# Patient Record
Sex: MALE | Race: WHITE | Employment: OTHER | URBAN - METROPOLITAN AREA
[De-identification: names, ages, dates, MRNs, and addresses within clinical notes are randomized per-mention and may not be internally consistent; named-entity substitution may affect disease eponyms.]

---

## 2017-01-04 RX ORDER — CLOPIDOGREL BISULFATE 75 MG/1
TABLET ORAL
Qty: 90 TAB | Refills: 3 | Status: SHIPPED | OUTPATIENT
Start: 2017-01-04 | End: 2018-02-16 | Stop reason: SDUPTHER

## 2017-01-04 RX ORDER — SIMVASTATIN 40 MG/1
TABLET, FILM COATED ORAL
Qty: 90 TAB | Refills: 3 | Status: SHIPPED | OUTPATIENT
Start: 2017-01-04 | End: 2018-02-18 | Stop reason: SDUPTHER

## 2017-01-04 RX ORDER — AMLODIPINE BESYLATE 5 MG/1
TABLET ORAL
Qty: 90 TAB | Refills: 3 | Status: SHIPPED | OUTPATIENT
Start: 2017-01-04 | End: 2018-02-18 | Stop reason: SDUPTHER

## 2017-03-03 ENCOUNTER — OFFICE VISIT (OUTPATIENT)
Dept: CARDIOLOGY CLINIC | Age: 82
End: 2017-03-03

## 2017-03-03 VITALS
SYSTOLIC BLOOD PRESSURE: 144 MMHG | DIASTOLIC BLOOD PRESSURE: 80 MMHG | OXYGEN SATURATION: 96 % | HEIGHT: 68 IN | BODY MASS INDEX: 25.16 KG/M2 | WEIGHT: 166 LBS | HEART RATE: 65 BPM

## 2017-03-03 DIAGNOSIS — I25.10 ATHEROSCLEROSIS OF NATIVE CORONARY ARTERY OF NATIVE HEART WITHOUT ANGINA PECTORIS: Primary | ICD-10-CM

## 2017-03-03 DIAGNOSIS — E78.5 DYSLIPIDEMIA: ICD-10-CM

## 2017-03-03 DIAGNOSIS — R09.89 BRUIT: ICD-10-CM

## 2017-03-03 DIAGNOSIS — I77.9 BILATERAL CAROTID ARTERY DISEASE (HCC): ICD-10-CM

## 2017-03-03 DIAGNOSIS — I44.7 LEFT BUNDLE BRANCH BLOCK: ICD-10-CM

## 2017-03-03 DIAGNOSIS — I11.9 BENIGN HYPERTENSIVE HEART DISEASE WITHOUT HEART FAILURE: ICD-10-CM

## 2017-03-03 NOTE — MR AVS SNAPSHOT
Visit Information Date & Time Provider Department Dept. Phone Encounter #  
 3/3/2017  8:00 AM Oma Stephens MD [facility] [phone] [id number] Follow-up Instructions Return in about 6 months (around 9/3/2017). Follow-up and Disposition History Your Appointments 6/22/2017  8:00 AM  
LAB with Yarely Varela MD  
[facility] [employer] (--) Appt Note: 6 mo f/u lab [address] [id number] [phone]  
  
   
 [address] [place] [address] [place] [address]  
  
    
 6/29/2017  8:00 AM  
ROUTINE CARE with Yarely Varela MD  
[facility] [employer] (--) Appt Note: 6 mo f/u &mwv   
 [address] [id number] [phone]  
  
   
 [address] [place] [address],[address] [place] [address] Upcoming Health Maintenance Date Due DTaP/Tdap/Td series (1 - Tdap) 2/5/1947 GLAUCOMA SCREENING Q2Y 4/1/2015 Pneumococcal 65+ High/Highest Risk (2 of 2 - PPSV23) 8/9/2016 MEDICARE YEARLY EXAM 6/15/2017 Allergies as of 3/3/2017  Review Complete On: 3/3/2017 By: Oma Stephens MD  
 No Known Allergies Current Immunizations  Reviewed on 11/10/2010 Name Date Influenza Vaccine Split 11/10/2010  9:13 AM  
  
 Not reviewed this visit You Were Diagnosed With   
  
 Codes Comments Atherosclerosis of native coronary artery of native heart without angina pectoris    -  Primary ICD-10-CM: I25.10 ICD-9-CM: 414.01 Bilateral carotid artery disease ([facility] [address]     ICD-10-CM: I77.9 ICD-9-CM: 447.9 Bruit     ICD-10-CM: R09.89 ICD-9-CM: 670. 9 Left bundle branch block     ICD-10-CM: I44.7 ICD-9-CM: 426.3 Benign hypertensive heart disease without heart failure     ICD-10-CM: I11.9 ICD-9-CM: 402.10 Dyslipidemia     ICD-10-CM: E78.5 ICD-9-CM: 272.4 Vitals BP  
  
  
  
  
  
 144/80 Vitals History BMI and BSA Data Body Mass Index Body Surface Area  
 25.24 kg/m 2 1.9 m 2 Preferred Pharmacy Pharmacy Name Phone Tiffani Cedillo [address] [place] [address] [phone] Your Updated Medication List  
  
   
This list is accurate as of: 3/3/17  8:59 AM.  Always use your most recent med list.  
  
  
  
  
 albuterol 90 mcg/actuation inhaler Commonly known as:  PROVENTIL HFA, VENTOLIN HFA, PROAIR HFA Take 2 Puffs by inhalation every four (4) hours as needed for Wheezing or Shortness of Breath. amLODIPine 5 mg tablet Commonly known as:  Suzon Salle TAKE 1 TABLET EVERY DAY  
  
 aspirin delayed-release 81 mg tablet Take 81 mg by mouth daily. clopidogrel 75 mg Tab Commonly known as:  PLAVIX TAKE 1 TABLET EVERY DAY  
  
 fluticasone-salmeterol 100-50 mcg/dose diskus inhaler Commonly known as:  ADVAIR DISKUS Take 1 Puff by inhalation daily. Indications: BRONCHIAL ASTHMA  
  
 simvastatin 40 mg tablet Commonly known as:  ZOCOR  
TAKE 1 TABLET EVERY NIGHT  
  
 tadalafil 20 mg tablet Commonly known as:  CIALIS Take 1 Tab by mouth as needed. tamsulosin 0.4 mg capsule Commonly known as:  FLOMAX Take 1 Cap by mouth daily. We Performed the Following AMB POC EKG ROUTINE W/ 12 LEADS, INTER & REP [25251 CPT(R)] Follow-up Instructions Return in about 6 months (around 9/3/2017). To-Do List   
 03/03/2017 Imaging:  DUPLEX CAROTID BILATERAL   
  
 03/08/2017 11:00 AM  
  Appointment with [facility] at [facility] ([phone]) No preparation is required for this study. Patient can have their meals and take their medications. Patient should not wear a turtleneck or high neck shirt for this study. Please report to the main location @ [address] [place] [address] approximately 30 minutes prior to your appointment time.   The entrance is located on the RIGHT side of the street, immediately adjacent to the Emergency Room. Introducing [facility] & HEALTH SERVICES! [facility] introduces LicenseStream patient portal. Now you can access parts of your medical record, email your doctor's office, and request medication refills online. 1. In your internet browser, go to [email or web address] 2. Click on the First Time User? Click Here link in the Sign In box. You will see the New Member Sign Up page. 3. Enter your LicenseStream Access Code exactly as it appears below. You will not need to use this code after youve completed the sign-up process. If you do not sign up before the expiration date, you must request a new code. · LicenseStream Access Code: [email or web address] Expires: 3/22/2017  8:10 AM 
 
4. Enter the last four digits of your Social Security Number (xxxx) and Date of Birth (mm/dd/yyyy) as indicated and click Submit. You will be taken to the next sign-up page. 5. Create a LicenseStream ID. This will be your LicenseStream login ID and cannot be changed, so think of one that is secure and easy to remember. 6. Create a LicenseStream password. You can change your password at any time. 7. Enter your Password Reset Question and Answer. This can be used at a later time if you forget your password. 8. Enter your e-mail address. You will receive e-mail notification when new information is available in [address] 9. Click Sign Up. You can now view and download portions of your medical record. 10. Click the Download Summary menu link to download a portable copy of your medical information. If you have questions, please visit the Frequently Asked Questions section of the LicenseStream website. Remember, LicenseStream is NOT to be used for urgent needs. For medical emergencies, dial 911. Now available from your iPhone and Android! Please provide this summary of care documentation to your next provider. Your primary care clinician is listed as DENISE HERRERA.  If you have any questions after today's visit, please call [phone].

## 2017-03-03 NOTE — PROGRESS NOTES
1. Have you been to the ER, urgent care clinic since your last visit? Hospitalized since your last visit? No    2. Have you seen or consulted any other health care providers outside of the [address] [place] [address] since your last visit? Include any pap smears or colon screening.  No

## 2017-03-13 ENCOUNTER — HOSPITAL ENCOUNTER (OUTPATIENT)
Dept: VASCULAR SURGERY | Age: 82
Discharge: HOME | End: 2017-03-13
Attending: INTERNAL MEDICINE
Payer: MEDICARE

## 2017-03-13 DIAGNOSIS — I25.10 ATHEROSCLEROSIS OF NATIVE CORONARY ARTERY OF NATIVE HEART WITHOUT ANGINA PECTORIS: ICD-10-CM

## 2017-03-13 DIAGNOSIS — R09.89 BRUIT: ICD-10-CM

## 2017-03-13 PROCEDURE — 93880 EXTRACRANIAL BILAT STUDY: CPT

## 2017-03-13 NOTE — PROGRESS NOTES
Bilateral carotid artery duplex exam complete; preliminary findings to follow. Armband removed and patient discharged.

## 2017-03-13 NOTE — PROCEDURES
[facility]  *** FINAL REPORT ***    Name: Brandyn Greenberg  MRN: [id number]    Outpatient  : 1926  HIS Order #: [id number]  [address] [facility] Visit #: [id number]  Date: 13 Mar 2017    TYPE OF TEST: Cerebrovascular Duplex    REASON FOR TEST  Carotid bruit    Right Carotid:-             Proximal               Mid                 Distal  cm/s  Systolic  Diastolic  Systolic  Diastolic  Systolic  Diastolic  CCA:     97.5       8.0       85.0      13.0       92.0      14.0  Bulb:  ECA:    187.0       0.0  ICA:    184.0      35.0      152.0      22.0       90.0      21.0  ICA/CCA:  2.0       2.5    ICA Stenosis: 50-69%    Right Vertebral:-  Finding: Antegrade  Sys:       81.0  Estefania:       19.0    Right Subclavian: <50% stenosis    Left Carotid:-            Proximal                Mid                 Distal  cm/s  Systolic  Diastolic  Systolic  Diastolic  Systolic  Diastolic  CCA:     93.0      14.0       94.0      14.0       86.0      14.0  Bulb:  ECA:    106.0       0.0  ICA:    258.0      42.0      275.0      57.0       84.0      17.0  ICA/CCA:  2.9       4.1    ICA Stenosis: 50-69%    Left Vertebral:-  Finding: Antegrade  Sys:       82.0  Estefania:       16.0    Left Subclavian: <50% stenosis    INTERPRETATION/FINDINGS  Duplex images were obtained using 2-D gray scale, color flow and  spectral doppler analysis. 1. Bilateral 50-69% stenosis of the internal carotid arteries. 2. No significant stenosis in the external carotid arteries  bilaterally. 3. Antegrade flow in both vertebral arteries. 4. Biphasic doppler signals in the bilateral subclavian arteries. 5. Brachial pressures are 140 mmhg on the right side and 150 mmhg on  the left side. Plaque Morphology:  1. Irregular varying density plaque in the bulb and right ICA. 2. Irregular varying density plaque in the bulb and left ICA.     ADDITIONAL COMMENTS  Retrospective Comparison: No significant changes of the bilateral  internal arteries when compared to previous exam performed 3/2/2016. I have personally reviewed the data relevant to the interpretation of  this  study. TECHNOLOGIST: Hipolito Hancock RVT  Signed: 03/13/2017 01:09 PM    PHYSICIAN: Angelo Farley MD  Signed: 03/14/2017 09:22 AM

## 2017-03-17 NOTE — PROGRESS NOTES
Per your last note \" Carotid artery disease. The patient has moderate bilateral ICA stenosis last evaluated by a carotid duplex in March 2016, which was unchanged compared to the year prior, but I would continue to reevaluate this on an annual basis. He remains on Plavix as well, which I would continue.

## 2017-04-20 ENCOUNTER — TELEPHONE (OUTPATIENT)
Dept: CARDIOLOGY CLINIC | Age: 82
End: 2017-04-20

## 2017-04-20 NOTE — TELEPHONE ENCOUNTER
----- Message from Darleen Farley MD sent at 3/22/2017  2:24 PM EDT -----  Patient's carotid artery disease remains unchanged on his recent follow-up duplex scan. This remains moderate bilaterally. We can continue to assess annually  ----- Message -----     From: Della Caro RN     Sent: 3/17/2017   4:12 PM       To: Darleen Farley MD    Per your last note \" Carotid artery disease. The patient has moderate bilateral ICA stenosis last evaluated by a carotid duplex in March 2016, which was unchanged compared to the year prior, but I would continue to reevaluate this on an annual basis. He remains on Plavix as well, which I would continue.

## 2017-06-22 ENCOUNTER — HOSPITAL ENCOUNTER (OUTPATIENT)
Dept: LAB | Age: 82
Discharge: HOME | End: 2017-06-22

## 2017-06-22 PROCEDURE — 99001 SPECIMEN HANDLING PT-LAB: CPT | Performed by: INTERNAL MEDICINE

## 2017-06-29 ENCOUNTER — OFFICE VISIT (OUTPATIENT)
Dept: INTERNAL MEDICINE CLINIC | Age: 82
End: 2017-06-29

## 2017-06-29 VITALS
TEMPERATURE: 97.7 F | RESPIRATION RATE: 16 BRPM | OXYGEN SATURATION: 96 % | WEIGHT: 163.9 LBS | HEART RATE: 61 BPM | BODY MASS INDEX: 24.84 KG/M2 | DIASTOLIC BLOOD PRESSURE: 62 MMHG | SYSTOLIC BLOOD PRESSURE: 139 MMHG | HEIGHT: 68 IN

## 2017-06-29 DIAGNOSIS — I25.10 ATHEROSCLEROSIS OF NATIVE CORONARY ARTERY OF NATIVE HEART WITHOUT ANGINA PECTORIS: ICD-10-CM

## 2017-06-29 DIAGNOSIS — R25.1 TREMOR OF RIGHT HAND: ICD-10-CM

## 2017-06-29 DIAGNOSIS — Z00.00 ROUTINE GENERAL MEDICAL EXAMINATION AT A HEALTH CARE FACILITY: Primary | ICD-10-CM

## 2017-06-29 DIAGNOSIS — E78.5 DYSLIPIDEMIA: ICD-10-CM

## 2017-06-29 DIAGNOSIS — I11.9 BENIGN HYPERTENSIVE HEART DISEASE WITHOUT HEART FAILURE: ICD-10-CM

## 2017-06-29 NOTE — PATIENT INSTRUCTIONS

## 2017-06-29 NOTE — MR AVS SNAPSHOT
Visit Information Date & Time Provider Department Dept. Phone Encounter #  
 6/29/2017  8:00 AM Hannah Davies MD [facility] [phone] Follow-up Instructions Return in about 6 months (around 12/29/2017) for labs 1 week before. Your Appointments 9/15/2017  8:20 AM  
Follow Up with Ky Chopra MD  
[facility] ([address]) Appt Note: 6 month follow up [place] [address] [address] [address] [phone] [facility] [address] Upcoming Health Maintenance Date Due DTaP/Tdap/Td series (1 - Tdap) 2/5/1947 GLAUCOMA SCREENING Q2Y 4/1/2015 Pneumococcal 65+ High/Highest Risk (2 of 2 - PPSV23) 8/9/2016 MEDICARE YEARLY EXAM 6/15/2017 Allergies as of 6/29/2017  Review Complete On: 6/29/2017 By: Hannah Davies MD  
 No Known Allergies Current Immunizations  Reviewed on 11/10/2010 Name Date Influenza Vaccine Split 11/10/2010  9:13 AM  
  
 Not reviewed this visit You Were Diagnosed With   
  
 Codes Comments Routine general medical examination at a health care facility    -  Primary ICD-10-CM: Z00.00 ICD-9-CM: V70.0 Vitals BP Pulse Temp Resp Height(growth percentile) Weight(growth percentile) 139/62 61 97.7 °F (36.5 °C) (Oral) 16 5' 8\" (1.727 m) 163 lb 14.4 oz (74.3 kg) SpO2 BMI Smoking Status 96% 24.92 kg/m2 Former Smoker Vitals History BMI and BSA Data Body Mass Index Body Surface Area 24.92 kg/m 2 1.89 m 2 Preferred Pharmacy Pharmacy Name Phone [address] [phone] Your Updated Medication List  
  
   
This list is accurate as of: 6/29/17  8:25 AM.  Always use your most recent med list.  
  
  
  
  
 albuterol 90 mcg/actuation inhaler Commonly known as:  PROVENTIL HFA, VENTOLIN HFA, PROAIR HFA Take 2 Puffs by inhalation every four (4) hours as needed for Wheezing or Shortness of Breath. amLODIPine 5 mg tablet Commonly known as:  Benjiman Floro TAKE 1 TABLET EVERY DAY  
  
 aspirin delayed-release 81 mg tablet Take 81 mg by mouth daily. clopidogrel 75 mg Tab Commonly known as:  PLAVIX TAKE 1 TABLET EVERY DAY  
  
 fluticasone-salmeterol 100-50 mcg/dose diskus inhaler Commonly known as:  ADVAIR DISKUS Take 1 Puff by inhalation daily. Indications: BRONCHIAL ASTHMA  
  
 simvastatin 40 mg tablet Commonly known as:  ZOCOR  
TAKE 1 TABLET EVERY NIGHT  
  
 tadalafil 20 mg tablet Commonly known as:  CIALIS Take 1 Tab by mouth as needed. tamsulosin 0.4 mg capsule Commonly known as:  FLOMAX Take 1 Cap by mouth daily. Follow-up Instructions Return in about 6 months (around 12/29/2017) for labs 1 week before. Patient Instructions A Healthy Lifestyle: Care Instructions Your Care Instructions A healthy lifestyle can help you feel good, stay at a healthy weight, and have plenty of energy for both work and play. A healthy lifestyle is something you can share with your whole family. A healthy lifestyle also can lower your risk for serious health problems, such as high blood pressure, heart disease, and diabetes. You can follow a few steps listed below to improve your health and the health of your family. Follow-up care is a key part of your treatment and safety. Be sure to make and go to all appointments, and call your doctor if you are having problems. Its also a good idea to know your test results and keep a list of the medicines you take. How can you care for yourself at home? · Do not eat too much sugar, fat, or fast foods. You can still have dessert and treats now and then. The goal is moderation. · Start small to improve your eating habits.  Pay attention to portion sizes, drink less juice and soda pop, and eat more fruits and vegetables. ¨ Eat a healthy amount of food. A 3-ounce serving of meat, for example, is about the size of a deck of cards. Fill the rest of your plate with vegetables and whole grains. ¨ Limit the amount of soda and sports drinks you have every day. Drink more water when you are thirsty. ¨ Eat at least 5 servings of fruits and vegetables every day. It may seem like a lot, but it is not hard to reach this goal. A serving or helping is 1 piece of fruit, 1 cup of vegetables, or 2 cups of leafy, raw vegetables. Have an apple or some carrot sticks as an afternoon snack instead of a candy bar. Try to have fruits and/or vegetables at every meal. 
· Make exercise part of your daily routine. You may want to start with simple activities, such as walking, bicycling, or slow swimming. Try to be active 30 to 60 minutes every day. You do not need to do all 30 to 60 minutes all at once. For example, you can exercise 3 times a day for 10 or 20 minutes. Moderate exercise is safe for most people, but it is always a good idea to talk to your doctor before starting an exercise program. 
· Keep moving. Javier Dies the lawn, work in the garden, or Zen Planner. Take the stairs instead of the elevator at work. · If you smoke, quit. People who smoke have an increased risk for heart attack, stroke, cancer, and other lung illnesses. Quitting is hard, but there are ways to boost your chance of quitting tobacco for good. ¨ Use nicotine gum, patches, or lozenges. ¨ Ask your doctor about stop-smoking programs and medicines. ¨ Keep trying. In addition to reducing your risk of diseases in the future, you will notice some benefits soon after you stop using tobacco. If you have shortness of breath or asthma symptoms, they will likely get better within a few weeks after you quit. · Limit how much alcohol you drink.  Moderate amounts of alcohol (up to 2 drinks a day for men, 1 drink a day for women) are okay. But drinking too much can lead to liver problems, high blood pressure, and other health problems. Family health If you have a family, there are many things you can do together to improve your health. · Eat meals together as a family as often as possible. · Eat healthy foods. This includes fruits, vegetables, lean meats and dairy, and whole grains. · Include your family in your fitness plan. Most people think of activities such as jogging or tennis as the way to fitness, but there are many ways you and your family can be more active. Anything that makes you breathe hard and gets your heart pumping is exercise. Here are some tips: 
¨ Walk to do errands or to take your child to school or the bus. ¨ Go for a family bike ride after dinner instead of watching TV. Where can you learn more? Go to http://monty-nirali.info/. Enter E677 in the search box to learn more about \"A Healthy Lifestyle: Care Instructions. \" Current as of: July 26, 2016 Content Version: 11.3 © 1491-5750 Bridg. Care instructions adapted under license by [employer] (which disclaims liability or warranty for this information). If you have questions about a medical condition or this instruction, always ask your healthcare professional. [address] any warranty or liability for your use of this information. Introducing [facility] & HEALTH SERVICES! Shyann Field introduces [email or web address] patient portal. Now you can access parts of your medical record, email your doctor's office, and request medication refills online. 1. In your internet browser, go to [email or web address] 2. Click on the First Time User? Click Here link in the Sign In box. You will see the New Member Sign Up page. 3. Enter your [email or web address] Access Code exactly as it appears below.  You will not need to use this code after youve completed the sign-up process. If you do not sign up before the expiration date, you must request a new code. · Xignite Access Code: [email or web address] Expires: 7/1/2017  2:26 PM 
 
4. Enter the last four digits of your Social Security Number (xxxx) and Date of Birth (mm/dd/yyyy) as indicated and click Submit. You will be taken to the next sign-up page. 5. Create a Xignite ID. This will be your Xignite login ID and cannot be changed, so think of one that is secure and easy to remember. 6. Create a Xignite password. You can change your password at any time. 7. Enter your Password Reset Question and Answer. This can be used at a later time if you forget your password. 8. Enter your e-mail address. You will receive e-mail notification when new information is available in [address]. 9. Click Sign Up. You can now view and download portions of your medical record. 10. Click the Download Summary menu link to download a portable copy of your medical information. If you have questions, please visit the Frequently Asked Questions section of the Xignite website. Remember, Xignite is NOT to be used for urgent needs. For medical emergencies, dial 911. Now available from your iPhone and Android! Please provide this summary of care documentation to your next provider. Your primary care clinician is listed as DENISE HERRERA. If you have any questions after today's visit, please call [phone].

## 2017-06-29 NOTE — PROGRESS NOTES
Subjective:       Chief Complaint  The patient presents for follow up of hypertension and high cholesterol. Asthma         HPI  Billy Segura is a 80 y.o. male seen for follow up of hyperlipidemia. Healponcho has hypertension. Hypertension well controlled, no significant medication side effects noted, on Norvasc , hyperlipidemia well controlled, no significant medication side effects noted, on Zocor. Diet and Lifestyle: generally follows a low fat low cholesterol diet, exercises regularly    Home BP Monitoring: is not measured at home. Other symptoms and concerns: Pt using Advair and albuterol intermittently for his asthma. Pt doing very well for his age. Bowling and playing Golf up to 3x/week. Pt c/o tremor left hand for the last year but it has not worsened and does not prevent him from doing his daily activities. Current Outpatient Prescriptions   Medication Sig    simvastatin (ZOCOR) 40 mg tablet TAKE 1 TABLET EVERY NIGHT    clopidogrel (PLAVIX) 75 mg tab TAKE 1 TABLET EVERY DAY    amLODIPine (NORVASC) 5 mg tablet TAKE 1 TABLET EVERY DAY    fluticasone-salmeterol (ADVAIR DISKUS) 100-50 mcg/dose diskus inhaler Take 1 Puff by inhalation daily. Indications: BRONCHIAL ASTHMA    tamsulosin (FLOMAX) 0.4 mg capsule Take 1 Cap by mouth daily.  aspirin delayed-release 81 mg tablet Take 81 mg by mouth daily.  tadalafil (CIALIS) 20 mg tablet Take 1 Tab by mouth as needed.  albuterol (PROVENTIL HFA, VENTOLIN HFA, PROAIR HFA) 90 mcg/actuation inhaler Take 2 Puffs by inhalation every four (4) hours as needed for Wheezing or Shortness of Breath. No current facility-administered medications for this visit.               Review of Systems  Respiratory: negative for dyspnea on exertion  Cardiovascular: negative for chest pain    Objective:     Visit Vitals    /62    Pulse 61    Temp 97.7 °F (36.5 °C) (Oral)    Resp 16    Ht 5' 8\" (1.727 m)    Wt 163 lb 14.4 oz (74.3 kg)    SpO2 96%    BMI 24.92 kg/m2        General appearance - alert, well appearing, and in no distress  Neck - supple, no significant adenopathy, carotids upstroke normal bilaterally, no bruits  Chest - clear to auscultation, no wheezes, rales or rhonchi, symmetric air entry  Heart - normal rate, regular rhythm, normal S1, S2, no murmurs, rubs, clicks or gallops  Extremities - peripheral pulses normal, no pedal edema, no clubbing or cyanosis  Skin - normal coloration and turgor, no rashes, no suspicious skin lesions noted      Labs:   Lab Results   Component Value Date/Time    Cholesterol, total 113 06/22/2017 07:51 AM    HDL Cholesterol 49 06/22/2017 07:51 AM    LDL, calculated 45 06/22/2017 07:51 AM    Triglyceride 97 06/22/2017 07:51 AM    CHOL/HDL Ratio 4.5 11/09/2010 07:51 AM     Lab Results   Component Value Date/Time    ALT (SGPT) 12 06/22/2017 07:51 AM    AST (SGOT) 13 06/22/2017 07:51 AM    Alk. phosphatase 45 06/22/2017 07:51 AM    Bilirubin, total 0.5 06/22/2017 07:51 AM     Lab Results   Component Value Date/Time    GFR est AA 90 06/22/2017 07:51 AM    GFR est non-AA 78 06/22/2017 07:51 AM    Creatinine 0.80 06/22/2017 07:51 AM    BUN 15 06/22/2017 07:51 AM    Sodium 145 06/22/2017 07:51 AM    Potassium 4.1 06/22/2017 07:51 AM    Chloride 103 06/22/2017 07:51 AM    CO2 25 06/22/2017 07:51 AM            Assessment / Plan     Hypertension well controlled, on Norvasc   Hyperlipidemia well controlled, on Zocor. ICD-10-CM ICD-9-CM    1. Routine general medical examination at a health care facility Z00.00 V70.0    2. Hypertension I11.9 402.10 Well controlled on current meds METABOLIC PANEL, COMPREHENSIVE   3. Dyslipidemia E78.5 272.4 Well controlled on statin LIPID PANEL   4. Atherosclerosis of native coronary artery of native heart without angina pectoris I25.10 414.01 Stable on current meds. Pt is followed by cardiology    5. Tremor of right hand R25.1 781.0 Pt will continue to monitor.  If worsening will refer to neurology                  Low cholesterol diet, weight control and daily exercise discussed. Follow-up Disposition:  Return in about 6 months (around 12/29/2017) for labs 1 week before. Reviewed plan of care. Patient has pro`vided input and agrees with goals.

## 2017-06-29 NOTE — PROGRESS NOTES
Pt is here for 6 month follow up. HTN, Chol. Labs done  Medicare Wellness Exam   Pt c/o tremors in his L hand, that are visible to this nurse. 1. Have you been to the ER, urgent care clinic since your last visit? Hospitalized since your last visit? No    2. Have you seen or consulted any other health care providers outside of the [address] [place] [address] since your last visit? Include any pap smears or colon screening. No        This is a Subsequent Medicare Annual Wellness Visit providing Personalized Prevention Plan Services (PPPS) (Performed 12 months after initial AWV and PPPS )    I have reviewed the patient's medical history in detail and updated the computerized patient record. History     Past Medical History:   Diagnosis Date    Asthma     Cardiac cath 04/28/2011    oLM 30%. pLAD 30%. oD1 50%. CX 90% (3 x 18 [place] DEMAR). dRCA 90%. RPLB subtotal.  RPDA 100%.  Cardiac echocardiogram 01/21/2014    EF 55-60%. Mod LVH. Gr 1 DDfx. Mild AS (mean grad 13). Mild AI. Unchanged from study of 11/30/11.  Cardiac nuclear imaging test, mod risk 01/22/2016    Intermediate risk. Medium-sized inferior, inferoseptal infarction. No ischemia. EF 54%. Nondiagnostic EKG on pharm stress test.    Carotid duplex 03/02/2016    Mod 50-69% bilateral ICA stenosis. Probable significant RECA stenosis. >50% stenosis of left subclavian. No significant change from study of 1/8/15.  Coronary artery disease     Status post PCI with drug-eluting stent, [place] 3 x 18 mm in the proximal circumflex.  Hx of carotid artery disease ([facility]     Hypercholesterolemia     Hypertension     Prostate cancer [facility])       Past Surgical History:   Procedure Laterality Date    HX CORONARY STENT PLACEMENT  4/28/11    PCI with drug-eluting stent, [place] 3 x 18 mm in the proximal circumflex (post dialated with 3.25 mm noncompliant balloon at high pressure).      Current Outpatient Prescriptions   Medication Sig Dispense Refill    simvastatin (ZOCOR) 40 mg tablet TAKE 1 TABLET EVERY NIGHT 90 Tab 3    clopidogrel (PLAVIX) 75 mg tab TAKE 1 TABLET EVERY DAY 90 Tab 3    amLODIPine (NORVASC) 5 mg tablet TAKE 1 TABLET EVERY DAY 90 Tab 3    fluticasone-salmeterol (ADVAIR DISKUS) 100-50 mcg/dose diskus inhaler Take 1 Puff by inhalation daily. Indications: BRONCHIAL ASTHMA 3 Inhaler 3    tamsulosin (FLOMAX) 0.4 mg capsule Take 1 Cap by mouth daily. 30 Cap 5    aspirin delayed-release 81 mg tablet Take 81 mg by mouth daily.  tadalafil (CIALIS) 20 mg tablet Take 1 Tab by mouth as needed. 3 Tab 0    albuterol (PROVENTIL HFA, VENTOLIN HFA, PROAIR HFA) 90 mcg/actuation inhaler Take 2 Puffs by inhalation every four (4) hours as needed for Wheezing or Shortness of Breath. 1 Inhaler 1     No Known Allergies  History reviewed. No pertinent family history. Social History   Substance Use Topics    Smoking status: Former Smoker     Packs/day: 1.00     Years: 15.00     Quit date: 5/28/1960    Smokeless tobacco: Never Used    Alcohol use No     Patient Active Problem List   Diagnosis Code    Asthma J45.909    Prostate cancer ([facility] C61    Hypercholesterolemia E78.00    [facility], [facility] I20.9    Left bundle branch block I44.7    Coronary artery disease I25.10    Hypertension I11.9    Dyslipidemia E78.5    Carotid artery disease (HCC) I77.9       Depression Risk Factor Screening:     PHQ over the last two weeks 6/29/2017   Little interest or pleasure in doing things Not at all   Feeling down, depressed or hopeless Not at all   Total Score PHQ 2 0     Alcohol Risk Factor Screening:   Pt drinks 1-2 glasses of red wine a week      Functional Ability and Level of Safety:     Hearing Loss   \"I don't hear like I used to\"    Activities of Daily Living   {No assistance with ADL's  Fall Risk     Fall Risk Assessment, last 12 mths 6/29/2017   Able to walk? Yes   Fall in past 12 months?  No     Abuse Screen   Pt is not abused. Review of Systems   A comprehensive review of systems was negative except for that written in the HPI. Physical Examination     Evaluation of Cognitive Function:  Mood/affect:  Very pleasant  Appearance does not look stated age  Family member/caregiver input: none    Visit Vitals    /62    Pulse 61    Temp 97.7 °F (36.5 °C) (Oral)    Resp 16    Ht 5' 8\" (1.727 m)    Wt 163 lb 14.4 oz (74.3 kg)    SpO2 96%    BMI 24.92 kg/m2       Patient Care Team:  Srinivas Flores MD as PCP - General  Jewel Torres MD as Physician (Cardiology)    Advice/Referrals/Counseling   Education and counseling provided:  Are appropriate based on today's review and evaluation  End-of-Life planning (with patient's consent)     Glaucoma Screening-  Has not been in many years. Due to age not interested in going since no problems currently   Pneumonia Vaccine- UTD  Shingles Vaccine- is due, patient declined today  Tdap Vaccine- is due, declined today  Colonoscopy- none on file, patient states last one performed was normal  PSA- last PSA less than 0.1 in 2013- d/t his age, not recommended to repeat  Advance Directive- none on file pt has one with daughter but confused on getting it to us. Assessment/Plan       ICD-10-CM ICD-9-CM    1. Routine general medical examination at a health care facility Z00.00 V70.0    2. Hypertension R82.6 840.29 METABOLIC PANEL, COMPREHENSIVE   3. Dyslipidemia E78.5 272.4 LIPID PANEL   4. Atherosclerosis of native coronary artery of native heart without angina pectoris I25.10 414.01    5. Tremor of right hand R25.1 781.0    . A comprehensive 5 year plan for medical care and screening exams was reviewed with pt and they received a copy of it.

## 2017-11-02 ENCOUNTER — OFFICE VISIT (OUTPATIENT)
Dept: CARDIOLOGY CLINIC | Age: 82
End: 2017-11-02

## 2017-11-02 VITALS
HEIGHT: 68 IN | BODY MASS INDEX: 24.71 KG/M2 | HEART RATE: 67 BPM | DIASTOLIC BLOOD PRESSURE: 70 MMHG | SYSTOLIC BLOOD PRESSURE: 140 MMHG | WEIGHT: 163 LBS | OXYGEN SATURATION: 97 %

## 2017-11-02 DIAGNOSIS — I11.9 BENIGN HYPERTENSIVE HEART DISEASE WITHOUT HEART FAILURE: ICD-10-CM

## 2017-11-02 DIAGNOSIS — I25.10 ATHEROSCLEROSIS OF NATIVE CORONARY ARTERY OF NATIVE HEART WITHOUT ANGINA PECTORIS: Primary | ICD-10-CM

## 2017-11-02 DIAGNOSIS — I44.7 LEFT BUNDLE BRANCH BLOCK: ICD-10-CM

## 2017-11-02 DIAGNOSIS — E78.00 HYPERCHOLESTEROLEMIA: ICD-10-CM

## 2017-11-02 DIAGNOSIS — I35.0 AORTIC VALVE STENOSIS, ETIOLOGY OF CARDIAC VALVE DISEASE UNSPECIFIED: ICD-10-CM

## 2017-11-02 DIAGNOSIS — I77.9 BILATERAL CAROTID ARTERY DISEASE (HCC): ICD-10-CM

## 2017-11-02 NOTE — MR AVS SNAPSHOT
Visit Information Date & Time Provider Department Dept. Phone Encounter #  
 11/2/2017 10:20 AM Lauren Rome MD [facility] [address] [id number] Your Appointments 12/28/2017  8:00 AM  
LAB with [facility] (BUNNY Akbar) Appt Note: 6 mo f/u lab [address] [facility] [address] [address]  
[phone]  
  
   
 [place] [facility] [address]  
  
    
 1/4/2018  8:00 AM  
Office Visit with Fernando Marroquin MD  
[address] [facility]) Appt Note: 6 mo f/u; .  
 [address], [address] [address]  
[phone]  
  
   
 [address] Upcoming Health Maintenance Date Due DTaP/Tdap/Td series (1 - Tdap) 2/5/1947 GLAUCOMA SCREENING Q2Y 4/1/2015 Pneumococcal 65+ High/Highest Risk (2 of 2 - PPSV23) 8/9/2016 MEDICARE YEARLY EXAM 6/30/2018 Allergies as of 11/2/2017  Review Complete On: 11/2/2017 By: Laila Waters No Known Allergies Current Immunizations  Reviewed on 11/10/2010 Name Date Influenza Vaccine Split 11/10/2010  9:13 AM  
  
 Not reviewed this visit You Were Diagnosed With   
  
 Codes Comments Atherosclerosis of native coronary artery of native heart without angina pectoris    -  Primary ICD-10-CM: I25.10 ICD-9-CM: 414.01 Aortic valve stenosis, etiology of cardiac valve disease unspecified     ICD-10-CM: I35.0 ICD-9-CM: 424.1 Vitals BP Pulse Height(growth percentile) Weight(growth percentile) SpO2 BMI  
 140/70 (BP 1 Location: Left arm, BP Patient Position: Sitting) 67 5' 8\" (1.727 m) 163 lb (73.9 kg) 97% 24.78 kg/m2 Smoking Status Former Smoker Vitals History BMI and BSA Data Body Mass Index Body Surface Area 24.78 kg/m 2 1.88 m 2 Preferred Pharmacy Pharmacy Name Phone [address] [phone] Your Updated Medication List  
  
   
This list is accurate as of: 11/2/17 10:43 AM.  Always use your most recent med list.  
  
  
  
  
 albuterol 90 mcg/actuation inhaler Commonly known as:  PROVENTIL HFA, VENTOLIN HFA, PROAIR HFA Take 2 Puffs by inhalation every four (4) hours as needed for Wheezing or Shortness of Breath. amLODIPine 5 mg tablet Commonly known as:  Dayanna Croon TAKE 1 TABLET EVERY DAY  
  
 aspirin delayed-release 81 mg tablet Take 81 mg by mouth daily. clopidogrel 75 mg Tab Commonly known as:  PLAVIX TAKE 1 TABLET EVERY DAY  
  
 fluticasone-salmeterol 100-50 mcg/dose diskus inhaler Commonly known as:  ADVAIR DISKUS Take 1 Puff by inhalation daily. Indications: BRONCHIAL ASTHMA  
  
 simvastatin 40 mg tablet Commonly known as:  ZOCOR  
TAKE 1 TABLET EVERY NIGHT  
  
 tadalafil 20 mg tablet Commonly known as:  CIALIS Take 1 Tab by mouth as needed. tamsulosin 0.4 mg capsule Commonly known as:  FLOMAX Take 1 Cap by mouth daily. We Performed the Following AMB POC EKG ROUTINE W/ 12 LEADS, INTER & REP [99661 CPT(R)] To-Do List   
 11/02/2017 ECHO:  2D ECHO COMPLETE ADULT (TTE) W OR WO CONTR   
  
 11/17/2017 12:30 PM  
  Appointment with HBV- [id number] MACHINE (WT ) at [facility] NON-INVASIVE CARD ([phone]) Age Limit for ALL Heart procedures @ all [facility] facilities: 18 yrs and older only. Under the age of 25, refer to [facility] ([phone]). Wt Limit: 350lbs. This study requires patient to bring a written physician's order or MD office may fax the order to Central Scheduling at [phone]. Patient needs to bring a current list of all medications. No preparation is required for this study. Patients should report 15 minutes prior to their appointment time to the [facility], [address] [place] [address]/[address]. Introducing [facility]! Ale Ruffin introduces [employer] patient portal. Now you can access parts of your medical record, email your doctor's office, and request medication refills online. 1. In your internet browser, go to [email or web address] 2. Click on the First Time User? Click Here link in the Sign In box. You will see the New Member Sign Up page. 3. Enter your [employer] Access Code exactly as it appears below. You will not need to use this code after youve completed the sign-up process. If you do not sign up before the expiration date, you must request a new code. · [employer] Access Code: [email or web address] Expires: 1/31/2018 10:30 AM 
 
4. Enter the last four digits of your Social Security Number (xxxx) and Date of Birth (mm/dd/yyyy) as indicated and click Submit. You will be taken to the next sign-up page. 5. Create a [employer]andera ID. This will be your [employer] login ID and cannot be changed, so think of one that is secure and easy to remember. 6. Create a [employer]era password. You can change your password at any time. 7. Enter your Password Reset Question and Answer. This can be used at a later time if you forget your password. 8. Enter your e-mail address. You will receive e-mail notification when new information is available in [address] 9. Click Sign Up. You can now view and download portions of your medical record. 10. Click the Download Summary menu link to download a portable copy of your medical information. If you have questions, please visit the Frequently Asked Questions section of the [employer] website. Remember, [employer]era is NOT to be used for urgent needs. For medical emergencies, dial 911. Now available from your iPhone and Android! Please provide this summary of care documentation to your next provider. Your primary care clinician is listed as DENISE HERRERA. If you have any questions after today's visit, please call [phone].

## 2017-11-02 NOTE — PROGRESS NOTES
HISTORY OF PRESENT ILLNESS  Erasto Willard is a 80 y.o. male. Heart Problem   Pertinent negatives include no chest pain, no abdominal pain, no headaches and no shortness of breath. Patient presents for a followup office visit. He was initially seen here for evaluation of angina. He subsequently underwent a cardiac catheterization April 2011, which revealed high-grade stenosis of his left circumflex, status post drug-eluting stent, [place] 3.0 x 18 mm. Patient also was found to have a heavily calcified vessels, including a 30% left main stenosis, 30% LAD stenosis, and RCA had diffuse distal 90% disease with collateralization from the left-sided. He last underwent a pharmacological nuclear stress test in January 2016, which showed a predominantly fixed inferior perfusion defect, likely from a previous MI, Low-normal left ventricular ejection fraction of 54%. This has not significantly changed compared to his prior study from 2013. He also has a history of moderate bilateral carotid artery disease, last evaluated by duplex in March 2017 which has remained unchanged over the past few years  . The patient was last seen in the office approximately 8 months ago. Since that time, he denies any change in his activity tolerance. He still tries to ball and golf one day a week respectively. He denies any exertional chest pain or shortness of breath. He continues to have a resting left hand tremor. He denies any leg swelling, no orthopnea, no PND. No palpitations, dizziness or divya syncope. Past Medical History:   Diagnosis Date    Asthma     Cardiac cath 04/28/2011    oLM 30%. pLAD 30%. oD1 50%. CX 90% (3 x 18 [place] DEMAR). dRCA 90%. RPLB subtotal.  RPDA 100%.  Cardiac echocardiogram 01/21/2014    EF 55-60%. Mod LVH. Gr 1 DDfx. Mild AS (mean grad 13). Mild AI. Unchanged from study of 11/30/11.  Cardiac nuclear imaging test, mod risk 01/22/2016    Intermediate risk.   Medium-sized inferior, inferoseptal infarction. No ischemia. EF 54%. Nondiagnostic EKG on pharm stress test.    Carotid duplex 03/02/2016    Mod 50-69% bilateral ICA stenosis. Probable significant RECA stenosis. >50% stenosis of left subclavian. No significant change from study of 1/8/15.  Coronary artery disease     Status post PCI with drug-eluting stent, [place] 3 x 18 mm in the proximal circumflex.  Hx of carotid artery disease (HCC)     Hypercholesterolemia     Hypertension     Prostate cancer (HCC)       Current Outpatient Prescriptions   Medication Sig Dispense Refill    simvastatin (ZOCOR) 40 mg tablet TAKE 1 TABLET EVERY NIGHT 90 Tab 3    clopidogrel (PLAVIX) 75 mg tab TAKE 1 TABLET EVERY DAY 90 Tab 3    amLODIPine (NORVASC) 5 mg tablet TAKE 1 TABLET EVERY DAY 90 Tab 3    tadalafil (CIALIS) 20 mg tablet Take 1 Tab by mouth as needed. 3 Tab 0    fluticasone-salmeterol (ADVAIR DISKUS) 100-50 mcg/dose diskus inhaler Take 1 Puff by inhalation daily. Indications: BRONCHIAL ASTHMA 3 Inhaler 3    albuterol (PROVENTIL HFA, VENTOLIN HFA, PROAIR HFA) 90 mcg/actuation inhaler Take 2 Puffs by inhalation every four (4) hours as needed for Wheezing or Shortness of Breath. 1 Inhaler 1    tamsulosin (FLOMAX) 0.4 mg capsule Take 1 Cap by mouth daily. 30 Cap 5    aspirin delayed-release 81 mg tablet Take 81 mg by mouth daily. No Known Allergies     Social History   Substance Use Topics    Smoking status: Former Smoker     Packs/day: 1.00     Years: 15.00     Quit date: 5/28/1960    Smokeless tobacco: Never Used    Alcohol use No         Review of Systems   Constitutional: Negative for chills, fever and weight loss. HENT: Negative for nosebleeds. Eyes: Negative for blurred vision and double vision. Respiratory: Negative for cough, shortness of breath and wheezing. Cardiovascular: Negative for chest pain, palpitations, orthopnea, claudication, leg swelling and PND.    Gastrointestinal: Negative for abdominal pain, heartburn, nausea and vomiting. Genitourinary: Negative for dysuria and hematuria. Musculoskeletal: Negative for falls and myalgias. Skin: Negative for rash. Neurological: Negative for dizziness, focal weakness and headaches. Endo/Heme/Allergies: Does not bruise/bleed easily. Psychiatric/Behavioral: Negative for substance abuse. Visit Vitals    /70 (BP 1 Location: Left arm, BP Patient Position: Sitting)    Pulse 67    Ht 5' 8\" (1.727 m)    Wt 73.9 kg (163 lb)    SpO2 97%    BMI 24.78 kg/m2      Physical Exam   Constitutional: He is oriented to person, place, and time. He appears well-developed and well-nourished. HENT:   Head: Normocephalic and atraumatic. Eyes: Conjunctivae are normal.   Neck: Neck supple. No JVD present. Carotid bruit is present (bilateral). Cardiovascular: Normal rate, regular rhythm, S1 normal, S2 normal and normal pulses. Exam reveals no gallop and no S3.    Murmur heard. Systolic murmur is present with a grade of 2/6  at the upper right sternal border  Pulmonary/Chest: Breath sounds normal. He has no wheezes. He has no rales. Abdominal: Soft. Bowel sounds are normal. There is no tenderness. Musculoskeletal: He exhibits no edema. Neurological: He is alert and oriented to person, place, and time. Skin: Skin is warm and dry. EKG: Normal sinus rhythm, leftward axis, left bundle branch block. Occasional PVCs. Compared to the previous EKG, PVCs are now present. ASSESSMENT and PLAN    Coronary disease. Status post drug-eluting stent to the left circumflex, with an [place] 3 x 18 mm stent in 2011. Patient has mild residual ostial left main disease and LAD disease, with heavy calcification, and significant distal RCA disease, which was well collateralized. He underwent a followup pharmacologic nuclear stress test in January 2016, which continued to demonstrate scarring of the inferior wall, but no significant ischemia.    No new symptoms concerning for angina. I would continue his aspirin, Plavix and simvastatin. He has never been on a beta blocker because of his low normal heart rate and left bundle branch block. Carotid artery disease. The patient has moderate bilateral ICA stenosis last evaluated by a carotid duplex in March 2017, which was unchanged compared to the year prior, but I would continue to reevaluate this on an annual basis. He remains on Plavix as well, which I would continue. Peripheral vascular disease. 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 Because of this his blood pressure should be checked in his right arm. Aortic valve stenosis. This was last checked on an echocardiogram in 2014. His murmur appears somewhat more pronounced on exam today, so I have recommended a repeat echocardiogram.    Hypertension. The patient's blood pressure  appears reasonably well-controlled on amlodipine as monotherapy. Dyslipidemia. On simvastatin 40 mg a day. This has been followed by his PCP. Historically this has been well-controlled. Left bundle branch block.  This is again present on today's EKG and has been intermittent in the past.    Followup in the office in 6 months time, sooner if needed

## 2017-11-02 NOTE — PROGRESS NOTES
1. Have you been to the ER, urgent care clinic since your last visit? Hospitalized since your last visit? no  2. Have you seen or consulted any other health care providers outside of the [address] [place] [address] since your last visit? Include any pap smears or colon screening.   no

## 2017-11-17 ENCOUNTER — HOSPITAL ENCOUNTER (OUTPATIENT)
Dept: NON INVASIVE DIAGNOSTICS | Age: 82
Discharge: HOME | End: 2017-11-17
Attending: INTERNAL MEDICINE
Payer: MEDICARE

## 2017-11-17 DIAGNOSIS — I35.0 AORTIC VALVE STENOSIS, ETIOLOGY OF CARDIAC VALVE DISEASE UNSPECIFIED: ICD-10-CM

## 2017-11-17 PROCEDURE — 93306 TTE W/DOPPLER COMPLETE: CPT

## 2017-11-20 NOTE — PROGRESS NOTES
Aortic valve stenosis. This was last checked on an echocardiogram in 2014.   His murmur appears somewhat more pronounced on exam today, so I have recommended a repeat echocardiogram.

## 2017-11-21 ENCOUNTER — TELEPHONE (OUTPATIENT)
Dept: CARDIOLOGY CLINIC | Age: 82
End: 2017-11-21

## 2017-11-21 NOTE — TELEPHONE ENCOUNTER
----- Message from Helene Bone MD sent at 11/21/2017 11:22 AM EST -----  Please let the patient know that his aortic valve stenosis was in the mild range and this is nothing to be concerned about at this point  ----- Message -----     From: Jermaine Naif     Sent: 11/20/2017  10:18 AM       To: Helene Bone MD    Aortic valve stenosis. This was last checked on an echocardiogram in 2014.   His murmur appears somewhat more pronounced on exam today, so I have recommended a repeat echocardiogram.

## 2017-11-21 NOTE — LETTER
11/28/2017 2:58 PM 
 
Mr. Eliza Katz [facility] [address] Dear Mr. Melottiam Gunderson, We have been unable to reach you by phone to notify you of your test results. Please call our office at [phone] and ask to speak with my nurse in order to explain these results to you and advise you of any recommendations. Sincerely, Karen Roach MD

## 2017-12-28 ENCOUNTER — HOSPITAL ENCOUNTER (OUTPATIENT)
Dept: LAB | Age: 82
Discharge: HOME | End: 2017-12-28

## 2017-12-28 ENCOUNTER — LAB ONLY (OUTPATIENT)
Dept: FAMILY MEDICINE CLINIC | Age: 82
End: 2017-12-28

## 2017-12-28 DIAGNOSIS — I11.9 BENIGN HYPERTENSIVE HEART DISEASE WITHOUT HEART FAILURE: Primary | ICD-10-CM

## 2017-12-28 DIAGNOSIS — E78.5 DYSLIPIDEMIA: ICD-10-CM

## 2017-12-28 PROCEDURE — 99001 SPECIMEN HANDLING PT-LAB: CPT | Performed by: INTERNAL MEDICINE

## 2017-12-29 LAB
ALBUMIN SERPL-MCNC: 4.4 G/DL (ref 3.2–4.6)
ALBUMIN/GLOB SERPL: 1.7 {RATIO} (ref 1.2–2.2)
ALP SERPL-CCNC: 45 IU/L (ref 39–117)
ALT SERPL-CCNC: 9 IU/L (ref 0–44)
AST SERPL-CCNC: 13 IU/L (ref 0–40)
BILIRUB SERPL-MCNC: 0.6 MG/DL (ref 0–1.2)
BUN SERPL-MCNC: 17 MG/DL (ref 10–36)
BUN/CREAT SERPL: 17 (ref 10–24)
CALCIUM SERPL-MCNC: 9.3 MG/DL (ref 8.6–10.2)
CHLORIDE SERPL-SCNC: 102 MMOL/L (ref 96–106)
CHOLEST SERPL-MCNC: 124 MG/DL (ref 100–199)
CO2 SERPL-SCNC: 29 MMOL/L (ref 18–29)
CREAT SERPL-MCNC: 1.03 MG/DL (ref 0.76–1.27)
GLOBULIN SER CALC-MCNC: 2.6 G/DL (ref 1.5–4.5)
GLUCOSE SERPL-MCNC: 114 MG/DL (ref 65–99)
HDLC SERPL-MCNC: 43 MG/DL
LDLC SERPL CALC-MCNC: 54 MG/DL (ref 0–99)
POTASSIUM SERPL-SCNC: 4 MMOL/L (ref 3.5–5.2)
PROT SERPL-MCNC: 7 G/DL (ref 6–8.5)
SODIUM SERPL-SCNC: 144 MMOL/L (ref 134–144)
TRIGL SERPL-MCNC: 134 MG/DL (ref 0–149)
VLDLC SERPL CALC-MCNC: 27 MG/DL (ref 5–40)

## 2018-01-01 ENCOUNTER — TELEPHONE (OUTPATIENT)
Dept: CARDIOLOGY CLINIC | Age: 83
End: 2018-01-01

## 2018-01-01 ENCOUNTER — HOSPITAL ENCOUNTER (OUTPATIENT)
Dept: LAB | Age: 83
Discharge: HOME | End: 2018-07-24

## 2018-01-01 ENCOUNTER — OFFICE VISIT (OUTPATIENT)
Dept: FAMILY MEDICINE CLINIC | Age: 83
End: 2018-01-01

## 2018-01-01 VITALS
HEART RATE: 66 BPM | OXYGEN SATURATION: 99 % | HEIGHT: 68 IN | TEMPERATURE: 98.2 F | WEIGHT: 164 LBS | DIASTOLIC BLOOD PRESSURE: 60 MMHG | SYSTOLIC BLOOD PRESSURE: 122 MMHG | BODY MASS INDEX: 24.86 KG/M2 | RESPIRATION RATE: 20 BRPM

## 2018-01-01 DIAGNOSIS — I25.10 ATHEROSCLEROSIS OF NATIVE CORONARY ARTERY OF NATIVE HEART WITHOUT ANGINA PECTORIS: ICD-10-CM

## 2018-01-01 DIAGNOSIS — I11.9 BENIGN HYPERTENSIVE HEART DISEASE WITHOUT HEART FAILURE: ICD-10-CM

## 2018-01-01 DIAGNOSIS — Z00.00 MEDICARE ANNUAL WELLNESS VISIT, SUBSEQUENT: Primary | ICD-10-CM

## 2018-01-01 DIAGNOSIS — E78.5 DYSLIPIDEMIA: ICD-10-CM

## 2018-01-01 DIAGNOSIS — I77.9 BILATERAL CAROTID ARTERY DISEASE (HCC): ICD-10-CM

## 2018-01-01 LAB
ALBUMIN SERPL-MCNC: 3.9 G/DL (ref 3.2–4.6)
ALBUMIN/GLOB SERPL: 1.4 {RATIO} (ref 1.2–2.2)
ALP SERPL-CCNC: 45 IU/L (ref 39–117)
ALT SERPL-CCNC: 7 IU/L (ref 0–44)
AST SERPL-CCNC: 10 IU/L (ref 0–40)
BILIRUB SERPL-MCNC: 0.3 MG/DL (ref 0–1.2)
BUN SERPL-MCNC: 14 MG/DL (ref 10–36)
BUN/CREAT SERPL: 12 (ref 10–24)
CALCIUM SERPL-MCNC: 9.4 MG/DL (ref 8.6–10.2)
CHLORIDE SERPL-SCNC: 103 MMOL/L (ref 96–106)
CHOLEST SERPL-MCNC: 117 MG/DL (ref 100–199)
CO2 SERPL-SCNC: 26 MMOL/L (ref 20–29)
CREAT SERPL-MCNC: 1.16 MG/DL (ref 0.76–1.27)
GLOBULIN SER CALC-MCNC: 2.8 G/DL (ref 1.5–4.5)
GLUCOSE SERPL-MCNC: 125 MG/DL (ref 65–99)
HDLC SERPL-MCNC: 36 MG/DL
LDLC SERPL CALC-MCNC: 39 MG/DL (ref 0–99)
POTASSIUM SERPL-SCNC: 4.1 MMOL/L (ref 3.5–5.2)
PROT SERPL-MCNC: 6.7 G/DL (ref 6–8.5)
SODIUM SERPL-SCNC: 143 MMOL/L (ref 134–144)
TRIGL SERPL-MCNC: 208 MG/DL (ref 0–149)
VLDLC SERPL CALC-MCNC: 42 MG/DL (ref 5–40)

## 2018-01-01 PROCEDURE — 99001 SPECIMEN HANDLING PT-LAB: CPT | Performed by: INTERNAL MEDICINE

## 2018-01-23 ENCOUNTER — TELEPHONE (OUTPATIENT)
Dept: FAMILY MEDICINE CLINIC | Age: 83
End: 2018-01-23

## 2018-01-23 NOTE — TELEPHONE ENCOUNTER
Pt daughter Sonia Holt called concerned about pt left arm shaking. Ms. Sonia Holt states that its gotten worse of time, it started from the hand on up to his arm. Ms. Sonia Holt is also requesting for a call after his appt tomorrow if possible from nurse and/or physician, being that she will not be able to come with the pt to his appt.

## 2018-01-24 ENCOUNTER — TELEPHONE (OUTPATIENT)
Dept: FAMILY MEDICINE CLINIC | Age: 83
End: 2018-01-24

## 2018-01-24 ENCOUNTER — OFFICE VISIT (OUTPATIENT)
Dept: FAMILY MEDICINE CLINIC | Age: 83
End: 2018-01-24

## 2018-01-24 VITALS
BODY MASS INDEX: 24.86 KG/M2 | OXYGEN SATURATION: 98 % | DIASTOLIC BLOOD PRESSURE: 60 MMHG | HEART RATE: 70 BPM | WEIGHT: 164 LBS | TEMPERATURE: 97.7 F | SYSTOLIC BLOOD PRESSURE: 140 MMHG | RESPIRATION RATE: 20 BRPM | HEIGHT: 68 IN

## 2018-01-24 DIAGNOSIS — I11.9 BENIGN HYPERTENSIVE HEART DISEASE WITHOUT HEART FAILURE: Primary | ICD-10-CM

## 2018-01-24 DIAGNOSIS — I77.9 BILATERAL CAROTID ARTERY DISEASE (HCC): ICD-10-CM

## 2018-01-24 DIAGNOSIS — I25.10 ATHEROSCLEROSIS OF NATIVE CORONARY ARTERY OF NATIVE HEART WITHOUT ANGINA PECTORIS: ICD-10-CM

## 2018-01-24 DIAGNOSIS — R25.1 TREMOR OF LEFT HAND: ICD-10-CM

## 2018-01-24 DIAGNOSIS — E78.5 DYSLIPIDEMIA: ICD-10-CM

## 2018-01-24 NOTE — MR AVS SNAPSHOT
[facility] 
 
 
 [address] 203 [address] Debbie Thomas [address] 
[phone] Patient: Shelly Enamorado MRN: [id number] Visit Information Date & Time Provider Department Dept. Phone Encounter #  
 1/24/2018  8:00 AM Subhash Wayne, [address] [place] [address] [phone] [id number] Follow-up Instructions Return in about 6 months (around 7/24/2018) for OV, and Medicare Wellness Visit, labs 1 week before. Your Appointments 5/21/2018 10:40 AM  
Follow Up with Shonda Fung MD  
[facility] [place] [address] ([facility]) Appt Note: 6 month follow up Kyle Gustafson [id number] [phone] [address] Upcoming Health Maintenance Date Due DTaP/Tdap/Td series (1 - Tdap) 2/5/1947 GLAUCOMA SCREENING Q2Y 4/1/2015 Pneumococcal 65+ High/Highest Risk (2 of 2 - PPSV23) 8/9/2016 MEDICARE YEARLY EXAM 6/30/2018 Allergies as of 1/24/2018  Review Complete On: 1/24/2018 By: Subhash Wayne MD  
 No Known Allergies Current Immunizations  Reviewed on 11/10/2010 Name Date Influenza Vaccine Split 11/10/2010  9:13 AM  
  
 Not reviewed this visit You Were Diagnosed With   
  
 Codes Comments Benign hypertensive heart disease without heart failure    -  Primary ICD-10-CM: I11.9 ICD-9-CM: 402.10 Dyslipidemia     ICD-10-CM: E78.5 ICD-9-CM: 272.4 Bilateral carotid artery disease ([address]     ICD-10-CM: I77.9 ICD-9-CM: 425. 9 Atherosclerosis of native coronary artery of native heart without angina pectoris     ICD-10-CM: I25.10 ICD-9-CM: 414.01 Vitals BP Pulse Temp Resp Height(growth percentile) Weight(growth percentile) 140/60 70 97.7 °F (36.5 °C) (Oral) 20 5' 8\" (1.727 m) 164 lb (74.4 kg) SpO2 BMI Smoking Status 98% 24.94 kg/m2 Former Smoker Vitals History BMI and BSA Data Body Mass Index Body Surface Area 24.94 kg/m 2 1.89 m 2 Preferred Pharmacy Pharmacy Name Phone Tiffani Cedillo [address] [place] [address] [address] [phone] Your Updated Medication List  
  
   
This list is accurate as of: 1/24/18  8:25 AM.  Always use your most recent med list.  
  
  
  
  
 albuterol 90 mcg/actuation inhaler Commonly known as:  PROVENTIL HFA, VENTOLIN HFA, PROAIR HFA Take 2 Puffs by inhalation every four (4) hours as needed for Wheezing or Shortness of Breath. amLODIPine 5 mg tablet Commonly known as:  Paulnia [place] TAKE 1 TABLET EVERY DAY  
  
 aspirin delayed-release 81 mg tablet Take 81 mg by mouth daily. clopidogrel 75 mg Tab Commonly known as:  PLAVIX TAKE 1 TABLET EVERY DAY  
  
 fluticasone-salmeterol 100-50 mcg/dose diskus inhaler Commonly known as:  ADVAIR DISKUS Take 1 Puff by inhalation daily. Indications: BRONCHIAL ASTHMA  
  
 simvastatin 40 mg tablet Commonly known as:  ZOCOR  
TAKE 1 TABLET EVERY NIGHT  
  
 tadalafil 20 mg tablet Commonly known as:  CIALIS Take 1 Tab by mouth as needed. tamsulosin 0.4 mg capsule Commonly known as:  FLOMAX Take 1 Cap by mouth daily. Follow-up Instructions Return in about 6 months (around 7/24/2018) for , and Medicare Wellness Visit, labs 1 week before. Patient Instructions High Blood Pressure: Care Instructions Your Care Instructions If your blood pressure is usually above 140/90, you have high blood pressure, or hypertension. That means the top number is 140 or higher or the bottom number is 90 or higher, or both. Despite what a lot of people think, high blood pressure usually doesn't cause headaches or make you feel dizzy or lightheaded. It usually has no symptoms. But it does increase your risk for heart attack, stroke, and kidney or eye damage.  The higher your blood pressure, the more your risk increases. Your doctor will give you a goal for your blood pressure. Your goal will be based on your health and your age. An example of a goal is to keep your blood pressure below 140/90. Lifestyle changes, such as eating healthy and being active, are always important to help lower blood pressure. You might also take medicine to reach your blood pressure goal. 
Follow-up care is a key part of your treatment and safety. Be sure to make and go to all appointments, and call your doctor if you are having problems. It's also a good idea to know your test results and keep a list of the medicines you take. How can you care for yourself at home? Medical treatment · If you stop taking your medicine, your blood pressure will go back up. You may take one or more types of medicine to lower your blood pressure. Be safe with medicines. Take your medicine exactly as prescribed. Call your doctor if you think you are having a problem with your medicine. · Talk to your doctor before you start taking aspirin every day. Aspirin can help certain people lower their risk of a heart attack or stroke. But taking aspirin isn't right for everyone, because it can cause serious bleeding. · See your doctor regularly. You may need to see the doctor more often at first or until your blood pressure comes down. · If you are taking blood pressure medicine, talk to your doctor before you take decongestants or anti-inflammatory medicine, such as ibuprofen. Some of these medicines can raise blood pressure. · Learn how to check your blood pressure at home. Lifestyle changes · Stay at a healthy weight. This is especially important if you put on weight around the waist. Losing even 10 pounds can help you lower your blood pressure. · If your doctor recommends it, get more exercise. Walking is a good choice. Bit by bit, increase the amount you walk every day. Try for at least 30 minutes on most days of the week.  You also may want to swim, bike, or do other activities. · Avoid or limit alcohol. Talk to your doctor about whether you can drink any alcohol. · Try to limit how much sodium you eat to less than 2,300 milligrams (mg) a day. Your doctor may ask you to try to eat less than 1,500 mg a day. · Eat plenty of fruits (such as bananas and oranges), vegetables, legumes, whole grains, and low-fat dairy products. · Lower the amount of saturated fat in your diet. Saturated fat is found in animal products such as milk, cheese, and meat. Limiting these foods may help you lose weight and also lower your risk for heart disease. · Do not smoke. Smoking increases your risk for heart attack and stroke. If you need help quitting, talk to your doctor about stop-smoking programs and medicines. These can increase your chances of quitting for good. When should you call for help? Call 911 anytime you think you may need emergency care. This may mean having symptoms that suggest that your blood pressure is causing a serious heart or blood vessel problem. Your blood pressure may be over 180/110. ? For example, call 911 if: 
? · You have symptoms of a heart attack. These may include: ¨ Chest pain or pressure, or a strange feeling in the chest. 
¨ Sweating. ¨ Shortness of breath. ¨ Nausea or vomiting. ¨ Pain, pressure, or a strange feeling in the back, neck, jaw, or upper belly or in one or both shoulders or arms. ¨ Lightheadedness or sudden weakness. ¨ A fast or irregular heartbeat. ? · You have symptoms of a stroke. These may include: 
¨ Sudden numbness, tingling, weakness, or loss of movement in your face, arm, or leg, especially on only one side of your body. ¨ Sudden vision changes. ¨ Sudden trouble speaking. ¨ Sudden confusion or trouble understanding simple statements. ¨ Sudden problems with walking or balance. ¨ A sudden, severe headache that is different from past headaches. ? · You have severe back or belly pain. ?Do not wait until your blood pressure comes down on its own. Get help right away. ?Call your doctor now or seek immediate care if: 
? · Your blood pressure is much higher than normal (such as 180/110 or higher), but you don't have symptoms. ? · You think high blood pressure is causing symptoms, such as: ¨ Severe headache. ¨ Blurry vision. ? Watch closely for changes in your health, and be sure to contact your doctor if: 
? · Your blood pressure measures 140/90 or higher at least 2 times. That means the top number is 140 or higher or the bottom number is 90 or higher, or both. ? · You think you may be having side effects from your blood pressure medicine. ? · Your blood pressure is usually normal, but it goes above normal at least 2 times. Where can you learn more? Go to http://monty-nirali.info/. Enter T710 in the search box to learn more about \"High Blood Pressure: Care Instructions. \" Current as of: September 21, 2016 Content Version: 11.4 © 3904-7754 [employer]. Care instructions adapted under license by [employer] (which disclaims liability or warranty for this information). If you have questions about a medical condition or this instruction, always ask your healthcare professional. [address] any warranty or liability for your use of this information. Introducing [facility]! Carmelina Mantilla introduces Visual [employer] patient portal. Now you can access parts of your medical record, email your doctor's office, and request medication refills online. 1. In your internet browser, go to [email or web address] 2. Click on the First Time User? Click Here link in the Sign In box. You will see the New Member Sign Up page. 3. Enter your Visual Revenue Access Code exactly as it appears below. You will not need to use this code after youve completed the sign-up process.  If you do not sign up before the expiration date, you must request a new code. · Remotemedical Access Code: [email or web address] Expires: 1/31/2018  9:30 AM 
 
4. Enter the last four digits of your Social Security Number (xxxx) and Date of Birth (mm/dd/yyyy) as indicated and click Submit. You will be taken to the next sign-up page. 5. Create a Remotemedical ID. This will be your Remotemedical login ID and cannot be changed, so think of one that is secure and easy to remember. 6. Create a Remotemedical password. You can change your password at any time. 7. Enter your Password Reset Question and Answer. This can be used at a later time if you forget your password. 8. Enter your e-mail address. You will receive e-mail notification when new information is available in [address] 9. Click Sign Up. You can now view and download portions of your medical record. 10. Click the Download Summary menu link to download a portable copy of your medical information. If you have questions, please visit the Frequently Asked Questions section of the Remotemedical website. Remember, Remotemedical is NOT to be used for urgent needs. For medical emergencies, dial 911. Now available from your iPhone and Android! Please provide this summary of care documentation to your next provider. Your primary care clinician is listed as DENISE HERRERA. If you have any questions after today's visit, please call [phone].

## 2018-01-24 NOTE — PROGRESS NOTES
Patient is in the office today for a 6 month follow up. 1. Have you been to the ER, urgent care clinic since your last visit? Hospitalized since your last visit? No    2. Have you seen or consulted any other health care providers outside of the [address] [place] [address] since your last visit? Include any pap smears or colon screening.  No

## 2018-01-24 NOTE — PROGRESS NOTES
Subjective:       Chief Complaint  The patient presents for follow up of hypertension and high cholesterol. Asthma, CAD         HPI  Guerda Roberto is a 80 y.o. male seen for follow up of hyperlipidemia. Healso has hypertension. Hypertension well controlled, no significant medication side effects noted, on Norvasc , hyperlipidemia well controlled, no significant medication side effects noted, on Zocor. Diet and Lifestyle: generally follows a low fat low cholesterol diet, exercises regularly    Home BP Monitoring: is not measured at home. Other symptoms and concerns: Pt using Advair and albuterol intermittently for his asthma. Pt doing very well for his age. Bowling and playing Golf up to 3x/week. Pt c/o tremor left hand for the last year but it has not worsened and does not prevent him from doing his daily activities. Due to his age would not pursue w/u unless pt's lifestyle is affected. Pt has h/o CAD and stent placement for which he is on Zocor. He is doing well and followed by cardiology. He has moderate Carotid artery disease that has been stable on imaging. He is on Plavix and Zocor and doing well. Current Outpatient Prescriptions   Medication Sig    simvastatin (ZOCOR) 40 mg tablet TAKE 1 TABLET EVERY NIGHT    clopidogrel (PLAVIX) 75 mg tab TAKE 1 TABLET EVERY DAY    amLODIPine (NORVASC) 5 mg tablet TAKE 1 TABLET EVERY DAY    fluticasone-salmeterol (ADVAIR DISKUS) 100-50 mcg/dose diskus inhaler Take 1 Puff by inhalation daily. Indications: BRONCHIAL ASTHMA    tamsulosin (FLOMAX) 0.4 mg capsule Take 1 Cap by mouth daily.  aspirin delayed-release 81 mg tablet Take 81 mg by mouth daily.  tadalafil (CIALIS) 20 mg tablet Take 1 Tab by mouth as needed.  albuterol (PROVENTIL HFA, VENTOLIN HFA, PROAIR HFA) 90 mcg/actuation inhaler Take 2 Puffs by inhalation every four (4) hours as needed for Wheezing or Shortness of Breath.      No current facility-administered medications for this visit. Review of Systems  Respiratory: negative for dyspnea on exertion  Cardiovascular: negative for chest pain    Objective:     Visit Vitals    /60    Pulse 70    Temp 97.7 °F (36.5 °C) (Oral)    Resp 20    Ht 5' 8\" (1.727 m)    Wt 164 lb (74.4 kg)    SpO2 98%    BMI 24.94 kg/m2        General appearance - alert, well appearing, and in no distress  Neck - supple, no significant adenopathy, carotids upstroke normal bilaterally, no bruits  Chest - clear to auscultation, no wheezes, rales or rhonchi, symmetric air entry  Heart - normal rate, regular rhythm, normal S1, S2, no murmurs, rubs, clicks or gallops  Extremities - peripheral pulses normal, no pedal edema, no clubbing or cyanosis  Skin - normal coloration and turgor, no rashes, no suspicious skin lesions noted  Neuro- tremor left hand that is constant     Labs:   Lab Results   Component Value Date/Time    Cholesterol, total 124 12/28/2017 08:00 AM    HDL Cholesterol 43 12/28/2017 08:00 AM    LDL, calculated 54 12/28/2017 08:00 AM    Triglyceride 134 12/28/2017 08:00 AM    CHOL/HDL Ratio 4.5 11/09/2010 07:51 AM     Lab Results   Component Value Date/Time    ALT (SGPT) 9 12/28/2017 08:00 AM    AST (SGOT) 13 12/28/2017 08:00 AM    Alk. phosphatase 45 12/28/2017 08:00 AM    Bilirubin, total 0.6 12/28/2017 08:00 AM     Lab Results   Component Value Date/Time    GFR est AA 73 12/28/2017 08:00 AM    GFR est non-AA 63 12/28/2017 08:00 AM    Creatinine 1.03 12/28/2017 08:00 AM    BUN 17 12/28/2017 08:00 AM    Sodium 144 12/28/2017 08:00 AM    Potassium 4.0 12/28/2017 08:00 AM    Chloride 102 12/28/2017 08:00 AM    CO2 29 12/28/2017 08:00 AM            Assessment / Plan     Hypertension well controlled, on Norvasc   Hyperlipidemia well controlled, on Zocor. ICD-10-CM ICD-9-CM    1. Hypertension M95.7 072.71 METABOLIC PANEL, COMPREHENSIVE   2.  Dyslipidemia E78.5 272.4 LIPID PANEL   3. Bilateral carotid artery disease (HCC) I77.9 447.9 Stable on Zocor, Plavix and ASA   4. Atherosclerosis of native coronary artery of native heart without angina pectoris I25.10 414.01 Pt is stable on current meds and followed by cardiology    5. Tremor of left hand R25.1 781.0 Stable. Due to age Would only pursue w/u if pt's life style becomes affected. Low cholesterol diet, weight control and daily exercise discussed. Follow-up Disposition:  Return in about 6 months (around 7/24/2018) for OV, and Medicare Wellness Visit, labs 1 week before. Reviewed plan of care. Patient has pro`vided input and agrees with goals.

## 2018-01-24 NOTE — PATIENT INSTRUCTIONS
High Blood Pressure: Care Instructions  Your Care Instructions    If your blood pressure is usually above 140/90, you have high blood pressure, or hypertension. That means the top number is 140 or higher or the bottom number is 90 or higher, or both. Despite what a lot of people think, high blood pressure usually doesn't cause headaches or make you feel dizzy or lightheaded. It usually has no symptoms. But it does increase your risk for heart attack, stroke, and kidney or eye damage. The higher your blood pressure, the more your risk increases. Your doctor will give you a goal for your blood pressure. Your goal will be based on your health and your age. An example of a goal is to keep your blood pressure below 140/90. Lifestyle changes, such as eating healthy and being active, are always important to help lower blood pressure. You might also take medicine to reach your blood pressure goal.  Follow-up care is a key part of your treatment and safety. Be sure to make and go to all appointments, and call your doctor if you are having problems. It's also a good idea to know your test results and keep a list of the medicines you take. How can you care for yourself at home? Medical treatment  · If you stop taking your medicine, your blood pressure will go back up. You may take one or more types of medicine to lower your blood pressure. Be safe with medicines. Take your medicine exactly as prescribed. Call your doctor if you think you are having a problem with your medicine. · Talk to your doctor before you start taking aspirin every day. Aspirin can help certain people lower their risk of a heart attack or stroke. But taking aspirin isn't right for everyone, because it can cause serious bleeding. · See your doctor regularly. You may need to see the doctor more often at first or until your blood pressure comes down.   · If you are taking blood pressure medicine, talk to your doctor before you take decongestants or anti-inflammatory medicine, such as ibuprofen. Some of these medicines can raise blood pressure. · Learn how to check your blood pressure at home. Lifestyle changes  · Stay at a healthy weight. This is especially important if you put on weight around the waist. Losing even 10 pounds can help you lower your blood pressure. · If your doctor recommends it, get more exercise. Walking is a good choice. Bit by bit, increase the amount you walk every day. Try for at least 30 minutes on most days of the week. You also may want to swim, bike, or do other activities. · Avoid or limit alcohol. Talk to your doctor about whether you can drink any alcohol. · Try to limit how much sodium you eat to less than 2,300 milligrams (mg) a day. Your doctor may ask you to try to eat less than 1,500 mg a day. · Eat plenty of fruits (such as bananas and oranges), vegetables, legumes, whole grains, and low-fat dairy products. · Lower the amount of saturated fat in your diet. Saturated fat is found in animal products such as milk, cheese, and meat. Limiting these foods may help you lose weight and also lower your risk for heart disease. · Do not smoke. Smoking increases your risk for heart attack and stroke. If you need help quitting, talk to your doctor about stop-smoking programs and medicines. These can increase your chances of quitting for good. When should you call for help? Call 911 anytime you think you may need emergency care. This may mean having symptoms that suggest that your blood pressure is causing a serious heart or blood vessel problem. Your blood pressure may be over 180/110. ? For example, call 911 if:  ? · You have symptoms of a heart attack. These may include:  ¨ Chest pain or pressure, or a strange feeling in the chest.  ¨ Sweating. ¨ Shortness of breath. ¨ Nausea or vomiting.   ¨ Pain, pressure, or a strange feeling in the back, neck, jaw, or upper belly or in one or both shoulders or arms.  ¨ Lightheadedness or sudden weakness. ¨ A fast or irregular heartbeat. ? · You have symptoms of a stroke. These may include:  ¨ Sudden numbness, tingling, weakness, or loss of movement in your face, arm, or leg, especially on only one side of your body. ¨ Sudden vision changes. ¨ Sudden trouble speaking. ¨ Sudden confusion or trouble understanding simple statements. ¨ Sudden problems with walking or balance. ¨ A sudden, severe headache that is different from past headaches. ? · You have severe back or belly pain. ?Do not wait until your blood pressure comes down on its own. Get help right away. ?Call your doctor now or seek immediate care if:  ? · Your blood pressure is much higher than normal (such as 180/110 or higher), but you don't have symptoms. ? · You think high blood pressure is causing symptoms, such as:  ¨ Severe headache. ¨ Blurry vision. ? Watch closely for changes in your health, and be sure to contact your doctor if:  ? · Your blood pressure measures 140/90 or higher at least 2 times. That means the top number is 140 or higher or the bottom number is 90 or higher, or both. ? · You think you may be having side effects from your blood pressure medicine. ? · Your blood pressure is usually normal, but it goes above normal at least 2 times. Where can you learn more? Go to http://monty-nirali.info/. Enter A422 in the search box to learn more about \"High Blood Pressure: Care Instructions. \"  Current as of: September 21, 2016  Content Version: 11.4  © 6465-3076 [employer]. Care instructions adapted under license by [employer] (which disclaims liability or warranty for this information). If you have questions about a medical condition or this instruction, always ask your healthcare professional. [place] [address] any warranty or liability for your use of this information.

## 2018-01-25 NOTE — TELEPHONE ENCOUNTER
Talked to daughter and she will monitor if his tremor worsens. If it does will do CT head and refer to neurology for further evaluation Pt has good quality of life currently.

## 2018-02-18 RX ORDER — SIMVASTATIN 40 MG/1
TABLET, FILM COATED ORAL
Qty: 90 TAB | Refills: 3 | Status: SHIPPED | OUTPATIENT
Start: 2018-02-18 | End: 2019-01-01 | Stop reason: SDUPTHER

## 2018-02-18 RX ORDER — CLOPIDOGREL BISULFATE 75 MG/1
TABLET ORAL
Qty: 90 TAB | Refills: 3 | Status: SHIPPED | OUTPATIENT
Start: 2018-02-18 | End: 2019-01-01 | Stop reason: SDUPTHER

## 2018-02-18 RX ORDER — AMLODIPINE BESYLATE 5 MG/1
TABLET ORAL
Qty: 90 TAB | Refills: 3 | Status: SHIPPED | OUTPATIENT
Start: 2018-02-18 | End: 2019-01-01 | Stop reason: SDUPTHER

## 2018-02-19 ENCOUNTER — TELEPHONE (OUTPATIENT)
Dept: FAMILY MEDICINE CLINIC | Age: 83
End: 2018-02-19

## 2018-02-19 NOTE — TELEPHONE ENCOUNTER
Pt walked in today and stated that [facility] will not fill his Plavix without speaking to office. Advised that the rx was sent over on Friday but he stated that they need authorization.

## 2018-02-19 NOTE — TELEPHONE ENCOUNTER
Pt states he needed to RX sent to the pharmacy. [facility]  Pt aware Dr Nisha Meade sent Plavix, simvastatin & Norvasc in to [facility] last night just after 7pm.

## 2018-06-21 ENCOUNTER — OFFICE VISIT (OUTPATIENT)
Dept: CARDIOLOGY CLINIC | Age: 83
End: 2018-06-21

## 2018-06-21 VITALS
DIASTOLIC BLOOD PRESSURE: 82 MMHG | WEIGHT: 164 LBS | BODY MASS INDEX: 24.86 KG/M2 | SYSTOLIC BLOOD PRESSURE: 138 MMHG | HEART RATE: 64 BPM | OXYGEN SATURATION: 96 % | HEIGHT: 68 IN

## 2018-06-21 DIAGNOSIS — E78.00 HYPERCHOLESTEROLEMIA: ICD-10-CM

## 2018-06-21 DIAGNOSIS — I77.9 BILATERAL CAROTID ARTERY DISEASE (HCC): ICD-10-CM

## 2018-06-21 DIAGNOSIS — I44.7 LEFT BUNDLE BRANCH BLOCK: ICD-10-CM

## 2018-06-21 DIAGNOSIS — I11.9 BENIGN HYPERTENSIVE HEART DISEASE WITHOUT HEART FAILURE: ICD-10-CM

## 2018-06-21 DIAGNOSIS — I35.0 AORTIC VALVE STENOSIS, ETIOLOGY OF CARDIAC VALVE DISEASE UNSPECIFIED: ICD-10-CM

## 2018-06-21 DIAGNOSIS — I25.10 ATHEROSCLEROSIS OF NATIVE CORONARY ARTERY OF NATIVE HEART WITHOUT ANGINA PECTORIS: Primary | ICD-10-CM

## 2018-06-21 NOTE — PROGRESS NOTES
1. Have you been to the ER, urgent care clinic since your last visit? Hospitalized since your last visit? No    2. Have you seen or consulted any other health care providers outside of the [address] [place] [address] since your last visit? Include any pap smears or colon screening.  No

## 2018-06-21 NOTE — MR AVS SNAPSHOT
[address] [address] [address] [address] [phone] Patient: Miguelina Davis MRN: [id number] NXY:4/5/1965 Visit Information Date & Time Provider Department Dept. Phone Encounter #  
 6/21/2018  9:20 AM Zahida Taylor MD [facility] [id number] Follow-up Instructions Return in about 6 months (around 12/21/2018). Your Appointments 7/17/2018  7:25 AM  
LAB with [facility] (BUNNY Akbar) Appt Note: Labs 1 week before [address] [facility] [address] [address]  
[phone]  
  
   
 [address], [facility]  
  
    
 7/24/2018  8:45 AM  
Office Visit with Reina Lopez MD  
[address] [place] [address]) Appt Note: OV / Medicare Wellness Visit [address] [facility] [address] [address]  
[phone]  
  
   
 [address], [facility] [address] [address]  
  
    
 12/28/2018  9:40 AM  
Follow Up with Zahida Taylor MD  
[facility] ([address]) Appt Note: 6 mo f/u  
 [address] [address] [address] [address]  
[phone] [address] Upcoming Health Maintenance Date Due DTaP/Tdap/Td series (1 - Tdap) 2/5/1947 GLAUCOMA SCREENING Q2Y 4/1/2015 Pneumococcal 65+ High/Highest Risk (2 of 2 - PPSV23) 8/9/2016 MEDICARE YEARLY EXAM 6/30/2018 Allergies as of 6/21/2018  Review Complete On: 1/24/2018 By: Reina Lopez MD  
 No Known Allergies Current Immunizations  Reviewed on 11/10/2010 Name Date Influenza Vaccine Split 11/10/2010  9:13 AM  
  
 Not reviewed this visit You Were Diagnosed With   
  
 Codes Comments Bilateral carotid bruits    -  Primary ICD-10-CM: R09.89 ICD-9-CM: 785.9 Aortic valve stenosis, etiology of cardiac valve disease unspecified     ICD-10-CM: I35.0 ICD-9-CM: 424.1 Left bundle branch block     ICD-10-CM: I44.7 ICD-9-CM: 426.3 Atherosclerosis of native coronary artery of native heart without angina pectoris     ICD-10-CM: I25.10 ICD-9-CM: 414.01 Benign hypertensive heart disease without heart failure     ICD-10-CM: I11.9 ICD-9-CM: 402.10 Bilateral carotid artery disease ([facility] [address]     ICD-10-CM: I77.9 ICD-9-CM: 386. 9 Vitals BP Pulse Height(growth percentile) Weight(growth percentile) SpO2 BMI  
 138/82 64 5' 8\" (1.727 m) 164 lb (74.4 kg) 96% 24.94 kg/m2 Smoking Status Former Smoker BMI and BSA Data Body Mass Index Body Surface Area 24.94 kg/m 2 1.89 m 2 Preferred Pharmacy Pharmacy Name Phone [address] [phone] Your Updated Medication List  
  
   
This list is accurate as of 6/21/18 10:08 AM.  Always use your most recent med list.  
  
  
  
  
 albuterol 90 mcg/actuation inhaler Commonly known as:  PROVENTIL HFA, VENTOLIN HFA, PROAIR HFA Take 2 Puffs by inhalation every four (4) hours as needed for Wheezing or Shortness of Breath. amLODIPine 5 mg tablet Commonly known as:  Skip Newcomer TAKE 1 TABLET EVERY DAY  
  
 aspirin delayed-release 81 mg tablet Take 81 mg by mouth daily. clopidogrel 75 mg Tab Commonly known as:  PLAVIX TAKE 1 TABLET EVERY DAY  
  
 fluticasone-salmeterol 100-50 mcg/dose diskus inhaler Commonly known as:  ADVAIR DISKUS Take 1 Puff by inhalation daily. Indications: BRONCHIAL ASTHMA  
  
 simvastatin 40 mg tablet Commonly known as:  ZOCOR  
TAKE 1 TABLET EVERY NIGHT  
  
 tadalafil 20 mg tablet Commonly known as:  CIALIS Take 1 Tab by mouth as needed. tamsulosin 0.4 mg capsule Commonly known as:  FLOMAX Take 1 Cap by mouth daily. We Performed the Following AMB POC EKG ROUTINE W/ 12 LEADS, INTER & REP [06929 CPT(R)] Follow-up Instructions Return in about 6 months (around 12/21/2018). To-Do List   
 06/21/2018 Vascular/US:  DUPLEX CAROTID BILATERAL Introducing [facility]! Deann Castorena introduces [employer] patient portal. Now you can access parts of your medical record, email your doctor's office, and request medication refills online. 1. In your internet browser, go to https://[employer]). [employer]) 2. Click on the First Time User? Click Here link in the Sign In box. You will see the New Member Sign Up page. 3. Enter your In[employer] Access Code exactly as it appears below. You will not need to use this code after youve completed the sign-up process. If you do not sign up before the expiration date, you must request a new code. · In[employer] Access Code: [email or web address] Expires: 9/19/2018 10:08 AM 
 
4. Enter the last four digits of your Social Security Number (xxxx) and Date of Birth (mm/dd/yyyy) as indicated and click Submit. You will be taken to the next sign-up page. 5. Create a In[employer] ID. This will be your [employer] login ID and cannot be changed, so think of one that is secure and easy to remember. 6. Create a [employer] password. You can change your password at any time. 7. Enter your Password Reset Question and Answer. This can be used at a later time if you forget your password. 8. Enter your e-mail address. You will receive e-mail notification when new information is available in [address] 9. Click Sign Up. You can now view and download portions of your medical record. 10. Click the Download Summary menu link to download a portable copy of your medical information. If you have questions, please visit the Frequently Asked Questions section of the [employer] website. Remember, [employer] is NOT to be used for urgent needs. For medical emergencies, dial 911. Now available from your iPhone and Android! Please provide this summary of care documentation to your next provider. Your primary care clinician is listed as DENISE HERRERA. If you have any questions after today's visit, please call [phone].

## 2018-06-21 NOTE — PROGRESS NOTES
HISTORY OF PRESENT ILLNESS  Mirian Pickens is a 80 y.o. male. Follow-up   Pertinent negatives include no chest pain, no abdominal pain, no headaches and no shortness of breath. Heart Problem   Pertinent negatives include no chest pain, no abdominal pain, no headaches and no shortness of breath. Patient presents for a followup office visit. He was initially seen here for evaluation of angina. He subsequently underwent a cardiac catheterization April 2011, which revealed high-grade stenosis of his left circumflex, status post drug-eluting stent, endeavor 3.0 x 18 mm. Patient also was found to have a heavily calcified vessels, including a 30% left main stenosis, 30% LAD stenosis, and RCA had diffuse distal 90% disease with collateralization from the left-sided. He last underwent a pharmacological nuclear stress test in January 2016, which showed a predominantly fixed inferior perfusion defect, likely from a previous MI, Low-normal left ventricular ejection fraction of 54%. This has not significantly changed compared to his prior study from 2013. He also has a history of moderate bilateral carotid artery disease, last evaluated by duplex in March 2017 which has remained unchanged over the past few years  . The patient underwent a follow-up echocardiogram in November 2017 was unchanged compared to his prior study. EF 55 6%, grade 1 diastolic dysfunction, mild aortic valve stenosis with mild aortic root enlargement. He was last in the office approximately 7 months ago. Since that time, he states he has been feeling well. No major change in his activity level. He still tries to bowl once a week and golf every other week. No new chest pain or shortness of breath, no leg swelling, orthopnea, PND. Past Medical History:   Diagnosis Date    Asthma     Cardiac cath 04/28/2011    oLM 30%. pLAD 30%. oD1 50%. CX 90% (3 x 18 [place] DEMAR). dRCA 90%. RPLB subtotal.  RPDA 100%.       Cardiac echocardiogram 01/21/2014    EF 55-60%. Mod LVH. Gr 1 DDfx. Mild AS (mean grad 13). Mild AI. Unchanged from study of 11/30/11.  Cardiac nuclear imaging test, mod risk 01/22/2016    Intermediate risk. Medium-sized inferior, inferoseptal infarction. No ischemia. EF 54%. Nondiagnostic EKG on pharm stress test.    Carotid duplex 03/02/2016    Mod 50-69% bilateral ICA stenosis. Probable significant RECA stenosis. >50% stenosis of left subclavian. No significant change from study of 1/8/15.  Coronary artery disease     Status post PCI with drug-eluting stent, Witt 3 x 18 mm in the proximal circumflex.  Hx of carotid artery disease (HCC)     Hypercholesterolemia     Hypertension     Prostate cancer (HCC)       Current Outpatient Prescriptions   Medication Sig Dispense Refill    amLODIPine (NORVASC) 5 mg tablet TAKE 1 TABLET EVERY DAY 90 Tab 3    simvastatin (ZOCOR) 40 mg tablet TAKE 1 TABLET EVERY NIGHT 90 Tab 3    clopidogrel (PLAVIX) 75 mg tab TAKE 1 TABLET EVERY DAY 90 Tab 3    tadalafil (CIALIS) 20 mg tablet Take 1 Tab by mouth as needed. 3 Tab 0    fluticasone-salmeterol (ADVAIR DISKUS) 100-50 mcg/dose diskus inhaler Take 1 Puff by inhalation daily. Indications: BRONCHIAL ASTHMA 3 Inhaler 3    albuterol (PROVENTIL HFA, VENTOLIN HFA, PROAIR HFA) 90 mcg/actuation inhaler Take 2 Puffs by inhalation every four (4) hours as needed for Wheezing or Shortness of Breath. 1 Inhaler 1    tamsulosin (FLOMAX) 0.4 mg capsule Take 1 Cap by mouth daily. 30 Cap 5    aspirin delayed-release 81 mg tablet Take 81 mg by mouth daily. No Known Allergies     Social History   Substance Use Topics    Smoking status: Former Smoker     Packs/day: 1.00     Years: 15.00     Quit date: 5/28/1960    Smokeless tobacco: Never Used    Alcohol use No         Review of Systems   Constitutional: Negative for chills, fever and weight loss. HENT: Negative for nosebleeds.     Eyes: Negative for blurred vision and double vision. Respiratory: Negative for cough, shortness of breath and wheezing. Cardiovascular: Negative for chest pain, palpitations, orthopnea, claudication, leg swelling and PND. Gastrointestinal: Negative for abdominal pain, heartburn, nausea and vomiting. Genitourinary: Negative for dysuria and hematuria. Musculoskeletal: Negative for falls and myalgias. Skin: Negative for rash. Neurological: Negative for dizziness, focal weakness and headaches. Endo/Heme/Allergies: Does not bruise/bleed easily. Psychiatric/Behavioral: Negative for substance abuse. Visit Vitals    /82    Pulse 64    Ht 5' 8\" (1.727 m)    Wt 74.4 kg (164 lb)    SpO2 96%    BMI 24.94 kg/m2      Physical Exam   Constitutional: He is oriented to person, place, and time. He appears well-developed and well-nourished. HENT:   Head: Normocephalic and atraumatic. Eyes: Conjunctivae are normal.   Neck: Neck supple. No JVD present. Carotid bruit is present (bilateral). Cardiovascular: Normal rate, regular rhythm, S1 normal, S2 normal and normal pulses. Exam reveals no gallop and no S3.    Murmur heard. Systolic murmur is present with a grade of 2/6  at the upper right sternal border  Pulmonary/Chest: Breath sounds normal. He has no wheezes. He has no rales. Abdominal: Soft. Bowel sounds are normal. There is no tenderness. Musculoskeletal: He exhibits no edema. Neurological: He is alert and oriented to person, place, and time. Skin: Skin is warm and dry. EKG: Normal sinus rhythm, leftward axis, left bundle branch block. Compared to the previous EKG, PVCs are no longer present. ASSESSMENT and PLAN    Coronary disease. Status post drug-eluting stent to the left circumflex, with an [place] 3 x 18 mm stent in 2011. Patient has mild residual ostial left main disease and LAD disease, with heavy calcification, and significant distal RCA disease, which was well collateralized.   He underwent a followup pharmacologic nuclear stress test in January 2016, which continued to demonstrate scarring of the inferior wall, but no significant ischemia. No new symptoms concerning for angina. I would continue his current medical regimen. Carotid artery disease. The patient has moderate bilateral ICA stenosis last evaluated by a carotid duplex in March 2017, which was unchanged compared to the year prior. I would like to repeat a carotid duplex scan later this year. Peripheral vascular disease. 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 Because of this his blood pressure should be checked in his right arm. Aortic valve stenosis. This remains in the mild range on a follow-up echocardiogram in November 2017. Hypertension. The patient's blood pressure  appears reasonably well-controlled on amlodipine as monotherapy. Dyslipidemia. On simvastatin 40 mg a day. This has been followed by his PCP. Historically this has been well-controlled. Left bundle branch block.  This is again present on today's EKG and has been intermittent in the past.    Followup in the office in 6 months time, sooner if needed

## 2018-06-27 ENCOUNTER — HOSPITAL ENCOUNTER (OUTPATIENT)
Dept: VASCULAR SURGERY | Age: 83
Discharge: HOME | End: 2018-06-27
Attending: INTERNAL MEDICINE
Payer: MEDICARE

## 2018-06-27 DIAGNOSIS — I35.0 AORTIC VALVE STENOSIS, ETIOLOGY OF CARDIAC VALVE DISEASE UNSPECIFIED: ICD-10-CM

## 2018-06-27 DIAGNOSIS — I77.9 BILATERAL CAROTID ARTERY DISEASE (HCC): ICD-10-CM

## 2018-06-27 DIAGNOSIS — I44.7 LEFT BUNDLE BRANCH BLOCK: ICD-10-CM

## 2018-06-27 DIAGNOSIS — I25.10 ATHEROSCLEROSIS OF NATIVE CORONARY ARTERY OF NATIVE HEART WITHOUT ANGINA PECTORIS: ICD-10-CM

## 2018-06-27 DIAGNOSIS — I11.9 BENIGN HYPERTENSIVE HEART DISEASE WITHOUT HEART FAILURE: ICD-10-CM

## 2018-06-27 LAB
LEFT CCA DIST DIAS: 70 CM/S
LEFT CCA DIST SYS: 77 CM/S
LEFT CCA PROX DIAS: 10 CM/S
LEFT CCA PROX SYS: 90 CM/S
LEFT ECA SYS: 117 CM/S
LEFT ICA DIST DIAS: 23 CM/S
LEFT ICA DIST SYS: 174 CM/S
LEFT ICA MID DIAS: 42 CM/S
LEFT ICA MID SYS: 283 CM/S
LEFT ICA PROX DIAS: 31 CM/S
LEFT ICA PROX SYS: 283 CM/S
LEFT SUBCLAVIAN SYS: 107 CM/S
LEFT VERTEBRAL SYS: 67 CM/S
RIGHT CCA DIST DIAS: 7 CM/S
RIGHT CCA DIST SYS: 69 CM/S
RIGHT CCA PROX DIAS: 8 CM/S
RIGHT CCA PROX SYS: 62 CM/S
RIGHT ECA SYS: 181 CM/S
RIGHT ICA DIST DIAS: 16 CM/S
RIGHT ICA DIST SYS: 106 CM/S
RIGHT ICA MID DIAS: 22 CM/S
RIGHT ICA MID SYS: 192 CM/S
RIGHT ICA PROX SYS: 178 CM/S
RIGHT SUBCLAVIAN SYS: 104 CM/S
RIGHT VERTEBRAL SYS: 61 CM/S

## 2018-06-27 PROCEDURE — 93880 EXTRACRANIAL BILAT STUDY: CPT

## 2018-06-28 NOTE — PROGRESS NOTES
Per your last note \" Carotid artery disease. The patient has moderate bilateral ICA stenosis last evaluated by a carotid duplex in March 2017, which was unchanged compared to the year prior. I would like to repeat a carotid duplex scan later this year.

## 2018-07-05 NOTE — TELEPHONE ENCOUNTER
----- Message from Lazaro Staples MD sent at 6/28/2018  8:09 AM EDT -----  Please let the patient know that his carotid artery disease was unchanged from last year remains moderate bilaterally  ----- Message -----     From: Jodi Carrillo RN     Sent: 6/28/2018   7:21 AM       To: Lazaro Staples MD    Per your last note \" Carotid artery disease. The patient has moderate bilateral ICA stenosis last evaluated by a carotid duplex in March 2017, which was unchanged compared to the year prior. I would like to repeat a carotid duplex scan later this year.

## 2018-07-24 NOTE — MR AVS SNAPSHOT
[address] [place] [address] 
 
 
 [address] [address] 
[phone] Patient: Keshawn Smith MRN: [id number] KIO:[birth year] Visit Information Date & Time Provider Department Dept. Phone Encounter #  
 7/24/2018  8:45 AM Jany Keys, [address] [place] [address] [address] [id number] Follow-up Instructions Return in about 6 months (around 1/24/2019). Your Appointments 12/28/2018  9:40 AM  
Follow Up with Kee Gallegos MD  
[facility] ([facility]) Appt Note: 6 mo f/u  
 [address] [facility] [id number] [address] [address] [phone] [address] [place] [address] [address] Upcoming Health Maintenance Date Due DTaP/Tdap/Td series (1 - Tdap) 2/5/1947 GLAUCOMA SCREENING Q2Y 4/1/2015 Pneumococcal 65+ High/Highest Risk (2 of 2 - PPSV23) 8/9/2016 MEDICARE YEARLY EXAM 6/30/2018 Allergies as of 7/24/2018  Review Complete On: 7/24/2018 By: Jany Keys MD  
 No Known Allergies Current Immunizations  Reviewed on 11/10/2010 Name Date Influenza Vaccine Split 11/10/2010  9:13 AM  
  
 Not reviewed this visit You Were Diagnosed With   
  
 Codes Comments Medicare annual wellness visit, subsequent    -  Primary ICD-10-CM: Z00.00 ICD-9-CM: V70.0 Benign hypertensive heart disease without heart failure     ICD-10-CM: I11.9 ICD-9-CM: 402.10 Dyslipidemia     ICD-10-CM: E78.5 ICD-9-CM: 272.4 Bilateral carotid artery disease ([address]     ICD-10-CM: I77.9 ICD-9-CM: 682. 9 Vitals BP Pulse Temp Resp Height(growth percentile) Weight(growth percentile) 122/60 (BP 1 Location: Left arm, BP Patient Position: Sitting) 66 98.2 °F (36.8 °C) (Oral) 20 5' 8\" (1.727 m) 164 lb (74.4 kg) SpO2 BMI Smoking Status 99% 24.94 kg/m2 Former Smoker Vitals History BMI and BSA Data Body Mass Index Body Surface Area 24.94 kg/m 2 1.89 m 2 Preferred Pharmacy Pharmacy Name Phone Tiffani Cedillo [address] [place] [phone] [facility] [address] [place] [address] [phone] Your Updated Medication List  
  
   
This list is accurate as of 7/24/18  9:14 AM.  Always use your most recent med list.  
  
  
  
  
 albuterol 90 mcg/actuation inhaler Commonly known as:  PROVENTIL HFA, VENTOLIN HFA, PROAIR HFA Take 2 Puffs by inhalation every four (4) hours as needed for Wheezing or Shortness of Breath. amLODIPine 5 mg tablet Commonly known as:  Davies Mullet TAKE 1 TABLET EVERY DAY  
  
 aspirin delayed-release 81 mg tablet Take 81 mg by mouth daily. clopidogrel 75 mg Tab Commonly known as:  PLAVIX TAKE 1 TABLET EVERY DAY  
  
 fluticasone-salmeterol 100-50 mcg/dose diskus inhaler Commonly known as:  ADVAIR DISKUS Take 1 Puff by inhalation daily. Indications: BRONCHIAL ASTHMA  
  
 simvastatin 40 mg tablet Commonly known as:  ZOCOR  
TAKE 1 TABLET EVERY NIGHT  
  
 tadalafil 20 mg tablet Commonly known as:  CIALIS Take 1 Tab by mouth as needed. tamsulosin 0.4 mg capsule Commonly known as:  FLOMAX Take 1 Cap by mouth daily. Follow-up Instructions Return in about 6 months (around 1/24/2019). Patient Instructions Medicare Wellness Visit, Male The best way to live healthy is to have a lifestyle where you eat a well-balanced diet, exercise regularly, limit alcohol use, and quit all forms of tobacco/nicotine, if applicable. Regular preventive services are another way to keep healthy. Preventive services (vaccines, screening tests, monitoring & exams) can help personalize your care plan, which helps you manage your own care. Screening tests can find health problems at the earliest stages, when they are easiest to treat.  
  
[facility] follows the current, evidence-based guidelines published by the [facility] (USPSTF) when recommending preventive services for our patients. Because we follow these guidelines, sometimes recommendations change over time as research supports it. (For example, a prostate screening blood test is no longer routinely recommended for men with no symptoms.) Of course, you and your provider may decide to screen more often for some diseases, based on your risk and co-morbidities (chronic disease you are already diagnosed with). Preventive services for you include: - Medicare offers their members a free annual wellness visit, which is time for you and your primary care provider to discuss and plan for your preventive service needs. Take advantage of this benefit every year! 
 
-All people over age 72 should receive the recommended pneumonia vaccines. Current USPSTF guidelines recommend a series of two vaccines for the best pneumonia protection.  
 
-All adults should have a yearly flu vaccine and a tetanus vaccine every 10 years. All adults age 61 years should receive a shingles vaccine once in their lifetime.   
 
-All adults age 38-68 years who are overweight should have a diabetes screening test once every three years.  
 
-Other screening tests & preventive services for persons with diabetes include: an eye exam to screen for diabetic retinopathy, a kidney function test, a foot exam, and stricter control over your cholesterol.  
 
-Cardiovascular screening for adults with routine risk involves an electrocardiogram (ECG) at intervals determined by the provider.  
 
-Colorectal cancer screenings should be done for adults age 54-65 years with normal risk. There are a number of acceptable methods of screening for this type of cancer. Each test has its own benefits and drawbacks. Discuss with your provider what is most appropriate for you during your annual wellness visit.  The different tests include: colonoscopy (considered the best screening method), a fecal occult blood test, a fecal DNA test, and sigmoidoscopy. 
 
-All adults born between [facility] should be screened once for Hepatitis C. 
 
-An Abdominal Aortic Aneurysm (AAA) Screening is recommended for men age 73-68 who has ever smoked in their lifetime. Here is a list of your current Health Maintenance items (your personalized list of preventive services) with a due date: 
Health Maintenance Due Topic Date Due  
 DTaP/Tdap/Td  (1 - Tdap) 02/05/1947  Glaucoma Screening   04/01/2015  Pneumococcal Vaccine (2 of 2 - PPSV23) 08/09/2016 [facility] Annual Well Visit  06/30/2018 Medicare Part B Preventive Services Limitations Recommendation Scheduled Bone Mass Measurement 
(age 72 & older, biennial) Requires diagnosis related to osteoporosis or estrogen deficiency. Biennial benefit unless patient has history of long-term glucocorticoid tx or baseline is needed because initial test was by other method  NA Cardiovascular Screening Blood Tests (every 5 years) Total cholesterol, HDL, Triglycerides Order as a panel if possible  12/2017 Colorectal Cancer Screening 
-Fecal occult blood test (annual) -Flexible sigmoidoscopy (5y) 
-Screening colonoscopy (10y) -Barium Enema   NA Counseling to Prevent Tobacco Use (up to 8 sessions per year) - Counseling greater than 3 and up to 10 minutes - Counseling greater than 10 minutes Patients must be asymptomatic of tobacco-related conditions to receive as preventive service  NA Diabetes Screening Tests (at least every 3 years, Medicare covers annually or at 6-month intervals for prediabetic patients) Fasting blood sugar (FBS) or glucose tolerance test (GTT) Patient must be diagnosed with one of the following: 
-Hypertension, Dyslipidemia, obesity, previous impaired FBS or GTT 
Or any two of the following: overweight, FH of diabetes, age ? 65, history of gestational diabetes, birth of baby weighing more than 9 pounds  12/2017 Diabetes Self-Management Training (DSMT) (no USPSTF recommendation) Requires referral by treating physician for patient with diabetes or renal disease. 10 hours of initial DSMT session of no less than 30 minutes each in a continuous 12-month period. 2 hours of follow-up DSMT in subsequent years. NA Glaucoma Screening (no USPSTF recommendation) Diabetes mellitus, family history, , age 48 or over,  American, age 72 or over  Ordered Human Immunodeficiency Virus (HIV) Screening (annually for increased risk patients) HIV-1 and HIV-2 by EIA, SOFIE, rapid antibody test, or oral mucosa transudate Patient must be at increased risk for HIV infection per USPSTF guidelines or pregnant. Tests covered annually for patients at increased risk. Pregnant patients may receive up to 3 test during pregnancy. NA Medical Nutrition Therapy (MNT) (for diabetes or renal disease not recommended schedule) Requires referral by treating physician for patient with diabetes or renal disease. Can be provided in same year as diabetes self-management training (DSMT), and CMS recommends medical nutrition therapy take place after DSMT. Up to 3 hours for initial year and 2 hours in subsequent years. NA Prostate Cancer Screening (annually up to age 76) - Digital rectal exam (MINERVA) - Prostate specific antigen (PSA) Annually (age 48 or over), MINERVA not paid separately when covered E/M service is provided on same date Men up to age 76 may need a screening blood test for prostate cancer at certain intervals, depending on their personal and family history. This decision is between the patient and his provider. NA Seasonal Influenza Vaccination (annually)   11/2010 Pneumococcal Vaccination (once after 65)   Declined Hepatitis B Vaccinations (if medium/high risk) Medium/high risk factors:  End-stage renal disease, 
 Hemophiliacs who received Factor VIII or IX concentrates, Clients of institutions for the mentally retarded, Persons who live in the same house as a HepB virus carrier, Homosexual men, Illicit injectable drug abusers. NA Shingles Vaccination A shingles vaccine is also recommended once in a lifetime after age 61  Declined Ultrasound Screening for Abdominal Aortic Aneurysm (AAA) (once) Patient must be referred through IPPE and not have had a screening for abdominal aortic aneurysm before under Medicare. Limited to patients who meet one of the following criteria: 
- Men who are 73-68 years old and have smoked more than 100 cigarettes in their lifetime. 
-Anyone with a FH of AAA 
-Anyone recommended for screening by USPSTF  NA Well Visit, Over 72: Care Instructions Your Care Instructions Physical exams can help you stay healthy. Your doctor has checked your overall health and may have suggested ways to take good care of yourself. He or she also may have recommended tests. At home, you can help prevent illness with healthy eating, regular exercise, and other steps. Follow-up care is a key part of your treatment and safety. Be sure to make and go to all appointments, and call your doctor if you are having problems. It's also a good idea to know your test results and keep a list of the medicines you take. How can you care for yourself at home? · Reach and stay at a healthy weight. This will lower your risk for many problems, such as obesity, diabetes, heart disease, and high blood pressure. · Get at least 30 minutes of exercise on most days of the week. Walking is a good choice. You also may want to do other activities, such as running, swimming, cycling, or playing tennis or team sports. · Do not smoke. Smoking can make health problems worse. If you need help quitting, talk to your doctor about stop-smoking programs and medicines. These can increase your chances of quitting for good. · Protect your skin from too much sun. When you're outdoors from 10 a.m. to 4 p.m., stay in the shade or cover up with clothing and a hat with a wide brim. Wear sunglasses that block UV rays. Even when it's cloudy, put broad-spectrum sunscreen (SPF 30 or higher) on any exposed skin. · See a dentist one or two times a year for checkups and to have your teeth cleaned. · Wear a seat belt in the car. · Limit alcohol to 2 drinks a day for men and 1 drink a day for women. Too much alcohol can cause health problems. Follow your doctor's advice about when to have certain tests. These tests can spot problems early. For men and women · Cholesterol. Your doctor will tell you how often to have this done based on your overall health and other things that can increase your risk for heart attack and stroke. · Blood pressure. Have your blood pressure checked during a routine doctor visit. Your doctor will tell you how often to check your blood pressure based on your age, your blood pressure results, and other factors. · Diabetes. Ask your doctor whether you should have tests for diabetes. · Vision. Experts recommend that you have yearly exams for glaucoma and other age-related eye problems. · Hearing. Tell your doctor if you notice any change in your hearing. You can have tests to find out how well you hear. · Colon cancer tests. Keep having colon cancer tests as your doctor recommends. You can have one of several types of tests. · Heart attack and stroke risk. At least every 4 to 6 years, you should have your risk for heart attack and stroke assessed. Your doctor uses factors such as your age, blood pressure, cholesterol, and whether you smoke or have diabetes to show what your risk for a heart attack or stroke is over the next 10 years. · Osteoporosis. Talk to your doctor about whether you should have a bone density test to find out whether you have thinning bones.  Also ask your doctor about whether you should take calcium and vitamin D supplements. For women · Pap test and pelvic exam. You may no longer need a Pap test. Talk with your doctor about whether to stop or continue to have Pap tests. · Breast exam and mammogram. Ask how often you should have a mammogram, which is an X-ray of your breasts. A mammogram can spot breast cancer before it can be felt and when it is easiest to treat. · Thyroid disease. Talk to your doctor about whether to have your thyroid checked as part of a regular physical exam. Women have an increased chance of a thyroid problem. For men · Prostate exam. Talk to your doctor about whether you should have a blood test (called a PSA test) for prostate cancer. Experts disagree on whether men should have this test. Some experts recommend that you discuss the benefits and risks of the test with your doctor. · Abdominal aortic aneurysm. Ask your doctor whether you should have a test to check for an aneurysm. You may need a test if you ever smoked or if your parent, brother, sister, or child has had an aneurysm. When should you call for help? Watch closely for changes in your health, and be sure to contact your doctor if you have any problems or symptoms that concern you. Where can you learn more? Go to http://monty-nirali.info/. Enter X343 in the search box to learn more about \"Well Visit, Over 65: Care Instructions. \" Current as of: May 16, 2017 Content Version: 11.7 © 9170-9675 "[employer]. Care instructions adapted under license by [employer] (which disclaims liability or warranty for this information). If you have questions about a medical condition or this instruction, always ask your healthcare professional. [place] [address] any warranty or liability for your use of this information. Introducing \[facility]\"" & HEALTH SERVICES!    
 [address] introduces [employer] patient portal. Now you can access parts of your medical record, email your doctor's office, and request medication refills online. 1. In your internet browser, go to [email or web address] 2. Click on the First Time User? Click Here link in the Sign In box. You will see the New Member Sign Up page. 3. Enter your citiservi Access Code exactly as it appears below. You will not need to use this code after youve completed the sign-up process. If you do not sign up before the expiration date, you must request a new code. · [employer]i Access Code: [email or web address] Expires: 9/19/2018 10:08 AM 
 
4. Enter the last four digits of your Social Security Number (xxxx) and Date of Birth (mm/dd/yyyy) as indicated and click Submit. You will be taken to the next sign-up page. 5. Create a citiservi ID. This will be your citiservi login ID and cannot be changed, so think of one that is secure and easy to remember. 6. Create a cit[employer]ervi password. You can change your password at any time. 7. Enter your Password Reset Question and Answer. This can be used at a later time if you forget your password. 8. Enter your e-mail address. You will receive e-mail notification when new information is available in [address] 9. Click Sign Up. You can now view and download portions of your medical record. 10. Click the Download Summary menu link to download a portable copy of your medical information. If you have questions, please visit the Frequently Asked Questions section of the [employer]i website. Remember, [employer]ervi is NOT to be used for urgent needs. For medical emergencies, dial 911. Now available from your iPhone and Android! Please provide this summary of care documentation to your next provider. Your primary care clinician is listed as DENISE HERRERA. If you have any questions after today's visit, please call [phone].

## 2018-07-24 NOTE — PATIENT INSTRUCTIONS
Medicare Wellness Visit, Male    The best way to live healthy is to have a lifestyle where you eat a well-balanced diet, exercise regularly, limit alcohol use, and quit all forms of tobacco/nicotine, if applicable. Regular preventive services are another way to keep healthy. Preventive services (vaccines, screening tests, monitoring & exams) can help personalize your care plan, which helps you manage your own care. Screening tests can find health problems at the earliest stages, when they are easiest to treat. [address] [facility] follows the current, evidence-based guidelines published by the [facility] ([facility]) when recommending preventive services for our patients. Because we follow these guidelines, sometimes recommendations change over time as research supports it. (For example, a prostate screening blood test is no longer routinely recommended for men with no symptoms.)    Of course, you and your provider may decide to screen more often for some diseases, based on your risk and co-morbidities (chronic disease you are already diagnosed with). Preventive services for you include:    - Medicare offers their members a free annual wellness visit, which is time for you and your primary care provider to discuss and plan for your preventive service needs. Take advantage of this benefit every year!    -All people over age 72 should receive the recommended pneumonia vaccines. Current USPSTF guidelines recommend a series of two vaccines for the best pneumonia protection.     -All adults should have a yearly flu vaccine and a tetanus vaccine every 10 years.  All adults age 61 years should receive a shingles vaccine once in their lifetime.      -All adults age 38-68 years who are overweight should have a diabetes screening test once every three years.     -Other screening tests & preventive services for persons with diabetes include: an eye exam to screen for diabetic retinopathy, a kidney function test, a foot exam, and stricter control over your cholesterol.     -Cardiovascular screening for adults with routine risk involves an electrocardiogram (ECG) at intervals determined by the provider.     -Colorectal cancer screenings should be done for adults age 54-65 years with normal risk. There are a number of acceptable methods of screening for this type of cancer. Each test has its own benefits and drawbacks. Discuss with your provider what is most appropriate for you during your annual wellness visit. The different tests include: colonoscopy (considered the best screening method), a fecal occult blood test, a fecal DNA test, and sigmoidoscopy.    -All adults born between [facility] should be screened once for Hepatitis C.    -An Abdominal Aortic Aneurysm (AAA) Screening is recommended for men age 73-68 who has ever smoked in their lifetime. Here is a list of your current Health Maintenance items (your personalized list of preventive services) with a due date:  Health Maintenance Due   Topic Date Due    DTaP/Tdap/Td  (1 - Tdap) 02/05/1947    Glaucoma Screening   04/01/2015    Pneumococcal Vaccine (2 of 2 - PPSV23) 08/09/2016    Annual Well Visit  06/30/2018     Medicare Part B Preventive Services Limitations Recommendation Scheduled   Bone Mass Measurement  (age 72 & older, biennial) Requires diagnosis related to osteoporosis or estrogen deficiency.  Biennial benefit unless patient has history of long-term glucocorticoid tx or baseline is needed because initial test was by other method  NA   Cardiovascular Screening Blood Tests (every 5 years)  Total cholesterol, HDL, Triglycerides Order as a panel if possible  12/2017   Colorectal Cancer Screening  -Fecal occult blood test (annual)  -Flexible sigmoidoscopy (5y)  -Screening colonoscopy (10y)  -Barium Enema   NA   Counseling to Prevent Tobacco Use (up to 8 sessions per year)  - Counseling greater than 3 and up to 10 minutes  - Counseling greater than 10 minutes Patients must be asymptomatic of tobacco-related conditions to receive as preventive service  NA   Diabetes Screening Tests (at least every 3 years, Medicare covers annually or at 6-month intervals for prediabetic patients)    Fasting blood sugar (FBS) or glucose tolerance test (GTT) Patient must be diagnosed with one of the following:  -Hypertension, Dyslipidemia, obesity, previous impaired FBS or GTT  Or any two of the following: overweight, FH of diabetes, age ? 72, history of gestational diabetes, birth of baby weighing more than 9 pounds  12/2017   Diabetes Self-Management Training (DSMT) (no USPSTF recommendation) Requires referral by treating physician for patient with diabetes or renal disease. 10 hours of initial DSMT session of no less than 30 minutes each in a continuous 12-month period. 2 hours of follow-up DSMT in subsequent years. NA   Glaucoma Screening (no USPSTF recommendation) Diabetes mellitus, family history, , age 48 or over,  American, age 72 or over  Ordered    Human Immunodeficiency Virus (HIV) Screening (annually for increased risk patients)  HIV-1 and HIV-2 by EIA, SOFIE, rapid antibody test, or oral mucosa transudate Patient must be at increased risk for HIV infection per USPSTF guidelines or pregnant. Tests covered annually for patients at increased risk. Pregnant patients may receive up to 3 test during pregnancy. NA   Medical Nutrition Therapy (MNT) (for diabetes or renal disease not recommended schedule) Requires referral by treating physician for patient with diabetes or renal disease. Can be provided in same year as diabetes self-management training (DSMT), and CMS recommends medical nutrition therapy take place after DSMT. Up to 3 hours for initial year and 2 hours in subsequent years.   NA   Prostate Cancer Screening (annually up to age 76)  - Digital rectal exam (MINERVA)  - Prostate specific antigen (PSA) Annually (age 48 or over), MINERVA not paid separately when covered E/M service is provided on same date  Men up to age 76 may need a screening blood test for prostate cancer at certain intervals, depending on their personal and family history. This decision is between the patient and his provider. NA   Seasonal Influenza Vaccination (annually)   11/2010     Pneumococcal Vaccination (once after 72)   Declined   Hepatitis B Vaccinations (if medium/high risk) Medium/high risk factors:  End-stage renal disease,  Hemophiliacs who received Factor VIII or IX concentrates, Clients of institutions for the mentally retarded, Persons who live in the same house as a HepB virus carrier, Homosexual men, Illicit injectable drug abusers. NA   Shingles Vaccination A shingles vaccine is also recommended once in a lifetime after age 61  Declined    Ultrasound Screening for Abdominal Aortic Aneurysm (AAA) (once) Patient must be referred through [facility] and not have had a screening for abdominal aortic aneurysm before under Medicare. Limited to patients who meet one of the following criteria:  - Men who are 73-68 years old and have smoked more than 100 cigarettes in their lifetime.  -Anyone with a FH of AAA  -Anyone recommended for screening by USPSTF  NA          Well Visit, Over 72: Care Instructions  Your Care Instructions    Physical exams can help you stay healthy. Your doctor has checked your overall health and may have suggested ways to take good care of yourself. He or she also may have recommended tests. At home, you can help prevent illness with healthy eating, regular exercise, and other steps. Follow-up care is a key part of your treatment and safety. Be sure to make and go to all appointments, and call your doctor if you are having problems. It's also a good idea to know your test results and keep a list of the medicines you take. How can you care for yourself at home? · Reach and stay at a healthy weight.  This will lower your risk for many problems, such as obesity, diabetes, heart disease, and high blood pressure. · Get at least 30 minutes of exercise on most days of the week. Walking is a good choice. You also may want to do other activities, such as running, swimming, cycling, or playing tennis or team sports. · Do not smoke. Smoking can make health problems worse. If you need help quitting, talk to your doctor about stop-smoking programs and medicines. These can increase your chances of quitting for good. · Protect your skin from too much sun. When you're outdoors from 10 a.m. to 4 p.m., stay in the shade or cover up with clothing and a hat with a wide brim. Wear sunglasses that block UV rays. Even when it's cloudy, put broad-spectrum sunscreen (SPF 30 or higher) on any exposed skin. · See a dentist one or two times a year for checkups and to have your teeth cleaned. · Wear a seat belt in the car. · Limit alcohol to 2 drinks a day for men and 1 drink a day for women. Too much alcohol can cause health problems. Follow your doctor's advice about when to have certain tests. These tests can spot problems early. For men and women  · Cholesterol. Your doctor will tell you how often to have this done based on your overall health and other things that can increase your risk for heart attack and stroke. · Blood pressure. Have your blood pressure checked during a routine doctor visit. Your doctor will tell you how often to check your blood pressure based on your age, your blood pressure results, and other factors. · Diabetes. Ask your doctor whether you should have tests for diabetes. · Vision. Experts recommend that you have yearly exams for glaucoma and other age-related eye problems. · Hearing. Tell your doctor if you notice any change in your hearing. You can have tests to find out how well you hear. · Colon cancer tests. Keep having colon cancer tests as your doctor recommends.  You can have one of several types of tests.  · Heart attack and stroke risk. At least every 4 to 6 years, you should have your risk for heart attack and stroke assessed. Your doctor uses factors such as your age, blood pressure, cholesterol, and whether you smoke or have diabetes to show what your risk for a heart attack or stroke is over the next 10 years. · Osteoporosis. Talk to your doctor about whether you should have a bone density test to find out whether you have thinning bones. Also ask your doctor about whether you should take calcium and vitamin D supplements. For women  · Pap test and pelvic exam. You may no longer need a Pap test. Talk with your doctor about whether to stop or continue to have Pap tests. · Breast exam and mammogram. Ask how often you should have a mammogram, which is an X-ray of your breasts. A mammogram can spot breast cancer before it can be felt and when it is easiest to treat. · Thyroid disease. Talk to your doctor about whether to have your thyroid checked as part of a regular physical exam. Women have an increased chance of a thyroid problem. For men  · Prostate exam. Talk to your doctor about whether you should have a blood test (called a PSA test) for prostate cancer. Experts disagree on whether men should have this test. Some experts recommend that you discuss the benefits and risks of the test with your doctor. · Abdominal aortic aneurysm. Ask your doctor whether you should have a test to check for an aneurysm. You may need a test if you ever smoked or if your parent, brother, sister, or child has had an aneurysm. When should you call for help? Watch closely for changes in your health, and be sure to contact your doctor if you have any problems or symptoms that concern you. Where can you learn more? Go to http://monty-nirali.info/. Enter Y186 in the search box to learn more about \"Well Visit, Over 65: Care Instructions. \"  Current as of:  May 16, 2017  Content Version: 11.7  © 3077-8850 Healthwise, Incorporated. Care instructions adapted under license by [employer] (which disclaims liability or warranty for this information). If you have questions about a medical condition or this instruction, always ask your healthcare professional. [place] [address] any warranty or liability for your use of this information.

## 2018-07-24 NOTE — PROGRESS NOTES
Patient is in the office today for a 6 month follow up, and Medicare Wellness Visit. 1. Have you been to the ER, urgent care clinic since your last visit? Hospitalized since your last visit? No    2. Have you seen or consulted any other health care providers outside of the [facility] since your last visit? Include any pap smears or colon screening. No          This is the Subsequent Medicare Annual Wellness Exam, performed 12 months or more after the Initial AWV or the last Subsequent AWV    I have reviewed the patient's medical history in detail and updated the computerized patient record. History     Past Medical History:   Diagnosis Date    Asthma     Cardiac cath 04/28/2011    oLM 30%. pLAD 30%. oD1 50%. CX 90% (3 x 18 [place] DEMAR). dRCA 90%. RPLB subtotal.  RPDA 100%.  Cardiac echocardiogram 01/21/2014    EF 55-60%. Mod LVH. Gr 1 DDfx. Mild AS (mean grad 13). Mild AI. Unchanged from study of 11/30/11.  Cardiac nuclear imaging test, mod risk 01/22/2016    Intermediate risk. Medium-sized inferior, inferoseptal infarction. No ischemia. EF 54%. Nondiagnostic EKG on pharm stress test.    Carotid duplex 03/02/2016    Mod 50-69% bilateral ICA stenosis. Probable significant RECA stenosis. >50% stenosis of left subclavian. No significant change from study of 1/8/15.  Coronary artery disease     Status post PCI with drug-eluting stent, [place] 3 x 18 mm in the proximal circumflex.  Hx of carotid artery disease ([facility] [address]     Hypercholesterolemia     Hypertension     Prostate cancer [facility])       Past Surgical History:   Procedure Laterality Date    HX CORONARY STENT PLACEMENT  4/28/11    PCI with drug-eluting stent, [place] 3 x 18 mm in the proximal circumflex (post dialated with 3.25 mm noncompliant balloon at high pressure).      Current Outpatient Prescriptions   Medication Sig Dispense Refill    amLODIPine (NORVASC) 5 mg tablet TAKE 1 TABLET EVERY DAY 90 Tab 3    simvastatin (ZOCOR) 40 mg tablet TAKE 1 TABLET EVERY NIGHT 90 Tab 3    clopidogrel (PLAVIX) 75 mg tab TAKE 1 TABLET EVERY DAY 90 Tab 3    tadalafil (CIALIS) 20 mg tablet Take 1 Tab by mouth as needed. 3 Tab 0    fluticasone-salmeterol (ADVAIR DISKUS) 100-50 mcg/dose diskus inhaler Take 1 Puff by inhalation daily. Indications: BRONCHIAL ASTHMA 3 Inhaler 3    tamsulosin (FLOMAX) 0.4 mg capsule Take 1 Cap by mouth daily. 30 Cap 5    aspirin delayed-release 81 mg tablet Take 81 mg by mouth daily.  albuterol (PROVENTIL HFA, VENTOLIN HFA, PROAIR HFA) 90 mcg/actuation inhaler Take 2 Puffs by inhalation every four (4) hours as needed for Wheezing or Shortness of Breath. 1 Inhaler 1     No Known Allergies  History reviewed. No pertinent family history. Social History   Substance Use Topics    Smoking status: Former Smoker     Packs/day: 1.00     Years: 15.00     Quit date: 5/28/1960    Smokeless tobacco: Never Used    Alcohol use No     Patient Active Problem List   Diagnosis Code    Asthma J45.909    Prostate cancer ([facility] [address] C61    Hypercholesterolemia E78.00    Angina, class I ([facility] [address] I20.9    Left bundle branch block I44.7    Coronary artery disease I25.10    Hypertension I11.9    Dyslipidemia E78.5    Carotid artery disease (HCC) I77.9    Aortic valve stenosis I35.0       Depression Risk Factor Screening:     PHQ over the last two weeks 7/24/2018   Little interest or pleasure in doing things Not at all   Feeling down, depressed, irritable, or hopeless Not at all   Total Score PHQ 2 0     Alcohol Risk Factor Screening:   Patient states he does not drink alcohol. Functional Ability and Level of Safety:   Hearing Loss  Hearing is good. Activities of Daily Living  The home contains: no safety equipment. Patient does total self care    Fall Risk  Fall Risk Assessment, last 12 mths 7/24/2018   Able to walk? Yes   Fall in past 12 months?  No       Abuse Screen  Patient is not abused    Cognitive Screening   Evaluation of Cognitive Function:  Has your family/caregiver stated any concerns about your memory: no  Normal    Patient Care Team   Patient Care Team:  Velia Hoffmann MD as PCP - General  Brooke Wray MD as Physician (Cardiology)     [facility]. Due to age not interested in going since no problems currently   Pneumonia Vaccine- declines due to age. Shingles Vaccine- is due, patient declined today  Tdap Vaccine- is due, declined today  Colonoscopy- none on file, patient states last one performed was normal  PSA- last PSA less than 0.1 in 2013- d/t his age, not recommended to repeat  Advance Directive- none on file pt has one with daughter but confused on getting it to us    Assessment/Plan   Education and counseling provided:  Are appropriate based on today's review and evaluation  End-of-Life planning (with patient's consent)    Diagnoses and all orders for this visit:    1. Medicare annual wellness visit, subsequent    2. Hypertension    3. Dyslipidemia    4. Atherosclerosis of native coronary artery of native heart without angina pectoris    5. Bilateral carotid artery disease [facility])        Health Maintenance Due   Topic Date Due    DTaP/Tdap/Td series (1 - Tdap) 02/05/1947    GLAUCOMA SCREENING Q2Y  04/01/2015    Pneumococcal 65+ High/Highest Risk (2 of 2 - PPSV23) 08/09/2016    MEDICARE YEARLY EXAM  06/30/2018     A comprehensive 5 year plan for medical care and screening exams was reviewed with pt and they received a copy of it.

## 2018-07-24 NOTE — PROGRESS NOTES
Subjective:       Chief Complaint  The patient presents for follow up of hypertension and high cholesterol. Asthma, CAD         HPI  Zi Lange is a 80 y.o. male seen for follow up of hyperlipidemia. Healso has hypertension. Hypertension well controlled, no significant medication side effects noted, on Norvasc , hyperlipidemia normally well controlled, no significant medication side effects noted, on Zocor. Will check lipid profile today    Diet and Lifestyle: generally follows a low fat low cholesterol diet, exercises regularly    Home BP Monitoring: is not measured at home. Other symptoms and concerns: Pt using Advair and albuterol intermittently for his asthma. Pt doing very well for his age. Bowling and playing Golf up to 3x/week. Pt c/o tremor left hand for the last year but it has not worsened and does not prevent him from doing his daily activities. Due to his age would not pursue w/u unless pt's lifestyle is affected. Pt lives by himself but his daughter lives in the area and is his POA. Pt has h/o CAD and stent placement for which he is on Zocor. He is doing well and followed by cardiology. He has moderate Carotid artery disease that has been stable on imaging. He is on Plavix and Zocor and doing well. Current Outpatient Prescriptions   Medication Sig    amLODIPine (NORVASC) 5 mg tablet TAKE 1 TABLET EVERY DAY    simvastatin (ZOCOR) 40 mg tablet TAKE 1 TABLET EVERY NIGHT    clopidogrel (PLAVIX) 75 mg tab TAKE 1 TABLET EVERY DAY    tadalafil (CIALIS) 20 mg tablet Take 1 Tab by mouth as needed.  fluticasone-salmeterol (ADVAIR DISKUS) 100-50 mcg/dose diskus inhaler Take 1 Puff by inhalation daily. Indications: BRONCHIAL ASTHMA    tamsulosin (FLOMAX) 0.4 mg capsule Take 1 Cap by mouth daily.  aspirin delayed-release 81 mg tablet Take 81 mg by mouth daily.     albuterol (PROVENTIL HFA, VENTOLIN HFA, PROAIR HFA) 90 mcg/actuation inhaler Take 2 Puffs by inhalation every four (4) hours as needed for Wheezing or Shortness of Breath. No current facility-administered medications for this visit. Review of Systems  Respiratory: negative for dyspnea on exertion  Cardiovascular: negative for chest pain    Objective:     Visit Vitals    /60 (BP 1 Location: Left arm, BP Patient Position: Sitting)    Pulse 66    Temp 98.2 °F (36.8 °C) (Oral)    Resp 20    Ht 5' 8\" (1.727 m)    Wt 164 lb (74.4 kg)    SpO2 99%    BMI 24.94 kg/m2        General appearance - alert, well appearing, and in no distress  Neck - supple, no significant adenopathy, carotids upstroke normal bilaterally, no bruits  Chest - clear to auscultation, no wheezes, rales or rhonchi, symmetric air entry  Heart - normal rate, regular rhythm, normal S1, S2, no murmurs, rubs, clicks or gallops  Extremities - peripheral pulses normal, no pedal edema, no clubbing or cyanosis  Skin - normal coloration and turgor, no rashes, no suspicious skin lesions noted  Neuro- tremor left hand that is constant     Labs:   Lab Results   Component Value Date/Time    Cholesterol, total 124 12/28/2017 08:00 AM    HDL Cholesterol 43 12/28/2017 08:00 AM    LDL, calculated 54 12/28/2017 08:00 AM    Triglyceride 134 12/28/2017 08:00 AM    CHOL/HDL Ratio 4.5 11/09/2010 07:51 AM     Lab Results   Component Value Date/Time    ALT (SGPT) 9 12/28/2017 08:00 AM    AST (SGOT) 13 12/28/2017 08:00 AM    Alk. phosphatase 45 12/28/2017 08:00 AM    Bilirubin, total 0.6 12/28/2017 08:00 AM     Lab Results   Component Value Date/Time    GFR est AA 73 12/28/2017 08:00 AM    GFR est non-AA 63 12/28/2017 08:00 AM    Creatinine 1.03 12/28/2017 08:00 AM    BUN 17 12/28/2017 08:00 AM    Sodium 144 12/28/2017 08:00 AM    Potassium 4.0 12/28/2017 08:00 AM    Chloride 102 12/28/2017 08:00 AM    CO2 29 12/28/2017 08:00 AM            Assessment / Plan     Hypertension well controlled, on Norvasc   Hyperlipidemia normally well controlled, on Zocor.  Will check lipid profile today. ICD-10-CM ICD-9-CM    1. Medicare annual wellness visit, subsequent Z00.00 V70.0    2. Hypertension I11.9 402.10    3. Dyslipidemia E78.5 272.4    4. Atherosclerosis of native coronary artery of native heart without angina pectoris I25.10 414.01 Controlled on Plavix and statin. Pt followed by Cardiology. 5. Bilateral carotid artery disease (HCC) I77.9 447.9 Stable on Plavix and statin. Low cholesterol diet, weight control and daily exercise discussed. Follow-up Disposition:  Return in about 6 months (around 1/24/2019). Reviewed plan of care. Patient has pro`vided input and agrees with goals.

## 2018-09-07 NOTE — PROGRESS NOTES
HISTORY OF PRESENT ILLNESS  Juice Vergara is a 80 y.o. male. Heart Problem   Pertinent negatives include no chest pain, no abdominal pain, no headaches and no shortness of breath. Patient presents for a followup office visit. He was initially seen here for evaluation of angina. He subsequently underwent a cardiac catheterization April 2011, which revealed high-grade stenosis of his left circumflex, status post drug-eluting stent, [place] 3.0 x 18 mm. Patient also was found to have a heavily calcified vessels, including a 30% left main stenosis, 30% LAD stenosis, and RCA had diffuse distal 90% disease with collateralization from the left-sided. He last underwent a pharmacological nuclear stress test in January 2016, which showed a predominantly fixed inferior perfusion defect, likely from a previous MI, Low-normal left ventricular ejection fraction of 54%. This has not significantly changed compared to his prior study from 2013. He also has a history of moderate bilateral carotid artery disease, last evaluated by duplex in March 2016 which was unchanged compared to the year prior. The patient was last seen in the office approximately 6 months ago. Since that time, he denies any new chest pain or shortness of breath. No palpitations, dizziness or syncope. No change in his activity tolerance. He still tries to bowl once a week and works in his yard when the weather permits. He does admit to a resting tremor of his left hand and arm which tends to improve when he is active. He denies any gait disturbance or difficulties ambulating. Past Medical History:   Diagnosis Date    Asthma     Cardiac cath 04/28/2011    oLM 30%. pLAD 30%. oD1 50%. CX 90% (3 x 18 [place] DEMAR). dRCA 90%. RPLB subtotal.  RPDA 100%.  Cardiac echocardiogram 01/21/2014    EF 55-60%. Mod LVH. Gr 1 DDfx. Mild AS (mean grad 13). Mild AI. Unchanged from study of 11/30/11.     Cardiac nuclear imaging test, mod risk 01/22/2016    Intermediate risk. Medium-sized inferior, inferoseptal infarction. No ischemia. EF 54%. Nondiagnostic EKG on pharm stress test.    Carotid duplex 03/02/2016    Mod 50-69% bilateral ICA stenosis. Probable significant RECA stenosis. >50% stenosis of left subclavian. No significant change from study of 1/8/15.  Coronary artery disease     Status post PCI with drug-eluting stent, [place] 3 x 18 mm in the proximal circumflex.  Hx of carotid artery disease (HCC)     Hypercholesterolemia     Hypertension     Prostate cancer (HCC)       Current Outpatient Prescriptions   Medication Sig Dispense Refill    simvastatin (ZOCOR) 40 mg tablet TAKE 1 TABLET EVERY NIGHT 90 Tab 3    clopidogrel (PLAVIX) 75 mg tab TAKE 1 TABLET EVERY DAY 90 Tab 3    amLODIPine (NORVASC) 5 mg tablet TAKE 1 TABLET EVERY DAY 90 Tab 3    tadalafil (CIALIS) 20 mg tablet Take 1 Tab by mouth as needed. 3 Tab 0    fluticasone-salmeterol (ADVAIR DISKUS) 100-50 mcg/dose diskus inhaler Take 1 Puff by inhalation daily. Indications: BRONCHIAL ASTHMA 3 Inhaler 3    albuterol (PROVENTIL HFA, VENTOLIN HFA, PROAIR HFA) 90 mcg/actuation inhaler Take 2 Puffs by inhalation every four (4) hours as needed for Wheezing or Shortness of Breath. 1 Inhaler 1    tamsulosin (FLOMAX) 0.4 mg capsule Take 1 Cap by mouth daily. 30 Cap 5    aspirin delayed-release 81 mg tablet Take 81 mg by mouth daily. No Known Allergies     Social History   Substance Use Topics    Smoking status: Former Smoker     Packs/day: 1.00     Years: 15.00     Quit date: 5/28/1960    Smokeless tobacco: Never Used    Alcohol use No         Review of Systems   Constitutional: Negative for chills, fever and weight loss. HENT: Negative for nosebleeds. Eyes: Negative for blurred vision and double vision. Respiratory: Negative for cough, shortness of breath and wheezing.     Cardiovascular: Negative for chest pain, palpitations, orthopnea, claudication, leg swelling and PND. Gastrointestinal: Negative for abdominal pain, heartburn, nausea and vomiting. Genitourinary: Negative for dysuria and hematuria. Musculoskeletal: Negative for falls and myalgias. Skin: Negative for rash. Neurological: Negative for dizziness, focal weakness and headaches. Endo/Heme/Allergies: Does not bruise/bleed easily. Psychiatric/Behavioral: Negative for substance abuse. Visit Vitals    /80    Pulse 65    Ht 5' 8\" (1.727 m)    Wt 75.3 kg (166 lb)    SpO2 96%    BMI 25.24 kg/m2      Physical Exam   Constitutional: He is oriented to person, place, and time. He appears well-developed and well-nourished. HENT:   Head: Normocephalic and atraumatic. Eyes: Conjunctivae are normal.   Neck: Neck supple. No JVD present. Carotid bruit is present (bilateral). Cardiovascular: Normal rate, regular rhythm, S1 normal, S2 normal and normal pulses. Exam reveals no gallop and no S3.    Murmur heard. Systolic murmur is present with a grade of 2/6  at the upper right sternal border  Pulmonary/Chest: Breath sounds normal. He has no wheezes. He has no rales. Abdominal: Soft. Bowel sounds are normal. There is no tenderness. Musculoskeletal: He exhibits no edema. Neurological: He is alert and oriented to person, place, and time. Skin: Skin is warm and dry. EKG: Normal sinus rhythm, leftward axis, left bundle branch block. No change compared to his previous EKG. ASSESSMENT and PLAN    Coronary disease. Status post drug-eluting stent to the left circumflex, with an [place] 3 x 18 mm stent April 2011. Patient has mild residual ostial left main disease and LAD disease, with heavy calcification, and he had significant distal RCA disease, which was well collateralized. He underwent a followup pharmacologic nuclear stress test in January 2016, which continued to demonstrate scarring of the inferior wall, but no significant ischemia.    No new symptoms concerning for angina. I would continue his Plavix and simvastatin. He has never been on a beta blocker because of his low normal heart rate and left bundle branch block. Carotid artery disease. The patient has moderate bilateral ICA stenosis last evaluated by a carotid duplex in March 2016, which was unchanged compared to the year prior, but I would continue to reevaluate this on an annual basis. He remains on Plavix as well, which I would continue. Peripheral vascular disease. 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 Because of this his blood pressure should be checked in his right arm. Mild aortic valve stenosis. Unchanged on echocardiogram in Jan. 2014. It is unlikely that this will progress to becoming significant. Hypertension. The patient's blood pressure is minimally elevated today in the office, however, is usually much better controlled, so for now I would not adjust any of his medications. Dyslipidemia. On simvastatin 40 mg a day. This has been followed by his PCP. Historically this has been well-controlled. Left bundle branch block.  This is again present on today's EKG and has been intermittent in the past.    Followup in the office in 6 months time, sooner if needed yes

## 2019-01-01 ENCOUNTER — APPOINTMENT (OUTPATIENT)
Dept: ULTRASOUND IMAGING | Age: 84
DRG: 193 | End: 2019-01-01
Attending: INTERNAL MEDICINE
Payer: MEDICARE

## 2019-01-01 ENCOUNTER — PATIENT OUTREACH (OUTPATIENT)
Dept: FAMILY MEDICINE CLINIC | Age: 84
End: 2019-01-01

## 2019-01-01 ENCOUNTER — TELEPHONE (OUTPATIENT)
Dept: FAMILY MEDICINE CLINIC | Age: 84
End: 2019-01-01

## 2019-01-01 ENCOUNTER — APPOINTMENT (OUTPATIENT)
Dept: GENERAL RADIOLOGY | Age: 84
DRG: 280 | End: 2019-01-01
Attending: PHYSICIAN ASSISTANT
Payer: MEDICARE

## 2019-01-01 ENCOUNTER — APPOINTMENT (OUTPATIENT)
Dept: GENERAL RADIOLOGY | Age: 84
DRG: 280 | End: 2019-01-01
Attending: INTERNAL MEDICINE
Payer: MEDICARE

## 2019-01-01 ENCOUNTER — HOME CARE VISIT (OUTPATIENT)
Dept: HOME HEALTH SERVICES | Facility: HOME HEALTH | Age: 84
End: 2019-01-01
Payer: MEDICARE

## 2019-01-01 ENCOUNTER — APPOINTMENT (OUTPATIENT)
Dept: ULTRASOUND IMAGING | Age: 84
DRG: 193 | End: 2019-01-01
Attending: RADIOLOGY
Payer: MEDICARE

## 2019-01-01 ENCOUNTER — HOME CARE VISIT (OUTPATIENT)
Dept: HOME HEALTH SERVICES | Facility: HOME HEALTH | Age: 84
End: 2019-01-01

## 2019-01-01 ENCOUNTER — APPOINTMENT (OUTPATIENT)
Dept: GENERAL RADIOLOGY | Age: 84
DRG: 193 | End: 2019-01-01
Attending: RADIOLOGY
Payer: MEDICARE

## 2019-01-01 ENCOUNTER — HOME HEALTH ADMISSION (OUTPATIENT)
Dept: HOME HEALTH SERVICES | Facility: HOME HEALTH | Age: 84
End: 2019-01-01

## 2019-01-01 ENCOUNTER — APPOINTMENT (OUTPATIENT)
Dept: GENERAL RADIOLOGY | Age: 84
DRG: 193 | End: 2019-01-01
Attending: INTERNAL MEDICINE
Payer: MEDICARE

## 2019-01-01 ENCOUNTER — APPOINTMENT (OUTPATIENT)
Dept: VASCULAR SURGERY | Age: 84
DRG: 193 | End: 2019-01-01
Attending: NURSE PRACTITIONER
Payer: MEDICARE

## 2019-01-01 ENCOUNTER — APPOINTMENT (OUTPATIENT)
Dept: GENERAL RADIOLOGY | Age: 84
DRG: 193 | End: 2019-01-01
Attending: NURSE PRACTITIONER
Payer: MEDICARE

## 2019-01-01 ENCOUNTER — APPOINTMENT (OUTPATIENT)
Dept: ULTRASOUND IMAGING | Age: 84
DRG: 280 | End: 2019-01-01
Attending: PHYSICIAN ASSISTANT
Payer: MEDICARE

## 2019-01-01 ENCOUNTER — HOSPITAL ENCOUNTER (OUTPATIENT)
Dept: LAB | Age: 84
Discharge: HOME | End: 2019-01-24

## 2019-01-01 ENCOUNTER — HOSPITAL ENCOUNTER (INPATIENT)
Age: 84
LOS: 26 days | Discharge: HOME HEALTH | DRG: 193 | End: 2019-05-06
Attending: EMERGENCY MEDICINE | Admitting: FAMILY MEDICINE
Payer: MEDICARE

## 2019-01-01 ENCOUNTER — APPOINTMENT (OUTPATIENT)
Dept: NON INVASIVE DIAGNOSTICS | Age: 84
DRG: 280 | End: 2019-01-01
Attending: PHYSICIAN ASSISTANT
Payer: MEDICARE

## 2019-01-01 ENCOUNTER — APPOINTMENT (OUTPATIENT)
Dept: GENERAL RADIOLOGY | Age: 84
DRG: 280 | End: 2019-01-01
Attending: EMERGENCY MEDICINE
Payer: MEDICARE

## 2019-01-01 ENCOUNTER — HOSPITAL ENCOUNTER (INPATIENT)
Dept: ULTRASOUND IMAGING | Age: 84
Discharge: HOME | DRG: 280 | End: 2019-03-26
Attending: PHYSICIAN ASSISTANT | Admitting: INTERNAL MEDICINE
Payer: MEDICARE

## 2019-01-01 ENCOUNTER — OFFICE VISIT (OUTPATIENT)
Dept: CARDIOLOGY CLINIC | Age: 84
End: 2019-01-01

## 2019-01-01 ENCOUNTER — HOME HEALTH ADMISSION (OUTPATIENT)
Dept: HOME HEALTH SERVICES | Facility: HOME HEALTH | Age: 84
End: 2019-01-01
Payer: MEDICARE

## 2019-01-01 ENCOUNTER — HOME CARE VISIT (OUTPATIENT)
Dept: SCHEDULING | Facility: HOME HEALTH | Age: 84
End: 2019-01-01
Payer: MEDICARE

## 2019-01-01 ENCOUNTER — TELEPHONE (OUTPATIENT)
Dept: CARDIOLOGY CLINIC | Age: 84
End: 2019-01-01

## 2019-01-01 ENCOUNTER — HOSPITAL ENCOUNTER (INPATIENT)
Dept: CARDIAC CATH/INVASIVE PROCEDURES | Age: 84
Discharge: HOME | DRG: 193 | End: 2019-04-23
Attending: HOSPITALIST
Payer: MEDICARE

## 2019-01-01 ENCOUNTER — HOSPITAL ENCOUNTER (OUTPATIENT)
Dept: LAB | Age: 84
Discharge: HOME | End: 2019-02-13
Payer: MEDICARE

## 2019-01-01 ENCOUNTER — APPOINTMENT (OUTPATIENT)
Dept: CT IMAGING | Age: 84
DRG: 280 | End: 2019-01-01
Attending: EMERGENCY MEDICINE
Payer: MEDICARE

## 2019-01-01 ENCOUNTER — HOSPITAL ENCOUNTER (INPATIENT)
Age: 84
LOS: 5 days | Discharge: HOME HEALTH | DRG: 246 | End: 2019-03-12
Attending: HOSPITALIST | Admitting: HOSPITALIST
Payer: MEDICARE

## 2019-01-01 ENCOUNTER — HOSPITAL ENCOUNTER (INPATIENT)
Age: 84
LOS: 20 days | Discharge: NURSING, REHAB OR LONG-TERM CARE | DRG: 280 | End: 2019-04-02
Attending: EMERGENCY MEDICINE | Admitting: INTERNAL MEDICINE
Payer: MEDICARE

## 2019-01-01 ENCOUNTER — PATIENT OUTREACH (OUTPATIENT)
Dept: CARDIOLOGY CLINIC | Age: 84
End: 2019-01-01

## 2019-01-01 ENCOUNTER — OFFICE VISIT (OUTPATIENT)
Dept: FAMILY MEDICINE CLINIC | Age: 84
End: 2019-01-01

## 2019-01-01 ENCOUNTER — APPOINTMENT (OUTPATIENT)
Dept: VASCULAR SURGERY | Age: 84
DRG: 193 | End: 2019-01-01
Attending: EMERGENCY MEDICINE
Payer: MEDICARE

## 2019-01-01 ENCOUNTER — APPOINTMENT (OUTPATIENT)
Dept: CT IMAGING | Age: 84
DRG: 193 | End: 2019-01-01
Attending: EMERGENCY MEDICINE
Payer: MEDICARE

## 2019-01-01 ENCOUNTER — APPOINTMENT (OUTPATIENT)
Dept: NON INVASIVE DIAGNOSTICS | Age: 84
DRG: 246 | End: 2019-01-01
Attending: HOSPITALIST
Payer: MEDICARE

## 2019-01-01 ENCOUNTER — APPOINTMENT (OUTPATIENT)
Dept: CT IMAGING | Age: 84
DRG: 280 | End: 2019-01-01
Attending: PHYSICIAN ASSISTANT
Payer: MEDICARE

## 2019-01-01 ENCOUNTER — APPOINTMENT (OUTPATIENT)
Dept: CARDIAC CATH/INVASIVE PROCEDURES | Age: 84
DRG: 193 | End: 2019-01-01
Attending: HOSPITALIST
Payer: MEDICARE

## 2019-01-01 ENCOUNTER — HOSPITAL ENCOUNTER (INPATIENT)
Age: 84
LOS: 8 days | Discharge: STILL ADMITTED OR ADMINISTRATIVE | End: 2019-04-10
Attending: INTERNAL MEDICINE | Admitting: INTERNAL MEDICINE

## 2019-01-01 ENCOUNTER — APPOINTMENT (OUTPATIENT)
Dept: GENERAL RADIOLOGY | Age: 84
DRG: 280 | End: 2019-01-01
Attending: NURSE PRACTITIONER
Payer: MEDICARE

## 2019-01-01 ENCOUNTER — HOSPITAL ENCOUNTER (OUTPATIENT)
Dept: GENERAL RADIOLOGY | Age: 84
Discharge: HOME | End: 2019-04-09
Attending: NURSE PRACTITIONER

## 2019-01-01 ENCOUNTER — APPOINTMENT (OUTPATIENT)
Dept: GENERAL RADIOLOGY | Age: 84
DRG: 193 | End: 2019-01-01
Attending: EMERGENCY MEDICINE
Payer: MEDICARE

## 2019-01-01 VITALS
RESPIRATION RATE: 17 BRPM | BODY MASS INDEX: 22.19 KG/M2 | OXYGEN SATURATION: 96 % | HEART RATE: 100 BPM | WEIGHT: 141.4 LBS | DIASTOLIC BLOOD PRESSURE: 64 MMHG | TEMPERATURE: 97 F | SYSTOLIC BLOOD PRESSURE: 100 MMHG | HEIGHT: 67 IN

## 2019-01-01 VITALS
SYSTOLIC BLOOD PRESSURE: 126 MMHG | WEIGHT: 161 LBS | HEIGHT: 67 IN | DIASTOLIC BLOOD PRESSURE: 54 MMHG | TEMPERATURE: 97.1 F | RESPIRATION RATE: 20 BRPM | BODY MASS INDEX: 25.27 KG/M2 | HEART RATE: 98 BPM | OXYGEN SATURATION: 95 %

## 2019-01-01 VITALS
HEART RATE: 111 BPM | HEIGHT: 68 IN | WEIGHT: 152 LBS | OXYGEN SATURATION: 94 % | SYSTOLIC BLOOD PRESSURE: 135 MMHG | BODY MASS INDEX: 23.04 KG/M2 | RESPIRATION RATE: 20 BRPM | TEMPERATURE: 97.7 F | DIASTOLIC BLOOD PRESSURE: 66 MMHG

## 2019-01-01 VITALS
OXYGEN SATURATION: 91 % | HEART RATE: 67 BPM | SYSTOLIC BLOOD PRESSURE: 106 MMHG | TEMPERATURE: 97.8 F | RESPIRATION RATE: 18 BRPM | DIASTOLIC BLOOD PRESSURE: 60 MMHG

## 2019-01-01 VITALS
RESPIRATION RATE: 14 BRPM | HEIGHT: 67 IN | HEART RATE: 82 BPM | OXYGEN SATURATION: 98 % | BODY MASS INDEX: 23.11 KG/M2 | DIASTOLIC BLOOD PRESSURE: 58 MMHG | WEIGHT: 147.27 LBS | SYSTOLIC BLOOD PRESSURE: 100 MMHG | TEMPERATURE: 97.9 F

## 2019-01-01 VITALS
HEART RATE: 72 BPM | WEIGHT: 149.03 LBS | OXYGEN SATURATION: 97 % | DIASTOLIC BLOOD PRESSURE: 57 MMHG | TEMPERATURE: 97.3 F | SYSTOLIC BLOOD PRESSURE: 108 MMHG | HEIGHT: 67 IN | BODY MASS INDEX: 23.39 KG/M2 | RESPIRATION RATE: 16 BRPM

## 2019-01-01 VITALS — SYSTOLIC BLOOD PRESSURE: 91 MMHG | DIASTOLIC BLOOD PRESSURE: 64 MMHG | OXYGEN SATURATION: 95 %

## 2019-01-01 VITALS
OXYGEN SATURATION: 98 % | WEIGHT: 136 LBS | HEART RATE: 80 BPM | HEIGHT: 65 IN | BODY MASS INDEX: 22.66 KG/M2 | TEMPERATURE: 98.2 F | SYSTOLIC BLOOD PRESSURE: 96 MMHG | RESPIRATION RATE: 19 BRPM | DIASTOLIC BLOOD PRESSURE: 56 MMHG

## 2019-01-01 VITALS
DIASTOLIC BLOOD PRESSURE: 82 MMHG | RESPIRATION RATE: 18 BRPM | SYSTOLIC BLOOD PRESSURE: 108 MMHG | HEART RATE: 85 BPM | OXYGEN SATURATION: 98 %

## 2019-01-01 VITALS
WEIGHT: 158 LBS | HEIGHT: 68 IN | SYSTOLIC BLOOD PRESSURE: 140 MMHG | HEART RATE: 69 BPM | OXYGEN SATURATION: 97 % | BODY MASS INDEX: 23.95 KG/M2 | DIASTOLIC BLOOD PRESSURE: 62 MMHG

## 2019-01-01 VITALS
RESPIRATION RATE: 20 BRPM | BODY MASS INDEX: 23.79 KG/M2 | TEMPERATURE: 97.7 F | HEART RATE: 66 BPM | DIASTOLIC BLOOD PRESSURE: 50 MMHG | OXYGEN SATURATION: 96 % | HEIGHT: 68 IN | SYSTOLIC BLOOD PRESSURE: 140 MMHG | WEIGHT: 157 LBS

## 2019-01-01 VITALS
OXYGEN SATURATION: 95 % | RESPIRATION RATE: 19 BRPM | SYSTOLIC BLOOD PRESSURE: 91 MMHG | HEART RATE: 103 BPM | TEMPERATURE: 98 F | DIASTOLIC BLOOD PRESSURE: 62 MMHG

## 2019-01-01 DIAGNOSIS — I25.10 ATHEROSCLEROSIS OF NATIVE CORONARY ARTERY OF NATIVE HEART WITHOUT ANGINA PECTORIS: ICD-10-CM

## 2019-01-01 DIAGNOSIS — I65.23 BILATERAL CAROTID ARTERY STENOSIS: ICD-10-CM

## 2019-01-01 DIAGNOSIS — I11.9 BENIGN HYPERTENSIVE HEART DISEASE WITHOUT HEART FAILURE: Primary | ICD-10-CM

## 2019-01-01 DIAGNOSIS — R77.8 ELEVATED TROPONIN: ICD-10-CM

## 2019-01-01 DIAGNOSIS — I11.9 BENIGN HYPERTENSIVE HEART DISEASE WITHOUT HEART FAILURE: ICD-10-CM

## 2019-01-01 DIAGNOSIS — K92.1 MELENA: Primary | ICD-10-CM

## 2019-01-01 DIAGNOSIS — J96.01 ACUTE RESPIRATORY FAILURE WITH HYPOXIA (HCC): ICD-10-CM

## 2019-01-01 DIAGNOSIS — J81.0 ACUTE PULMONARY EDEMA (HCC): Primary | ICD-10-CM

## 2019-01-01 DIAGNOSIS — R65.21 SEPTIC SHOCK (HCC): ICD-10-CM

## 2019-01-01 DIAGNOSIS — D50.0 IRON DEFICIENCY ANEMIA DUE TO CHRONIC BLOOD LOSS: Primary | ICD-10-CM

## 2019-01-01 DIAGNOSIS — E78.00 HYPERCHOLESTEROLEMIA: ICD-10-CM

## 2019-01-01 DIAGNOSIS — A41.9 SEPTIC SHOCK (HCC): ICD-10-CM

## 2019-01-01 DIAGNOSIS — D50.0 IRON DEFICIENCY ANEMIA DUE TO CHRONIC BLOOD LOSS: ICD-10-CM

## 2019-01-01 DIAGNOSIS — I21.4 NSTEMI (NON-ST ELEVATED MYOCARDIAL INFARCTION) (HCC): ICD-10-CM

## 2019-01-01 DIAGNOSIS — I44.7 LEFT BUNDLE BRANCH BLOCK: Primary | ICD-10-CM

## 2019-01-01 DIAGNOSIS — J45.21 MILD INTERMITTENT ASTHMA WITH ACUTE EXACERBATION: ICD-10-CM

## 2019-01-01 DIAGNOSIS — K31.811 AVM (ARTERIOVENOUS MALFORMATION) OF DUODENUM, ACQUIRED WITH HEMORRHAGE: Primary | ICD-10-CM

## 2019-01-01 DIAGNOSIS — Z12.11 COLON CANCER SCREENING: ICD-10-CM

## 2019-01-01 DIAGNOSIS — I50.23 SYSTOLIC CHF, ACUTE ON CHRONIC (HCC): ICD-10-CM

## 2019-01-01 DIAGNOSIS — J90 BILATERAL PLEURAL EFFUSION: Primary | ICD-10-CM

## 2019-01-01 DIAGNOSIS — I25.119 CORONARY ARTERY DISEASE INVOLVING NATIVE HEART WITH ANGINA PECTORIS, UNSPECIFIED VESSEL OR LESION TYPE (HCC): ICD-10-CM
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
## 2019-01-01 DIAGNOSIS — J45.40 MODERATE PERSISTENT ASTHMA, UNSPECIFIED WHETHER COMPLICATED: Primary | ICD-10-CM

## 2019-01-01 DIAGNOSIS — I50.9 ACUTE ON CHRONIC CONGESTIVE HEART FAILURE, UNSPECIFIED HEART FAILURE TYPE (HCC): ICD-10-CM

## 2019-01-01 DIAGNOSIS — Z71.89 ADVANCED CARE PLANNING/COUNSELING DISCUSSION: ICD-10-CM

## 2019-01-01 DIAGNOSIS — I35.0 AORTIC VALVE STENOSIS, ETIOLOGY OF CARDIAC VALVE DISEASE UNSPECIFIED: ICD-10-CM

## 2019-01-01 DIAGNOSIS — J90 PLEURAL EFFUSION, BILATERAL: ICD-10-CM

## 2019-01-01 DIAGNOSIS — E78.5 DYSLIPIDEMIA: ICD-10-CM

## 2019-01-01 DIAGNOSIS — J18.9 HAP (HOSPITAL-ACQUIRED PNEUMONIA): ICD-10-CM

## 2019-01-01 LAB
ABO + RH BLD: NORMAL
ABO + RH BLD: NORMAL
ALBUMIN SERPL-MCNC: 2.3 G/DL (ref 3.4–5)
ALBUMIN SERPL-MCNC: 2.5 G/DL (ref 3.4–5)
ALBUMIN SERPL-MCNC: 2.6 G/DL (ref 3.4–5)
ALBUMIN SERPL-MCNC: 2.7 G/DL (ref 3.4–5)
ALBUMIN SERPL-MCNC: 2.8 G/DL (ref 3.4–5)
ALBUMIN SERPL-MCNC: 3.1 G/DL (ref 3.4–5)
ALBUMIN SERPL-MCNC: 3.5 G/DL (ref 3.4–5)
ALBUMIN SERPL-MCNC: 3.5 G/DL (ref 3.4–5)
ALBUMIN SERPL-MCNC: 3.8 G/DL (ref 3.2–4.6)
ALBUMIN SERPL-MCNC: 4 G/DL (ref 3.2–4.6)
ALBUMIN SERPL-MCNC: 4.4 G/DL (ref 3.2–4.6)
ALBUMIN/GLOB SERPL: 0.6 {RATIO} (ref 0.8–1.7)
ALBUMIN/GLOB SERPL: 0.7 {RATIO} (ref 0.8–1.7)
ALBUMIN/GLOB SERPL: 0.8 {RATIO} (ref 0.8–1.7)
ALBUMIN/GLOB SERPL: 0.9 {RATIO} (ref 0.8–1.7)
ALBUMIN/GLOB SERPL: 1.2 {RATIO} (ref 0.8–1.7)
ALBUMIN/GLOB SERPL: 1.5 {RATIO} (ref 1.2–2.2)
ALBUMIN/GLOB SERPL: 1.5 {RATIO} (ref 1.2–2.2)
ALBUMIN/GLOB SERPL: 2 {RATIO} (ref 1.2–2.2)
ALP SERPL-CCNC: 42 IU/L (ref 39–117)
ALP SERPL-CCNC: 47 IU/L (ref 39–117)
ALP SERPL-CCNC: 50 IU/L (ref 39–117)
ALP SERPL-CCNC: 50 U/L (ref 45–117)
ALP SERPL-CCNC: 55 U/L (ref 45–117)
ALP SERPL-CCNC: 56 U/L (ref 45–117)
ALP SERPL-CCNC: 61 U/L (ref 45–117)
ALP SERPL-CCNC: 61 U/L (ref 45–117)
ALP SERPL-CCNC: 71 U/L (ref 45–117)
ALP SERPL-CCNC: 87 U/L (ref 45–117)
ALT SERPL-CCNC: 10 IU/L (ref 0–44)
ALT SERPL-CCNC: 11 IU/L (ref 0–44)
ALT SERPL-CCNC: 12 IU/L (ref 0–44)
ALT SERPL-CCNC: 13 U/L (ref 16–61)
ALT SERPL-CCNC: 14 U/L (ref 16–61)
ALT SERPL-CCNC: 15 U/L (ref 16–61)
ALT SERPL-CCNC: 15 U/L (ref 16–61)
ALT SERPL-CCNC: 20 U/L (ref 16–61)
ANION GAP SERPL CALC-SCNC: 10 MMOL/L (ref 3–18)
ANION GAP SERPL CALC-SCNC: 10 MMOL/L (ref 3–18)
ANION GAP SERPL CALC-SCNC: 11 MMOL/L (ref 3–18)
ANION GAP SERPL CALC-SCNC: 12 MMOL/L (ref 3–18)
ANION GAP SERPL CALC-SCNC: 12 MMOL/L (ref 3–18)
ANION GAP SERPL CALC-SCNC: 13 MMOL/L (ref 3–18)
ANION GAP SERPL CALC-SCNC: 15 MMOL/L (ref 3–18)
ANION GAP SERPL CALC-SCNC: 4 MMOL/L (ref 3–18)
ANION GAP SERPL CALC-SCNC: 5 MMOL/L (ref 3–18)
ANION GAP SERPL CALC-SCNC: 5 MMOL/L (ref 3–18)
ANION GAP SERPL CALC-SCNC: 6 MMOL/L (ref 3–18)
ANION GAP SERPL CALC-SCNC: 7 MMOL/L (ref 3–18)
ANION GAP SERPL CALC-SCNC: 8 MMOL/L (ref 3–18)
ANION GAP SERPL CALC-SCNC: 9 MMOL/L (ref 3–18)
APPEARANCE FLD: ABNORMAL
APPEARANCE UR: CLEAR
APPEARANCE UR: NORMAL
APTT PPP: 115 SEC (ref 23–36.4)
APTT PPP: 29.5 SEC (ref 23–36.4)
APTT PPP: 30.3 SEC (ref 23–36.4)
APTT PPP: 31.8 SEC (ref 23–36.4)
APTT PPP: 32.5 SEC (ref 23–36.4)
APTT PPP: 39.4 SEC (ref 23–36.4)
APTT PPP: 40.5 SEC (ref 23–36.4)
APTT PPP: 40.9 SEC (ref 23–36.4)
APTT PPP: 42.6 SEC (ref 23–36.4)
APTT PPP: 43.2 SEC (ref 23–36.4)
APTT PPP: 43.9 SEC (ref 23–36.4)
APTT PPP: 44 SEC (ref 23–36.4)
APTT PPP: 45 SEC (ref 23–36.4)
APTT PPP: 45 SEC (ref 23–36.4)
APTT PPP: 45.8 SEC (ref 23–36.4)
APTT PPP: 46.8 SEC (ref 23–36.4)
APTT PPP: 47.1 SEC (ref 23–36.4)
APTT PPP: 48 SEC (ref 23–36.4)
APTT PPP: 48.2 SEC (ref 23–36.4)
APTT PPP: 49 SEC (ref 23–36.4)
APTT PPP: 53 SEC (ref 23–36.4)
APTT PPP: 54.1 SEC (ref 23–36.4)
APTT PPP: 58.5 SEC (ref 23–36.4)
APTT PPP: 65.9 SEC (ref 23–36.4)
APTT PPP: 70.9 SEC (ref 23–36.4)
APTT PPP: 88.8 SEC (ref 23–36.4)
ARTERIAL PATENCY WRIST A: YES
AST SERPL-CCNC: 11 IU/L (ref 0–40)
AST SERPL-CCNC: 11 U/L (ref 15–37)
AST SERPL-CCNC: 14 U/L (ref 15–37)
AST SERPL-CCNC: 15 IU/L (ref 0–40)
AST SERPL-CCNC: 17 U/L (ref 15–37)
AST SERPL-CCNC: 18 IU/L (ref 0–40)
AST SERPL-CCNC: 37 U/L (ref 15–37)
AST SERPL-CCNC: 9 U/L (ref 15–37)
ATRIAL RATE: 117 BPM
ATRIAL RATE: 120 BPM
ATRIAL RATE: 129 BPM
ATRIAL RATE: 87 BPM
ATRIAL RATE: 94 BPM
BACTERIA SPEC CULT: NORMAL
BASE EXCESS BLDV CALC-SCNC: 5 MMOL/L
BASOPHILS # BLD AUTO: 0 X10E3/UL (ref 0–0.2)
BASOPHILS # BLD AUTO: 0 X10E3/UL (ref 0–0.2)
BASOPHILS # BLD: 0 K/UL (ref 0–0.06)
BASOPHILS # BLD: 0 K/UL (ref 0–0.1)
BASOPHILS NFR BLD AUTO: 0 %
BASOPHILS NFR BLD AUTO: 0 %
BASOPHILS NFR BLD: 0 % (ref 0–2)
BASOPHILS NFR BLD: 0 % (ref 0–3)
BDY SITE: ABNORMAL
BILIRUB DIRECT SERPL-MCNC: 0.3 MG/DL (ref 0–0.2)
BILIRUB SERPL-MCNC: 0.4 MG/DL (ref 0–1.2)
BILIRUB SERPL-MCNC: 0.5 MG/DL (ref 0.2–1)
BILIRUB SERPL-MCNC: 0.5 MG/DL (ref 0–1.2)
BILIRUB SERPL-MCNC: 0.6 MG/DL (ref 0.2–1)
BILIRUB SERPL-MCNC: 0.7 MG/DL (ref 0.2–1)
BILIRUB SERPL-MCNC: 0.7 MG/DL (ref 0.2–1)
BILIRUB SERPL-MCNC: 0.8 MG/DL (ref 0.2–1)
BILIRUB SERPL-MCNC: 1.1 MG/DL (ref 0.2–1)
BILIRUB SERPL-MCNC: 1.3 MG/DL (ref 0.2–1)
BILIRUB SERPL-MCNC: <0.2 MG/DL (ref 0–1.2)
BILIRUB UR QL: NEGATIVE
BILIRUB UR QL: NEGATIVE
BLD PROD TYP BPU: NORMAL
BLOOD GROUP ANTIBODIES SERPL: NORMAL
BLOOD GROUP ANTIBODIES SERPL: NORMAL
BNP SERPL-MCNC: ABNORMAL PG/ML (ref 0–1800)
BODY FLD TYPE: NORMAL
BPU ID: NORMAL
BUN SERPL-MCNC: 12 MG/DL (ref 10–36)
BUN SERPL-MCNC: 14 MG/DL (ref 10–36)
BUN SERPL-MCNC: 18 MG/DL (ref 10–36)
BUN SERPL-MCNC: 22 MG/DL (ref 7–18)
BUN SERPL-MCNC: 23 MG/DL (ref 7–18)
BUN SERPL-MCNC: 28 MG/DL (ref 7–18)
BUN SERPL-MCNC: 30 MG/DL (ref 7–18)
BUN SERPL-MCNC: 31 MG/DL (ref 7–18)
BUN SERPL-MCNC: 32 MG/DL (ref 7–18)
BUN SERPL-MCNC: 33 MG/DL (ref 7–18)
BUN SERPL-MCNC: 34 MG/DL (ref 7–18)
BUN SERPL-MCNC: 35 MG/DL (ref 7–18)
BUN SERPL-MCNC: 36 MG/DL (ref 7–18)
BUN SERPL-MCNC: 37 MG/DL (ref 7–18)
BUN SERPL-MCNC: 38 MG/DL (ref 7–18)
BUN SERPL-MCNC: 39 MG/DL (ref 7–18)
BUN SERPL-MCNC: 40 MG/DL (ref 7–18)
BUN SERPL-MCNC: 41 MG/DL (ref 7–18)
BUN SERPL-MCNC: 44 MG/DL (ref 7–18)
BUN SERPL-MCNC: 45 MG/DL (ref 7–18)
BUN SERPL-MCNC: 45 MG/DL (ref 7–18)
BUN SERPL-MCNC: 46 MG/DL (ref 7–18)
BUN SERPL-MCNC: 52 MG/DL (ref 7–18)
BUN SERPL-MCNC: 54 MG/DL (ref 7–18)
BUN SERPL-MCNC: 55 MG/DL (ref 7–18)
BUN SERPL-MCNC: 55 MG/DL (ref 7–18)
BUN SERPL-MCNC: 69 MG/DL (ref 7–18)
BUN SERPL-MCNC: 69 MG/DL (ref 7–18)
BUN SERPL-MCNC: 76 MG/DL (ref 7–18)
BUN SERPL-MCNC: 84 MG/DL (ref 7–18)
BUN SERPL-MCNC: 87 MG/DL (ref 7–18)
BUN SERPL-MCNC: 90 MG/DL (ref 7–18)
BUN SERPL-MCNC: 93 MG/DL (ref 7–18)
BUN SERPL-MCNC: 94 MG/DL (ref 7–18)
BUN SERPL-MCNC: 97 MG/DL (ref 7–18)
BUN SERPL-MCNC: 99 MG/DL (ref 7–18)
BUN/CREAT SERPL: 13 (ref 10–24)
BUN/CREAT SERPL: 14 (ref 10–24)
BUN/CREAT SERPL: 15 (ref 10–24)
BUN/CREAT SERPL: 19 (ref 12–20)
BUN/CREAT SERPL: 22 (ref 12–20)
BUN/CREAT SERPL: 23 (ref 12–20)
BUN/CREAT SERPL: 24 (ref 12–20)
BUN/CREAT SERPL: 25 (ref 12–20)
BUN/CREAT SERPL: 25 (ref 12–20)
BUN/CREAT SERPL: 26 (ref 12–20)
BUN/CREAT SERPL: 27 (ref 12–20)
BUN/CREAT SERPL: 28 (ref 12–20)
BUN/CREAT SERPL: 28 (ref 12–20)
BUN/CREAT SERPL: 29 (ref 12–20)
BUN/CREAT SERPL: 29 (ref 12–20)
BUN/CREAT SERPL: 30 (ref 12–20)
BUN/CREAT SERPL: 31 (ref 12–20)
BUN/CREAT SERPL: 31 (ref 12–20)
BUN/CREAT SERPL: 33 (ref 12–20)
BUN/CREAT SERPL: 33 (ref 12–20)
BUN/CREAT SERPL: 34 (ref 12–20)
BUN/CREAT SERPL: 35 (ref 12–20)
BUN/CREAT SERPL: 38 (ref 12–20)
BUN/CREAT SERPL: 38 (ref 12–20)
BUN/CREAT SERPL: 41 (ref 12–20)
BUN/CREAT SERPL: 41 (ref 12–20)
BUN/CREAT SERPL: 43 (ref 12–20)
BUN/CREAT SERPL: 44 (ref 12–20)
BUN/CREAT SERPL: 46 (ref 12–20)
BUN/CREAT SERPL: 46 (ref 12–20)
BUN/CREAT SERPL: 47 (ref 12–20)
BUN/CREAT SERPL: 52 (ref 12–20)
BUN/CREAT SERPL: 53 (ref 12–20)
BUN/CREAT SERPL: 54 (ref 12–20)
BUN/CREAT SERPL: 55 (ref 12–20)
CA-I SERPL-SCNC: 1.02 MMOL/L (ref 1.12–1.32)
CA-I SERPL-SCNC: 1.08 MMOL/L (ref 1.12–1.32)
CA-I SERPL-SCNC: 1.15 MMOL/L (ref 1.12–1.32)
CA-I SERPL-SCNC: 1.17 MMOL/L (ref 1.12–1.32)
CA-I SERPL-SCNC: 1.19 MMOL/L (ref 1.12–1.32)
CA-I SERPL-SCNC: 1.19 MMOL/L (ref 1.12–1.32)
CALCIUM SERPL-MCNC: 7.4 MG/DL (ref 8.5–10.1)
CALCIUM SERPL-MCNC: 8.3 MG/DL (ref 8.5–10.1)
CALCIUM SERPL-MCNC: 8.4 MG/DL (ref 8.5–10.1)
CALCIUM SERPL-MCNC: 8.5 MG/DL (ref 8.5–10.1)
CALCIUM SERPL-MCNC: 8.6 MG/DL (ref 8.5–10.1)
CALCIUM SERPL-MCNC: 8.6 MG/DL (ref 8.5–10.1)
CALCIUM SERPL-MCNC: 8.7 MG/DL (ref 8.5–10.1)
CALCIUM SERPL-MCNC: 8.8 MG/DL (ref 8.5–10.1)
CALCIUM SERPL-MCNC: 8.8 MG/DL (ref 8.5–10.1)
CALCIUM SERPL-MCNC: 8.9 MG/DL (ref 8.5–10.1)
CALCIUM SERPL-MCNC: 9 MG/DL (ref 8.5–10.1)
CALCIUM SERPL-MCNC: 9.1 MG/DL (ref 8.5–10.1)
CALCIUM SERPL-MCNC: 9.1 MG/DL (ref 8.6–10.2)
CALCIUM SERPL-MCNC: 9.2 MG/DL (ref 8.5–10.1)
CALCIUM SERPL-MCNC: 9.2 MG/DL (ref 8.6–10.2)
CALCIUM SERPL-MCNC: 9.3 MG/DL (ref 8.5–10.1)
CALCIUM SERPL-MCNC: 9.3 MG/DL (ref 8.5–10.1)
CALCIUM SERPL-MCNC: 9.3 MG/DL (ref 8.6–10.2)
CALCIUM SERPL-MCNC: 9.4 MG/DL (ref 8.5–10.1)
CALCIUM SERPL-MCNC: 9.6 MG/DL (ref 8.5–10.1)
CALCULATED P AXIS, ECG09: 0 DEGREES
CALCULATED P AXIS, ECG09: 50 DEGREES
CALCULATED P AXIS, ECG09: 57 DEGREES
CALCULATED R AXIS, ECG10: -12 DEGREES
CALCULATED R AXIS, ECG10: 10 DEGREES
CALCULATED R AXIS, ECG10: 44 DEGREES
CALCULATED R AXIS, ECG10: 5 DEGREES
CALCULATED R AXIS, ECG10: 8 DEGREES
CALCULATED T AXIS, ECG11: -147 DEGREES
CALCULATED T AXIS, ECG11: -178 DEGREES
CALCULATED T AXIS, ECG11: 150 DEGREES
CALCULATED T AXIS, ECG11: 163 DEGREES
CALCULATED T AXIS, ECG11: 179 DEGREES
CALLED TO:,BCALL1: NORMAL
CHLORIDE SERPL-SCNC: 100 MMOL/L (ref 100–108)
CHLORIDE SERPL-SCNC: 101 MMOL/L (ref 100–108)
CHLORIDE SERPL-SCNC: 102 MMOL/L (ref 100–108)
CHLORIDE SERPL-SCNC: 103 MMOL/L (ref 100–108)
CHLORIDE SERPL-SCNC: 104 MMOL/L (ref 100–108)
CHLORIDE SERPL-SCNC: 104 MMOL/L (ref 96–106)
CHLORIDE SERPL-SCNC: 105 MMOL/L (ref 100–108)
CHLORIDE SERPL-SCNC: 105 MMOL/L (ref 100–108)
CHLORIDE SERPL-SCNC: 105 MMOL/L (ref 96–106)
CHLORIDE SERPL-SCNC: 106 MMOL/L (ref 100–108)
CHLORIDE SERPL-SCNC: 106 MMOL/L (ref 100–108)
CHLORIDE SERPL-SCNC: 107 MMOL/L (ref 100–108)
CHLORIDE SERPL-SCNC: 107 MMOL/L (ref 96–106)
CHLORIDE SERPL-SCNC: 108 MMOL/L (ref 100–108)
CHLORIDE SERPL-SCNC: 108 MMOL/L (ref 100–108)
CHLORIDE SERPL-SCNC: 109 MMOL/L (ref 100–108)
CHLORIDE SERPL-SCNC: 110 MMOL/L (ref 100–108)
CHLORIDE SERPL-SCNC: 112 MMOL/L (ref 100–108)
CHLORIDE SERPL-SCNC: 113 MMOL/L (ref 100–108)
CHLORIDE SERPL-SCNC: 95 MMOL/L (ref 100–108)
CHLORIDE SERPL-SCNC: 97 MMOL/L (ref 100–108)
CHLORIDE SERPL-SCNC: 98 MMOL/L (ref 100–108)
CHLORIDE SERPL-SCNC: 99 MMOL/L (ref 100–108)
CHOLEST SERPL-MCNC: 108 MG/DL (ref 100–199)
CHOLEST SERPL-MCNC: 87 MG/DL
CK MB CFR SERPL CALC: 2.5 % (ref 0–4)
CK MB CFR SERPL CALC: 4.9 % (ref 0–4)
CK MB CFR SERPL CALC: 6.1 % (ref 0–4)
CK MB CFR SERPL CALC: 8 % (ref 0–4)
CK MB CFR SERPL CALC: 8 % (ref 0–4)
CK MB SERPL-MCNC: 1.4 NG/ML (ref 5–25)
CK MB SERPL-MCNC: 11 NG/ML (ref 5–25)
CK MB SERPL-MCNC: 15 NG/ML (ref 5–25)
CK MB SERPL-MCNC: 20.4 NG/ML (ref 5–25)
CK MB SERPL-MCNC: 7.6 NG/ML (ref 5–25)
CK SERPL-CCNC: 156 U/L (ref 39–308)
CK SERPL-CCNC: 179 U/L (ref 39–308)
CK SERPL-CCNC: 187 U/L (ref 39–308)
CK SERPL-CCNC: 256 U/L (ref 39–308)
CK SERPL-CCNC: 56 U/L (ref 39–308)
CO2 SERPL-SCNC: 21 MMOL/L (ref 20–29)
CO2 SERPL-SCNC: 23 MMOL/L (ref 20–29)
CO2 SERPL-SCNC: 26 MMOL/L (ref 20–29)
CO2 SERPL-SCNC: 26 MMOL/L (ref 21–32)
CO2 SERPL-SCNC: 27 MMOL/L (ref 21–32)
CO2 SERPL-SCNC: 28 MMOL/L (ref 21–32)
CO2 SERPL-SCNC: 29 MMOL/L (ref 21–32)
CO2 SERPL-SCNC: 30 MMOL/L (ref 21–32)
CO2 SERPL-SCNC: 31 MMOL/L (ref 21–32)
CO2 SERPL-SCNC: 32 MMOL/L (ref 21–32)
CO2 SERPL-SCNC: 33 MMOL/L (ref 21–32)
CO2 SERPL-SCNC: 34 MMOL/L (ref 21–32)
COLOR FLD: YELLOW
COLOR UR: NORMAL
COLOR UR: YELLOW
CREAT SERPL-MCNC: 0.76 MG/DL (ref 0.6–1.3)
CREAT SERPL-MCNC: 0.89 MG/DL (ref 0.6–1.3)
CREAT SERPL-MCNC: 0.89 MG/DL (ref 0.6–1.3)
CREAT SERPL-MCNC: 0.91 MG/DL (ref 0.6–1.3)
CREAT SERPL-MCNC: 0.91 MG/DL (ref 0.76–1.27)
CREAT SERPL-MCNC: 1 MG/DL (ref 0.6–1.3)
CREAT SERPL-MCNC: 1.01 MG/DL (ref 0.76–1.27)
CREAT SERPL-MCNC: 1.04 MG/DL (ref 0.6–1.3)
CREAT SERPL-MCNC: 1.07 MG/DL (ref 0.6–1.3)
CREAT SERPL-MCNC: 1.13 MG/DL (ref 0.6–1.3)
CREAT SERPL-MCNC: 1.15 MG/DL (ref 0.6–1.3)
CREAT SERPL-MCNC: 1.16 MG/DL (ref 0.6–1.3)
CREAT SERPL-MCNC: 1.17 MG/DL (ref 0.76–1.27)
CREAT SERPL-MCNC: 1.27 MG/DL (ref 0.6–1.3)
CREAT SERPL-MCNC: 1.27 MG/DL (ref 0.6–1.3)
CREAT SERPL-MCNC: 1.28 MG/DL (ref 0.6–1.3)
CREAT SERPL-MCNC: 1.28 MG/DL (ref 0.6–1.3)
CREAT SERPL-MCNC: 1.29 MG/DL (ref 0.6–1.3)
CREAT SERPL-MCNC: 1.3 MG/DL (ref 0.6–1.3)
CREAT SERPL-MCNC: 1.31 MG/DL (ref 0.6–1.3)
CREAT SERPL-MCNC: 1.32 MG/DL (ref 0.6–1.3)
CREAT SERPL-MCNC: 1.34 MG/DL (ref 0.6–1.3)
CREAT SERPL-MCNC: 1.37 MG/DL (ref 0.6–1.3)
CREAT SERPL-MCNC: 1.39 MG/DL (ref 0.6–1.3)
CREAT SERPL-MCNC: 1.39 MG/DL (ref 0.6–1.3)
CREAT SERPL-MCNC: 1.41 MG/DL (ref 0.6–1.3)
CREAT SERPL-MCNC: 1.44 MG/DL (ref 0.6–1.3)
CREAT SERPL-MCNC: 1.46 MG/DL (ref 0.6–1.3)
CREAT SERPL-MCNC: 1.47 MG/DL (ref 0.6–1.3)
CREAT SERPL-MCNC: 1.47 MG/DL (ref 0.6–1.3)
CREAT SERPL-MCNC: 1.49 MG/DL (ref 0.6–1.3)
CREAT SERPL-MCNC: 1.55 MG/DL (ref 0.6–1.3)
CREAT SERPL-MCNC: 1.57 MG/DL (ref 0.6–1.3)
CREAT SERPL-MCNC: 1.58 MG/DL (ref 0.6–1.3)
CREAT SERPL-MCNC: 1.61 MG/DL (ref 0.6–1.3)
CREAT SERPL-MCNC: 1.62 MG/DL (ref 0.6–1.3)
CREAT SERPL-MCNC: 1.63 MG/DL (ref 0.6–1.3)
CREAT SERPL-MCNC: 1.63 MG/DL (ref 0.6–1.3)
CREAT SERPL-MCNC: 1.67 MG/DL (ref 0.6–1.3)
CREAT SERPL-MCNC: 1.79 MG/DL (ref 0.6–1.3)
CREAT SERPL-MCNC: 1.96 MG/DL (ref 0.6–1.3)
CREAT SERPL-MCNC: 1.97 MG/DL (ref 0.6–1.3)
CREAT SERPL-MCNC: 2.09 MG/DL (ref 0.6–1.3)
CREAT SERPL-MCNC: 2.12 MG/DL (ref 0.6–1.3)
CREAT SERPL-MCNC: 2.23 MG/DL (ref 0.6–1.3)
CREAT SERPL-MCNC: 2.27 MG/DL (ref 0.6–1.3)
CREAT SERPL-MCNC: 2.34 MG/DL (ref 0.6–1.3)
CREAT UR-MCNC: 76.1 MG/DL (ref 30–125)
CREAT UR-MCNC: 76.1 MG/DL (ref 30–125)
CROSSMATCH RESULT,%XM: NORMAL
D DIMER PPP FEU-MCNC: 1.89 UG/ML(FEU)
D DIMER PPP FEU-MCNC: 3.08 UG/ML(FEU)
DATE LAST DOSE: ABNORMAL
DATE LAST DOSE: NORMAL
DIAGNOSIS, 93000: NORMAL
DIFFERENTIAL METHOD BLD: ABNORMAL
DIGOXIN SERPL-MCNC: 1.9 NG/ML (ref 0.9–2)
DIGOXIN SERPL-MCNC: 2 NG/ML (ref 0.9–2)
ECHO AO ROOT DIAM: 3.74 CM
ECHO AV AREA PEAK VELOCITY: 1.1 CM2
ECHO AV AREA VTI: 1 CM2
ECHO AV AREA/BSA PEAK VELOCITY: 0.6 CM2/M2
ECHO AV AREA/BSA VTI: 0.5 CM2/M2
ECHO AV MEAN GRADIENT: 12.9 MMHG
ECHO AV PEAK GRADIENT: 27.1 MMHG
ECHO AV PEAK VELOCITY: 260.38 CM/S
ECHO AV VTI: 49.91 CM
ECHO IVC SNIFF: 2.33 CM
ECHO LA AREA 4C: 23 CM2
ECHO LA VOL 2C: 117.16 ML (ref 18–58)
ECHO LA VOL 4C: 67.68 ML (ref 18–58)
ECHO LA VOL BP: 95.23 ML (ref 18–58)
ECHO LA VOL/BSA BIPLANE: 49.85 ML/M2 (ref 16–28)
ECHO LA VOLUME INDEX A2C: 61.32 ML/M2 (ref 16–28)
ECHO LA VOLUME INDEX A4C: 35.43 ML/M2 (ref 16–28)
ECHO LV INTERNAL DIMENSION DIASTOLIC: 4.99 CM (ref 4.2–5.9)
ECHO LV INTERNAL DIMENSION SYSTOLIC: 4.04 CM
ECHO LV IVSD: 1.36 CM (ref 0.6–1)
ECHO LV MASS 2D: 331.3 G (ref 88–224)
ECHO LV MASS INDEX 2D: 173.4 G/M2 (ref 49–115)
ECHO LV POSTERIOR WALL DIASTOLIC: 1.34 CM (ref 0.6–1)
ECHO LVOT DIAM: 2.23 CM
ECHO LVOT PEAK GRADIENT: 2 MMHG
ECHO LVOT PEAK VELOCITY: 71.27 CM/S
ECHO LVOT VTI: 12.47 CM
ECHO MV A VELOCITY: 64.62 CM/S
ECHO MV AREA PISA: 0.2 CM2
ECHO MV E DECELERATION TIME (DT): 156.4 MS
ECHO MV E VELOCITY: 98.97 CM/S
ECHO MV E/A RATIO: 1.53
ECHO MV EROA PISA: 0.2 CM2
ECHO MV REGURGITANT PEAK GRADIENT: 87.4 MMHG
ECHO MV REGURGITANT PEAK VELOCITY: 467.47 CM/S
ECHO MV REGURGITANT RADIUS PISA: 0.64 CM
ECHO MV REGURGITANT VOLUME: 24.53 CC
ECHO MV REGURGITANT VTIA: 142.33 CM
ECHO PULMONARY ARTERY SYSTOLIC PRESSURE (PASP): 59 MMHG
ECHO PULMONARY ARTERY SYSTOLIC PRESSURE (PASP): 60 MMHG
ECHO TV REGURGITANT MAX VELOCITY: 358.01 CM/S
ECHO TV REGURGITANT PEAK GRADIENT: 51.3 MMHG
EOSINOPHIL # BLD AUTO: 0 X10E3/UL (ref 0–0.4)
EOSINOPHIL # BLD AUTO: 0.1 X10E3/UL (ref 0–0.4)
EOSINOPHIL # BLD: 0 K/UL (ref 0–0.4)
EOSINOPHIL # BLD: 0.1 K/UL (ref 0–0.4)
EOSINOPHIL # BLD: 0.2 K/UL (ref 0–0.4)
EOSINOPHIL # BLD: 0.3 K/UL (ref 0–0.4)
EOSINOPHIL #/AREA URNS HPF: NORMAL /[HPF]
EOSINOPHIL NFR BLD AUTO: 1 %
EOSINOPHIL NFR BLD AUTO: 1 %
EOSINOPHIL NFR BLD: 0 % (ref 0–5)
EOSINOPHIL NFR BLD: 1 % (ref 0–5)
EOSINOPHIL NFR BLD: 2 % (ref 0–5)
EOSINOPHIL NFR BLD: 3 % (ref 0–5)
EOSINOPHIL NFR FLD MANUAL: 0 %
ERYTHROCYTE [DISTWIDTH] IN BLOOD BY AUTOMATED COUNT: 14 % (ref 12.3–15.4)
ERYTHROCYTE [DISTWIDTH] IN BLOOD BY AUTOMATED COUNT: 14.8 % (ref 11.6–14.5)
ERYTHROCYTE [DISTWIDTH] IN BLOOD BY AUTOMATED COUNT: 14.9 % (ref 12.3–15.4)
ERYTHROCYTE [DISTWIDTH] IN BLOOD BY AUTOMATED COUNT: 15.4 % (ref 11.6–14.5)
ERYTHROCYTE [DISTWIDTH] IN BLOOD BY AUTOMATED COUNT: 15.5 % (ref 11.6–14.5)
ERYTHROCYTE [DISTWIDTH] IN BLOOD BY AUTOMATED COUNT: 15.6 % (ref 11.6–14.5)
ERYTHROCYTE [DISTWIDTH] IN BLOOD BY AUTOMATED COUNT: 15.8 % (ref 11.6–14.5)
ERYTHROCYTE [DISTWIDTH] IN BLOOD BY AUTOMATED COUNT: 15.8 % (ref 11.6–14.5)
ERYTHROCYTE [DISTWIDTH] IN BLOOD BY AUTOMATED COUNT: 16.7 % (ref 11.6–14.5)
ERYTHROCYTE [DISTWIDTH] IN BLOOD BY AUTOMATED COUNT: 17.3 % (ref 11.6–14.5)
ERYTHROCYTE [DISTWIDTH] IN BLOOD BY AUTOMATED COUNT: 17.3 % (ref 11.6–14.5)
ERYTHROCYTE [DISTWIDTH] IN BLOOD BY AUTOMATED COUNT: 17.4 % (ref 11.6–14.5)
ERYTHROCYTE [DISTWIDTH] IN BLOOD BY AUTOMATED COUNT: 17.4 % (ref 11.6–14.5)
ERYTHROCYTE [DISTWIDTH] IN BLOOD BY AUTOMATED COUNT: 17.5 % (ref 11.6–14.5)
ERYTHROCYTE [DISTWIDTH] IN BLOOD BY AUTOMATED COUNT: 17.5 % (ref 11.6–14.5)
ERYTHROCYTE [DISTWIDTH] IN BLOOD BY AUTOMATED COUNT: 17.7 % (ref 11.6–14.5)
ERYTHROCYTE [DISTWIDTH] IN BLOOD BY AUTOMATED COUNT: 17.8 % (ref 11.6–14.5)
ERYTHROCYTE [DISTWIDTH] IN BLOOD BY AUTOMATED COUNT: 17.9 % (ref 11.6–14.5)
ERYTHROCYTE [DISTWIDTH] IN BLOOD BY AUTOMATED COUNT: 18.6 % (ref 11.6–14.5)
ERYTHROCYTE [DISTWIDTH] IN BLOOD BY AUTOMATED COUNT: 18.8 % (ref 11.6–14.5)
ERYTHROCYTE [DISTWIDTH] IN BLOOD BY AUTOMATED COUNT: 19 % (ref 11.6–14.5)
ERYTHROCYTE [DISTWIDTH] IN BLOOD BY AUTOMATED COUNT: 20.4 % (ref 11.6–14.5)
EST. AVERAGE GLUCOSE BLD GHB EST-MCNC: 131 MG/DL
FLUAV AG NPH QL IA: NEGATIVE
FLUAV RNA SPEC QL NAA+PROBE: NEGATIVE
FLUBV AG NOSE QL IA: NEGATIVE
FLUBV RNA SPEC QL NAA+PROBE: NEGATIVE
GAS FLOW.O2 O2 DELIVERY SYS: ABNORMAL L/MIN
GAS FLOW.O2 SETTING OXYMISER: 4 L/M
GLOBULIN SER CALC-MCNC: 2.2 G/DL (ref 1.5–4.5)
GLOBULIN SER CALC-MCNC: 2.6 G/DL (ref 1.5–4.5)
GLOBULIN SER CALC-MCNC: 2.7 G/DL (ref 1.5–4.5)
GLOBULIN SER CALC-MCNC: 3 G/DL (ref 2–4)
GLOBULIN SER CALC-MCNC: 3.4 G/DL (ref 2–4)
GLOBULIN SER CALC-MCNC: 3.4 G/DL (ref 2–4)
GLOBULIN SER CALC-MCNC: 3.5 G/DL (ref 2–4)
GLOBULIN SER CALC-MCNC: 3.8 G/DL (ref 2–4)
GLOBULIN SER CALC-MCNC: 4 G/DL (ref 2–4)
GLOBULIN SER CALC-MCNC: 4 G/DL (ref 2–4)
GLUCOSE BLD STRIP.AUTO-MCNC: 100 MG/DL (ref 70–110)
GLUCOSE BLD STRIP.AUTO-MCNC: 101 MG/DL (ref 70–110)
GLUCOSE BLD STRIP.AUTO-MCNC: 104 MG/DL (ref 70–110)
GLUCOSE BLD STRIP.AUTO-MCNC: 107 MG/DL (ref 70–110)
GLUCOSE BLD STRIP.AUTO-MCNC: 110 MG/DL (ref 70–110)
GLUCOSE BLD STRIP.AUTO-MCNC: 110 MG/DL (ref 70–110)
GLUCOSE BLD STRIP.AUTO-MCNC: 114 MG/DL (ref 70–110)
GLUCOSE BLD STRIP.AUTO-MCNC: 114 MG/DL (ref 70–110)
GLUCOSE BLD STRIP.AUTO-MCNC: 119 MG/DL (ref 70–110)
GLUCOSE BLD STRIP.AUTO-MCNC: 121 MG/DL (ref 70–110)
GLUCOSE BLD STRIP.AUTO-MCNC: 124 MG/DL (ref 70–110)
GLUCOSE BLD STRIP.AUTO-MCNC: 126 MG/DL (ref 70–110)
GLUCOSE BLD STRIP.AUTO-MCNC: 128 MG/DL (ref 70–110)
GLUCOSE BLD STRIP.AUTO-MCNC: 130 MG/DL (ref 70–110)
GLUCOSE BLD STRIP.AUTO-MCNC: 131 MG/DL (ref 70–110)
GLUCOSE BLD STRIP.AUTO-MCNC: 135 MG/DL (ref 70–110)
GLUCOSE BLD STRIP.AUTO-MCNC: 136 MG/DL (ref 70–110)
GLUCOSE BLD STRIP.AUTO-MCNC: 139 MG/DL (ref 70–110)
GLUCOSE BLD STRIP.AUTO-MCNC: 139 MG/DL (ref 70–110)
GLUCOSE BLD STRIP.AUTO-MCNC: 140 MG/DL (ref 70–110)
GLUCOSE BLD STRIP.AUTO-MCNC: 142 MG/DL (ref 70–110)
GLUCOSE BLD STRIP.AUTO-MCNC: 146 MG/DL (ref 70–110)
GLUCOSE BLD STRIP.AUTO-MCNC: 148 MG/DL (ref 70–110)
GLUCOSE BLD STRIP.AUTO-MCNC: 153 MG/DL (ref 70–110)
GLUCOSE BLD STRIP.AUTO-MCNC: 153 MG/DL (ref 70–110)
GLUCOSE BLD STRIP.AUTO-MCNC: 156 MG/DL (ref 70–110)
GLUCOSE BLD STRIP.AUTO-MCNC: 157 MG/DL (ref 70–110)
GLUCOSE BLD STRIP.AUTO-MCNC: 157 MG/DL (ref 70–110)
GLUCOSE BLD STRIP.AUTO-MCNC: 160 MG/DL (ref 70–110)
GLUCOSE BLD STRIP.AUTO-MCNC: 169 MG/DL (ref 70–110)
GLUCOSE BLD STRIP.AUTO-MCNC: 175 MG/DL (ref 70–110)
GLUCOSE BLD STRIP.AUTO-MCNC: 177 MG/DL (ref 70–110)
GLUCOSE BLD STRIP.AUTO-MCNC: 177 MG/DL (ref 70–110)
GLUCOSE BLD STRIP.AUTO-MCNC: 179 MG/DL (ref 70–110)
GLUCOSE BLD STRIP.AUTO-MCNC: 182 MG/DL (ref 70–110)
GLUCOSE BLD STRIP.AUTO-MCNC: 182 MG/DL (ref 70–110)
GLUCOSE BLD STRIP.AUTO-MCNC: 192 MG/DL (ref 70–110)
GLUCOSE BLD STRIP.AUTO-MCNC: 196 MG/DL (ref 70–110)
GLUCOSE BLD STRIP.AUTO-MCNC: 205 MG/DL (ref 70–110)
GLUCOSE BLD STRIP.AUTO-MCNC: 207 MG/DL (ref 70–110)
GLUCOSE BLD STRIP.AUTO-MCNC: 212 MG/DL (ref 70–110)
GLUCOSE BLD STRIP.AUTO-MCNC: 226 MG/DL (ref 70–110)
GLUCOSE BLD STRIP.AUTO-MCNC: 226 MG/DL (ref 70–110)
GLUCOSE BLD STRIP.AUTO-MCNC: 236 MG/DL (ref 70–110)
GLUCOSE BLD STRIP.AUTO-MCNC: 243 MG/DL (ref 70–110)
GLUCOSE BLD STRIP.AUTO-MCNC: 247 MG/DL (ref 70–110)
GLUCOSE BLD STRIP.AUTO-MCNC: 337 MG/DL (ref 70–110)
GLUCOSE BLD STRIP.AUTO-MCNC: 82 MG/DL (ref 70–110)
GLUCOSE BLD STRIP.AUTO-MCNC: 84 MG/DL (ref 70–110)
GLUCOSE BLD STRIP.AUTO-MCNC: 87 MG/DL (ref 70–110)
GLUCOSE BLD STRIP.AUTO-MCNC: 96 MG/DL (ref 70–110)
GLUCOSE BLD STRIP.AUTO-MCNC: 98 MG/DL (ref 70–110)
GLUCOSE BLD STRIP.AUTO-MCNC: 98 MG/DL (ref 70–110)
GLUCOSE FLD-MCNC: 132 MG/DL
GLUCOSE FLD-MCNC: 139 MG/DL
GLUCOSE FLD-MCNC: 147 MG/DL
GLUCOSE FLD-MCNC: 162 MG/DL
GLUCOSE FLD-MCNC: 162 MG/DL
GLUCOSE SERPL-MCNC: 100 MG/DL (ref 65–99)
GLUCOSE SERPL-MCNC: 100 MG/DL (ref 74–99)
GLUCOSE SERPL-MCNC: 102 MG/DL (ref 74–99)
GLUCOSE SERPL-MCNC: 105 MG/DL (ref 74–99)
GLUCOSE SERPL-MCNC: 106 MG/DL (ref 74–99)
GLUCOSE SERPL-MCNC: 109 MG/DL (ref 74–99)
GLUCOSE SERPL-MCNC: 110 MG/DL (ref 74–99)
GLUCOSE SERPL-MCNC: 112 MG/DL (ref 74–99)
GLUCOSE SERPL-MCNC: 112 MG/DL (ref 74–99)
GLUCOSE SERPL-MCNC: 114 MG/DL (ref 74–99)
GLUCOSE SERPL-MCNC: 120 MG/DL (ref 74–99)
GLUCOSE SERPL-MCNC: 120 MG/DL (ref 74–99)
GLUCOSE SERPL-MCNC: 122 MG/DL (ref 74–99)
GLUCOSE SERPL-MCNC: 123 MG/DL (ref 74–99)
GLUCOSE SERPL-MCNC: 126 MG/DL (ref 65–99)
GLUCOSE SERPL-MCNC: 126 MG/DL (ref 74–99)
GLUCOSE SERPL-MCNC: 128 MG/DL (ref 74–99)
GLUCOSE SERPL-MCNC: 131 MG/DL (ref 65–99)
GLUCOSE SERPL-MCNC: 132 MG/DL (ref 74–99)
GLUCOSE SERPL-MCNC: 132 MG/DL (ref 74–99)
GLUCOSE SERPL-MCNC: 134 MG/DL (ref 74–99)
GLUCOSE SERPL-MCNC: 135 MG/DL (ref 74–99)
GLUCOSE SERPL-MCNC: 138 MG/DL (ref 74–99)
GLUCOSE SERPL-MCNC: 140 MG/DL (ref 74–99)
GLUCOSE SERPL-MCNC: 147 MG/DL (ref 74–99)
GLUCOSE SERPL-MCNC: 152 MG/DL (ref 74–99)
GLUCOSE SERPL-MCNC: 154 MG/DL (ref 74–99)
GLUCOSE SERPL-MCNC: 155 MG/DL (ref 74–99)
GLUCOSE SERPL-MCNC: 158 MG/DL (ref 74–99)
GLUCOSE SERPL-MCNC: 158 MG/DL (ref 74–99)
GLUCOSE SERPL-MCNC: 165 MG/DL (ref 74–99)
GLUCOSE SERPL-MCNC: 168 MG/DL (ref 74–99)
GLUCOSE SERPL-MCNC: 168 MG/DL (ref 74–99)
GLUCOSE SERPL-MCNC: 182 MG/DL (ref 74–99)
GLUCOSE SERPL-MCNC: 183 MG/DL (ref 74–99)
GLUCOSE SERPL-MCNC: 185 MG/DL (ref 74–99)
GLUCOSE SERPL-MCNC: 231 MG/DL (ref 74–99)
GLUCOSE SERPL-MCNC: 67 MG/DL (ref 74–99)
GLUCOSE SERPL-MCNC: 70 MG/DL (ref 74–99)
GLUCOSE SERPL-MCNC: 79 MG/DL (ref 74–99)
GLUCOSE SERPL-MCNC: 81 MG/DL (ref 74–99)
GLUCOSE SERPL-MCNC: 91 MG/DL (ref 74–99)
GLUCOSE SERPL-MCNC: 96 MG/DL (ref 74–99)
GLUCOSE SERPL-MCNC: 98 MG/DL (ref 74–99)
GLUCOSE UR STRIP.AUTO-MCNC: NEGATIVE MG/DL
GLUCOSE UR STRIP.AUTO-MCNC: NEGATIVE MG/DL
GRAM STN SPEC: NORMAL
HBA1C MFR BLD: 6.2 % (ref 4.2–5.6)
HCO3 BLDV-SCNC: 28.2 MMOL/L (ref 23–28)
HCT VFR BLD AUTO: 25.7 % (ref 37.5–51)
HCT VFR BLD AUTO: 25.9 % (ref 36–48)
HCT VFR BLD AUTO: 26 % (ref 36–48)
HCT VFR BLD AUTO: 26.5 % (ref 36–48)
HCT VFR BLD AUTO: 26.6 % (ref 36–48)
HCT VFR BLD AUTO: 26.9 % (ref 36–48)
HCT VFR BLD AUTO: 27 % (ref 36–48)
HCT VFR BLD AUTO: 27.1 % (ref 36–48)
HCT VFR BLD AUTO: 27.2 % (ref 36–48)
HCT VFR BLD AUTO: 27.4 % (ref 36–48)
HCT VFR BLD AUTO: 27.5 % (ref 36–48)
HCT VFR BLD AUTO: 27.5 % (ref 36–48)
HCT VFR BLD AUTO: 28.1 % (ref 36–48)
HCT VFR BLD AUTO: 28.3 % (ref 36–48)
HCT VFR BLD AUTO: 28.4 % (ref 36–48)
HCT VFR BLD AUTO: 28.5 % (ref 36–48)
HCT VFR BLD AUTO: 28.6 % (ref 37.5–51)
HCT VFR BLD AUTO: 28.8 % (ref 36–48)
HCT VFR BLD AUTO: 29 % (ref 36–48)
HCT VFR BLD AUTO: 29.6 % (ref 36–48)
HCT VFR BLD AUTO: 29.7 % (ref 36–48)
HCT VFR BLD AUTO: 29.9 % (ref 36–48)
HCT VFR BLD AUTO: 30 % (ref 36–48)
HCT VFR BLD AUTO: 30 % (ref 36–48)
HCT VFR BLD AUTO: 30.1 % (ref 36–48)
HCT VFR BLD AUTO: 30.3 % (ref 36–48)
HCT VFR BLD AUTO: 30.3 % (ref 36–48)
HCT VFR BLD AUTO: 30.8 % (ref 36–48)
HCT VFR BLD AUTO: 31.1 % (ref 36–48)
HCT VFR BLD AUTO: 31.2 % (ref 36–48)
HCT VFR BLD AUTO: 31.5 % (ref 36–48)
HCT VFR BLD AUTO: 31.9 % (ref 36–48)
HCT VFR BLD AUTO: 33 % (ref 36–48)
HCT VFR BLD AUTO: 34.5 % (ref 36–48)
HDLC SERPL-MCNC: 39 MG/DL
HDLC SERPL-MCNC: 44 MG/DL (ref 40–60)
HDLC SERPL: 2 {RATIO} (ref 0–5)
HEMOCCULT STL QL IA: POSITIVE
HGB BLD-MCNC: 10.2 G/DL (ref 13–16)
HGB BLD-MCNC: 7.9 G/DL (ref 13–16)
HGB BLD-MCNC: 7.9 G/DL (ref 13–16)
HGB BLD-MCNC: 8.1 G/DL (ref 13–16)
HGB BLD-MCNC: 8.2 G/DL (ref 13–16)
HGB BLD-MCNC: 8.3 G/DL (ref 13–16)
HGB BLD-MCNC: 8.4 G/DL (ref 13–16)
HGB BLD-MCNC: 8.4 G/DL (ref 13–17.7)
HGB BLD-MCNC: 8.5 G/DL (ref 13–16)
HGB BLD-MCNC: 8.6 G/DL (ref 13–16)
HGB BLD-MCNC: 8.6 G/DL (ref 13–16)
HGB BLD-MCNC: 8.7 G/DL (ref 13–16)
HGB BLD-MCNC: 8.9 G/DL (ref 13–16)
HGB BLD-MCNC: 9 G/DL (ref 13–16)
HGB BLD-MCNC: 9 G/DL (ref 13–16)
HGB BLD-MCNC: 9.1 G/DL (ref 13–16)
HGB BLD-MCNC: 9.2 G/DL (ref 13–16)
HGB BLD-MCNC: 9.2 G/DL (ref 13–17.7)
HGB BLD-MCNC: 9.5 G/DL (ref 13–16)
HGB BLD-MCNC: 9.9 G/DL (ref 13–16)
HGB UR QL STRIP: NEGATIVE
HGB UR QL STRIP: NEGATIVE
IMM GRANULOCYTES # BLD AUTO: 0 X10E3/UL (ref 0–0.1)
IMM GRANULOCYTES # BLD AUTO: 0 X10E3/UL (ref 0–0.1)
IMM GRANULOCYTES NFR BLD AUTO: 0 %
IMM GRANULOCYTES NFR BLD AUTO: 0 %
INR PPP: 1 (ref 0.8–1.2)
INR PPP: 1.1 (ref 0.8–1.2)
INR PPP: 1.2 (ref 0.8–1.2)
INR PPP: 1.2 (ref 0.8–1.2)
INR PPP: 1.3 (ref 0.8–1.2)
INR PPP: 1.3 (ref 0.8–1.2)
KETONES UR QL STRIP.AUTO: NEGATIVE MG/DL
KETONES UR QL STRIP.AUTO: NEGATIVE MG/DL
L PNEUMO AG UR QL IA: NEGATIVE
L PNEUMO AG UR QL IA: NEGATIVE
LACTATE BLD-SCNC: 1.32 MMOL/L (ref 0.4–2)
LACTATE BLD-SCNC: 1.81 MMOL/L (ref 0.4–2)
LACTATE SERPL-SCNC: 1 MMOL/L (ref 0.4–2)
LDH FLD L TO P-CCNC: 100 U/L
LDH FLD L TO P-CCNC: 104 U/L
LDH FLD L TO P-CCNC: 104 U/L
LDH FLD L TO P-CCNC: 86 U/L
LDH FLD L TO P-CCNC: 99 U/L
LDH SERPL L TO P-CCNC: 235 U/L (ref 81–234)
LDLC SERPL CALC-MCNC: 30 MG/DL (ref 0–100)
LDLC SERPL CALC-MCNC: 40 MG/DL (ref 0–99)
LEUKOCYTE ESTERASE UR QL STRIP.AUTO: NEGATIVE
LEUKOCYTE ESTERASE UR QL STRIP.AUTO: NEGATIVE
LIPID PROFILE,FLP: NORMAL
LYMPHOCYTES # BLD AUTO: 1.9 X10E3/UL (ref 0.7–3.1)
LYMPHOCYTES # BLD AUTO: 1.9 X10E3/UL (ref 0.7–3.1)
LYMPHOCYTES # BLD: 0.7 K/UL (ref 0.8–3.5)
LYMPHOCYTES # BLD: 0.7 K/UL (ref 0.9–3.6)
LYMPHOCYTES # BLD: 0.8 K/UL (ref 0.9–3.6)
LYMPHOCYTES # BLD: 1 K/UL (ref 0.8–3.5)
LYMPHOCYTES # BLD: 1.1 K/UL (ref 0.9–3.6)
LYMPHOCYTES # BLD: 1.2 K/UL (ref 0.8–3.5)
LYMPHOCYTES # BLD: 1.2 K/UL (ref 0.8–3.5)
LYMPHOCYTES # BLD: 1.2 K/UL (ref 0.9–3.6)
LYMPHOCYTES # BLD: 1.3 K/UL (ref 0.9–3.6)
LYMPHOCYTES # BLD: 1.4 K/UL (ref 0.8–3.5)
LYMPHOCYTES # BLD: 1.4 K/UL (ref 0.9–3.6)
LYMPHOCYTES # BLD: 1.5 K/UL (ref 0.9–3.6)
LYMPHOCYTES # BLD: 1.7 K/UL (ref 0.9–3.6)
LYMPHOCYTES # BLD: 1.8 K/UL (ref 0.9–3.6)
LYMPHOCYTES # BLD: 1.9 K/UL (ref 0.8–3.5)
LYMPHOCYTES # BLD: 1.9 K/UL (ref 0.9–3.6)
LYMPHOCYTES # BLD: 2 K/UL (ref 0.9–3.6)
LYMPHOCYTES # BLD: 2.2 K/UL (ref 0.9–3.6)
LYMPHOCYTES # BLD: 2.4 K/UL (ref 0.9–3.6)
LYMPHOCYTES # BLD: 2.9 K/UL (ref 0.9–3.6)
LYMPHOCYTES NFR BLD AUTO: 29 %
LYMPHOCYTES NFR BLD AUTO: 30 %
LYMPHOCYTES NFR BLD: 10 % (ref 21–52)
LYMPHOCYTES NFR BLD: 10 % (ref 21–52)
LYMPHOCYTES NFR BLD: 11 % (ref 20–51)
LYMPHOCYTES NFR BLD: 12 % (ref 21–52)
LYMPHOCYTES NFR BLD: 14 % (ref 20–51)
LYMPHOCYTES NFR BLD: 15 % (ref 20–51)
LYMPHOCYTES NFR BLD: 17 % (ref 21–52)
LYMPHOCYTES NFR BLD: 18 % (ref 21–52)
LYMPHOCYTES NFR BLD: 20 % (ref 21–52)
LYMPHOCYTES NFR BLD: 20 % (ref 21–52)
LYMPHOCYTES NFR BLD: 21 % (ref 21–52)
LYMPHOCYTES NFR BLD: 22 % (ref 21–52)
LYMPHOCYTES NFR BLD: 25 % (ref 20–51)
LYMPHOCYTES NFR BLD: 29 % (ref 21–52)
LYMPHOCYTES NFR BLD: 5 % (ref 20–51)
LYMPHOCYTES NFR BLD: 9 % (ref 20–51)
LYMPHOCYTES NFR BLD: 9 % (ref 21–52)
LYMPHOCYTES NFR FLD: 25 %
LYMPHOCYTES NFR FLD: 34 %
LYMPHOCYTES NFR FLD: 51 %
LYMPHOCYTES NFR FLD: 53 %
LYMPHOCYTES NFR FLD: 82 %
MACROPHAGES NFR FLD: 32 %
MACROPHAGES NFR FLD: 37 %
MACROPHAGES NFR FLD: 7 %
MAGNESIUM SERPL-MCNC: 2 MG/DL (ref 1.6–2.6)
MAGNESIUM SERPL-MCNC: 2.1 MG/DL (ref 1.6–2.6)
MAGNESIUM SERPL-MCNC: 2.2 MG/DL (ref 1.6–2.6)
MAGNESIUM SERPL-MCNC: 2.3 MG/DL (ref 1.6–2.6)
MAGNESIUM SERPL-MCNC: 2.4 MG/DL (ref 1.6–2.6)
MAGNESIUM SERPL-MCNC: 2.5 MG/DL (ref 1.6–2.6)
MAGNESIUM SERPL-MCNC: 2.9 MG/DL (ref 1.6–2.6)
MAGNESIUM SERPL-MCNC: 3 MG/DL (ref 1.6–2.6)
MAGNESIUM SERPL-MCNC: 3.2 MG/DL (ref 1.6–2.6)
MAGNESIUM SERPL-MCNC: 3.5 MG/DL (ref 1.6–2.6)
MCH RBC QN AUTO: 25 PG (ref 24–34)
MCH RBC QN AUTO: 25.1 PG (ref 24–34)
MCH RBC QN AUTO: 25.1 PG (ref 24–34)
MCH RBC QN AUTO: 25.2 PG (ref 24–34)
MCH RBC QN AUTO: 25.3 PG (ref 24–34)
MCH RBC QN AUTO: 25.6 PG (ref 24–34)
MCH RBC QN AUTO: 25.7 PG (ref 24–34)
MCH RBC QN AUTO: 25.9 PG (ref 24–34)
MCH RBC QN AUTO: 26 PG (ref 24–34)
MCH RBC QN AUTO: 26.1 PG (ref 24–34)
MCH RBC QN AUTO: 26.1 PG (ref 24–34)
MCH RBC QN AUTO: 26.2 PG (ref 24–34)
MCH RBC QN AUTO: 26.2 PG (ref 24–34)
MCH RBC QN AUTO: 26.4 PG (ref 24–34)
MCH RBC QN AUTO: 26.5 PG (ref 24–34)
MCH RBC QN AUTO: 26.7 PG (ref 24–34)
MCH RBC QN AUTO: 27 PG (ref 24–34)
MCH RBC QN AUTO: 27.1 PG (ref 24–34)
MCH RBC QN AUTO: 27.2 PG (ref 24–34)
MCH RBC QN AUTO: 27.4 PG (ref 24–34)
MCH RBC QN AUTO: 27.6 PG (ref 24–34)
MCH RBC QN AUTO: 27.7 PG (ref 24–34)
MCH RBC QN AUTO: 27.8 PG (ref 24–34)
MCH RBC QN AUTO: 27.9 PG (ref 24–34)
MCH RBC QN AUTO: 29.4 PG (ref 26.6–33)
MCH RBC QN AUTO: 30.9 PG (ref 26.6–33)
MCHC RBC AUTO-ENTMCNC: 28.5 G/DL (ref 31–37)
MCHC RBC AUTO-ENTMCNC: 28.5 G/DL (ref 31–37)
MCHC RBC AUTO-ENTMCNC: 28.7 G/DL (ref 31–37)
MCHC RBC AUTO-ENTMCNC: 28.8 G/DL (ref 31–37)
MCHC RBC AUTO-ENTMCNC: 28.9 G/DL (ref 31–37)
MCHC RBC AUTO-ENTMCNC: 28.9 G/DL (ref 31–37)
MCHC RBC AUTO-ENTMCNC: 29 G/DL (ref 31–37)
MCHC RBC AUTO-ENTMCNC: 29 G/DL (ref 31–37)
MCHC RBC AUTO-ENTMCNC: 29.2 G/DL (ref 31–37)
MCHC RBC AUTO-ENTMCNC: 29.3 G/DL (ref 31–37)
MCHC RBC AUTO-ENTMCNC: 29.3 G/DL (ref 31–37)
MCHC RBC AUTO-ENTMCNC: 29.5 G/DL (ref 31–37)
MCHC RBC AUTO-ENTMCNC: 29.6 G/DL (ref 31–37)
MCHC RBC AUTO-ENTMCNC: 29.9 G/DL (ref 31–37)
MCHC RBC AUTO-ENTMCNC: 30.1 G/DL (ref 31–37)
MCHC RBC AUTO-ENTMCNC: 30.2 G/DL (ref 31–37)
MCHC RBC AUTO-ENTMCNC: 30.3 G/DL (ref 31–37)
MCHC RBC AUTO-ENTMCNC: 30.3 G/DL (ref 31–37)
MCHC RBC AUTO-ENTMCNC: 30.5 G/DL (ref 31–37)
MCHC RBC AUTO-ENTMCNC: 30.6 G/DL (ref 31–37)
MCHC RBC AUTO-ENTMCNC: 30.7 G/DL (ref 31–37)
MCHC RBC AUTO-ENTMCNC: 30.9 G/DL (ref 31–37)
MCHC RBC AUTO-ENTMCNC: 31 G/DL (ref 31–37)
MCHC RBC AUTO-ENTMCNC: 31.2 G/DL (ref 31–37)
MCHC RBC AUTO-ENTMCNC: 32.2 G/DL (ref 31.5–35.7)
MCHC RBC AUTO-ENTMCNC: 32.7 G/DL (ref 31.5–35.7)
MCV RBC AUTO: 86.8 FL (ref 74–97)
MCV RBC AUTO: 86.8 FL (ref 74–97)
MCV RBC AUTO: 86.9 FL (ref 74–97)
MCV RBC AUTO: 86.9 FL (ref 74–97)
MCV RBC AUTO: 87 FL (ref 74–97)
MCV RBC AUTO: 87.1 FL (ref 74–97)
MCV RBC AUTO: 87.1 FL (ref 74–97)
MCV RBC AUTO: 87.3 FL (ref 74–97)
MCV RBC AUTO: 87.3 FL (ref 74–97)
MCV RBC AUTO: 88 FL (ref 74–97)
MCV RBC AUTO: 88.1 FL (ref 74–97)
MCV RBC AUTO: 88.2 FL (ref 74–97)
MCV RBC AUTO: 88.2 FL (ref 74–97)
MCV RBC AUTO: 88.3 FL (ref 74–97)
MCV RBC AUTO: 88.8 FL (ref 74–97)
MCV RBC AUTO: 88.8 FL (ref 74–97)
MCV RBC AUTO: 89 FL (ref 74–97)
MCV RBC AUTO: 89.3 FL (ref 74–97)
MCV RBC AUTO: 89.3 FL (ref 74–97)
MCV RBC AUTO: 89.4 FL (ref 74–97)
MCV RBC AUTO: 89.4 FL (ref 74–97)
MCV RBC AUTO: 89.9 FL (ref 74–97)
MCV RBC AUTO: 90.4 FL (ref 74–97)
MCV RBC AUTO: 90.6 FL (ref 74–97)
MCV RBC AUTO: 90.6 FL (ref 74–97)
MCV RBC AUTO: 90.7 FL (ref 74–97)
MCV RBC AUTO: 90.9 FL (ref 74–97)
MCV RBC AUTO: 91 FL (ref 79–97)
MCV RBC AUTO: 91.1 FL (ref 74–97)
MCV RBC AUTO: 91.2 FL (ref 74–97)
MCV RBC AUTO: 91.4 FL (ref 74–97)
MCV RBC AUTO: 95 FL (ref 79–97)
MONOCYTES # BLD AUTO: 0.9 X10E3/UL (ref 0.1–0.9)
MONOCYTES # BLD AUTO: 1 X10E3/UL (ref 0.1–0.9)
MONOCYTES # BLD: 0.2 K/UL (ref 0.05–1.2)
MONOCYTES # BLD: 0.3 K/UL (ref 0.05–1.2)
MONOCYTES # BLD: 0.3 K/UL (ref 0.05–1.2)
MONOCYTES # BLD: 0.4 K/UL (ref 0–1)
MONOCYTES # BLD: 0.5 K/UL (ref 0.05–1.2)
MONOCYTES # BLD: 0.6 K/UL (ref 0.05–1.2)
MONOCYTES # BLD: 0.7 K/UL (ref 0.05–1.2)
MONOCYTES # BLD: 0.9 K/UL (ref 0–1)
MONOCYTES # BLD: 1 K/UL (ref 0–1)
MONOCYTES # BLD: 1.1 K/UL (ref 0.05–1.2)
MONOCYTES # BLD: 1.2 K/UL (ref 0.05–1.2)
MONOCYTES # BLD: 1.2 K/UL (ref 0.05–1.2)
MONOCYTES # BLD: 1.3 K/UL (ref 0.05–1.2)
MONOCYTES # BLD: 1.4 K/UL (ref 0.05–1.2)
MONOCYTES # BLD: 1.4 K/UL (ref 0–1)
MONOCYTES # BLD: 1.5 K/UL (ref 0.05–1.2)
MONOCYTES # BLD: 1.5 K/UL (ref 0.05–1.2)
MONOCYTES # BLD: 1.5 K/UL (ref 0–1)
MONOCYTES # BLD: 1.7 K/UL (ref 0–1)
MONOCYTES # BLD: 2 K/UL (ref 0.05–1.2)
MONOCYTES # BLD: 2.1 K/UL (ref 0.05–1.2)
MONOCYTES NFR BLD AUTO: 13 %
MONOCYTES NFR BLD AUTO: 15 %
MONOCYTES NFR BLD: 1 % (ref 3–10)
MONOCYTES NFR BLD: 12 % (ref 3–10)
MONOCYTES NFR BLD: 14 % (ref 2–9)
MONOCYTES NFR BLD: 14 % (ref 3–10)
MONOCYTES NFR BLD: 16 % (ref 3–10)
MONOCYTES NFR BLD: 17 % (ref 3–10)
MONOCYTES NFR BLD: 18 % (ref 2–9)
MONOCYTES NFR BLD: 18 % (ref 2–9)
MONOCYTES NFR BLD: 2 % (ref 3–10)
MONOCYTES NFR BLD: 21 % (ref 3–10)
MONOCYTES NFR BLD: 23 % (ref 3–10)
MONOCYTES NFR BLD: 3 % (ref 3–10)
MONOCYTES NFR BLD: 4 % (ref 3–10)
MONOCYTES NFR BLD: 5 % (ref 2–9)
MONOCYTES NFR BLD: 5 % (ref 3–10)
MONOCYTES NFR BLD: 5 % (ref 3–10)
MONOCYTES NFR BLD: 6 % (ref 3–10)
MONOCYTES NFR BLD: 6 % (ref 3–10)
MONOCYTES NFR BLD: 7 % (ref 2–9)
MONOCYTES NFR BLD: 7 % (ref 3–10)
MONOCYTES NFR BLD: 9 % (ref 2–9)
MONOCYTES NFR FLD: 0 %
MONOCYTES NFR FLD: 0 %
MONOCYTES NFR FLD: 18 %
MONOCYTES NFR FLD: 18 %
MONOCYTES NFR FLD: 2 %
NEUTROPHILS # BLD AUTO: 3.4 X10E3/UL (ref 1.4–7)
NEUTROPHILS # BLD AUTO: 3.7 X10E3/UL (ref 1.4–7)
NEUTROPHILS NFR BLD AUTO: 54 %
NEUTROPHILS NFR BLD AUTO: 57 %
NEUTROPHILS NFR FLD: 10 %
NEUTROPHILS NFR FLD: 16 %
NEUTROPHILS NFR FLD: 24 %
NEUTROPHILS NFR FLD: 43 %
NEUTROPHILS NFR FLD: 48 %
NEUTS BAND # FLD: 0 %
NEUTS SEG # BLD: 10 K/UL (ref 1.8–8)
NEUTS SEG # BLD: 10.3 K/UL (ref 1.8–8)
NEUTS SEG # BLD: 10.8 K/UL (ref 1.8–8)
NEUTS SEG # BLD: 11 K/UL (ref 1.8–8)
NEUTS SEG # BLD: 11.8 K/UL (ref 1.8–8)
NEUTS SEG # BLD: 3.5 K/UL (ref 1.8–8)
NEUTS SEG # BLD: 3.8 K/UL (ref 1.8–8)
NEUTS SEG # BLD: 4 K/UL (ref 1.8–8)
NEUTS SEG # BLD: 4.1 K/UL (ref 1.8–8)
NEUTS SEG # BLD: 5 K/UL (ref 1.8–8)
NEUTS SEG # BLD: 5.7 K/UL (ref 1.8–8)
NEUTS SEG # BLD: 6.1 K/UL (ref 1.8–8)
NEUTS SEG # BLD: 6.2 K/UL (ref 1.8–8)
NEUTS SEG # BLD: 6.6 K/UL (ref 1.8–8)
NEUTS SEG # BLD: 6.6 K/UL (ref 1.8–8)
NEUTS SEG # BLD: 6.7 K/UL (ref 1.8–8)
NEUTS SEG # BLD: 6.8 K/UL (ref 1.8–8)
NEUTS SEG # BLD: 6.9 K/UL (ref 1.8–8)
NEUTS SEG # BLD: 7.4 K/UL (ref 1.8–8)
NEUTS SEG # BLD: 8.2 K/UL (ref 1.8–8)
NEUTS SEG # BLD: 8.4 K/UL (ref 1.8–8)
NEUTS SEG # BLD: 8.9 K/UL (ref 1.8–8)
NEUTS SEG # BLD: 9.1 K/UL (ref 1.8–8)
NEUTS SEG # BLD: 9.3 K/UL (ref 1.8–8)
NEUTS SEG NFR BLD: 55 % (ref 40–73)
NEUTS SEG NFR BLD: 55 % (ref 42–75)
NEUTS SEG NFR BLD: 56 % (ref 40–73)
NEUTS SEG NFR BLD: 63 % (ref 40–73)
NEUTS SEG NFR BLD: 65 % (ref 40–73)
NEUTS SEG NFR BLD: 68 % (ref 40–73)
NEUTS SEG NFR BLD: 68 % (ref 40–73)
NEUTS SEG NFR BLD: 68 % (ref 42–75)
NEUTS SEG NFR BLD: 70 % (ref 40–73)
NEUTS SEG NFR BLD: 71 % (ref 40–73)
NEUTS SEG NFR BLD: 72 % (ref 40–73)
NEUTS SEG NFR BLD: 73 % (ref 40–73)
NEUTS SEG NFR BLD: 74 % (ref 40–73)
NEUTS SEG NFR BLD: 76 % (ref 40–73)
NEUTS SEG NFR BLD: 77 % (ref 40–73)
NEUTS SEG NFR BLD: 77 % (ref 42–75)
NEUTS SEG NFR BLD: 80 % (ref 42–75)
NEUTS SEG NFR BLD: 80 % (ref 42–75)
NEUTS SEG NFR BLD: 86 % (ref 40–73)
NEUTS SEG NFR BLD: 86 % (ref 40–73)
NEUTS SEG NFR BLD: 88 % (ref 42–75)
NEUTS SEG NFR BLD: 90 % (ref 40–73)
NITRITE UR QL STRIP.AUTO: NEGATIVE
NITRITE UR QL STRIP.AUTO: NEGATIVE
NUC CELL # FLD: 12 /CU MM
NUC CELL # FLD: 270 /CU MM
NUC CELL # FLD: 420 /CU MM
NUC CELL # FLD: 435 /CU MM
NUC CELL # FLD: 50 /CU MM
OSMOLALITY UR: 512 MOSM/KG H2O (ref 300–900)
P-R INTERVAL, ECG05: 124 MS
P-R INTERVAL, ECG05: 138 MS
P-R INTERVAL, ECG05: 240 MS
PCO2 BLDV: 38.7 MMHG (ref 41–51)
PH BLDV: 7.47 [PH] (ref 7.32–7.42)
PH FLD: 7.7 [PH]
PH FLD: 7.9 [PH]
PH UR STRIP: 5 [PH] (ref 5–8)
PH UR STRIP: 5 [PH] (ref 5–8)
PHOSPHATE SERPL-MCNC: 2.9 MG/DL (ref 2.5–4.9)
PHOSPHATE SERPL-MCNC: 3 MG/DL (ref 2.5–4.9)
PHOSPHATE SERPL-MCNC: 3 MG/DL (ref 2.5–4.9)
PHOSPHATE SERPL-MCNC: 3.1 MG/DL (ref 2.5–4.9)
PHOSPHATE SERPL-MCNC: 3.1 MG/DL (ref 2.5–4.9)
PHOSPHATE SERPL-MCNC: 3.2 MG/DL (ref 2.5–4.9)
PHOSPHATE SERPL-MCNC: 3.3 MG/DL (ref 2.5–4.9)
PHOSPHATE SERPL-MCNC: 3.5 MG/DL (ref 2.5–4.9)
PHOSPHATE SERPL-MCNC: 4 MG/DL (ref 2.5–4.9)
PHOSPHATE SERPL-MCNC: 4 MG/DL (ref 2.5–4.9)
PHOSPHATE SERPL-MCNC: 4.4 MG/DL (ref 2.5–4.9)
PHOSPHATE SERPL-MCNC: 4.5 MG/DL (ref 2.5–4.9)
PHOSPHATE SERPL-MCNC: 5.7 MG/DL (ref 2.5–4.9)
PHOSPHATE SERPL-MCNC: 5.8 MG/DL (ref 2.5–4.9)
PHOSPHATE SERPL-MCNC: 5.9 MG/DL (ref 2.5–4.9)
PLATELET # BLD AUTO: 173 K/UL (ref 135–420)
PLATELET # BLD AUTO: 206 K/UL (ref 135–420)
PLATELET # BLD AUTO: 215 K/UL (ref 135–420)
PLATELET # BLD AUTO: 223 K/UL (ref 135–420)
PLATELET # BLD AUTO: 232 K/UL (ref 135–420)
PLATELET # BLD AUTO: 235 K/UL (ref 135–420)
PLATELET # BLD AUTO: 251 K/UL (ref 135–420)
PLATELET # BLD AUTO: 254 K/UL (ref 135–420)
PLATELET # BLD AUTO: 263 K/UL (ref 135–420)
PLATELET # BLD AUTO: 263 K/UL (ref 135–420)
PLATELET # BLD AUTO: 264 K/UL (ref 135–420)
PLATELET # BLD AUTO: 265 K/UL (ref 135–420)
PLATELET # BLD AUTO: 272 K/UL (ref 135–420)
PLATELET # BLD AUTO: 272 X10E3/UL (ref 150–379)
PLATELET # BLD AUTO: 275 K/UL (ref 135–420)
PLATELET # BLD AUTO: 278 K/UL (ref 135–420)
PLATELET # BLD AUTO: 278 K/UL (ref 135–420)
PLATELET # BLD AUTO: 284 K/UL (ref 135–420)
PLATELET # BLD AUTO: 287 K/UL (ref 135–420)
PLATELET # BLD AUTO: 294 K/UL (ref 135–420)
PLATELET # BLD AUTO: 295 K/UL (ref 135–420)
PLATELET # BLD AUTO: 295 K/UL (ref 135–420)
PLATELET # BLD AUTO: 304 K/UL (ref 135–420)
PLATELET # BLD AUTO: 305 K/UL (ref 135–420)
PLATELET # BLD AUTO: 306 X10E3/UL (ref 150–379)
PLATELET # BLD AUTO: 311 K/UL (ref 135–420)
PLATELET # BLD AUTO: 316 K/UL (ref 135–420)
PLATELET # BLD AUTO: 319 K/UL (ref 135–420)
PLATELET # BLD AUTO: 322 K/UL (ref 135–420)
PLATELET # BLD AUTO: 357 K/UL (ref 135–420)
PLATELET COMMENTS,PCOM: ABNORMAL
PMV BLD AUTO: 10 FL (ref 9.2–11.8)
PMV BLD AUTO: 10 FL (ref 9.2–11.8)
PMV BLD AUTO: 10.1 FL (ref 9.2–11.8)
PMV BLD AUTO: 10.2 FL (ref 9.2–11.8)
PMV BLD AUTO: 10.3 FL (ref 9.2–11.8)
PMV BLD AUTO: 10.4 FL (ref 9.2–11.8)
PMV BLD AUTO: 10.4 FL (ref 9.2–11.8)
PMV BLD AUTO: 10.5 FL (ref 9.2–11.8)
PMV BLD AUTO: 10.5 FL (ref 9.2–11.8)
PMV BLD AUTO: 10.7 FL (ref 9.2–11.8)
PMV BLD AUTO: 10.8 FL (ref 9.2–11.8)
PMV BLD AUTO: 10.8 FL (ref 9.2–11.8)
PMV BLD AUTO: 11 FL (ref 9.2–11.8)
PMV BLD AUTO: 11.1 FL (ref 9.2–11.8)
PMV BLD AUTO: 11.1 FL (ref 9.2–11.8)
PMV BLD AUTO: 9.6 FL (ref 9.2–11.8)
PMV BLD AUTO: 9.7 FL (ref 9.2–11.8)
PMV BLD AUTO: 9.8 FL (ref 9.2–11.8)
PMV BLD AUTO: 9.9 FL (ref 9.2–11.8)
PMV BLD AUTO: 9.9 FL (ref 9.2–11.8)
PO2 BLDV: 32 MMHG (ref 25–40)
POTASSIUM SERPL-SCNC: 3.1 MMOL/L (ref 3.5–5.5)
POTASSIUM SERPL-SCNC: 3.1 MMOL/L (ref 3.5–5.5)
POTASSIUM SERPL-SCNC: 3.2 MMOL/L (ref 3.5–5.5)
POTASSIUM SERPL-SCNC: 3.3 MMOL/L (ref 3.5–5.5)
POTASSIUM SERPL-SCNC: 3.4 MMOL/L (ref 3.5–5.5)
POTASSIUM SERPL-SCNC: 3.5 MMOL/L (ref 3.5–5.5)
POTASSIUM SERPL-SCNC: 3.6 MMOL/L (ref 3.5–5.5)
POTASSIUM SERPL-SCNC: 3.7 MMOL/L (ref 3.5–5.5)
POTASSIUM SERPL-SCNC: 3.7 MMOL/L (ref 3.5–5.5)
POTASSIUM SERPL-SCNC: 3.8 MMOL/L (ref 3.5–5.5)
POTASSIUM SERPL-SCNC: 3.9 MMOL/L (ref 3.5–5.5)
POTASSIUM SERPL-SCNC: 4 MMOL/L (ref 3.5–5.5)
POTASSIUM SERPL-SCNC: 4.1 MMOL/L (ref 3.5–5.2)
POTASSIUM SERPL-SCNC: 4.1 MMOL/L (ref 3.5–5.2)
POTASSIUM SERPL-SCNC: 4.1 MMOL/L (ref 3.5–5.5)
POTASSIUM SERPL-SCNC: 4.2 MMOL/L (ref 3.5–5.5)
POTASSIUM SERPL-SCNC: 4.3 MMOL/L (ref 3.5–5.5)
POTASSIUM SERPL-SCNC: 4.4 MMOL/L (ref 3.5–5.5)
POTASSIUM SERPL-SCNC: 4.5 MMOL/L (ref 3.5–5.5)
POTASSIUM SERPL-SCNC: 4.5 MMOL/L (ref 3.5–5.5)
POTASSIUM SERPL-SCNC: 4.6 MMOL/L (ref 3.5–5.5)
POTASSIUM SERPL-SCNC: 4.7 MMOL/L (ref 3.5–5.2)
POTASSIUM UR-SCNC: 57 MMOL/L (ref 12–62)
PROT FLD-MCNC: 1.9 G/DL
PROT FLD-MCNC: 1.9 G/DL
PROT FLD-MCNC: 2.1 G/DL
PROT FLD-MCNC: <2 G/DL
PROT FLD-MCNC: <2 G/DL
PROT SERPL-MCNC: 5.7 G/DL (ref 6.4–8.2)
PROT SERPL-MCNC: 5.7 G/DL (ref 6.4–8.2)
PROT SERPL-MCNC: 5.8 G/DL (ref 6.4–8.2)
PROT SERPL-MCNC: 6.4 G/DL (ref 6–8.5)
PROT SERPL-MCNC: 6.5 G/DL (ref 6.4–8.2)
PROT SERPL-MCNC: 6.5 G/DL (ref 6.4–8.2)
PROT SERPL-MCNC: 6.6 G/DL (ref 6–8.5)
PROT SERPL-MCNC: 6.7 G/DL (ref 6.4–8.2)
PROT SERPL-MCNC: 6.7 G/DL (ref 6–8.5)
PROT SERPL-MCNC: 6.9 G/DL (ref 6.4–8.2)
PROT SERPL-MCNC: 7.5 G/DL (ref 6.4–8.2)
PROT UR STRIP-MCNC: NEGATIVE MG/DL
PROT UR STRIP-MCNC: NEGATIVE MG/DL
PROT UR-MCNC: 34 MG/DL
PROT/CREAT UR-RTO: 0.4
PROTHROMBIN TIME: 12.5 SEC (ref 11.5–15.2)
PROTHROMBIN TIME: 14.3 SEC (ref 11.5–15.2)
PROTHROMBIN TIME: 14.8 SEC (ref 11.5–15.2)
PROTHROMBIN TIME: 15 SEC (ref 11.5–15.2)
PROTHROMBIN TIME: 15.6 SEC (ref 11.5–15.2)
PROTHROMBIN TIME: 15.6 SEC (ref 11.5–15.2)
Q-T INTERVAL, ECG07: 298 MS
Q-T INTERVAL, ECG07: 340 MS
Q-T INTERVAL, ECG07: 366 MS
Q-T INTERVAL, ECG07: 370 MS
Q-T INTERVAL, ECG07: 402 MS
QRS DURATION, ECG06: 130 MS
QRS DURATION, ECG06: 144 MS
QRS DURATION, ECG06: 144 MS
QRS DURATION, ECG06: 148 MS
QRS DURATION, ECG06: 150 MS
QTC CALCULATION (BEZET), ECG08: 410 MS
QTC CALCULATION (BEZET), ECG08: 467 MS
QTC CALCULATION (BEZET), ECG08: 480 MS
QTC CALCULATION (BEZET), ECG08: 483 MS
QTC CALCULATION (BEZET), ECG08: 536 MS
RBC # BLD AUTO: 2.72 X10E6/UL (ref 4.14–5.8)
RBC # BLD AUTO: 2.98 M/UL (ref 4.7–5.5)
RBC # BLD AUTO: 3 M/UL (ref 4.7–5.5)
RBC # BLD AUTO: 3.01 M/UL (ref 4.7–5.5)
RBC # BLD AUTO: 3.03 M/UL (ref 4.7–5.5)
RBC # BLD AUTO: 3.03 M/UL (ref 4.7–5.5)
RBC # BLD AUTO: 3.08 M/UL (ref 4.7–5.5)
RBC # BLD AUTO: 3.1 M/UL (ref 4.7–5.5)
RBC # BLD AUTO: 3.11 M/UL (ref 4.7–5.5)
RBC # BLD AUTO: 3.12 M/UL (ref 4.7–5.5)
RBC # BLD AUTO: 3.13 X10E6/UL (ref 4.14–5.8)
RBC # BLD AUTO: 3.17 M/UL (ref 4.7–5.5)
RBC # BLD AUTO: 3.2 M/UL (ref 4.7–5.5)
RBC # BLD AUTO: 3.28 M/UL (ref 4.7–5.5)
RBC # BLD AUTO: 3.31 M/UL (ref 4.7–5.5)
RBC # BLD AUTO: 3.38 M/UL (ref 4.7–5.5)
RBC # BLD AUTO: 3.39 M/UL (ref 4.7–5.5)
RBC # BLD AUTO: 3.39 M/UL (ref 4.7–5.5)
RBC # BLD AUTO: 3.41 M/UL (ref 4.7–5.5)
RBC # BLD AUTO: 3.47 M/UL (ref 4.7–5.5)
RBC # BLD AUTO: 3.47 M/UL (ref 4.7–5.5)
RBC # BLD AUTO: 3.48 M/UL (ref 4.7–5.5)
RBC # BLD AUTO: 3.48 M/UL (ref 4.7–5.5)
RBC # BLD AUTO: 3.5 M/UL (ref 4.7–5.5)
RBC # BLD AUTO: 3.52 M/UL (ref 4.7–5.5)
RBC # BLD AUTO: 3.54 M/UL (ref 4.7–5.5)
RBC # BLD AUTO: 3.55 M/UL (ref 4.7–5.5)
RBC # BLD AUTO: 3.59 M/UL (ref 4.7–5.5)
RBC # BLD AUTO: 3.63 M/UL (ref 4.7–5.5)
RBC # BLD AUTO: 3.91 M/UL (ref 4.7–5.5)
RBC # FLD: 1300 /CU MM
RBC # FLD: 1425 /CU MM
RBC # FLD: 1805 /CU MM
RBC # FLD: 2160 /CU MM
RBC # FLD: 910 /CU MM
RBC MORPH BLD: ABNORMAL
REPORTED DOSE,DOSE: 750 UNITS
REPORTED DOSE,DOSE: 750 UNITS
REPORTED DOSE,DOSE: ABNORMAL UNITS
REPORTED DOSE,DOSE: NORMAL UNITS
REPORTED DOSE,DOSE: NORMAL UNITS
REPORTED DOSE/TIME,TMG: 1000
REPORTED DOSE/TIME,TMG: 1900
REPORTED DOSE/TIME,TMG: 1900
REPORTED DOSE/TIME,TMG: ABNORMAL
REPORTED DOSE/TIME,TMG: NORMAL
S PNEUM AG UR QL: NEGATIVE
S PNEUM AG UR QL: NEGATIVE
SAO2 % BLDV: 67 % (ref 65–88)
SERVICE CMNT-IMP: ABNORMAL
SERVICE CMNT-IMP: NORMAL
SODIUM SERPL-SCNC: 133 MMOL/L (ref 136–145)
SODIUM SERPL-SCNC: 136 MMOL/L (ref 136–145)
SODIUM SERPL-SCNC: 137 MMOL/L (ref 136–145)
SODIUM SERPL-SCNC: 138 MMOL/L (ref 136–145)
SODIUM SERPL-SCNC: 138 MMOL/L (ref 136–145)
SODIUM SERPL-SCNC: 139 MMOL/L (ref 136–145)
SODIUM SERPL-SCNC: 140 MMOL/L (ref 136–145)
SODIUM SERPL-SCNC: 141 MMOL/L (ref 136–145)
SODIUM SERPL-SCNC: 141 MMOL/L (ref 136–145)
SODIUM SERPL-SCNC: 142 MMOL/L (ref 136–145)
SODIUM SERPL-SCNC: 143 MMOL/L (ref 136–145)
SODIUM SERPL-SCNC: 144 MMOL/L (ref 134–144)
SODIUM SERPL-SCNC: 144 MMOL/L (ref 134–144)
SODIUM SERPL-SCNC: 144 MMOL/L (ref 136–145)
SODIUM SERPL-SCNC: 145 MMOL/L (ref 134–144)
SODIUM SERPL-SCNC: 145 MMOL/L (ref 136–145)
SODIUM SERPL-SCNC: 145 MMOL/L (ref 136–145)
SODIUM SERPL-SCNC: 146 MMOL/L (ref 136–145)
SODIUM SERPL-SCNC: 147 MMOL/L (ref 136–145)
SODIUM SERPL-SCNC: 148 MMOL/L (ref 136–145)
SODIUM SERPL-SCNC: 149 MMOL/L (ref 136–145)
SODIUM SERPL-SCNC: 151 MMOL/L (ref 136–145)
SODIUM SERPL-SCNC: 153 MMOL/L (ref 136–145)
SODIUM SERPL-SCNC: 153 MMOL/L (ref 136–145)
SODIUM UR-SCNC: 17 MMOL/L (ref 20–110)
SODIUM UR-SCNC: 8 MMOL/L (ref 20–110)
SP GR UR REFRACTOMETRY: 1.02 (ref 1–1.03)
SP GR UR REFRACTOMETRY: 1.02 (ref 1–1.03)
SPECIMEN EXP DATE BLD: NORMAL
SPECIMEN EXP DATE BLD: NORMAL
SPECIMEN SOURCE FLD: ABNORMAL
SPECIMEN SOURCE FLD: NORMAL
SPECIMEN TYPE: ABNORMAL
STATUS OF UNIT,%ST: NORMAL
TOTAL RESP. RATE, ITRR: 24
TRIGL SERPL-MCNC: 144 MG/DL (ref 0–149)
TRIGL SERPL-MCNC: 65 MG/DL (ref ?–150)
TROPONIN I SERPL-MCNC: 0.26 NG/ML (ref 0–0.04)
TROPONIN I SERPL-MCNC: 0.27 NG/ML (ref 0–0.04)
TROPONIN I SERPL-MCNC: 1.94 NG/ML (ref 0–0.04)
TROPONIN I SERPL-MCNC: 3.84 NG/ML (ref 0–0.04)
TROPONIN I SERPL-MCNC: 5.98 NG/ML (ref 0–0.04)
TROPONIN I SERPL-MCNC: 6.7 NG/ML (ref 0–0.04)
TROPONIN I SERPL-MCNC: 7.58 NG/ML (ref 0–0.04)
TSH SERPL DL<=0.05 MIU/L-ACNC: 1.22 UIU/ML (ref 0.36–3.74)
UNIT DIVISION, %UDIV: 0
UROBILINOGEN UR QL STRIP.AUTO: 0.2 EU/DL (ref 0.2–1)
UROBILINOGEN UR QL STRIP.AUTO: 0.2 EU/DL (ref 0.2–1)
VANCOMYCIN SERPL-MCNC: 10.7 UG/ML (ref 5–40)
VANCOMYCIN TROUGH SERPL-MCNC: 10.5 UG/ML (ref 10–20)
VANCOMYCIN TROUGH SERPL-MCNC: 14.6 UG/ML (ref 10–20)
VANCOMYCIN TROUGH SERPL-MCNC: 18.3 UG/ML (ref 10–20)
VANCOMYCIN TROUGH SERPL-MCNC: 19 UG/ML (ref 10–20)
VANCOMYCIN TROUGH SERPL-MCNC: 24.9 UG/ML (ref 10–20)
VENTRICULAR RATE, ECG03: 114 BPM
VENTRICULAR RATE, ECG03: 120 BPM
VENTRICULAR RATE, ECG03: 129 BPM
VENTRICULAR RATE, ECG03: 87 BPM
VENTRICULAR RATE, ECG03: 96 BPM
VLDLC SERPL CALC-MCNC: 13 MG/DL
VLDLC SERPL CALC-MCNC: 29 MG/DL (ref 5–40)
WBC # BLD AUTO: 10 K/UL (ref 4.6–13.2)
WBC # BLD AUTO: 10.2 K/UL (ref 4.6–13.2)
WBC # BLD AUTO: 10.6 K/UL (ref 4.6–13.2)
WBC # BLD AUTO: 10.7 K/UL (ref 4.6–13.2)
WBC # BLD AUTO: 10.8 K/UL (ref 4.6–13.2)
WBC # BLD AUTO: 11.7 K/UL (ref 4.6–13.2)
WBC # BLD AUTO: 12 K/UL (ref 4.6–13.2)
WBC # BLD AUTO: 12.1 K/UL (ref 4.6–13.2)
WBC # BLD AUTO: 12.6 K/UL (ref 4.6–13.2)
WBC # BLD AUTO: 12.8 K/UL (ref 4.6–13.2)
WBC # BLD AUTO: 13.4 K/UL (ref 4.6–13.2)
WBC # BLD AUTO: 13.4 K/UL (ref 4.6–13.2)
WBC # BLD AUTO: 13.6 K/UL (ref 4.6–13.2)
WBC # BLD AUTO: 6.1 K/UL (ref 4.6–13.2)
WBC # BLD AUTO: 6.4 K/UL (ref 4.6–13.2)
WBC # BLD AUTO: 6.4 X10E3/UL (ref 3.4–10.8)
WBC # BLD AUTO: 6.5 X10E3/UL (ref 3.4–10.8)
WBC # BLD AUTO: 6.6 K/UL (ref 4.6–13.2)
WBC # BLD AUTO: 6.7 K/UL (ref 4.6–13.2)
WBC # BLD AUTO: 7.6 K/UL (ref 4.6–13.2)
WBC # BLD AUTO: 7.7 K/UL (ref 4.6–13.2)
WBC # BLD AUTO: 7.8 K/UL (ref 4.6–13.2)
WBC # BLD AUTO: 7.8 K/UL (ref 4.6–13.2)
WBC # BLD AUTO: 8.2 K/UL (ref 4.6–13.2)
WBC # BLD AUTO: 8.5 K/UL (ref 4.6–13.2)
WBC # BLD AUTO: 9.2 K/UL (ref 4.6–13.2)
WBC # BLD AUTO: 9.5 K/UL (ref 4.6–13.2)
WBC # BLD AUTO: 9.5 K/UL (ref 4.6–13.2)
WBC # BLD AUTO: 9.7 K/UL (ref 4.6–13.2)
WBC # BLD AUTO: 9.8 K/UL (ref 4.6–13.2)
WBC MORPH BLD: ABNORMAL

## 2019-01-01 PROCEDURE — 74011250637 HC RX REV CODE- 250/637: Performed by: FAMILY MEDICINE

## 2019-01-01 PROCEDURE — 74011250637 HC RX REV CODE- 250/637: Performed by: PHYSICIAN ASSISTANT

## 2019-01-01 PROCEDURE — 74011250637 HC RX REV CODE- 250/637: Performed by: INTERNAL MEDICINE

## 2019-01-01 PROCEDURE — 80048 BASIC METABOLIC PNL TOTAL CA: CPT

## 2019-01-01 PROCEDURE — 74011250636 HC RX REV CODE- 250/636: Performed by: EMERGENCY MEDICINE

## 2019-01-01 PROCEDURE — 77010033678 HC OXYGEN DAILY

## 2019-01-01 PROCEDURE — 65660000000 HC RM CCU STEPDOWN

## 2019-01-01 PROCEDURE — 74011000250 HC RX REV CODE- 250: Performed by: INTERNAL MEDICINE

## 2019-01-01 PROCEDURE — 74011000250 HC RX REV CODE- 250: Performed by: FAMILY MEDICINE

## 2019-01-01 PROCEDURE — 85730 THROMBOPLASTIN TIME PARTIAL: CPT

## 2019-01-01 PROCEDURE — 97530 THERAPEUTIC ACTIVITIES: CPT

## 2019-01-01 PROCEDURE — 74011250636 HC RX REV CODE- 250/636: Performed by: INTERNAL MEDICINE

## 2019-01-01 PROCEDURE — 94640 AIRWAY INHALATION TREATMENT: CPT

## 2019-01-01 PROCEDURE — 36415 COLL VENOUS BLD VENIPUNCTURE: CPT

## 2019-01-01 PROCEDURE — C1894 INTRO/SHEATH, NON-LASER: HCPCS | Performed by: INTERNAL MEDICINE

## 2019-01-01 PROCEDURE — 82803 BLOOD GASES ANY COMBINATION: CPT

## 2019-01-01 PROCEDURE — 3331090002 HH PPS REVENUE DEBIT

## 2019-01-01 PROCEDURE — 85025 COMPLETE CBC W/AUTO DIFF WBC: CPT

## 2019-01-01 PROCEDURE — A7048 VACUUM DRAIN BOTTLE/TUBE KIT: HCPCS

## 2019-01-01 PROCEDURE — 97110 THERAPEUTIC EXERCISES: CPT

## 2019-01-01 PROCEDURE — 83735 ASSAY OF MAGNESIUM: CPT

## 2019-01-01 PROCEDURE — 82945 GLUCOSE OTHER FLUID: CPT

## 2019-01-01 PROCEDURE — 83615 LACTATE (LD) (LDH) ENZYME: CPT

## 2019-01-01 PROCEDURE — 65660000001 HC RM ICU INTERMED STEPDOWN

## 2019-01-01 PROCEDURE — 77030002916 HC SUT ETHLN J&J -A

## 2019-01-01 PROCEDURE — 94761 N-INVAS EAR/PLS OXIMETRY MLT: CPT

## 2019-01-01 PROCEDURE — 3331090001 HH PPS REVENUE CREDIT

## 2019-01-01 PROCEDURE — 80202 ASSAY OF VANCOMYCIN: CPT

## 2019-01-01 PROCEDURE — 88112 CYTOPATH CELL ENHANCE TECH: CPT

## 2019-01-01 PROCEDURE — 71250 CT THORAX DX C-: CPT

## 2019-01-01 PROCEDURE — 83880 ASSAY OF NATRIURETIC PEPTIDE: CPT

## 2019-01-01 PROCEDURE — 82550 ASSAY OF CK (CPK): CPT

## 2019-01-01 PROCEDURE — 77030039266 HC ADH SKN EXOFIN S2SG -A

## 2019-01-01 PROCEDURE — 74011250636 HC RX REV CODE- 250/636

## 2019-01-01 PROCEDURE — C1729 CATH, DRAINAGE: HCPCS

## 2019-01-01 PROCEDURE — 75810000165 HC THORACENTESIS

## 2019-01-01 PROCEDURE — 77030037877 HC DRSG MEPILEX >48IN BORD MOLN -A

## 2019-01-01 PROCEDURE — 87040 BLOOD CULTURE FOR BACTERIA: CPT

## 2019-01-01 PROCEDURE — 77030020186 HC BOOT HL PROTCT SAGE -B

## 2019-01-01 PROCEDURE — 94667 MNPJ CHEST WALL 1ST: CPT

## 2019-01-01 PROCEDURE — 94668 MNPJ CHEST WALL SBSQ: CPT

## 2019-01-01 PROCEDURE — 71046 X-RAY EXAM CHEST 2 VIEWS: CPT

## 2019-01-01 PROCEDURE — 80162 ASSAY OF DIGOXIN TOTAL: CPT

## 2019-01-01 PROCEDURE — 74011636637 HC RX REV CODE- 636/637: Performed by: INTERNAL MEDICINE

## 2019-01-01 PROCEDURE — 81003 URINALYSIS AUTO W/O SCOPE: CPT

## 2019-01-01 PROCEDURE — 77030018729 HC ELECTRD DEFIB PAD CARD -B: Performed by: INTERNAL MEDICINE

## 2019-01-01 PROCEDURE — 97116 GAIT TRAINING THERAPY: CPT

## 2019-01-01 PROCEDURE — 77030037878 HC DRSG MEPILEX >48IN BORD MOLN -B

## 2019-01-01 PROCEDURE — 84132 ASSAY OF SERUM POTASSIUM: CPT

## 2019-01-01 PROCEDURE — C1769 GUIDE WIRE: HCPCS | Performed by: INTERNAL MEDICINE

## 2019-01-01 PROCEDURE — 74011250636 HC RX REV CODE- 250/636: Performed by: PHYSICIAN ASSISTANT

## 2019-01-01 PROCEDURE — 0W993ZZ DRAINAGE OF RIGHT PLEURAL CAVITY, PERCUTANEOUS APPROACH: ICD-10-PCS | Performed by: RADIOLOGY

## 2019-01-01 PROCEDURE — 74011636637 HC RX REV CODE- 636/637: Performed by: HOSPITALIST

## 2019-01-01 PROCEDURE — 80053 COMPREHEN METABOLIC PANEL: CPT

## 2019-01-01 PROCEDURE — 96368 THER/DIAG CONCURRENT INF: CPT

## 2019-01-01 PROCEDURE — 92526 ORAL FUNCTION THERAPY: CPT

## 2019-01-01 PROCEDURE — 74011000250 HC RX REV CODE- 250: Performed by: PHYSICIAN ASSISTANT

## 2019-01-01 PROCEDURE — 94664 DEMO&/EVAL PT USE INHALER: CPT

## 2019-01-01 PROCEDURE — 96365 THER/PROPH/DIAG IV INF INIT: CPT

## 2019-01-01 PROCEDURE — 74011250636 HC RX REV CODE- 250/636: Performed by: NURSE PRACTITIONER

## 2019-01-01 PROCEDURE — 99152 MOD SED SAME PHYS/QHP 5/>YRS: CPT | Performed by: INTERNAL MEDICINE

## 2019-01-01 PROCEDURE — 85610 PROTHROMBIN TIME: CPT

## 2019-01-01 PROCEDURE — 97535 SELF CARE MNGMENT TRAINING: CPT

## 2019-01-01 PROCEDURE — 71045 X-RAY EXAM CHEST 1 VIEW: CPT

## 2019-01-01 PROCEDURE — 74011250637 HC RX REV CODE- 250/637: Performed by: NURSE PRACTITIONER

## 2019-01-01 PROCEDURE — 74011000258 HC RX REV CODE- 258: Performed by: FAMILY MEDICINE

## 2019-01-01 PROCEDURE — 76450000000

## 2019-01-01 PROCEDURE — 87449 NOS EACH ORGANISM AG IA: CPT

## 2019-01-01 PROCEDURE — 93005 ELECTROCARDIOGRAM TRACING: CPT

## 2019-01-01 PROCEDURE — 74011250637 HC RX REV CODE- 250/637: Performed by: HOSPITALIST

## 2019-01-01 PROCEDURE — 82962 GLUCOSE BLOOD TEST: CPT

## 2019-01-01 PROCEDURE — 83935 ASSAY OF URINE OSMOLALITY: CPT

## 2019-01-01 PROCEDURE — 82274 ASSAY TEST FOR BLOOD FECAL: CPT

## 2019-01-01 PROCEDURE — C1769 GUIDE WIRE: HCPCS

## 2019-01-01 PROCEDURE — 74011250636 HC RX REV CODE- 250/636: Performed by: HOSPITALIST

## 2019-01-01 PROCEDURE — 71275 CT ANGIOGRAPHY CHEST: CPT

## 2019-01-01 PROCEDURE — 77030028114 HC DRN KT PLEURX SYS BD -B

## 2019-01-01 PROCEDURE — 74011250636 HC RX REV CODE- 250/636: Performed by: FAMILY MEDICINE

## 2019-01-01 PROCEDURE — 4A023N7 MEASUREMENT OF CARDIAC SAMPLING AND PRESSURE, LEFT HEART, PERCUTANEOUS APPROACH: ICD-10-PCS | Performed by: INTERNAL MEDICINE

## 2019-01-01 PROCEDURE — 87070 CULTURE OTHR SPECIMN AEROBIC: CPT

## 2019-01-01 PROCEDURE — 99285 EMERGENCY DEPT VISIT HI MDM: CPT

## 2019-01-01 PROCEDURE — 77030037875 HC DRSG MEPILEX <16IN BORD MOLN -A

## 2019-01-01 PROCEDURE — 86900 BLOOD TYPING SEROLOGIC ABO: CPT

## 2019-01-01 PROCEDURE — 84100 ASSAY OF PHOSPHORUS: CPT

## 2019-01-01 PROCEDURE — C1725 CATH, TRANSLUMIN NON-LASER: HCPCS | Performed by: INTERNAL MEDICINE

## 2019-01-01 PROCEDURE — 76604 US EXAM CHEST: CPT

## 2019-01-01 PROCEDURE — 5A09457 ASSISTANCE WITH RESPIRATORY VENTILATION, 24-96 CONSECUTIVE HOURS, CONTINUOUS POSITIVE AIRWAY PRESSURE: ICD-10-PCS | Performed by: INTERNAL MEDICINE

## 2019-01-01 PROCEDURE — 94760 N-INVAS EAR/PLS OXIMETRY 1: CPT

## 2019-01-01 PROCEDURE — 77030010545

## 2019-01-01 PROCEDURE — B2111ZZ FLUOROSCOPY OF MULTIPLE CORONARY ARTERIES USING LOW OSMOLAR CONTRAST: ICD-10-PCS | Performed by: INTERNAL MEDICINE

## 2019-01-01 PROCEDURE — 84155 ASSAY OF PROTEIN SERUM: CPT

## 2019-01-01 PROCEDURE — 77030010538 HC BG FLEXSEAL FMS BMS -A

## 2019-01-01 PROCEDURE — 74011000255 HC RX REV CODE- 255: Performed by: INTERNAL MEDICINE

## 2019-01-01 PROCEDURE — 77030032490 HC SLV COMPR SCD KNE COVD -B

## 2019-01-01 PROCEDURE — 97168 OT RE-EVAL EST PLAN CARE: CPT

## 2019-01-01 PROCEDURE — 77030028127 HC DRN KT PLEURX SYS BD -D

## 2019-01-01 PROCEDURE — 94660 CPAP INITIATION&MGMT: CPT

## 2019-01-01 PROCEDURE — 77030035694 HC MSK BIPAP FLL FAC PERFMAX PHIL -B

## 2019-01-01 PROCEDURE — 80069 RENAL FUNCTION PANEL: CPT

## 2019-01-01 PROCEDURE — 87641 MR-STAPH DNA AMP PROBE: CPT

## 2019-01-01 PROCEDURE — 83605 ASSAY OF LACTIC ACID: CPT

## 2019-01-01 PROCEDURE — 77030002933 HC SUT MCRYL J&J -A

## 2019-01-01 PROCEDURE — 84484 ASSAY OF TROPONIN QUANT: CPT

## 2019-01-01 PROCEDURE — 85018 HEMOGLOBIN: CPT

## 2019-01-01 PROCEDURE — 0W9B3ZZ DRAINAGE OF LEFT PLEURAL CAVITY, PERCUTANEOUS APPROACH: ICD-10-PCS | Performed by: RADIOLOGY

## 2019-01-01 PROCEDURE — 94762 N-INVAS EAR/PLS OXIMTRY CONT: CPT

## 2019-01-01 PROCEDURE — P9045 ALBUMIN (HUMAN), 5%, 250 ML: HCPCS | Performed by: PHYSICIAN ASSISTANT

## 2019-01-01 PROCEDURE — 89051 BODY FLUID CELL COUNT: CPT

## 2019-01-01 PROCEDURE — 88305 TISSUE EXAM BY PATHOLOGIST: CPT

## 2019-01-01 PROCEDURE — 97164 PT RE-EVAL EST PLAN CARE: CPT

## 2019-01-01 PROCEDURE — 74011000250 HC RX REV CODE- 250: Performed by: EMERGENCY MEDICINE

## 2019-01-01 PROCEDURE — 97166 OT EVAL MOD COMPLEX 45 MIN: CPT

## 2019-01-01 PROCEDURE — 84157 ASSAY OF PROTEIN OTHER: CPT

## 2019-01-01 PROCEDURE — 85379 FIBRIN DEGRADATION QUANT: CPT

## 2019-01-01 PROCEDURE — 85027 COMPLETE CBC AUTOMATED: CPT

## 2019-01-01 PROCEDURE — 77030027138 HC INCENT SPIROMETER -A

## 2019-01-01 PROCEDURE — 30233N1 TRANSFUSION OF NONAUTOLOGOUS RED BLOOD CELLS INTO PERIPHERAL VEIN, PERCUTANEOUS APPROACH: ICD-10-PCS | Performed by: INTERNAL MEDICINE

## 2019-01-01 PROCEDURE — 80076 HEPATIC FUNCTION PANEL: CPT

## 2019-01-01 PROCEDURE — 0W9B30Z DRAINAGE OF LEFT PLEURAL CAVITY WITH DRAINAGE DEVICE, PERCUTANEOUS APPROACH: ICD-10-PCS | Performed by: INTERNAL MEDICINE

## 2019-01-01 PROCEDURE — 93970 EXTREMITY STUDY: CPT

## 2019-01-01 PROCEDURE — 82330 ASSAY OF CALCIUM: CPT

## 2019-01-01 PROCEDURE — B2151ZZ FLUOROSCOPY OF LEFT HEART USING LOW OSMOLAR CONTRAST: ICD-10-PCS | Performed by: INTERNAL MEDICINE

## 2019-01-01 PROCEDURE — 97162 PT EVAL MOD COMPLEX 30 MIN: CPT

## 2019-01-01 PROCEDURE — G0299 HHS/HOSPICE OF RN EA 15 MIN: HCPCS

## 2019-01-01 PROCEDURE — 83986 ASSAY PH BODY FLUID NOS: CPT

## 2019-01-01 PROCEDURE — 74011636320 HC RX REV CODE- 636/320: Performed by: INTERNAL MEDICINE

## 2019-01-01 PROCEDURE — 77030013519 HC DEV INFL BASIX MRTM -B: Performed by: INTERNAL MEDICINE

## 2019-01-01 PROCEDURE — 74011636320 HC RX REV CODE- 636/320: Performed by: EMERGENCY MEDICINE

## 2019-01-01 PROCEDURE — 80061 LIPID PANEL: CPT

## 2019-01-01 PROCEDURE — 84300 ASSAY OF URINE SODIUM: CPT

## 2019-01-01 PROCEDURE — 97165 OT EVAL LOW COMPLEX 30 MIN: CPT

## 2019-01-01 PROCEDURE — 92610 EVALUATE SWALLOWING FUNCTION: CPT

## 2019-01-01 PROCEDURE — 65660000004 HC RM CVT STEPDOWN

## 2019-01-01 PROCEDURE — 36430 TRANSFUSION BLD/BLD COMPNT: CPT

## 2019-01-01 PROCEDURE — 77030031139 HC SUT VCRL2 J&J -A

## 2019-01-01 PROCEDURE — 77030013033 HC MSK BPAP/CPAP MMKA -B

## 2019-01-01 PROCEDURE — 0W9B30Z DRAINAGE OF LEFT PLEURAL CAVITY WITH DRAINAGE DEVICE, PERCUTANEOUS APPROACH: ICD-10-PCS | Performed by: RADIOLOGY

## 2019-01-01 PROCEDURE — 86923 COMPATIBILITY TEST ELECTRIC: CPT

## 2019-01-01 PROCEDURE — P9045 ALBUMIN (HUMAN), 5%, 250 ML: HCPCS | Performed by: NURSE PRACTITIONER

## 2019-01-01 PROCEDURE — 027034Z DILATION OF CORONARY ARTERY, ONE ARTERY WITH DRUG-ELUTING INTRALUMINAL DEVICE, PERCUTANEOUS APPROACH: ICD-10-PCS | Performed by: INTERNAL MEDICINE

## 2019-01-01 PROCEDURE — 77030015766: Performed by: INTERNAL MEDICINE

## 2019-01-01 PROCEDURE — 3331090003 HH PPS REVENUE ADJ

## 2019-01-01 PROCEDURE — 82565 ASSAY OF CREATININE: CPT

## 2019-01-01 PROCEDURE — 5A09357 ASSISTANCE WITH RESPIRATORY VENTILATION, LESS THAN 24 CONSECUTIVE HOURS, CONTINUOUS POSITIVE AIRWAY PRESSURE: ICD-10-PCS | Performed by: INTERNAL MEDICINE

## 2019-01-01 PROCEDURE — 96375 TX/PRO/DX INJ NEW DRUG ADDON: CPT

## 2019-01-01 PROCEDURE — 87502 INFLUENZA DNA AMP PROBE: CPT

## 2019-01-01 PROCEDURE — 32555 ASPIRATE PLEURA W/ IMAGING: CPT

## 2019-01-01 PROCEDURE — 74011000258 HC RX REV CODE- 258: Performed by: INTERNAL MEDICINE

## 2019-01-01 PROCEDURE — P9016 RBC LEUKOCYTES REDUCED: HCPCS

## 2019-01-01 PROCEDURE — 77030027845 HC BND COM RDL D-STAT TELE -B: Performed by: INTERNAL MEDICINE

## 2019-01-01 PROCEDURE — C8929 TTE W OR WO FOL WCON,DOPPLER: HCPCS

## 2019-01-01 PROCEDURE — 77030031604 US THORACENTESIS LT NDL W IMAGE

## 2019-01-01 PROCEDURE — 84156 ASSAY OF PROTEIN URINE: CPT

## 2019-01-01 PROCEDURE — 99153 MOD SED SAME PHYS/QHP EA: CPT | Performed by: INTERNAL MEDICINE

## 2019-01-01 PROCEDURE — 87804 INFLUENZA ASSAY W/OPTIC: CPT

## 2019-01-01 PROCEDURE — 92611 MOTION FLUOROSCOPY/SWALLOW: CPT

## 2019-01-01 PROCEDURE — C1874 STENT, COATED/COV W/DEL SYS: HCPCS | Performed by: INTERNAL MEDICINE

## 2019-01-01 PROCEDURE — 74011250636 HC RX REV CODE- 250/636: Performed by: RADIOLOGY

## 2019-01-01 PROCEDURE — 74011000250 HC RX REV CODE- 250: Performed by: HOSPITALIST

## 2019-01-01 PROCEDURE — 0W993ZZ DRAINAGE OF RIGHT PLEURAL CAVITY, PERCUTANEOUS APPROACH: ICD-10-PCS | Performed by: NURSE PRACTITIONER

## 2019-01-01 PROCEDURE — 93454 CORONARY ARTERY ANGIO S&I: CPT | Performed by: INTERNAL MEDICINE

## 2019-01-01 PROCEDURE — 93308 TTE F-UP OR LMTD: CPT

## 2019-01-01 PROCEDURE — 92928 PRQ TCAT PLMT NTRAC ST 1 LES: CPT | Performed by: INTERNAL MEDICINE

## 2019-01-01 PROCEDURE — 82570 ASSAY OF URINE CREATININE: CPT

## 2019-01-01 PROCEDURE — 77030012468 HC VLV BLEEDBK CNTRL ABBT -B: Performed by: INTERNAL MEDICINE

## 2019-01-01 PROCEDURE — 74230 X-RAY XM SWLNG FUNCJ C+: CPT

## 2019-01-01 PROCEDURE — 0W9930Z DRAINAGE OF RIGHT PLEURAL CAVITY WITH DRAINAGE DEVICE, PERCUTANEOUS APPROACH: ICD-10-PCS | Performed by: INTERNAL MEDICINE

## 2019-01-01 PROCEDURE — 77030010547 HC BG URIN W/UMETER -A

## 2019-01-01 PROCEDURE — 74011000258 HC RX REV CODE- 258: Performed by: EMERGENCY MEDICINE

## 2019-01-01 PROCEDURE — 70491 CT SOFT TISSUE NECK W/DYE: CPT

## 2019-01-01 PROCEDURE — 400013 HH SOC

## 2019-01-01 PROCEDURE — 96367 TX/PROPH/DG ADDL SEQ IV INF: CPT

## 2019-01-01 PROCEDURE — 83036 HEMOGLOBIN GLYCOSYLATED A1C: CPT

## 2019-01-01 PROCEDURE — 84133 ASSAY OF URINE POTASSIUM: CPT

## 2019-01-01 PROCEDURE — 87450 LEGIONELLA PNEUMOPHILA AG, URINE: CPT

## 2019-01-01 PROCEDURE — G0151 HHCP-SERV OF PT,EA 15 MIN: HCPCS

## 2019-01-01 PROCEDURE — 77030011256 HC DRSG MEPILEX <16IN NO BORD MOLN -A

## 2019-01-01 PROCEDURE — 87205 SMEAR GRAM STAIN: CPT

## 2019-01-01 PROCEDURE — 36600 WITHDRAWAL OF ARTERIAL BLOOD: CPT

## 2019-01-01 PROCEDURE — 65610000006 HC RM INTENSIVE CARE

## 2019-01-01 PROCEDURE — C1887 CATHETER, GUIDING: HCPCS | Performed by: INTERNAL MEDICINE

## 2019-01-01 PROCEDURE — 77030021352 HC CBL LD SYS DISP COVD -B

## 2019-01-01 PROCEDURE — 74011000250 HC RX REV CODE- 250

## 2019-01-01 PROCEDURE — 93017 CV STRESS TEST TRACING ONLY: CPT

## 2019-01-01 PROCEDURE — 97161 PT EVAL LOW COMPLEX 20 MIN: CPT

## 2019-01-01 PROCEDURE — 32550 INSERT PLEURAL CATH: CPT

## 2019-01-01 PROCEDURE — 84443 ASSAY THYROID STIM HORMONE: CPT

## 2019-01-01 PROCEDURE — 77030029684 HC NEB SM VOL KT MONA -A

## 2019-01-01 PROCEDURE — 99001 SPECIMEN HANDLING PT-LAB: CPT

## 2019-01-01 DEVICE — XIENCE ALPINE EVEROLIMUS ELUTING CORONARY STENT SYSTEM 2.25 MM X 12 MM / RAPID-EXCHANGE
Type: IMPLANTABLE DEVICE | Status: FUNCTIONAL
Brand: XIENCE ALPINE

## 2019-01-01 RX ORDER — SODIUM CHLORIDE 9 MG/ML
250 INJECTION, SOLUTION INTRAVENOUS ONCE
Status: COMPLETED | OUTPATIENT
Start: 2019-01-01 | End: 2019-01-01

## 2019-01-01 RX ORDER — CEFAZOLIN SODIUM 1 G/3ML
INJECTION, POWDER, FOR SOLUTION INTRAMUSCULAR; INTRAVENOUS
Status: DISCONTINUED
Start: 2019-01-01 | End: 2019-01-01 | Stop reason: CLARIF

## 2019-01-01 RX ORDER — CLOPIDOGREL BISULFATE 75 MG/1
75 TABLET ORAL DAILY
Status: DISCONTINUED | OUTPATIENT
Start: 2019-01-01 | End: 2019-01-01 | Stop reason: HOSPADM

## 2019-01-01 RX ORDER — CARVEDILOL 3.12 MG/1
3.12 TABLET ORAL 2 TIMES DAILY WITH MEALS
Status: DISCONTINUED | OUTPATIENT
Start: 2019-01-01 | End: 2019-01-01 | Stop reason: HOSPADM

## 2019-01-01 RX ORDER — POTASSIUM CHLORIDE 750 MG/1
10 TABLET, FILM COATED, EXTENDED RELEASE ORAL 2 TIMES DAILY
Status: DISCONTINUED | OUTPATIENT
Start: 2019-01-01 | End: 2019-01-01 | Stop reason: HOSPADM

## 2019-01-01 RX ORDER — POTASSIUM CHLORIDE 750 MG/1
20 TABLET, FILM COATED, EXTENDED RELEASE ORAL DAILY
Status: CANCELLED | OUTPATIENT
Start: 2019-01-01

## 2019-01-01 RX ORDER — LEVOFLOXACIN 5 MG/ML
750 INJECTION, SOLUTION INTRAVENOUS
Status: DISCONTINUED | OUTPATIENT
Start: 2019-01-01 | End: 2019-01-01

## 2019-01-01 RX ORDER — SODIUM CHLORIDE 0.9 % (FLUSH) 0.9 %
5-40 SYRINGE (ML) INJECTION EVERY 8 HOURS
Status: DISCONTINUED | OUTPATIENT
Start: 2019-01-01 | End: 2019-01-01 | Stop reason: HOSPADM

## 2019-01-01 RX ORDER — FUROSEMIDE 10 MG/ML
40 INJECTION INTRAMUSCULAR; INTRAVENOUS EVERY 12 HOURS
Status: DISCONTINUED | OUTPATIENT
Start: 2019-01-01 | End: 2019-01-01

## 2019-01-01 RX ORDER — FUROSEMIDE 20 MG/1
10 TABLET ORAL
Status: DISCONTINUED | OUTPATIENT
Start: 2019-01-01 | End: 2019-01-01

## 2019-01-01 RX ORDER — VANCOMYCIN HYDROCHLORIDE
1250
Status: DISCONTINUED | OUTPATIENT
Start: 2019-01-01 | End: 2019-01-01 | Stop reason: DRUGHIGH

## 2019-01-01 RX ORDER — ENOXAPARIN SODIUM 100 MG/ML
40 INJECTION SUBCUTANEOUS EVERY 24 HOURS
Status: DISCONTINUED | OUTPATIENT
Start: 2019-01-01 | End: 2019-01-01

## 2019-01-01 RX ORDER — LIDOCAINE HYDROCHLORIDE 10 MG/ML
INJECTION, SOLUTION EPIDURAL; INFILTRATION; INTRACAUDAL; PERINEURAL AS NEEDED
Status: DISCONTINUED | OUTPATIENT
Start: 2019-01-01 | End: 2019-01-01 | Stop reason: HOSPADM

## 2019-01-01 RX ORDER — LIDOCAINE HYDROCHLORIDE 10 MG/ML
10 INJECTION INFILTRATION; PERINEURAL
Status: DISPENSED | OUTPATIENT
Start: 2019-01-01 | End: 2019-01-01

## 2019-01-01 RX ORDER — ALBUMIN HUMAN 50 G/1000ML
25 SOLUTION INTRAVENOUS ONCE
Status: DISCONTINUED | OUTPATIENT
Start: 2019-01-01 | End: 2019-01-01

## 2019-01-01 RX ORDER — FUROSEMIDE 40 MG/1
20 TABLET ORAL DAILY
Status: DISCONTINUED | OUTPATIENT
Start: 2019-01-01 | End: 2019-01-01 | Stop reason: HOSPADM

## 2019-01-01 RX ORDER — FUROSEMIDE 10 MG/ML
20 INJECTION INTRAMUSCULAR; INTRAVENOUS DAILY
Status: DISCONTINUED | OUTPATIENT
Start: 2019-01-01 | End: 2019-01-01

## 2019-01-01 RX ORDER — FAMOTIDINE 20 MG/1
20 TABLET, FILM COATED ORAL DAILY
Qty: 30 TAB | Refills: 1 | Status: SHIPPED
Start: 2019-01-01 | End: 2019-01-01 | Stop reason: SDUPTHER

## 2019-01-01 RX ORDER — FACIAL-BODY WIPES
10 EACH TOPICAL DAILY PRN
Status: DISCONTINUED | OUTPATIENT
Start: 2019-01-01 | End: 2019-01-01 | Stop reason: HOSPADM

## 2019-01-01 RX ORDER — MIDODRINE HYDROCHLORIDE 10 MG/1
10 TABLET ORAL
Status: DISCONTINUED | OUTPATIENT
Start: 2019-01-01 | End: 2019-01-01 | Stop reason: HOSPADM

## 2019-01-01 RX ORDER — POTASSIUM CHLORIDE 1.5 G/1.77G
20 POWDER, FOR SOLUTION ORAL ONCE
Status: COMPLETED | OUTPATIENT
Start: 2019-01-01 | End: 2019-01-01

## 2019-01-01 RX ORDER — POTASSIUM CHLORIDE 750 MG/1
10 TABLET, FILM COATED, EXTENDED RELEASE ORAL ONCE
Status: COMPLETED | OUTPATIENT
Start: 2019-01-01 | End: 2019-01-01

## 2019-01-01 RX ORDER — GUAIFENESIN 600 MG/1
600 TABLET, EXTENDED RELEASE ORAL EVERY 12 HOURS
Status: DISCONTINUED | OUTPATIENT
Start: 2019-01-01 | End: 2019-01-01 | Stop reason: SDUPTHER

## 2019-01-01 RX ORDER — MIDODRINE HYDROCHLORIDE 5 MG/1
5 TABLET ORAL
Status: DISCONTINUED | OUTPATIENT
Start: 2019-01-01 | End: 2019-01-01

## 2019-01-01 RX ORDER — SODIUM CHLORIDE 9 MG/ML
50 INJECTION, SOLUTION INTRAVENOUS CONTINUOUS
Status: DISCONTINUED | OUTPATIENT
Start: 2019-01-01 | End: 2019-01-01

## 2019-01-01 RX ORDER — FUROSEMIDE 20 MG/1
20 TABLET ORAL DAILY
Qty: 30 TAB | Refills: 0 | Status: SHIPPED | OUTPATIENT
Start: 2019-01-01 | End: 2019-01-01

## 2019-01-01 RX ORDER — BIVALIRUDIN 250 MG/5ML
INJECTION, POWDER, LYOPHILIZED, FOR SOLUTION INTRAVENOUS AS NEEDED
Status: DISCONTINUED | OUTPATIENT
Start: 2019-01-01 | End: 2019-01-01 | Stop reason: HOSPADM

## 2019-01-01 RX ORDER — BUDESONIDE 0.5 MG/2ML
500 INHALANT ORAL
Status: DISCONTINUED | OUTPATIENT
Start: 2019-01-01 | End: 2019-01-01

## 2019-01-01 RX ORDER — CLOPIDOGREL BISULFATE 75 MG/1
75 TABLET ORAL DAILY
Status: DISCONTINUED | OUTPATIENT
Start: 2019-01-01 | End: 2019-01-01

## 2019-01-01 RX ORDER — SODIUM CHLORIDE 0.9 % (FLUSH) 0.9 %
5-40 SYRINGE (ML) INJECTION AS NEEDED
Status: DISCONTINUED | OUTPATIENT
Start: 2019-01-01 | End: 2019-01-01 | Stop reason: HOSPADM

## 2019-01-01 RX ORDER — SODIUM CHLORIDE FOR INHALATION 3 %
3 VIAL, NEBULIZER (ML) INHALATION
Status: DISCONTINUED | OUTPATIENT
Start: 2019-01-01 | End: 2019-01-01

## 2019-01-01 RX ORDER — FAMOTIDINE 20 MG/1
20 TABLET, FILM COATED ORAL DAILY
Status: DISCONTINUED | OUTPATIENT
Start: 2019-01-01 | End: 2019-01-01 | Stop reason: HOSPADM

## 2019-01-01 RX ORDER — FAMOTIDINE 20 MG/1
20 TABLET, FILM COATED ORAL DAILY
Status: DISCONTINUED | OUTPATIENT
Start: 2019-01-01 | End: 2019-01-01

## 2019-01-01 RX ORDER — FAMOTIDINE 20 MG/1
20 TABLET, FILM COATED ORAL ONCE
Status: DISPENSED | OUTPATIENT
Start: 2019-01-01 | End: 2019-01-01

## 2019-01-01 RX ORDER — POTASSIUM CHLORIDE 7.45 MG/ML
10 INJECTION INTRAVENOUS
Status: DISPENSED | OUTPATIENT
Start: 2019-01-01 | End: 2019-01-01

## 2019-01-01 RX ORDER — INSULIN LISPRO 100 [IU]/ML
INJECTION, SOLUTION INTRAVENOUS; SUBCUTANEOUS EVERY 6 HOURS
Status: DISCONTINUED | OUTPATIENT
Start: 2019-01-01 | End: 2019-01-01

## 2019-01-01 RX ORDER — IPRATROPIUM BROMIDE AND ALBUTEROL SULFATE 2.5; .5 MG/3ML; MG/3ML
3 SOLUTION RESPIRATORY (INHALATION)
Status: DISCONTINUED | OUTPATIENT
Start: 2019-01-01 | End: 2019-01-01 | Stop reason: HOSPADM

## 2019-01-01 RX ORDER — IPRATROPIUM BROMIDE AND ALBUTEROL SULFATE 2.5; .5 MG/3ML; MG/3ML
3 SOLUTION RESPIRATORY (INHALATION)
Status: DISCONTINUED | OUTPATIENT
Start: 2019-01-01 | End: 2019-01-01

## 2019-01-01 RX ORDER — DEXTROSE MONOHYDRATE 50 MG/ML
75 INJECTION, SOLUTION INTRAVENOUS CONTINUOUS
Status: DISCONTINUED | OUTPATIENT
Start: 2019-01-01 | End: 2019-01-01

## 2019-01-01 RX ORDER — FENTANYL CITRATE 50 UG/ML
25 INJECTION, SOLUTION INTRAMUSCULAR; INTRAVENOUS ONCE
Status: DISCONTINUED | OUTPATIENT
Start: 2019-01-01 | End: 2019-01-01

## 2019-01-01 RX ORDER — POTASSIUM CHLORIDE 750 MG/1
20 TABLET, FILM COATED, EXTENDED RELEASE ORAL DAILY
Status: DISCONTINUED | OUTPATIENT
Start: 2019-01-01 | End: 2019-01-01 | Stop reason: HOSPADM

## 2019-01-01 RX ORDER — LIDOCAINE HYDROCHLORIDE 10 MG/ML
10 INJECTION, SOLUTION EPIDURAL; INFILTRATION; INTRACAUDAL; PERINEURAL ONCE
Status: COMPLETED | OUTPATIENT
Start: 2019-01-01 | End: 2019-01-01

## 2019-01-01 RX ORDER — FUROSEMIDE 20 MG/1
20 TABLET ORAL DAILY
Status: DISCONTINUED | OUTPATIENT
Start: 2019-01-01 | End: 2019-01-01

## 2019-01-01 RX ORDER — GUAIFENESIN 100 MG/5ML
81 LIQUID (ML) ORAL DAILY
Status: DISCONTINUED | OUTPATIENT
Start: 2019-01-01 | End: 2019-01-01

## 2019-01-01 RX ORDER — FUROSEMIDE 20 MG/1
20 TABLET ORAL 2 TIMES DAILY
Status: DISCONTINUED | OUTPATIENT
Start: 2019-01-01 | End: 2019-01-01

## 2019-01-01 RX ORDER — NITROGLYCERIN 0.4 MG/1
0.4 TABLET SUBLINGUAL
Status: DISCONTINUED | OUTPATIENT
Start: 2019-01-01 | End: 2019-01-01 | Stop reason: HOSPADM

## 2019-01-01 RX ORDER — CLOPIDOGREL 300 MG/1
TABLET, FILM COATED ORAL AS NEEDED
Status: DISCONTINUED | OUTPATIENT
Start: 2019-01-01 | End: 2019-01-01 | Stop reason: HOSPADM

## 2019-01-01 RX ORDER — BUDESONIDE 0.5 MG/2ML
500 INHALANT ORAL
Status: DISCONTINUED | OUTPATIENT
Start: 2019-01-01 | End: 2019-01-01 | Stop reason: HOSPADM

## 2019-01-01 RX ORDER — PANTOPRAZOLE SODIUM 40 MG/1
40 TABLET, DELAYED RELEASE ORAL
Status: DISCONTINUED | OUTPATIENT
Start: 2019-01-01 | End: 2019-01-01 | Stop reason: HOSPADM

## 2019-01-01 RX ORDER — IODIXANOL 320 MG/ML
INJECTION, SOLUTION INTRAVASCULAR AS NEEDED
Status: DISCONTINUED | OUTPATIENT
Start: 2019-01-01 | End: 2019-01-01 | Stop reason: HOSPADM

## 2019-01-01 RX ORDER — GUAIFENESIN 100 MG/5ML
81 LIQUID (ML) ORAL DAILY
Status: DISCONTINUED | OUTPATIENT
Start: 2019-01-01 | End: 2019-01-01 | Stop reason: HOSPADM

## 2019-01-01 RX ORDER — IPRATROPIUM BROMIDE AND ALBUTEROL SULFATE 2.5; .5 MG/3ML; MG/3ML
3 SOLUTION RESPIRATORY (INHALATION)
Status: COMPLETED | OUTPATIENT
Start: 2019-01-01 | End: 2019-01-01

## 2019-01-01 RX ORDER — HEPARIN SODIUM 5000 [USP'U]/ML
5000 INJECTION, SOLUTION INTRAVENOUS; SUBCUTANEOUS EVERY 8 HOURS
Status: DISCONTINUED | OUTPATIENT
Start: 2019-01-01 | End: 2019-01-01 | Stop reason: HOSPADM

## 2019-01-01 RX ORDER — SODIUM CHLORIDE 0.9 % (FLUSH) 0.9 %
5-10 SYRINGE (ML) INJECTION AS NEEDED
Status: DISCONTINUED | OUTPATIENT
Start: 2019-01-01 | End: 2019-01-01 | Stop reason: SDUPTHER

## 2019-01-01 RX ORDER — ALBUMIN HUMAN 50 G/1000ML
12.5 SOLUTION INTRAVENOUS ONCE
Status: COMPLETED | OUTPATIENT
Start: 2019-01-01 | End: 2019-01-01

## 2019-01-01 RX ORDER — FUROSEMIDE 10 MG/ML
20 INJECTION INTRAMUSCULAR; INTRAVENOUS ONCE
Status: COMPLETED | OUTPATIENT
Start: 2019-01-01 | End: 2019-01-01

## 2019-01-01 RX ORDER — LISINOPRIL 5 MG/1
5 TABLET ORAL DAILY
Status: DISCONTINUED | OUTPATIENT
Start: 2019-01-01 | End: 2019-01-01 | Stop reason: HOSPADM

## 2019-01-01 RX ORDER — AMLODIPINE BESYLATE 5 MG/1
TABLET ORAL
Qty: 90 TAB | Refills: 3 | Status: SHIPPED | OUTPATIENT
Start: 2019-01-01 | End: 2019-01-01

## 2019-01-01 RX ORDER — TAMSULOSIN HYDROCHLORIDE 0.4 MG/1
0.4 CAPSULE ORAL DAILY
Status: DISCONTINUED | OUTPATIENT
Start: 2019-01-01 | End: 2019-01-01

## 2019-01-01 RX ORDER — ATORVASTATIN CALCIUM 40 MG/1
40 TABLET, FILM COATED ORAL
Status: DISCONTINUED | OUTPATIENT
Start: 2019-01-01 | End: 2019-01-01 | Stop reason: HOSPADM

## 2019-01-01 RX ORDER — LIDOCAINE HYDROCHLORIDE 10 MG/ML
5 INJECTION, SOLUTION EPIDURAL; INFILTRATION; INTRACAUDAL; PERINEURAL AS NEEDED
Status: DISCONTINUED | OUTPATIENT
Start: 2019-01-01 | End: 2019-01-01

## 2019-01-01 RX ORDER — AMOXICILLIN AND CLAVULANATE POTASSIUM 875; 125 MG/1; MG/1
1 TABLET, FILM COATED ORAL EVERY 12 HOURS
Status: CANCELLED | OUTPATIENT
Start: 2019-01-01 | End: 2019-01-01

## 2019-01-01 RX ORDER — ARFORMOTEROL TARTRATE 15 UG/2ML
15 SOLUTION RESPIRATORY (INHALATION)
Status: CANCELLED | OUTPATIENT
Start: 2019-01-01

## 2019-01-01 RX ORDER — CEFAZOLIN SODIUM 1 G/3ML
INJECTION, POWDER, FOR SOLUTION INTRAMUSCULAR; INTRAVENOUS
Status: DISCONTINUED
Start: 2019-01-01 | End: 2019-01-01 | Stop reason: WASHOUT

## 2019-01-01 RX ORDER — NITROGLYCERIN 0.4 MG/1
0.4 TABLET SUBLINGUAL
Qty: 1 BOTTLE | Refills: 0 | Status: SHIPPED | OUTPATIENT
Start: 2019-01-01 | End: 2019-01-01 | Stop reason: SDUPTHER

## 2019-01-01 RX ORDER — FUROSEMIDE 20 MG/1
20 TABLET ORAL DAILY
Status: DISCONTINUED | OUTPATIENT
Start: 2019-01-01 | End: 2019-01-01 | Stop reason: HOSPADM

## 2019-01-01 RX ORDER — BUDESONIDE AND FORMOTEROL FUMARATE DIHYDRATE 160; 4.5 UG/1; UG/1
2 AEROSOL RESPIRATORY (INHALATION)
Status: DISCONTINUED | OUTPATIENT
Start: 2019-01-01 | End: 2019-01-01

## 2019-01-01 RX ORDER — KETOROLAC TROMETHAMINE 30 MG/ML
15 INJECTION, SOLUTION INTRAMUSCULAR; INTRAVENOUS
Status: DISPENSED | OUTPATIENT
Start: 2019-01-01 | End: 2019-01-01

## 2019-01-01 RX ORDER — HEPARIN SODIUM 1000 [USP'U]/ML
INJECTION, SOLUTION INTRAVENOUS; SUBCUTANEOUS
Status: COMPLETED
Start: 2019-01-01 | End: 2019-01-01

## 2019-01-01 RX ORDER — IPRATROPIUM BROMIDE 0.5 MG/2.5ML
0.5 SOLUTION RESPIRATORY (INHALATION)
Status: DISCONTINUED | OUTPATIENT
Start: 2019-01-01 | End: 2019-01-01

## 2019-01-01 RX ORDER — FUROSEMIDE 10 MG/ML
10 INJECTION INTRAMUSCULAR; INTRAVENOUS ONCE
Status: COMPLETED | OUTPATIENT
Start: 2019-01-01 | End: 2019-01-01

## 2019-01-01 RX ORDER — CLOPIDOGREL BISULFATE 75 MG/1
TABLET ORAL
Qty: 90 TAB | Refills: 3 | Status: SHIPPED | OUTPATIENT
Start: 2019-01-01 | End: 2019-01-01 | Stop reason: SDUPTHER

## 2019-01-01 RX ORDER — DILTIAZEM HYDROCHLORIDE 60 MG/1
60 TABLET, FILM COATED ORAL EVERY 6 HOURS
Status: DISCONTINUED | OUTPATIENT
Start: 2019-01-01 | End: 2019-01-01

## 2019-01-01 RX ORDER — VANCOMYCIN HYDROCHLORIDE
1250 ONCE
Status: COMPLETED | OUTPATIENT
Start: 2019-01-01 | End: 2019-01-01

## 2019-01-01 RX ORDER — FUROSEMIDE 40 MG/1
40 TABLET ORAL DAILY
Status: DISCONTINUED | OUTPATIENT
Start: 2019-01-01 | End: 2019-01-01

## 2019-01-01 RX ORDER — NOREPINEPHRINE BITARTRATE/D5W 8 MG/250ML
2-16 PLASTIC BAG, INJECTION (ML) INTRAVENOUS
Status: DISCONTINUED | OUTPATIENT
Start: 2019-01-01 | End: 2019-01-01

## 2019-01-01 RX ORDER — ARFORMOTEROL TARTRATE 15 UG/2ML
15 SOLUTION RESPIRATORY (INHALATION)
Status: DISCONTINUED | OUTPATIENT
Start: 2019-01-01 | End: 2019-01-01 | Stop reason: HOSPADM

## 2019-01-01 RX ORDER — METOPROLOL TARTRATE 25 MG/1
12.5 TABLET, FILM COATED ORAL 2 TIMES DAILY
Status: DISCONTINUED | OUTPATIENT
Start: 2019-01-01 | End: 2019-01-01 | Stop reason: HOSPADM

## 2019-01-01 RX ORDER — IPRATROPIUM BROMIDE 0.5 MG/2.5ML
0.5 SOLUTION RESPIRATORY (INHALATION)
Status: DISCONTINUED | OUTPATIENT
Start: 2019-01-01 | End: 2019-01-01 | Stop reason: HOSPADM

## 2019-01-01 RX ORDER — LEVOFLOXACIN 5 MG/ML
750 INJECTION, SOLUTION INTRAVENOUS
Status: DISCONTINUED | OUTPATIENT
Start: 2019-01-01 | End: 2019-01-01 | Stop reason: SDUPTHER

## 2019-01-01 RX ORDER — HEPARIN SODIUM 1000 [USP'U]/ML
3000 INJECTION, SOLUTION INTRAVENOUS; SUBCUTANEOUS ONCE
Status: COMPLETED | OUTPATIENT
Start: 2019-01-01 | End: 2019-01-01

## 2019-01-01 RX ORDER — FENTANYL CITRATE 50 UG/ML
25 INJECTION, SOLUTION INTRAMUSCULAR; INTRAVENOUS ONCE
Status: COMPLETED | OUTPATIENT
Start: 2019-01-01 | End: 2019-01-01

## 2019-01-01 RX ORDER — VANCOMYCIN/0.9 % SOD CHLORIDE 1.5G/250ML
1500 PLASTIC BAG, INJECTION (ML) INTRAVENOUS ONCE
Status: COMPLETED | OUTPATIENT
Start: 2019-01-01 | End: 2019-01-01

## 2019-01-01 RX ORDER — ARFORMOTEROL TARTRATE 15 UG/2ML
15 SOLUTION RESPIRATORY (INHALATION)
Status: DISCONTINUED | OUTPATIENT
Start: 2019-01-01 | End: 2019-01-01

## 2019-01-01 RX ORDER — VANCOMYCIN/0.9 % SOD CHLORIDE 1 G/100 ML
1000 PLASTIC BAG, INJECTION (ML) INTRAVENOUS ONCE
Status: DISCONTINUED | OUTPATIENT
Start: 2019-01-01 | End: 2019-01-01 | Stop reason: DRUGHIGH

## 2019-01-01 RX ORDER — INSULIN GLARGINE 100 [IU]/ML
4 INJECTION, SOLUTION SUBCUTANEOUS DAILY
Status: DISCONTINUED | OUTPATIENT
Start: 2019-01-01 | End: 2019-01-01 | Stop reason: HOSPADM

## 2019-01-01 RX ORDER — DIGOXIN 125 MCG
0.12 TABLET ORAL
Qty: 12 TAB | Refills: 0 | Status: SHIPPED | OUTPATIENT
Start: 2019-01-01 | End: 2019-01-01 | Stop reason: SDUPTHER

## 2019-01-01 RX ORDER — GUAIFENESIN 100 MG/5ML
400 SOLUTION ORAL
Status: DISCONTINUED | OUTPATIENT
Start: 2019-01-01 | End: 2019-01-01

## 2019-01-01 RX ORDER — POTASSIUM CHLORIDE 1.5 G/1.77G
40 POWDER, FOR SOLUTION ORAL
Status: COMPLETED | OUTPATIENT
Start: 2019-01-01 | End: 2019-01-01

## 2019-01-01 RX ORDER — LIDOCAINE HYDROCHLORIDE 10 MG/ML
10 INJECTION INFILTRATION; PERINEURAL
Status: CANCELLED | OUTPATIENT
Start: 2019-01-01 | End: 2019-01-01

## 2019-01-01 RX ORDER — FUROSEMIDE 10 MG/ML
10 INJECTION INTRAMUSCULAR; INTRAVENOUS
Status: DISPENSED | OUTPATIENT
Start: 2019-01-01 | End: 2019-01-01

## 2019-01-01 RX ORDER — ADHESIVE BANDAGE
30 BANDAGE TOPICAL DAILY PRN
Status: DISCONTINUED | OUTPATIENT
Start: 2019-01-01 | End: 2019-01-01 | Stop reason: HOSPADM

## 2019-01-01 RX ORDER — BUDESONIDE AND FORMOTEROL FUMARATE DIHYDRATE 160; 4.5 UG/1; UG/1
2 AEROSOL RESPIRATORY (INHALATION) 2 TIMES DAILY
Qty: 1 INHALER | Refills: 5 | Status: SHIPPED | OUTPATIENT
Start: 2019-01-01

## 2019-01-01 RX ORDER — FUROSEMIDE 40 MG/1
20 TABLET ORAL 2 TIMES DAILY
Status: DISCONTINUED | OUTPATIENT
Start: 2019-01-01 | End: 2019-01-01 | Stop reason: HOSPADM

## 2019-01-01 RX ORDER — GUAIFENESIN 100 MG/5ML
400 SOLUTION ORAL 3 TIMES DAILY
Status: DISCONTINUED | OUTPATIENT
Start: 2019-01-01 | End: 2019-01-01 | Stop reason: HOSPADM

## 2019-01-01 RX ORDER — DEXTROSE MONOHYDRATE 50 MG/ML
25 INJECTION, SOLUTION INTRAVENOUS CONTINUOUS
Status: DISCONTINUED | OUTPATIENT
Start: 2019-01-01 | End: 2019-01-01 | Stop reason: HOSPADM

## 2019-01-01 RX ORDER — SODIUM CHLORIDE 0.9 % (FLUSH) 0.9 %
5-40 SYRINGE (ML) INJECTION EVERY 8 HOURS
Status: DISCONTINUED | OUTPATIENT
Start: 2019-01-01 | End: 2019-01-01 | Stop reason: SDUPTHER

## 2019-01-01 RX ORDER — TAMSULOSIN HYDROCHLORIDE 0.4 MG/1
0.4 CAPSULE ORAL DAILY
Status: DISCONTINUED | OUTPATIENT
Start: 2019-01-01 | End: 2019-01-01 | Stop reason: HOSPADM

## 2019-01-01 RX ORDER — UREA 10 %
2 LOTION (ML) TOPICAL 2 TIMES DAILY
Status: CANCELLED | OUTPATIENT
Start: 2019-01-01

## 2019-01-01 RX ORDER — LIDOCAINE HYDROCHLORIDE 10 MG/ML
20 INJECTION, SOLUTION EPIDURAL; INFILTRATION; INTRACAUDAL; PERINEURAL ONCE
Status: COMPLETED | OUTPATIENT
Start: 2019-01-01 | End: 2019-01-01

## 2019-01-01 RX ORDER — HEPARIN SODIUM 1000 [USP'U]/ML
INJECTION, SOLUTION INTRAVENOUS; SUBCUTANEOUS AS NEEDED
Status: DISCONTINUED | OUTPATIENT
Start: 2019-01-01 | End: 2019-01-01 | Stop reason: HOSPADM

## 2019-01-01 RX ORDER — FENTANYL CITRATE 50 UG/ML
INJECTION, SOLUTION INTRAMUSCULAR; INTRAVENOUS AS NEEDED
Status: DISCONTINUED | OUTPATIENT
Start: 2019-01-01 | End: 2019-01-01 | Stop reason: HOSPADM

## 2019-01-01 RX ORDER — NITROGLYCERIN 0.4 MG/1
0.4 TABLET SUBLINGUAL AS NEEDED
Status: DISCONTINUED | OUTPATIENT
Start: 2019-01-01 | End: 2019-01-01

## 2019-01-01 RX ORDER — VERAPAMIL HYDROCHLORIDE 2.5 MG/ML
INJECTION, SOLUTION INTRAVENOUS AS NEEDED
Status: DISCONTINUED | OUTPATIENT
Start: 2019-01-01 | End: 2019-01-01 | Stop reason: HOSPADM

## 2019-01-01 RX ORDER — UREA 10 %
2 LOTION (ML) TOPICAL ONCE
Status: COMPLETED | OUTPATIENT
Start: 2019-01-01 | End: 2019-01-01

## 2019-01-01 RX ORDER — FUROSEMIDE 40 MG/1
20 TABLET ORAL 2 TIMES DAILY
Status: CANCELLED | OUTPATIENT
Start: 2019-01-01 | End: 2019-01-01

## 2019-01-01 RX ORDER — ATORVASTATIN CALCIUM 40 MG/1
40 TABLET, FILM COATED ORAL
Qty: 30 TAB | Refills: 0 | Status: SHIPPED | OUTPATIENT
Start: 2019-01-01 | End: 2019-01-01 | Stop reason: SDUPTHER

## 2019-01-01 RX ORDER — BUDESONIDE 0.5 MG/2ML
500 INHALANT ORAL
Status: CANCELLED | OUTPATIENT
Start: 2019-01-01

## 2019-01-01 RX ORDER — SODIUM CHLORIDE 0.9 % (FLUSH) 0.9 %
5-10 SYRINGE (ML) INJECTION AS NEEDED
Status: DISCONTINUED | OUTPATIENT
Start: 2019-01-01 | End: 2019-01-01 | Stop reason: HOSPADM

## 2019-01-01 RX ORDER — UREA 10 %
2 LOTION (ML) TOPICAL 2 TIMES DAILY
Status: DISCONTINUED | OUTPATIENT
Start: 2019-01-01 | End: 2019-01-01 | Stop reason: HOSPADM

## 2019-01-01 RX ORDER — THERA TABS 400 MCG
1 TAB ORAL DAILY
Status: DISCONTINUED | OUTPATIENT
Start: 2019-01-01 | End: 2019-01-01

## 2019-01-01 RX ORDER — GUAIFENESIN 100 MG/5ML
81 LIQUID (ML) ORAL DAILY
Status: CANCELLED | OUTPATIENT
Start: 2019-01-01

## 2019-01-01 RX ORDER — THERA TABS 400 MCG
1 TAB ORAL DAILY
Status: DISCONTINUED | OUTPATIENT
Start: 2019-01-01 | End: 2019-01-01 | Stop reason: HOSPADM

## 2019-01-01 RX ORDER — FENTANYL CITRATE 50 UG/ML
25 INJECTION, SOLUTION INTRAMUSCULAR; INTRAVENOUS ONCE
Status: ACTIVE | OUTPATIENT
Start: 2019-01-01 | End: 2019-01-01

## 2019-01-01 RX ORDER — FUROSEMIDE 10 MG/ML
40 INJECTION INTRAMUSCULAR; INTRAVENOUS
Status: COMPLETED | OUTPATIENT
Start: 2019-01-01 | End: 2019-01-01

## 2019-01-01 RX ORDER — LIDOCAINE HYDROCHLORIDE 10 MG/ML
10 INJECTION INFILTRATION; PERINEURAL
Status: COMPLETED | OUTPATIENT
Start: 2019-01-01 | End: 2019-01-01

## 2019-01-01 RX ORDER — NITROGLYCERIN 400 UG/1
1 SPRAY ORAL
Status: ACTIVE | OUTPATIENT
Start: 2019-01-01 | End: 2019-01-01

## 2019-01-01 RX ORDER — BUDESONIDE AND FORMOTEROL FUMARATE DIHYDRATE 80; 4.5 UG/1; UG/1
2 AEROSOL RESPIRATORY (INHALATION) 2 TIMES DAILY
Status: DISCONTINUED | OUTPATIENT
Start: 2019-01-01 | End: 2019-01-01

## 2019-01-01 RX ORDER — NITROGLYCERIN 0.4 MG/1
0.4 TABLET SUBLINGUAL AS NEEDED
Status: CANCELLED | OUTPATIENT
Start: 2019-01-01

## 2019-01-01 RX ORDER — IPRATROPIUM BROMIDE AND ALBUTEROL SULFATE 2.5; .5 MG/3ML; MG/3ML
3 SOLUTION RESPIRATORY (INHALATION)
Status: CANCELLED | OUTPATIENT
Start: 2019-01-01

## 2019-01-01 RX ORDER — FACIAL-BODY WIPES
10 EACH TOPICAL DAILY PRN
Status: CANCELLED | OUTPATIENT
Start: 2019-01-01

## 2019-01-01 RX ORDER — SODIUM CHLORIDE 9 MG/ML
250 INJECTION, SOLUTION INTRAVENOUS ONCE
Status: DISPENSED | OUTPATIENT
Start: 2019-01-01 | End: 2019-01-01

## 2019-01-01 RX ORDER — HEPARIN SODIUM 10000 [USP'U]/100ML
12-25 INJECTION, SOLUTION INTRAVENOUS
Status: DISCONTINUED | OUTPATIENT
Start: 2019-01-01 | End: 2019-01-01

## 2019-01-01 RX ORDER — DIGOXIN 0.25 MG/ML
250 INJECTION INTRAMUSCULAR; INTRAVENOUS ONCE
Status: COMPLETED | OUTPATIENT
Start: 2019-01-01 | End: 2019-01-01

## 2019-01-01 RX ORDER — MIDODRINE HYDROCHLORIDE 2.5 MG/1
10 TABLET ORAL
Status: COMPLETED | OUTPATIENT
Start: 2019-01-01 | End: 2019-01-01

## 2019-01-01 RX ORDER — CLOPIDOGREL BISULFATE 75 MG/1
75 TABLET ORAL DAILY
Status: CANCELLED | OUTPATIENT
Start: 2019-01-01

## 2019-01-01 RX ORDER — FUROSEMIDE 20 MG/1
10 TABLET ORAL EVERY EVENING
Status: DISCONTINUED | OUTPATIENT
Start: 2019-01-01 | End: 2019-01-01 | Stop reason: HOSPADM

## 2019-01-01 RX ORDER — CEFAZOLIN SODIUM 2 G/50ML
2 SOLUTION INTRAVENOUS ONCE
Status: COMPLETED | OUTPATIENT
Start: 2019-01-01 | End: 2019-01-01

## 2019-01-01 RX ORDER — SODIUM CHLORIDE 9 MG/ML
INJECTION, SOLUTION INTRAVENOUS
Status: COMPLETED | OUTPATIENT
Start: 2019-01-01 | End: 2019-01-01

## 2019-01-01 RX ORDER — ALBUTEROL SULFATE 1.25 MG/3ML
1.25 SOLUTION RESPIRATORY (INHALATION)
Status: DISCONTINUED | OUTPATIENT
Start: 2019-01-01 | End: 2019-01-01 | Stop reason: HOSPADM

## 2019-01-01 RX ORDER — FAMOTIDINE 20 MG/1
20 TABLET, FILM COATED ORAL DAILY
Status: CANCELLED | OUTPATIENT
Start: 2019-01-01

## 2019-01-01 RX ORDER — DIGOXIN 0.25 MG/ML
250 INJECTION INTRAMUSCULAR; INTRAVENOUS
Status: COMPLETED | OUTPATIENT
Start: 2019-01-01 | End: 2019-01-01

## 2019-01-01 RX ORDER — ALBUTEROL SULFATE 90 UG/1
2 AEROSOL, METERED RESPIRATORY (INHALATION)
Status: CANCELLED | OUTPATIENT
Start: 2019-01-01

## 2019-01-01 RX ORDER — ACETAMINOPHEN 325 MG/1
650 TABLET ORAL
Status: DISCONTINUED | OUTPATIENT
Start: 2019-01-01 | End: 2019-01-01 | Stop reason: HOSPADM

## 2019-01-01 RX ORDER — PREDNISONE 20 MG/1
20 TABLET ORAL
Status: COMPLETED | OUTPATIENT
Start: 2019-01-01 | End: 2019-01-01

## 2019-01-01 RX ORDER — GUAIFENESIN 100 MG/5ML
81 LIQUID (ML) ORAL DAILY
Qty: 30 TAB | Refills: 1 | Status: SHIPPED
Start: 2019-01-01 | End: 2019-01-01

## 2019-01-01 RX ORDER — LEVOFLOXACIN 5 MG/ML
750 INJECTION, SOLUTION INTRAVENOUS EVERY 24 HOURS
Status: DISCONTINUED | OUTPATIENT
Start: 2019-01-01 | End: 2019-01-01 | Stop reason: DRUGHIGH

## 2019-01-01 RX ORDER — MIDODRINE HYDROCHLORIDE 10 MG/1
10 TABLET ORAL
Status: DISCONTINUED | OUTPATIENT
Start: 2019-01-01 | End: 2019-01-01

## 2019-01-01 RX ORDER — FACIAL-BODY WIPES
10 EACH TOPICAL DAILY PRN
Status: DISCONTINUED | OUTPATIENT
Start: 2019-01-01 | End: 2019-01-01

## 2019-01-01 RX ORDER — LISINOPRIL 5 MG/1
5 TABLET ORAL DAILY
Qty: 30 TAB | Refills: 0 | Status: SHIPPED | OUTPATIENT
Start: 2019-01-01 | End: 2019-01-01

## 2019-01-01 RX ORDER — MIDODRINE HYDROCHLORIDE 2.5 MG/1
5 TABLET ORAL
Status: DISCONTINUED | OUTPATIENT
Start: 2019-01-01 | End: 2019-01-01

## 2019-01-01 RX ORDER — LEVOFLOXACIN 750 MG/1
750 TABLET ORAL
Status: DISCONTINUED | OUTPATIENT
Start: 2019-01-01 | End: 2019-01-01

## 2019-01-01 RX ORDER — MIDODRINE HYDROCHLORIDE 10 MG/1
10 TABLET ORAL
Status: CANCELLED | OUTPATIENT
Start: 2019-01-01

## 2019-01-01 RX ORDER — METOPROLOL TARTRATE 25 MG/1
12.5 TABLET, FILM COATED ORAL 2 TIMES DAILY
Qty: 30 TAB | Refills: 0 | Status: SHIPPED | OUTPATIENT
Start: 2019-01-01 | End: 2019-01-01 | Stop reason: SDUPTHER

## 2019-01-01 RX ORDER — POTASSIUM CHLORIDE 20 MEQ/1
40 TABLET, EXTENDED RELEASE ORAL EVERY 4 HOURS
Status: COMPLETED | OUTPATIENT
Start: 2019-01-01 | End: 2019-01-01

## 2019-01-01 RX ORDER — SODIUM CHLORIDE 0.9 % (FLUSH) 0.9 %
5-40 SYRINGE (ML) INJECTION AS NEEDED
Status: DISCONTINUED | OUTPATIENT
Start: 2019-01-01 | End: 2019-01-01 | Stop reason: SDUPTHER

## 2019-01-01 RX ORDER — BUDESONIDE AND FORMOTEROL FUMARATE DIHYDRATE 80; 4.5 UG/1; UG/1
2 AEROSOL RESPIRATORY (INHALATION)
Status: DISCONTINUED | OUTPATIENT
Start: 2019-01-01 | End: 2019-01-01 | Stop reason: HOSPADM

## 2019-01-01 RX ORDER — DILTIAZEM HYDROCHLORIDE 5 MG/ML
10 INJECTION INTRAVENOUS
Status: COMPLETED | OUTPATIENT
Start: 2019-01-01 | End: 2019-01-01

## 2019-01-01 RX ORDER — POTASSIUM CHLORIDE 750 MG/1
20 TABLET, FILM COATED, EXTENDED RELEASE ORAL
Status: DISCONTINUED | OUTPATIENT
Start: 2019-01-01 | End: 2019-01-01

## 2019-01-01 RX ORDER — ASPIRIN 325 MG
325 TABLET, DELAYED RELEASE (ENTERIC COATED) ORAL DAILY
Status: DISCONTINUED | OUTPATIENT
Start: 2019-01-01 | End: 2019-01-01

## 2019-01-01 RX ORDER — POTASSIUM CHLORIDE 750 MG/1
20 TABLET, FILM COATED, EXTENDED RELEASE ORAL DAILY
Status: DISCONTINUED | OUTPATIENT
Start: 2019-01-01 | End: 2019-01-01 | Stop reason: SDUPTHER

## 2019-01-01 RX ORDER — INSULIN GLARGINE 100 [IU]/ML
5 INJECTION, SOLUTION SUBCUTANEOUS DAILY
Status: DISCONTINUED | OUTPATIENT
Start: 2019-01-01 | End: 2019-01-01

## 2019-01-01 RX ORDER — SPIRONOLACTONE 25 MG/1
12.5 TABLET ORAL DAILY
Status: DISCONTINUED | OUTPATIENT
Start: 2019-01-01 | End: 2019-01-01

## 2019-01-01 RX ORDER — DEXTROSE MONOHYDRATE 25 G/50ML
25-50 INJECTION, SOLUTION INTRAVENOUS AS NEEDED
Status: DISCONTINUED | OUTPATIENT
Start: 2019-01-01 | End: 2019-01-01 | Stop reason: HOSPADM

## 2019-01-01 RX ORDER — IPRATROPIUM BROMIDE AND ALBUTEROL SULFATE 2.5; .5 MG/3ML; MG/3ML
3 SOLUTION RESPIRATORY (INHALATION)
Qty: 30 NEBULE | Refills: 1 | Status: SHIPPED
Start: 2019-01-01 | End: 2019-01-01 | Stop reason: SDUPTHER

## 2019-01-01 RX ORDER — ATORVASTATIN CALCIUM 40 MG/1
40 TABLET, FILM COATED ORAL
Status: DISCONTINUED | OUTPATIENT
Start: 2019-01-01 | End: 2019-01-01

## 2019-01-01 RX ORDER — ASPIRIN 81 MG/1
81 TABLET ORAL DAILY
Status: DISCONTINUED | OUTPATIENT
Start: 2019-01-01 | End: 2019-01-01 | Stop reason: HOSPADM

## 2019-01-01 RX ORDER — FLUTICASONE PROPIONATE AND SALMETEROL 100; 50 UG/1; UG/1
1 POWDER RESPIRATORY (INHALATION) DAILY
Qty: 3 INHALER | Refills: 3 | Status: SHIPPED | OUTPATIENT
Start: 2019-01-01 | End: 2019-01-01 | Stop reason: ALTCHOICE

## 2019-01-01 RX ORDER — POTASSIUM CHLORIDE 750 MG/1
10 TABLET, FILM COATED, EXTENDED RELEASE ORAL DAILY
Status: DISCONTINUED | OUTPATIENT
Start: 2019-01-01 | End: 2019-01-01

## 2019-01-01 RX ORDER — POTASSIUM CHLORIDE 750 MG/1
10 TABLET, FILM COATED, EXTENDED RELEASE ORAL
Status: COMPLETED | OUTPATIENT
Start: 2019-01-01 | End: 2019-01-01

## 2019-01-01 RX ORDER — MIDAZOLAM HYDROCHLORIDE 1 MG/ML
INJECTION, SOLUTION INTRAMUSCULAR; INTRAVENOUS AS NEEDED
Status: DISCONTINUED | OUTPATIENT
Start: 2019-01-01 | End: 2019-01-01 | Stop reason: HOSPADM

## 2019-01-01 RX ORDER — AMOXICILLIN AND CLAVULANATE POTASSIUM 875; 125 MG/1; MG/1
1 TABLET, FILM COATED ORAL EVERY 12 HOURS
Status: DISCONTINUED | OUTPATIENT
Start: 2019-01-01 | End: 2019-01-01

## 2019-01-01 RX ORDER — VANCOMYCIN/0.9 % SOD CHLORIDE 1 G/100 ML
1000 PLASTIC BAG, INJECTION (ML) INTRAVENOUS EVERY 24 HOURS
Status: DISCONTINUED | OUTPATIENT
Start: 2019-01-01 | End: 2019-01-01

## 2019-01-01 RX ORDER — THERA TABS 400 MCG
1 TAB ORAL DAILY
Status: CANCELLED | OUTPATIENT
Start: 2019-01-01

## 2019-01-01 RX ORDER — AMOXICILLIN AND CLAVULANATE POTASSIUM 875; 125 MG/1; MG/1
1 TABLET, FILM COATED ORAL EVERY 12 HOURS
Status: DISCONTINUED | OUTPATIENT
Start: 2019-01-01 | End: 2019-01-01 | Stop reason: HOSPADM

## 2019-01-01 RX ORDER — IPRATROPIUM BROMIDE AND ALBUTEROL SULFATE 2.5; .5 MG/3ML; MG/3ML
SOLUTION RESPIRATORY (INHALATION)
Status: COMPLETED
Start: 2019-01-01 | End: 2019-01-01

## 2019-01-01 RX ORDER — SODIUM CHLORIDE 9 MG/ML
250 INJECTION, SOLUTION INTRAVENOUS AS NEEDED
Status: DISCONTINUED | OUTPATIENT
Start: 2019-01-01 | End: 2019-01-01 | Stop reason: ALTCHOICE

## 2019-01-01 RX ORDER — CEFPODOXIME PROXETIL 200 MG/1
200 TABLET, FILM COATED ORAL 2 TIMES DAILY
Qty: 4 TAB | Refills: 0 | Status: SHIPPED | OUTPATIENT
Start: 2019-01-01 | End: 2019-01-01 | Stop reason: SDUPTHER

## 2019-01-01 RX ORDER — ATORVASTATIN CALCIUM 40 MG/1
40 TABLET, FILM COATED ORAL
Status: CANCELLED | OUTPATIENT
Start: 2019-01-01

## 2019-01-01 RX ORDER — FUROSEMIDE 20 MG/1
20 TABLET ORAL DAILY
Qty: 30 TAB | Refills: 1 | Status: SHIPPED
Start: 2019-01-01 | End: 2019-01-01 | Stop reason: SDUPTHER

## 2019-01-01 RX ORDER — CEFPODOXIME PROXETIL 200 MG/1
200 TABLET, FILM COATED ORAL DAILY
Qty: 2 TAB | Refills: 0 | Status: SHIPPED
Start: 2019-01-01 | End: 2019-01-01

## 2019-01-01 RX ORDER — FUROSEMIDE 20 MG/1
20 TABLET ORAL
Status: DISCONTINUED | OUTPATIENT
Start: 2019-01-01 | End: 2019-01-01

## 2019-01-01 RX ORDER — POTASSIUM CHLORIDE 750 MG/1
10 TABLET, FILM COATED, EXTENDED RELEASE ORAL DAILY
Qty: 15 TAB | Refills: 0 | Status: SHIPPED | OUTPATIENT
Start: 2019-01-01 | End: 2019-01-01

## 2019-01-01 RX ORDER — IPRATROPIUM BROMIDE AND ALBUTEROL SULFATE 2.5; .5 MG/3ML; MG/3ML
3 SOLUTION RESPIRATORY (INHALATION)
Status: DISCONTINUED | OUTPATIENT
Start: 2019-01-01 | End: 2019-01-01 | Stop reason: SDUPTHER

## 2019-01-01 RX ORDER — FUROSEMIDE 20 MG/1
10 TABLET ORAL DAILY
Qty: 30 TAB | Refills: 1 | Status: SHIPPED
Start: 2019-01-01 | End: 2019-01-01 | Stop reason: SDUPTHER

## 2019-01-01 RX ORDER — DEXTROSE MONOHYDRATE 50 MG/ML
50 INJECTION, SOLUTION INTRAVENOUS CONTINUOUS
Status: DISCONTINUED | OUTPATIENT
Start: 2019-01-01 | End: 2019-01-01

## 2019-01-01 RX ORDER — THERA TABS 400 MCG
1 TAB ORAL DAILY
Qty: 30 TAB | Refills: 1 | Status: SHIPPED
Start: 2019-01-01 | End: 2019-01-01 | Stop reason: SDUPTHER

## 2019-01-01 RX ORDER — SIMVASTATIN 40 MG/1
TABLET, FILM COATED ORAL
Qty: 90 TAB | Refills: 3 | Status: SHIPPED | OUTPATIENT
Start: 2019-01-01 | End: 2019-01-01

## 2019-01-01 RX ORDER — ADHESIVE BANDAGE
30 BANDAGE TOPICAL DAILY PRN
Status: CANCELLED | OUTPATIENT
Start: 2019-01-01

## 2019-01-01 RX ORDER — BUDESONIDE AND FORMOTEROL FUMARATE DIHYDRATE 80; 4.5 UG/1; UG/1
2 AEROSOL RESPIRATORY (INHALATION) 2 TIMES DAILY
Qty: 1 INHALER | Refills: 1 | Status: SHIPPED
Start: 2019-01-01 | End: 2019-01-01 | Stop reason: SDUPTHER

## 2019-01-01 RX ORDER — ONDANSETRON 2 MG/ML
4 INJECTION INTRAMUSCULAR; INTRAVENOUS
Status: DISCONTINUED | OUTPATIENT
Start: 2019-01-01 | End: 2019-01-01 | Stop reason: HOSPADM

## 2019-01-01 RX ORDER — DIGOXIN 125 MCG
0.12 TABLET ORAL DAILY
Status: DISCONTINUED | OUTPATIENT
Start: 2019-01-01 | End: 2019-01-01

## 2019-01-01 RX ORDER — MAGNESIUM SULFATE 100 %
4 CRYSTALS MISCELLANEOUS AS NEEDED
Status: DISCONTINUED | OUTPATIENT
Start: 2019-01-01 | End: 2019-01-01 | Stop reason: HOSPADM

## 2019-01-01 RX ORDER — DIGOXIN 125 MCG
0.12 TABLET ORAL
Status: DISCONTINUED | OUTPATIENT
Start: 2019-01-01 | End: 2019-01-01 | Stop reason: HOSPADM

## 2019-01-01 RX ORDER — POTASSIUM CHLORIDE 1.5 G/1.77G
20 POWDER, FOR SOLUTION ORAL
Status: COMPLETED | OUTPATIENT
Start: 2019-01-01 | End: 2019-01-01

## 2019-01-01 RX ORDER — POTASSIUM CHLORIDE 20 MEQ/1
20 TABLET, EXTENDED RELEASE ORAL ONCE
Status: COMPLETED | OUTPATIENT
Start: 2019-01-01 | End: 2019-01-01

## 2019-01-01 RX ORDER — INSULIN LISPRO 100 [IU]/ML
INJECTION, SOLUTION INTRAVENOUS; SUBCUTANEOUS
Status: DISCONTINUED | OUTPATIENT
Start: 2019-01-01 | End: 2019-01-01 | Stop reason: HOSPADM

## 2019-01-01 RX ORDER — NITROGLYCERIN 0.4 MG/1
0.4 TABLET SUBLINGUAL AS NEEDED
Status: DISCONTINUED | OUTPATIENT
Start: 2019-01-01 | End: 2019-01-01 | Stop reason: HOSPADM

## 2019-01-01 RX ORDER — MIDODRINE HYDROCHLORIDE 5 MG/1
10 TABLET ORAL
Status: DISCONTINUED | OUTPATIENT
Start: 2019-01-01 | End: 2019-01-01 | Stop reason: HOSPADM

## 2019-01-01 RX ORDER — ADHESIVE BANDAGE
30 BANDAGE TOPICAL DAILY PRN
Status: DISCONTINUED | OUTPATIENT
Start: 2019-01-01 | End: 2019-01-01

## 2019-01-01 RX ORDER — ALPRAZOLAM 0.5 MG/1
0.5 TABLET ORAL
Status: DISCONTINUED | OUTPATIENT
Start: 2019-01-01 | End: 2019-01-01 | Stop reason: HOSPADM

## 2019-01-01 RX ORDER — DOCUSATE SODIUM 100 MG/1
100 CAPSULE, LIQUID FILLED ORAL 2 TIMES DAILY
Status: DISCONTINUED | OUTPATIENT
Start: 2019-01-01 | End: 2019-01-01 | Stop reason: HOSPADM

## 2019-01-01 RX ORDER — TAMSULOSIN HYDROCHLORIDE 0.4 MG/1
0.4 CAPSULE ORAL DAILY
Status: CANCELLED | OUTPATIENT
Start: 2019-01-01

## 2019-01-01 RX ORDER — SODIUM CHLORIDE 450 MG/100ML
150 INJECTION, SOLUTION INTRAVENOUS CONTINUOUS
Status: DISPENSED | OUTPATIENT
Start: 2019-01-01 | End: 2019-01-01

## 2019-01-01 RX ORDER — TAMSULOSIN HYDROCHLORIDE 0.4 MG/1
0.4 CAPSULE ORAL DAILY
Qty: 30 CAP | Refills: 1 | Status: SHIPPED
Start: 2019-01-01 | End: 2019-01-01

## 2019-01-01 RX ORDER — FUROSEMIDE 40 MG/1
20 TABLET ORAL DAILY
Status: CANCELLED | OUTPATIENT
Start: 2019-01-01

## 2019-01-01 RX ORDER — METOPROLOL TARTRATE 25 MG/1
12.5 TABLET, FILM COATED ORAL EVERY 12 HOURS
Status: DISCONTINUED | OUTPATIENT
Start: 2019-01-01 | End: 2019-01-01

## 2019-01-01 RX ORDER — BUDESONIDE AND FORMOTEROL FUMARATE DIHYDRATE 160; 4.5 UG/1; UG/1
2 AEROSOL RESPIRATORY (INHALATION) 2 TIMES DAILY
Status: DISCONTINUED | OUTPATIENT
Start: 2019-01-01 | End: 2019-01-01

## 2019-01-01 RX ORDER — CARVEDILOL 3.12 MG/1
3.12 TABLET ORAL 2 TIMES DAILY WITH MEALS
Qty: 60 TAB | Refills: 0 | Status: SHIPPED | OUTPATIENT
Start: 2019-01-01 | End: 2019-01-01

## 2019-01-01 RX ORDER — MIDODRINE HYDROCHLORIDE 10 MG/1
10 TABLET ORAL
Qty: 90 TAB | Refills: 0 | Status: SHIPPED
Start: 2019-01-01 | End: 2019-01-01

## 2019-01-01 RX ORDER — FUROSEMIDE 20 MG/1
10 TABLET ORAL DAILY
Status: DISCONTINUED | OUTPATIENT
Start: 2019-01-01 | End: 2019-01-01 | Stop reason: HOSPADM

## 2019-01-01 RX ORDER — METOPROLOL TARTRATE 5 MG/5ML
2.5 INJECTION INTRAVENOUS
Status: DISCONTINUED | OUTPATIENT
Start: 2019-01-01 | End: 2019-01-01 | Stop reason: HOSPADM

## 2019-01-01 RX ORDER — IPRATROPIUM BROMIDE 0.5 MG/2.5ML
0.5 SOLUTION RESPIRATORY (INHALATION)
Status: CANCELLED | OUTPATIENT
Start: 2019-01-01

## 2019-01-01 RX ORDER — FAMOTIDINE 20 MG/1
20 TABLET, FILM COATED ORAL 2 TIMES DAILY
Status: DISCONTINUED | OUTPATIENT
Start: 2019-01-01 | End: 2019-01-01 | Stop reason: DRUGHIGH

## 2019-01-01 RX ORDER — LIDOCAINE HYDROCHLORIDE 10 MG/ML
10 INJECTION, SOLUTION EPIDURAL; INFILTRATION; INTRACAUDAL; PERINEURAL ONCE
Status: DISPENSED | OUTPATIENT
Start: 2019-01-01 | End: 2019-01-01

## 2019-01-01 RX ORDER — DIGOXIN 0.25 MG/ML
125 INJECTION INTRAMUSCULAR; INTRAVENOUS ONCE
Status: DISCONTINUED | OUTPATIENT
Start: 2019-01-01 | End: 2019-01-01

## 2019-01-01 RX ORDER — ALPRAZOLAM 0.5 MG/1
0.5 TABLET, EXTENDED RELEASE ORAL
Status: DISCONTINUED | OUTPATIENT
Start: 2019-01-01 | End: 2019-01-01 | Stop reason: SDUPTHER

## 2019-01-01 RX ORDER — IPRATROPIUM BROMIDE AND ALBUTEROL SULFATE 2.5; .5 MG/3ML; MG/3ML
3 SOLUTION RESPIRATORY (INHALATION)
Qty: 30 NEBULE | Refills: 1 | Status: SHIPPED | OUTPATIENT
Start: 2019-01-01 | End: 2019-01-01

## 2019-01-01 RX ADMIN — Medication 750 MG: at 18:24

## 2019-01-01 RX ADMIN — Medication 1 TABLET: at 08:52

## 2019-01-01 RX ADMIN — IPRATROPIUM BROMIDE 0.5 MG: 0.5 SOLUTION RESPIRATORY (INHALATION) at 14:12

## 2019-01-01 RX ADMIN — FAMOTIDINE 20 MG: 20 TABLET ORAL at 10:31

## 2019-01-01 RX ADMIN — MIDODRINE HYDROCHLORIDE 10 MG: 10 TABLET ORAL at 17:05

## 2019-01-01 RX ADMIN — HEPARIN SODIUM 5000 UNITS: 5000 INJECTION INTRAVENOUS; SUBCUTANEOUS at 22:10

## 2019-01-01 RX ADMIN — ARFORMOTEROL TARTRATE 15 MCG: 15 SOLUTION RESPIRATORY (INHALATION) at 20:19

## 2019-01-01 RX ADMIN — IPRATROPIUM BROMIDE AND ALBUTEROL SULFATE 3 ML: .5; 3 SOLUTION RESPIRATORY (INHALATION) at 21:06

## 2019-01-01 RX ADMIN — METOPROLOL TARTRATE 2.5 MG: 5 INJECTION INTRAVENOUS at 09:34

## 2019-01-01 RX ADMIN — MIDODRINE HYDROCHLORIDE 10 MG: 10 TABLET ORAL at 12:11

## 2019-01-01 RX ADMIN — IPRATROPIUM BROMIDE 0.5 MG: 0.5 SOLUTION RESPIRATORY (INHALATION) at 02:19

## 2019-01-01 RX ADMIN — ATORVASTATIN CALCIUM 40 MG: 40 TABLET, FILM COATED ORAL at 21:26

## 2019-01-01 RX ADMIN — IPRATROPIUM BROMIDE 0.5 MG: 0.5 SOLUTION RESPIRATORY (INHALATION) at 02:00

## 2019-01-01 RX ADMIN — FUROSEMIDE 40 MG: 10 INJECTION, SOLUTION INTRAMUSCULAR; INTRAVENOUS at 08:39

## 2019-01-01 RX ADMIN — PIPERACILLIN SODIUM,TAZOBACTAM SODIUM 3.38 G: 3; .375 INJECTION, POWDER, FOR SOLUTION INTRAVENOUS at 20:40

## 2019-01-01 RX ADMIN — Medication 2 TABLET: at 09:24

## 2019-01-01 RX ADMIN — HEPARIN SODIUM 5000 UNITS: 5000 INJECTION INTRAVENOUS; SUBCUTANEOUS at 22:06

## 2019-01-01 RX ADMIN — DILTIAZEM HYDROCHLORIDE 60 MG: 60 TABLET, FILM COATED ORAL at 01:44

## 2019-01-01 RX ADMIN — MIDODRINE HYDROCHLORIDE 10 MG: 10 TABLET ORAL at 12:14

## 2019-01-01 RX ADMIN — METHYLPREDNISOLONE SODIUM SUCCINATE 40 MG: 40 INJECTION, POWDER, FOR SOLUTION INTRAMUSCULAR; INTRAVENOUS at 20:32

## 2019-01-01 RX ADMIN — FAMOTIDINE 20 MG: 20 TABLET ORAL at 08:58

## 2019-01-01 RX ADMIN — CLOPIDOGREL BISULFATE 75 MG: 75 TABLET, FILM COATED ORAL at 09:05

## 2019-01-01 RX ADMIN — MIDODRINE HYDROCHLORIDE 10 MG: 10 TABLET ORAL at 09:50

## 2019-01-01 RX ADMIN — Medication 10 ML: at 13:00

## 2019-01-01 RX ADMIN — SPIRONOLACTONE 12.5 MG: 25 TABLET ORAL at 10:30

## 2019-01-01 RX ADMIN — HEPARIN SODIUM 5000 UNITS: 5000 INJECTION INTRAVENOUS; SUBCUTANEOUS at 06:12

## 2019-01-01 RX ADMIN — MIDODRINE HYDROCHLORIDE 10 MG: 5 TABLET ORAL at 09:03

## 2019-01-01 RX ADMIN — MIDODRINE HYDROCHLORIDE 5 MG: 10 TABLET ORAL at 08:47

## 2019-01-01 RX ADMIN — BUDESONIDE 500 MCG: 0.5 INHALANT RESPIRATORY (INHALATION) at 14:11

## 2019-01-01 RX ADMIN — IPRATROPIUM BROMIDE AND ALBUTEROL SULFATE 3 ML: .5; 3 SOLUTION RESPIRATORY (INHALATION) at 20:09

## 2019-01-01 RX ADMIN — Medication 10 ML: at 17:52

## 2019-01-01 RX ADMIN — HEPARIN SODIUM 5000 UNITS: 5000 INJECTION INTRAVENOUS; SUBCUTANEOUS at 11:16

## 2019-01-01 RX ADMIN — TAMSULOSIN HYDROCHLORIDE 0.4 MG: 0.4 CAPSULE ORAL at 08:20

## 2019-01-01 RX ADMIN — CLOPIDOGREL BISULFATE 75 MG: 75 TABLET, FILM COATED ORAL at 09:42

## 2019-01-01 RX ADMIN — HEPARIN SODIUM 5000 UNITS: 5000 INJECTION INTRAVENOUS; SUBCUTANEOUS at 22:18

## 2019-01-01 RX ADMIN — HEPARIN SODIUM 5000 UNITS: 5000 INJECTION INTRAVENOUS; SUBCUTANEOUS at 13:32

## 2019-01-01 RX ADMIN — INSULIN GLARGINE 4 UNITS: 100 INJECTION, SOLUTION SUBCUTANEOUS at 08:32

## 2019-01-01 RX ADMIN — ARFORMOTEROL TARTRATE 15 MCG: 15 SOLUTION RESPIRATORY (INHALATION) at 07:56

## 2019-01-01 RX ADMIN — PIPERACILLIN SODIUM AND TAZOBACTAM SODIUM 3.38 G: 36; 4.5 INJECTION, POWDER, FOR SOLUTION INTRAVENOUS at 06:45

## 2019-01-01 RX ADMIN — PIPERACILLIN SODIUM,TAZOBACTAM SODIUM 4.5 G: 4; .5 INJECTION, POWDER, FOR SOLUTION INTRAVENOUS at 22:14

## 2019-01-01 RX ADMIN — FUROSEMIDE 40 MG: 40 TABLET ORAL at 08:19

## 2019-01-01 RX ADMIN — MIDODRINE HYDROCHLORIDE 10 MG: 10 TABLET ORAL at 17:53

## 2019-01-01 RX ADMIN — LISINOPRIL 5 MG: 5 TABLET ORAL at 08:02

## 2019-01-01 RX ADMIN — FUROSEMIDE 10 MG: 10 INJECTION, SOLUTION INTRAMUSCULAR; INTRAVENOUS at 12:43

## 2019-01-01 RX ADMIN — CLOPIDOGREL BISULFATE 75 MG: 75 TABLET, FILM COATED ORAL at 09:29

## 2019-01-01 RX ADMIN — THERA TABS 1 TABLET: TAB at 09:12

## 2019-01-01 RX ADMIN — ACETAMINOPHEN 650 MG: 325 TABLET, FILM COATED ORAL at 22:24

## 2019-01-01 RX ADMIN — Medication 2 TABLET: at 17:23

## 2019-01-01 RX ADMIN — IPRATROPIUM BROMIDE 0.5 MG: 0.5 SOLUTION RESPIRATORY (INHALATION) at 19:54

## 2019-01-01 RX ADMIN — IPRATROPIUM BROMIDE AND ALBUTEROL SULFATE 3 ML: .5; 3 SOLUTION RESPIRATORY (INHALATION) at 17:36

## 2019-01-01 RX ADMIN — PIPERACILLIN SODIUM,TAZOBACTAM SODIUM 4.5 G: 4; .5 INJECTION, POWDER, FOR SOLUTION INTRAVENOUS at 17:57

## 2019-01-01 RX ADMIN — POTASSIUM CHLORIDE 10 MEQ: 10 TABLET, EXTENDED RELEASE ORAL at 08:36

## 2019-01-01 RX ADMIN — BUDESONIDE AND FORMOTEROL FUMARATE DIHYDRATE 2 PUFF: 160; 4.5 AEROSOL RESPIRATORY (INHALATION) at 20:14

## 2019-01-01 RX ADMIN — HEPARIN SODIUM 5000 UNITS: 5000 INJECTION INTRAVENOUS; SUBCUTANEOUS at 13:04

## 2019-01-01 RX ADMIN — BUDESONIDE 500 MCG: 0.5 INHALANT RESPIRATORY (INHALATION) at 07:52

## 2019-01-01 RX ADMIN — WATER 2 G: 1 INJECTION INTRAMUSCULAR; INTRAVENOUS; SUBCUTANEOUS at 06:15

## 2019-01-01 RX ADMIN — Medication 10 ML: at 05:16

## 2019-01-01 RX ADMIN — ATORVASTATIN CALCIUM 40 MG: 40 TABLET, FILM COATED ORAL at 21:00

## 2019-01-01 RX ADMIN — IPRATROPIUM BROMIDE 0.5 MG: 0.5 SOLUTION RESPIRATORY (INHALATION) at 13:25

## 2019-01-01 RX ADMIN — IPRATROPIUM BROMIDE 0.5 MG: 0.5 SOLUTION RESPIRATORY (INHALATION) at 09:03

## 2019-01-01 RX ADMIN — HEPARIN SODIUM 5000 UNITS: 5000 INJECTION INTRAVENOUS; SUBCUTANEOUS at 21:38

## 2019-01-01 RX ADMIN — Medication 750 MG: at 21:00

## 2019-01-01 RX ADMIN — AMOXICILLIN AND CLAVULANATE POTASSIUM 1 TABLET: 875; 125 TABLET, FILM COATED ORAL at 20:46

## 2019-01-01 RX ADMIN — ASPIRIN 81 MG 81 MG: 81 TABLET ORAL at 09:16

## 2019-01-01 RX ADMIN — METHYLPREDNISOLONE SODIUM SUCCINATE 20 MG: 40 INJECTION, POWDER, FOR SOLUTION INTRAMUSCULAR; INTRAVENOUS at 08:59

## 2019-01-01 RX ADMIN — DIGOXIN 250 MCG: 0.25 INJECTION INTRAMUSCULAR; INTRAVENOUS at 07:50

## 2019-01-01 RX ADMIN — FAMOTIDINE 20 MG: 20 TABLET ORAL at 08:26

## 2019-01-01 RX ADMIN — THERA TABS 1 TABLET: TAB at 10:13

## 2019-01-01 RX ADMIN — Medication 2 TABLET: at 17:22

## 2019-01-01 RX ADMIN — ARFORMOTEROL TARTRATE 15 MCG: 15 SOLUTION RESPIRATORY (INHALATION) at 08:43

## 2019-01-01 RX ADMIN — HEPARIN SODIUM 5000 UNITS: 5000 INJECTION INTRAVENOUS; SUBCUTANEOUS at 14:21

## 2019-01-01 RX ADMIN — FUROSEMIDE 20 MG: 20 TABLET ORAL at 08:53

## 2019-01-01 RX ADMIN — ARFORMOTEROL TARTRATE 15 MCG: 15 SOLUTION RESPIRATORY (INHALATION) at 21:06

## 2019-01-01 RX ADMIN — DOCUSATE SODIUM 100 MG: 100 CAPSULE, LIQUID FILLED ORAL at 09:21

## 2019-01-01 RX ADMIN — FAMOTIDINE 20 MG: 20 TABLET ORAL at 09:08

## 2019-01-01 RX ADMIN — MIDODRINE HYDROCHLORIDE 10 MG: 10 TABLET ORAL at 13:00

## 2019-01-01 RX ADMIN — INSULIN GLARGINE 5 UNITS: 100 INJECTION, SOLUTION SUBCUTANEOUS at 14:00

## 2019-01-01 RX ADMIN — HEPARIN SODIUM 5000 UNITS: 5000 INJECTION INTRAVENOUS; SUBCUTANEOUS at 22:38

## 2019-01-01 RX ADMIN — Medication 10 ML: at 05:07

## 2019-01-01 RX ADMIN — HEPARIN SODIUM 5000 UNITS: 5000 INJECTION INTRAVENOUS; SUBCUTANEOUS at 14:34

## 2019-01-01 RX ADMIN — MIDODRINE HYDROCHLORIDE 10 MG: 10 TABLET ORAL at 11:45

## 2019-01-01 RX ADMIN — CLOPIDOGREL BISULFATE 75 MG: 75 TABLET, FILM COATED ORAL at 08:33

## 2019-01-01 RX ADMIN — BUDESONIDE 500 MCG: 0.5 INHALANT RESPIRATORY (INHALATION) at 19:50

## 2019-01-01 RX ADMIN — GUAIFENESIN 600 MG: 600 TABLET, EXTENDED RELEASE ORAL at 21:27

## 2019-01-01 RX ADMIN — FAMOTIDINE 20 MG: 20 TABLET ORAL at 07:13

## 2019-01-01 RX ADMIN — ATORVASTATIN CALCIUM 40 MG: 40 TABLET, FILM COATED ORAL at 22:53

## 2019-01-01 RX ADMIN — METOPROLOL TARTRATE 12.5 MG: 25 TABLET, FILM COATED ORAL at 13:42

## 2019-01-01 RX ADMIN — WATER 2 G: 1 INJECTION INTRAMUSCULAR; INTRAVENOUS; SUBCUTANEOUS at 19:04

## 2019-01-01 RX ADMIN — ASPIRIN 81 MG 81 MG: 81 TABLET ORAL at 08:35

## 2019-01-01 RX ADMIN — Medication 2 TABLET: at 09:20

## 2019-01-01 RX ADMIN — MIDODRINE HYDROCHLORIDE 10 MG: 10 TABLET ORAL at 18:22

## 2019-01-01 RX ADMIN — METOPROLOL TARTRATE 12.5 MG: 25 TABLET ORAL at 21:38

## 2019-01-01 RX ADMIN — ATORVASTATIN CALCIUM 40 MG: 40 TABLET, FILM COATED ORAL at 21:35

## 2019-01-01 RX ADMIN — Medication 1 TABLET: at 08:27

## 2019-01-01 RX ADMIN — ARFORMOTEROL TARTRATE 15 MCG: 15 SOLUTION RESPIRATORY (INHALATION) at 20:28

## 2019-01-01 RX ADMIN — Medication 10 ML: at 13:36

## 2019-01-01 RX ADMIN — HEPARIN SODIUM AND DEXTROSE 795.6 UNITS/HR: 10000; 5 INJECTION INTRAVENOUS at 02:07

## 2019-01-01 RX ADMIN — Medication 10 ML: at 05:28

## 2019-01-01 RX ADMIN — INSULIN LISPRO 3 UNITS: 100 INJECTION, SOLUTION INTRAVENOUS; SUBCUTANEOUS at 08:57

## 2019-01-01 RX ADMIN — FUROSEMIDE 10 MG: 20 TABLET ORAL at 17:39

## 2019-01-01 RX ADMIN — CLOPIDOGREL BISULFATE 75 MG: 75 TABLET, FILM COATED ORAL at 09:28

## 2019-01-01 RX ADMIN — MIDODRINE HYDROCHLORIDE 10 MG: 10 TABLET ORAL at 08:50

## 2019-01-01 RX ADMIN — HEPARIN SODIUM 5000 UNITS: 5000 INJECTION INTRAVENOUS; SUBCUTANEOUS at 21:46

## 2019-01-01 RX ADMIN — MIDODRINE HYDROCHLORIDE 10 MG: 5 TABLET ORAL at 09:12

## 2019-01-01 RX ADMIN — DILTIAZEM HYDROCHLORIDE 60 MG: 60 TABLET, FILM COATED ORAL at 21:26

## 2019-01-01 RX ADMIN — SODIUM CHLORIDE SOLN NEBU 3% 3 ML: 3 NEBU SOLN at 21:22

## 2019-01-01 RX ADMIN — ASPIRIN 81 MG 81 MG: 81 TABLET ORAL at 08:53

## 2019-01-01 RX ADMIN — CLOPIDOGREL BISULFATE 75 MG: 75 TABLET, FILM COATED ORAL at 13:43

## 2019-01-01 RX ADMIN — PIPERACILLIN SODIUM,TAZOBACTAM SODIUM 4.5 G: 4; .5 INJECTION, POWDER, FOR SOLUTION INTRAVENOUS at 22:36

## 2019-01-01 RX ADMIN — IPRATROPIUM BROMIDE AND ALBUTEROL SULFATE 3 ML: .5; 3 SOLUTION RESPIRATORY (INHALATION) at 13:20

## 2019-01-01 RX ADMIN — POTASSIUM CHLORIDE 10 MEQ: 10 TABLET, EXTENDED RELEASE ORAL at 17:12

## 2019-01-01 RX ADMIN — GUAIFENESIN 400 MG: 200 SOLUTION ORAL at 22:06

## 2019-01-01 RX ADMIN — IPRATROPIUM BROMIDE AND ALBUTEROL SULFATE 3 ML: .5; 3 SOLUTION RESPIRATORY (INHALATION) at 13:39

## 2019-01-01 RX ADMIN — DIGOXIN 0.12 MG: 125 TABLET ORAL at 08:27

## 2019-01-01 RX ADMIN — Medication 10 ML: at 07:12

## 2019-01-01 RX ADMIN — ARFORMOTEROL TARTRATE 15 MCG: 15 SOLUTION RESPIRATORY (INHALATION) at 21:05

## 2019-01-01 RX ADMIN — FUROSEMIDE 20 MG: 20 TABLET ORAL at 09:50

## 2019-01-01 RX ADMIN — THERA TABS 1 TABLET: TAB at 10:04

## 2019-01-01 RX ADMIN — METOPROLOL TARTRATE 12.5 MG: 25 TABLET, FILM COATED ORAL at 17:31

## 2019-01-01 RX ADMIN — TAMSULOSIN HYDROCHLORIDE 0.4 MG: 0.4 CAPSULE ORAL at 09:37

## 2019-01-01 RX ADMIN — HEPARIN SODIUM 5000 UNITS: 5000 INJECTION INTRAVENOUS; SUBCUTANEOUS at 10:24

## 2019-01-01 RX ADMIN — Medication 10 ML: at 10:04

## 2019-01-01 RX ADMIN — IPRATROPIUM BROMIDE AND ALBUTEROL SULFATE 3 ML: .5; 3 SOLUTION RESPIRATORY (INHALATION) at 08:02

## 2019-01-01 RX ADMIN — ARFORMOTEROL TARTRATE 15 MCG: 15 SOLUTION RESPIRATORY (INHALATION) at 19:54

## 2019-01-01 RX ADMIN — POTASSIUM CHLORIDE 10 MEQ: 10 TABLET, EXTENDED RELEASE ORAL at 17:30

## 2019-01-01 RX ADMIN — TAMSULOSIN HYDROCHLORIDE 0.4 MG: 0.4 CAPSULE ORAL at 08:52

## 2019-01-01 RX ADMIN — ARFORMOTEROL TARTRATE 15 MCG: 15 SOLUTION RESPIRATORY (INHALATION) at 20:24

## 2019-01-01 RX ADMIN — CLOPIDOGREL BISULFATE 75 MG: 75 TABLET, FILM COATED ORAL at 08:48

## 2019-01-01 RX ADMIN — HEPARIN SODIUM 5000 UNITS: 5000 INJECTION INTRAVENOUS; SUBCUTANEOUS at 03:31

## 2019-01-01 RX ADMIN — ASPIRIN 81 MG 81 MG: 81 TABLET ORAL at 08:27

## 2019-01-01 RX ADMIN — FAMOTIDINE 20 MG: 20 TABLET ORAL at 09:03

## 2019-01-01 RX ADMIN — ASPIRIN 81 MG: 81 TABLET, COATED ORAL at 08:02

## 2019-01-01 RX ADMIN — SODIUM CHLORIDE 50 ML/HR: 900 INJECTION, SOLUTION INTRAVENOUS at 20:04

## 2019-01-01 RX ADMIN — GUAIFENESIN 400 MG: 200 SOLUTION ORAL at 22:18

## 2019-01-01 RX ADMIN — ACETAMINOPHEN 650 MG: 325 TABLET, FILM COATED ORAL at 11:18

## 2019-01-01 RX ADMIN — TAMSULOSIN HYDROCHLORIDE 0.4 MG: 0.4 CAPSULE ORAL at 08:36

## 2019-01-01 RX ADMIN — ATORVASTATIN CALCIUM 40 MG: 40 TABLET, FILM COATED ORAL at 22:12

## 2019-01-01 RX ADMIN — IPRATROPIUM BROMIDE AND ALBUTEROL SULFATE 3 ML: .5; 3 SOLUTION RESPIRATORY (INHALATION) at 08:20

## 2019-01-01 RX ADMIN — POTASSIUM CHLORIDE 20 MEQ: 1.5 POWDER, FOR SOLUTION ORAL at 09:28

## 2019-01-01 RX ADMIN — HEPARIN SODIUM 5000 UNITS: 5000 INJECTION INTRAVENOUS; SUBCUTANEOUS at 16:00

## 2019-01-01 RX ADMIN — ATORVASTATIN CALCIUM 40 MG: 40 TABLET, FILM COATED ORAL at 21:52

## 2019-01-01 RX ADMIN — HEPARIN SODIUM 5000 UNITS: 5000 INJECTION INTRAVENOUS; SUBCUTANEOUS at 21:45

## 2019-01-01 RX ADMIN — HEPARIN SODIUM 5000 UNITS: 5000 INJECTION INTRAVENOUS; SUBCUTANEOUS at 12:52

## 2019-01-01 RX ADMIN — MIDODRINE HYDROCHLORIDE 10 MG: 10 TABLET ORAL at 13:24

## 2019-01-01 RX ADMIN — IOPAMIDOL 78 ML: 755 INJECTION, SOLUTION INTRAVENOUS at 15:05

## 2019-01-01 RX ADMIN — PERFLUTREN 2 ML: 6.52 INJECTION, SUSPENSION INTRAVENOUS at 14:35

## 2019-01-01 RX ADMIN — Medication 10 ML: at 15:21

## 2019-01-01 RX ADMIN — ARFORMOTEROL TARTRATE 15 MCG: 15 SOLUTION RESPIRATORY (INHALATION) at 19:56

## 2019-01-01 RX ADMIN — FUROSEMIDE 20 MG: 40 TABLET ORAL at 08:43

## 2019-01-01 RX ADMIN — Medication 2 TABLET: at 18:08

## 2019-01-01 RX ADMIN — METOPROLOL TARTRATE 12.5 MG: 25 TABLET ORAL at 13:30

## 2019-01-01 RX ADMIN — METHYLPREDNISOLONE SODIUM SUCCINATE 40 MG: 40 INJECTION, POWDER, FOR SOLUTION INTRAMUSCULAR; INTRAVENOUS at 11:18

## 2019-01-01 RX ADMIN — IPRATROPIUM BROMIDE AND ALBUTEROL SULFATE 3 ML: .5; 3 SOLUTION RESPIRATORY (INHALATION) at 20:52

## 2019-01-01 RX ADMIN — TAMSULOSIN HYDROCHLORIDE 0.4 MG: 0.4 CAPSULE ORAL at 08:57

## 2019-01-01 RX ADMIN — IPRATROPIUM BROMIDE 0.5 MG: 0.5 SOLUTION RESPIRATORY (INHALATION) at 00:38

## 2019-01-01 RX ADMIN — ASPIRIN 81 MG: 81 TABLET, COATED ORAL at 10:05

## 2019-01-01 RX ADMIN — HEPARIN SODIUM 5000 UNITS: 5000 INJECTION INTRAVENOUS; SUBCUTANEOUS at 17:41

## 2019-01-01 RX ADMIN — PANTOPRAZOLE SODIUM 40 MG: 40 TABLET, DELAYED RELEASE ORAL at 10:05

## 2019-01-01 RX ADMIN — ASPIRIN 81 MG 81 MG: 81 TABLET ORAL at 09:58

## 2019-01-01 RX ADMIN — IPRATROPIUM BROMIDE AND ALBUTEROL SULFATE 3 ML: .5; 3 SOLUTION RESPIRATORY (INHALATION) at 16:58

## 2019-01-01 RX ADMIN — BUDESONIDE AND FORMOTEROL FUMARATE DIHYDRATE 2 PUFF: 160; 4.5 AEROSOL RESPIRATORY (INHALATION) at 08:35

## 2019-01-01 RX ADMIN — Medication 750 MG: at 19:30

## 2019-01-01 RX ADMIN — ASPIRIN 81 MG 81 MG: 81 TABLET ORAL at 10:24

## 2019-01-01 RX ADMIN — POTASSIUM CHLORIDE 10 MEQ: 10 TABLET, EXTENDED RELEASE ORAL at 17:05

## 2019-01-01 RX ADMIN — BUDESONIDE 500 MCG: 0.5 INHALANT RESPIRATORY (INHALATION) at 19:44

## 2019-01-01 RX ADMIN — METOPROLOL TARTRATE 2.5 MG: 5 INJECTION INTRAVENOUS at 16:20

## 2019-01-01 RX ADMIN — PIPERACILLIN SODIUM,TAZOBACTAM SODIUM 4.5 G: 4; .5 INJECTION, POWDER, FOR SOLUTION INTRAVENOUS at 21:46

## 2019-01-01 RX ADMIN — IPRATROPIUM BROMIDE AND ALBUTEROL SULFATE 3 ML: .5; 3 SOLUTION RESPIRATORY (INHALATION) at 03:50

## 2019-01-01 RX ADMIN — ASPIRIN 81 MG 81 MG: 81 TABLET ORAL at 08:37

## 2019-01-01 RX ADMIN — IPRATROPIUM BROMIDE AND ALBUTEROL SULFATE 3 ML: .5; 3 SOLUTION RESPIRATORY (INHALATION) at 12:45

## 2019-01-01 RX ADMIN — BUDESONIDE 500 MCG: 0.5 INHALANT RESPIRATORY (INHALATION) at 08:07

## 2019-01-01 RX ADMIN — Medication 10 ML: at 06:53

## 2019-01-01 RX ADMIN — BUDESONIDE 500 MCG: 0.5 INHALANT RESPIRATORY (INHALATION) at 20:30

## 2019-01-01 RX ADMIN — IPRATROPIUM BROMIDE AND ALBUTEROL SULFATE 3 ML: .5; 3 SOLUTION RESPIRATORY (INHALATION) at 16:00

## 2019-01-01 RX ADMIN — CLOPIDOGREL BISULFATE 75 MG: 75 TABLET, FILM COATED ORAL at 08:26

## 2019-01-01 RX ADMIN — HEPARIN SODIUM 5000 UNITS: 5000 INJECTION INTRAVENOUS; SUBCUTANEOUS at 22:45

## 2019-01-01 RX ADMIN — ASPIRIN 81 MG 81 MG: 81 TABLET ORAL at 08:48

## 2019-01-01 RX ADMIN — PIPERACILLIN SODIUM AND TAZOBACTAM SODIUM 3.38 G: 36; 4.5 INJECTION, POWDER, FOR SOLUTION INTRAVENOUS at 22:13

## 2019-01-01 RX ADMIN — Medication 1 TABLET: at 08:59

## 2019-01-01 RX ADMIN — BUDESONIDE 500 MCG: 0.5 INHALANT RESPIRATORY (INHALATION) at 20:19

## 2019-01-01 RX ADMIN — IPRATROPIUM BROMIDE AND ALBUTEROL SULFATE 3 ML: .5; 3 SOLUTION RESPIRATORY (INHALATION) at 08:38

## 2019-01-01 RX ADMIN — TAMSULOSIN HYDROCHLORIDE 0.4 MG: 0.4 CAPSULE ORAL at 12:16

## 2019-01-01 RX ADMIN — HEPARIN SODIUM 5000 UNITS: 5000 INJECTION INTRAVENOUS; SUBCUTANEOUS at 22:53

## 2019-01-01 RX ADMIN — TAMSULOSIN HYDROCHLORIDE 0.4 MG: 0.4 CAPSULE ORAL at 10:29

## 2019-01-01 RX ADMIN — Medication 2 TABLET: at 18:23

## 2019-01-01 RX ADMIN — POTASSIUM CHLORIDE 10 MEQ: 10 TABLET, EXTENDED RELEASE ORAL at 09:03

## 2019-01-01 RX ADMIN — ASPIRIN 81 MG 81 MG: 81 TABLET ORAL at 09:34

## 2019-01-01 RX ADMIN — THERA TABS 1 TABLET: TAB at 09:03

## 2019-01-01 RX ADMIN — BUDESONIDE 500 MCG: 0.5 INHALANT RESPIRATORY (INHALATION) at 07:25

## 2019-01-01 RX ADMIN — ATORVASTATIN CALCIUM 40 MG: 40 TABLET, FILM COATED ORAL at 21:11

## 2019-01-01 RX ADMIN — THERA TABS 1 TABLET: TAB at 08:26

## 2019-01-01 RX ADMIN — FUROSEMIDE 20 MG: 10 INJECTION, SOLUTION INTRAMUSCULAR; INTRAVENOUS at 10:23

## 2019-01-01 RX ADMIN — TAMSULOSIN HYDROCHLORIDE 0.4 MG: 0.4 CAPSULE ORAL at 08:42

## 2019-01-01 RX ADMIN — FAMOTIDINE 20 MG: 20 TABLET ORAL at 11:03

## 2019-01-01 RX ADMIN — IPRATROPIUM BROMIDE AND ALBUTEROL SULFATE 3 ML: .5; 3 SOLUTION RESPIRATORY (INHALATION) at 08:44

## 2019-01-01 RX ADMIN — POTASSIUM CHLORIDE 10 MEQ: 750 TABLET, EXTENDED RELEASE ORAL at 12:07

## 2019-01-01 RX ADMIN — HEPARIN SODIUM 5000 UNITS: 5000 INJECTION INTRAVENOUS; SUBCUTANEOUS at 15:05

## 2019-01-01 RX ADMIN — WATER 2 G: 1 INJECTION INTRAMUSCULAR; INTRAVENOUS; SUBCUTANEOUS at 18:33

## 2019-01-01 RX ADMIN — PIPERACILLIN SODIUM AND TAZOBACTAM SODIUM 3.38 G: 36; 4.5 INJECTION, POWDER, FOR SOLUTION INTRAVENOUS at 15:55

## 2019-01-01 RX ADMIN — MIDODRINE HYDROCHLORIDE 10 MG: 10 TABLET ORAL at 08:26

## 2019-01-01 RX ADMIN — FUROSEMIDE 20 MG: 40 TABLET ORAL at 08:50

## 2019-01-01 RX ADMIN — IPRATROPIUM BROMIDE AND ALBUTEROL SULFATE 3 ML: .5; 3 SOLUTION RESPIRATORY (INHALATION) at 21:22

## 2019-01-01 RX ADMIN — Medication 10 ML: at 22:00

## 2019-01-01 RX ADMIN — Medication 10 ML: at 21:27

## 2019-01-01 RX ADMIN — TAMSULOSIN HYDROCHLORIDE 0.4 MG: 0.4 CAPSULE ORAL at 09:28

## 2019-01-01 RX ADMIN — IPRATROPIUM BROMIDE AND ALBUTEROL SULFATE 3 ML: .5; 3 SOLUTION RESPIRATORY (INHALATION) at 20:00

## 2019-01-01 RX ADMIN — Medication 10 ML: at 22:15

## 2019-01-01 RX ADMIN — POTASSIUM CHLORIDE 10 MEQ: 750 TABLET, EXTENDED RELEASE ORAL at 08:42

## 2019-01-01 RX ADMIN — HEPARIN SODIUM 5000 UNITS: 5000 INJECTION INTRAVENOUS; SUBCUTANEOUS at 05:41

## 2019-01-01 RX ADMIN — IPRATROPIUM BROMIDE 0.5 MG: 0.5 SOLUTION RESPIRATORY (INHALATION) at 13:26

## 2019-01-01 RX ADMIN — BUDESONIDE AND FORMOTEROL FUMARATE DIHYDRATE 2 PUFF: 160; 4.5 AEROSOL RESPIRATORY (INHALATION) at 19:35

## 2019-01-01 RX ADMIN — BUDESONIDE 500 MCG: 0.5 INHALANT RESPIRATORY (INHALATION) at 07:13

## 2019-01-01 RX ADMIN — ATORVASTATIN CALCIUM 40 MG: 40 TABLET, FILM COATED ORAL at 22:29

## 2019-01-01 RX ADMIN — BUDESONIDE AND FORMOTEROL FUMARATE DIHYDRATE 2 PUFF: 80; 4.5 AEROSOL RESPIRATORY (INHALATION) at 07:03

## 2019-01-01 RX ADMIN — IPRATROPIUM BROMIDE AND ALBUTEROL SULFATE 3 ML: .5; 3 SOLUTION RESPIRATORY (INHALATION) at 20:12

## 2019-01-01 RX ADMIN — FUROSEMIDE 10 MG: 20 TABLET ORAL at 09:37

## 2019-01-01 RX ADMIN — HEPARIN SODIUM 5000 UNITS: 5000 INJECTION INTRAVENOUS; SUBCUTANEOUS at 21:16

## 2019-01-01 RX ADMIN — HEPARIN SODIUM 5000 UNITS: 5000 INJECTION INTRAVENOUS; SUBCUTANEOUS at 00:16

## 2019-01-01 RX ADMIN — METHYLPREDNISOLONE SODIUM SUCCINATE 40 MG: 40 INJECTION, POWDER, FOR SOLUTION INTRAMUSCULAR; INTRAVENOUS at 03:13

## 2019-01-01 RX ADMIN — POTASSIUM CHLORIDE 10 MEQ: 10 TABLET, EXTENDED RELEASE ORAL at 09:23

## 2019-01-01 RX ADMIN — FAMOTIDINE 20 MG: 20 TABLET ORAL at 09:29

## 2019-01-01 RX ADMIN — BUDESONIDE 500 MCG: 0.5 INHALANT RESPIRATORY (INHALATION) at 08:12

## 2019-01-01 RX ADMIN — Medication 2 TABLET: at 18:05

## 2019-01-01 RX ADMIN — ASPIRIN 81 MG: 81 TABLET, COATED ORAL at 08:39

## 2019-01-01 RX ADMIN — HEPARIN SODIUM 5000 UNITS: 5000 INJECTION INTRAVENOUS; SUBCUTANEOUS at 04:57

## 2019-01-01 RX ADMIN — FAMOTIDINE 20 MG: 20 TABLET ORAL at 09:28

## 2019-01-01 RX ADMIN — ARFORMOTEROL TARTRATE 15 MCG: 15 SOLUTION RESPIRATORY (INHALATION) at 20:45

## 2019-01-01 RX ADMIN — PIPERACILLIN SODIUM,TAZOBACTAM SODIUM 4.5 G: 4; .5 INJECTION, POWDER, FOR SOLUTION INTRAVENOUS at 06:16

## 2019-01-01 RX ADMIN — FAMOTIDINE 20 MG: 20 TABLET ORAL at 09:36

## 2019-01-01 RX ADMIN — HEPARIN SODIUM 5000 UNITS: 5000 INJECTION INTRAVENOUS; SUBCUTANEOUS at 17:08

## 2019-01-01 RX ADMIN — POTASSIUM CHLORIDE 20 MEQ: 750 TABLET, EXTENDED RELEASE ORAL at 10:36

## 2019-01-01 RX ADMIN — CLOPIDOGREL BISULFATE 75 MG: 75 TABLET, FILM COATED ORAL at 09:36

## 2019-01-01 RX ADMIN — MIDODRINE HYDROCHLORIDE 10 MG: 10 TABLET ORAL at 09:34

## 2019-01-01 RX ADMIN — POTASSIUM CHLORIDE 10 MEQ: 10 TABLET, EXTENDED RELEASE ORAL at 17:22

## 2019-01-01 RX ADMIN — KETOROLAC TROMETHAMINE 15 MG: 30 INJECTION, SOLUTION INTRAMUSCULAR; INTRAVENOUS at 09:06

## 2019-01-01 RX ADMIN — TAMSULOSIN HYDROCHLORIDE 0.4 MG: 0.4 CAPSULE ORAL at 09:29

## 2019-01-01 RX ADMIN — METHYLPREDNISOLONE SODIUM SUCCINATE 20 MG: 40 INJECTION, POWDER, FOR SOLUTION INTRAMUSCULAR; INTRAVENOUS at 21:30

## 2019-01-01 RX ADMIN — ARFORMOTEROL TARTRATE 15 MCG: 15 SOLUTION RESPIRATORY (INHALATION) at 19:39

## 2019-01-01 RX ADMIN — IPRATROPIUM BROMIDE AND ALBUTEROL SULFATE 3 ML: .5; 3 SOLUTION RESPIRATORY (INHALATION) at 01:45

## 2019-01-01 RX ADMIN — MIDODRINE HYDROCHLORIDE 10 MG: 5 TABLET ORAL at 11:28

## 2019-01-01 RX ADMIN — METHYLPREDNISOLONE SODIUM SUCCINATE 40 MG: 40 INJECTION, POWDER, FOR SOLUTION INTRAMUSCULAR; INTRAVENOUS at 11:39

## 2019-01-01 RX ADMIN — Medication 2 TABLET: at 22:21

## 2019-01-01 RX ADMIN — POTASSIUM CHLORIDE 10 MEQ: 10 TABLET, EXTENDED RELEASE ORAL at 18:05

## 2019-01-01 RX ADMIN — VANCOMYCIN HYDROCHLORIDE 1250 MG: 10 INJECTION, POWDER, LYOPHILIZED, FOR SOLUTION INTRAVENOUS at 09:41

## 2019-01-01 RX ADMIN — ATORVASTATIN CALCIUM 40 MG: 40 TABLET, FILM COATED ORAL at 23:00

## 2019-01-01 RX ADMIN — THERA TABS 1 TABLET: TAB at 11:52

## 2019-01-01 RX ADMIN — POTASSIUM CHLORIDE 10 MEQ: 10 TABLET, EXTENDED RELEASE ORAL at 18:23

## 2019-01-01 RX ADMIN — DIGOXIN 0.12 MG: 125 TABLET ORAL at 21:35

## 2019-01-01 RX ADMIN — HEPARIN SODIUM 5000 UNITS: 5000 INJECTION INTRAVENOUS; SUBCUTANEOUS at 20:43

## 2019-01-01 RX ADMIN — PANTOPRAZOLE SODIUM 40 MG: 40 TABLET, DELAYED RELEASE ORAL at 08:39

## 2019-01-01 RX ADMIN — MIDODRINE HYDROCHLORIDE 10 MG: 10 TABLET ORAL at 10:03

## 2019-01-01 RX ADMIN — Medication 10 ML: at 05:42

## 2019-01-01 RX ADMIN — BUDESONIDE 500 MCG: 0.5 INHALANT RESPIRATORY (INHALATION) at 10:03

## 2019-01-01 RX ADMIN — HEPARIN SODIUM 5000 UNITS: 5000 INJECTION INTRAVENOUS; SUBCUTANEOUS at 04:41

## 2019-01-01 RX ADMIN — BUDESONIDE 500 MCG: 0.5 INHALANT RESPIRATORY (INHALATION) at 08:02

## 2019-01-01 RX ADMIN — ATORVASTATIN CALCIUM 40 MG: 40 TABLET, FILM COATED ORAL at 23:01

## 2019-01-01 RX ADMIN — PIPERACILLIN SODIUM,TAZOBACTAM SODIUM 4.5 G: 4; .5 INJECTION, POWDER, FOR SOLUTION INTRAVENOUS at 13:19

## 2019-01-01 RX ADMIN — PIPERACILLIN SODIUM,TAZOBACTAM SODIUM 3.38 G: 3; .375 INJECTION, POWDER, FOR SOLUTION INTRAVENOUS at 15:31

## 2019-01-01 RX ADMIN — PIPERACILLIN SODIUM,TAZOBACTAM SODIUM 4.5 G: 4; .5 INJECTION, POWDER, FOR SOLUTION INTRAVENOUS at 15:45

## 2019-01-01 RX ADMIN — MIDODRINE HYDROCHLORIDE 10 MG: 10 TABLET ORAL at 17:37

## 2019-01-01 RX ADMIN — ASPIRIN 81 MG 81 MG: 81 TABLET ORAL at 10:03

## 2019-01-01 RX ADMIN — POTASSIUM CHLORIDE 20 MEQ: 20 TABLET, EXTENDED RELEASE ORAL at 12:07

## 2019-01-01 RX ADMIN — MIDODRINE HYDROCHLORIDE 10 MG: 10 TABLET ORAL at 09:03

## 2019-01-01 RX ADMIN — IPRATROPIUM BROMIDE 0.5 MG: 0.5 SOLUTION RESPIRATORY (INHALATION) at 09:35

## 2019-01-01 RX ADMIN — GUAIFENESIN 400 MG: 200 SOLUTION ORAL at 09:04

## 2019-01-01 RX ADMIN — VANCOMYCIN HYDROCHLORIDE 1250 MG: 10 INJECTION, POWDER, LYOPHILIZED, FOR SOLUTION INTRAVENOUS at 16:31

## 2019-01-01 RX ADMIN — ARFORMOTEROL TARTRATE 15 MCG: 15 SOLUTION RESPIRATORY (INHALATION) at 08:07

## 2019-01-01 RX ADMIN — Medication 2 TABLET: at 18:31

## 2019-01-01 RX ADMIN — PIPERACILLIN SODIUM,TAZOBACTAM SODIUM 3.38 G: 3; .375 INJECTION, POWDER, FOR SOLUTION INTRAVENOUS at 02:15

## 2019-01-01 RX ADMIN — GUAIFENESIN 400 MG: 200 SOLUTION ORAL at 08:36

## 2019-01-01 RX ADMIN — TAMSULOSIN HYDROCHLORIDE 0.4 MG: 0.4 CAPSULE ORAL at 09:11

## 2019-01-01 RX ADMIN — IPRATROPIUM BROMIDE AND ALBUTEROL SULFATE 3 ML: .5; 3 SOLUTION RESPIRATORY (INHALATION) at 03:01

## 2019-01-01 RX ADMIN — HEPARIN SODIUM 5000 UNITS: 5000 INJECTION INTRAVENOUS; SUBCUTANEOUS at 13:25

## 2019-01-01 RX ADMIN — FUROSEMIDE 20 MG: 10 INJECTION, SOLUTION INTRAMUSCULAR; INTRAVENOUS at 04:41

## 2019-01-01 RX ADMIN — ATORVASTATIN CALCIUM 40 MG: 40 TABLET, FILM COATED ORAL at 22:20

## 2019-01-01 RX ADMIN — IPRATROPIUM BROMIDE AND ALBUTEROL SULFATE: .5; 3 SOLUTION RESPIRATORY (INHALATION) at 20:00

## 2019-01-01 RX ADMIN — ATORVASTATIN CALCIUM 40 MG: 40 TABLET, FILM COATED ORAL at 21:39

## 2019-01-01 RX ADMIN — ASPIRIN 81 MG 81 MG: 81 TABLET ORAL at 08:50

## 2019-01-01 RX ADMIN — MIDODRINE HYDROCHLORIDE 10 MG: 10 TABLET ORAL at 09:07

## 2019-01-01 RX ADMIN — HEPARIN SODIUM 5000 UNITS: 5000 INJECTION INTRAVENOUS; SUBCUTANEOUS at 21:06

## 2019-01-01 RX ADMIN — ASPIRIN 81 MG 81 MG: 81 TABLET ORAL at 10:13

## 2019-01-01 RX ADMIN — BUDESONIDE 500 MCG: 0.5 INHALANT RESPIRATORY (INHALATION) at 23:14

## 2019-01-01 RX ADMIN — POTASSIUM CHLORIDE 10 MEQ: 10 TABLET, EXTENDED RELEASE ORAL at 17:24

## 2019-01-01 RX ADMIN — Medication 750 MG: at 19:00

## 2019-01-01 RX ADMIN — FAMOTIDINE 20 MG: 20 TABLET ORAL at 08:21

## 2019-01-01 RX ADMIN — CARVEDILOL 3.12 MG: 3.12 TABLET, FILM COATED ORAL at 09:21

## 2019-01-01 RX ADMIN — BUDESONIDE AND FORMOTEROL FUMARATE DIHYDRATE 2 PUFF: 160; 4.5 AEROSOL RESPIRATORY (INHALATION) at 07:38

## 2019-01-01 RX ADMIN — METHYLPREDNISOLONE SODIUM SUCCINATE 20 MG: 40 INJECTION, POWDER, FOR SOLUTION INTRAMUSCULAR; INTRAVENOUS at 09:38

## 2019-01-01 RX ADMIN — CLOPIDOGREL BISULFATE 75 MG: 75 TABLET, FILM COATED ORAL at 09:38

## 2019-01-01 RX ADMIN — ASPIRIN 81 MG 81 MG: 81 TABLET ORAL at 08:58

## 2019-01-01 RX ADMIN — FUROSEMIDE 20 MG: 40 TABLET ORAL at 11:42

## 2019-01-01 RX ADMIN — BUDESONIDE 500 MCG: 0.5 INHALANT RESPIRATORY (INHALATION) at 20:28

## 2019-01-01 RX ADMIN — DILTIAZEM HYDROCHLORIDE 60 MG: 60 TABLET, FILM COATED ORAL at 06:22

## 2019-01-01 RX ADMIN — INSULIN GLARGINE 5 UNITS: 100 INJECTION, SOLUTION SUBCUTANEOUS at 08:57

## 2019-01-01 RX ADMIN — FUROSEMIDE 10 MG: 20 TABLET ORAL at 17:56

## 2019-01-01 RX ADMIN — MIDODRINE HYDROCHLORIDE 10 MG: 10 TABLET ORAL at 09:20

## 2019-01-01 RX ADMIN — FUROSEMIDE 20 MG: 10 INJECTION, SOLUTION INTRAVENOUS at 19:12

## 2019-01-01 RX ADMIN — ASPIRIN 81 MG: 81 TABLET, COATED ORAL at 09:21

## 2019-01-01 RX ADMIN — Medication 10 ML: at 21:59

## 2019-01-01 RX ADMIN — PANTOPRAZOLE SODIUM 40 MG: 40 TABLET, DELAYED RELEASE ORAL at 09:15

## 2019-01-01 RX ADMIN — MIDODRINE HYDROCHLORIDE 10 MG: 10 TABLET ORAL at 09:11

## 2019-01-01 RX ADMIN — IPRATROPIUM BROMIDE 0.5 MG: 0.5 SOLUTION RESPIRATORY (INHALATION) at 21:06

## 2019-01-01 RX ADMIN — ASPIRIN 81 MG 81 MG: 81 TABLET ORAL at 08:20

## 2019-01-01 RX ADMIN — ASPIRIN 81 MG 81 MG: 81 TABLET ORAL at 09:29

## 2019-01-01 RX ADMIN — MIDODRINE HYDROCHLORIDE 10 MG: 10 TABLET ORAL at 08:35

## 2019-01-01 RX ADMIN — IOPAMIDOL 70 ML: 612 INJECTION, SOLUTION INTRAVENOUS at 20:55

## 2019-01-01 RX ADMIN — INSULIN GLARGINE 5 UNITS: 100 INJECTION, SOLUTION SUBCUTANEOUS at 13:05

## 2019-01-01 RX ADMIN — GUAIFENESIN 400 MG: 200 SOLUTION ORAL at 15:46

## 2019-01-01 RX ADMIN — Medication 1 TABLET: at 13:42

## 2019-01-01 RX ADMIN — CEFAZOLIN SODIUM 2 G: 2 SOLUTION INTRAVENOUS at 14:12

## 2019-01-01 RX ADMIN — CLOPIDOGREL BISULFATE 75 MG: 75 TABLET, FILM COATED ORAL at 09:12

## 2019-01-01 RX ADMIN — IPRATROPIUM BROMIDE 0.5 MG: 0.5 SOLUTION RESPIRATORY (INHALATION) at 13:39

## 2019-01-01 RX ADMIN — IPRATROPIUM BROMIDE AND ALBUTEROL SULFATE 3 ML: .5; 3 SOLUTION RESPIRATORY (INHALATION) at 05:27

## 2019-01-01 RX ADMIN — BARIUM SULFATE 15 ML: 400 SUSPENSION ORAL at 09:46

## 2019-01-01 RX ADMIN — POTASSIUM CHLORIDE 10 MEQ: 750 TABLET, EXTENDED RELEASE ORAL at 08:49

## 2019-01-01 RX ADMIN — Medication 10 ML: at 14:47

## 2019-01-01 RX ADMIN — CLOPIDOGREL BISULFATE 75 MG: 75 TABLET, FILM COATED ORAL at 08:42

## 2019-01-01 RX ADMIN — BUDESONIDE 500 MCG: 0.5 INHALANT RESPIRATORY (INHALATION) at 08:00

## 2019-01-01 RX ADMIN — BUDESONIDE 500 MCG: 0.5 INHALANT RESPIRATORY (INHALATION) at 21:21

## 2019-01-01 RX ADMIN — FENTANYL CITRATE 25 MCG: 50 INJECTION, SOLUTION INTRAMUSCULAR; INTRAVENOUS at 14:44

## 2019-01-01 RX ADMIN — TAMSULOSIN HYDROCHLORIDE 0.4 MG: 0.4 CAPSULE ORAL at 10:41

## 2019-01-01 RX ADMIN — FAMOTIDINE 20 MG: 20 TABLET ORAL at 08:20

## 2019-01-01 RX ADMIN — Medication 2 TABLET: at 08:36

## 2019-01-01 RX ADMIN — FUROSEMIDE 10 MG: 20 TABLET ORAL at 19:43

## 2019-01-01 RX ADMIN — BUDESONIDE 500 MCG: 0.5 INHALANT RESPIRATORY (INHALATION) at 19:31

## 2019-01-01 RX ADMIN — DEXTROSE MONOHYDRATE 25 ML/HR: 5 INJECTION, SOLUTION INTRAVENOUS at 03:16

## 2019-01-01 RX ADMIN — BISACODYL 10 MG: 10 SUPPOSITORY RECTAL at 09:07

## 2019-01-01 RX ADMIN — IPRATROPIUM BROMIDE 0.5 MG: 0.5 SOLUTION RESPIRATORY (INHALATION) at 13:31

## 2019-01-01 RX ADMIN — SODIUM CHLORIDE 750 MG: 900 INJECTION, SOLUTION INTRAVENOUS at 19:41

## 2019-01-01 RX ADMIN — Medication 10 ML: at 14:00

## 2019-01-01 RX ADMIN — LEVOFLOXACIN 750 MG: 5 INJECTION, SOLUTION INTRAVENOUS at 16:20

## 2019-01-01 RX ADMIN — HEPARIN SODIUM 5000 UNITS: 5000 INJECTION INTRAVENOUS; SUBCUTANEOUS at 18:53

## 2019-01-01 RX ADMIN — ARFORMOTEROL TARTRATE 15 MCG: 15 SOLUTION RESPIRATORY (INHALATION) at 19:50

## 2019-01-01 RX ADMIN — TAMSULOSIN HYDROCHLORIDE 0.4 MG: 0.4 CAPSULE ORAL at 08:26

## 2019-01-01 RX ADMIN — ASPIRIN 81 MG 81 MG: 81 TABLET ORAL at 12:14

## 2019-01-01 RX ADMIN — BUDESONIDE 500 MCG: 0.5 INHALANT RESPIRATORY (INHALATION) at 20:24

## 2019-01-01 RX ADMIN — WATER 2 G: 1 INJECTION INTRAMUSCULAR; INTRAVENOUS; SUBCUTANEOUS at 05:14

## 2019-01-01 RX ADMIN — HEPARIN SODIUM 5000 UNITS: 5000 INJECTION INTRAVENOUS; SUBCUTANEOUS at 10:31

## 2019-01-01 RX ADMIN — PANTOPRAZOLE SODIUM 40 MG: 40 TABLET, DELAYED RELEASE ORAL at 08:02

## 2019-01-01 RX ADMIN — DILTIAZEM HYDROCHLORIDE 10 MG: 5 INJECTION INTRAVENOUS at 20:01

## 2019-01-01 RX ADMIN — IPRATROPIUM BROMIDE 0.5 MG: 0.5 SOLUTION RESPIRATORY (INHALATION) at 08:26

## 2019-01-01 RX ADMIN — TAMSULOSIN HYDROCHLORIDE 0.4 MG: 0.4 CAPSULE ORAL at 08:33

## 2019-01-01 RX ADMIN — MIDODRINE HYDROCHLORIDE 10 MG: 10 TABLET ORAL at 11:16

## 2019-01-01 RX ADMIN — CARVEDILOL 3.12 MG: 3.12 TABLET, FILM COATED ORAL at 17:00

## 2019-01-01 RX ADMIN — MIDODRINE HYDROCHLORIDE 5 MG: 10 TABLET ORAL at 17:23

## 2019-01-01 RX ADMIN — MIDODRINE HYDROCHLORIDE 10 MG: 10 TABLET ORAL at 17:30

## 2019-01-01 RX ADMIN — ARFORMOTEROL TARTRATE 15 MCG: 15 SOLUTION RESPIRATORY (INHALATION) at 08:08

## 2019-01-01 RX ADMIN — CARVEDILOL 3.12 MG: 3.12 TABLET, FILM COATED ORAL at 08:39

## 2019-01-01 RX ADMIN — INSULIN LISPRO 3 UNITS: 100 INJECTION, SOLUTION INTRAVENOUS; SUBCUTANEOUS at 11:44

## 2019-01-01 RX ADMIN — MIDODRINE HYDROCHLORIDE 10 MG: 10 TABLET ORAL at 09:24

## 2019-01-01 RX ADMIN — TIOTROPIUM BROMIDE 18 MCG: 18 CAPSULE ORAL; RESPIRATORY (INHALATION) at 08:11

## 2019-01-01 RX ADMIN — TAMSULOSIN HYDROCHLORIDE 0.4 MG: 0.4 CAPSULE ORAL at 09:50

## 2019-01-01 RX ADMIN — BUDESONIDE 500 MCG: 0.5 INHALANT RESPIRATORY (INHALATION) at 08:43

## 2019-01-01 RX ADMIN — INSULIN LISPRO 3 UNITS: 100 INJECTION, SOLUTION INTRAVENOUS; SUBCUTANEOUS at 18:08

## 2019-01-01 RX ADMIN — ASPIRIN 81 MG 81 MG: 81 TABLET ORAL at 09:04

## 2019-01-01 RX ADMIN — MIDODRINE HYDROCHLORIDE 10 MG: 10 TABLET ORAL at 13:39

## 2019-01-01 RX ADMIN — FAMOTIDINE 20 MG: 20 TABLET ORAL at 08:53

## 2019-01-01 RX ADMIN — ATORVASTATIN CALCIUM 40 MG: 40 TABLET, FILM COATED ORAL at 21:56

## 2019-01-01 RX ADMIN — Medication 10 ML: at 05:51

## 2019-01-01 RX ADMIN — PIPERACILLIN SODIUM,TAZOBACTAM SODIUM 4.5 G: 4; .5 INJECTION, POWDER, FOR SOLUTION INTRAVENOUS at 13:45

## 2019-01-01 RX ADMIN — HEPARIN SODIUM 5000 UNITS: 5000 INJECTION INTRAVENOUS; SUBCUTANEOUS at 06:22

## 2019-01-01 RX ADMIN — FUROSEMIDE 40 MG: 10 INJECTION, SOLUTION INTRAMUSCULAR; INTRAVENOUS at 19:23

## 2019-01-01 RX ADMIN — BUDESONIDE 500 MCG: 0.5 INHALANT RESPIRATORY (INHALATION) at 19:43

## 2019-01-01 RX ADMIN — LIDOCAINE HYDROCHLORIDE 14 ML: 10 INJECTION, SOLUTION EPIDURAL; INFILTRATION; INTRACAUDAL; PERINEURAL at 14:22

## 2019-01-01 RX ADMIN — METHYLPREDNISOLONE SODIUM SUCCINATE 40 MG: 40 INJECTION, POWDER, FOR SOLUTION INTRAMUSCULAR; INTRAVENOUS at 03:00

## 2019-01-01 RX ADMIN — ALBUMIN (HUMAN) 12.5 G: 12.5 SOLUTION INTRAVENOUS at 20:39

## 2019-01-01 RX ADMIN — CLOPIDOGREL BISULFATE 75 MG: 75 TABLET, FILM COATED ORAL at 08:58

## 2019-01-01 RX ADMIN — FUROSEMIDE 10 MG: 20 TABLET ORAL at 18:02

## 2019-01-01 RX ADMIN — INSULIN LISPRO 6 UNITS: 100 INJECTION, SOLUTION INTRAVENOUS; SUBCUTANEOUS at 21:30

## 2019-01-01 RX ADMIN — TAMSULOSIN HYDROCHLORIDE 0.4 MG: 0.4 CAPSULE ORAL at 08:50

## 2019-01-01 RX ADMIN — INSULIN LISPRO 4 UNITS: 100 INJECTION, SOLUTION INTRAVENOUS; SUBCUTANEOUS at 17:03

## 2019-01-01 RX ADMIN — BUDESONIDE 500 MCG: 0.5 INHALANT RESPIRATORY (INHALATION) at 08:55

## 2019-01-01 RX ADMIN — BUDESONIDE AND FORMOTEROL FUMARATE DIHYDRATE 2 PUFF: 160; 4.5 AEROSOL RESPIRATORY (INHALATION) at 10:23

## 2019-01-01 RX ADMIN — Medication 2 TABLET: at 08:47

## 2019-01-01 RX ADMIN — INSULIN GLARGINE 5 UNITS: 100 INJECTION, SOLUTION SUBCUTANEOUS at 08:36

## 2019-01-01 RX ADMIN — DEXTROSE MONOHYDRATE 50 ML/HR: 5 INJECTION, SOLUTION INTRAVENOUS at 23:00

## 2019-01-01 RX ADMIN — FAMOTIDINE 20 MG: 20 TABLET ORAL at 10:13

## 2019-01-01 RX ADMIN — FUROSEMIDE 20 MG: 20 TABLET ORAL at 10:31

## 2019-01-01 RX ADMIN — ASPIRIN 81 MG 81 MG: 81 TABLET ORAL at 09:28

## 2019-01-01 RX ADMIN — FUROSEMIDE 10 MG: 20 TABLET ORAL at 18:29

## 2019-01-01 RX ADMIN — TAMSULOSIN HYDROCHLORIDE 0.4 MG: 0.4 CAPSULE ORAL at 09:24

## 2019-01-01 RX ADMIN — BUDESONIDE AND FORMOTEROL FUMARATE DIHYDRATE 2 PUFF: 160; 4.5 AEROSOL RESPIRATORY (INHALATION) at 20:46

## 2019-01-01 RX ADMIN — POTASSIUM CHLORIDE 10 MEQ: 10 TABLET, EXTENDED RELEASE ORAL at 17:08

## 2019-01-01 RX ADMIN — FUROSEMIDE 10 MG: 10 INJECTION, SOLUTION INTRAVENOUS at 03:48

## 2019-01-01 RX ADMIN — GUAIFENESIN 400 MG: 200 SOLUTION ORAL at 23:01

## 2019-01-01 RX ADMIN — PIPERACILLIN SODIUM AND TAZOBACTAM SODIUM 3.38 G: 36; 4.5 INJECTION, POWDER, FOR SOLUTION INTRAVENOUS at 14:21

## 2019-01-01 RX ADMIN — AMOXICILLIN AND CLAVULANATE POTASSIUM 1 TABLET: 875; 125 TABLET, FILM COATED ORAL at 09:07

## 2019-01-01 RX ADMIN — PIPERACILLIN SODIUM,TAZOBACTAM SODIUM 4.5 G: 4; .5 INJECTION, POWDER, FOR SOLUTION INTRAVENOUS at 06:32

## 2019-01-01 RX ADMIN — HEPARIN SODIUM 5000 UNITS: 5000 INJECTION INTRAVENOUS; SUBCUTANEOUS at 06:24

## 2019-01-01 RX ADMIN — IPRATROPIUM BROMIDE AND ALBUTEROL SULFATE 3 ML: .5; 3 SOLUTION RESPIRATORY (INHALATION) at 00:25

## 2019-01-01 RX ADMIN — Medication 10 ML: at 06:10

## 2019-01-01 RX ADMIN — MIDODRINE HYDROCHLORIDE 10 MG: 5 TABLET ORAL at 15:28

## 2019-01-01 RX ADMIN — BUDESONIDE 500 MCG: 0.5 INHALANT RESPIRATORY (INHALATION) at 09:05

## 2019-01-01 RX ADMIN — LEVOFLOXACIN 750 MG: 750 INJECTION, SOLUTION INTRAVENOUS at 16:31

## 2019-01-01 RX ADMIN — BUDESONIDE AND FORMOTEROL FUMARATE DIHYDRATE 2 PUFF: 160; 4.5 AEROSOL RESPIRATORY (INHALATION) at 10:19

## 2019-01-01 RX ADMIN — FAMOTIDINE 20 MG: 20 TABLET ORAL at 09:35

## 2019-01-01 RX ADMIN — FAMOTIDINE 20 MG: 20 TABLET ORAL at 08:36

## 2019-01-01 RX ADMIN — ARFORMOTEROL TARTRATE 15 MCG: 15 SOLUTION RESPIRATORY (INHALATION) at 19:55

## 2019-01-01 RX ADMIN — DOCUSATE SODIUM 100 MG: 100 CAPSULE, LIQUID FILLED ORAL at 08:39

## 2019-01-01 RX ADMIN — TAMSULOSIN HYDROCHLORIDE 0.4 MG: 0.4 CAPSULE ORAL at 09:05

## 2019-01-01 RX ADMIN — FUROSEMIDE 20 MG: 20 TABLET ORAL at 10:25

## 2019-01-01 RX ADMIN — IPRATROPIUM BROMIDE AND ALBUTEROL SULFATE 3 ML: .5; 3 SOLUTION RESPIRATORY (INHALATION) at 20:46

## 2019-01-01 RX ADMIN — ATORVASTATIN CALCIUM 40 MG: 40 TABLET, FILM COATED ORAL at 21:33

## 2019-01-01 RX ADMIN — Medication 1 TABLET: at 08:35

## 2019-01-01 RX ADMIN — POTASSIUM CHLORIDE 10 MEQ: 750 TABLET, EXTENDED RELEASE ORAL at 09:06

## 2019-01-01 RX ADMIN — IPRATROPIUM BROMIDE AND ALBUTEROL SULFATE 3 ML: .5; 3 SOLUTION RESPIRATORY (INHALATION) at 20:44

## 2019-01-01 RX ADMIN — TAMSULOSIN HYDROCHLORIDE 0.4 MG: 0.4 CAPSULE ORAL at 10:03

## 2019-01-01 RX ADMIN — INSULIN LISPRO 4 UNITS: 100 INJECTION, SOLUTION INTRAVENOUS; SUBCUTANEOUS at 17:05

## 2019-01-01 RX ADMIN — FAMOTIDINE 20 MG: 20 TABLET ORAL at 09:16

## 2019-01-01 RX ADMIN — DIGOXIN 0.12 MG: 125 TABLET ORAL at 22:19

## 2019-01-01 RX ADMIN — FUROSEMIDE 20 MG: 20 TABLET ORAL at 09:13

## 2019-01-01 RX ADMIN — LEVOFLOXACIN 750 MG: 5 INJECTION, SOLUTION INTRAVENOUS at 16:46

## 2019-01-01 RX ADMIN — Medication 10 ML: at 21:42

## 2019-01-01 RX ADMIN — CARVEDILOL 3.12 MG: 3.12 TABLET, FILM COATED ORAL at 10:05

## 2019-01-01 RX ADMIN — POTASSIUM CHLORIDE 20 MEQ: 750 TABLET, EXTENDED RELEASE ORAL at 09:58

## 2019-01-01 RX ADMIN — PREDNISONE 20 MG: 20 TABLET ORAL at 08:48

## 2019-01-01 RX ADMIN — TAMSULOSIN HYDROCHLORIDE 0.4 MG: 0.4 CAPSULE ORAL at 10:31

## 2019-01-01 RX ADMIN — THERA TABS 1 TABLET: TAB at 09:11

## 2019-01-01 RX ADMIN — CLOPIDOGREL BISULFATE 75 MG: 75 TABLET, FILM COATED ORAL at 12:16

## 2019-01-01 RX ADMIN — PIPERACILLIN SODIUM AND TAZOBACTAM SODIUM 3.38 G: 36; 4.5 INJECTION, POWDER, FOR SOLUTION INTRAVENOUS at 13:31

## 2019-01-01 RX ADMIN — HEPARIN SODIUM 5000 UNITS: 5000 INJECTION INTRAVENOUS; SUBCUTANEOUS at 08:34

## 2019-01-01 RX ADMIN — BUDESONIDE 500 MCG: 0.5 INHALANT RESPIRATORY (INHALATION) at 21:06

## 2019-01-01 RX ADMIN — FUROSEMIDE 20 MG: 20 TABLET ORAL at 10:13

## 2019-01-01 RX ADMIN — PIPERACILLIN SODIUM AND TAZOBACTAM SODIUM 3.38 G: 36; 4.5 INJECTION, POWDER, FOR SOLUTION INTRAVENOUS at 15:02

## 2019-01-01 RX ADMIN — Medication 10 ML: at 16:39

## 2019-01-01 RX ADMIN — IPRATROPIUM BROMIDE 0.5 MG: 0.5 SOLUTION RESPIRATORY (INHALATION) at 07:56

## 2019-01-01 RX ADMIN — FUROSEMIDE 10 MG: 20 TABLET ORAL at 08:53

## 2019-01-01 RX ADMIN — IPRATROPIUM BROMIDE AND ALBUTEROL SULFATE 3 ML: .5; 3 SOLUTION RESPIRATORY (INHALATION) at 16:08

## 2019-01-01 RX ADMIN — IPRATROPIUM BROMIDE AND ALBUTEROL SULFATE 3 ML: .5; 3 SOLUTION RESPIRATORY (INHALATION) at 19:25

## 2019-01-01 RX ADMIN — Medication 2 TABLET: at 08:35

## 2019-01-01 RX ADMIN — POTASSIUM CHLORIDE 10 MEQ: 10 TABLET, EXTENDED RELEASE ORAL at 18:29

## 2019-01-01 RX ADMIN — ARFORMOTEROL TARTRATE 15 MCG: 15 SOLUTION RESPIRATORY (INHALATION) at 09:08

## 2019-01-01 RX ADMIN — ASPIRIN 81 MG 81 MG: 81 TABLET ORAL at 09:12

## 2019-01-01 RX ADMIN — BUDESONIDE AND FORMOTEROL FUMARATE DIHYDRATE 2 PUFF: 160; 4.5 AEROSOL RESPIRATORY (INHALATION) at 07:36

## 2019-01-01 RX ADMIN — DEXTROSE MONOHYDRATE 100 ML/HR: 5 INJECTION, SOLUTION INTRAVENOUS at 21:17

## 2019-01-01 RX ADMIN — POTASSIUM CHLORIDE 40 MEQ: 20 TABLET, EXTENDED RELEASE ORAL at 14:47

## 2019-01-01 RX ADMIN — BUDESONIDE AND FORMOTEROL FUMARATE DIHYDRATE 2 PUFF: 160; 4.5 AEROSOL RESPIRATORY (INHALATION) at 18:00

## 2019-01-01 RX ADMIN — Medication 2 TABLET: at 09:58

## 2019-01-01 RX ADMIN — AMOXICILLIN AND CLAVULANATE POTASSIUM 1 TABLET: 875; 125 TABLET, FILM COATED ORAL at 09:58

## 2019-01-01 RX ADMIN — DIGOXIN 0.12 MG: 125 TABLET ORAL at 09:58

## 2019-01-01 RX ADMIN — ARFORMOTEROL TARTRATE 15 MCG: 15 SOLUTION RESPIRATORY (INHALATION) at 08:09

## 2019-01-01 RX ADMIN — BARIUM SULFATE 15 ML: 400 SUSPENSION ORAL at 09:47

## 2019-01-01 RX ADMIN — MIDODRINE HYDROCHLORIDE 5 MG: 5 TABLET ORAL at 09:16

## 2019-01-01 RX ADMIN — FAMOTIDINE 20 MG: 20 TABLET ORAL at 09:04

## 2019-01-01 RX ADMIN — FUROSEMIDE 20 MG: 40 TABLET ORAL at 09:15

## 2019-01-01 RX ADMIN — SODIUM CHLORIDE 750 MG: 900 INJECTION, SOLUTION INTRAVENOUS at 19:29

## 2019-01-01 RX ADMIN — DEXTROSE MONOHYDRATE 50 ML/HR: 5 INJECTION, SOLUTION INTRAVENOUS at 00:52

## 2019-01-01 RX ADMIN — BARIUM SULFATE 15 ML: 400 PASTE ORAL at 09:48

## 2019-01-01 RX ADMIN — ASPIRIN 81 MG: 81 TABLET, COATED ORAL at 10:03

## 2019-01-01 RX ADMIN — IPRATROPIUM BROMIDE AND ALBUTEROL SULFATE 3 ML: .5; 3 SOLUTION RESPIRATORY (INHALATION) at 06:15

## 2019-01-01 RX ADMIN — CLOPIDOGREL BISULFATE 75 MG: 75 TABLET, FILM COATED ORAL at 08:20

## 2019-01-01 RX ADMIN — INSULIN GLARGINE 4 UNITS: 100 INJECTION, SOLUTION SUBCUTANEOUS at 10:04

## 2019-01-01 RX ADMIN — PIPERACILLIN SODIUM AND TAZOBACTAM SODIUM 3.38 G: 36; 4.5 INJECTION, POWDER, FOR SOLUTION INTRAVENOUS at 22:00

## 2019-01-01 RX ADMIN — PREDNISONE 20 MG: 20 TABLET ORAL at 08:58

## 2019-01-01 RX ADMIN — ACETAMINOPHEN 650 MG: 325 TABLET ORAL at 21:41

## 2019-01-01 RX ADMIN — BUDESONIDE 500 MCG: 0.5 INHALANT RESPIRATORY (INHALATION) at 19:54

## 2019-01-01 RX ADMIN — MIDODRINE HYDROCHLORIDE 10 MG: 10 TABLET ORAL at 17:12

## 2019-01-01 RX ADMIN — Medication 1 TABLET: at 08:42

## 2019-01-01 RX ADMIN — PIPERACILLIN SODIUM AND TAZOBACTAM SODIUM 3.38 G: 36; 4.5 INJECTION, POWDER, FOR SOLUTION INTRAVENOUS at 05:34

## 2019-01-01 RX ADMIN — TAMSULOSIN HYDROCHLORIDE 0.4 MG: 0.4 CAPSULE ORAL at 08:35

## 2019-01-01 RX ADMIN — INSULIN LISPRO 6 UNITS: 100 INJECTION, SOLUTION INTRAVENOUS; SUBCUTANEOUS at 21:38

## 2019-01-01 RX ADMIN — TAMSULOSIN HYDROCHLORIDE 0.4 MG: 0.4 CAPSULE ORAL at 08:47

## 2019-01-01 RX ADMIN — HEPARIN SODIUM 5000 UNITS: 5000 INJECTION INTRAVENOUS; SUBCUTANEOUS at 16:25

## 2019-01-01 RX ADMIN — Medication 10 ML: at 16:49

## 2019-01-01 RX ADMIN — BUDESONIDE AND FORMOTEROL FUMARATE DIHYDRATE 2 PUFF: 160; 4.5 AEROSOL RESPIRATORY (INHALATION) at 20:16

## 2019-01-01 RX ADMIN — CARVEDILOL 3.12 MG: 3.12 TABLET, FILM COATED ORAL at 09:15

## 2019-01-01 RX ADMIN — IPRATROPIUM BROMIDE AND ALBUTEROL SULFATE 3 ML: .5; 3 SOLUTION RESPIRATORY (INHALATION) at 16:09

## 2019-01-01 RX ADMIN — HEPARIN SODIUM 5000 UNITS: 5000 INJECTION INTRAVENOUS; SUBCUTANEOUS at 13:37

## 2019-01-01 RX ADMIN — IPRATROPIUM BROMIDE 0.5 MG: 0.5 SOLUTION RESPIRATORY (INHALATION) at 20:19

## 2019-01-01 RX ADMIN — BUDESONIDE AND FORMOTEROL FUMARATE DIHYDRATE 2 PUFF: 160; 4.5 AEROSOL RESPIRATORY (INHALATION) at 19:47

## 2019-01-01 RX ADMIN — TAMSULOSIN HYDROCHLORIDE 0.4 MG: 0.4 CAPSULE ORAL at 10:25

## 2019-01-01 RX ADMIN — LEVOFLOXACIN 750 MG: 750 TABLET, FILM COATED ORAL at 18:22

## 2019-01-01 RX ADMIN — MIDODRINE HYDROCHLORIDE 10 MG: 5 TABLET ORAL at 19:40

## 2019-01-01 RX ADMIN — BUDESONIDE 500 MCG: 0.5 INHALANT RESPIRATORY (INHALATION) at 20:38

## 2019-01-01 RX ADMIN — ARFORMOTEROL TARTRATE 15 MCG: 15 SOLUTION RESPIRATORY (INHALATION) at 19:30

## 2019-01-01 RX ADMIN — ARFORMOTEROL TARTRATE 15 MCG: 15 SOLUTION RESPIRATORY (INHALATION) at 08:55

## 2019-01-01 RX ADMIN — GUAIFENESIN 600 MG: 600 TABLET, EXTENDED RELEASE ORAL at 09:38

## 2019-01-01 RX ADMIN — IPRATROPIUM BROMIDE AND ALBUTEROL SULFATE 3 ML: .5; 3 SOLUTION RESPIRATORY (INHALATION) at 09:47

## 2019-01-01 RX ADMIN — CLOPIDOGREL BISULFATE 75 MG: 75 TABLET, FILM COATED ORAL at 08:53

## 2019-01-01 RX ADMIN — Medication 1 TABLET: at 09:27

## 2019-01-01 RX ADMIN — DILTIAZEM HYDROCHLORIDE 60 MG: 60 TABLET, FILM COATED ORAL at 17:05

## 2019-01-01 RX ADMIN — Medication 1 TABLET: at 09:03

## 2019-01-01 RX ADMIN — ATORVASTATIN CALCIUM 40 MG: 40 TABLET, FILM COATED ORAL at 22:00

## 2019-01-01 RX ADMIN — TAMSULOSIN HYDROCHLORIDE 0.4 MG: 0.4 CAPSULE ORAL at 11:03

## 2019-01-01 RX ADMIN — ARFORMOTEROL TARTRATE 15 MCG: 15 SOLUTION RESPIRATORY (INHALATION) at 20:38

## 2019-01-01 RX ADMIN — DEXTROSE MONOHYDRATE 100 ML/HR: 5 INJECTION, SOLUTION INTRAVENOUS at 10:07

## 2019-01-01 RX ADMIN — HEPARIN SODIUM 5000 UNITS: 5000 INJECTION INTRAVENOUS; SUBCUTANEOUS at 17:30

## 2019-01-01 RX ADMIN — ATORVASTATIN CALCIUM 40 MG: 40 TABLET, FILM COATED ORAL at 21:06

## 2019-01-01 RX ADMIN — IPRATROPIUM BROMIDE AND ALBUTEROL SULFATE 3 ML: .5; 3 SOLUTION RESPIRATORY (INHALATION) at 13:38

## 2019-01-01 RX ADMIN — Medication 10 ML: at 21:33

## 2019-01-01 RX ADMIN — HEPARIN SODIUM 5000 UNITS: 5000 INJECTION INTRAVENOUS; SUBCUTANEOUS at 06:43

## 2019-01-01 RX ADMIN — METOPROLOL TARTRATE 2.5 MG: 5 INJECTION INTRAVENOUS at 00:19

## 2019-01-01 RX ADMIN — MIDODRINE HYDROCHLORIDE 10 MG: 5 TABLET ORAL at 10:30

## 2019-01-01 RX ADMIN — DILTIAZEM HYDROCHLORIDE 60 MG: 60 TABLET, FILM COATED ORAL at 05:29

## 2019-01-01 RX ADMIN — TAMSULOSIN HYDROCHLORIDE 0.4 MG: 0.4 CAPSULE ORAL at 09:38

## 2019-01-01 RX ADMIN — ASPIRIN 81 MG 81 MG: 81 TABLET ORAL at 09:37

## 2019-01-01 RX ADMIN — IPRATROPIUM BROMIDE AND ALBUTEROL SULFATE 3 ML: .5; 3 SOLUTION RESPIRATORY (INHALATION) at 19:45

## 2019-01-01 RX ADMIN — ATORVASTATIN CALCIUM 40 MG: 40 TABLET, FILM COATED ORAL at 22:10

## 2019-01-01 RX ADMIN — FUROSEMIDE 10 MG: 20 TABLET ORAL at 17:55

## 2019-01-01 RX ADMIN — Medication 10 ML: at 07:17

## 2019-01-01 RX ADMIN — METOPROLOL TARTRATE 12.5 MG: 25 TABLET ORAL at 10:24

## 2019-01-01 RX ADMIN — FUROSEMIDE 20 MG: 20 TABLET ORAL at 09:34

## 2019-01-01 RX ADMIN — BUDESONIDE 500 MCG: 0.5 INHALANT RESPIRATORY (INHALATION) at 07:54

## 2019-01-01 RX ADMIN — IPRATROPIUM BROMIDE AND ALBUTEROL SULFATE 3 ML: .5; 3 SOLUTION RESPIRATORY (INHALATION) at 00:06

## 2019-01-01 RX ADMIN — IPRATROPIUM BROMIDE 0.5 MG: 0.5 SOLUTION RESPIRATORY (INHALATION) at 12:32

## 2019-01-01 RX ADMIN — INSULIN LISPRO 3 UNITS: 100 INJECTION, SOLUTION INTRAVENOUS; SUBCUTANEOUS at 10:16

## 2019-01-01 RX ADMIN — HEPARIN SODIUM AND DEXTROSE 15.02 UNITS/KG/HR: 10000; 5 INJECTION INTRAVENOUS at 12:01

## 2019-01-01 RX ADMIN — TAMSULOSIN HYDROCHLORIDE 0.4 MG: 0.4 CAPSULE ORAL at 09:19

## 2019-01-01 RX ADMIN — ALBUTEROL SULFATE 1.25 MG: 1.25 SOLUTION RESPIRATORY (INHALATION) at 03:51

## 2019-01-01 RX ADMIN — Medication 2 TABLET: at 08:52

## 2019-01-01 RX ADMIN — POTASSIUM CHLORIDE 10 MEQ: 750 TABLET, EXTENDED RELEASE ORAL at 11:16

## 2019-01-01 RX ADMIN — IPRATROPIUM BROMIDE 0.5 MG: 0.5 SOLUTION RESPIRATORY (INHALATION) at 20:24

## 2019-01-01 RX ADMIN — PIPERACILLIN SODIUM,TAZOBACTAM SODIUM 4.5 G: 4; .5 INJECTION, POWDER, FOR SOLUTION INTRAVENOUS at 06:23

## 2019-01-01 RX ADMIN — CLOPIDOGREL BISULFATE 75 MG: 75 TABLET, FILM COATED ORAL at 10:14

## 2019-01-01 RX ADMIN — HEPARIN SODIUM 5000 UNITS: 5000 INJECTION INTRAVENOUS; SUBCUTANEOUS at 14:00

## 2019-01-01 RX ADMIN — Medication 1 TABLET: at 08:58

## 2019-01-01 RX ADMIN — Medication 10 ML: at 06:08

## 2019-01-01 RX ADMIN — ATORVASTATIN CALCIUM 40 MG: 40 TABLET, FILM COATED ORAL at 22:21

## 2019-01-01 RX ADMIN — ATORVASTATIN CALCIUM 40 MG: 40 TABLET, FILM COATED ORAL at 22:19

## 2019-01-01 RX ADMIN — ATORVASTATIN CALCIUM 40 MG: 40 TABLET, FILM COATED ORAL at 22:11

## 2019-01-01 RX ADMIN — BUDESONIDE 500 MCG: 0.5 INHALANT RESPIRATORY (INHALATION) at 21:28

## 2019-01-01 RX ADMIN — MIDODRINE HYDROCHLORIDE 10 MG: 2.5 TABLET ORAL at 22:59

## 2019-01-01 RX ADMIN — IPRATROPIUM BROMIDE AND ALBUTEROL SULFATE 3 ML: .5; 3 SOLUTION RESPIRATORY (INHALATION) at 00:30

## 2019-01-01 RX ADMIN — CLOPIDOGREL BISULFATE 75 MG: 75 TABLET, FILM COATED ORAL at 09:35

## 2019-01-01 RX ADMIN — GUAIFENESIN 400 MG: 200 SOLUTION ORAL at 08:37

## 2019-01-01 RX ADMIN — BUDESONIDE 500 MCG: 0.5 INHALANT RESPIRATORY (INHALATION) at 20:51

## 2019-01-01 RX ADMIN — METOPROLOL TARTRATE 2.5 MG: 5 INJECTION INTRAVENOUS at 02:32

## 2019-01-01 RX ADMIN — TAMSULOSIN HYDROCHLORIDE 0.4 MG: 0.4 CAPSULE ORAL at 09:35

## 2019-01-01 RX ADMIN — METHYLPREDNISOLONE SODIUM SUCCINATE 20 MG: 40 INJECTION, POWDER, FOR SOLUTION INTRAMUSCULAR; INTRAVENOUS at 22:21

## 2019-01-01 RX ADMIN — ARFORMOTEROL TARTRATE 15 MCG: 15 SOLUTION RESPIRATORY (INHALATION) at 14:11

## 2019-01-01 RX ADMIN — LIDOCAINE HYDROCHLORIDE 10 ML: 10 INJECTION, SOLUTION INFILTRATION; PERINEURAL at 14:06

## 2019-01-01 RX ADMIN — HEPARIN SODIUM 3000 UNITS: 1000 INJECTION INTRAVENOUS; SUBCUTANEOUS at 09:40

## 2019-01-01 RX ADMIN — ARFORMOTEROL TARTRATE 15 MCG: 15 SOLUTION RESPIRATORY (INHALATION) at 09:35

## 2019-01-01 RX ADMIN — DEXTROSE MONOHYDRATE 25 ML/HR: 5 INJECTION, SOLUTION INTRAVENOUS at 09:57

## 2019-01-01 RX ADMIN — ARFORMOTEROL TARTRATE 15 MCG: 15 SOLUTION RESPIRATORY (INHALATION) at 08:30

## 2019-01-01 RX ADMIN — DEXTROSE MONOHYDRATE 50 ML/HR: 5 INJECTION, SOLUTION INTRAVENOUS at 09:35

## 2019-01-01 RX ADMIN — ARFORMOTEROL TARTRATE 15 MCG: 15 SOLUTION RESPIRATORY (INHALATION) at 20:50

## 2019-01-01 RX ADMIN — ACETAMINOPHEN 650 MG: 325 TABLET ORAL at 16:42

## 2019-01-01 RX ADMIN — WATER 2 G: 1 INJECTION INTRAMUSCULAR; INTRAVENOUS; SUBCUTANEOUS at 18:01

## 2019-01-01 RX ADMIN — ASPIRIN 81 MG 81 MG: 81 TABLET ORAL at 09:05

## 2019-01-01 RX ADMIN — WATER 2 G: 1 INJECTION INTRAMUSCULAR; INTRAVENOUS; SUBCUTANEOUS at 06:10

## 2019-01-01 RX ADMIN — Medication 2 TABLET: at 17:38

## 2019-01-01 RX ADMIN — BUDESONIDE 500 MCG: 0.5 INHALANT RESPIRATORY (INHALATION) at 09:10

## 2019-01-01 RX ADMIN — DILTIAZEM HYDROCHLORIDE 60 MG: 60 TABLET, FILM COATED ORAL at 00:04

## 2019-01-01 RX ADMIN — BUDESONIDE 500 MCG: 0.5 INHALANT RESPIRATORY (INHALATION) at 08:46

## 2019-01-01 RX ADMIN — Medication 10 ML: at 21:37

## 2019-01-01 RX ADMIN — FAMOTIDINE 20 MG: 20 TABLET ORAL at 08:52

## 2019-01-01 RX ADMIN — WATER 2 G: 1 INJECTION INTRAMUSCULAR; INTRAVENOUS; SUBCUTANEOUS at 17:49

## 2019-01-01 RX ADMIN — DOCUSATE SODIUM 100 MG: 100 CAPSULE, LIQUID FILLED ORAL at 08:02

## 2019-01-01 RX ADMIN — METOPROLOL TARTRATE 12.5 MG: 25 TABLET, FILM COATED ORAL at 18:00

## 2019-01-01 RX ADMIN — Medication 1 TABLET: at 09:15

## 2019-01-01 RX ADMIN — ASPIRIN 81 MG 81 MG: 81 TABLET ORAL at 08:22

## 2019-01-01 RX ADMIN — DILTIAZEM HYDROCHLORIDE 60 MG: 60 TABLET, FILM COATED ORAL at 17:22

## 2019-01-01 RX ADMIN — FAMOTIDINE 20 MG: 20 TABLET ORAL at 08:33

## 2019-01-01 RX ADMIN — IPRATROPIUM BROMIDE AND ALBUTEROL SULFATE 3 ML: .5; 3 SOLUTION RESPIRATORY (INHALATION) at 14:25

## 2019-01-01 RX ADMIN — POTASSIUM CHLORIDE 40 MEQ: 20 TABLET, EXTENDED RELEASE ORAL at 17:35

## 2019-01-01 RX ADMIN — METHYLPREDNISOLONE SODIUM SUCCINATE 40 MG: 40 INJECTION, POWDER, FOR SOLUTION INTRAMUSCULAR; INTRAVENOUS at 12:41

## 2019-01-01 RX ADMIN — Medication 10 ML: at 06:46

## 2019-01-01 RX ADMIN — IPRATROPIUM BROMIDE AND ALBUTEROL SULFATE 3 ML: .5; 3 SOLUTION RESPIRATORY (INHALATION) at 14:45

## 2019-01-01 RX ADMIN — HEPARIN SODIUM 5000 UNITS: 5000 INJECTION INTRAVENOUS; SUBCUTANEOUS at 13:45

## 2019-01-01 RX ADMIN — IPRATROPIUM BROMIDE AND ALBUTEROL SULFATE 3 ML: .5; 3 SOLUTION RESPIRATORY (INHALATION) at 20:20

## 2019-01-01 RX ADMIN — IPRATROPIUM BROMIDE 0.5 MG: 0.5 SOLUTION RESPIRATORY (INHALATION) at 13:17

## 2019-01-01 RX ADMIN — IPRATROPIUM BROMIDE AND ALBUTEROL SULFATE 3 ML: .5; 3 SOLUTION RESPIRATORY (INHALATION) at 08:00

## 2019-01-01 RX ADMIN — ATORVASTATIN CALCIUM 40 MG: 40 TABLET, FILM COATED ORAL at 21:45

## 2019-01-01 RX ADMIN — METOPROLOL TARTRATE 12.5 MG: 25 TABLET, FILM COATED ORAL at 18:29

## 2019-01-01 RX ADMIN — INSULIN LISPRO 3 UNITS: 100 INJECTION, SOLUTION INTRAVENOUS; SUBCUTANEOUS at 22:25

## 2019-01-01 RX ADMIN — MIDODRINE HYDROCHLORIDE 10 MG: 10 TABLET ORAL at 11:39

## 2019-01-01 RX ADMIN — BUDESONIDE 500 MCG: 0.5 INHALANT RESPIRATORY (INHALATION) at 07:57

## 2019-01-01 RX ADMIN — POTASSIUM CHLORIDE 10 MEQ: 10 TABLET, EXTENDED RELEASE ORAL at 09:20

## 2019-01-01 RX ADMIN — MIDODRINE HYDROCHLORIDE 10 MG: 5 TABLET ORAL at 17:55

## 2019-01-01 RX ADMIN — ARFORMOTEROL TARTRATE 15 MCG: 15 SOLUTION RESPIRATORY (INHALATION) at 10:03

## 2019-01-01 RX ADMIN — MIDODRINE HYDROCHLORIDE 5 MG: 10 TABLET ORAL at 10:28

## 2019-01-01 RX ADMIN — IPRATROPIUM BROMIDE AND ALBUTEROL SULFATE 3 ML: .5; 3 SOLUTION RESPIRATORY (INHALATION) at 16:16

## 2019-01-01 RX ADMIN — LIDOCAINE HYDROCHLORIDE 1 ML: 10 INJECTION, SOLUTION EPIDURAL; INFILTRATION; INTRACAUDAL; PERINEURAL at 10:00

## 2019-01-01 RX ADMIN — MIDODRINE HYDROCHLORIDE 10 MG: 10 TABLET ORAL at 08:20

## 2019-01-01 RX ADMIN — PIPERACILLIN SODIUM,TAZOBACTAM SODIUM 3.38 G: 3; .375 INJECTION, POWDER, FOR SOLUTION INTRAVENOUS at 09:29

## 2019-01-01 RX ADMIN — CARVEDILOL 3.12 MG: 3.12 TABLET, FILM COATED ORAL at 18:10

## 2019-01-01 RX ADMIN — CLOPIDOGREL BISULFATE 75 MG: 75 TABLET, FILM COATED ORAL at 12:14

## 2019-01-01 RX ADMIN — HEPARIN SODIUM 5000 UNITS: 5000 INJECTION INTRAVENOUS; SUBCUTANEOUS at 15:28

## 2019-01-01 RX ADMIN — BUDESONIDE 500 MCG: 0.5 INHALANT RESPIRATORY (INHALATION) at 19:39

## 2019-01-01 RX ADMIN — MIDODRINE HYDROCHLORIDE 10 MG: 10 TABLET ORAL at 18:30

## 2019-01-01 RX ADMIN — THERA TABS 1 TABLET: TAB at 09:34

## 2019-01-01 RX ADMIN — FAMOTIDINE 20 MG: 20 TABLET ORAL at 10:30

## 2019-01-01 RX ADMIN — Medication 1 TABLET: at 08:29

## 2019-01-01 RX ADMIN — POTASSIUM CHLORIDE 40 MEQ: 1.5 POWDER, FOR SOLUTION ORAL at 18:00

## 2019-01-01 RX ADMIN — GUAIFENESIN 400 MG: 200 SOLUTION ORAL at 17:12

## 2019-01-01 RX ADMIN — MIDODRINE HYDROCHLORIDE 10 MG: 10 TABLET ORAL at 15:47

## 2019-01-01 RX ADMIN — Medication 1 TABLET: at 09:24

## 2019-01-01 RX ADMIN — ATORVASTATIN CALCIUM 40 MG: 40 TABLET, FILM COATED ORAL at 21:25

## 2019-01-01 RX ADMIN — DILTIAZEM HYDROCHLORIDE 60 MG: 60 TABLET, FILM COATED ORAL at 11:45

## 2019-01-01 RX ADMIN — SODIUM CHLORIDE 750 MG: 900 INJECTION, SOLUTION INTRAVENOUS at 20:34

## 2019-01-01 RX ADMIN — DEXTROSE MONOHYDRATE 50 ML/HR: 5 INJECTION, SOLUTION INTRAVENOUS at 11:28

## 2019-01-01 RX ADMIN — HEPARIN SODIUM 5000 UNITS: 5000 INJECTION INTRAVENOUS; SUBCUTANEOUS at 01:01

## 2019-01-01 RX ADMIN — ATORVASTATIN CALCIUM 40 MG: 40 TABLET, FILM COATED ORAL at 21:20

## 2019-01-01 RX ADMIN — DIGOXIN 0.12 MG: 125 TABLET ORAL at 20:33

## 2019-01-01 RX ADMIN — BUDESONIDE 500 MCG: 0.5 INHALANT RESPIRATORY (INHALATION) at 09:02

## 2019-01-01 RX ADMIN — METHYLPREDNISOLONE SODIUM SUCCINATE 20 MG: 40 INJECTION, POWDER, FOR SOLUTION INTRAMUSCULAR; INTRAVENOUS at 21:39

## 2019-01-01 RX ADMIN — MIDODRINE HYDROCHLORIDE 10 MG: 5 TABLET ORAL at 11:52

## 2019-01-01 RX ADMIN — Medication 2 TABLET: at 17:05

## 2019-01-01 RX ADMIN — FUROSEMIDE 20 MG: 20 TABLET ORAL at 09:11

## 2019-01-01 RX ADMIN — MIDODRINE HYDROCHLORIDE 10 MG: 5 TABLET ORAL at 18:29

## 2019-01-01 RX ADMIN — IPRATROPIUM BROMIDE AND ALBUTEROL SULFATE 3 ML: .5; 3 SOLUTION RESPIRATORY (INHALATION) at 07:39

## 2019-01-01 RX ADMIN — ASPIRIN 81 MG 81 MG: 81 TABLET ORAL at 09:03

## 2019-01-01 RX ADMIN — BUDESONIDE 500 MCG: 0.5 INHALANT RESPIRATORY (INHALATION) at 20:29

## 2019-01-01 RX ADMIN — Medication 10 ML: at 07:06

## 2019-01-01 RX ADMIN — IPRATROPIUM BROMIDE AND ALBUTEROL SULFATE 3 ML: .5; 3 SOLUTION RESPIRATORY (INHALATION) at 08:29

## 2019-01-01 RX ADMIN — Medication 1 TABLET: at 09:58

## 2019-01-01 RX ADMIN — PIPERACILLIN SODIUM AND TAZOBACTAM SODIUM 3.38 G: 36; 4.5 INJECTION, POWDER, FOR SOLUTION INTRAVENOUS at 22:21

## 2019-01-01 RX ADMIN — ATORVASTATIN CALCIUM 40 MG: 40 TABLET, FILM COATED ORAL at 21:59

## 2019-01-01 RX ADMIN — CLOPIDOGREL BISULFATE 75 MG: 75 TABLET, FILM COATED ORAL at 09:24

## 2019-01-01 RX ADMIN — BUDESONIDE 500 MCG: 0.5 INHALANT RESPIRATORY (INHALATION) at 08:29

## 2019-01-01 RX ADMIN — FUROSEMIDE 20 MG: 20 TABLET ORAL at 08:33

## 2019-01-01 RX ADMIN — CLOPIDOGREL BISULFATE 75 MG: 75 TABLET, FILM COATED ORAL at 09:07

## 2019-01-01 RX ADMIN — LISINOPRIL 5 MG: 5 TABLET ORAL at 09:15

## 2019-01-01 RX ADMIN — HEPARIN SODIUM 5000 UNITS: 5000 INJECTION INTRAVENOUS; SUBCUTANEOUS at 06:07

## 2019-01-01 RX ADMIN — TAMSULOSIN HYDROCHLORIDE 0.4 MG: 0.4 CAPSULE ORAL at 09:03

## 2019-01-01 RX ADMIN — MIDODRINE HYDROCHLORIDE 5 MG: 10 TABLET ORAL at 17:12

## 2019-01-01 RX ADMIN — IPRATROPIUM BROMIDE AND ALBUTEROL SULFATE 3 ML: .5; 3 SOLUTION RESPIRATORY (INHALATION) at 12:01

## 2019-01-01 RX ADMIN — HEPARIN SODIUM 5000 UNITS: 5000 INJECTION INTRAVENOUS; SUBCUTANEOUS at 07:01

## 2019-01-01 RX ADMIN — Medication 10 ML: at 13:03

## 2019-01-01 RX ADMIN — ASPIRIN 81 MG 81 MG: 81 TABLET ORAL at 10:30

## 2019-01-01 RX ADMIN — IPRATROPIUM BROMIDE 0.5 MG: 0.5 SOLUTION RESPIRATORY (INHALATION) at 21:52

## 2019-01-01 RX ADMIN — IPRATROPIUM BROMIDE 0.5 MG: 0.5 SOLUTION RESPIRATORY (INHALATION) at 20:45

## 2019-01-01 RX ADMIN — BUDESONIDE AND FORMOTEROL FUMARATE DIHYDRATE 2 PUFF: 80; 4.5 AEROSOL RESPIRATORY (INHALATION) at 20:33

## 2019-01-01 RX ADMIN — CLOPIDOGREL BISULFATE 75 MG: 75 TABLET, FILM COATED ORAL at 08:50

## 2019-01-01 RX ADMIN — PIPERACILLIN SODIUM,TAZOBACTAM SODIUM 4.5 G: 4; .5 INJECTION, POWDER, FOR SOLUTION INTRAVENOUS at 22:00

## 2019-01-01 RX ADMIN — HEPARIN SODIUM 5000 UNITS: 5000 INJECTION INTRAVENOUS; SUBCUTANEOUS at 13:23

## 2019-01-01 RX ADMIN — FAMOTIDINE 20 MG: 20 TABLET ORAL at 08:35

## 2019-01-01 RX ADMIN — CLOPIDOGREL BISULFATE 75 MG: 75 TABLET, FILM COATED ORAL at 08:35

## 2019-01-01 RX ADMIN — CLOPIDOGREL BISULFATE 75 MG: 75 TABLET, FILM COATED ORAL at 09:04

## 2019-01-01 RX ADMIN — ARFORMOTEROL TARTRATE 15 MCG: 15 SOLUTION RESPIRATORY (INHALATION) at 07:57

## 2019-01-01 RX ADMIN — Medication 10 ML: at 05:54

## 2019-01-01 RX ADMIN — Medication 10 ML: at 21:32

## 2019-01-01 RX ADMIN — ATORVASTATIN CALCIUM 40 MG: 40 TABLET, FILM COATED ORAL at 21:12

## 2019-01-01 RX ADMIN — MIDODRINE HYDROCHLORIDE 10 MG: 10 TABLET ORAL at 09:27

## 2019-01-01 RX ADMIN — ARFORMOTEROL TARTRATE 15 MCG: 15 SOLUTION RESPIRATORY (INHALATION) at 22:12

## 2019-01-01 RX ADMIN — MIDODRINE HYDROCHLORIDE 10 MG: 5 TABLET ORAL at 10:24

## 2019-01-01 RX ADMIN — SODIUM CHLORIDE SOLN NEBU 3% 3 ML: 3 NEBU SOLN at 07:54

## 2019-01-01 RX ADMIN — IPRATROPIUM BROMIDE AND ALBUTEROL SULFATE 3 ML: .5; 3 SOLUTION RESPIRATORY (INHALATION) at 11:15

## 2019-01-01 RX ADMIN — MIDODRINE HYDROCHLORIDE 10 MG: 10 TABLET ORAL at 21:27

## 2019-01-01 RX ADMIN — IPRATROPIUM BROMIDE AND ALBUTEROL SULFATE 3 ML: .5; 3 SOLUTION RESPIRATORY (INHALATION) at 07:57

## 2019-01-01 RX ADMIN — ARFORMOTEROL TARTRATE 15 MCG: 15 SOLUTION RESPIRATORY (INHALATION) at 08:12

## 2019-01-01 RX ADMIN — HEPARIN SODIUM 5000 UNITS: 5000 INJECTION INTRAVENOUS; SUBCUTANEOUS at 05:40

## 2019-01-01 RX ADMIN — MIDODRINE HYDROCHLORIDE 10 MG: 5 TABLET ORAL at 13:04

## 2019-01-01 RX ADMIN — Medication 10 ML: at 22:23

## 2019-01-01 RX ADMIN — Medication 10 ML: at 21:46

## 2019-01-01 RX ADMIN — TAMSULOSIN HYDROCHLORIDE 0.4 MG: 0.4 CAPSULE ORAL at 08:48

## 2019-01-01 RX ADMIN — Medication 2 TABLET: at 12:15

## 2019-01-01 RX ADMIN — HEPARIN SODIUM 5000 UNITS: 5000 INJECTION INTRAVENOUS; SUBCUTANEOUS at 21:42

## 2019-01-01 RX ADMIN — DEXTROSE MONOHYDRATE 50 ML/HR: 5 INJECTION, SOLUTION INTRAVENOUS at 07:32

## 2019-01-01 RX ADMIN — Medication 10 ML: at 00:21

## 2019-01-01 RX ADMIN — ASPIRIN 81 MG 81 MG: 81 TABLET ORAL at 08:42

## 2019-01-01 RX ADMIN — Medication 2 TABLET: at 17:17

## 2019-01-01 RX ADMIN — GUAIFENESIN 400 MG: 200 SOLUTION ORAL at 17:03

## 2019-01-01 RX ADMIN — IPRATROPIUM BROMIDE AND ALBUTEROL SULFATE 3 ML: .5; 3 SOLUTION RESPIRATORY (INHALATION) at 01:15

## 2019-01-01 RX ADMIN — ASPIRIN 81 MG 81 MG: 81 TABLET ORAL at 09:27

## 2019-01-01 RX ADMIN — DOCUSATE SODIUM 100 MG: 100 CAPSULE, LIQUID FILLED ORAL at 18:10

## 2019-01-01 RX ADMIN — IPRATROPIUM BROMIDE 0.5 MG: 0.5 SOLUTION RESPIRATORY (INHALATION) at 19:43

## 2019-01-01 RX ADMIN — FUROSEMIDE 20 MG: 10 INJECTION, SOLUTION INTRAMUSCULAR; INTRAVENOUS at 12:55

## 2019-01-01 RX ADMIN — POTASSIUM CHLORIDE 10 MEQ: 7.46 INJECTION, SOLUTION INTRAVENOUS at 23:42

## 2019-01-01 RX ADMIN — POTASSIUM CHLORIDE 10 MEQ: 10 TABLET, EXTENDED RELEASE ORAL at 17:39

## 2019-01-01 RX ADMIN — IPRATROPIUM BROMIDE AND ALBUTEROL SULFATE 3 ML: .5; 3 SOLUTION RESPIRATORY (INHALATION) at 08:47

## 2019-01-01 RX ADMIN — POTASSIUM CHLORIDE 10 MEQ: 10 TABLET, EXTENDED RELEASE ORAL at 13:41

## 2019-01-01 RX ADMIN — HEPARIN SODIUM 5000 UNITS: 5000 INJECTION INTRAVENOUS; SUBCUTANEOUS at 05:29

## 2019-01-01 RX ADMIN — FUROSEMIDE 20 MG: 20 TABLET ORAL at 08:26

## 2019-01-01 RX ADMIN — POTASSIUM CHLORIDE 10 MEQ: 10 TABLET, EXTENDED RELEASE ORAL at 18:08

## 2019-01-01 RX ADMIN — VANCOMYCIN HYDROCHLORIDE 1500 MG: 10 INJECTION, POWDER, LYOPHILIZED, FOR SOLUTION INTRAVENOUS at 19:24

## 2019-01-01 RX ADMIN — Medication 2 TABLET: at 00:31

## 2019-01-01 RX ADMIN — HEPARIN SODIUM 5000 UNITS: 5000 INJECTION INTRAVENOUS; SUBCUTANEOUS at 22:34

## 2019-01-01 RX ADMIN — FAMOTIDINE 20 MG: 20 TABLET ORAL at 08:43

## 2019-01-01 RX ADMIN — POTASSIUM CHLORIDE 10 MEQ: 10 TABLET, EXTENDED RELEASE ORAL at 09:04

## 2019-01-01 RX ADMIN — MIDODRINE HYDROCHLORIDE 10 MG: 10 TABLET ORAL at 12:25

## 2019-01-01 RX ADMIN — DIGOXIN 250 MCG: 0.25 INJECTION INTRAMUSCULAR; INTRAVENOUS at 16:00

## 2019-01-01 RX ADMIN — Medication 1 TABLET: at 12:16

## 2019-01-01 RX ADMIN — GUAIFENESIN 600 MG: 600 TABLET, EXTENDED RELEASE ORAL at 11:45

## 2019-01-01 RX ADMIN — CLOPIDOGREL BISULFATE 75 MG: 75 TABLET, FILM COATED ORAL at 08:52

## 2019-01-01 RX ADMIN — Medication 10 ML: at 06:00

## 2019-01-01 RX ADMIN — IPRATROPIUM BROMIDE AND ALBUTEROL SULFATE 3 ML: .5; 3 SOLUTION RESPIRATORY (INHALATION) at 00:18

## 2019-01-01 RX ADMIN — IPRATROPIUM BROMIDE AND ALBUTEROL SULFATE 3 ML: .5; 3 SOLUTION RESPIRATORY (INHALATION) at 11:30

## 2019-01-01 RX ADMIN — ASPIRIN 81 MG 81 MG: 81 TABLET ORAL at 09:50

## 2019-01-01 RX ADMIN — IPRATROPIUM BROMIDE AND ALBUTEROL SULFATE 3 ML: .5; 3 SOLUTION RESPIRATORY (INHALATION) at 08:04

## 2019-01-01 RX ADMIN — Medication 1 TABLET: at 09:29

## 2019-01-01 RX ADMIN — IPRATROPIUM BROMIDE 0.5 MG: 0.5 SOLUTION RESPIRATORY (INHALATION) at 09:49

## 2019-01-01 RX ADMIN — VANCOMYCIN HYDROCHLORIDE 1250 MG: 10 INJECTION, POWDER, LYOPHILIZED, FOR SOLUTION INTRAVENOUS at 22:04

## 2019-01-01 RX ADMIN — ATORVASTATIN CALCIUM 40 MG: 40 TABLET, FILM COATED ORAL at 22:16

## 2019-01-01 RX ADMIN — Medication 10 ML: at 22:11

## 2019-01-01 RX ADMIN — MIDODRINE HYDROCHLORIDE 10 MG: 10 TABLET ORAL at 17:24

## 2019-01-01 RX ADMIN — HEPARIN SODIUM 5000 UNITS: 5000 INJECTION INTRAVENOUS; SUBCUTANEOUS at 21:17

## 2019-01-01 RX ADMIN — Medication 10 ML: at 21:36

## 2019-01-01 RX ADMIN — LIDOCAINE HYDROCHLORIDE 10 ML: 10 INJECTION, SOLUTION EPIDURAL; INFILTRATION; INTRACAUDAL; PERINEURAL at 11:00

## 2019-01-01 RX ADMIN — HEPARIN SODIUM 5000 UNITS: 5000 INJECTION INTRAVENOUS; SUBCUTANEOUS at 17:38

## 2019-01-01 RX ADMIN — IPRATROPIUM BROMIDE AND ALBUTEROL SULFATE 3 ML: .5; 3 SOLUTION RESPIRATORY (INHALATION) at 20:38

## 2019-01-01 RX ADMIN — PIPERACILLIN SODIUM,TAZOBACTAM SODIUM 4.5 G: 4; .5 INJECTION, POWDER, FOR SOLUTION INTRAVENOUS at 13:48

## 2019-01-01 RX ADMIN — MIDODRINE HYDROCHLORIDE 10 MG: 10 TABLET ORAL at 17:22

## 2019-01-01 RX ADMIN — Medication 1 TABLET: at 08:36

## 2019-01-01 RX ADMIN — DOCUSATE SODIUM 100 MG: 100 CAPSULE, LIQUID FILLED ORAL at 10:03

## 2019-01-01 RX ADMIN — Medication 2.67 MCG/MIN: at 18:41

## 2019-01-01 RX ADMIN — GUAIFENESIN 400 MG: 200 SOLUTION ORAL at 22:13

## 2019-01-01 RX ADMIN — IPRATROPIUM BROMIDE AND ALBUTEROL SULFATE 3 ML: .5; 3 SOLUTION RESPIRATORY (INHALATION) at 03:58

## 2019-01-01 RX ADMIN — MIDODRINE HYDROCHLORIDE 5 MG: 10 TABLET ORAL at 18:31

## 2019-01-01 RX ADMIN — IPRATROPIUM BROMIDE 0.5 MG: 0.5 SOLUTION RESPIRATORY (INHALATION) at 19:53

## 2019-01-01 RX ADMIN — HEPARIN SODIUM 5000 UNITS: 5000 INJECTION INTRAVENOUS; SUBCUTANEOUS at 05:08

## 2019-01-01 RX ADMIN — ARFORMOTEROL TARTRATE 15 MCG: 15 SOLUTION RESPIRATORY (INHALATION) at 22:59

## 2019-01-01 RX ADMIN — Medication 10 ML: at 08:40

## 2019-01-01 RX ADMIN — METOPROLOL TARTRATE 2.5 MG: 5 INJECTION INTRAVENOUS at 12:12

## 2019-01-01 RX ADMIN — HEPARIN SODIUM 3000 UNITS: 1000 INJECTION, SOLUTION INTRAVENOUS; SUBCUTANEOUS at 09:40

## 2019-01-01 RX ADMIN — BUDESONIDE AND FORMOTEROL FUMARATE DIHYDRATE 2 PUFF: 80; 4.5 AEROSOL RESPIRATORY (INHALATION) at 07:38

## 2019-01-01 RX ADMIN — IPRATROPIUM BROMIDE 0.5 MG: 0.5 SOLUTION RESPIRATORY (INHALATION) at 08:09

## 2019-01-01 RX ADMIN — DOCUSATE SODIUM 100 MG: 100 CAPSULE, LIQUID FILLED ORAL at 09:15

## 2019-01-01 RX ADMIN — ATORVASTATIN CALCIUM 40 MG: 40 TABLET, FILM COATED ORAL at 21:15

## 2019-01-01 RX ADMIN — FUROSEMIDE 20 MG: 10 INJECTION, SOLUTION INTRAVENOUS at 09:28

## 2019-01-01 RX ADMIN — ARFORMOTEROL TARTRATE 15 MCG: 15 SOLUTION RESPIRATORY (INHALATION) at 09:15

## 2019-01-01 RX ADMIN — MIDODRINE HYDROCHLORIDE 10 MG: 10 TABLET ORAL at 16:19

## 2019-01-01 RX ADMIN — Medication 10 ML: at 21:14

## 2019-01-01 RX ADMIN — BUDESONIDE AND FORMOTEROL FUMARATE DIHYDRATE 2 PUFF: 160; 4.5 AEROSOL RESPIRATORY (INHALATION) at 08:11

## 2019-01-01 RX ADMIN — ASPIRIN 81 MG 81 MG: 81 TABLET ORAL at 10:31

## 2019-01-01 RX ADMIN — IPRATROPIUM BROMIDE AND ALBUTEROL SULFATE 3 ML: .5; 3 SOLUTION RESPIRATORY (INHALATION) at 09:42

## 2019-01-01 RX ADMIN — ONDANSETRON 4 MG: 2 INJECTION INTRAMUSCULAR; INTRAVENOUS at 09:06

## 2019-01-01 RX ADMIN — BUDESONIDE 500 MCG: 0.5 INHALANT RESPIRATORY (INHALATION) at 19:45

## 2019-01-01 RX ADMIN — Medication 10 ML: at 21:19

## 2019-01-01 RX ADMIN — Medication 10 ML: at 15:32

## 2019-01-01 RX ADMIN — LISINOPRIL 5 MG: 5 TABLET ORAL at 10:06

## 2019-01-01 RX ADMIN — POTASSIUM CHLORIDE 10 MEQ: 10 TABLET, EXTENDED RELEASE ORAL at 08:35

## 2019-01-01 RX ADMIN — IPRATROPIUM BROMIDE 0.5 MG: 0.5 SOLUTION RESPIRATORY (INHALATION) at 14:29

## 2019-01-01 RX ADMIN — PIPERACILLIN SODIUM,TAZOBACTAM SODIUM 4.5 G: 4; .5 INJECTION, POWDER, FOR SOLUTION INTRAVENOUS at 14:45

## 2019-01-01 RX ADMIN — PANTOPRAZOLE SODIUM 40 MG: 40 TABLET, DELAYED RELEASE ORAL at 10:03

## 2019-01-01 RX ADMIN — FAMOTIDINE 20 MG: 20 TABLET ORAL at 08:48

## 2019-01-01 RX ADMIN — PIPERACILLIN SODIUM AND TAZOBACTAM SODIUM 3.38 G: 36; 4.5 INJECTION, POWDER, FOR SOLUTION INTRAVENOUS at 06:00

## 2019-01-01 RX ADMIN — HEPARIN SODIUM 5000 UNITS: 5000 INJECTION INTRAVENOUS; SUBCUTANEOUS at 08:21

## 2019-01-01 RX ADMIN — DIGOXIN 0.12 MG: 125 TABLET ORAL at 22:12

## 2019-01-01 RX ADMIN — SODIUM CHLORIDE SOLN NEBU 3% 3 ML: 3 NEBU SOLN at 08:11

## 2019-01-01 RX ADMIN — Medication 2 TABLET: at 09:03

## 2019-01-01 RX ADMIN — PIPERACILLIN SODIUM,TAZOBACTAM SODIUM 4.5 G: 4; .5 INJECTION, POWDER, FOR SOLUTION INTRAVENOUS at 05:40

## 2019-01-01 RX ADMIN — ARFORMOTEROL TARTRATE 15 MCG: 15 SOLUTION RESPIRATORY (INHALATION) at 21:29

## 2019-01-01 RX ADMIN — ATORVASTATIN CALCIUM 40 MG: 40 TABLET, FILM COATED ORAL at 21:42

## 2019-01-01 RX ADMIN — METOPROLOL TARTRATE 12.5 MG: 25 TABLET, FILM COATED ORAL at 17:12

## 2019-01-01 RX ADMIN — FAMOTIDINE 20 MG: 20 TABLET ORAL at 13:01

## 2019-01-01 RX ADMIN — FUROSEMIDE 20 MG: 20 TABLET ORAL at 12:51

## 2019-01-01 RX ADMIN — METHYLPREDNISOLONE SODIUM SUCCINATE 40 MG: 40 INJECTION, POWDER, FOR SOLUTION INTRAMUSCULAR; INTRAVENOUS at 18:23

## 2019-01-01 RX ADMIN — MIDODRINE HYDROCHLORIDE 10 MG: 5 TABLET ORAL at 09:28

## 2019-01-01 RX ADMIN — ARFORMOTEROL TARTRATE 15 MCG: 15 SOLUTION RESPIRATORY (INHALATION) at 09:10

## 2019-01-01 RX ADMIN — FAMOTIDINE 20 MG: 20 TABLET ORAL at 09:11

## 2019-01-01 RX ADMIN — Medication 1 TABLET: at 08:48

## 2019-01-01 RX ADMIN — Medication 10 ML: at 05:31

## 2019-01-01 RX ADMIN — BUDESONIDE 500 MCG: 0.5 INHALANT RESPIRATORY (INHALATION) at 21:20

## 2019-01-01 RX ADMIN — PIPERACILLIN SODIUM,TAZOBACTAM SODIUM 4.5 G: 4; .5 INJECTION, POWDER, FOR SOLUTION INTRAVENOUS at 05:29

## 2019-01-01 RX ADMIN — FUROSEMIDE 20 MG: 20 TABLET ORAL at 09:27

## 2019-01-01 RX ADMIN — IPRATROPIUM BROMIDE AND ALBUTEROL SULFATE 3 ML: .5; 3 SOLUTION RESPIRATORY (INHALATION) at 19:44

## 2019-01-01 RX ADMIN — CLOPIDOGREL BISULFATE 75 MG: 75 TABLET, FILM COATED ORAL at 09:23

## 2019-01-01 RX ADMIN — ARFORMOTEROL TARTRATE 15 MCG: 15 SOLUTION RESPIRATORY (INHALATION) at 07:24

## 2019-01-01 RX ADMIN — WATER 2 G: 1 INJECTION INTRAMUSCULAR; INTRAVENOUS; SUBCUTANEOUS at 05:15

## 2019-01-01 RX ADMIN — FUROSEMIDE 20 MG: 20 TABLET ORAL at 10:04

## 2019-01-01 RX ADMIN — GUAIFENESIN 400 MG: 200 SOLUTION ORAL at 18:08

## 2019-01-01 RX ADMIN — BUDESONIDE 500 MCG: 0.5 INHALANT RESPIRATORY (INHALATION) at 08:10

## 2019-01-01 RX ADMIN — MIDODRINE HYDROCHLORIDE 10 MG: 5 TABLET ORAL at 15:21

## 2019-01-01 RX ADMIN — MIDODRINE HYDROCHLORIDE 10 MG: 10 TABLET ORAL at 08:36

## 2019-01-01 RX ADMIN — ASPIRIN 81 MG 81 MG: 81 TABLET ORAL at 10:29

## 2019-01-01 RX ADMIN — FAMOTIDINE 20 MG: 20 TABLET ORAL at 09:20

## 2019-01-01 RX ADMIN — BUDESONIDE 500 MCG: 0.5 INHALANT RESPIRATORY (INHALATION) at 19:57

## 2019-01-01 RX ADMIN — MIDODRINE HYDROCHLORIDE 5 MG: 10 TABLET ORAL at 08:57

## 2019-01-01 RX ADMIN — BUDESONIDE 500 MCG: 0.5 INHALANT RESPIRATORY (INHALATION) at 07:56

## 2019-01-01 RX ADMIN — IPRATROPIUM BROMIDE AND ALBUTEROL SULFATE 3 ML: .5; 3 SOLUTION RESPIRATORY (INHALATION) at 20:51

## 2019-01-01 RX ADMIN — MIDODRINE HYDROCHLORIDE 10 MG: 10 TABLET ORAL at 17:35

## 2019-01-01 RX ADMIN — IPRATROPIUM BROMIDE 0.5 MG: 0.5 SOLUTION RESPIRATORY (INHALATION) at 03:00

## 2019-01-01 RX ADMIN — THERA TABS 1 TABLET: TAB at 09:28

## 2019-01-01 RX ADMIN — IPRATROPIUM BROMIDE AND ALBUTEROL SULFATE 3 ML: .5; 3 SOLUTION RESPIRATORY (INHALATION) at 03:25

## 2019-01-01 RX ADMIN — METOPROLOL TARTRATE 12.5 MG: 25 TABLET, FILM COATED ORAL at 17:24

## 2019-01-01 RX ADMIN — ATORVASTATIN CALCIUM 40 MG: 40 TABLET, FILM COATED ORAL at 03:52

## 2019-01-01 RX ADMIN — Medication 10 ML: at 16:52

## 2019-01-01 RX ADMIN — IPRATROPIUM BROMIDE 0.5 MG: 0.5 SOLUTION RESPIRATORY (INHALATION) at 14:04

## 2019-01-01 RX ADMIN — FAMOTIDINE 20 MG: 20 TABLET ORAL at 09:58

## 2019-01-01 RX ADMIN — HEPARIN SODIUM 5000 UNITS: 5000 INJECTION INTRAVENOUS; SUBCUTANEOUS at 04:08

## 2019-01-01 RX ADMIN — ATORVASTATIN CALCIUM 40 MG: 40 TABLET, FILM COATED ORAL at 22:39

## 2019-01-01 RX ADMIN — DIGOXIN 250 MCG: 250 INJECTION, SOLUTION INTRAMUSCULAR; INTRAVENOUS; PARENTERAL at 23:42

## 2019-01-01 RX ADMIN — BUDESONIDE AND FORMOTEROL FUMARATE DIHYDRATE 2 PUFF: 160; 4.5 AEROSOL RESPIRATORY (INHALATION) at 20:00

## 2019-01-01 RX ADMIN — Medication 1 TABLET: at 09:23

## 2019-01-01 RX ADMIN — ARFORMOTEROL TARTRATE 15 MCG: 15 SOLUTION RESPIRATORY (INHALATION) at 08:33

## 2019-01-01 RX ADMIN — DIGOXIN 0.12 MG: 125 TABLET ORAL at 10:29

## 2019-01-01 RX ADMIN — LISINOPRIL 5 MG: 5 TABLET ORAL at 12:16

## 2019-01-01 RX ADMIN — Medication 10 ML: at 16:27

## 2019-01-01 RX ADMIN — ATORVASTATIN CALCIUM 40 MG: 40 TABLET, FILM COATED ORAL at 22:24

## 2019-01-01 RX ADMIN — ARFORMOTEROL TARTRATE 15 MCG: 15 SOLUTION RESPIRATORY (INHALATION) at 07:52

## 2019-01-01 RX ADMIN — ATORVASTATIN CALCIUM 40 MG: 40 TABLET, FILM COATED ORAL at 21:16

## 2019-01-01 RX ADMIN — IPRATROPIUM BROMIDE AND ALBUTEROL SULFATE 3 ML: .5; 3 SOLUTION RESPIRATORY (INHALATION) at 05:11

## 2019-01-01 RX ADMIN — IPRATROPIUM BROMIDE 0.5 MG: 0.5 SOLUTION RESPIRATORY (INHALATION) at 08:47

## 2019-01-01 RX ADMIN — Medication 10 ML: at 21:10

## 2019-01-01 RX ADMIN — THERA TABS 1 TABLET: TAB at 10:25

## 2019-01-01 RX ADMIN — POTASSIUM CHLORIDE 10 MEQ: 750 TABLET, EXTENDED RELEASE ORAL at 13:00

## 2019-01-01 RX ADMIN — TAMSULOSIN HYDROCHLORIDE 0.4 MG: 0.4 CAPSULE ORAL at 09:15

## 2019-01-01 RX ADMIN — Medication 10 ML: at 22:16

## 2019-01-01 RX ADMIN — METOPROLOL TARTRATE 2.5 MG: 5 INJECTION INTRAVENOUS at 23:01

## 2019-01-01 RX ADMIN — MIDODRINE HYDROCHLORIDE 10 MG: 10 TABLET ORAL at 12:07

## 2019-01-01 RX ADMIN — ATORVASTATIN CALCIUM 40 MG: 40 TABLET, FILM COATED ORAL at 21:36

## 2019-01-01 RX ADMIN — HEPARIN SODIUM 5000 UNITS: 5000 INJECTION INTRAVENOUS; SUBCUTANEOUS at 13:59

## 2019-01-01 RX ADMIN — BUDESONIDE 500 MCG: 0.5 INHALANT RESPIRATORY (INHALATION) at 21:05

## 2019-01-01 RX ADMIN — MIDODRINE HYDROCHLORIDE 10 MG: 10 TABLET ORAL at 14:50

## 2019-01-01 RX ADMIN — INSULIN GLARGINE 4 UNITS: 100 INJECTION, SOLUTION SUBCUTANEOUS at 09:01

## 2019-01-01 RX ADMIN — BUDESONIDE 500 MCG: 0.5 INHALANT RESPIRATORY (INHALATION) at 20:44

## 2019-01-01 RX ADMIN — Medication 10 ML: at 21:12

## 2019-01-01 RX ADMIN — SODIUM CHLORIDE 250 ML: 900 INJECTION, SOLUTION INTRAVENOUS at 14:59

## 2019-01-01 RX ADMIN — HEPARIN SODIUM 5000 UNITS: 5000 INJECTION INTRAVENOUS; SUBCUTANEOUS at 22:21

## 2019-01-01 RX ADMIN — IPRATROPIUM BROMIDE AND ALBUTEROL SULFATE 3 ML: .5; 3 SOLUTION RESPIRATORY (INHALATION) at 20:37

## 2019-01-01 RX ADMIN — FUROSEMIDE 20 MG: 10 INJECTION, SOLUTION INTRAMUSCULAR; INTRAVENOUS at 08:47

## 2019-01-01 RX ADMIN — MIDODRINE HYDROCHLORIDE 10 MG: 10 TABLET ORAL at 11:32

## 2019-01-01 RX ADMIN — FUROSEMIDE 20 MG: 20 TABLET ORAL at 08:35

## 2019-01-01 RX ADMIN — SODIUM CHLORIDE 150 ML: 450 INJECTION, SOLUTION INTRAVENOUS at 13:54

## 2019-01-01 RX ADMIN — MIDODRINE HYDROCHLORIDE 10 MG: 10 TABLET ORAL at 11:41

## 2019-01-01 RX ADMIN — ARFORMOTEROL TARTRATE 15 MCG: 15 SOLUTION RESPIRATORY (INHALATION) at 19:57

## 2019-01-01 RX ADMIN — FUROSEMIDE 20 MG: 20 TABLET ORAL at 16:36

## 2019-01-01 RX ADMIN — MIDODRINE HYDROCHLORIDE 10 MG: 5 TABLET ORAL at 17:47

## 2019-01-01 RX ADMIN — MIDODRINE HYDROCHLORIDE 10 MG: 5 TABLET ORAL at 12:51

## 2019-01-01 RX ADMIN — FUROSEMIDE 20 MG: 40 TABLET ORAL at 09:29

## 2019-01-01 RX ADMIN — Medication 2 TABLET: at 09:05

## 2019-01-01 RX ADMIN — IPRATROPIUM BROMIDE 0.5 MG: 0.5 SOLUTION RESPIRATORY (INHALATION) at 10:03

## 2019-01-01 RX ADMIN — GUAIFENESIN 400 MG: 200 SOLUTION ORAL at 17:40

## 2019-01-01 RX ADMIN — MIDODRINE HYDROCHLORIDE 5 MG: 10 TABLET ORAL at 11:45

## 2019-01-01 RX ADMIN — ARFORMOTEROL TARTRATE 15 MCG: 15 SOLUTION RESPIRATORY (INHALATION) at 07:12

## 2019-01-01 RX ADMIN — FAMOTIDINE 20 MG: 20 TABLET ORAL at 08:50

## 2019-01-01 RX ADMIN — MIDODRINE HYDROCHLORIDE 10 MG: 10 TABLET ORAL at 18:08

## 2019-01-01 RX ADMIN — METOPROLOL TARTRATE 12.5 MG: 25 TABLET, FILM COATED ORAL at 12:17

## 2019-01-01 RX ADMIN — INSULIN LISPRO 3 UNITS: 100 INJECTION, SOLUTION INTRAVENOUS; SUBCUTANEOUS at 11:51

## 2019-01-01 RX ADMIN — ATORVASTATIN CALCIUM 40 MG: 40 TABLET, FILM COATED ORAL at 22:36

## 2019-01-01 RX ADMIN — IPRATROPIUM BROMIDE AND ALBUTEROL SULFATE 3 ML: .5; 3 SOLUTION RESPIRATORY (INHALATION) at 02:39

## 2019-01-01 RX ADMIN — BUDESONIDE AND FORMOTEROL FUMARATE DIHYDRATE 2 PUFF: 160; 4.5 AEROSOL RESPIRATORY (INHALATION) at 09:43

## 2019-01-01 RX ADMIN — ATORVASTATIN CALCIUM 40 MG: 40 TABLET, FILM COATED ORAL at 21:10

## 2019-01-01 RX ADMIN — HEPARIN SODIUM 5000 UNITS: 5000 INJECTION INTRAVENOUS; SUBCUTANEOUS at 06:00

## 2019-01-01 RX ADMIN — HEPARIN SODIUM 5000 UNITS: 5000 INJECTION INTRAVENOUS; SUBCUTANEOUS at 20:07

## 2019-01-01 RX ADMIN — TAMSULOSIN HYDROCHLORIDE 0.4 MG: 0.4 CAPSULE ORAL at 09:43

## 2019-01-01 RX ADMIN — POTASSIUM CHLORIDE 10 MEQ: 10 TABLET, EXTENDED RELEASE ORAL at 09:24

## 2019-01-01 RX ADMIN — ARFORMOTEROL TARTRATE 15 MCG: 15 SOLUTION RESPIRATORY (INHALATION) at 09:50

## 2019-01-01 RX ADMIN — BUDESONIDE 500 MCG: 0.5 INHALANT RESPIRATORY (INHALATION) at 08:26

## 2019-01-01 RX ADMIN — ATORVASTATIN CALCIUM 40 MG: 40 TABLET, FILM COATED ORAL at 21:14

## 2019-01-01 RX ADMIN — MIDODRINE HYDROCHLORIDE 10 MG: 10 TABLET ORAL at 08:27

## 2019-01-01 RX ADMIN — ARFORMOTEROL TARTRATE 15 MCG: 15 SOLUTION RESPIRATORY (INHALATION) at 20:52

## 2019-01-01 RX ADMIN — POTASSIUM CHLORIDE 10 MEQ: 10 TABLET, EXTENDED RELEASE ORAL at 18:31

## 2019-01-01 RX ADMIN — HEPARIN SODIUM 5000 UNITS: 5000 INJECTION INTRAVENOUS; SUBCUTANEOUS at 18:50

## 2019-01-01 RX ADMIN — BUDESONIDE 500 MCG: 0.5 INHALANT RESPIRATORY (INHALATION) at 21:04

## 2019-01-01 RX ADMIN — TAMSULOSIN HYDROCHLORIDE 0.4 MG: 0.4 CAPSULE ORAL at 08:53

## 2019-01-01 RX ADMIN — GUAIFENESIN 400 MG: 200 SOLUTION ORAL at 22:53

## 2019-01-01 RX ADMIN — ASPIRIN 81 MG 81 MG: 81 TABLET ORAL at 11:03

## 2019-01-01 RX ADMIN — IPRATROPIUM BROMIDE 0.5 MG: 0.5 SOLUTION RESPIRATORY (INHALATION) at 12:27

## 2019-01-01 RX ADMIN — WATER 2 G: 1 INJECTION INTRAMUSCULAR; INTRAVENOUS; SUBCUTANEOUS at 17:35

## 2019-01-01 RX ADMIN — CARVEDILOL 3.12 MG: 3.12 TABLET, FILM COATED ORAL at 17:36

## 2019-01-01 RX ADMIN — Medication 10 ML: at 23:53

## 2019-01-01 RX ADMIN — ARFORMOTEROL TARTRATE 15 MCG: 15 SOLUTION RESPIRATORY (INHALATION) at 20:30

## 2019-01-01 RX ADMIN — TAMSULOSIN HYDROCHLORIDE 0.4 MG: 0.4 CAPSULE ORAL at 09:27

## 2019-01-01 RX ADMIN — CLOPIDOGREL BISULFATE 75 MG: 75 TABLET, FILM COATED ORAL at 10:24

## 2019-01-01 RX ADMIN — Medication 10 ML: at 14:07

## 2019-01-01 RX ADMIN — IPRATROPIUM BROMIDE AND ALBUTEROL SULFATE 3 ML: .5; 3 SOLUTION RESPIRATORY (INHALATION) at 14:00

## 2019-01-01 RX ADMIN — Medication 2 TABLET: at 09:27

## 2019-01-01 RX ADMIN — Medication 2 TABLET: at 13:42

## 2019-01-01 RX ADMIN — FUROSEMIDE 20 MG: 10 INJECTION, SOLUTION INTRAMUSCULAR; INTRAVENOUS at 09:39

## 2019-01-01 RX ADMIN — GUAIFENESIN 400 MG: 200 SOLUTION ORAL at 12:14

## 2019-01-01 RX ADMIN — IPRATROPIUM BROMIDE AND ALBUTEROL SULFATE 3 ML: .5; 3 SOLUTION RESPIRATORY (INHALATION) at 18:02

## 2019-01-01 RX ADMIN — IPRATROPIUM BROMIDE 0.5 MG: 0.5 SOLUTION RESPIRATORY (INHALATION) at 13:04

## 2019-01-01 RX ADMIN — IPRATROPIUM BROMIDE AND ALBUTEROL SULFATE 3 ML: .5; 3 SOLUTION RESPIRATORY (INHALATION) at 04:19

## 2019-01-01 RX ADMIN — TAMSULOSIN HYDROCHLORIDE 0.4 MG: 0.4 CAPSULE ORAL at 09:36

## 2019-01-01 RX ADMIN — FAMOTIDINE 20 MG: 20 TABLET ORAL at 10:03

## 2019-01-01 RX ADMIN — ATORVASTATIN CALCIUM 40 MG: 40 TABLET, FILM COATED ORAL at 22:31

## 2019-01-01 RX ADMIN — Medication 2 TABLET: at 17:30

## 2019-01-01 RX ADMIN — DIGOXIN 0.12 MG: 125 TABLET ORAL at 20:49

## 2019-01-01 RX ADMIN — Medication 10 ML: at 15:05

## 2019-01-01 RX ADMIN — ARFORMOTEROL TARTRATE 15 MCG: 15 SOLUTION RESPIRATORY (INHALATION) at 08:26

## 2019-01-01 RX ADMIN — GUAIFENESIN 600 MG: 600 TABLET, EXTENDED RELEASE ORAL at 09:20

## 2019-01-01 RX ADMIN — BUDESONIDE 500 MCG: 0.5 INHALANT RESPIRATORY (INHALATION) at 09:47

## 2019-01-01 RX ADMIN — ALBUMIN (HUMAN) 12.5 G: 12.5 INJECTION, SOLUTION INTRAVENOUS at 03:00

## 2019-01-01 RX ADMIN — HEPARIN SODIUM 5000 UNITS: 5000 INJECTION INTRAVENOUS; SUBCUTANEOUS at 17:48

## 2019-01-01 RX ADMIN — INSULIN LISPRO 4 UNITS: 100 INJECTION, SOLUTION INTRAVENOUS; SUBCUTANEOUS at 08:36

## 2019-01-01 RX ADMIN — METOPROLOL TARTRATE 2.5 MG: 5 INJECTION INTRAVENOUS at 22:13

## 2019-01-01 RX ADMIN — PIPERACILLIN SODIUM,TAZOBACTAM SODIUM 4.5 G: 4; .5 INJECTION, POWDER, FOR SOLUTION INTRAVENOUS at 22:20

## 2019-01-01 RX ADMIN — HEPARIN SODIUM 5000 UNITS: 5000 INJECTION INTRAVENOUS; SUBCUTANEOUS at 14:22

## 2019-01-01 RX ADMIN — DEXTROSE MONOHYDRATE 75 ML/HR: 5 INJECTION, SOLUTION INTRAVENOUS at 12:19

## 2019-01-01 RX ADMIN — TAMSULOSIN HYDROCHLORIDE 0.4 MG: 0.4 CAPSULE ORAL at 10:13

## 2019-01-01 RX ADMIN — ASPIRIN 81 MG 81 MG: 81 TABLET ORAL at 09:24

## 2019-01-01 RX ADMIN — POTASSIUM CHLORIDE 10 MEQ: 10 TABLET, EXTENDED RELEASE ORAL at 08:34

## 2019-01-01 RX ADMIN — IPRATROPIUM BROMIDE AND ALBUTEROL SULFATE 3 ML: .5; 3 SOLUTION RESPIRATORY (INHALATION) at 15:40

## 2019-01-01 RX ADMIN — ARFORMOTEROL TARTRATE 15 MCG: 15 SOLUTION RESPIRATORY (INHALATION) at 21:52

## 2019-01-01 RX ADMIN — Medication 10 ML: at 05:57

## 2019-01-01 RX ADMIN — MIDODRINE HYDROCHLORIDE 10 MG: 10 TABLET ORAL at 18:05

## 2019-01-01 RX ADMIN — DILTIAZEM HYDROCHLORIDE 60 MG: 60 TABLET, FILM COATED ORAL at 07:01

## 2019-01-01 RX ADMIN — MIDODRINE HYDROCHLORIDE 5 MG: 10 TABLET ORAL at 12:00

## 2019-01-01 RX ADMIN — POTASSIUM CHLORIDE 10 MEQ: 10 TABLET, EXTENDED RELEASE ORAL at 12:17

## 2019-01-01 RX ADMIN — IPRATROPIUM BROMIDE 0.5 MG: 0.5 SOLUTION RESPIRATORY (INHALATION) at 08:53

## 2019-01-01 RX ADMIN — METOPROLOL TARTRATE 12.5 MG: 25 TABLET, FILM COATED ORAL at 08:48

## 2019-01-01 RX ADMIN — IPRATROPIUM BROMIDE AND ALBUTEROL SULFATE 3 ML: .5; 3 SOLUTION RESPIRATORY (INHALATION) at 19:54

## 2019-01-01 RX ADMIN — METOPROLOL TARTRATE 2.5 MG: 5 INJECTION INTRAVENOUS at 02:16

## 2019-01-01 RX ADMIN — INSULIN LISPRO 4 UNITS: 100 INJECTION, SOLUTION INTRAVENOUS; SUBCUTANEOUS at 11:51

## 2019-01-01 RX ADMIN — IPRATROPIUM BROMIDE AND ALBUTEROL SULFATE 3 ML: .5; 3 SOLUTION RESPIRATORY (INHALATION) at 09:05

## 2019-01-01 RX ADMIN — ARFORMOTEROL TARTRATE 15 MCG: 15 SOLUTION RESPIRATORY (INHALATION) at 21:53

## 2019-01-01 RX ADMIN — POTASSIUM CHLORIDE 10 MEQ: 750 TABLET, EXTENDED RELEASE ORAL at 12:00

## 2019-01-01 RX ADMIN — HEPARIN SODIUM 5000 UNITS: 5000 INJECTION INTRAVENOUS; SUBCUTANEOUS at 22:29

## 2019-01-01 RX ADMIN — LIDOCAINE HYDROCHLORIDE 1 ML: 10 INJECTION, SOLUTION EPIDURAL; INFILTRATION; INTRACAUDAL; PERINEURAL at 12:04

## 2019-01-01 RX ADMIN — DIGOXIN 0.12 MG: 125 TABLET ORAL at 08:20

## 2019-01-01 RX ADMIN — THERA TABS 1 TABLET: TAB at 08:53

## 2019-01-01 RX ADMIN — BUDESONIDE 500 MCG: 0.5 INHALANT RESPIRATORY (INHALATION) at 09:35

## 2019-01-01 RX ADMIN — CALCIUM GLUCONATE 2 G: 94 INJECTION, SOLUTION INTRAVENOUS at 10:46

## 2019-01-01 RX ADMIN — BUDESONIDE 500 MCG: 0.5 INHALANT RESPIRATORY (INHALATION) at 20:52

## 2019-01-01 RX ADMIN — INSULIN LISPRO 3 UNITS: 100 INJECTION, SOLUTION INTRAVENOUS; SUBCUTANEOUS at 23:11

## 2019-01-01 RX ADMIN — PIPERACILLIN SODIUM AND TAZOBACTAM SODIUM 3.38 G: 36; 4.5 INJECTION, POWDER, FOR SOLUTION INTRAVENOUS at 23:22

## 2019-01-01 RX ADMIN — IPRATROPIUM BROMIDE AND ALBUTEROL SULFATE 3 ML: .5; 3 SOLUTION RESPIRATORY (INHALATION) at 23:07

## 2019-01-01 RX ADMIN — FAMOTIDINE 20 MG: 20 TABLET ORAL at 09:27

## 2019-01-01 RX ADMIN — ARFORMOTEROL TARTRATE 15 MCG: 15 SOLUTION RESPIRATORY (INHALATION) at 08:47

## 2019-01-01 RX ADMIN — MIDODRINE HYDROCHLORIDE 10 MG: 10 TABLET ORAL at 18:23

## 2019-01-01 RX ADMIN — IPRATROPIUM BROMIDE AND ALBUTEROL SULFATE 3 ML: .5; 3 SOLUTION RESPIRATORY (INHALATION) at 07:00

## 2019-01-01 RX ADMIN — IPRATROPIUM BROMIDE 0.5 MG: 0.5 SOLUTION RESPIRATORY (INHALATION) at 20:39

## 2019-01-01 RX ADMIN — LEVOFLOXACIN 750 MG: 750 TABLET, FILM COATED ORAL at 18:43

## 2019-01-01 RX ADMIN — TAMSULOSIN HYDROCHLORIDE 0.4 MG: 0.4 CAPSULE ORAL at 09:07

## 2019-01-01 RX ADMIN — BUDESONIDE 500 MCG: 0.5 INHALANT RESPIRATORY (INHALATION) at 08:33

## 2019-01-01 RX ADMIN — ASPIRIN 81 MG 81 MG: 81 TABLET ORAL at 09:23

## 2019-01-01 RX ADMIN — SODIUM CHLORIDE SOLN NEBU 3%: 3 NEBU SOLN at 20:09

## 2019-01-01 RX ADMIN — INSULIN GLARGINE 5 UNITS: 100 INJECTION, SOLUTION SUBCUTANEOUS at 09:06

## 2019-01-01 RX ADMIN — IPRATROPIUM BROMIDE AND ALBUTEROL SULFATE 3 ML: .5; 3 SOLUTION RESPIRATORY (INHALATION) at 14:17

## 2019-01-01 RX ADMIN — INSULIN LISPRO 3 UNITS: 100 INJECTION, SOLUTION INTRAVENOUS; SUBCUTANEOUS at 22:52

## 2019-01-01 RX ADMIN — HEPARIN SODIUM 5000 UNITS: 5000 INJECTION INTRAVENOUS; SUBCUTANEOUS at 13:26

## 2019-01-01 RX ADMIN — MIDODRINE HYDROCHLORIDE 10 MG: 10 TABLET ORAL at 18:43

## 2019-01-01 RX ADMIN — IPRATROPIUM BROMIDE 0.5 MG: 0.5 SOLUTION RESPIRATORY (INHALATION) at 15:28

## 2019-01-01 RX ADMIN — GUAIFENESIN 600 MG: 600 TABLET, EXTENDED RELEASE ORAL at 22:19

## 2019-01-01 RX ADMIN — ATORVASTATIN CALCIUM 40 MG: 40 TABLET, FILM COATED ORAL at 21:48

## 2019-01-01 RX ADMIN — MIDODRINE HYDROCHLORIDE 10 MG: 10 TABLET ORAL at 08:42

## 2019-01-01 RX ADMIN — ASPIRIN 81 MG 81 MG: 81 TABLET ORAL at 09:42

## 2019-01-01 RX ADMIN — ATORVASTATIN CALCIUM 40 MG: 40 TABLET, FILM COATED ORAL at 22:59

## 2019-01-01 RX ADMIN — MIDODRINE HYDROCHLORIDE 10 MG: 10 TABLET ORAL at 09:38

## 2019-01-01 RX ADMIN — CLOPIDOGREL BISULFATE 75 MG: 75 TABLET, FILM COATED ORAL at 10:30

## 2019-01-01 RX ADMIN — IPRATROPIUM BROMIDE 0.5 MG: 0.5 SOLUTION RESPIRATORY (INHALATION) at 01:43

## 2019-01-01 RX ADMIN — CLOPIDOGREL BISULFATE 75 MG: 75 TABLET, FILM COATED ORAL at 09:03

## 2019-01-01 RX ADMIN — FAMOTIDINE 20 MG: 20 TABLET ORAL at 09:24

## 2019-01-01 RX ADMIN — INSULIN LISPRO 4 UNITS: 100 INJECTION, SOLUTION INTRAVENOUS; SUBCUTANEOUS at 12:24

## 2019-01-01 RX ADMIN — NITROGLYCERIN 0.5 INCH: 20 OINTMENT TOPICAL at 04:04

## 2019-01-01 RX ADMIN — TAMSULOSIN HYDROCHLORIDE 0.4 MG: 0.4 CAPSULE ORAL at 09:12

## 2019-01-01 RX ADMIN — LISINOPRIL 5 MG: 5 TABLET ORAL at 10:03

## 2019-01-01 RX ADMIN — METOPROLOL TARTRATE 2.5 MG: 5 INJECTION INTRAVENOUS at 05:29

## 2019-01-01 RX ADMIN — HEPARIN SODIUM 5000 UNITS: 5000 INJECTION INTRAVENOUS; SUBCUTANEOUS at 11:52

## 2019-01-01 RX ADMIN — ALBUTEROL SULFATE 1.25 MG: 1.25 SOLUTION RESPIRATORY (INHALATION) at 20:35

## 2019-01-01 RX ADMIN — ARFORMOTEROL TARTRATE 15 MCG: 15 SOLUTION RESPIRATORY (INHALATION) at 08:29

## 2019-01-01 RX ADMIN — SODIUM CHLORIDE 750 MG: 900 INJECTION, SOLUTION INTRAVENOUS at 19:45

## 2019-01-01 RX ADMIN — ARFORMOTEROL TARTRATE 15 MCG: 15 SOLUTION RESPIRATORY (INHALATION) at 19:45

## 2019-01-01 RX ADMIN — FAMOTIDINE 20 MG: 20 TABLET ORAL at 09:12

## 2019-01-01 RX ADMIN — HEPARIN SODIUM 5000 UNITS: 5000 INJECTION INTRAVENOUS; SUBCUTANEOUS at 21:41

## 2019-01-01 RX ADMIN — WATER 2 G: 1 INJECTION INTRAMUSCULAR; INTRAVENOUS; SUBCUTANEOUS at 18:53

## 2019-01-01 RX ADMIN — Medication 10 ML: at 18:53

## 2019-01-01 RX ADMIN — ASPIRIN 81 MG 81 MG: 81 TABLET ORAL at 09:38

## 2019-01-01 RX ADMIN — FUROSEMIDE 20 MG: 40 TABLET ORAL at 10:29

## 2019-01-01 RX ADMIN — ATORVASTATIN CALCIUM 40 MG: 40 TABLET, FILM COATED ORAL at 22:02

## 2019-01-01 RX ADMIN — TAMSULOSIN HYDROCHLORIDE 0.4 MG: 0.4 CAPSULE ORAL at 09:16

## 2019-01-01 RX ADMIN — HEPARIN SODIUM 5000 UNITS: 5000 INJECTION INTRAVENOUS; SUBCUTANEOUS at 11:03

## 2019-01-01 RX ADMIN — WATER 2 G: 1 INJECTION INTRAMUSCULAR; INTRAVENOUS; SUBCUTANEOUS at 18:10

## 2019-01-01 RX ADMIN — PIPERACILLIN SODIUM AND TAZOBACTAM SODIUM 3.38 G: 36; 4.5 INJECTION, POWDER, FOR SOLUTION INTRAVENOUS at 06:07

## 2019-01-01 RX ADMIN — IPRATROPIUM BROMIDE 0.5 MG: 0.5 SOLUTION RESPIRATORY (INHALATION) at 19:30

## 2019-01-01 RX ADMIN — METOPROLOL TARTRATE 12.5 MG: 25 TABLET, FILM COATED ORAL at 17:08

## 2019-01-01 RX ADMIN — BUDESONIDE 500 MCG: 0.5 INHALANT RESPIRATORY (INHALATION) at 09:08

## 2019-01-01 RX ADMIN — MIDODRINE HYDROCHLORIDE 10 MG: 10 TABLET ORAL at 18:25

## 2019-01-01 RX ADMIN — ATORVASTATIN CALCIUM 40 MG: 40 TABLET, FILM COATED ORAL at 21:58

## 2019-01-01 RX ADMIN — POTASSIUM CHLORIDE 20 MEQ: 1.5 POWDER, FOR SOLUTION ORAL at 21:26

## 2019-01-01 RX ADMIN — PIPERACILLIN SODIUM,TAZOBACTAM SODIUM 4.5 G: 4; .5 INJECTION, POWDER, FOR SOLUTION INTRAVENOUS at 06:08

## 2019-01-01 RX ADMIN — IPRATROPIUM BROMIDE 0.5 MG: 0.5 SOLUTION RESPIRATORY (INHALATION) at 08:55

## 2019-01-01 RX ADMIN — MIDODRINE HYDROCHLORIDE 10 MG: 10 TABLET ORAL at 09:29

## 2019-01-01 RX ADMIN — SODIUM CHLORIDE SOLN NEBU 3% 3 ML: 3 NEBU SOLN at 19:53

## 2019-01-01 RX ADMIN — IPRATROPIUM BROMIDE 0.5 MG: 0.5 SOLUTION RESPIRATORY (INHALATION) at 04:30

## 2019-01-01 RX ADMIN — BUDESONIDE 500 MCG: 0.5 INHALANT RESPIRATORY (INHALATION) at 09:15

## 2019-01-01 RX ADMIN — METHYLPREDNISOLONE SODIUM SUCCINATE 20 MG: 40 INJECTION, POWDER, FOR SOLUTION INTRAMUSCULAR; INTRAVENOUS at 20:49

## 2019-01-01 RX ADMIN — MIDODRINE HYDROCHLORIDE 10 MG: 10 TABLET ORAL at 16:13

## 2019-01-01 RX ADMIN — IRON SUCROSE 100 MG: 20 INJECTION, SOLUTION INTRAVENOUS at 17:25

## 2019-01-01 RX ADMIN — GUAIFENESIN 400 MG: 200 SOLUTION ORAL at 22:38

## 2019-01-01 RX ADMIN — HEPARIN SODIUM 5000 UNITS: 5000 INJECTION INTRAVENOUS; SUBCUTANEOUS at 21:39

## 2019-01-01 RX ADMIN — FUROSEMIDE 20 MG: 40 TABLET ORAL at 08:23

## 2019-01-01 RX ADMIN — FAMOTIDINE 20 MG: 20 TABLET ORAL at 10:29

## 2019-01-01 RX ADMIN — INSULIN LISPRO 3 UNITS: 100 INJECTION, SOLUTION INTRAVENOUS; SUBCUTANEOUS at 08:44

## 2019-01-01 RX ADMIN — DIGOXIN 0.12 MG: 125 TABLET ORAL at 23:06

## 2019-01-01 RX ADMIN — IPRATROPIUM BROMIDE AND ALBUTEROL SULFATE 3 ML: .5; 3 SOLUTION RESPIRATORY (INHALATION) at 14:46

## 2019-01-01 RX ADMIN — PIPERACILLIN SODIUM AND TAZOBACTAM SODIUM 3.38 G: 36; 4.5 INJECTION, POWDER, FOR SOLUTION INTRAVENOUS at 14:39

## 2019-01-01 RX ADMIN — Medication 1 TABLET: at 09:20

## 2019-01-01 RX ADMIN — LEVOFLOXACIN 750 MG: 750 TABLET, FILM COATED ORAL at 17:48

## 2019-01-01 RX ADMIN — ARFORMOTEROL TARTRATE 15 MCG: 15 SOLUTION RESPIRATORY (INHALATION) at 08:02

## 2019-01-01 RX ADMIN — MIDODRINE HYDROCHLORIDE 10 MG: 10 TABLET ORAL at 10:13

## 2019-01-01 RX ADMIN — METHYLPREDNISOLONE SODIUM SUCCINATE 40 MG: 40 INJECTION, POWDER, FOR SOLUTION INTRAMUSCULAR; INTRAVENOUS at 04:41

## 2019-01-01 RX ADMIN — CLOPIDOGREL BISULFATE 75 MG: 75 TABLET, FILM COATED ORAL at 11:03

## 2019-01-01 RX ADMIN — ASPIRIN 81 MG 81 MG: 81 TABLET ORAL at 12:18

## 2019-01-01 RX ADMIN — CARVEDILOL 3.12 MG: 3.12 TABLET, FILM COATED ORAL at 10:03

## 2019-01-01 RX ADMIN — IPRATROPIUM BROMIDE AND ALBUTEROL SULFATE 3 ML: .5; 3 SOLUTION RESPIRATORY (INHALATION) at 07:13

## 2019-01-01 RX ADMIN — IPRATROPIUM BROMIDE AND ALBUTEROL SULFATE 3 ML: .5; 3 SOLUTION RESPIRATORY (INHALATION) at 07:25

## 2019-01-01 RX ADMIN — HEPARIN SODIUM 5000 UNITS: 5000 INJECTION INTRAVENOUS; SUBCUTANEOUS at 22:24

## 2019-01-01 RX ADMIN — PIPERACILLIN SODIUM AND TAZOBACTAM SODIUM 3.38 G: 36; 4.5 INJECTION, POWDER, FOR SOLUTION INTRAVENOUS at 06:24

## 2019-01-01 RX ADMIN — FAMOTIDINE 20 MG: 20 TABLET ORAL at 08:27

## 2019-01-01 RX ADMIN — BUDESONIDE 500 MCG: 0.5 INHALANT RESPIRATORY (INHALATION) at 22:12

## 2019-01-01 RX ADMIN — ARFORMOTEROL TARTRATE 15 MCG: 15 SOLUTION RESPIRATORY (INHALATION) at 09:02

## 2019-01-01 RX ADMIN — MIDODRINE HYDROCHLORIDE 10 MG: 10 TABLET ORAL at 09:16

## 2019-01-01 RX ADMIN — HEPARIN SODIUM 5000 UNITS: 5000 INJECTION INTRAVENOUS; SUBCUTANEOUS at 22:31

## 2019-01-01 RX ADMIN — FUROSEMIDE 40 MG: 10 INJECTION, SOLUTION INTRAMUSCULAR; INTRAVENOUS at 09:21

## 2019-01-01 RX ADMIN — POTASSIUM CHLORIDE 10 MEQ: 10 TABLET, EXTENDED RELEASE ORAL at 08:53

## 2019-01-01 RX ADMIN — INSULIN LISPRO 12 UNITS: 100 INJECTION, SOLUTION INTRAVENOUS; SUBCUTANEOUS at 11:32

## 2019-01-01 RX ADMIN — ARFORMOTEROL TARTRATE 15 MCG: 15 SOLUTION RESPIRATORY (INHALATION) at 21:20

## 2019-01-01 RX ADMIN — FAMOTIDINE 20 MG: 20 TABLET ORAL at 09:50

## 2019-01-01 RX ADMIN — METHYLPREDNISOLONE SODIUM SUCCINATE 40 MG: 40 INJECTION, POWDER, FOR SOLUTION INTRAMUSCULAR; INTRAVENOUS at 19:50

## 2019-01-01 RX ADMIN — DOCUSATE SODIUM 100 MG: 100 CAPSULE, LIQUID FILLED ORAL at 17:35

## 2019-01-01 RX ADMIN — ATORVASTATIN CALCIUM 40 MG: 40 TABLET, FILM COATED ORAL at 21:05

## 2019-01-01 RX ADMIN — ARFORMOTEROL TARTRATE 15 MCG: 15 SOLUTION RESPIRATORY (INHALATION) at 19:31

## 2019-01-01 RX ADMIN — ARFORMOTEROL TARTRATE 15 MCG: 15 SOLUTION RESPIRATORY (INHALATION) at 08:53

## 2019-01-01 RX ADMIN — MIDODRINE HYDROCHLORIDE 10 MG: 10 TABLET ORAL at 18:00

## 2019-01-01 RX ADMIN — MIDODRINE HYDROCHLORIDE 10 MG: 10 TABLET ORAL at 16:45

## 2019-01-01 RX ADMIN — FUROSEMIDE 10 MG: 20 TABLET ORAL at 09:03

## 2019-01-01 RX ADMIN — Medication 10 ML: at 15:20

## 2019-01-01 RX ADMIN — ACETAMINOPHEN 650 MG: 325 TABLET ORAL at 23:00

## 2019-01-01 RX ADMIN — IPRATROPIUM BROMIDE 0.5 MG: 0.5 SOLUTION RESPIRATORY (INHALATION) at 20:29

## 2019-01-01 RX ADMIN — HEPARIN SODIUM 5000 UNITS: 5000 INJECTION INTRAVENOUS; SUBCUTANEOUS at 12:59

## 2019-01-01 RX ADMIN — TAMSULOSIN HYDROCHLORIDE 0.4 MG: 0.4 CAPSULE ORAL at 09:04

## 2019-01-01 RX ADMIN — HEPARIN SODIUM 5000 UNITS: 5000 INJECTION INTRAVENOUS; SUBCUTANEOUS at 06:39

## 2019-01-01 RX ADMIN — ASPIRIN 81 MG 81 MG: 81 TABLET ORAL at 08:26

## 2019-01-01 RX ADMIN — GUAIFENESIN 400 MG: 200 SOLUTION ORAL at 08:57

## 2019-01-01 RX ADMIN — PIPERACILLIN SODIUM AND TAZOBACTAM SODIUM 3.38 G: 36; 4.5 INJECTION, POWDER, FOR SOLUTION INTRAVENOUS at 22:36

## 2019-01-01 RX ADMIN — MIDODRINE HYDROCHLORIDE 10 MG: 5 TABLET ORAL at 16:00

## 2019-01-01 RX ADMIN — MIDODRINE HYDROCHLORIDE 10 MG: 10 TABLET ORAL at 12:40

## 2019-01-01 RX ADMIN — ASPIRIN 81 MG 81 MG: 81 TABLET ORAL at 08:33

## 2019-01-01 RX ADMIN — WATER 2 G: 1 INJECTION INTRAMUSCULAR; INTRAVENOUS; SUBCUTANEOUS at 05:41

## 2019-01-01 RX ADMIN — DILTIAZEM HYDROCHLORIDE 60 MG: 60 TABLET, FILM COATED ORAL at 01:06

## 2019-01-01 RX ADMIN — ARFORMOTEROL TARTRATE 15 MCG: 15 SOLUTION RESPIRATORY (INHALATION) at 20:29

## 2019-01-01 RX ADMIN — ACETAMINOPHEN 650 MG: 325 TABLET ORAL at 22:10

## 2019-01-01 RX ADMIN — FAMOTIDINE 20 MG: 20 TABLET ORAL at 09:15

## 2019-01-01 RX ADMIN — CARVEDILOL 3.12 MG: 3.12 TABLET, FILM COATED ORAL at 19:54

## 2019-01-01 RX ADMIN — HEPARIN SODIUM 5000 UNITS: 5000 INJECTION INTRAVENOUS; SUBCUTANEOUS at 00:18

## 2019-01-01 RX ADMIN — ACETAMINOPHEN 650 MG: 325 TABLET, FILM COATED ORAL at 11:51

## 2019-01-01 RX ADMIN — CLOPIDOGREL BISULFATE 75 MG: 75 TABLET ORAL at 15:21

## 2019-01-01 RX ADMIN — Medication 2 TABLET: at 17:12

## 2019-01-01 RX ADMIN — CLOPIDOGREL BISULFATE 75 MG: 75 TABLET, FILM COATED ORAL at 08:36

## 2019-01-01 RX ADMIN — FAMOTIDINE 20 MG: 20 TABLET ORAL at 08:15

## 2019-01-01 RX ADMIN — ALPRAZOLAM 0.5 MG: 0.5 TABLET ORAL at 22:13

## 2019-01-01 RX ADMIN — WATER 2 G: 1 INJECTION INTRAMUSCULAR; INTRAVENOUS; SUBCUTANEOUS at 18:00

## 2019-01-01 RX ADMIN — IPRATROPIUM BROMIDE AND ALBUTEROL SULFATE 3 ML: .5; 3 SOLUTION RESPIRATORY (INHALATION) at 07:54

## 2019-01-01 RX ADMIN — Medication 10 ML: at 21:38

## 2019-01-01 RX ADMIN — METOPROLOL TARTRATE 2.5 MG: 5 INJECTION INTRAVENOUS at 10:48

## 2019-01-01 RX ADMIN — IPRATROPIUM BROMIDE AND ALBUTEROL SULFATE 3 ML: .5; 3 SOLUTION RESPIRATORY (INHALATION) at 19:34

## 2019-01-01 RX ADMIN — IPRATROPIUM BROMIDE AND ALBUTEROL SULFATE 3 ML: .5; 3 SOLUTION RESPIRATORY (INHALATION) at 14:55

## 2019-01-01 RX ADMIN — DEXTROSE MONOHYDRATE 50 ML/HR: 5 INJECTION, SOLUTION INTRAVENOUS at 03:54

## 2019-01-01 RX ADMIN — DOCUSATE SODIUM 100 MG: 100 CAPSULE, LIQUID FILLED ORAL at 18:00

## 2019-01-01 RX ADMIN — BUDESONIDE 500 MCG: 0.5 INHALANT RESPIRATORY (INHALATION) at 19:49

## 2019-01-01 RX ADMIN — SODIUM CHLORIDE 750 MG: 900 INJECTION, SOLUTION INTRAVENOUS at 20:47

## 2019-01-01 RX ADMIN — DILTIAZEM HYDROCHLORIDE 60 MG: 60 TABLET, FILM COATED ORAL at 06:23

## 2019-01-01 RX ADMIN — IPRATROPIUM BROMIDE AND ALBUTEROL SULFATE 3 ML: .5; 3 SOLUTION RESPIRATORY (INHALATION) at 02:25

## 2019-01-01 RX ADMIN — GUAIFENESIN 400 MG: 200 SOLUTION ORAL at 08:53

## 2019-01-01 RX ADMIN — BUDESONIDE 500 MCG: 0.5 INHALANT RESPIRATORY (INHALATION) at 20:45

## 2019-01-01 RX ADMIN — IPRATROPIUM BROMIDE 0.5 MG: 0.5 SOLUTION RESPIRATORY (INHALATION) at 09:08

## 2019-01-01 RX ADMIN — HEPARIN SODIUM 5000 UNITS: 5000 INJECTION INTRAVENOUS; SUBCUTANEOUS at 06:06

## 2019-01-01 RX ADMIN — ATORVASTATIN CALCIUM 40 MG: 40 TABLET, FILM COATED ORAL at 21:50

## 2019-01-01 RX ADMIN — METHYLPREDNISOLONE SODIUM SUCCINATE 20 MG: 40 INJECTION, POWDER, FOR SOLUTION INTRAMUSCULAR; INTRAVENOUS at 08:37

## 2019-01-01 RX ADMIN — IPRATROPIUM BROMIDE 0.5 MG: 0.5 SOLUTION RESPIRATORY (INHALATION) at 23:13

## 2019-01-01 RX ADMIN — DOCUSATE SODIUM 100 MG: 100 CAPSULE, LIQUID FILLED ORAL at 10:05

## 2019-01-01 RX ADMIN — INSULIN GLARGINE 5 UNITS: 100 INJECTION, SOLUTION SUBCUTANEOUS at 08:48

## 2019-01-01 RX ADMIN — HEPARIN SODIUM 5000 UNITS: 5000 INJECTION INTRAVENOUS; SUBCUTANEOUS at 18:10

## 2019-01-01 RX ADMIN — POTASSIUM CHLORIDE 10 MEQ: 10 TABLET, EXTENDED RELEASE ORAL at 08:58

## 2019-01-01 RX ADMIN — GUAIFENESIN 400 MG: 200 SOLUTION ORAL at 21:16

## 2019-01-01 RX ADMIN — Medication 2 TABLET: at 09:23

## 2019-01-01 RX ADMIN — FUROSEMIDE 20 MG: 40 TABLET ORAL at 08:26

## 2019-01-01 RX ADMIN — ALPRAZOLAM 0.5 MG: 0.5 TABLET ORAL at 22:11

## 2019-01-01 RX ADMIN — BUDESONIDE 500 MCG: 0.5 INHALANT RESPIRATORY (INHALATION) at 19:56

## 2019-01-01 RX ADMIN — THERA TABS 1 TABLET: TAB at 10:30

## 2019-01-01 RX ADMIN — IPRATROPIUM BROMIDE AND ALBUTEROL SULFATE 3 ML: .5; 3 SOLUTION RESPIRATORY (INHALATION) at 19:51

## 2019-01-01 RX ADMIN — FUROSEMIDE 20 MG: 10 INJECTION, SOLUTION INTRAMUSCULAR; INTRAVENOUS at 08:15

## 2019-01-01 RX ADMIN — METHYLPREDNISOLONE SODIUM SUCCINATE 20 MG: 40 INJECTION, POWDER, FOR SOLUTION INTRAMUSCULAR; INTRAVENOUS at 20:45

## 2019-01-01 RX ADMIN — ASPIRIN 81 MG 81 MG: 81 TABLET ORAL at 09:40

## 2019-01-01 RX ADMIN — PIPERACILLIN SODIUM,TAZOBACTAM SODIUM 4.5 G: 4; .5 INJECTION, POWDER, FOR SOLUTION INTRAVENOUS at 22:57

## 2019-01-01 RX ADMIN — CLOPIDOGREL BISULFATE 75 MG: 75 TABLET, FILM COATED ORAL at 09:50

## 2019-01-01 RX ADMIN — POTASSIUM CHLORIDE 10 MEQ: 10 TABLET, EXTENDED RELEASE ORAL at 08:47

## 2019-01-01 RX ADMIN — MIDODRINE HYDROCHLORIDE 10 MG: 10 TABLET ORAL at 16:15

## 2019-01-01 RX ADMIN — HEPARIN SODIUM 5000 UNITS: 5000 INJECTION INTRAVENOUS; SUBCUTANEOUS at 03:37

## 2019-01-01 RX ADMIN — POTASSIUM CHLORIDE 10 MEQ: 10 TABLET, EXTENDED RELEASE ORAL at 09:33

## 2019-01-01 RX ADMIN — MIDODRINE HYDROCHLORIDE 10 MG: 10 TABLET ORAL at 12:43

## 2019-01-01 RX ADMIN — BUDESONIDE AND FORMOTEROL FUMARATE DIHYDRATE 2 PUFF: 160; 4.5 AEROSOL RESPIRATORY (INHALATION) at 21:42

## 2019-01-01 RX ADMIN — CLOPIDOGREL BISULFATE 75 MG: 75 TABLET, FILM COATED ORAL at 09:58

## 2019-01-01 RX ADMIN — IPRATROPIUM BROMIDE 0.5 MG: 0.5 SOLUTION RESPIRATORY (INHALATION) at 01:35

## 2019-01-01 RX ADMIN — MIDODRINE HYDROCHLORIDE 10 MG: 10 TABLET ORAL at 12:22

## 2019-01-01 RX ADMIN — FAMOTIDINE 20 MG: 20 TABLET ORAL at 10:24

## 2019-01-01 RX ADMIN — FAMOTIDINE 20 MG: 20 TABLET ORAL at 12:17

## 2019-01-01 RX ADMIN — BUDESONIDE AND FORMOTEROL FUMARATE DIHYDRATE 2 PUFF: 160; 4.5 AEROSOL RESPIRATORY (INHALATION) at 19:56

## 2019-01-01 RX ADMIN — METOPROLOL TARTRATE 12.5 MG: 25 TABLET, FILM COATED ORAL at 13:18

## 2019-01-01 RX ADMIN — DEXTROSE MONOHYDRATE 50 ML/HR: 5 INJECTION, SOLUTION INTRAVENOUS at 03:55

## 2019-01-01 RX ADMIN — TAMSULOSIN HYDROCHLORIDE 0.4 MG: 0.4 CAPSULE ORAL at 09:58

## 2019-01-01 RX ADMIN — GUAIFENESIN 400 MG: 200 SOLUTION ORAL at 17:24

## 2019-01-01 RX ADMIN — BUDESONIDE 500 MCG: 0.5 INHALANT RESPIRATORY (INHALATION) at 08:09

## 2019-01-01 RX ADMIN — IPRATROPIUM BROMIDE AND ALBUTEROL SULFATE 3 ML: .5; 3 SOLUTION RESPIRATORY (INHALATION) at 09:52

## 2019-01-01 RX ADMIN — ARFORMOTEROL TARTRATE 15 MCG: 15 SOLUTION RESPIRATORY (INHALATION) at 07:58

## 2019-01-01 RX ADMIN — METOPROLOL TARTRATE 2.5 MG: 5 INJECTION INTRAVENOUS at 13:10

## 2019-01-01 RX ADMIN — FUROSEMIDE 20 MG: 20 TABLET ORAL at 09:58

## 2019-01-01 RX ADMIN — LEVOFLOXACIN 750 MG: 5 INJECTION, SOLUTION INTRAVENOUS at 18:52

## 2019-01-01 RX ADMIN — IPRATROPIUM BROMIDE 0.5 MG: 0.5 SOLUTION RESPIRATORY (INHALATION) at 22:12

## 2019-01-01 RX ADMIN — Medication 1 TABLET: at 09:05

## 2019-01-01 RX ADMIN — BUDESONIDE 500 MCG: 0.5 INHALANT RESPIRATORY (INHALATION) at 21:53

## 2019-01-01 RX ADMIN — HEPARIN SODIUM 5000 UNITS: 5000 INJECTION INTRAVENOUS; SUBCUTANEOUS at 04:24

## 2019-01-01 RX ADMIN — METOPROLOL TARTRATE 12.5 MG: 25 TABLET, FILM COATED ORAL at 08:58

## 2019-01-01 RX ADMIN — THERA TABS 1 TABLET: TAB at 09:50

## 2019-01-01 RX ADMIN — GUAIFENESIN 600 MG: 600 TABLET, EXTENDED RELEASE ORAL at 21:35

## 2019-01-01 RX ADMIN — TAMSULOSIN HYDROCHLORIDE 0.4 MG: 0.4 CAPSULE ORAL at 08:15

## 2019-01-01 RX ADMIN — IPRATROPIUM BROMIDE AND ALBUTEROL SULFATE 3 ML: .5; 3 SOLUTION RESPIRATORY (INHALATION) at 21:31

## 2019-01-01 RX ADMIN — IPRATROPIUM BROMIDE AND ALBUTEROL SULFATE 3 ML: .5; 3 SOLUTION RESPIRATORY (INHALATION) at 00:14

## 2019-01-01 RX ADMIN — Medication 2 TABLET: at 17:49

## 2019-01-01 RX ADMIN — Medication 10 ML: at 21:00

## 2019-01-01 RX ADMIN — Medication 10 ML: at 16:51

## 2019-01-01 RX ADMIN — MIDODRINE HYDROCHLORIDE 10 MG: 10 TABLET ORAL at 09:58

## 2019-01-01 RX ADMIN — HEPARIN SODIUM 5000 UNITS: 5000 INJECTION INTRAVENOUS; SUBCUTANEOUS at 00:25

## 2019-01-01 RX ADMIN — FUROSEMIDE 10 MG: 20 TABLET ORAL at 17:47

## 2019-01-01 RX ADMIN — FUROSEMIDE 20 MG: 20 TABLET ORAL at 10:30

## 2019-01-01 RX ADMIN — FAMOTIDINE 20 MG: 20 TABLET ORAL at 09:23

## 2019-01-01 RX ADMIN — Medication 10 ML: at 15:27

## 2019-01-01 RX ADMIN — Medication 10 ML: at 05:23

## 2019-01-01 RX ADMIN — MIDODRINE HYDROCHLORIDE 5 MG: 10 TABLET ORAL at 14:04

## 2019-01-01 RX ADMIN — IPRATROPIUM BROMIDE 0.5 MG: 0.5 SOLUTION RESPIRATORY (INHALATION) at 04:18

## 2019-01-01 RX ADMIN — CLOPIDOGREL BISULFATE 75 MG: 75 TABLET, FILM COATED ORAL at 09:15

## 2019-01-01 RX ADMIN — BUDESONIDE 500 MCG: 0.5 INHALANT RESPIRATORY (INHALATION) at 07:00

## 2019-01-01 RX ADMIN — ASPIRIN 81 MG 81 MG: 81 TABLET ORAL at 09:07

## 2019-01-01 RX ADMIN — WATER 2 G: 1 INJECTION INTRAMUSCULAR; INTRAVENOUS; SUBCUTANEOUS at 06:39

## 2019-01-01 RX ADMIN — CEFEPIME 2 G: 2 INJECTION, POWDER, FOR SOLUTION INTRAVENOUS at 05:22

## 2019-01-01 RX ADMIN — BUDESONIDE AND FORMOTEROL FUMARATE DIHYDRATE 2 PUFF: 160; 4.5 AEROSOL RESPIRATORY (INHALATION) at 08:00

## 2019-01-01 RX ADMIN — WATER 2 G: 1 INJECTION INTRAMUSCULAR; INTRAVENOUS; SUBCUTANEOUS at 06:08

## 2019-01-01 RX ADMIN — PANTOPRAZOLE SODIUM 40 MG: 40 TABLET, DELAYED RELEASE ORAL at 09:21

## 2019-01-01 RX ADMIN — Medication 10 ML: at 06:44

## 2019-01-01 RX ADMIN — IPRATROPIUM BROMIDE AND ALBUTEROL SULFATE 3 ML: .5; 3 SOLUTION RESPIRATORY (INHALATION) at 20:16

## 2019-01-01 RX ADMIN — Medication 10 ML: at 07:13

## 2019-01-01 RX ADMIN — IPRATROPIUM BROMIDE 0.5 MG: 0.5 SOLUTION RESPIRATORY (INHALATION) at 15:34

## 2019-01-01 RX ADMIN — FAMOTIDINE 20 MG: 20 TABLET ORAL at 08:47

## 2019-01-01 RX ADMIN — DOCUSATE SODIUM 100 MG: 100 CAPSULE, LIQUID FILLED ORAL at 17:37

## 2019-01-01 RX ADMIN — ATORVASTATIN CALCIUM 40 MG: 40 TABLET, FILM COATED ORAL at 21:41

## 2019-01-01 RX ADMIN — ASPIRIN 81 MG 81 MG: 81 TABLET ORAL at 08:52

## 2019-01-01 RX ADMIN — Medication 2 TABLET: at 08:57

## 2019-01-01 RX ADMIN — BUDESONIDE AND FORMOTEROL FUMARATE DIHYDRATE 2 PUFF: 160; 4.5 AEROSOL RESPIRATORY (INHALATION) at 20:09

## 2019-01-01 RX ADMIN — BUDESONIDE AND FORMOTEROL FUMARATE DIHYDRATE 2 PUFF: 160; 4.5 AEROSOL RESPIRATORY (INHALATION) at 21:06

## 2019-01-01 RX ADMIN — FUROSEMIDE 20 MG: 20 TABLET ORAL at 11:03

## 2019-01-01 RX ADMIN — BUDESONIDE 500 MCG: 0.5 INHALANT RESPIRATORY (INHALATION) at 09:50

## 2019-01-01 RX ADMIN — IPRATROPIUM BROMIDE 0.5 MG: 0.5 SOLUTION RESPIRATORY (INHALATION) at 08:02

## 2019-01-01 RX ADMIN — BUDESONIDE 500 MCG: 0.5 INHALANT RESPIRATORY (INHALATION) at 20:50

## 2019-01-01 RX ADMIN — MIDODRINE HYDROCHLORIDE 5 MG: 5 TABLET ORAL at 18:02

## 2019-01-01 RX ADMIN — METHYLPREDNISOLONE SODIUM SUCCINATE 20 MG: 40 INJECTION, POWDER, FOR SOLUTION INTRAMUSCULAR; INTRAVENOUS at 09:20

## 2019-01-01 RX ADMIN — MIDODRINE HYDROCHLORIDE 10 MG: 5 TABLET ORAL at 08:53

## 2019-01-01 RX ADMIN — INSULIN LISPRO 3 UNITS: 100 INJECTION, SOLUTION INTRAVENOUS; SUBCUTANEOUS at 17:10

## 2019-01-01 RX ADMIN — BARIUM SULFATE 370 ML: 0.81 POWDER, FOR SUSPENSION ORAL at 09:45

## 2019-01-01 RX ADMIN — IPRATROPIUM BROMIDE AND ALBUTEROL SULFATE 3 ML: .5; 3 SOLUTION RESPIRATORY (INHALATION) at 10:24

## 2019-01-01 RX ADMIN — THERA TABS 1 TABLET: TAB at 09:07

## 2019-01-01 RX ADMIN — BUDESONIDE 500 MCG: 0.5 INHALANT RESPIRATORY (INHALATION) at 21:52

## 2019-01-01 RX ADMIN — IPRATROPIUM BROMIDE AND ALBUTEROL SULFATE 3 ML: .5; 3 SOLUTION RESPIRATORY (INHALATION) at 16:51

## 2019-01-01 RX ADMIN — MIDODRINE HYDROCHLORIDE 10 MG: 10 TABLET ORAL at 12:03

## 2019-01-01 RX ADMIN — Medication 2 TABLET: at 18:22

## 2019-01-01 RX ADMIN — MIDODRINE HYDROCHLORIDE 10 MG: 10 TABLET ORAL at 10:29

## 2019-01-01 RX ADMIN — MIDODRINE HYDROCHLORIDE 10 MG: 10 TABLET ORAL at 11:56

## 2019-01-01 RX ADMIN — Medication 1 TABLET: at 10:31

## 2019-01-01 RX ADMIN — BUDESONIDE 500 MCG: 0.5 INHALANT RESPIRATORY (INHALATION) at 07:39

## 2019-01-01 RX ADMIN — IPRATROPIUM BROMIDE 0.5 MG: 0.5 SOLUTION RESPIRATORY (INHALATION) at 02:38

## 2019-01-01 RX ADMIN — ATORVASTATIN CALCIUM 40 MG: 40 TABLET, FILM COATED ORAL at 21:51

## 2019-01-01 RX ADMIN — HEPARIN SODIUM 5000 UNITS: 5000 INJECTION INTRAVENOUS; SUBCUTANEOUS at 01:11

## 2019-01-01 RX ADMIN — TAMSULOSIN HYDROCHLORIDE 0.4 MG: 0.4 CAPSULE ORAL at 10:30

## 2019-01-01 RX ADMIN — CLOPIDOGREL BISULFATE 75 MG: 75 TABLET ORAL at 10:03

## 2019-01-01 RX ADMIN — BUDESONIDE 500 MCG: 0.5 INHALANT RESPIRATORY (INHALATION) at 19:30

## 2019-01-01 RX ADMIN — GUAIFENESIN 400 MG: 200 SOLUTION ORAL at 15:59

## 2019-01-01 RX ADMIN — POTASSIUM CHLORIDE 10 MEQ: 10 TABLET, EXTENDED RELEASE ORAL at 18:00

## 2019-01-01 RX ADMIN — DILTIAZEM HYDROCHLORIDE 60 MG: 60 TABLET, FILM COATED ORAL at 23:52

## 2019-01-01 RX ADMIN — Medication 10 ML: at 22:03

## 2019-01-01 RX ADMIN — IPRATROPIUM BROMIDE AND ALBUTEROL SULFATE 3 ML: .5; 3 SOLUTION RESPIRATORY (INHALATION) at 15:44

## 2019-01-01 RX ADMIN — HEPARIN SODIUM AND DEXTROSE 895.6 UNITS/HR: 10000; 5 INJECTION INTRAVENOUS at 07:41

## 2019-01-01 RX ADMIN — FAMOTIDINE 20 MG: 20 TABLET ORAL at 13:43

## 2019-01-01 RX ADMIN — BUDESONIDE 500 MCG: 0.5 INHALANT RESPIRATORY (INHALATION) at 08:30

## 2019-01-01 RX ADMIN — MIDODRINE HYDROCHLORIDE 10 MG: 10 TABLET ORAL at 11:35

## 2019-01-01 RX ADMIN — GUAIFENESIN 400 MG: 200 SOLUTION ORAL at 13:43

## 2019-01-01 RX ADMIN — IPRATROPIUM BROMIDE AND ALBUTEROL SULFATE 3 ML: .5; 3 SOLUTION RESPIRATORY (INHALATION) at 08:35

## 2019-01-01 RX ADMIN — Medication 10 ML: at 06:16

## 2019-01-01 RX ADMIN — ATORVASTATIN CALCIUM 40 MG: 40 TABLET, FILM COATED ORAL at 22:23

## 2019-01-01 RX ADMIN — POTASSIUM CHLORIDE 10 MEQ: 10 TABLET, EXTENDED RELEASE ORAL at 08:26

## 2019-01-01 RX ADMIN — PREDNISONE 20 MG: 20 TABLET ORAL at 12:17

## 2019-01-01 RX ADMIN — BUDESONIDE 500 MCG: 0.5 INHALANT RESPIRATORY (INHALATION) at 08:53

## 2019-01-01 RX ADMIN — INSULIN LISPRO 3 UNITS: 100 INJECTION, SOLUTION INTRAVENOUS; SUBCUTANEOUS at 17:12

## 2019-01-01 RX ADMIN — METOPROLOL TARTRATE 12.5 MG: 25 TABLET ORAL at 10:32

## 2019-01-01 RX ADMIN — HEPARIN SODIUM 5000 UNITS: 5000 INJECTION INTRAVENOUS; SUBCUTANEOUS at 06:46

## 2019-01-01 RX ADMIN — INSULIN LISPRO 3 UNITS: 100 INJECTION, SOLUTION INTRAVENOUS; SUBCUTANEOUS at 12:18

## 2019-01-01 RX ADMIN — PREDNISONE 20 MG: 20 TABLET ORAL at 09:05

## 2019-01-01 RX ADMIN — IPRATROPIUM BROMIDE AND ALBUTEROL SULFATE 3 ML: .5; 3 SOLUTION RESPIRATORY (INHALATION) at 12:17

## 2019-01-01 RX ADMIN — CLOPIDOGREL BISULFATE 75 MG: 75 TABLET, FILM COATED ORAL at 10:04

## 2019-01-01 RX ADMIN — BUDESONIDE 500 MCG: 0.5 INHALANT RESPIRATORY (INHALATION) at 08:08

## 2019-01-01 RX ADMIN — ASPIRIN 81 MG 81 MG: 81 TABLET ORAL at 13:43

## 2019-01-01 RX ADMIN — IPRATROPIUM BROMIDE AND ALBUTEROL SULFATE 3 ML: .5; 3 SOLUTION RESPIRATORY (INHALATION) at 12:07

## 2019-01-01 RX ADMIN — FAMOTIDINE 20 MG: 20 TABLET ORAL at 09:37

## 2019-01-01 RX ADMIN — DILTIAZEM HYDROCHLORIDE 60 MG: 60 TABLET, FILM COATED ORAL at 12:14

## 2019-01-01 RX ADMIN — CARVEDILOL 3.12 MG: 3.12 TABLET, FILM COATED ORAL at 08:02

## 2019-01-01 RX ADMIN — Medication 10 ML: at 07:16

## 2019-01-01 RX ADMIN — GUAIFENESIN 600 MG: 600 TABLET, EXTENDED RELEASE ORAL at 09:04

## 2019-01-01 RX ADMIN — FUROSEMIDE 20 MG: 20 TABLET ORAL at 09:38

## 2019-01-01 RX ADMIN — IPRATROPIUM BROMIDE AND ALBUTEROL SULFATE 3 ML: .5; 3 SOLUTION RESPIRATORY (INHALATION) at 09:00

## 2019-01-01 RX ADMIN — Medication 750 MG: at 21:14

## 2019-01-01 RX ADMIN — ASPIRIN 81 MG: 81 TABLET, COATED ORAL at 09:15

## 2019-01-01 RX ADMIN — Medication 2 TABLET: at 18:28

## 2019-01-01 RX ADMIN — FUROSEMIDE 40 MG: 10 INJECTION, SOLUTION INTRAMUSCULAR; INTRAVENOUS at 20:35

## 2019-01-01 RX ADMIN — MIDODRINE HYDROCHLORIDE 10 MG: 5 TABLET ORAL at 13:32

## 2019-01-01 RX ADMIN — FAMOTIDINE 20 MG: 20 TABLET ORAL at 10:25

## 2019-01-01 RX ADMIN — IPRATROPIUM BROMIDE AND ALBUTEROL SULFATE 3 ML: .5; 3 SOLUTION RESPIRATORY (INHALATION) at 12:10

## 2019-01-01 RX ADMIN — TAMSULOSIN HYDROCHLORIDE 0.4 MG: 0.4 CAPSULE ORAL at 13:42

## 2019-01-01 RX ADMIN — FUROSEMIDE 10 MG: 10 INJECTION, SOLUTION INTRAMUSCULAR; INTRAVENOUS at 12:46

## 2019-01-16 NOTE — PROGRESS NOTES
Jeremiah Arango presents today for Chief Complaint Patient presents with  Follow-up 6 months - no cardiac complaints Jeremiah Arango preferred language for health care discussion is english/other. Is someone accompanying this pt? No 
 
Is the patient using any DME equipment during OV? No 
 
Depression Screening: PHQ over the last two weeks 7/24/2018 Little interest or pleasure in doing things Not at all Feeling down, depressed, irritable, or hopeless Not at all Total Score PHQ 2 0 Learning Assessment: 
Learning Assessment 6/21/2018 PRIMARY LEARNER Patient HIGHEST LEVEL OF EDUCATION - PRIMARY LEARNER  -  
BARRIERS PRIMARY LEARNER -  
CO-LEARNER CAREGIVER -  
PRIMARY LANGUAGE ENGLISH  
LEARNER PREFERENCE PRIMARY DEMONSTRATION  
  -  
ANSWERED BY patient RELATIONSHIP SELF Abuse Screening: 
Abuse Screening Questionnaire 7/24/2018 Do you ever feel afraid of your partner? Jacy Clemens Are you in a relationship with someone who physically or mentally threatens you? Jacy Clemens Is it safe for you to go home? Davida Dance Fall Risk Fall Risk Assessment, last 12 mths 7/24/2018 Able to walk? Yes Fall in past 12 months? No  
 
 
Pt currently taking Antiplatelet therapy? Plavix and ASA Coordination of Care: 1. Have you been to the ER, urgent care clinic since your last visit? Hospitalized since your last visit? No 
 
2. Have you seen or consulted any other health care providers outside of the [address] [place] [address] since your last visit? Include any pap smears or colon screening.  No

## 2019-01-16 NOTE — PROGRESS NOTES
HISTORY OF PRESENT ILLNESS Anne Ardon is a 80 y.o. male. Follow-up Pertinent negatives include no chest pain, no abdominal pain, no headaches and no shortness of breath. Heart Problem Pertinent negatives include no chest pain, no abdominal pain, no headaches and no shortness of breath. Patient presents for a followup office visit. He was initially seen here for evaluation of angina. He subsequently underwent a cardiac catheterization April 2011, which revealed high-grade stenosis of his left circumflex, status post drug-eluting stent, [place] 3.0 x 18 mm. Patient also was found to have a heavily calcified vessels, including a 30% left main stenosis, 30% LAD stenosis, and RCA had diffuse distal 90% disease with collateralization from the left-sided. He last underwent a pharmacological nuclear stress test in January 2016, which showed a predominantly fixed inferior perfusion defect, likely from a previous MI, Low-normal left ventricular ejection fraction of 54%. This has not significantly changed compared to his prior study from 2013. He also has a history of moderate bilateral carotid artery disease, last evaluated by duplex in June 2018 which has remained unchanged over the past few years  . The patient underwent a follow-up echocardiogram in November 2017 was unchanged compared to his prior study. EF 24-73%, grade 1 diastolic dysfunction, mild aortic valve stenosis with mild aortic root enlargement. He was last in the office approximately 6-7 months ago. Since that time, he states he has been feeling well. No major change in his activity level. He still tries to bowl once a week and golf every other week. He denies any new chest pain or exertional dyspnea. No palpitations, dizziness or syncope. Past Medical History:  
Diagnosis Date  Asthma  Cardiac cath 04/28/2011 oLM 30%. pLAD 30%. oD1 50%. CX 90% (3 x 18 [place] DEMAR). dRCA 90%. RPLB subtotal.  RPDA 100%.  Cardiac echocardiogram 2014 EF 55-60%. Mod LVH. Gr 1 DDfx. Mild AS (mean grad 13). Mild AI. Unchanged from study of 11.  Cardiac nuclear imaging test, mod risk 2016 Intermediate risk. Medium-sized inferior, inferoseptal infarction. No ischemia. EF 54%. Nondiagnostic EKG on pharm stress test.  
 Carotid duplex 2016 Mod 50-69% bilateral ICA stenosis. Probable significant RECA stenosis. >50% stenosis of left subclavian. No significant change from study of 1/8/15.  Coronary artery disease Status post PCI with drug-eluting stent, [place] 3 x 18 mm in the proximal circumflex.  Hx of carotid artery disease ([facility] [address]  Hypercholesterolemia  Hypertension  Prostate cancer ([facility] [address] Current Outpatient Medications Medication Sig Dispense Refill  amLODIPine (NORVASC) 5 mg tablet TAKE 1 TABLET EVERY DAY 90 Tab 3  
 simvastatin (ZOCOR) 40 mg tablet TAKE 1 TABLET EVERY NIGHT 90 Tab 3  clopidogrel (PLAVIX) 75 mg tab TAKE 1 TABLET EVERY DAY 90 Tab 3  
 tadalafil (CIALIS) 20 mg tablet Take 1 Tab by mouth as needed. 3 Tab 0  
 fluticasone-salmeterol (ADVAIR DISKUS) 100-50 mcg/dose diskus inhaler Take 1 Puff by inhalation daily. Indications: BRONCHIAL ASTHMA 3 Inhaler 3  
 albuterol (PROVENTIL HFA, VENTOLIN HFA, PROAIR HFA) 90 mcg/actuation inhaler Take 2 Puffs by inhalation every four (4) hours as needed for Wheezing or Shortness of Breath. 1 Inhaler 1  
 tamsulosin (FLOMAX) 0.4 mg capsule Take 1 Cap by mouth daily. 30 Cap 5  
 aspirin delayed-release 81 mg tablet Take 81 mg by mouth daily. No Known Allergies Social History Tobacco Use  Smoking status: Former Smoker Packs/day: 1.00 Years: 15.00 Pack years: 15.00 Last attempt to quit: 1960 Years since quittin.6  Smokeless tobacco: Never Used Substance Use Topics  Alcohol use: No  
 Drug use: No  
   
 
Review of Systems Constitutional: Negative for chills, fever and weight loss. HENT: Negative for nosebleeds. Eyes: Negative for blurred vision and double vision. Respiratory: Negative for cough, shortness of breath and wheezing. Cardiovascular: Negative for chest pain, palpitations, orthopnea, claudication, leg swelling and PND. Gastrointestinal: Negative for abdominal pain, heartburn, nausea and vomiting. Genitourinary: Negative for dysuria and hematuria. Musculoskeletal: Negative for falls and myalgias. Skin: Negative for rash. Neurological: Negative for dizziness, focal weakness and headaches. Endo/Heme/Allergies: Does not bruise/bleed easily. Psychiatric/Behavioral: Negative for substance abuse. Visit Vitals /62 Pulse 69 Ht 5' 8\" (1.727 m) Wt 71.7 kg (158 lb) SpO2 97% BMI 24.02 kg/m² Physical Exam  
Constitutional: He is oriented to person, place, and time. He appears well-developed and well-nourished. HENT:  
Head: Normocephalic and atraumatic. Eyes: Conjunctivae are normal.  
Neck: Neck supple. No JVD present. Carotid bruit is present (bilateral). Cardiovascular: Normal rate, regular rhythm, S1 normal, S2 normal and normal pulses. Exam reveals no gallop and no S3.  
Murmur heard. Systolic murmur is present with a grade of 2/6 at the upper right sternal border. Pulmonary/Chest: Breath sounds normal. He has no wheezes. He has no rales. Abdominal: Soft. Bowel sounds are normal. There is no tenderness. Musculoskeletal: He exhibits no edema. Neurological: He is alert and oriented to person, place, and time. Skin: Skin is warm and dry. EKG: Normal sinus rhythm, leftward axis, left bundle branch block. Occasional PVC. Compared to the previous EKG, no significant interval change. Nate Larry ASSESSMENT and PLAN Coronary disease. Status post drug-eluting stent to the left circumflex, with an [place] 3 x 18 mm stent in 2011.   Patient has mild residual ostial left main disease and LAD disease, with heavy calcification, and significant distal RCA disease, which was well collateralized. He last underwent a followup pharmacologic nuclear stress test in January 2016, which continued to demonstrate scarring of the inferior wall, but no significant ischemia. No major change in his activity level or symptoms suggestive of angina. I will continue his current medical regimen. Carotid artery disease. The patient has moderate bilateral ICA stenosis last evaluated by a carotid duplex in June 2018 which was unchanged for the past several years. This can be reassessed every year. Peripheral vascular disease. 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 Because of this his blood pressure should be checked in his right arm. Aortic valve stenosis. This remains in the mild range on a follow-up echocardiogram in November 2017. This likely will not be an issue. Hypertension. The patient's blood pressure  appears reasonably well-controlled on amlodipine as monotherapy. Dyslipidemia. On simvastatin 40 mg a day. This has been followed by his PCP. Left bundle branch block. This is again present on today's EKG and has been intermittent in the past. 
 
Followup in the office in 6 months, sooner if needed

## 2019-01-24 NOTE — PROGRESS NOTES
Patient is in the office today for 6 month follow up. 1. Have you been to the ER, urgent care clinic since your last visit? Hospitalized since your last visit? No 
 
2. Have you seen or consulted any other health care providers outside of the [address] [place] [address] since your last visit? Include any pap smears or colon screening.  No

## 2019-01-24 NOTE — PROGRESS NOTES
Subjective: Chief Complaint The patient presents for follow up of hypertension and high cholesterol. Asthma, CAD HPI Jeremiah Arango is a 80 y.o. male seen for follow up of hyperlipidemia. Healso has hypertension. Hypertension well controlled, no significant medication side effects noted, on Norvasc, will check CMP today , hyperlipidemia normally well controlled except for triglcerides, no significant medication side effects noted, on Zocor. Will check lipid profile today Diet and Lifestyle: generally follows a low fat low cholesterol diet, exercises regularly pt has cut back sweets and lost weight. He continues to bowl weekly. Home BP Monitoring: is not measured at home. Other symptoms and concerns: Pt using Advair and albuterol intermittently for his asthma. Pt doing very well for his age. Bowling and playing Golf up to 3x/week. Pt c/o tremor left hand for the last year but it has not worsened and does not prevent him from doing his daily activities. Due to his age would not pursue w/u unless pt's lifestyle is affected. Pt lives by himself but his daughter lives in the area and is his POA. Pt has h/o CAD and stent placement for which he is on Zocor. He is doing well and followed by cardiology. He has moderate Carotid artery disease that has been stable on imaging. He is on Plavix and Zocor and doing well. Current Outpatient Medications Medication Sig  
 amLODIPine (NORVASC) 5 mg tablet TAKE 1 TABLET EVERY DAY  simvastatin (ZOCOR) 40 mg tablet TAKE 1 TABLET EVERY NIGHT  clopidogrel (PLAVIX) 75 mg tab TAKE 1 TABLET EVERY DAY  tadalafil (CIALIS) 20 mg tablet Take 1 Tab by mouth as needed.  fluticasone-salmeterol (ADVAIR DISKUS) 100-50 mcg/dose diskus inhaler Take 1 Puff by inhalation daily. Indications: BRONCHIAL ASTHMA  albuterol (PROVENTIL HFA, VENTOLIN HFA, PROAIR HFA) 90 mcg/actuation inhaler Take 2 Puffs by inhalation every four (4) hours as needed for Wheezing or Shortness of Breath.  tamsulosin (FLOMAX) 0.4 mg capsule Take 1 Cap by mouth daily.  aspirin delayed-release 81 mg tablet Take 81 mg by mouth daily. No current facility-administered medications for this visit. Review of Systems Respiratory: negative for dyspnea on exertion Cardiovascular: negative for chest pain Objective:  
 
Visit Vitals /50 Pulse 66 Temp 97.7 °F (36.5 °C) (Oral) Resp 20 Ht 5' 8\" (1.727 m) Wt 157 lb (71.2 kg) SpO2 96% BMI 23.87 kg/m² General appearance - alert, well appearing, and in no distress Neck - supple, no significant adenopathy, carotids upstroke normal bilaterally, no bruits Chest - clear to auscultation, no wheezes, rales or rhonchi, symmetric air entry Heart - normal rate, regular rhythm, normal S1, S2, no murmurs, rubs, clicks or gallops Extremities - peripheral pulses normal, no pedal edema, no clubbing or cyanosis Skin - normal coloration and turgor, no rashes, no suspicious skin lesions noted Neuro- tremor left hand that is constant Labs:  
Lab Results Component Value Date/Time Cholesterol, total 117 07/24/2018 12:00 AM  
 HDL Cholesterol 36 (L) 07/24/2018 12:00 AM  
 LDL, calculated 39 07/24/2018 12:00 AM  
 Triglyceride 208 (H) 07/24/2018 12:00 AM  
 CHOL/HDL Ratio 4.5 11/09/2010 07:51 AM  
 
Lab Results Component Value Date/Time ALT (SGPT) 7 07/24/2018 12:00 AM  
 AST (SGOT) 10 07/24/2018 12:00 AM  
 Alk. phosphatase 45 07/24/2018 12:00 AM  
 Bilirubin, total 0.3 07/24/2018 12:00 AM  
 
Lab Results Component Value Date/Time GFR est AA 63 07/24/2018 12:00 AM  
 GFR est non-AA 54 (L) 07/24/2018 12:00 AM  
 Creatinine 1.16 07/24/2018 12:00 AM  
 BUN 14 07/24/2018 12:00 AM  
 Sodium 143 07/24/2018 12:00 AM  
 Potassium 4.1 07/24/2018 12:00 AM  
 Chloride 103 07/24/2018 12:00 AM  
 CO2 26 07/24/2018 12:00 AM  
  
 
 
 
Assessment / Plan Hypertension well controlled, on Norvasc Hyperlipidemia normally well controlled, on Zocor. Will check lipid profile today. ICD-10-CM ICD-9-CM 1. Hypertension P92.5 996.99 METABOLIC PANEL, COMPREHENSIVE  
   METABOLIC PANEL, COMPREHENSIVE 2. Dyslipidemia E78.5 272.4 LIPID PANEL  
   LIPID PANEL 3. Atherosclerosis of native coronary artery of native heart without angina pectoris I25.10 414.01 Well controlled on Plavix and Zocor. Pt is followed by Cardiology 4. Bilateral carotid artery stenosis I65.23 433.10 Stable currently on Plavix and Zocor. 433.30 Low cholesterol diet, weight control and daily exercise discussed. Follow-up Disposition: 
Return in about 6 months (around 7/24/2019) for , and Medicare Wellness Visit. Reviewed plan of care. Patient has pro`vided input and agrees with goals.

## 2019-01-24 NOTE — PATIENT INSTRUCTIONS
High Blood Pressure: Care Instructions Overview It's normal for blood pressure to go up and down throughout the day. But if it stays up, you have high blood pressure. Another name for high blood pressure is hypertension. Despite what a lot of people think, high blood pressure usually doesn't cause headaches or make you feel dizzy or lightheaded. It usually has no symptoms. But it does increase your risk of stroke, heart attack, and other problems. You and your doctor will talk about your risks of these problems based on your blood pressure. Your doctor will give you a goal for your blood pressure. Your goal will be based on your health and your age. Lifestyle changes, such as eating healthy and being active, are always important to help lower blood pressure. You might also take medicine to reach your blood pressure goal. 
Follow-up care is a key part of your treatment and safety. Be sure to make and go to all appointments, and call your doctor if you are having problems. It's also a good idea to know your test results and keep a list of the medicines you take. How can you care for yourself at home? Medical treatment · If you stop taking your medicine, your blood pressure will go back up. You may take one or more types of medicine to lower your blood pressure. Be safe with medicines. Take your medicine exactly as prescribed. Call your doctor if you think you are having a problem with your medicine. · Talk to your doctor before you start taking aspirin every day. Aspirin can help certain people lower their risk of a heart attack or stroke. But taking aspirin isn't right for everyone, because it can cause serious bleeding. · See your doctor regularly. You may need to see the doctor more often at first or until your blood pressure comes down. · If you are taking blood pressure medicine, talk to your doctor before you take decongestants or anti-inflammatory medicine, such as ibuprofen. Some of these medicines can raise blood pressure. · Learn how to check your blood pressure at home. Lifestyle changes · Stay at a healthy weight. This is especially important if you put on weight around the waist. Losing even 10 pounds can help you lower your blood pressure. · If your doctor recommends it, get more exercise. Walking is a good choice. Bit by bit, increase the amount you walk every day. Try for at least 30 minutes on most days of the week. You also may want to swim, bike, or do other activities. · Avoid or limit alcohol. Talk to your doctor about whether you can drink any alcohol. · Try to limit how much sodium you eat to less than 2,300 milligrams (mg) a day. Your doctor may ask you to try to eat less than 1,500 mg a day. · Eat plenty of fruits (such as bananas and oranges), vegetables, legumes, whole grains, and low-fat dairy products. · Lower the amount of saturated fat in your diet. Saturated fat is found in animal products such as milk, cheese, and meat. Limiting these foods may help you lose weight and also lower your risk for heart disease. · Do not smoke. Smoking increases your risk for heart attack and stroke. If you need help quitting, talk to your doctor about stop-smoking programs and medicines. These can increase your chances of quitting for good. When should you call for help? Call 911 anytime you think you may need emergency care. This may mean having symptoms that suggest that your blood pressure is causing a serious heart or blood vessel problem. Your blood pressure may be over 180/120. 
 For example, call 911 if: 
  · You have symptoms of a heart attack. These may include: 
? Chest pain or pressure, or a strange feeling in the chest. 
? Sweating. ? Shortness of breath. ? Nausea or vomiting. ? Pain, pressure, or a strange feeling in the back, neck, jaw, or upper belly or in one or both shoulders or arms. ? Lightheadedness or sudden weakness. ? A fast or irregular heartbeat.  
  · You have symptoms of a stroke. These may include: 
? Sudden numbness, tingling, weakness, or loss of movement in your face, arm, or leg, especially on only one side of your body. ? Sudden vision changes. ? Sudden trouble speaking. ? Sudden confusion or trouble understanding simple statements. ? Sudden problems with walking or balance. ? A sudden, severe headache that is different from past headaches.  
  · You have severe back or belly pain.  
 Do not wait until your blood pressure comes down on its own. Get help right away. 
 Call your doctor now or seek immediate care if: 
  · Your blood pressure is much higher than normal (such as 180/120 or higher), but you don't have symptoms.  
  · You think high blood pressure is causing symptoms, such as: 
? Severe headache. 
? Blurry vision.  
 Watch closely for changes in your health, and be sure to contact your doctor if: 
  · Your blood pressure measures higher than your doctor recommends at least 2 times. That means the top number is higher or the bottom number is higher, or both.  
  · You think you may be having side effects from your blood pressure medicine. Where can you learn more? Go to http://monty-nirali.info/. Enter H773 in the search box to learn more about \"High Blood Pressure: Care Instructions. \" Current as of: July 22, 2018 Content Version: 11.9 © 8497-8080 [employer]. Care instructions adapted under license by [employer] (which disclaims liability or warranty for this information). If you have questions about a medical condition or this instruction, always ask your healthcare professional. [place] [address] any warranty or liability for your use of this information.

## 2019-02-07 NOTE — TELEPHONE ENCOUNTER
Spoke with Darrell Files she is aware to make sure patient drinks plenty of fluid and if things get worse patient will need to go to the ED. Tonya verbalizes understanding.

## 2019-02-07 NOTE — TELEPHONE ENCOUNTER
Make sure he drinks a lot of fluids today and if he is not feeling better can make appt tomorrow.  If things get worst go to ER

## 2019-02-07 NOTE — TELEPHONE ENCOUNTER
Spoke with patient's Daughter Guerrero Maldonado she states she is concerned wit her father. She states he vomited one time yesterday and it was black in color however patient had just finished drinking coffee. Daughter states he also had diarrhea yesterday for a few hours and finally took some antidiarrhea medication and it seemed to have stopped. Daughter states father felt weak and states this happened several times yesterday. Daughter states patient does not have any fever, cough or congestion. Daughter also states father is feeling a little bit better today. Daughter did not know if he should be seen sooner. Please advise.

## 2019-02-12 NOTE — PROGRESS NOTES
Patient is in the office today for dark tarry stool since 02/07/19 with abdominal pain. 1. Have you been to the ER, urgent care clinic since your last visit? Hospitalized since your last visit? No 
 
2. Have you seen or consulted any other health care providers outside of the [address] [place] [address] since your last visit? Include any pap smears or colon screening.  No

## 2019-02-12 NOTE — TELEPHONE ENCOUNTER
Spoke with daughter she was concerned with the visit. Daughter was aware patient mentioned he had dark tarry stools and he will be referred to GI. Daughter verbalizes understanding.

## 2019-02-14 NOTE — TELEPHONE ENCOUNTER
Patient is aware of the following appointment information: 
 
Dr. Valentina Awad [phone] Feb 27, 2019 at 11:00 am check in at 10:45 am 
[address] [place] [address]

## 2019-02-14 NOTE — PROGRESS NOTES
Saba Parrish is a 80 y.o.  male and presents with Stool Color Change and Melena SUBJECTIVE: 
 
Blood in Stool Patient presents for presents evaluation of blood in stool/ rectal bleeding. Patient has associated symptoms of change in stool color: has been black for about a week. The patient denies diarrhea and visible blood, none. The patient has a known history of: nothing. At age 80 he has not had a colonoscopy in awhile. . The patient has had 5 or 6 episodes of black stools  There is not a history of rectal injury. Patient does not have similar episodes of rectal bleeding in the past. 
 
 
Respiratory ROS: negative for - shortness of breath Cardiovascular ROS: negative for - chest pain Current Outpatient Medications Medication Sig  
 amLODIPine (NORVASC) 5 mg tablet TAKE 1 TABLET EVERY DAY  simvastatin (ZOCOR) 40 mg tablet TAKE 1 TABLET EVERY NIGHT  clopidogrel (PLAVIX) 75 mg tab TAKE 1 TABLET EVERY DAY  tadalafil (CIALIS) 20 mg tablet Take 1 Tab by mouth as needed.  fluticasone-salmeterol (ADVAIR DISKUS) 100-50 mcg/dose diskus inhaler Take 1 Puff by inhalation daily. Indications: BRONCHIAL ASTHMA  albuterol (PROVENTIL HFA, VENTOLIN HFA, PROAIR HFA) 90 mcg/actuation inhaler Take 2 Puffs by inhalation every four (4) hours as needed for Wheezing or Shortness of Breath.  tamsulosin (FLOMAX) 0.4 mg capsule Take 1 Cap by mouth daily.  aspirin delayed-release 81 mg tablet Take 81 mg by mouth daily. No current facility-administered medications for this visit. OBJECTIVE: 
alert, well appearing, and in no distress Visit Vitals /66 (BP 1 Location: Left arm, BP Patient Position: Sitting) Pulse (!) 111 Temp 97.7 °F (36.5 °C) (Oral) Resp 20 Ht 5' 8\" (1.727 m) Wt 152 lb (68.9 kg) SpO2 94% BMI 23.11 kg/m²  
  
well developed and well nourished Chest - clear to auscultation, no wheezes, rales or rhonchi, symmetric air entry Heart - normal rate, regular rhythm, normal S1, S2, no murmurs, rubs, clicks or gallops Abdomen - soft, nontender, nondistended, no masses or organomegaly Labs:  
Lab Results Component Value Date/Time WBC 6.5 02/12/2019 03:55 AM  
 HGB 8.4 (L) 02/12/2019 03:55 AM  
 HCT 25.7 (L) 02/12/2019 03:55 AM  
 PLATELET 184 81/68/2823 03:55 AM  
 MCV 95 02/12/2019 03:55 AM  
 
Lab Results Component Value Date/Time Sodium 144 02/12/2019 03:55 AM  
 Potassium 4.1 02/12/2019 03:55 AM  
 Chloride 105 02/12/2019 03:55 AM  
 CO2 21 02/12/2019 03:55 AM  
 Anion gap 6 04/29/2011 04:30 AM  
 Glucose 131 (H) 02/12/2019 03:55 AM  
 BUN 14 02/12/2019 03:55 AM  
 Creatinine 1.01 02/12/2019 03:55 AM  
 BUN/Creatinine ratio 14 02/12/2019 03:55 AM  
 GFR est AA 74 02/12/2019 03:55 AM  
 GFR est non-AA 64 02/12/2019 03:55 AM  
 Calcium 9.1 02/12/2019 03:55 AM  
 Bilirubin, total <0.2 02/12/2019 03:55 AM  
 ALT (SGPT) 12 02/12/2019 03:55 AM  
 AST (SGOT) 18 02/12/2019 03:55 AM  
 Alk. phosphatase 47 02/12/2019 03:55 AM  
 Protein, total 6.4 02/12/2019 03:55 AM  
 Albumin 3.8 02/12/2019 03:55 AM  
 Globulin 3.0 04/25/2011 11:37 AM  
 A-G Ratio 1.5 02/12/2019 03:55 AM  
  
 
Assessment/Plan ICD-10-CM ICD-9-CM 1. Melena K92.1 578.1 CBC WITH AUTOMATED DIFF  
   REFERRAL TO GASTROENTEROLOGY METABOLIC PANEL, COMPREHENSIVE 2. Colon cancer screening Z12.11 V76.51 OCCULT BLOOD IMMUNOASSAY,DIAGNOSTIC Follow-up Disposition: 
Return if symptoms worsen or fail to improve. Reviewed plan of care. Patient has provided input and agrees with goals.

## 2019-02-14 NOTE — TELEPHONE ENCOUNTER
----- Message from Kiki Durand MD sent at 2/14/2019  9:13 AM EST ----- Try to get him in with GI ASAP. He had some black stools and is anemic. I already placed an order.

## 2019-02-19 NOTE — TELEPHONE ENCOUNTER
Requested Prescriptions Pending Prescriptions Disp Refills  fluticasone-salmeterol (ADVAIR DISKUS) 100-50 mcg/dose diskus inhaler 3 Inhaler 3 Sig: Take 1 Puff by inhalation daily. Patient daughter is also requesting a call back in reference to previous lab work. She can be contacted back at [phone]

## 2019-02-20 NOTE — TELEPHONE ENCOUNTER
Pt daughter made aware that he is bleeding in his GI track so if he feels weak and has difficulty getting around she should take him to ER to be evaluated for possible transfusion and further diagnostic studies. He has GI appt for 2/27/19

## 2019-02-21 PROBLEM — D64.9 ANEMIA: Status: ACTIVE | Noted: 2019-01-01

## 2019-02-27 NOTE — Clinical Note
Patient reports feeling short of breath. Patient encouraged to call PCP as needed and to follow up at ED for any new or worsening symptoms.

## 2019-02-27 NOTE — PROGRESS NOTES
Hospital Discharge Follow-Up Date/Time:  2019 12:50 PM 
 
Patient was admitted to [facility] on 19  and discharged on 19 for: - Anemia *Per Discharge Summary of  
19, by Dr. Terry Castellon The physician discharge summary was available at the time of outreach. Patient was contacted within 3  business days of discharge. Top Challenges reviewed with the provider Per Discharge Summary of 19:  
PCP: Dr Javier Kam 1 week for f/u and lab CBC 
GI: Dr Sophia Thompson 4 weeks to plan on a repeat EGD in 8 weeks 
  
Patient reports he is feeling  short of breath. Patient reports he has had asthma for 50 years. It is a little worse this time Method of communication with provider : 
Chart Routed to Dr. Bogdan Ruth and Mrs. Dante Cordero LPN with importance. Inpatient RRAT score: not available Was this a readmission? no  
Patient stated reason for the readmission: n/a Nurse Navigator (NN) contacted the patient by telephone to perform post hospital discharge assessment. Verified name and  with patient as identifiers. Provided introduction to self, and explanation of the Nurse Navigator role. Reviewed discharge instructions and red flags with patient who verbalized understanding. Patient given an opportunity to ask questions and does not have any further questions or concerns at this time. The patient agrees to contact the PCP office for questions related to their healthcare. NN provided contact information for future reference. Disease Specific:   N/A Summary of patient's top problems: 1. Patient 89 years olf 2. Patient reports feeling a little short of breath and has a history of asthma Home Health orders at discharge: 
-No orders noted [address]: n/a Date of initial visit: n/a Durable Medical Equipment ordered/company:  
- No orders noted Durable Medical Equipment received: - No orders noted Barriers to care? 
- No Barriers to care noted at this time. Advance Care Planning:  
Does patient have an Advance Directive:  not on file Medication(s):  
New Medications at Discharge: - Prilosec Changed Medications at Discharge: - None noted Discontinued Medications at Discharge: - None noted Discharge medications were reviewed by nurse navigator. Medication reconciliation  with patient could not be completed with patient over the telephone as it was somewhat difficult to understand patient via the telephone call. Patient verbalizes understanding of administration of home medications and reports that he has started Omperazole. There were no barriers to obtaining medications identified at this time. Patient reports that he has all needed medications at this time. Patient reports some shortness of breath. Patient states that he has had Asthma for 50 years but it is a little worse this time. Patient states that he has medication to use and has not had to use this very much in the past. Patient states that he has a follow up appointment scheduled with Dr. Iesha Parham for Friday (3-1-19). Nurse Navigator encouraged patient to call physician office as needed or return to ED for any new or worsening of symptoms. Referral to Pharm D needed: no  
 
Current Outpatient Medications Medication Sig  
 amLODIPine (NORVASC) 5 mg tablet TAKE 1 TABLET EVERY DAY  clopidogrel (PLAVIX) 75 mg tab TAKE 1 TABLET EVERY DAY  simvastatin (ZOCOR) 40 mg tablet TAKE 1 TABLET EVERY NIGHT  omeprazole (PRILOSEC) 40 mg capsule Take 1 Cap by mouth daily.  fluticasone-salmeterol (ADVAIR DISKUS) 100-50 mcg/dose diskus inhaler Take 1 Puff by inhalation daily.  tadalafil (CIALIS) 20 mg tablet Take 1 Tab by mouth as needed.  albuterol (PROVENTIL HFA, VENTOLIN HFA, PROAIR HFA) 90 mcg/actuation inhaler Take 2 Puffs by inhalation every four (4) hours as needed for Wheezing or Shortness of Breath.  tamsulosin (FLOMAX) 0.4 mg capsule Take 1 Cap by mouth daily.  aspirin delayed-release 81 mg tablet Take 81 mg by mouth daily. No current facility-administered medications for this visit. There are no discontinued medications. [facility] follow up appointment(s):  
Future Appointments Date Time Provider Markus Campai 3/1/2019  2:15 PM Titi Daniel MD [facility]  
7/12/2019 11:00 AM Charis Suh  [facility] follow up 
 adppointment(s): 
 
Dr. Ariane Duggan within 4 weeks [facility] 1947[facility]:  out of service area Goals None Patient Reports: - Feeling  short of breath - A little weakness but that is normal per patient. Patient reports that he has been trying to walk. His leg/knee feels a little stiff. Patient Denies:  
- Fever/Chills - Chest Pain - Feeling light headed - Bleeding Patient encouraed to contact PCP office for concerns or follow up with ED as needed. Patient could carry on a conversation with nurse navigator via telephone call. Patient and/or family members were alerted to the availability of physician or other advanced practioners after hours, weekends, and holidays. Please call the PCP office phone number and speak with the answering service for assistance. Nurse Navigator contact information provided for follow up as needed. Patient stated he was not aware of follow up appointment with Dr. Ariane Duggan, \" I guess they will call me\". Nurse Navigator offered to follow up with Dr. Sisi Gamboa office and patient was agreeable with this. Nurse Navigator called Dr. Sisi Gamboa office, with patient's permission, and scheduled followed up GI appointment. Follow up with Dr. Ariane Duggan with [facility] is scheduled for March 6, 2019 @ 3:45 at the [facility] AT [place].

## 2019-02-28 NOTE — PROGRESS NOTES
Nurse Navigator spoke with patient via telephone call and provided the appointment date, time, and location for follow up with Dr. Cesar Chandler of [facility] Ind[facility] Herring [place] 1947. Patient alerted that follow up appointment has been scheduled for:  
 
3/6/19 @ 3:45 Loccation:  [employer] Patient asked that nurse navigator call his daughter to please inform her. Patient reports that he is feeling about the same as yesterday and that he is \"hanging in there\". Nurse Navigator attempted to contact [employer] via the two phone numbers listed on patient's medical record. Both of these numbers were incorrect. Nurse Navigator called patient for clarification of his daughter's phone number. Patient states his daughter's phone number is [phone]. Nurse Navigator called this phone number and requested a non-emergency return telephone call. Patient's daughter, Ms. Atiya Tidwell,  returned telephone call. Patient's daughter alerted to date, time, and location of GI follow up appointment. Mrs. Atiya Tidwell is agreeable with this date and time. Mrs. Atiya Tidwell reports the following in regards to patient:  
- Patient's  voice is raspy 
  - this may be related to the endoscopy? 
- Patient reports shortness of breath And gets winded with activity - Patient states his asthma is back - Patient complains of leg pain This may be due to patient's decreased mobility ? - Mrs. Atiya Tidwell reports that patient has all medications except Advair.  
- Mrs. Atiya Tidwell stated that another inhaler was called in and is not affordable. Mrs. Atiya Tidwell denies the following for patient[de-identified] 
- Chest pain  
- dizziness Mrs. Atiya Tidwell reports that patient has a follow up with PCP on 3-1-19. Mrs. Atiya Tidwell  was alerted to the availability of physician or other advanced practioners after hours, weekends, and holidays. Please call the PCP office phone number and speak with the answering service for assistance. Mrs. Iva Hayden asked to please follow up with emergency services for new or worsening symptoms to include  chest pain or severe shortness of breath. Nurse Navigator contact information provided for follow up as needed. Nurse Navigator called [facility] and attempted to follow up to determine which medication was not affordable for patient. Per discussion with [facility]ne [facility] representative the Advair Inhaler cost is  
$254.00 (for generic). Nurse Navigator will forward chart to Dr. Mauricio Rivero for review and copy to Ese Duenas LPN with [facility].

## 2019-02-28 NOTE — Clinical Note
Dr. Ramón Mcmanus and Ms. Steward please see follow up telephone call with patient and his daughter. Patient's daughter reports that one of the inhalers is not affordable. Thank you Valentino Peng Nurse Navigator [phone].

## 2019-03-01 NOTE — PROGRESS NOTES
Patient is in the office today for 1 month follow up. 1. Have you been to the ER, urgent care clinic since your last visit? Hospitalized since your last visit? yes [facility] 2/22/19 2. Have you seen or consulted any other health care providers outside of the [address] [place] [address] since your last visit? Include any pap smears or colon screening.  No

## 2019-03-01 NOTE — PATIENT INSTRUCTIONS
Learning About Colonoscopy What is a colonoscopy? A colonoscopy is a test (also called a procedure) that lets a doctor look inside your large intestine. The doctor uses a thin, lighted tube called a colonoscope. The doctor uses it to look for small growths called polyps, colon or rectal cancer (colorectal cancer), or other problems like bleeding. During the procedure, the doctor can take samples of tissue. The samples can then be checked for cancer or other conditions. The doctor can also take out polyps. How is colonoscopy done? This procedure is done in a doctor's office or a clinic or hospital. You will get medicine to help you relax and not feel pain. Some people find that they do not remember having the test because of the medicine. The doctor gently moves the colonoscope, or scope, through the colon. The scope is also a small video camera. It lets the doctor see the colon and take pictures. A colonoscopy usually takes 30 to 45 minutes. It may take longer if the doctor has to remove polyps. How do you prepare for the procedure? You need to clean out your colon before the procedure so the doctor can see all of your colon. You may start the cleaning process a day or two before the test. This depends on which \"colon prep\" your doctor recommends. To clean your colon, you stop eating solid foods and drink only clear liquids. You can have water, tea, coffee, clear juices, clear broths, flavored ice pops, and gelatin (such as Jell-O). Do not drink anything red or purple, such as grape juice or fruit punch. And do not eat red or purple foods, such as grape ice pops or cherry gelatin. The day or night before the procedure, you drink a large amount of a special liquid. This causes loose, frequent stools. You will go to the bathroom a lot. It is very important to drink all of the colon prep liquid. If you have problems drinking the liquid, call your doctor. For many people, the prep is worse than the test. It may be uncomfortable, and you may feel hungry on the clear liquid diet. Some people do not go to work or do their usual activities on the day of the prep. Arrange to have someone take you home after the test. 
What can you expect after a colonoscopy? The nurses will watch you for 1 to 2 hours until the medicines wear off. Then you can go home. You will need a ride. Your doctor will tell you when you can eat and do your usual activities. Your doctor will talk to you about when you will need your next colonoscopy. The results of your test and your risk for colorectal cancer will help your doctor decide how often you need to be checked. Follow-up care is a key part of your treatment and safety. Be sure to make and go to all appointments, and call your doctor if you are having problems. It's also a good idea to know your test results and keep a list of the medicines you take. Where can you learn more? Go to http://monty-nirali.info/. Enter R887 in the search box to learn more about \"Learning About Colonoscopy. \" Current as of: March 27, 2018 Content Version: 11.9 © 6424-8582 [employer]. Care instructions adapted under license by [employer] (which disclaims liability or warranty for this information). If you have questions about a medical condition or this instruction, always ask your healthcare professional. [place] [address] any warranty or liability for your use of this information.

## 2019-03-03 NOTE — PROGRESS NOTES
Alfonso Sood is a 80 y.o.  male and presents with Hospital Follow Up (endoscopy); Transitions Of Care; and Anemia SUBJECTIVE: 
Pt has anemia and EGD showed bleeding AVM that was cauterized. Pt's H/H is still low but not falling any further. He however continues to feel very short of breath with any activity. He denies any Chest pain. He is with his daughter today Bran Locus [phone]. Pt feels that his asthma has been exacerbated and is using an old serevent since Jessie Sandhu was to expensive Respiratory ROS: negative for - shortness of breath Cardiovascular ROS: negative for - chest pain Current Outpatient Medications Medication Sig  budesonide-formoterol (SYMBICORT) 160-4.5 mcg/actuation HFAA Take 2 Puffs by inhalation two (2) times a day.  glycopyrrolate-formoterol (BEVESPI AEROSPHERE) 9-4.8 mcg HFAA Take 2 Inhalation by inhalation two (2) times a day.  amLODIPine (NORVASC) 5 mg tablet TAKE 1 TABLET EVERY DAY  clopidogrel (PLAVIX) 75 mg tab TAKE 1 TABLET EVERY DAY  simvastatin (ZOCOR) 40 mg tablet TAKE 1 TABLET EVERY NIGHT  omeprazole (PRILOSEC) 40 mg capsule Take 1 Cap by mouth daily.  albuterol (PROVENTIL HFA, VENTOLIN HFA, PROAIR HFA) 90 mcg/actuation inhaler Take 2 Puffs by inhalation every four (4) hours as needed for Wheezing or Shortness of Breath.  tamsulosin (FLOMAX) 0.4 mg capsule Take 1 Cap by mouth daily.  aspirin delayed-release 81 mg tablet Take 81 mg by mouth daily.  tadalafil (CIALIS) 20 mg tablet Take 1 Tab by mouth as needed. No current facility-administered medications for this visit. OBJECTIVE: 
alert, well appearing, and in no distress Visit Vitals /54 (BP 1 Location: Left arm, BP Patient Position: Sitting) Pulse 98 Temp 97.1 °F (36.2 °C) (Oral) Resp 20 Ht 5' 7\" (1.702 m) Wt 161 lb (73 kg) SpO2 95% BMI 25.22 kg/m²  
  
well developed and well nourished Chest - clear to auscultation, no wheezes, rales or rhonchi, symmetric air entry Heart - normal rate, regular rhythm, normal S1, S2, no murmurs, rubs, clicks or gallops Extremities - peripheral pulses normal, no pedal edema, no clubbing or cyanosis Labs:  
Lab Results Component Value Date/Time WBC 6.4 03/01/2019 03:13 AM  
 HGB 9.2 (L) 03/01/2019 03:13 AM  
 HCT 28.6 (L) 03/01/2019 03:13 AM  
 PLATELET 910 72/78/4583 03:13 AM  
 MCV 91 03/01/2019 03:13 AM  
 
Labs:  
Lab Results Component Value Date/Time Sodium 145 (H) 03/01/2019 03:13 AM  
 Potassium 4.7 03/01/2019 03:13 AM  
 Chloride 107 (H) 03/01/2019 03:13 AM  
 CO2 23 03/01/2019 03:13 AM  
 Anion gap 7 02/22/2019 05:21 AM  
 Glucose 100 (H) 03/01/2019 03:13 AM  
 BUN 18 03/01/2019 03:13 AM  
 Creatinine 1.17 03/01/2019 03:13 AM  
 BUN/Creatinine ratio 15 03/01/2019 03:13 AM  
 GFR est AA 62 03/01/2019 03:13 AM  
 GFR est non-AA 53 (L) 03/01/2019 03:13 AM  
 Calcium 9.3 03/01/2019 03:13 AM  
 Bilirubin, total 0.4 03/01/2019 03:13 AM  
 ALT (SGPT) 11 03/01/2019 03:13 AM  
 AST (SGOT) 11 03/01/2019 03:13 AM  
 Alk. phosphatase 50 03/01/2019 03:13 AM  
 Protein, total 6.7 03/01/2019 03:13 AM  
 Albumin 4.0 03/01/2019 03:13 AM  
 Globulin 3.0 04/25/2011 11:37 AM  
 A-G Ratio 1.5 03/01/2019 03:13 AM  
  
 
 
 
Assessment/Plan ICD-10-CM ICD-9-CM 1. AVM (arteriovenous malformation) of duodenum, acquired with hemorrhage K31.811 537.83 Pt to f/u with GI for further evaluation next week 2. Iron deficiency anemia due to chronic blood loss D50.0 280.0 Will check CBC WITH AUTOMATED DIFF and consider transfusion or iron infusion METABOLIC PANEL, COMPREHENSIVE 3. Mild intermittent asthma with acute exacerbation J45.21 493.92 Will try budesonide-formoterol (SYMBICORT) 160-4.5 mcg/actuation HFAA if insurance covers it   
   glycopyrrolate-formoterol (BEVESPI AEROSPHERE) 9-4.8 mcg HFAA with free coupon if unable to get Symbicort Follow-up Disposition: 
Return if symptoms worsen or fail to improve. Reviewed plan of care. Patient has provided input and agrees with goals.

## 2019-03-04 NOTE — TELEPHONE ENCOUNTER
Luis Prabhakar (daughter) is awareo of the following appointment: 
 
Dr. Nael Rivas Hematology [phone] March 26, 2019 at 12:15 pm 
[address] [place] [address]

## 2019-03-04 NOTE — TELEPHONE ENCOUNTER
Patients daughter Jeoffrey Hutchinson calling asking for a call back with the [facility] lab results

## 2019-03-04 NOTE — TELEPHONE ENCOUNTER
Talked to daughter and told her H/H is rising and no justification for a blood transfusion but will refer to hematology for possible iron infusion. In the meantime he can take OTC iron with a stool softener. Hold for constipation Please refer to hematology give appt to daughter Eric Dears [phone]

## 2019-03-05 NOTE — TELEPHONE ENCOUNTER
Left detailed message for patient with the following appointment information:    Pulmonary Appointment  April 16, 2019 at 10:30 am for breathing test                         at  11:00 am for Xray                         at  11:15 am With Dr. Racheal Fairbanks

## 2019-03-05 NOTE — TELEPHONE ENCOUNTER
Spoke with Daughter Luann Fernando and she states patient did get the Symbicort inhaler and states it does not work well. Luann Fernando stated that Dr. Manuelito Rutledge said patients breathing is related to his Asthma and need to be referred to a pulmonary. Please advise.

## 2019-03-06 NOTE — PROGRESS NOTES
Telephone call received from patient's daughter. Patient's  daughter states that patient can barely breathe, can't walk but about 10 feet before almost collapsing, and can hardly speak. Patient's daughter stated that pulmonary follow up is scheduled for April 16th She has tried and a sooner appointment date is not available. An appointment with the blood doctor is scheduled for March 26th. Nurse Navigator related that patient could be taken to the ED for evaluation and nurse navigator will attempt to determine if a sooner pulmonary appointment might be available. Chart routed, with importance,  to Dr. Nichol Kim and Elicia Partida LPN for review.

## 2019-03-06 NOTE — PROGRESS NOTES
Nurse Navigator called [facility] Pulmonary Specialist to see if there might be an earlier appointment date/time for patient. Due to a cancellation Patient can be seen tomorrow morning, 3/7/19 @ 9:30 am. Please arrive 15 minutes early for the appointment. Patient is not to use inhaler the morning of the appointment, until after breathing test is completed. Nurse Navigator spoke with Dr. Gloria Ny via telephone call related the phone call with patient's daughter, Ms. Atiya Tidwell. Dr. Gloria Ny advised that patient be seen in  the ED for evaluation. Nurse Navigator spoke with patient's daughter Ms. Atiya Tidwell. Nurse Navigator related that per Dr. Madison Matt recommendation, please have  patient seen at ED for evaluation. Nurse Navigator alerted patient's daughter Ms. Atiya Tidwell that there was a cancellation at the Pulmonary office. Patient now has an appointment with Dr. Hayden Wills for 3-7-19 @ 7071. Please arrive by 9:15 am  Patient to not use inhalers that morning until after breathing test.     Dr. Gloria Ny updated that patient's pulmonary appointment is with Dr. Hayden Wills. Dr. Gloria Ny stated he is fine with   Dr. Hayden Wills seeing patient.

## 2019-03-07 PROBLEM — I21.4 NSTEMI (NON-ST ELEVATED MYOCARDIAL INFARCTION) (HCC): Status: ACTIVE | Noted: 2019-01-01

## 2019-03-07 NOTE — Clinical Note
Lesion: Located in the Mid LAD. Stent deployed. Multiple inflations used. First inflation pressure = 8 sarahi; inflation time: 14 sec. Second inflation pressure: 12 sarahi; inflation time: 11 sec.

## 2019-03-07 NOTE — PROGRESS NOTES
Pt admitted this am, seen for follow up  Pt [address], feels better   Less SOB, no bloody stool or melena   RS: R base rales noted, mild JVD  Cont IV lasix and monitor   D/w pt and daughter at bedside

## 2019-03-07 NOTE — Clinical Note
TRANSFER - IN REPORT:  
 
Verbal report received from: SAMIR Menchaca RN. Minor Ronde Report consisted of patient's Situation, Background, Assessment and  
Recommendations(SBAR). Opportunity for questions and clarification was provided. Assessment completed upon patient's arrival to unit and care assumed. Patient transported with a Registered Nurse, [address] [place] [address] / Patient Care Tech and Monitor.

## 2019-03-07 NOTE — Clinical Note
Contrast Dose Calculator:  
Patient's age: 80.  
Patient's sex: Male. Patient weight (kg) = 68.8. Creatinine level (mg/dL) = 1.13. Creatinine clearance (mL/min): 40. Contrast concentration (mg/mL) = 320. MACD = 285.4 mL. Max Contrast dose per Creatinine Cl calculator = 90 mL.

## 2019-03-07 NOTE — Clinical Note
Catheter redirected. Dx: articulation delay, oral myofunctional disorder, oral phase dysphagia         Authorized # of Visits:  5/12 approved, exp. 8/23/18         Next MD visit: none scheduled  Fall Risk: standard         Precautions: n/a           Medication Changes since la Progressing     Plan: Continue POC.      Charges: 1 S/L tx    Total Timed Treatment: 45 min  Total Treatment Time: 45 min    Brandy Guy M.S., CCC-SLP  7/16/2018  2:47 PM

## 2019-03-07 NOTE — PROGRESS NOTES
2247   Report called to me by RN Cj Bernal, pt's ETA 1 hr given SBAR, MAR, ED summary and updates. 0040  Patient arrived with medical transportation on the stretcher, he was on a cardiac telemetry  Monitor and oxygen. Pt has wet lung sounds that are diminished. He denies chest pain and denies all other pain. Pt denies feeling short of breath as long as he is on oxygen. Hospitalist paged and informed pt is here and registration to get him into our data system  0140 daughter is at bedside, we discussed how he has been sick for the previous week, ever since his GI bleed and now he is unable to walk, pt lives alone and currently can not care for himself and she is concerned. 5  At bedside sitting down talking with patient and his daughter  18 daughter is leaving for the night, she will return and would like to be present when the doctors make rounds in the morning.   [address]

## 2019-03-07 NOTE — Clinical Note
Aspirin Registry Question:  
Aspirin was given and discussed with physician prior to the procedure.  SEE MAR

## 2019-03-07 NOTE — Clinical Note
Lesion located in the Mid LAD. Balloon inflated using multiple inflations inflation technique. Pressure = 14 sarahi; Duration = 14 sec. Inflation 2: Pressure: 16 sarahi; Duration: 8 sec.

## 2019-03-07 NOTE — CONSULTS
Cardiovascular Specialists - Consult Note    Consultation request by Wilma Nesbitt MD for advice/opinion related to evaluating NSTEMI (non-ST elevated myocardial infarction) [facility]) [I21.4]    Date of  Admission: 3/7/2019 12:52 AM   Primary Care Physician:  Shalini Butler MD     Assessment:     Patient Active Problem List   Diagnosis Code    Asthma J45.909    Prostate cancer ([facility] C61    Hypercholesterolemia E78.00    Angina, class I ([facility] I20.9    Left bundle branch block I44.7    Coronary artery disease I25.10    Hypertension I11.9    Dyslipidemia E78.5    Carotid artery disease ([facility] I77.9    Aortic valve stenosis I35.0    Anemia D64.9    [facility]non-ST elevated [facility] I21.4       -Acute likely on chronic heart failure with historically normal LV function EF 55-60% with mild AS and grade 1 DD on echo 11/2017.  -Indeterminate troponin, not consistent with ACS, likely related to CHF, ECG with known intermittent LBBB. -CAD s/p PCI to left circumflex 2011 with residual disease medically managed with mild ostial left main disease and LAD disease, with heavy calcification, and significant distal RCA disease, which was well collateralized. He last underwent a followup pharmacologic nuclear stress test in January 2016, which continued to demonstrate scarring of the inferior wall, but no significant ischemia.  -Anemia with recent UGIB due to duodenal AVM   -Mild aortic valve stenosis. -Carotid artery disease. Moderate bilateral ICA stenosis medically managed.  -Peripheral vascular disease. 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 Because of this his blood pressure should be checked in his right arm.  -Hypertension.  -Dyslipidemia. -H/o intermittent LBBB. -Asthma, former smoker. Primary cardiologist Dr. Leydi Gaytan. Plan:     Would continue diuresis with IV lasix as renal function allows. Continue ASA, follow Hgb. Continue statin. Continue BB.   Will review echocardiogram once complete. Do not suspect ACS, anticoagulation not started due to recent GIB and anemia. Would recommend conservative management in setting of advanced age and co morbidities. History of Present Illness: This is a 80 y.o. male admitted for NSTEMI (non-ST elevated myocardial infarction) ([facility] [I21.4]. Patient complains of:  SOB. Patient is a 80year old male who had recent GIB due to AVMs at [facility]. Patient reports since that time he has had progressively increasing SOB, orthopnea, RYLEE. Patient denies history of CHF. Patient denies any CP, palp, syncope. Patient was diuresed and feeling better this AM.    Cardiac risk factors: diabetes mellitus, male gender, hypertension      Review of Symptoms:  Except as stated above include:  Constitutional:  negative  Respiratory:  negative  Cardiovascular:  negative  Gastrointestinal: negative  Genitourinary:  negative  Musculoskeletal:  Negative  Neurological:  Negative  Dermatological:  Negative  Endocrinological: Negative  Psychological:  Negative    Constitutional: negative for fevers and chills  Eyes: negative for visual disturbance  Ears, nose, mouth, throat, and face: negative for nasal congestion  Respiratory: positive for cough  Cardiovascular: positive for dyspnea, orthopnea, lower extremity edema  Gastrointestinal: negative for vomiting and diarrhea  Genitourinary:negative for dysuria  Hematologic/lymphatic: negative for bleeding  Musculoskeletal:negative for muscle weakness  Neurological: negative for dizziness     Past Medical History:     Past Medical History:   Diagnosis Date    Asthma     Cardiac cath 04/28/2011    oLM 30%. pLAD 30%. oD1 50%. CX 90% (3 x 18 [place] DEMAR). dRCA 90%. RPLB subtotal.  RPDA 100%.  Cardiac echocardiogram 01/21/2014    EF 55-60%. Mod LVH. Gr 1 DDfx. Mild AS (mean grad 13). Mild AI. Unchanged from study of 11/30/11.     Cardiac nuclear imaging test, mod risk 01/22/2016 Intermediate risk. Medium-sized inferior, inferoseptal infarction. No ischemia. EF 54%. Nondiagnostic EKG on pharm stress test.    Carotid duplex 2016    Mod 50-69% bilateral ICA stenosis. Probable significant RECA stenosis. >50% stenosis of left subclavian. No significant change from study of 1/8/15.  Coronary artery disease     Status post PCI with drug-eluting stent, [place] 3 x 18 mm in the proximal circumflex.  [facility]     Hypercholesterolemia     Hypertension     Prostate cancer ([facility]          Social History:     Social History     Socioeconomic History    Marital status: SINGLE     Spouse name: Not on file    Number of children: Not on file    Years of education: Not on file    Highest education level: Not on file   Tobacco Use    Smoking status: Former Smoker     Packs/day: 1.00     Years: 15.00     Pack years: 15.00     Last attempt to quit: 1960     Years since quittin.8    Smokeless tobacco: Never Used   Substance and Sexual Activity    Alcohol use: No    Drug use: No    Sexual activity: Not Currently        Family History:   No family history on file.      Medications:   No Known Allergies     Current Facility-Administered Medications   Medication Dose Route Frequency    aspirin delayed-release tablet 81 mg  81 mg Oral DAILY    pantoprazole (PROTONIX) tablet 40 mg  40 mg Oral ACB    sodium chloride (NS) flush 5-40 mL  5-40 mL IntraVENous Q8H    sodium chloride (NS) flush 5-40 mL  5-40 mL IntraVENous PRN    magnesium hydroxide (MILK OF MAGNESIA) 400 mg/5 mL oral suspension 30 mL  30 mL Oral DAILY PRN    docusate sodium (COLACE) capsule 100 mg  100 mg Oral BID    carvedilol (COREG) tablet 3.125 mg  3.125 mg Oral BID WITH MEALS    nitroglycerin (NITROSTAT) tablet 0.4 mg  0.4 mg SubLINGual Q5MIN PRN    atorvastatin (LIPITOR) tablet 40 mg  40 mg Oral QHS    furosemide (LASIX) injection 40 mg  40 mg IntraVENous Q12H    albuterol (ACCUNEB) nebulizer solution 1.25 mg  1.25 mg Nebulization Q6H PRN         Physical Exam:     Visit Vitals  /73   Pulse 82   Temp 98.1 °F (36.7 °C)   Resp 22   Wt 79.8 kg (175 lb 14.8 oz)   SpO2 95%   BMI 27.55 kg/m²     BP Readings from Last 3 Encounters:   03/07/19 121/73   03/01/19 126/54   02/23/19 114/89     Pulse Readings from Last 3 Encounters:   03/07/19 82   03/01/19 98   02/23/19 91     Wt Readings from Last 3 Encounters:   03/07/19 79.8 kg (175 lb 14.8 oz)   03/01/19 73 kg (161 lb)   02/23/19 74 kg (163 lb 2.3 oz)       General:  alert, cooperative, no distress, appears stated age  Neck:  no JVD  Lungs:  diminished breath sounds   Heart:  regular rate and rhythm 2/6 systolic murmur LSB  Abdomen:  abdomen is soft without significant tenderness, masses, organomegaly or guarding  Extremities:  extremities normal, atraumatic, no cyanosis 1+ edema  Skin: Warm and dry. no hyperpigmentation, vitiligo, or suspicious lesions  Neuro: alert, oriented x3, affect appropriate  Psych: non focal     Data Review:     Recent Labs     03/07/19  0529   WBC 6.1   HGB 8.3*   HCT 27.5*        Recent Labs     03/07/19  0529      K 3.6      CO2 26   *   BUN 22*   CREA 1.15   CA 8.7   MG 2.3   ALB 3.5   SGOT 9*   ALT 14*   INR 1.2       Results for orders placed or performed during the hospital encounter of 02/21/19   EKG, 12 LEAD, INITIAL   Result Value Ref Range    Ventricular Rate 81 BPM    Atrial Rate 81 BPM    P-R Interval 120 ms    QRS Duration 142 ms    Q-T Interval 396 ms    QTC Calculation (Bezet) 460 ms    Calculated P Axis 11 degrees    Calculated R Axis -20 degrees    Calculated T Axis 141 degrees    Diagnosis       Baseline Artifact  Normal sinus rhythm  Left bundle branch block  No previous ECGs available  Confirmed by Jose Pisano M.D., Catha Pouch.  ([id number]) on 2/22/2019 7:44:46 AM     Results for orders placed or performed in visit on 01/16/19   AMB POC EKG ROUTINE W/ 12 LEADS, INTER & REP Impression    See progress note.        All Cardiac Markers in the last 24 hours:    Lab Results   Component Value Date/Time    TROIQ 0.26 (H) 03/07/2019 05:29 AM    TROIQ 0.27 (H) 03/07/2019 05:29 AM       Last Lipid:    Lab Results   Component Value Date/Time    Cholesterol, total 108 01/24/2019 09:56 AM    HDL Cholesterol 39 (L) 01/24/2019 09:56 AM    LDL, calculated 40 01/24/2019 09:56 AM    Triglyceride 144 01/24/2019 09:56 AM    CHOL/HDL Ratio 4.5 11/09/2010 07:51 AM       Signed By: AMITA Schwarz     March 7, 2019

## 2019-03-07 NOTE — Clinical Note
TRANSFER - OUT REPORT:  
 
Verbal report given to: Hua Bailey RN. Report consisted of patient's Situation, Background, Assessment and  
Recommendations(SBAR). Opportunity for questions and clarification was provided. Patient transported with a Registered Nurse and [address] [place] [address] / Patient [facility] Tech. Patient transported to: [address].

## 2019-03-07 NOTE — PROGRESS NOTES
conducted an initial consultation and Spiritual Assessment for Jorden Lopez, who is a 80 y. o.,male. Patients Primary Language is: [place]. According to the patients EMR Pentecostal Affiliation is: No Uatsdin. The reason the Patient came to the hospital is:   Patient Active Problem List    Diagnosis Date Noted    NSTEMI (non-ST elevated myocardial infarction) ([facility] 03/07/2019    Anemia 02/21/2019    Aortic valve stenosis 11/02/2017    Carotid artery disease ([facility] 06/03/2013    Left bundle branch block 05/20/2011    Dyslipidemia 05/20/2011    Coronary artery disease     Hypertension     Angina, class I ([facility] 04/25/2011    Asthma     Prostate cancer ([facility]     Hypercholesterolemia         The  provided the following Interventions:  Patient and daughter were both welcoming and smiling as  greeted them. Initiated a relationship of care and support. Explored issues of sneha, belief, spirituality and Faith/ritual needs while hospitalized. Listened empathically as patient shared his hobbies like golfing, bowling,and gardening, cooking and eating. Provided information about Spiritual Care Services. Offered prayer of blessing and assurance of continued prayers on patient's behalf. Chart reviewed. The following outcomes where achieved:  Patient shared limited information about both his medical narrative and spiritual journey/beliefs. He mentioned that his first stent was made     confirmed Patient has NO Pentecostalism. Patient processed feeling about current hospitalization. Patient expressed gratitude for 's visit. Assessment:  Patient is not in any form of distress. Denies any spiritual needs. Patient does not have any Faith/cultural needs that will affect patients preferences in health care. There are no spiritual or Faith issues which require intervention at this time.      Plan:  Chaplains will continue to follow and will provide pastoral care on an as needed/requested basis.  recommends bedside caregivers page  on duty if patient shows signs of acute spiritual or emotional distress.     Chaplain Resident [address]   [phone]

## 2019-03-07 NOTE — H&P
History & Physical    Patient: Karen Varela MRN: [id number]  CSN: [id number]    YOB: 1926  Age: 80 y.o. Sex: male      DOA: 3/7/2019    CC: SOB and Chest pain       HPI:     Karen Varela is a 80 y.o. male who has a past medical history of coronary artery disease status post stent in 2011. The stent was put in after he had an abnormal stress test done for increasing exercise intolerance, he never had an MI. Recently, he had been treated for an upper G. I. 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 Patient is accompanied by his daughter who helped in providing history. Following discharge, he has been started on DAPT with aspirin and plavix  He stated that since his last discharge, he has been having progressively worsening shortness of breath and now has shortness of breath at rest. At times of severe shortness of breath he also has chest pain. Shortness of breath can be brought on by minimum exertion. He also reports orthopnea of three pillows that is new. He also reports increased leg swelling. He had gone to see his gastroenterologist as part of follow-up, he was then advised to come to the emergency room for workup with the shortness of breath. Patient's daughter states that prior to his admission for G. I. bleed, he was able to do his regular activities, drive, and lived alone independently. However he now has limitations of all of the above. In the emergency room, patient was found to have left bundle branch block on EKG with no previous studies available for comparison. He also had an initial troponin of 0.37      Past Medical History:   Diagnosis Date    Asthma     Cardiac cath 04/28/2011    oLM 30%. pLAD 30%. oD1 50%. CX 90% (3 x 18 [place] DEMAR). dRCA 90%. RPLB subtotal.  RPDA 100%.  Cardiac echocardiogram 01/21/2014    EF 55-60%. Mod LVH. Gr 1 DDfx. Mild AS (mean grad 13). Mild AI. Unchanged from study of 11/30/11.     Cardiac nuclear imaging test, mod risk 2016    Intermediate risk. Medium-sized inferior, inferoseptal infarction. No ischemia. EF 54%. Nondiagnostic EKG on pharm stress test.    Carotid duplex 2016    Mod 50-69% bilateral ICA stenosis. Probable significant RECA stenosis. >50% stenosis of left subclavian. No significant change from study of 1/8/15.  Coronary artery disease     Status post PCI with drug-eluting stent, Tatums 3 x 18 mm in the proximal circumflex.  Hx of carotid artery disease ([address]     Hypercholesterolemia     Hypertension     Prostate cancer [facility])        Past Surgical History:   Procedure Laterality Date    HX CORONARY STENT PLACEMENT  11    PCI with drug-eluting stent, Tatums 3 x 18 mm in the proximal circumflex (post dialated with 3.25 mm noncompliant balloon at high pressure). No family history on file. Social History     Socioeconomic History    Marital status: SINGLE     Spouse name: Not on file    Number of children: Not on file    Years of education: Not on file    Highest education level: Not on file   Tobacco Use    Smoking status: Former Smoker     Packs/day: 1.00     Years: 15.00     Pack years: 15.00     Last attempt to quit: 1960     Years since quittin.8    Smokeless tobacco: Never Used   Substance and Sexual Activity    Alcohol use: No    Drug use: No    Sexual activity: Not Currently       Prior to Admission medications    Medication Sig Start Date End Date Taking? Authorizing Provider   budesonide-formoterol (SYMBICORT) 160-4.5 mcg/actuation HFAA Take 2 Puffs by inhalation two (2) times a day. 3/1/19   Darius Daniel MD   glycopyrrolate-formoterol (BEVESPI AEROSPHERE) 9-4.8 mcg HFAA Take 2 Inhalation by inhalation two (2) times a day.  3/1/19   Amaya Wilson MD   amLODIPine (NORVASC) 5 mg tablet TAKE 1 TABLET EVERY DAY 19   Amaya Wilson MD   clopidogrel (PLAVIX) 75 mg tab TAKE 1 TABLET EVERY DAY 19   Darius Daniel MD   simvastatin (ZOCOR) 40 mg tablet TAKE 1 TABLET EVERY NIGHT 19   Fly Daniel MD   omeprazole (PRILOSEC) 40 mg capsule Take 1 Cap by mouth daily. 19   Trev Bai MD   tadalafil (CIALIS) 20 mg tablet Take 1 Tab by mouth as needed. 16   Fatuma Fregoso MD   albuterol (PROVENTIL HFA, VENTOLIN HFA, PROAIR HFA) 90 mcg/actuation inhaler Take 2 Puffs by inhalation every four (4) hours as needed for Wheezing or Shortness of Breath. 3/2/15   Sherryle Connor, PA   tamsulosin (FLOMAX) 0.4 mg capsule Take 1 Cap by mouth daily. 3/13/13   Libby Zurita MD   aspirin delayed-release 81 mg tablet Take 81 mg by mouth daily. Libby Zurita MD       No Known Allergies    Review of Systems    Refer to HPI for positive findings. All other systems reviewed and are negative    Physical Exam:     Physical Exam:  Visit Vitals  /79 (BP 1 Location: Right arm, BP Patient Position: At rest)   Pulse 88   Temp 97.7 °F (36.5 °C)   Resp 20   Wt 79.8 kg (175 lb 14.8 oz)   SpO2 95%   BMI 27.55 kg/m²           Temp (24hrs), Av.7 °F (36.5 °C), Min:97.7 °F (36.5 °C), Max:97.7 °F (36.5 °C)       No intake/output data recorded. No intake/output data recorded. General:  Alert, cooperative, in respiratory distress, appears stated age. Head:  Normocephalic, without obvious abnormality, atraumatic. Eyes:  Conjunctivae/corneas clear. PERRL, EOMs intact. Nose: Nares normal. No drainage or sinus tenderness. Throat: Lips, mucosa, and tongue normal. Teeth and gums normal.   Neck: Supple, symmetrical, trachea midline, no adenopathy, thyroid: no enlargement/tenderness/nodules, no carotid bruit. JVD seen. Back:   ROM normal. No CVA tenderness. Lungs:   Bilateral crackles on exam   Chest wall:  No tenderness or deformity. Heart:  Regular rate and rhythm, S1, S2 normal, Pan systolic murmur at the apex 3/6 radiating to axilla no rub or gallop. Abdomen: Soft, non-tender.  Bowel sounds normal. No masses,  No organomegaly. Extremities: Extremities normal, atraumatic, no cyanosis, has bilateral pitting edema. Pulses: 2+ and symmetric all extremities. Skin: Skin color, texture, turgor normal. No rashes or lesions   Neurologic: CNII-XII intact. No focal motor or sensory deficit. Labs Reviewed:    No results found for this or any previous visit (from the past 24 hour(s)). Procedures/imaging: see electronic medical records for all procedures/Xrays and details which were not copied into this note but were reviewed prior to creation of Plan        Assessment/Plan     1. NSTEMI  2. New Onset of Congestive Heart failure  3. Ischemic Cardiomyopathy  4. HTN  5.  Hx of CAD s/p PCI 2011    Plan    - Admit to stepdown  - Lasix 40 mg IV BID  - Nitroglycerin for chest pain  - Cardiology team is aware of patient's admission per my discussion with ED provider and have advised against anticoagulation at this time given his recent significant GI Bleed  - will start aspirin  - High dose statin and BB  - Echocardiogram ordered  - further management with cardiology recommendations  - will repeat troponin    SCD for DVT ppx    Full Code      Active Problems:    NSTEMI (non-ST elevated myocardial infarction) ([facility] (3/7/2019)        Vangie Cutler MD  March 7, 2019

## 2019-03-07 NOTE — NURSE NAVIGATOR
Reason for Admission:  NSTEMI (non-ST elevated myocardial infarction) ([facility] [I21.4]                 RRAT Score:    18            Plan for utilizing home health: To be detemined                      Likelihood of Readmission:   Moderate                         Do you (patient/family) have any concerns for transition/discharge?  no    Transition of Care Plan:       Initial assessment completed with patient. Cognitive status of patient: oriented to time, place, person and situation. Face sheet information confirmed:  yes. The patient designates daughter, Eugene Griffiths, to participate in his/her discharge plan and to receive any needed information. This patient lives alone in a single family home. Patient is able to navigate steps as needed. Prior to hospitalization, patient was considered to be independent with ADLs/IADLS : yes . Patient has a current ACP document on file: no  The daughter will be available to transport patient home upon discharge. The patient does not have any DME in his home at this time. Patient is not currently active with home health. Patient has not stayed in a skilled nursing facility or rehab. This patient is on dialysis :no     List of available Home Health agencies were provided and reviewed with the patient prior to discharge. Freedom of choice signed: no - he wants daughter to select one if he needs one. Currently, the discharge plan is Home vs Home with  [facility]. The patient states that he can obtain his medications from the pharmacy, and take his medications as directed.     Patient's current insurance is [facility] [employer] Medicare      Care Management Interventions  PCP Verified by CM: Yes(couple of weeks ago)  Mode of Transport at Discharge: Self  Transition of Care Consult (CM Consult): Home Health  Discharge Durable Medical Equipment: No  Physical Therapy Consult: No  Occupational Therapy Consult: No  Current Support Network: Lives Alone  Confirm Follow Up Transport: Family  Discharge Location  Discharge Placement: Home with home health        Alexa Hogan.  Ko Dwyer BSN, RN  Care Management  [phone]

## 2019-03-07 NOTE — Clinical Note
Lesion located in the Mid LAD. Balloon inflated using multiple inflations inflation technique. Pressure = 13 sarahi; Duration = 6 sec. Inflation 2: Pressure: 15 sarahi; Duration: 8 sec.

## 2019-03-07 NOTE — Clinical Note
Sheath #1: Sheath: inserted. Sheath inserted/placed in the right radial artery. Hemostasis achieved.

## 2019-03-07 NOTE — PROGRESS NOTES
NUTRITION    Nutrition Consult: Diet Education     RECOMMENDATIONS / PLAN:     - Add supplements: Ensure Enlive, once daily and Magic Cup once daily.  - Diet education provided today. - Continue RD inpatient monitoring and evaluation. NUTRITION INTERVENTIONS & DIAGNOSIS:     [x] Meals/snacks: modify composition  [x] Medical food supplement therapy: initiate   [x] Nutrition Education: cardiac diet education provided 3/7/19    Nutrition Diagnosis: Food and nutrition related knowledge deficit related to cardiac diet as evidenced by pt report. Inadequate energy intake related to decreased appetite as evidenced by pt consuming 50% or less of recent meals. ASSESSMENT:     Pt reporting fair meal intake and appetite PTA x month, with overall stable weight hx. Pt unfamiliar with cardiac diet, education provided today to pt and daughter, receptive.     Average po intake adequate to meet patients estimated nutritional needs:   [] Yes     [x] No   [] Unable to determine at this time    Diet: DIET CARDIAC Regular      Food Allergies: NKFA  Current Appetite:   [] Good     [x] Fair     [] Poor     [] Other:  Appetite/meal intake prior to admission:   [] Good     [x] Fair     [] Poor     [x] Other: 2 meals/day, boost 1x daily  Feeding Limitations:  [] Swallowing difficulty    [] Chewing difficulty    [] Other:  Current Meal Intake:   Patient Vitals for the past 100 hrs:   % Diet Eaten   03/07/19 1009 50 %       BM: 3/5  Skin Integrity: WDL  Edema:   [x] No     [] Yes   Pertinent Medications: Reviewed: lipitor, colace, lasix, milk of magnesia, protonix     Recent Labs     03/07/19  0529      K 3.6      CO2 26   *   BUN 22*   CREA 1.15   CA 8.7   MG 2.3   ALB 3.5   SGOT 9*   ALT 14*       Intake/Output Summary (Last 24 hours) at 3/7/2019 1057  Last data filed at 3/7/2019 1013  Gross per 24 hour   Intake 200 ml   Output 600 ml   Net -400 ml       Anthropometrics:  Ht Readings from Last 1 Encounters: 03/01/19 5' 7\" (1.702 m)     Last 3 Recorded Weights in this Encounter    03/07/19 0233   Weight: 79.8 kg (175 lb 14.8 oz)     Body mass index is 27.55 kg/m². Weight History: Pt reports a 5-6 lb weight loss x 1 year PTA. Per chart review pt with weight gain PTA, however noted pt retaining fluid. Reports UBW around 160-165 lbs. Weight Metrics 3/7/2019 3/1/2019 2/23/2019 2/12/2019 1/24/2019 1/16/2019 7/24/2018   Weight 175 lb 14.8 oz 161 lb 163 lb 2.3 oz 152 lb 157 lb 158 lb 164 lb   BMI 27.55 kg/m2 25.22 kg/m2 25.55 kg/m2 23.11 kg/m2 23.87 kg/m2 24.02 kg/m2 24.94 kg/m2        Admitting Diagnosis: NSTEMI (non-ST elevated myocardial infarction) ([facility] [I21.4]  Pertinent PMHx: CAD, hypercholesterolemia, HTN, prostate cancer     Education Needs:        [] None identified  [] Identified - Not appropriate at this time  [x]  Identified and addressed - refer to education log  Learning Limitations:   [x] None identified  [] Identified    Cultural, Anabaptist & ethnic food preferences:  [x] None identified    [] Identified and addressed     ESTIMATED NUTRITION NEEDS:     Calories: 5058-4149 kcal (MSJx1.2-1.3) based on  [x] Actual BW: 80 kg      [] IBW   Protein: 64-96 gm (0.8-1.2 gm/kg) based on  [x] Actual BW      [] IBW   Fluid: 1 mL/kcal     MONITORING & EVALUATION:     Nutrition Goal(s):   1. Po intake of meals will meet >75% of patient estimated nutritional needs within the next 7 days. Outcome:  [] Met/Ongoing    []  Not Met    [x] New/Initial Goal   2. Patient will increase knowledge of appropriate food choices on a cardiac diet within 7 days.   Outcome:  [] Met    []  Not Met    [x] New/Initial Goal       Monitoring:   [x] Food and beverage intake   [x] Diet order   [x] Nutrition-focused physical findings   [x] Treatment/therapy   [] Weight   [] Enteral nutrition intake        Previous Recommendations (for follow-up assessments only):     []   Implemented       []   Not Implemented (RD to address)      [] No Longer Appropriate     [] No Recommendation Made     Discharge Planning: cardiac diet   [x] Participated in care planning, discharge planning, & interdisciplinary rounds as appropriate      Gautam Coon RD   Pager: [phone]

## 2019-03-08 NOTE — PROGRESS NOTES
[facility] Hospitalist Group  Progress Note    Patient: Anne Ardon Age: 80 y.o. : 1926 MR#: [id number] SSN: [id number]  Date/Time: 3/8/2019     Subjective: pt feels lot better, SOB better, no CP  No melena or hematochezia      Assessment/Plan:   1. Acute on chronic systolic and diastolic HF: improving, cont PO lasix per cardio. 1L neg balance   2. Elevated trop ? NSTEMI vs demand ischemia: echo decreased EF, cardio planning for cath Monday   3. Recent GI bleed: s/p EGD with AVM's: no more bleeding, cont PPI and monitor H/H. Stool occult   4. HTN: BP stable. 5. Hx of CAD s/p PCI : cont asa, BB and statin  6. Hypokalemia: will replace  7. Mild protein calorie malnutrition: nutrition supplements   Full code   D/w pt and daughter at bedside   [facility] for tele bed   Will avoid chemical DVT prophylaxis due to recent GI bleed   D/w RN for SCd's    Case discussed with:  [x]Patient  [x]Family  [x]Nursing  []Case Management  DVT Prophylaxis:  []Lovenox  []Hep SQ  [x]SCDs  []Coumadin   []On Heparin gtt    Objective:   VS:   Visit Vitals  /63 (BP 1 Location: Right arm, BP Patient Position: At rest)   Pulse 83   Temp 97.8 °F (36.6 °C)   Resp 17   Ht 5' 7\" (1.702 m)   Wt 68.1 kg (150 lb 1.6 oz)   SpO2 96%   BMI 23.51 kg/m²      Tmax/24hrs: Temp (24hrs), Av °F (36.7 °C), Min:97.7 °F (36.5 °C), Max:98.4 °F (36.9 °C)  IOBRIEF    Intake/Output Summary (Last 24 hours) at 3/8/2019 1546  Last data filed at 3/8/2019 1230  Gross per 24 hour   Intake 720 ml   Output 800 ml   Net -80 ml       General:  Alert, cooperative, no acute distress    Pulmonary:Bilaterally clear, no Wheezing/Rales. Cardiovascular: Regular rate and Rhythm. GI:  Soft, Non distended, Non tender. + Bowel sounds. Extremities:  Trace edema. No calf tenderness. Psych: Good insight. Not anxious or agitated. Neurologic: Alert and oriented X 3. No acute neuro deficits.   Additional:    Medications:   Current Facility-Administered Medications   Medication Dose Route Frequency    [START ON 3/9/2019] furosemide (LASIX) tablet 40 mg  40 mg Oral DAILY    lisinopril (PRINIVIL, ZESTRIL) tablet 5 mg  5 mg Oral DAILY    potassium chloride (K-DUR, KLOR-CON) SR tablet 40 mEq  40 mEq Oral Q4H    aspirin delayed-release tablet 81 mg  81 mg Oral DAILY    pantoprazole (PROTONIX) tablet 40 mg  40 mg Oral ACB    sodium chloride (NS) flush 5-40 mL  5-40 mL IntraVENous Q8H    sodium chloride (NS) flush 5-40 mL  5-40 mL IntraVENous PRN    magnesium hydroxide (MILK OF MAGNESIA) 400 mg/5 mL oral suspension 30 mL  30 mL Oral DAILY PRN    docusate sodium (COLACE) capsule 100 mg  100 mg Oral BID    carvedilol (COREG) tablet 3.125 mg  3.125 mg Oral BID WITH MEALS    nitroglycerin (NITROSTAT) tablet 0.4 mg  0.4 mg SubLINGual Q5MIN PRN    atorvastatin (LIPITOR) tablet 40 mg  40 mg Oral QHS    albuterol (ACCUNEB) nebulizer solution 1.25 mg  1.25 mg Nebulization Q6H PRN       Labs:    Recent Results (from the past 24 hour(s))   LIPID PANEL    Collection Time: 03/08/19  5:28 AM   Result Value Ref Range    LIPID PROFILE          Cholesterol, total 87 <200 MG/DL    Triglyceride 65 <150 MG/DL    HDL Cholesterol 44 40 - 60 MG/DL    LDL, calculated 30 0 - 100 MG/DL    VLDL, calculated 13 MG/DL    CHOL/HDL Ratio 2.0 0 - 5.0     METABOLIC PANEL, BASIC    Collection Time: 03/08/19  5:28 AM   Result Value Ref Range    Sodium 143 136 - 145 mmol/L    Potassium 3.1 (L) 3.5 - 5.5 mmol/L    Chloride 104 100 - 108 mmol/L    CO2 32 21 - 32 mmol/L    Anion gap 7 3.0 - 18 mmol/L    Glucose 105 (H) 74 - 99 mg/dL    BUN 28 (H) 7.0 - 18 MG/DL    Creatinine 1.27 0.6 - 1.3 MG/DL    BUN/Creatinine ratio 22 (H) 12 - 20      GFR est AA >60 >60 ml/min/1.73m2    GFR est non-AA 53 (L) >60 ml/min/1.73m2    Calcium 8.9 8.5 - 10.1 MG/DL   CBC WITH AUTOMATED DIFF    Collection Time: 03/08/19  5:28 AM   Result Value Ref Range    WBC 6.7 4.6 - 13.2 K/uL    RBC 3.20 (L) 4.70 - 5.50 M/uL    HGB 8.9 (L) 13.0 - 16.0 g/dL    HCT 29.0 (L) 36.0 - 48.0 %    MCV 90.6 74.0 - 97.0 FL    MCH 27.8 24.0 - 34.0 PG    MCHC 30.7 (L) 31.0 - 37.0 g/dL    RDW 15.4 (H) 11.6 - 14.5 %    PLATELET 599 324 - 426 K/uL    MPV 10.1 9.2 - 11.8 FL    NEUTROPHILS 56 40 - 73 %    LYMPHOCYTES 22 21 - 52 %    MONOCYTES 21 (H) 3 - 10 %    EOSINOPHILS 1 0 - 5 %    BASOPHILS 0 0 - 2 %    ABS. NEUTROPHILS 3.8 1.8 - 8.0 K/UL    ABS. LYMPHOCYTES 1.5 0.9 - 3.6 K/UL    ABS. MONOCYTES 1.4 (H) 0.05 - 1.2 K/UL    ABS. EOSINOPHILS 0.1 0.0 - 0.4 K/UL    ABS.  BASOPHILS 0.0 0.0 - 0.1 K/UL    DF AUTOMATED     MAGNESIUM    Collection Time: 03/08/19  5:28 AM   Result Value Ref Range    Magnesium 2.1 1.6 - 2.6 mg/dL       Signed By: Shamar Kwon MD     March 8, 2019

## 2019-03-08 NOTE — PROGRESS NOTES
1930 Bedside turnover given to me by KATYA Garay. Pt is on the cardiac telemetry monitor in stable condition, no signs of distress. Denies pain and denies shortness of breath.  Pt still has some wheezing, gave him a breathing tx

## 2019-03-08 NOTE — CDMP QUERY
Pt admitted with CHF documented per H&P. Please further specify type and acuity of CHF in the medical record. Acute on Chronic Systolic CHF  Acute on Chronic Diastolic CHF  Acute on Chronic Systolic and Diastolic CHF  Acute Systolic CHF  Acute Diastolic CHF  Acute Systolic and Diastolic CHF  Chronic Systolic CHF  Chronic Diastolic CHF  Chronic Systolic and Diastolic CHF  Other, please specify  Clinically unable to determine    The medical record reflects the following:    Risk Factors: Hx CAD; cardiomyopathy    Clinical Indicators: Cardiology notes: Acute likely on chronic heart failure with reduced EF now 26-30% this admission with historically normal LV function EF 55-60% 11/2017. Treatment: Per cardiology: Patient diuresed well with IV lasix with symptomatic improvement, will transition to PO lasix. If you DECLINE this query or would like to communicate with [employer], please utilize the \"[employer] message box\" at the TOP of the Progress Note on the right.       Thank you,  Patricia Solis RN/CCDS  [phone]

## 2019-03-08 NOTE — CDMP QUERY
Patient admitted with CHF/NSTEMI per H&P. Cardiology notes indeterminate troponin not consistent with ACS. If possible, please document in the progress notes and discharge summary if you are evaluating and/or treating any of the following:      Demand Ischemia  Other, please specify  Undetermined    The medical record reflects the following:  Risk Factors: CHF  Clinical Indicators: Troponin 0.26, 0.27  Treatment:  Tx of CHF/lasix; cardiology consult    If you DECLINE this query or would like to communicate with [employer], please utilize the \"[employer] message box\" at the TOP of the Progress Note on the right.       Thank you,  Eamon Archuleta RN/CCDS  [phone]

## 2019-03-08 NOTE — CDMP QUERY
Patient admitted with CHF. If possible, please document in progress notes and d/c summary if you are evaluating and /or treating any of the following:     Mild protein calorie malnutrition    Other (please specify)   Unable to determine    The medical record reflects the following:    Risk Factors: Elderly male; acute/chronic illness    Clinical Indicators: RD notes: Inadequate energy intake related to decreased appetite as evidenced by pt consuming 50% or less of recent meals    Treatment: - Add supplements: Ensure Enlive, once daily and Magic Cup once daily.  - Diet education provided today. - Continue RD inpatient monitoring and evaluation. If you DECLINE this query or would like to communicate with [employer], please utilize the \"[employer] message box\" at the TOP of the Progress Note on the right.       Thank you,  Tete Tripp RN/CCDS  [phone]

## 2019-03-08 NOTE — PROGRESS NOTES
Review of recent [place] records. Recent Upper GI bleed due to bleeding duodenal AVM which was cauterized by Dr Brendon Garland during EGD 2/22. Added Omeprazole 40mg PO daily to his meds for home. Plan for repeat EGD 8 weeks. No recent colonoscopy noted. GI noted ok to continue ASA 81mg daily, ok for Plavix starting 2/26. Currently on ASA only. Stool for occult blood pending. Will continue to follow Hgb.     AMITA Stanley

## 2019-03-08 NOTE — PROGRESS NOTES
[facility] - Progress Note  Admit Date: 3/7/2019    Assessment:     Hospital Problems  Date Reviewed: 3/1/2019          Codes Class Noted POA    NSTEMI (non-ST elevated myocardial infarction) [facility]) ICD-10-CM: I21.4  ICD-9-CM: 410.70  3/7/2019 Unknown                -Acute likely on chronic heart failure with reduced EF now 26-30% this admission with historically normal LV function EF 55-60% 11/2017.  -Indeterminate troponin, not consistent with ACS, likely related to CHF, ECG with known intermittent LBBB. -CAD s/p PCI to left circumflex 2011 with residual disease medically managed with mild ostial left main disease and LAD disease, with heavy calcification, and significant distal RCA disease, which was well collateralized.  He last underwent a followup pharmacologic nuclear stress test in January 2016, which continued to demonstrate scarring of the inferior wall, but no significant ischemia.  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 -Mild to moderate MR.  -Carotid artery disease. Moderate bilateral ICA stenosis medically managed.  -Peripheral vascular disease.  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  Because of this his blood pressure should be checked in his right arm.  -Hypertension.  -Dyslipidemia. -H/o intermittent LBBB. -Asthma, former smoker. -Advanced age, full code, but independent, lives alone and cares for self.     Primary cardiologist Dr. Edith Mares. Plan:     Noted decline in EF now 26-30% with anterior WMA. Have discussed possible cardiac catheterization with patient. Had breakfast but asked to stay NPO until further discussion. Patient with recent GIB, duodenal ulcer, hgb low but stable, denies recurrent bleeding. Patient diuresed well with IV lasix with symptomatic improvement, will transition to PO lasix. Needs continued potassium replacement. Continued on coreg.   Will start lisinopril as BP and renal function allow. Will need to discuss possible lifvest prior to discharge. Subjective:     No new complaints.      Objective:      Patient Vitals for the past 8 hrs:   Temp Pulse Resp BP SpO2   03/08/19 0804 98.1 °F (36.7 °C) 72 18 116/61 96 %   03/08/19 0400 98.4 °F (36.9 °C) 77 18 113/65 100 %         Patient Vitals for the past 96 hrs:   Weight   03/08/19 0400 68.1 kg (150 lb 1.6 oz)   03/07/19 1352 79.4 kg (175 lb)   03/07/19 0233 79.8 kg (175 lb 14.8 oz)                    Intake/Output Summary (Last 24 hours) at 3/8/2019 0857  Last data filed at 3/8/2019 0400  Gross per 24 hour   Intake 680 ml   Output 1725 ml   Net -1045 ml       Physical Exam:  General:  alert, cooperative, no distress, appears stated age  Neck:  no JVD  Lungs:  clear to auscultation bilaterally  Heart:  regular rate and rhythm 2/6 systolic murmur LSB  Abdomen:  abdomen is soft without significant tenderness, masses, organomegaly or guarding  Extremities:  extremities normal, atraumatic, no cyanosis or edema    Data Review:     Labs: Results:       Chemistry Recent Labs     03/08/19 0528 03/07/19  0529   * 109*    141   K 3.1* 3.6    106   CO2 32 26   BUN 28* 22*   CREA 1.27 1.15   CA 8.9 8.7   MG 2.1 2.3   AGAP 7 9   BUCR 22* 19   AP  --  50   TP  --  6.5   ALB  --  3.5   GLOB  --  3.0   AGRAT  --  1.2      CBC w/Diff Recent Labs     03/08/19 0528 03/07/19  0529   WBC 6.7 6.1   RBC 3.20* 3.01*   HGB 8.9* 8.3*   HCT 29.0* 27.5*    278   GRANS 56 65   LYMPH 22 18*   EOS 1 0      Cardiac Enzymes No results found for: CPK, CK, CKMMB, CKMB, RCK3, CKMBT, CKNDX, CKND1, CHRISTINE, TROPT, TROIQ, NGHIA, TROPT, TNIPOC, BNP, BNPP   Coagulation Recent Labs     03/07/19  0529   PTP 15.0   INR 1.2   APTT 31.8       Lipid Panel Lab Results   Component Value Date/Time    Cholesterol, total PENDING 03/08/2019 05:28 AM    HDL Cholesterol PENDING 03/08/2019 05:28 AM    LDL, calculated PENDING 03/08/2019 05:28 AM    VLDL, calculated PENDING 03/08/2019 05:28 AM    Triglyceride PENDING 03/08/2019 05:28 AM    CHOL/HDL Ratio PENDING 03/08/2019 05:28 AM      BNP No results found for: BNP, BNPP, XBNPT   Liver Enzymes Recent Labs     03/07/19  0529   TP 6.5   ALB 3.5   AP 50   SGOT 9*      Digoxin    Thyroid Studies Lab Results   Component Value Date/Time    TSH 1.22 03/07/2019 05:29 AM          Signed By: AMITA Burgess     March 8, 2019

## 2019-03-08 NOTE — ROUTINE PROCESS
0720 Pt received after bedside shift report from [facility]. Pt up in bed in no apparent distress. Able to make needs known. Denies chest pain. Pt on 3 liters of oxygen via nasal cannula. Repositioned pt up in bed. Bed locked and in low position. Instruct to call for assistance. 1020 Pt soiled with urine and bowel movement. Cora care performed. Small open are noted on left buttock. Mepilex applied. Bed pads, gown and linen changed. Repositioned up in bed. Call bell in reach. 13:15 Dr. Gil Remyke paged regarding potassium level. States, will put in orders. Bedside shift change report given to [facility], RN (oncoming nurse) by Claudette Foy, RN (offgoing nurse).  Report included the following information SBAR, MAR, KARDEX AND RECENT RESULTS

## 2019-03-08 NOTE — PROGRESS NOTES
0730 bedside turnover given to [facility]. Pt is on the cardiac telemetry monitor and nasal cannula, denies chest pain and denies shortness of breath. SBAR MAR< ED summary and updates given with a chance to ask questions. 2000 called report to KATYA Araiza on [address]. Pt transported to bed 416 on oxygen with his daughter and all of his belongings.  Pt alert oriented and denies pain and denies shortness of breath

## 2019-03-09 NOTE — PROGRESS NOTES
[facility]  -  Progress Note      Patient: Denisha Davis MRN: [id number]  SSN: [id number]    YOB: 1926  Age: 80 y.o. Sex: male      Admit Date: 3/7/2019    Assessment:     Hospital Problems  Date Reviewed: 3/1/2019          Codes Class Noted POA    NSTEMI (non-ST elevated myocardial infarction) [facility]) ICD-10-CM: I21.4  ICD-9-CM: 410.70  3/7/2019 Unknown             -Acute likely on chronic heart failure with reduced EF now 26-30% this admission with historically normal LV function EF 55-60% 11/2017.  -Indeterminate troponin, not consistent with ACS, likely related to CHF, ECG with known intermittent LBBB. -CAD s/p PCI to left circumflex 2011 with residual disease medically managed with mild ostial left main disease and LAD disease, with heavy calcification, and significant distal RCA disease, which was well collateralized.  He last underwent a followup pharmacologic nuclear stress test in January 2016, which continued to demonstrate scarring of the inferior wall, but no significant ischemia.  -Anemia with recent UGIB due to duodenal AVM   -Mild now moderate aortic valve stenosis. -Mild to moderate MR.  -Carotid artery disease. Moderate bilateral ICA stenosis medically managed.  -Peripheral vascular disease.  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  Because of this his blood pressure should be checked in his right arm.  -Hypertension.  -Dyslipidemia. -H/o intermittent LBBB. -Asthma, former smoker. -Advanced age, full code, but independent, lives alone and cares for self.     Primary cardiologist Dr. Dominga Francisco. Plan:     1. Hold AM Lasix tomorrow AM.  2. In view of new LV dysfunction will plan for cardiac cath on Monday. Subjective:     No new complaints. H&H appears stable at 8.9 and 29.0, although renal function is a little worse with BUN and Creat 36/1.47 today.     Objective:      Patient Vitals for the past 8 hrs:   Temp Pulse Resp BP SpO2 03/09/19 0813 98.7 °F (37.1 °C) 78 17 104/64 100 %         Patient Vitals for the past 96 hrs:   Weight   03/09/19 0653 64.2 kg (141 lb 8 oz)   03/08/19 0400 68.1 kg (150 lb 1.6 oz)   03/07/19 1352 79.4 kg (175 lb)   03/07/19 0233 79.8 kg (175 lb 14.8 oz)         Intake/Output Summary (Last 24 hours) at 3/9/2019 1226  Last data filed at 3/9/2019 0217  Gross per 24 hour   Intake 240 ml   Output 100 ml   Net 140 ml       Physical Exam:  General:  alert, cooperative, no distress, appears stated age  Neck:  no JVD  Lungs:  clear to auscultation bilaterally  Heart:  regular rate and rhythm, S1, S2 normal, no murmur, click, rub or gallop  Abdomen:  abdomen is soft without significant tenderness, masses, organomegaly or guarding  Extremities:  extremities normal, atraumatic, no cyanosis or edema    Data Review:     Labs: Results:       Chemistry Recent Labs     03/09/19  0123 03/08/19  0528 03/07/19  0529   * 105* 109*    143 141   K 4.0 3.1* 3.6    104 106   CO2 33* 32 26   BUN 36* 28* 22*   CREA 1.47* 1.27 1.15   CA 8.4* 8.9 8.7   MG  --  2.1 2.3   AGAP 4 7 9   BUCR 24* 22* 19   AP  --   --  50   TP  --   --  6.5   ALB  --   --  3.5   GLOB  --   --  3.0   AGRAT  --   --  1.2      CBC w/Diff Recent Labs     03/08/19 0528 03/07/19  0529   WBC 6.7 6.1   RBC 3.20* 3.01*   HGB 8.9* 8.3*   HCT 29.0* 27.5*    278   GRANS 56 65   LYMPH 22 18*   EOS 1 0      Cardiac Enzymes No results found for: CPK, CK, CKMMB, CKMB, RCK3, CKMBT, CKNDX, CKND1, CHRISTINE, TROPT, TROIQ, NGHIA, TROPT, TNIPOC, BNP, BNPP   Coagulation Recent Labs     03/07/19  0529   PTP 15.0   INR 1.2   APTT 31.8       Lipid Panel Lab Results   Component Value Date/Time    Cholesterol, total 87 03/08/2019 05:28 AM    HDL Cholesterol 44 03/08/2019 05:28 AM    LDL, calculated 30 03/08/2019 05:28 AM    VLDL, calculated 13 03/08/2019 05:28 AM    Triglyceride 65 03/08/2019 05:28 AM    CHOL/HDL Ratio 2.0 03/08/2019 05:28 AM      BNP No results found for: BNP, BNPP, XBNPT   Liver Enzymes Recent Labs     03/07/19  0529   TP 6.5   ALB 3.5   AP 50   SGOT 9*      Digoxin    Thyroid Studies Lab Results   Component Value Date/Time    TSH 1.22 03/07/2019 05:29 AM           Renetta Cedillo, DO   March 9, 2019

## 2019-03-09 NOTE — PROGRESS NOTES
Problem: Falls - Risk of  Goal: *Absence of Falls  Document Gabe Fall Risk and appropriate interventions in the flowsheet. Outcome: Progressing Towards Goal  Fall Risk Interventions:  Mobility Interventions: Bed/chair exit alarm         Medication Interventions: Bed/chair exit alarm    Elimination Interventions: Call light in reach, Bed/chair exit alarm             Problem: Pressure Injury - Risk of  Goal: *Prevention of pressure injury  Document William Scale and appropriate interventions in the flowsheet. Outcome: Progressing Towards Goal  Pressure Injury Interventions:  Sensory Interventions: Pressure redistribution bed/mattress (bed type), Assess changes in LOC    Moisture Interventions: Absorbent underpads, Maintain skin hydration (lotion/cream)    Activity Interventions: Increase time out of bed, Pressure redistribution bed/mattress(bed type), PT/OT evaluation    Mobility Interventions: HOB 30 degrees or less, Pressure redistribution bed/mattress (bed type), Turn and reposition approx.  every two hours(pillow and wedges)    Nutrition Interventions: Document food/fluid/supplement intake    Friction and Shear Interventions: HOB 30 degrees or less

## 2019-03-09 NOTE — PROGRESS NOTES
[facility] Hospitalist Group  Progress Note    Patient: Anay Carrasco Age: 80 y.o. : 1926 MR#: [id number] SSN: [id number]  Date/Time: 3/9/2019     Subjective: pt feels lot better, SOB better, no CP  No melena or hematochezia      Assessment/Plan:   1. 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 Elevated trop ? NSTEMI vs demand ischemia: cardio planning cath monday  3. Recent GI bleed: s/p EGD with AVM's: no more bleeding, cont PPI   4. HTN: BP stable. 5. Hx of CAD s/p PCI : on asa, BB, statin  6. Lasix held / renal insufficiency  7.   Mild protein calorie malnutrition: nutrition supplements   Full code   D/w patient and daughter  Full code  Will avoid off pharmacologic DVT prophylaxis due to recent GI bleed   SCDs    Case discussed with:  [x]Patient  [x]Family  [x]Nursing  []Case Management  DVT Prophylaxis:  []Lovenox  []Hep SQ  [x]SCDs  []Coumadin   []On Heparin gtt    Objective:   VS:   Visit Vitals  /57 (BP 1 Location: Left arm, BP Patient Position: At rest)   Pulse 76   Temp 97.4 °F (36.3 °C)   Resp 18   Ht 5' 7\" (1.702 m)   Wt 64.2 kg (141 lb 8 oz)   SpO2 97%   BMI 22.16 kg/m²      Tmax/24hrs: Temp (24hrs), Av.5 °F (36.4 °C), Min:96.8 °F (36 °C), Max:98.7 °F (37.1 °C)  IOBRIEF    Intake/Output Summary (Last 24 hours) at 3/9/2019 182  Last data filed at 3/9/2019 0217  Gross per 24 hour   Intake    Output 100 ml   Net -100 ml       General:  Awake, alert  Cardiovascular:  S1S2+, RRR  Pulmonary:  Decreased bs b/ bases  GI:  Soft, BS+, NT, ND  Extremities: trace edema        Medications:   Current Facility-Administered Medications   Medication Dose Route Frequency    lisinopril (PRINIVIL, ZESTRIL) tablet 5 mg  5 mg Oral DAILY    aspirin delayed-release tablet 81 mg  81 mg Oral DAILY    pantoprazole (PROTONIX) tablet 40 mg  40 mg Oral ACB    sodium chloride (NS) flush 5-40 mL  5-40 mL IntraVENous Q8H    sodium chloride (NS) flush 5-40 mL 5-40 mL IntraVENous PRN    magnesium hydroxide (MILK OF MAGNESIA) 400 mg/5 mL oral suspension 30 mL  30 mL Oral DAILY PRN    docusate sodium (COLACE) capsule 100 mg  100 mg Oral BID    carvedilol (COREG) tablet 3.125 mg  3.125 mg Oral BID WITH MEALS    nitroglycerin (NITROSTAT) tablet 0.4 mg  0.4 mg SubLINGual Q5MIN PRN    atorvastatin (LIPITOR) tablet 40 mg  40 mg Oral QHS    albuterol (ACCUNEB) nebulizer solution 1.25 mg  1.25 mg Nebulization Q6H PRN       Labs:    Recent Results (from the past 24 hour(s))   METABOLIC PANEL, BASIC    Collection Time: 03/09/19  1:23 AM   Result Value Ref Range    Sodium 141 136 - 145 mmol/L    Potassium 4.0 3.5 - 5.5 mmol/L    Chloride 104 100 - 108 mmol/L    CO2 33 (H) 21 - 32 mmol/L    Anion gap 4 3.0 - 18 mmol/L    Glucose 134 (H) 74 - 99 mg/dL    BUN 36 (H) 7.0 - 18 MG/DL    Creatinine 1.47 (H) 0.6 - 1.3 MG/DL    BUN/Creatinine ratio 24 (H) 12 - 20      GFR est AA 54 (L) >60 ml/min/1.73m2    GFR est non-AA 45 (L) >60 ml/min/1.73m2    Calcium 8.4 (L) 8.5 - 10.1 MG/DL       Signed By: aSul Albarran MD     March 9, 2019

## 2019-03-09 NOTE — PROGRESS NOTES
Bedside shift change report given to Marie (oncoming nurse) by Everett Montelongo RN (offgoing nurse). Report included the following information SBAR, Kardex, MAR and Cardiac Rhythm Sinus Rhythm.

## 2019-03-10 NOTE — PROGRESS NOTES
[facility] Hospitalist Group  Progress Note    Patient: Miguelina Davis Age: 80 y.o. : 1926 MR#: [id number] SSN: [id number]  Date/Time: 3/10/2019     Subjective:      Patient lying in bed in NAD, awake, alert, [place], denies cp or sob. Daughter at bedside  Assessment/Plan:   1. 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 Elevated trop ? NSTEMI vs demand ischemia: plans for cath noted  3. Recent GI bleed: s/p EGD with AVM's: on PPI  4. HTN: monitor  5. Hx of CAD s/p PCI : on asa, BB, statin  6. Monitor renal function  7.   Mild protein calorie malnutrition: nutrition supplements   Full code   D/w patient and daughter  Full code  Will avoid off pharmacologic DVT prophylaxis due to recent GI bleed   SCDs    Case discussed with:  [x]Patient  [x]Family  [x]Nursing  []Case Management  DVT Prophylaxis:  []Lovenox  []Hep SQ  [x]SCDs  []Coumadin   []On Heparin gtt    Objective:   VS:   Visit Vitals  BP 98/60 (BP 1 Location: Left arm, BP Patient Position: At rest)   Pulse 71   Temp 98.1 °F (36.7 °C)   Resp 18   Ht 5' 7\" (1.702 m)   Wt 64.8 kg (142 lb 12.8 oz)   SpO2 99%   BMI 22.37 kg/m²      Tmax/24hrs: Temp (24hrs), Av.7 °F (36.5 °C), Min:97.5 °F (36.4 °C), Max:98.1 °F (36.7 °C)  IOBRIEF    Intake/Output Summary (Last 24 hours) at 3/10/2019 1714  Last data filed at 3/10/2019 1106  Gross per 24 hour   Intake 220 ml   Output 100 ml   Net 120 ml       General:  Awake, alert, [place]  Cardiovascular:  S1S2+, RRR  Pulmonary:  Coarse bs b/l  GI:  Soft, BS+, NT, ND  Extremities:  trace edema          Medications:   Current Facility-Administered Medications   Medication Dose Route Frequency    lisinopril (PRINIVIL, ZESTRIL) tablet 5 mg  5 mg Oral DAILY    aspirin delayed-release tablet 81 mg  81 mg Oral DAILY    pantoprazole (PROTONIX) tablet 40 mg  40 mg Oral ACB    sodium chloride (NS) flush 5-40 mL  5-40 mL IntraVENous Q8H    sodium chloride (NS) flush 5-40 mL  5-40 mL IntraVENous PRN    magnesium hydroxide (MILK OF MAGNESIA) 400 mg/5 mL oral suspension 30 mL  30 mL Oral DAILY PRN    docusate sodium (COLACE) capsule 100 mg  100 mg Oral BID    carvedilol (COREG) tablet 3.125 mg  3.125 mg Oral BID WITH MEALS    nitroglycerin (NITROSTAT) tablet 0.4 mg  0.4 mg SubLINGual Q5MIN PRN    atorvastatin (LIPITOR) tablet 40 mg  40 mg Oral QHS    albuterol (ACCUNEB) nebulizer solution 1.25 mg  1.25 mg Nebulization Q6H PRN       Labs:    Recent Results (from the past 24 hour(s))   CBC WITH AUTOMATED DIFF    Collection Time: 03/10/19  2:41 AM   Result Value Ref Range    WBC 7.6 4.6 - 13.2 K/uL    RBC 3.08 (L) 4.70 - 5.50 M/uL    HGB 8.6 (L) 13.0 - 16.0 g/dL    HCT 28.1 (L) 36.0 - 48.0 %    MCV 91.2 74.0 - 97.0 FL    MCH 27.9 24.0 - 34.0 PG    MCHC 30.6 (L) 31.0 - 37.0 g/dL    RDW 15.4 (H) 11.6 - 14.5 %    PLATELET 203 717 - 430 K/uL    MPV 10.2 9.2 - 11.8 FL    NEUTROPHILS 55 42 - 75 %    LYMPHOCYTES 25 20 - 51 %    MONOCYTES 18 (H) 2 - 9 %    EOSINOPHILS 2 0 - 5 %    BASOPHILS 0 0 - 3 %    ABS. NEUTROPHILS 4.1 1.8 - 8.0 K/UL    ABS. LYMPHOCYTES 1.9 0.8 - 3.5 K/UL    ABS. MONOCYTES 1.4 (H) 0 - 1.0 K/UL    ABS. EOSINOPHILS 0.2 0.0 - 0.4 K/UL    ABS.  BASOPHILS 0.0 0.0 - 0.06 K/UL    DF MANUAL      PLATELET COMMENTS ADEQUATE PLATELETS      RBC COMMENTS ANISOCYTOSIS  1+        RBC COMMENTS POLYCHROMASIA  1+        RBC COMMENTS OVALOCYTES  1+       METABOLIC PANEL, BASIC    Collection Time: 03/10/19  2:41 AM   Result Value Ref Range    Sodium 143 136 - 145 mmol/L    Potassium 3.8 3.5 - 5.5 mmol/L    Chloride 104 100 - 108 mmol/L    CO2 33 (H) 21 - 32 mmol/L    Anion gap 6 3.0 - 18 mmol/L    Glucose 114 (H) 74 - 99 mg/dL    BUN 35 (H) 7.0 - 18 MG/DL    Creatinine 1.07 0.6 - 1.3 MG/DL    BUN/Creatinine ratio 33 (H) 12 - 20      GFR est AA >60 >60 ml/min/1.73m2    GFR est non-AA >60 >60 ml/min/1.73m2    Calcium 8.6 8.5 - 10.1 MG/DL       Signed By: Damien Aguila MD     March 10, 2019

## 2019-03-10 NOTE — PROGRESS NOTES
[facility]  -  Progress Note      Patient: Kiley Remy MRN: [id number]  SSN: [id number]    YOB: 1926  Age: 80 y.o. Sex: male      Admit Date: 3/7/2019    Assessment:     Hospital Problems  Date Reviewed: 3/1/2019          Codes Class Noted POA    NSTEMI (non-ST elevated myocardial infarction) [facility]) ICD-10-CM: I21.4  ICD-9-CM: 410.70  3/7/2019 Unknown             -Acute likely on chronic heart failure with reduced EF now 26-30% this admission with historically normal LV function EF 55-60% 11/2017.  -Indeterminate troponin, not consistent with ACS, likely related to CHF, ECG with known intermittent LBBB. -CAD s/p PCI to left circumflex 2011 with residual disease medically managed with mild ostial left main disease and LAD disease, with heavy calcification, and significant distal RCA disease, which was well collateralized.  He last underwent a followup pharmacologic nuclear stress test in January 2016, which continued to demonstrate scarring of the inferior wall, but no significant ischemia.  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 -Mild to moderate MR.  -Carotid artery disease. Moderate bilateral ICA stenosis medically managed.  -Peripheral vascular disease.  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  Because of this his blood pressure should be checked in his right arm.  -Hypertension.  -Dyslipidemia. -H/o intermittent LBBB. -Asthma, former smoker. -Advanced age, full code, but independent, lives alone and cares for self.     Primary cardiologist Dr. Lynne Bernard.     Plan:     1. Proceed with cardiac cath and possible PTCA and stent tomorrow morning. 2. Reviewed the risks and benefits with the patient who accepts the risks. Subjective:     No new complaints.  H&H fairly stable at 8.6/28.1 and BUN/Creat improved at 35/1.07.     Objective:      Patient Vitals for the past 8 hrs:   Temp Pulse Resp BP SpO2 03/10/19 0835 97.6 °F (36.4 °C) 82 17 112/66 96 %   03/10/19 0350 97.5 °F (36.4 °C) 74 16 121/64 98 %         Patient Vitals for the past 96 hrs:   Weight   03/10/19 0726 64.8 kg (142 lb 12.8 oz)   03/09/19 0653 64.2 kg (141 lb 8 oz)   03/08/19 0400 68.1 kg (150 lb 1.6 oz)   03/07/19 1352 79.4 kg (175 lb)   03/07/19 0233 79.8 kg (175 lb 14.8 oz)         Intake/Output Summary (Last 24 hours) at 3/10/2019 1035  Last data filed at 3/10/2019 0726  Gross per 24 hour   Intake    Output 100 ml   Net -100 ml       Physical Exam:  General:  alert, cooperative, no distress, appears stated age  Neck:  no JVD  Lungs:  clear to auscultation bilaterally  Heart:  regular rate and rhythm, S1, S2 normal, no murmur, click, rub or gallop  Abdomen:  abdomen is soft without significant tenderness, masses, organomegaly or guarding  Extremities:  extremities normal, atraumatic, no cyanosis or edema    Data Review:     Labs: Results:       Chemistry Recent Labs     03/10/19  0241 03/09/19  0123 03/08/19  0528   * 134* 105*    141 143   K 3.8 4.0 3.1*    104 104   CO2 33* 33* 32   BUN 35* 36* 28*   CREA 1.07 1.47* 1.27   CA 8.6 8.4* 8.9   MG  --   --  2.1   AGAP 6 4 7   BUCR 33* 24* 22*      CBC w/Diff Recent Labs     03/10/19  0241 03/08/19  0528   WBC 7.6 6.7   RBC 3.08* 3.20*   HGB 8.6* 8.9*   HCT 28.1* 29.0*    264   GRANS 55 56   LYMPH 25 22   EOS 2 1      Cardiac Enzymes No results found for: CPK, CK, CKMMB, CKMB, RCK3, CKMBT, CKNDX, CKND1, CHRISTINE, TROPT, TROIQ, NGHIA, TROPT, TNIPOC, BNP, BNPP   Coagulation No results for input(s): PTP, INR, APTT in the last 72 hours.     No lab exists for component: INREXT    Lipid Panel Lab Results   Component Value Date/Time    Cholesterol, total 87 03/08/2019 05:28 AM    HDL Cholesterol 44 03/08/2019 05:28 AM    LDL, calculated 30 03/08/2019 05:28 AM    VLDL, calculated 13 03/08/2019 05:28 AM    Triglyceride 65 03/08/2019 05:28 AM    CHOL/HDL Ratio 2.0 03/08/2019 05:28 AM BNP No results found for: BNP, BNPP, XBNPT   Liver Enzymes No results for input(s): TP, ALB, TBIL, AP, SGOT, GPT in the last 72 hours.     No lab exists for component: DBIL   Digoxin    Thyroid Studies Lab Results   Component Value Date/Time    TSH 1.22 03/07/2019 05:29 AM           Juan Diego Malin,    March 10, 2019

## 2019-03-10 NOTE — PROGRESS NOTES
Telemetry reported pt having 7 beats of V-tach with -180 @ 2251. Pt denies any chest pain or discomfort. Pt resting in bed without any distress. Will continue to monitor.

## 2019-03-10 NOTE — PROGRESS NOTES
Bedside shift change report given to Marie (oncoming nurse) by Paty Smith RN (offgoing nurse). Report included the following information SBAR, Recent Results and Alarm Parameters .

## 2019-03-11 NOTE — PROGRESS NOTES
Problem: Falls - Risk of  Goal: *Absence of Falls  Document Gabe Fall Risk and appropriate interventions in the flowsheet. Outcome: Progressing Towards Goal  Fall Risk Interventions:  Mobility Interventions: Assess mobility with egress test, Bed/chair exit alarm, Communicate number of staff needed for ambulation/transfer, OT consult for ADLs, Patient to call before getting OOB, PT Consult for mobility concerns, PT Consult for assist device competence, Strengthening exercises (ROM-active/passive), Utilize walker, cane, or other assistive device         Medication Interventions: Bed/chair exit alarm, Patient to call before getting OOB, Teach patient to arise slowly    Elimination Interventions: Bed/chair exit alarm, Call light in reach, Elevated toilet seat, Patient to call for help with toileting needs, Toilet paper/wipes in reach, Toileting schedule/hourly rounds, Urinal in reach             Problem: Pressure Injury - Risk of  Goal: *Prevention of pressure injury  Document William Scale and appropriate interventions in the flowsheet.   Outcome: Progressing Towards Goal  Pressure Injury Interventions:  Sensory Interventions: Assess changes in LOC, Avoid rigorous massage over bony prominences, Check visual cues for pain, Discuss PT/OT consult with provider, Keep linens dry and wrinkle-free, Maintain/enhance activity level, Minimize linen layers, Pressure redistribution bed/mattress (bed type)    Moisture Interventions: Absorbent underpads, Apply protective barrier, creams and emollients, Check for incontinence Q2 hours and as needed, Maintain skin hydration (lotion/cream), Minimize layers, Moisture barrier, Offer toileting Q_hr    Activity Interventions: Increase time out of bed, Pressure redistribution bed/mattress(bed type), PT/OT evaluation    Mobility Interventions: HOB 30 degrees or less, Pressure redistribution bed/mattress (bed type), PT/OT evaluation    Nutrition Interventions: Document food/fluid/supplement intake, Discuss nutritional consult with provider    Friction and Shear Interventions: HOB 30 degrees or less, Minimize layers

## 2019-03-11 NOTE — PROGRESS NOTES
Problem: Mobility Impaired (Adult and Pediatric)  Goal: *Acute Goals and Plan of Care (Insert Text)  Physical Therapy Goals  Initiated 3/11/2019 and to be accomplished within 7 day(s)  1. Patient will move from supine to sit and sit to supine, scoot up and down and roll side to side in bed with modified independence. 2.  Patient will transfer from bed to chair and chair to bed with modified independence using the least restrictive device. 3.  Patient will perform sit to stand with modified independence. 4.  Patient will ambulate with modified independence for >150 feet with the least restrictive device. 5.  Patient will ascend/descend 3 stairs with 1 handrail(s) with supervision/set-up. physical Therapy EVALUATION    Patient: Miguelina Davis (78 y.o. male)  Date: 3/11/2019  Primary Diagnosis: NSTEMI (non-ST elevated myocardial infarction) ([facility] [address] [I21.4]  Procedure(s) (LRB):  CORONARY ANGIOGRAPHY (N/A)  Percutaneous Coronary Intervention (N/A) Day of Surgery   Precautions: Fall   ASSESSMENT :  Patient is 79yo M admitted to hospital for NSTEMI and presents today alert and agreeable to therapy and was supine in bed upon arrival. Patient transferred to sitting EOB for objective assessment and with NC O2 donned ambulated total 50ft. Patient used RW for final 25ft with increased balance and upright posture as well as improved step length. Without RW, patient had 1 LoB requiring right from PT. Patient c/o SOB during mobility but denied chest pain; SOB resolved with rest. Upon conclusion of session, patient left resting with call bell by his side and was sitting up in recliner and acknowledged understanding not to get up without nursing assist.    Patient will benefit from skilled intervention to address the above impairments.   Patients rehabilitation potential is considered to be Good  Factors which may influence rehabilitation potential include:   []         None noted  []         Mental ability/status  [x] Medical condition  [x]         Home/family situation and support systems  [x]         Safety awareness  [x]         Pain tolerance/management  []         Other:      PLAN :  Recommendations and Planned Interventions:  [x]           Bed Mobility Training             [x]    Neuromuscular Re-Education  [x]           Transfer Training                   []    Orthotic/Prosthetic Training  [x]           Gait Training                          []    Modalities  [x]           Therapeutic Exercises          []    Edema Management/Control  [x]           Therapeutic Activities            [x]    Patient and Family Training/Education  []           Other (comment):    Frequency/Duration: Patient will be followed by physical therapy 1-2 times per day/4-7 days per week to address goals. Discharge Recommendations: Home Health with supervision  Further Equipment Recommendations for Discharge: rolling walker       G-CODES     Eval Complexity: History: MEDIUM  Complexity : 1-2 comorbidities / personal factors will impact the outcome/ POC Exam:LOW Complexity : 1-2 Standardized tests and measures addressing body structure, function, activity limitation and / or participation in recreation  Presentation: LOW Complexity : Stable, uncomplicated  Clinical Decision Making:Low Complexity   Overall Complexity:LOW     SUBJECTIVE:   Patient stated I would really like to eat.     OBJECTIVE DATA SUMMARY:     Past Medical History:   Diagnosis Date    Asthma     Cardiac cath 04/28/2011    oLM 30%. pLAD 30%. oD1 50%. CX 90% (3 x 18 [place] DEMAR). dRCA 90%. RPLB subtotal.  RPDA 100%.  Cardiac echocardiogram 01/21/2014    EF 55-60%. Mod LVH. Gr 1 DDfx. Mild AS (mean grad 13). Mild AI. Unchanged from study of 11/30/11.  Cardiac nuclear imaging test, mod risk 01/22/2016    Intermediate risk. Medium-sized inferior, inferoseptal infarction. No ischemia. EF 54%.   Nondiagnostic EKG on pharm stress test.    Carotid duplex 03/02/2016 Mod 50-69% bilateral ICA stenosis. Probable significant RECA stenosis. >50% stenosis of left subclavian. No significant change from study of 1/8/15.  Coronary artery disease     Status post PCI with drug-eluting stent, Nucla 3 x 18 mm in the proximal circumflex.  Hx of carotid artery disease ([address]     Hypercholesterolemia     Hypertension     Prostate cancer [facility])      Past Surgical History:   Procedure Laterality Date    HX CORONARY STENT PLACEMENT  4/28/11    PCI with drug-eluting stent, Nucla 3 x 18 mm in the proximal circumflex (post dialated with 3.25 mm noncompliant balloon at high pressure). Barriers to Learning/Limitations: None  Compensate with: N/A  Prior Level of Function/Home Situation: Patient lives with wife in 1 story home with 3STE and reports that he was independent with mobility PTA and does not wear home O2. Home Situation  Home Environment: Private residence  # Steps to Enter: 0  One/Two Story Residence: One story  Living Alone: Yes  Support Systems: Family member(s), Friends \ neighbors  Patient Expects to be Discharged to[de-identified] Private residence  Current DME Used/Available at Home: None  Critical Behavior:   A&Ox4   Strength:    Strength: Generally decreased, functional(BLE)    Tone & Sensation:   Tone: Normal(BLE)   Sensation: Intact(BLE)     Range Of Motion:  AROM: Within functional limits(BLE)   Functional Mobility:  Bed Mobility:   Supine to Sit: Stand-by assistance  Sit to Supine: Stand-by assistance  Scooting: Supervision  Transfers:  Sit to Stand: Contact guard assistance  Stand to Sit: Contact guard assistance   Balance:   Sitting: Intact  Standing: Impaired; With support  Standing - Static: Fair  Standing - Dynamic : Fair  Ambulation/Gait Training:  Distance (ft): 50 Feet (ft)  Assistive Device: Walker, rolling  Ambulation - Level of Assistance: Contact guard assistance   Base of Support: Narrowed  Speed/Janee: Slow  Interventions: Verbal cues;Visual/Demos    Pain:  Pt reports 0/10 pain or discomfort prior to treatment.    Pt reports 0/10 pain or discomfort post treatment. Activity Tolerance:   Patient tolerated activity well and denied dizziness or chest pain. Patient endorsed minimal SOB on NC O2 at 2L, however improved with rest.   Please refer to the flowsheet for vital signs taken during this treatment. After treatment:   [x]         Patient left in no apparent distress sitting up in chair  []         Patient left in no apparent distress in bed  [x]         Call bell left within reach  []         Nursing notified  []         Caregiver present  []         Bed alarm activated  []         SCDs in place to B LE     COMMUNICATION/EDUCATION:   [x]         Fall prevention education was provided and the patient/caregiver indicated understanding. [x]         Patient/family have participated as able in goal setting and plan of care. [x]         Patient/family agree to work toward stated goals and plan of care. []         Patient understands intent and goals of therapy, but is neutral about his/her participation. []         Patient is unable to participate in goal setting and plan of care.     Thank you for this referral.  Maddie Pires, PT   Time Calculation: 23 mins

## 2019-03-11 NOTE — PROGRESS NOTES
[facility] Hospitalist Group  Progress Note    Patient: Kelly Perera Age: 80 y.o. : 1926 MR#: [id number] SSN: [id number]  Date/Time: 3/11/2019     Subjective:      Patient lying in bed in NAD, awake, alert, denies cp. S/p cath and pci  Assessment/Plan:   1. 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 NSTEMI , CAD, s/p PCI, stent, on asa, plavix  3. Recent GI bleed: s/p EGD with AVM's: on PPI  4. HTN: monitor  5. Hx of CAD s/p PCI : on asa, BB, statin  6.   Mild protein calorie malnutrition: nutrition supplements   Full code   D/w patient  Will avoid off pharmacologic DVT prophylaxis due to recent GI bleed   SCDs    Case discussed with:  [x]Patient  [x]Family  [x]Nursing  []Case Management  DVT Prophylaxis:  []Lovenox  []Hep SQ  [x]SCDs  []Coumadin   []On Heparin gtt    Objective:   VS:   Visit Vitals  /55   Pulse 77   Temp 97.2 °F (36.2 °C)   Resp 20   Ht 5' 7\" (1.702 m)   Wt 68.8 kg (151 lb 9.6 oz)   SpO2 100%   BMI 23.74 kg/m²      Tmax/24hrs: Temp (24hrs), Av.8 °F (36.6 °C), Min:97.2 °F (36.2 °C), Max:98.3 °F (36.8 °C)  IOBRIEF    Intake/Output Summary (Last 24 hours) at 3/11/2019 1923  Last data filed at 3/11/2019 1911  Gross per 24 hour   Intake 300 ml   Output 400 ml   Net -100 ml       General:  Awake, alert, [place]  Cardiovascular:  S1S2+, RRR  Pulmonary:  CTA b/l  GI:  Soft, BS+, NT, ND  Extremities:  trace edema            Medications:   Current Facility-Administered Medications   Medication Dose Route Frequency    sodium chloride (NS) flush 5-40 mL  5-40 mL IntraVENous Q8H    sodium chloride (NS) flush 5-40 mL  5-40 mL IntraVENous PRN    nitroglycerin (NITROLINGUAL) sublingual 0.4 mg/spray  1 Spray SubLINGual Q5MIN PRN    [START ON 3/12/2019] clopidogrel (PLAVIX) tablet 75 mg  75 mg Oral DAILY    lisinopril (PRINIVIL, ZESTRIL) tablet 5 mg  5 mg Oral DAILY    aspirin delayed-release tablet 81 mg  81 mg Oral DAILY    pantoprazole (PROTONIX) tablet 40 mg  40 mg Oral ACB    sodium chloride (NS) flush 5-40 mL  5-40 mL IntraVENous Q8H    sodium chloride (NS) flush 5-40 mL  5-40 mL IntraVENous PRN    magnesium hydroxide (MILK OF MAGNESIA) 400 mg/5 mL oral suspension 30 mL  30 mL Oral DAILY PRN    docusate sodium (COLACE) capsule 100 mg  100 mg Oral BID    carvedilol (COREG) tablet 3.125 mg  3.125 mg Oral BID WITH MEALS    nitroglycerin (NITROSTAT) tablet 0.4 mg  0.4 mg SubLINGual Q5MIN PRN    atorvastatin (LIPITOR) tablet 40 mg  40 mg Oral QHS    albuterol (ACCUNEB) nebulizer solution 1.25 mg  1.25 mg Nebulization Q6H PRN       Labs:    Recent Results (from the past 24 hour(s))   METABOLIC PANEL, BASIC    Collection Time: 03/11/19  1:10 AM   Result Value Ref Range    Sodium 139 136 - 145 mmol/L    Potassium 3.8 3.5 - 5.5 mmol/L    Chloride 99 (L) 100 - 108 mmol/L    CO2 34 (H) 21 - 32 mmol/L    Anion gap 6 3.0 - 18 mmol/L    Glucose 120 (H) 74 - 99 mg/dL    BUN 39 (H) 7.0 - 18 MG/DL    Creatinine 1.13 0.6 - 1.3 MG/DL    BUN/Creatinine ratio 35 (H) 12 - 20      GFR est AA >60 >60 ml/min/1.73m2    GFR est non-AA >60 >60 ml/min/1.73m2    Calcium 8.5 8.5 - 10.1 MG/DL       Signed By: Sada Romeo MD     March 11, 2019

## 2019-03-11 NOTE — PROGRESS NOTES
Problem: Falls - Risk of  Goal: *Absence of Falls  Document Gabe Fall Risk and appropriate interventions in the flowsheet. Outcome: Progressing Towards Goal  Fall Risk Interventions:  Mobility Interventions: Assess mobility with egress test, OT consult for ADLs, Patient to call before getting OOB, PT Consult for mobility concerns, PT Consult for assist device competence, Strengthening exercises (ROM-active/passive)         Medication Interventions: Assess postural VS orthostatic hypotension, Evaluate medications/consider consulting pharmacy, Patient to call before getting OOB, Teach patient to arise slowly    Elimination Interventions: Call light in reach, Patient to call for help with toileting needs, Toilet paper/wipes in reach, Toileting schedule/hourly rounds, Urinal in reach             Problem: Pressure Injury - Risk of  Goal: *Prevention of pressure injury  Document William Scale and appropriate interventions in the flowsheet.   Outcome: Progressing Towards Goal  Pressure Injury Interventions:  Sensory Interventions: Assess changes in LOC, Avoid rigorous massage over bony prominences, Check visual cues for pain, Discuss PT/OT consult with provider, Keep linens dry and wrinkle-free, Maintain/enhance activity level, Minimize linen layers, Monitor skin under medical devices, Pad between skin to skin, Pressure redistribution bed/mattress (bed type), Sit a 90-degree angle/use footstool if needed    Moisture Interventions: Absorbent underpads, Apply protective barrier, creams and emollients, Maintain skin hydration (lotion/cream), Minimize layers, Moisture barrier, Offer toileting Q_hr    Activity Interventions: Increase time out of bed, Pressure redistribution bed/mattress(bed type), PT/OT evaluation    Mobility Interventions: HOB 30 degrees or less, Pressure redistribution bed/mattress (bed type), PT/OT evaluation    Nutrition Interventions: Discuss nutritional consult with provider, Document food/fluid/supplement intake, Offer support with meals,snacks and hydration    Friction and Shear Interventions: Apply protective barrier, creams and emollients, HOB 30 degrees or less, Minimize layers, Sit at 90-degree angle

## 2019-03-11 NOTE — PROGRESS NOTES
Right wrist D-STAT band removed, no bleeding or swelling. Normal radial pulse, normal distal circulation and neuro check. Sterile hemostatic dressing applied. Safety splint applied. Safety instructions reviewed with the patient and his daughter.

## 2019-03-11 NOTE — PROGRESS NOTES
[facility]  -  Progress Note      Patient: Derrill Goldberg MRN: [id number]  SSN: [id number]    YOB: 1926  Age: 80 y.o. Sex: male      Admit Date: 3/7/2019    Assessment:     Hospital Problems  Date Reviewed: 3/1/2019          Codes Class Noted POA    NSTEMI (non-ST elevated myocardial infarction) [facility]) ICD-10-CM: I21.4  ICD-9-CM: 410.70  3/7/2019 Unknown            -Acute likely on chronic heart failure with reduced EF now 26-30% this admission with historically normal LV function EF 55-60% 11/2017.  -Indeterminate troponin, not consistent with ACS, likely related to CHF, ECG with known intermittent LBBB. -CAD s/p PCI to left circumflex 2011 with residual disease medically managed with mild ostial left main disease and LAD disease, with heavy calcification, and significant distal RCA disease, which was well collateralized.  He last underwent a followup pharmacologic nuclear stress test in January 2016, which continued to demonstrate scarring of the inferior wall, but no significant ischemia.  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 -Mild to moderate MR.  -Carotid artery disease. Moderate bilateral ICA stenosis medically managed.  -Peripheral vascular disease.  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  Because of this his blood pressure should be checked in his right arm.  -Hypertension.  -Dyslipidemia. -H/o intermittent LBBB. -Asthma, former smoker. -Advanced age, full code, but independent, lives alone and cares for self.     Primary cardiologist Dr. Lowe [facility]. Plan:     Stable  If agreeable with Dr. Kiran Qureshi will proceed with cath today. Subjective:     No new complaints.  He states his breathing is better and near baseline    Objective:      Patient Vitals for the past 8 hrs:   Temp Pulse Resp BP SpO2   03/11/19 0808 97.9 °F (36.6 °C) 66 17 109/57 93 %   03/11/19 0400 97.2 °F (36.2 °C) 75 17 114/58 96 % Patient Vitals for the past 96 hrs:   Weight   03/11/19 0425 68.8 kg (151 lb 9.6 oz)   03/10/19 0726 64.8 kg (142 lb 12.8 oz)   03/09/19 0653 64.2 kg (141 lb 8 oz)   03/08/19 0400 68.1 kg (150 lb 1.6 oz)   03/07/19 1352 79.4 kg (175 lb)         Intake/Output Summary (Last 24 hours) at 3/11/2019 0810  Last data filed at 3/10/2019 2318  Gross per 24 hour   Intake 220 ml   Output 100 ml   Net 120 ml       Physical Exam:  General:  alert, cooperative, no distress, appears stated age  Neck:  nontender, no JVD  Lungs:  clear to auscultation bilaterally  Heart:  regular rate and rhythm, S1, S2 normal, no murmur, click, rub or gallop  Extremities:  extremities normal, atraumatic, no cyanosis or edema    Data Review:     Labs: Results:       Chemistry Recent Labs     03/11/19  0110 03/10/19  0241 03/09/19  0123   * 114* 134*    143 141   K 3.8 3.8 4.0   CL 99* 104 104   CO2 34* 33* 33*   BUN 39* 35* 36*   CREA 1.13 1.07 1.47*   CA 8.5 8.6 8.4*   AGAP 6 6 4   BUCR 35* 33* 24*      CBC w/Diff Recent Labs     03/10/19  0241   WBC 7.6   RBC 3.08*   HGB 8.6*   HCT 28.1*      GRANS 55   LYMPH 25   EOS 2      Cardiac Enzymes No results found for: CPK, CK, CKMMB, CKMB, RCK3, CKMBT, CKNDX, CKND1, CHRISTINE, TROPT, TROIQ, NGHIA, TROPT, TNIPOC, BNP, BNPP   Coagulation No results for input(s): PTP, INR, APTT in the last 72 hours. No lab exists for component: INREXT    Lipid Panel Lab Results   Component Value Date/Time    Cholesterol, total 87 03/08/2019 05:28 AM    HDL Cholesterol 44 03/08/2019 05:28 AM    LDL, calculated 30 03/08/2019 05:28 AM    VLDL, calculated 13 03/08/2019 05:28 AM    Triglyceride 65 03/08/2019 05:28 AM    CHOL/HDL Ratio 2.0 03/08/2019 05:28 AM      BNP No results found for: BNP, BNPP, XBNPT   Liver Enzymes No results for input(s): TP, ALB, TBIL, AP, SGOT, GPT in the last 72 hours.     No lab exists for component: DBIL   Digoxin    Thyroid Studies Lab Results   Component Value Date/Time TSH 1.22 03/07/2019 05:29 AM

## 2019-03-11 NOTE — ROUTINE PROCESS
Bedside and Verbal shift change report given to [place] (oncoming nurse) by GELY Deluca RN (offgoing nurse). Report given with SBAR, Kardex, MAR and Recent Results.

## 2019-03-11 NOTE — ROUTINE PROCESS
1920 Bedside and Verbal shift change  Received from MEHUL Vernon RN (outgoing nurse), to GELY Kumar (oncoming)  Pt. Is AOX 4. IV SL, Pt. denies  pain at this time. Report included the following information SBAR, Kardex, Procedure Summary, Intake/Output, MAR, Recent Lab Results, and  Cardiac Rhythm @ NSR. Will resume care and monitor Pt. Condition. Pt. Educated on call bell when in need of help and assistance. Pt. verbalized understanding. Bed alarm on.     Pt. Head to toe Assessment Done and documented. Pt. In bed, resting comfortably. Pt. Assisted to BSC, BM mod amount of brown soft stool. Voided CYU. Assisted back to bed. 2045  Pt. Resting comfortably in bed.    2150  Pt. Made no complaints. 2300  Pt. Resting in bed comfortably with eyes closed, easily awaken. 0030  No sign of discomfort. 0230  Pt. Made no complaints. 0430  Pt. Made no complaints. 0545  Pt. Able to rest well throughout the  Shift.    0700  Pt,. Made no complaints. Verbal and bedside Shift changed report given to Jani Gong RN (oncoming RN) on Pt. Condition. Report consisted of patients Situation, History, Activities, intake/output,  Background, Assessment and Recommendations(SBAR). Information from the following report(s) Kardex, order Summary, Lab results and MAR was reviewed with the receiving nurse. Opportunity for questions and clarification was provided.

## 2019-03-11 NOTE — PROGRESS NOTES
OT order received and chart reviewed. Pt currently has active complete bedrest orders. Please remove bedrest orders as appropriate for OT evaluation to be completed. Thank you, Antoinette Reeves    9:20 am: Per Dr. Fox Service, holding pt today as he is to have cardiac cath today.  Will f/u as pt appropriate, Thank you, Lilia Coates, OTRL

## 2019-03-11 NOTE — ROUTINE PROCESS
TRANSFER - OUT REPORT:    Verbal report given to KATYA Presley(name) on Alfonso Sood  being transferred to [facility](unit) for routine progression of care       Report consisted of patients Situation, Background, Assessment and   Recommendations(SBAR). Information from the following report(s) SBAR was reviewed with the receiving nurse. Lines:   Peripheral IV 03/11/19 Left Forearm (Active)       Peripheral IV 03/11/19 Posterior;Right Forearm (Active)        Opportunity for questions and clarification was provided. Patient transported with:   Registered Nurse: ADAL Owens

## 2019-03-12 NOTE — HOME CARE
Received HH referral, Discharge noted for today, [facility] will follow for SN,Telehealth for CHF,HTN ,PT,OT and labs,  DME: RW,pt's daughter Reina Carmona) states RW has been delivered to patient prior to discharge; [facility] will follow. PAUL PERES.    8/24/16- @6:06PG- spoke to  (Nina),confirmed that pt was provided with a RW prior to discharge yesterday. PAUL PERES.

## 2019-03-12 NOTE — PROGRESS NOTES
Problem: Mobility Impaired (Adult and Pediatric)  Goal: *Acute Goals and Plan of Care (Insert Text)  Physical Therapy Goals  Initiated 3/11/2019 and to be accomplished within 7 day(s)  1. Patient will move from supine to sit and sit to supine, scoot up and down and roll side to side in bed with modified independence. 2.  Patient will transfer from bed to chair and chair to bed with modified independence using the least restrictive device. 3.  Patient will perform sit to stand with modified independence. 4.  Patient will ambulate with modified independence for >150 feet with the least restrictive device. 5.  Patient will ascend/descend 3 stairs with 1 handrail(s) with supervision/set-up. Outcome: Progressing Towards Goal  physical Therapy TREATMENT    Patient: Mahad Ruvalcaba (16 y.o. male)  Date: 3/12/2019  Diagnosis: NSTEMI (non-ST elevated myocardial infarction) ([facility] [address] [I21.4] <principal problem not specified>  Procedure(s) (LRB):  CORONARY ANGIOGRAPHY (N/A)  Percutaneous Coronary Intervention (N/A) 1 Day Post-Op  Precautions:     Chart, physical therapy assessment, plan of care and goals were reviewed. OBJECTIVE/ ASSESSMENT:  Nursing cleared pt to participate with PT. Patient found semi reclined in bed willing to work with PT. Pt performed supine<>sit and it was noted that linens, gown, and crissy pad were soiled. This LPTA assisted pt with donning clean gown. Pt stood to RW CGA from EOB with VC's/manual cues for proper hand placement. Pt ambulated 85ft total with RW CGA in hallway and demonstrated a decreased valente, narrowed ARLET, and forward flexed posture. Pt required multiple VC's for keeping RW close to ARLET as well as Min A for RW mgmt to negotiate turns. Pt required 2 standing rest breaks due to fatigue and back pain. Pt returned to recliner chair in room and was positioned to comfort. Pt was left seated in recliner chair with all needs in reach, daughter present, and nursing notified. Education:  [x]         Bed mobility  [x]         Transfers  [x]         Ambulation / gait  [x]         Assistive device management  []         Stairs  [x]         Body mechanics  [x]         Position change   []         Therapeutic exercise  [x]         Activity pacing / energy conservation  []         Other:    Progression toward goals:  []      Improving appropriately and progressing toward goals  [x]      Improving slowly and progressing toward goals  []      Not making progress toward goals and plan of care will be adjusted     PLAN:  Patient continues to benefit from skilled intervention to address the above impairments. Continue treatment per established plan of care. Discharge Recommendations:  Home Health with Supervision pending progress  Further Equipment Recommendations for Discharge:  rolling walker     SUBJECTIVE:   Patient stated Are we going roller skating next time?     OBJECTIVE DATA SUMMARY:   Functional Mobility Training:  Bed Mobility:  Supine to Sit: Contact guard assistance  Scooting: Contact guard assistance  Transfers:  Sit to Stand: Contact guard assistance  Stand to Sit: Contact guard assistance  Balance:  Sitting: Intact  Standing: Impaired; With support  Standing - Static: Fair  Standing - Dynamic : Fair  Ambulation/Gait Training:  Distance (ft): 85 Feet (ft)  Assistive Device: Walker, rolling  Ambulation - Level of Assistance: Contact guard assistance;Minimal assistance  Gait Abnormalities: Decreased step clearance  Base of Support: Center of gravity altered;Narrowed  Speed/Janee: Pace decreased (<100 feet/min)  Step Length: Right shortened;Left shortened  Interventions: Verbal cues; Safety awareness training; Tactile cues;Manual cues  Pain:  Pre tx pain: 0/10  Post tx pain: 0/10  Activity Tolerance:   fair  Please refer to the flowsheet for vital signs taken during this treatment.   After treatment:   [x] Patient left in no apparent distress sitting up in chair  [] Patient left in no apparent distress in bed  [x] Call bell left within reach  [x] Nursing notified  [x] Daughter present  [] Bed alarm activated  [] SCDs applied  [] Ice applied      Lauren E D'Amico, PTA   Time Calculation: 30 mins

## 2019-03-12 NOTE — DISCHARGE SUMMARY
Discharge Summary    Patient: Brijesh Hickey MRN: [id number]  CSN: [id number]    YOB: 1926  Age: 80 y.o. Sex: male    DOA: 3/7/2019 LOS:  LOS: 5 days   Discharge Date:      Admission Diagnoses: NSTEMI (non-ST elevated myocardial infarction) ([facility] [address] [I21.4]    Discharge Diagnoses:  PLEASE SEE DICTATION. Discharge Condition: Stable    PHYSICAL EXAM  Visit Vitals  /59   Pulse 75   Temp 97.1 °F (36.2 °C)   Resp 22   Ht 5' 7\" (1.702 m)   Wt 67.6 kg (149 lb 0.5 oz)   SpO2 96%   BMI 23.34 kg/m²       General: Alert, cooperative, no acute distress    Lungs:  CTA Bilaterally. No Wheezing/Rhonchi/Rales. Heart:  Regular rate and Rhythm. Abdomen: Soft, Non distended, Non tender. + Bowel sounds. Extremities: No edema/ cyanosis/ clubbing  Neurologic:  AA oriented X 3, [place]. Moves all extremities. Hospital Course: Please see dictation. code # [id number]      Discharge Medications:     Current Discharge Medication List      START taking these medications    Details   atorvastatin (LIPITOR) 40 mg tablet Take 1 Tab by mouth nightly. Qty: 30 Tab, Refills: 0      carvedilol (COREG) 3.125 mg tablet Take 1 Tab by mouth two (2) times daily (with meals). Qty: 60 Tab, Refills: 0      lisinopril (PRINIVIL, ZESTRIL) 5 mg tablet Take 1 Tab by mouth daily. Qty: 30 Tab, Refills: 0      nitroglycerin (NITROSTAT) 0.4 mg SL tablet 1 Tab by SubLINGual route every five (5) minutes as needed for Chest Pain. Qty: 1 Bottle, Refills: 0      furosemide (LASIX) 20 mg tablet Take 1 Tab by mouth daily. Qty: 30 Tab, Refills: 0      potassium chloride SR (KLOR-CON 10) 10 mEq tablet Take 1 Tab by mouth daily. Qty: 15 Tab, Refills: 0      OTHER BMP in 1 week  Dx: CHF  Fax results to Dr. Jazmin Liu: 1 Each, Refills: 0         CONTINUE these medications which have NOT CHANGED    Details   budesonide-formoterol (SYMBICORT) 160-4.5 mcg/actuation HFAA Take 2 Puffs by inhalation two (2) times a day.   Qty: 1 Inhaler, Refills: 5    Associated Diagnoses: Mild intermittent asthma with acute exacerbation      glycopyrrolate-formoterol (BEVESPI AEROSPHERE) 9-4.8 mcg HFAA Take 2 Inhalation by inhalation two (2) times a day. Qty: 1 Inhaler, Refills: 1    Associated Diagnoses: Mild intermittent asthma with acute exacerbation      clopidogrel (PLAVIX) 75 mg tab TAKE 1 TABLET EVERY DAY  Qty: 90 Tab, Refills: 3      omeprazole (PRILOSEC) 40 mg capsule Take 1 Cap by mouth daily. Qty: 30 Cap, Refills: 1      albuterol (PROVENTIL HFA, VENTOLIN HFA, PROAIR HFA) 90 mcg/actuation inhaler Take 2 Puffs by inhalation every four (4) hours as needed for Wheezing or Shortness of Breath. Qty: 1 Inhaler, Refills: 1    Associated Diagnoses: SOB (shortness of breath)      tamsulosin (FLOMAX) 0.4 mg capsule Take 1 Cap by mouth daily. Qty: 30 Cap, Refills: 5    Associated Diagnoses: BPH (benign prostatic hypertrophy)      aspirin delayed-release 81 mg tablet Take 81 mg by mouth daily. STOP taking these medications       amLODIPine (NORVASC) 5 mg tablet Comments:   Reason for Stopping:         simvastatin (ZOCOR) 40 mg tablet Comments:   Reason for Stopping:         tadalafil (CIALIS) 20 mg tablet Comments:   Reason for Stopping:             · It is important that you take the medication exactly as they are prescribed. · Keep your medication in the bottles provided by the pharmacist and keep a list of the medication names, dosages, and times to be taken in your wallet. · Do not take other medications without consulting your doctor.      DIET:  Cardiac Diet    ACTIVITY: Activity as tolerated  Home health care for Skilled care for CHF tele monitoring, Hypertension and medication management    ·    PT/OT consult      ADDITIONAL INFORMATION: If you experience any of the following symptoms but not limited to Fever, chills, nausea, vomiting, diarrhea, change in mentation, falling, bleeding, shortness of breath, chest pain, please call your primary care physician or return to the emergency room if you cannot get hold of your doctor:     FOLLOW UP CARE:  Dr. Chucky Warren MD in 7-10 days. Please call and set up an appointment.   eliud Brewer in 2 week    Minutes spent on discharge: 40 minutes spent coordinating this discharge (review instructions/follow-up, prescriptions, preparing report for sign off)    Camilo Pollard MD  3/12/2019 3:06 PM

## 2019-03-12 NOTE — DISCHARGE INSTRUCTIONS
Patient Education        Percutaneous Coronary Intervention: What to Expect at Home  Your Recovery    Percutaneous coronary intervention (PCI) is the name for procedures that are used to open a narrowed or blocked coronary artery. The two most common PCI procedures are coronary angioplasty and coronary stent placement. Your groin or arm may have a bruise and feel sore for a day or two after a percutaneous coronary intervention (PCI). You can do light activities around the house, but nothing strenuous for several days. This care sheet gives you a general idea about how long it will take for you to recover. But each person recovers at a different pace. Follow the steps below to get better as quickly as possible. How can you care for yourself at home? Activity    · If the doctor gave you a sedative:  ? For 24 hours, don't do anything that requires attention to detail. It takes time for the medicine's effects to completely wear off.  ? For your safety, do not drive or operate any machinery that could be dangerous. Wait until the medicine wears off and you can think clearly and react easily.     · Do not do strenuous exercise and do not lift, pull, or push anything heavy until your doctor says it is okay. This may be for a day or two. You can walk around the house and do light activity, such as cooking.     · If the catheter was placed in your groin, try not to walk up stairs for the first couple of days.     · If the catheter was placed in your arm near your wrist, do not bend your wrist deeply for the first couple of days. Be careful using your hand to get into and out of a chair or bed.     · Carry your stent identification card with you at all times.     · If your doctor recommends it, get more exercise. Walking is a good choice. Bit by bit, increase the amount you walk every day. Try for at least 30 minutes on most days of the week. Diet    · Drink plenty of fluids to help your body flush out the dye.  If you have kidney, heart, or liver disease and have to limit fluids, talk with your doctor before you increase the amount of fluids you drink.     · Keep eating a heart-healthy diet that has lots of fruits, vegetables, and whole grains. If you have not been eating this way, talk to your doctor. You also may want to talk to a dietitian. This expert can help you to learn about healthy foods and plan meals. Medicines    · Your doctor will tell you if and when you can restart your medicines. He or she will also give you instructions about taking any new medicines.     · If you take blood thinners, such as warfarin (Coumadin), clopidogrel (Plavix), or aspirin, be sure to talk to your doctor. He or she will tell you if and when to start taking those medicines again. Make sure that you understand exactly what your doctor wants you to do.     · Your doctor will prescribe blood-thinning medicines. You will likely take aspirin plus another antiplatelet, such as clopidogrel (Plavix). It is very important that you take these medicines exactly as directed. These medicines help keep the coronary artery open and reduce your risk of a heart attack.     · Call your doctor if you think you are having a problem with your medicine.    Care of the catheter site    · For 1 or 2 days, keep a bandage over the spot where the catheter was inserted. The bandage probably will fall off in this time.     · Put ice or a cold pack on the area for 10 to 20 minutes at a time to help with soreness or swelling. Put a thin cloth between the ice and your skin.     · You may shower 24 to 48 hours after the procedure, if your doctor okays it. Pat the incision dry.     · Do not soak the catheter site until it is healed. Don't take a bath for 1 week, or until your doctor tells you it is okay.     · If you are bleeding, lie down and press on the area for 15 minutes to try to make it stop.  If the bleeding does not stop, call your doctor or seek immediate medical care.   Follow-up care is a key part of your treatment and safety. Be sure to make and go to all appointments, and call your doctor if you are having problems. It's also a good idea to know your test results and keep a list of the medicines you take. When should you call for help? Call 911 anytime you think you may need emergency care. For example, call if:    · You passed out (lost consciousness).     · You have severe trouble breathing.     · You have sudden chest pain and shortness of breath, or you cough up blood.     · You have symptoms of a heart attack, such as:  ? Chest pain or pressure. ? Sweating. ? Shortness of breath. ? Nausea or vomiting. ? Pain that spreads from the chest to the neck, jaw, or one or both shoulders or arms. ? Dizziness or lightheadedness. ? A fast or uneven pulse. After calling 911, chew 1 adult-strength aspirin. Wait for an ambulance. Do not try to drive yourself.     · You have been diagnosed with angina, and you have angina symptoms that do not go away with rest or are not getting better within 5 minutes after you take one dose of nitroglycerin.    Call your doctor now or seek immediate medical care if:    · You are bleeding from the area where the catheter was put in your artery.     · You have a fast-growing, painful lump at the catheter site.     · You have signs of infection, such as:  ? Increased pain, swelling, warmth, or redness. ? Red streaks leading from the catheter site. ? Pus draining from the catheter site. ? A fever.     · Your leg or arm looks blue or feels cold, numb, or tingly.    Watch closely for changes in your health, and be sure to contact your doctor if you have any problems. Where can you learn more? Go to http://monty-nirali.info/. Enter I499 in the search box to learn more about \"Percutaneous Coronary Intervention: What to Expect at Home. \"  Current as of: July 22, 2018  Content Version: 11.9  © 7941-1165 [employer]. Care instructions adapted under license by [employer] (which disclaims liability or warranty for this information). If you have questions about a medical condition or this instruction, always ask your healthcare professional. [address] any warranty or liability for your use of this information. Patient Education        Percutaneous Coronary Intervention: What to Expect at Home  Your Recovery    Percutaneous coronary intervention (PCI) is the name for procedures that are used to open a narrowed or blocked coronary artery. The two most common PCI procedures are coronary angioplasty and coronary stent placement. Your groin or arm may have a bruise and feel sore for a day or two after a percutaneous coronary intervention (PCI). You can do light activities around the house, but nothing strenuous for several days. This care sheet gives you a general idea about how long it will take for you to recover. But each person recovers at a different pace. Follow the steps below to get better as quickly as possible. How can you care for yourself at home? Activity    · If the doctor gave you a sedative:  ? For 24 hours, don't do anything that requires attention to detail. It takes time for the medicine's effects to completely wear off.  ? For your safety, do not drive or operate any machinery that could be dangerous. Wait until the medicine wears off and you can think clearly and react easily.     · Do not do strenuous exercise and do not lift, pull, or push anything heavy until your doctor says it is okay. This may be for a day or two. You can walk around the house and do light activity, such as cooking.     · If the catheter was placed in your groin, try not to walk up stairs for the first couple of days.     · If the catheter was placed in your arm near your wrist, do not bend your wrist deeply for the first couple of days.  Be careful using your hand to get into and out of a chair or bed.     · Carry your stent identification card with you at all times.     · If your doctor recommends it, get more exercise. Walking is a good choice. Bit by bit, increase the amount you walk every day. Try for at least 30 minutes on most days of the week. Diet    · Drink plenty of fluids to help your body flush out the dye. If you have kidney, heart, or liver disease and have to limit fluids, talk with your doctor before you increase the amount of fluids you drink.     · Keep eating a heart-healthy diet that has lots of fruits, vegetables, and whole grains. If you have not been eating this way, talk to your doctor. You also may want to talk to a dietitian. This expert can help you to learn about healthy foods and plan meals. Medicines    · Your doctor will tell you if and when you can restart your medicines. He or she will also give you instructions about taking any new medicines.     · If you take blood thinners, such as warfarin (Coumadin), clopidogrel (Plavix), or aspirin, be sure to talk to your doctor. He or she will tell you if and when to start taking those medicines again. Make sure that you understand exactly what your doctor wants you to do.     · Your doctor will prescribe blood-thinning medicines. You will likely take aspirin plus another antiplatelet, such as clopidogrel (Plavix). It is very important that you take these medicines exactly as directed. These medicines help keep the coronary artery open and reduce your risk of a heart attack.     · Call your doctor if you think you are having a problem with your medicine.    Care of the catheter site    · For 1 or 2 days, keep a bandage over the spot where the catheter was inserted. The bandage probably will fall off in this time.     · Put ice or a cold pack on the area for 10 to 20 minutes at a time to help with soreness or swelling.  Put a thin cloth between the ice and your skin.     · You may shower 24 to 48 hours after the procedure, if your doctor okays it. Pat the incision dry.     · Do not soak the catheter site until it is healed. Don't take a bath for 1 week, or until your doctor tells you it is okay.     · If you are bleeding, lie down and press on the area for 15 minutes to try to make it stop. If the bleeding does not stop, call your doctor or seek immediate medical care. Follow-up care is a key part of your treatment and safety. Be sure to make and go to all appointments, and call your doctor if you are having problems. It's also a good idea to know your test results and keep a list of the medicines you take. When should you call for help? Call 911 anytime you think you may need emergency care. For example, call if:    · You passed out (lost consciousness).     · You have severe trouble breathing.     · You have sudden chest pain and shortness of breath, or you cough up blood.     · You have symptoms of a heart attack, such as:  ? Chest pain or pressure. ? Sweating. ? Shortness of breath. ? Nausea or vomiting. ? Pain that spreads from the chest to the neck, jaw, or one or both shoulders or arms. ? Dizziness or lightheadedness. ? A fast or uneven pulse. After calling 911, chew 1 adult-strength aspirin. Wait for an ambulance. Do not try to drive yourself.     · You have been diagnosed with angina, and you have angina symptoms that do not go away with rest or are not getting better within 5 minutes after you take one dose of nitroglycerin.    Call your doctor now or seek immediate medical care if:    · You are bleeding from the area where the catheter was put in your artery.     · You have a fast-growing, painful lump at the catheter site.     · You have signs of infection, such as:  ? Increased pain, swelling, warmth, or redness. ? Red streaks leading from the catheter site. ? Pus draining from the catheter site.   ? A fever.     · Your leg or arm looks blue or feels cold, numb, or tingly.    Watch closely for changes in your health, and be sure to contact your doctor if you have any problems. Where can you learn more? Go to http://monty-nirali.info/. Enter A552 in the search box to learn more about \"Percutaneous Coronary Intervention: What to Expect at Home. \"  Current as of: July 22, 2018  Content Version: 11.9  © 8840-4316 [employer]. Care instructions adapted under license by [employer] (which disclaims liability or warranty for this information). If you have questions about a medical condition or this instruction, always ask your healthcare professional. [place] [address] any warranty or liability for your use of this information. DISCHARGE SUMMARY from Nurse    PATIENT INSTRUCTIONS:    After general anesthesia or intravenous sedation, for 24 hours or while taking prescription Narcotics:  · Limit your activities  · Do not drive and operate hazardous machinery  · Do not make important personal or business decisions  · Do  not drink alcoholic beverages  · If you have not urinated within 8 hours after discharge, please contact your surgeon on call. Report the following to your surgeon:  · Excessive pain, swelling, redness or odor of or around the surgical area  · Temperature over 100.5  · Nausea and vomiting lasting longer than 4 hours or if unable to take medications  · Any signs of decreased circulation or nerve impairment to extremity: change in color, persistent  numbness, tingling, coldness or increase pain  · Any questions    What to do at Home:  Recommended activity: {discharge activity:03777}, ***    If you experience any of the following symptoms ***, please follow up with ***. *  Please give a list of your current medications to your Primary Care Provider. *  Please update this list whenever your medications are discontinued, doses are      changed, or new medications (including over-the-counter products) are added.     *  Please carry medication information at all times in case of emergency situations. These are general instructions for a healthy lifestyle:    No smoking/ No tobacco products/ Avoid exposure to second hand smoke  Surgeon General's Warning:  Quitting smoking now greatly reduces serious risk to your health. Obesity, smoking, and sedentary lifestyle greatly increases your risk for illness    A healthy diet, regular physical exercise & weight monitoring are important for maintaining a healthy lifestyle    You may be retaining fluid if you have a history of heart failure or if you experience any of the following symptoms:  Weight gain of 3 pounds or more overnight or 5 pounds in a week, increased swelling in our hands or feet or shortness of breath while lying flat in bed. Please call your doctor as soon as you notice any of these symptoms; do not wait until your next office visit. Recognize signs and symptoms of STROKE:    F-face looks uneven    A-arms unable to move or move unevenly    S-speech slurred or non-existent    T-time-call 911 as soon as signs and symptoms begin-DO NOT go       Back to bed or wait to see if you get better-TIME IS BRAIN. Warning Signs of HEART ATTACK     Call 911 if you have these symptoms:   Chest discomfort. Most heart attacks involve discomfort in the center of the chest that lasts more than a few minutes, or that goes away and comes back. It can feel like uncomfortable pressure, squeezing, fullness, or pain.  Discomfort in other areas of the upper body. Symptoms can include pain or discomfort in one or both arms, the back, neck, jaw, or stomach.  Shortness of breath with or without chest discomfort.  Other signs may include breaking out in a cold sweat, nausea, or lightheadedness. Don't wait more than five minutes to call 911 - MINUTES MATTER! Fast action can save your life. Calling 911 is almost always the fastest way to get lifesaving treatment.  Emergency Medical Services staff can begin treatment when they arrive -- up to an hour sooner than if someone gets to the hospital by car. The discharge information has been reviewed with the {PATIENT PARENT GUARDIAN:41257}. The {PATIENT PARENT GUARDIAN:66277} verbalized understanding. Discharge medications reviewed with the {Dishcarge meds reviewed HIXW:35544} and appropriate educational materials and side effects teaching were provided. ___________________________________________________________________________________________________________________________________Discharge Instructions    Patient: Rena Stubbs MRN: [id number]  CSN: [id number]    YOB: 1926  Age: 80 y.o. Sex: male    DOA: 3/7/2019 LOS:  LOS: 5 days   Discharge Date:      DIET:  Cardiac Diet    ACTIVITY: Activity as tolerated  Home health care for Skilled care for CHF tele monitoring, Hypertension and medication management    ·    PT/OT consult      ADDITIONAL INFORMATION: If you experience any of the following symptoms but not limited to Fever, chills, nausea, vomiting, diarrhea, change in mentation, falling, bleeding, shortness of breath, chest pain, please call your primary care physician or return to the emergency room if you cannot get hold of your doctor:     FOLLOW UP CARE:  Dr. Divya Coppola MD in 7-10 days. Please call and set up an appointment.   Dr. Margaux Burch, cardio in 2 week      Schuyler Rowe MD  3/12/2019 3:05 PM

## 2019-03-12 NOTE — PROGRESS NOTES
[facility] - Progress Note  Admit Date: 3/7/2019    Assessment:     Hospital Problems  Date Reviewed: 3/1/2019          Codes Class Noted POA    NSTEMI (non-ST elevated myocardial infarction) [facility]) ICD-10-CM: I21.4  ICD-9-CM: 410.70  3/7/2019 Unknown                -Acute likely on chronic heart failure with reduced EF now 26-30% this admission with historically normal LV function EF 55-60% 11/2017.  -Indeterminate troponin, not consistent with ACS, likely related to CHF, ECG with known intermittent LBBB. -CAD s/p PCI to left circumflex 2011 with residual disease medically managed. Now s/p LAD PCI with DEMAR 3/11/2019.  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 -Mild now moderate aortic valve stenosis. -Mild to moderate MR.  -Carotid artery disease. Moderate bilateral ICA stenosis medically managed.  -Peripheral vascular disease.  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  Because of this his blood pressure should be checked in his right arm.  -Hypertension.  -Dyslipidemia. -H/o intermittent LBBB. -Asthma, former smoker. -Advanced age, full code, but independent, lives alone and cares for self.     Primary cardiologist Dr. Jared Quevedo    Cath 3/11/2019  · Occluded mid RCA which appears to be chronic. There is faint collateral from the left coronary system. · Left main artery with ostial 30-40%. · LAD mid focal severely calcified tortuous 99% stenosis with CARLENE II flow. · Circumflex coronary artery with diffuse mild 20-30% stenosis throughout. · Successful angioplasty and stent to mid LAD 9 9% residual 0% using 2.25 mm DEMAR. Plan:     Doing well post PCI. No CP. Breathing stable. Patient to increase activity with therapy today. Continued on ASA, plavix, statin, BB, Ace. Continuing conservative management of CMY/CHF in setting of advanced age. Hopefully discharge soon if ambulating without complaints, patient does live alone. Subjective:     No CP.  Breathing stable. Objective:      Patient Vitals for the past 8 hrs:   Temp Pulse Resp BP SpO2   03/12/19 1000  74 22  94 %   03/12/19 0900  79 20  96 %   03/12/19 0800  79 21  95 %   03/12/19 0726 98.3 °F (36.8 °C) 75 24 106/55 95 %   03/12/19 0700  72 21  94 %   03/12/19 0400 97.9 °F (36.6 °C) 68 18 103/63 96 %         Patient Vitals for the past 96 hrs:   Weight   03/12/19 0500 67.6 kg (149 lb 0.5 oz)   03/11/19 0425 68.8 kg (151 lb 9.6 oz)   03/10/19 0726 64.8 kg (142 lb 12.8 oz)   03/09/19 0653 64.2 kg (141 lb 8 oz)                    Intake/Output Summary (Last 24 hours) at 3/12/2019 1011  Last data filed at 3/12/2019 7025  Gross per 24 hour   Intake 600 ml   Output 600 ml   Net 0 ml       Physical Exam:  General:  alert, cooperative, no distress, appears stated age  Neck:  no JVD  Lungs:  clear to auscultation bilaterally  Heart:  regular rate and rhythm  Abdomen:  abdomen is soft without significant tenderness, masses, organomegaly or guarding  Extremities:  extremities normal, atraumatic, no cyanosis or edema. Right wrist soft without hematoma with full peripheral pulses    Data Review:     Labs: Results:       Chemistry Recent Labs     03/11/19  0110 03/10/19  0241   * 114*    143   K 3.8 3.8   CL 99* 104   CO2 34* 33*   BUN 39* 35*   CREA 1.13 1.07   CA 8.5 8.6   AGAP 6 6   BUCR 35* 33*      CBC w/Diff Recent Labs     03/10/19  0241   WBC 7.6   RBC 3.08*   HGB 8.6*   HCT 28.1*      GRANS 55   LYMPH 25   EOS 2      Cardiac Enzymes No results found for: CPK, CK, CKMMB, CKMB, RCK3, CKMBT, CKNDX, CKND1, CHRISTINE, TROPT, TROIQ, NGHIA, TROPT, TNIPOC, BNP, BNPP   Coagulation No results for input(s): PTP, INR, APTT in the last 72 hours.     No lab exists for component: INREXT    Lipid Panel Lab Results   Component Value Date/Time    Cholesterol, total 87 03/08/2019 05:28 AM    HDL Cholesterol 44 03/08/2019 05:28 AM    LDL, calculated 30 03/08/2019 05:28 AM    VLDL, calculated 13 03/08/2019 05:28 AM Triglyceride 65 03/08/2019 05:28 AM    CHOL/HDL Ratio 2.0 03/08/2019 05:28 AM      BNP No results found for: BNP, BNPP, XBNPT   Liver Enzymes No results for input(s): TP, ALB, TBIL, AP, SGOT, GPT in the last 72 hours.     No lab exists for component: DBIL   Digoxin    Thyroid Studies Lab Results   Component Value Date/Time    TSH 1.22 03/07/2019 05:29 AM          Signed By: AMITA Cheng     March 12, 2019

## 2019-03-12 NOTE — PROGRESS NOTES
Problem: Self Care Deficits Care Plan (Adult)  Goal: *Acute Goals and Plan of Care (Insert Text)  Occupational Therapy Goals  Initiated 3/12/2019 within 7 day(s). 1.  Patient will perform lower body dressing with modified independence   2. Patient will perform toileting with modified independence. 3.  Patient will perform toilet transfer with modified independence. 4.  Patient will perform a functional activity of choice while standing for 5 minutes with supervision. 5.  Patient will participate in upper extremity therapeutic exercise/activities with supervision/set-up for 8 minutes. 6.  Patient will utilize energy conservation techniques during functional activities with minimal verbal cues. Outcome: Progressing Towards Goal  Occupational Therapy EVALUATION    Patient: Mahad Ruvalcaba (14 y.o. male)  Date: 3/12/2019  Primary Diagnosis: NSTEMI (non-ST elevated myocardial infarction) ([facility] [address] [I21.4]  Procedure(s) (LRB):  CORONARY ANGIOGRAPHY (N/A)  Percutaneous Coronary Intervention (N/A) 1 Day Post-Op   Precautions: Falls; Aspiration       ASSESSMENT :  Pt cleared to participate in OT evaluation by RN. Upon entering room, pt supine with HOB elevated, alert, and agreeable to therapy session. Based on the objective data described below, the patient presents with decreased strength, decreased endurance, decreased functional balance, and decreased functional mobility, affecting his performance in basic self-care/ADL tasks. Pt is able to perform bed mobility at a SBA level with additional time to participate in self-care. Pt demonstrates is he able to perform LB dressing while sitting EOB at SBA with additional time, demonstrating good sitting balance. Pt required CGA for sit<> preparation for toileting/toilet transfers. Educated pt on the role of OT, evaluation process, and goals for therapy with pt demonstrating good understanding.  The pt will benefit from further OT services, in order to maximize his ADL performance and decrease the risk for complications associated with decreased functional activity. At the end of the session, pt returned to bed, call-bell in reach, with all needs met. Patient will benefit from skilled intervention to address the above impairments. Patients rehabilitation potential is considered to be Good  Factors which may influence rehabilitation potential include:   [x]             None noted  []             Mental ability/status  []             Medical condition  []             Home/family situation and support systems  []             Safety awareness  []             Pain tolerance/management  []             Other:      PLAN :  Recommendations and Planned Interventions:   [x]               Self Care Training                  [x]        Therapeutic Activities  [x]               Functional Mobility Training    []        Cognitive Retraining  [x]               Therapeutic Exercises           [x]        Endurance Activities  [x]               Balance Training                   []        Neuromuscular Re-Education  []               Visual/Perceptual Training     [x]   Home Safety Training  [x]               Patient Education                 [x]        Family Training/Education  []               Other (comment):    Frequency/Duration: Patient will be followed by occupational therapy 1-2 times per day/4-7 days per week to address goals. Discharge Recommendations: Home Health with 24/7 Supervision  Further Equipment Recommendations for Discharge: Grab bars to decrease the risk of falls     Barriers to Learning/Limitations: None  Compensate with: visual, verbal, tactile, kinesthetic cues/model     PATIENT COMPLEXITY      Eval Complexity: History: MEDIUM Complexity : Expanded review of history including physical, cognitive and psychosocial  history ;  Examination: LOW Complexity : 1-3 performance deficits relating to physical, cognitive , or psychosocial skils that result in activity limitations and / or participation restrictions ; Decision Making:LOW Complexity : No comorbidities that affect functional and no verbal or physical assistance needed to complete eval tasks  Assessment: Low Complexity     SUBJECTIVE:   Patient stated Sure, I'll work with you. I always like working with pretty ladies.     OBJECTIVE DATA SUMMARY:     Past Medical History:   Diagnosis Date    Asthma     Cardiac cath 04/28/2011    oLM 30%. pLAD 30%. oD1 50%. CX 90% (3 x 18 Ringgold DEMAR). dRCA 90%. RPLB subtotal.  RPDA 100%.  Cardiac echocardiogram 01/21/2014    EF 55-60%. Mod LVH. Gr 1 DDfx. Mild AS (mean grad 13). Mild AI. Unchanged from study of 11/30/11.  Cardiac nuclear imaging test, mod risk 01/22/2016    Intermediate risk. Medium-sized inferior, inferoseptal infarction. No ischemia. EF 54%. Nondiagnostic EKG on pharm stress test.    Carotid duplex 03/02/2016    Mod 50-69% bilateral ICA stenosis. Probable significant RECA stenosis. >50% stenosis of left subclavian. No significant change from study of 1/8/15.  Coronary artery disease     Status post PCI with drug-eluting stent, Ringgold 3 x 18 mm in the proximal circumflex.  Hx of carotid artery disease ([address]     Hypercholesterolemia     Hypertension     Prostate cancer [facility])      Past Surgical History:   Procedure Laterality Date    HX CORONARY STENT PLACEMENT  4/28/11    PCI with drug-eluting stent, Ringgold 3 x 18 mm in the proximal circumflex (post dialated with 3.25 mm noncompliant balloon at high pressure). Prior Level of Function/Home Situation: Pt reports he is not , but has a daughter and granddaughter to assist him when he returns home. Prior to this hospital stay, he was (I) with ADLs and IADLs (cooking, laundry, and driving) PTA.   Home Situation  Home Environment: Private residence  # Steps to Enter: 0  One/Two Story Residence: One story  Living Alone: Yes  Support Systems: Family member(s), Friends \ neighbors  Patient Expects to be Discharged to[de-identified] Private residence  Current DME Used/Available at Home: Shower chair  Tub or Shower Type: Tub/Shower combination  [x]  Right hand dominant   []  Left hand dominant  Cognitive/Behavioral Status:  Neurologic State: Alert  Orientation Level: Appropriate for age  Cognition: Appropriate decision making; Follows commands  Safety/Judgement: Awareness of environment; Fall prevention    Skin: Visible skin appeared    Edema: None noted    Coordination:  Coordination: Within functional limits(BUEs)  Fine Motor Skills-Upper: Left Intact; Right Intact    Gross Motor Skills-Upper: Left Intact; Right Intact    Balance:  Sitting: Intact  Standing: Impaired; With support  Standing - Static: Fair  Standing - Dynamic : Fair    Strength:  Strength: Generally decreased, functional(BUEs)    Tone & Sensation:  Tone: Normal(BUEs)  Sensation: Intact(BUEs)    Range of Motion:  AROM: Within functional limits(BUEs)    Functional Mobility and Transfers for ADLs:  Bed Mobility:  Supine to Sit: SBA with additional time  Scooting: SBA with additional time  Transfers:  Sit to Stand: Contact guard assistance    ADL Assessment:  Feeding: Setup    Oral Facial Hygiene/Grooming: Setup    Bathing: Supervision    Upper Body Dressing: Stand-by assistance;Supervision    Lower Body Dressing: Supervision;Stand-by assistance (with additional time)    Toileting: Contact guard assistance    ADL Intervention:  Lower Body Dressing Assistance  Dressing Assistance: Stand-by assistance  Position Performed: Seated edge of bed    Cognitive Retraining  Safety/Judgement: Awareness of environment; Fall prevention      Pain:  Pt reports 0/10 pain or discomfort prior to treatment.    Pt reports 0/10 pain or discomfort post treatment. Activity Tolerance:   Good    Please refer to the flowsheet for vital signs taken during this treatment.   After treatment:   [] Patient left in no apparent distress sitting up in chair  [x] Patient left in no apparent distress in bed  [x] Call bell left within reach  [x] PA notified  [] Caregiver present  [] Bed alarm activated    COMMUNICATION/EDUCATION:   [] Home safety education was provided and the patient/caregiver indicated understanding. [x] Patient/family have participated as able in goal setting and plan of care. [x] Patient/family agree to work toward stated goals and plan of care. [] Patient understands intent and goals of therapy, but is neutral about his/her participation. [] Patient is unable to participate in goal setting and plan of care.     Thank you for this referral.  Cecelia Kerr, OT  Time Calculation: 23 mins

## 2019-03-12 NOTE — PROGRESS NOTES
NUTRITION    Nutrition Consult: Diet Education     RECOMMENDATIONS / PLAN:     - Increase supplements to Ensure Enlive TID, continue Magic Cup once daily. - Continue RD inpatient monitoring and evaluation. NUTRITION INTERVENTIONS & DIAGNOSIS:     [x] Meals/snacks: modified composition  [x] Medical food supplement therapy: Ensure Enlive once daily, Magic Cup once daily- modify     Nutrition Diagnosis: Inadequate energy intake related to decreased appetite as evidenced by pt consuming 50% or less of recent meals. ASSESSMENT:     3/12: Tolerating meals and consuming all of supplements. 3/7: Pt reporting fair meal intake and appetite PTA x month, with overall stable weight hx. Pt unfamiliar with cardiac diet, education provided today to pt and daughter, receptive.     Average po intake adequate to meet patients estimated nutritional needs:   [] Yes     [x] No   [] Unable to determine at this time    Diet: DIET NUTRITIONAL SUPPLEMENTS Lunch; ENSURE ENLIVE  DIET NUTRITIONAL SUPPLEMENTS Dinner; MAGIC CUPS  DIET CARDIAC Regular      Food Allergies: NKFA  Current Appetite:   [] Good     [x] Fair     [] Poor     [] Other:  Appetite/meal intake prior to admission:   [] Good     [x] Fair     [] Poor     [x] Other: 2 meals/day, boost 1x daily  Feeding Limitations:  [] Swallowing difficulty    [] Chewing difficulty    [] Other:  Current Meal Intake:   Patient Vitals for the past 100 hrs:   % Diet Eaten   03/11/19 1911 50 %   03/10/19 1106 25 %   03/08/19 1230 10 %       BM: 3/11  Skin Integrity: WDL  Edema:   [] No     [x] Yes   Pertinent Medications: Reviewed    Recent Labs     03/11/19  0110 03/10/19  0241    143   K 3.8 3.8   CL 99* 104   CO2 34* 33*   * 114*   BUN 39* 35*   CREA 1.13 1.07   CA 8.5 8.6       Intake/Output Summary (Last 24 hours) at 3/12/2019 1407  Last data filed at 3/12/2019 0852  Gross per 24 hour   Intake 600 ml   Output 600 ml   Net 0 ml       Anthropometrics:  Ht Readings from Last 1 Encounters:   03/07/19 5' 7\" (1.702 m)     Last 3 Recorded Weights in this Encounter    03/10/19 0726 03/11/19 0425 03/12/19 0500   Weight: 64.8 kg (142 lb 12.8 oz) 68.8 kg (151 lb 9.6 oz) 67.6 kg (149 lb 0.5 oz)     Body mass index is 23.34 kg/m². Weight History: Pt reports a 5-6 lb weight loss x 1 year PTA. Per chart review pt with weight gain PTA, however noted pt retaining fluid. Reports UBW around 160-165 lbs. Weight Metrics 3/12/2019 3/7/2019 3/1/2019 2/23/2019 2/12/2019 1/24/2019 1/16/2019   Weight 149 lb 0.5 oz - 161 lb 163 lb 2.3 oz 152 lb 157 lb 158 lb   BMI - 23.34 kg/m2 25.22 kg/m2 25.55 kg/m2 23.11 kg/m2 23.87 kg/m2 24.02 kg/m2        Admitting Diagnosis: [facility]STEM[facility] (non[facility] [I21.4]  Pertinent PMHx: CAD, hypercholesterolemia, HTN, prostate cancer     Education Needs:        [] None identified  [] Identified - Not appropriate at this time  [x]  Identified and addressed - refer to education log  Learning Limitations:   [x] None identified  [] Identified    Cultural, Roman Catholic & ethnic food preferences:  [x] None identified    [] Identified and addressed     ESTIMATED NUTRITION NEEDS:     Calories: 2302-2305 kcal (MSJx1.2-1.3) based on  [x] Actual BW: 80 kg      [] IBW   Protein: 64-96 gm (0.8-1.2 gm/kg) based on  [x] Actual BW      [] IBW   Fluid: 1 mL/kcal     MONITORING & EVALUATION:     Nutrition Goal(s):   1. Po intake of meals will meet >75% of patient estimated nutritional needs within the next 7 days. Outcome:  [x] Met/Ongoing    []  Not Met    [] New/Initial Goal   2. Patient will increase knowledge of appropriate food choices on a cardiac diet within 7 days.   Outcome:  [x] Met    []  Not Met    [] New/Initial Goal     Monitoring:   [x] Food and beverage intake   [x] Diet order   [x] Nutrition-focused physical findings   [x] Treatment/therapy   [] Weight   [] Enteral nutrition intake        Previous Recommendations (for follow-up assessments only):     [x] Implemented       []   Not Implemented (RD to address)      [] No Longer Appropriate     [] No Recommendation Made     Discharge Planning: cardiac diet   [x] Participated in care planning, discharge planning, & interdisciplinary rounds as appropriate      Clayton Kline, [address]    Pager: [phone]

## 2019-03-12 NOTE — NURSE NAVIGATOR
Discharge order noted for today. Pt has been accepted to [facility] agency. Met with patient and daughter and are agreeable to the transition plan today. Transport has been arranged through daughter. Patient's discharge summary and home health  orders have been forwarded to [facility] home health  agency via [address] [facility]. Updated bedside RN, Cruz Navarro,  to the transition plan. Discharge information has been documented on the AVS.   PT is recommending walker - will get one for patient through [facility]. Jewell Nielsen.  Jessie ZIEGLERN, RN  Care Management  [phone]

## 2019-03-12 NOTE — PROGRESS NOTES
Bedside shift change report given to [address] (oncoming nurse) by Gypsy Valentino RN (offgoing nurse). Report included the following information SBAR, Kardex, Procedure Summary, Intake/Output, MAR and Recent Results.

## 2019-03-13 PROBLEM — I50.9 CONGESTIVE HEART DISEASE (HCC): Status: ACTIVE | Noted: 2019-01-01

## 2019-03-13 NOTE — PROGRESS NOTES
EMR reviewed. Patient is being evaluated by ED staff at   [facility] emergency department. Nurse Navigator to follow case.

## 2019-03-13 NOTE — ED PROVIDER NOTES
Emergency Department Treatment Report    Patient: Haseeb Saucedo Age: 80 y.o. Sex: male    YOB: 1926 Admit Date: 3/13/2019 PCP: Gunner Maldonado MD   MRN: [id number]  CSN: [id number]     Room: [place] [address] Time Dictated: 9:55 AM      Chief Complaint   Chief Complaint   Patient presents with    Shortness of Breath    Fall       History of Present Illness   9:55 AM Haseeb Saucedo is a 80 y.o. male with h/o asthma, HTN, Hx of GI bleed and CAD, who presents to ED complaining of acute SOB onset this morning. Patient was brought in by EMS on CPAP because he was satting in the low 70's. Upon arrival to the ER, his work of breathing improved. Denies dizziness or chest pain. Patient was hospitalized this week for NSTEMI and worsening CHF. He had a heart cath earlier this week, which showed chronic occlusion of his RCA. Denies any further complaints or symptoms at the moment. PCP: Gunner Maldonado MD  Review of Systems   Constitutional: No fever, chills, or weight loss  Eyes: No visual symptoms. ENT: No sore throat, runny nose or ear pain. Respiratory: SOB  Cardiovascular: No chest pain, pressure, palpitations, tightness or heaviness. Gastrointestinal: No vomiting, diarrhea or abdominal pain. Genitourinary: No dysuria, frequency, or urgency. Musculoskeletal: No joint pain or swelling. Integumentary: No rashes. Neurological: No headaches, sensory or motor symptoms. Denies complaints in all other systems. Past Medical/Surgical History     Past Medical History:   Diagnosis Date    Asthma     Cardiac cath 04/28/2011    oLM 30%. pLAD 30%. oD1 50%. CX 90% (3 x 18 [place] DEMAR). dRCA 90%. RPLB subtotal.  RPDA 100%.  Cardiac echocardiogram 01/21/2014    EF 55-60%. Mod LVH. Gr 1 DDfx. Mild AS (mean grad 13). Mild AI. Unchanged from study of 11/30/11.  Cardiac nuclear imaging test, mod risk 01/22/2016    Intermediate risk. Medium-sized inferior, inferoseptal infarction. No ischemia. EF 54%. Nondiagnostic EKG on pharm stress test.    Carotid duplex 2016    Mod 50-69% bilateral ICA stenosis. Probable significant RECA stenosis. >50% stenosis of left subclavian. No significant change from study of 1/8/15.  Coronary artery disease     Status post PCI with drug-eluting stent, [place] 3 x 18 mm in the proximal circumflex.  Hx of carotid artery disease ([address]     Hypercholesterolemia     Hypertension     Prostate cancer [facility])      Past Surgical History:   Procedure Laterality Date    HX CORONARY STENT PLACEMENT  11    PCI with drug-eluting stent, [place] 3 x 18 mm in the proximal circumflex (post dialated with 3.25 mm noncompliant balloon at high pressure). Social History     Social History     Socioeconomic History    Marital status: SINGLE     Spouse name: Not on file    Number of children: Not on file    Years of education: Not on file    Highest education level: Not on file   Tobacco Use    Smoking status: Former Smoker     Packs/day: 1.00     Years: 15.00     Pack years: 15.00     Last attempt to quit: 1960     Years since quittin.8    Smokeless tobacco: Never Used   Substance and Sexual Activity    Alcohol use: No    Drug use: No    Sexual activity: Not Currently       Family History   History reviewed. No pertinent family history. Home Medications     Prior to Admission Medications   Prescriptions Last Dose Informant Patient Reported? Taking? OTHER   No No   Sig: Cbc, BMP in 1 week  Dx: CHF  Fax results to Dr. Jan Tafoya   albuterol (PROVENTIL HFA, VENTOLIN HFA, PROAIR HFA) 90 mcg/actuation inhaler   No No   Sig: Take 2 Puffs by inhalation every four (4) hours as needed for Wheezing or Shortness of Breath. aspirin delayed-release 81 mg tablet   Yes No   Sig: Take 81 mg by mouth daily. atorvastatin (LIPITOR) 40 mg tablet   No No   Sig: Take 1 Tab by mouth nightly.    budesonide-formoterol (SYMBICORT) 160-4.5 mcg/actuation HFAA   No No   Sig: Take 2 Puffs by inhalation two (2) times a day. carvedilol (COREG) 3.125 mg tablet   No No   Sig: Take 1 Tab by mouth two (2) times daily (with meals). clopidogrel (PLAVIX) 75 mg tab   No No   Sig: TAKE 1 TABLET EVERY DAY   furosemide (LASIX) 20 mg tablet   No No   Sig: Take 1 Tab by mouth daily. glycopyrrolate-formoterol (BEVESPI AEROSPHERE) 9-4.8 mcg HFAA   No No   Sig: Take 2 Inhalation by inhalation two (2) times a day. lisinopril (PRINIVIL, ZESTRIL) 5 mg tablet   No No   Sig: Take 1 Tab by mouth daily. nitroglycerin (NITROSTAT) 0.4 mg SL tablet   No No   Si Tab by SubLINGual route every five (5) minutes as needed for Chest Pain. omeprazole (PRILOSEC) 40 mg capsule   No No   Sig: Take 1 Cap by mouth daily. potassium chloride SR (KLOR-CON 10) 10 mEq tablet   No No   Sig: Take 1 Tab by mouth daily. tamsulosin (FLOMAX) 0.4 mg capsule   No No   Sig: Take 1 Cap by mouth daily. Facility-Administered Medications: None       Allergies   No Known Allergies    Physical Exam     ED Triage Vitals   ED Encounter Vitals Group      BP       Pulse       Resp       Temp       Temp src       SpO2       Weight       Height      Constitutional: Patient appears well developed and well nourished. Appearance and behavior are age and situation appropriate. HEENT: Conjunctiva clear. PERRLA. Mucous membranes moist, non-erythematous. Surface of the pharynx, palate, and tongue are pink, moist and without lesions. Neck: supple, non tender, symmetrical, no masses or JVD. Respiratory: B/L rales and crackles  Cardiovascular: heart regular rate and rhythm without murmur rubs or gallops. Calves soft and non-tender. Distal pulses 2+ and equal bilaterally. No peripheral edema.    Gastrointestinal:  Abdomen soft, nontender without complaint of pain to palpation  Musculoskeletal: Nail beds pink with prompt capillary refill  Integumentary: warm and dry without rashes or lesions  Neurologic: alert and oriented, Sensation intact, motor strength equal and symmetric. No facial asymmetry or dysarthria. Speaking in full sentences. Diagnostic Studies   Lab:   Recent Results (from the past 12 hour(s))   EKG, 12 LEAD, INITIAL    Collection Time: 03/13/19 10:14 AM   Result Value Ref Range    Ventricular Rate 96 BPM    Atrial Rate 94 BPM    QRS Duration 130 ms    Q-T Interval 370 ms    QTC Calculation (Bezet) 467 ms    Calculated R Axis 44 degrees    Calculated T Axis -147 degrees    Diagnosis       Wide QRS rhythm with occasional premature ventricular complexes  Left bundle branch block  Abnormal ECG  When compared with ECG of 22-JAN-2016 09:27,  Wide QRS rhythm has replaced Sinus rhythm  Vent. rate has increased BY  32 BPM     CBC WITH AUTOMATED DIFF    Collection Time: 03/13/19 10:25 AM   Result Value Ref Range    WBC 13.4 (H) 4.6 - 13.2 K/uL    RBC 3.55 (L) 4.70 - 5.50 M/uL    HGB 9.9 (L) 13.0 - 16.0 g/dL    HCT 31.9 (L) 36.0 - 48.0 %    MCV 89.9 74.0 - 97.0 FL    MCH 27.9 24.0 - 34.0 PG    MCHC 31.0 31.0 - 37.0 g/dL    RDW 14.8 (H) 11.6 - 14.5 %    PLATELET 207 775 - 881 K/uL    MPV 9.9 9.2 - 11.8 FL    NEUTROPHILS 88 (H) 42 - 75 %    LYMPHOCYTES 5 (L) 20 - 51 %    MONOCYTES 7 2 - 9 %    EOSINOPHILS 0 0 - 5 %    BASOPHILS 0 0 - 3 %    ABS. NEUTROPHILS 11.8 (H) 1.8 - 8.0 K/UL    ABS. LYMPHOCYTES 0.7 (L) 0.8 - 3.5 K/UL    ABS. MONOCYTES 0.9 0 - 1.0 K/UL    ABS. EOSINOPHILS 0.0 0.0 - 0.4 K/UL    ABS.  BASOPHILS 0.0 0.0 - 0.06 K/UL    DF MANUAL      PLATELET COMMENTS ADEQUATE PLATELETS      RBC COMMENTS OVALOCYTES  2+        RBC COMMENTS POIKILOCYTOSIS  1+       METABOLIC PANEL, COMPREHENSIVE    Collection Time: 03/13/19 10:25 AM   Result Value Ref Range    Sodium 139 136 - 145 mmol/L    Potassium 4.3 3.5 - 5.5 mmol/L    Chloride 103 100 - 108 mmol/L    CO2 29 21 - 32 mmol/L    Anion gap 7 3.0 - 18 mmol/L    Glucose 158 (H) 74 - 99 mg/dL    BUN 38 (H) 7.0 - 18 MG/DL    Creatinine 1.39 (H) 0.6 - 1.3 MG/DL    BUN/Creatinine ratio 27 (H) 12 - 20      GFR est AA 58 (L) >60 ml/min/1.73m2    GFR est non-AA 48 (L) >60 ml/min/1.73m2    Calcium 9.4 8.5 - 10.1 MG/DL    Bilirubin, total 0.7 0.2 - 1.0 MG/DL    ALT (SGPT) 20 16 - 61 U/L    AST (SGOT) 37 15 - 37 U/L    Alk. phosphatase 61 45 - 117 U/L    Protein, total 7.5 6.4 - 8.2 g/dL    Albumin 3.5 3.4 - 5.0 g/dL    Globulin 4.0 2.0 - 4.0 g/dL    A-G Ratio 0.9 0.8 - 1.7     NT-PRO BNP    Collection Time: 03/13/19 10:25 AM   Result Value Ref Range    NT pro-BNP 14,092 (H) 0 - 1,800 PG/ML   CARDIAC PANEL,(CK, CKMB & TROPONIN)    Collection Time: 03/13/19 10:25 AM   Result Value Ref Range     39 - 308 U/L    CK - MB 15.0 (H) <3.6 ng/ml    CK-MB Index 8.0 (H) 0.0 - 4.0 %    Troponin-I, QT 3.84 (HH) 0.0 - 0.045 NG/ML   PROTHROMBIN TIME + INR    Collection Time: 03/13/19 10:45 AM   Result Value Ref Range    Prothrombin time 12.5 11.5 - 15.2 sec    INR 1.0 0.8 - 1.2     D DIMER    Collection Time: 03/13/19 10:45 AM   Result Value Ref Range    D DIMER 3.08 (H) <0.46 ug/ml(FEU)   INFLUENZA A & B AG (RAPID TEST)    Collection Time: 03/13/19 10:45 AM   Result Value Ref Range    Influenza A Antigen NEGATIVE  NEG      Influenza B Antigen NEGATIVE  NEG     CARDIAC PANEL,(CK, CKMB & TROPONIN)    Collection Time: 03/13/19  2:40 PM   Result Value Ref Range     39 - 308 U/L    CK - MB 20.4 (H) <3.6 ng/ml    CK-MB Index 8.0 (H) 0.0 - 4.0 %    Troponin-I, QT 5.98 (HH) 0.0 - 0.045 NG/ML   POC LACTIC ACID    Collection Time: 03/13/19  2:43 PM   Result Value Ref Range    Lactic Acid (POC) 1.32 0.40 - 2.00 mmol/L       Imaging:    Cta Chest W Or W Wo Cont    Result Date: 3/13/2019  EXAM:  CTA Chest with Contrast (CT Pulmonary Study for PE). CLINICAL INDICATION:  Acute shortness of breath which began this morning. Recent hospitalization for shortness of breath and fatigue. COMPARISON:  None. TECHNIQUE:  - Helical volumetric sections of the chest are obtained with CT pulmonary angiogram protocol. Subsequently, sagittal and coronal multiplanar reconstruction images are obtained. Maximum intensity projection images are generated to better delineate the pulmonary vasculature, differentiate between the pulmonary arteries and veins and to increase sensitivity to pulmonary emboli.  - IV contrast dose of 100 mL Isovue-370. - Radiation dose optimization techniques are utilized as appropriate to the exam, with combination of automated exposure control, adjustment of the mA and/or kV according to patient's size (Including appropriate matching for site-specific examinations), or use of iterative reconstruction technique. FINDINGS: Pulmonary Arteries:  - Unusual pulmonary artery opacification pattern is noted. The upper lung zone pulmonary artery opacification is complete whereas the lower lung zone pulmonary arteries appear suboptimally opacified particularly in the left lower lobe basal segments. Less pronounced atypical poor opacification of the basal segments is also noted on the right side. Instead of sharply demarcated distinct filling defects, is involved segments demonstrate gradual fading of the pulmonary artery luminal opacification. This makes it difficult to exclude small filling defects in the segmental and subsegmental arteries at the left lower lobe basal region. The presence of atelectatic changes with pleural effusion may contribute to this atypical pulmonary artery opacification pattern. Alternatively, intrinsic intracardiac abnormalities may be responsible for the atypical pulmonary artery opacification. Notably, the left inferior pulmonary vein opacification is much less than that of the right pulmonary venous opacification. - Within the technical limitations of the study, no definite suspicious filling defects are identified in the main trunk, right and left pulmonary artery, lobar and interlobar arteries, segmental and intrasegmental arteries.   Basal region distal segmental artery and subsegmental arteries are poorly opacified. Aorta:  No opacification of the aorta. The caliber of the ascending aorta and aortic arch appear within normal limits. The mid descending aortography demonstrates atypical aorta contour, with apparent smoothly outlined bulging contour along the medial aspect (axial #). Further inferiorly, the aorta appears to dilated slightly, measuring up to about 3.6 cm in diameter and with crescentic outpouching. At the level of the diaphragmatic hiatus, the right lateral wall of the aorta demonstrates crescentic appearance with lamellated pattern. 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 Recommend dedicated CTA of the aorta on routine basis for further delineation of this finding. Lung, Pleura, Airways:  - Moderate to large bilateral pleural effusion. Associated atelectatic changes. - Consolidative opacities in the left upper lobe and in the right lower lobe. Groundglass opacities in the right upper lobe and right middle lobe. Possible consolidative opacities vs. atelectatic changes in the left lower lobe. Mediastinum, Radha:  No definite mediastinal adenopathy. No definite hilar adenopathy. Base of Neck, Axillae:  Unremarkable. Abdomen:  Multiple gallstones. Skeletal Structures:  No acute findings. IMPRESSION:      1. Unusual pulmonary artery opacification with gradual decrease in luminal opacification in the basal regions, more pronounced on the left side than on the right. Possibly related to presence of moderate to large pleural effusion with associated atelectatic changes as the explanation for this pattern vs. intrinsic intracardiac abnormality. No convincing evidence of pulmonary embolism is detected within the technical limitations of the study. 2. Moderate to large pleural effusion bilaterally with associated passive atelectatic changes. Multiple foci of consolidative opacities in both lungs. Most pronounced in the left upper lobe.   Query developing pneumonia vs. pulmonary hemorrhage vs. nonspecific inflammatory process. 3.  Atypical appearance of the descending with smooth crescentic outpouchings and presence of lamellated calcifications along the aortic wall. Perhaps related to chronic dissection. With current technique, i.e. lack of IV contrast in the aorta, detailed analysis is difficult. Recommend CTA of the aorta for further delineation. 4.  Cholelithiasis. Xr Chest Port    Result Date: 3/13/2019  EXAM: CHEST  CPT CODE: 69555 CLINICAL INDICATION/HISTORY: Shortness of breath. COMPARISON: 25 April 2011. TECHNIQUE: Single AP portable view of chest at 1025. FINDINGS: There is suboptimal inspiratory effort. Subtle patchy opacities are seen in the lateral mid right lung and at the base as well as scattered in the left lung. Blunting left costophrenic angle suggests possible effusion/atelectasis. The cardiomediastinal silhouette is normal.  The bones and soft tissues are unremarkable. IMPRESSION: Suboptimal inspiration; bilateral patchy opacities, as described; infiltrates versus some interstitial edema. [unfilled]    EKG:   Time: 10:14   Rate: 96   Rhythm:  LBBB   Interpretation: No sgarbossa criteria to suggest STEMI    ED Course/Medical Decision Making   Patient seen immediately on arrival. BiPap helped improve his work of breathing and he was transitioned to nasal cannula. CTA Chest ordered due to sudden onset of SOB and hypoxia with recent hospitalization to assess for PE. CTA shows pulmonary edema and opacities, but no signs of PE. There may also be a chronic aortic dissection, but the CTA could not fully evaluate. His lactate is normal, but BP is slightly decreased (MAP 60-70). Started HCAP Abx due to his recent hospitalization. I believe he is in septic shock from multifocal pneumonia. There may also be a component of worsening CHF.  30 mL/kg IVF would cause worsening pulmonary edema and family refused the IVF after risks and benefits were discussed. Also, the patient and daughter refused central line at this time after discussing risks and benefits of peripheral norepinephrine. Consult:  Discussed care with AMITA Dunlap, Specialty: Cardiology. Standard discussion; including history of patients chief complaint, available diagnostic results, and treatment course. Will evaluate patient. Elevated Troponin is most likely from recent PCA, less likely NSTEMI due to no chest pain. Will follow, but no anticoagulation due to history of GI bleed. 11:42 AM, 3/13/2019    Consult:  Discussed care with Dr. Reina Ramírez, Specialty: ICU. Standard discussion; including history of patients chief complaint, available diagnostic results, and treatment course. He accepts patient to ICU.  5:12 PM, 3/13/2019       CRITICAL CARE:  4:35 PM  I have spent 45 minutes of critical care time involved in lab review, consultations with specialist, family decision-making, and documentation. During this entire length of time I was immediately available to the patient. Critical Care: The reason for providing this level of medical care for this critically ill patient was due a critical illness that impaired one or more vital organ systems such that there was a high probability of imminent or life threatening deterioration in the patients condition (elevated troponin, septic shock, pneumonia). This care involved high complexity decision making to assess, manipulate, and support vital system functions, to treat this degreee vital organ system failure and to prevent further life threatening deterioration of the patients condition.     Medications   sodium chloride 0.9 % bolus infusion 500 mL ( IntraVENous Canceled Entry 3/13/19 1159)   sodium chloride (NS) flush 5-10 mL (not administered)   piperacillin-tazobactam (ZOSYN) 3.375 g in 0.9% sodium chloride (MBP/ADV) 100 mL MBP (3.375 g IntraVENous New Bag 3/13/19 1531)   VANCOMYCIN INFORMATION NOTE ( Other New Bag 3/13/19 1251) NOREPINephrine (LEVOPHED) 8 mg in 5% dextrose 250mL infusion (not administered)   cefepime (MAXIPIME) 2 g in sterile water (preservative free) 10 mL IV syringe (not administered)   furosemide (LASIX) injection 20 mg (not administered)   sodium chloride (NS) flush 5-40 mL (not administered)   sodium chloride (NS) flush 5-40 mL (not administered)   vancomycin (VANCOCIN) 1250 mg in  ml infusion (1,250 mg IntraVENous New Bag 3/13/19 1631)   iopamidol (ISOVUE-370) 76 % injection  mL (78 mL IntraVENous Given 3/13/19 1505)       Final Diagnosis       ICD-10-CM ICD-9-CM   1. Acute pulmonary edema (HCC) J81.0 518.4   2. Acute respiratory failure with hypoxia (HCC) J96.01 518.81   3. Elevated troponin R74.8 790.6   4. Septic shock ([facility] A41.9 038.9    R65.21 785.52     995.92       Disposition   Patient is admitted. My Medications      ASK your doctor about these medications      Instructions Each Dose to Equal Morning Noon Evening Bedtime   albuterol 90 mcg/actuation inhaler  Commonly known as:  PROVENTIL HFA, VENTOLIN HFA, PROAIR HFA    Your last dose was: Your next dose is: Take 2 Puffs by inhalation every four (4) hours as needed for Wheezing or Shortness of Breath. 2 Puff                 aspirin delayed-release 81 mg tablet    Your last dose was: Your next dose is: Take 81 mg by mouth daily. 81 mg                 atorvastatin 40 mg tablet  Commonly known as:  LIPITOR    Your last dose was: Your next dose is: Take 1 Tab by mouth nightly. 40 mg                 budesonide-formoterol 160-4.5 mcg/actuation Hfaa  Commonly known as:  SYMBICORT    Your last dose was: Your next dose is: Take 2 Puffs by inhalation two (2) times a day. 2 Puff                 carvedilol 3.125 mg tablet  Commonly known as:  COREG    Your last dose was: Your next dose is: Take 1 Tab by mouth two (2) times daily (with meals).    3.125 mg clopidogrel 75 mg Tab  Commonly known as:  PLAVIX    Your last dose was: Your next dose is:          TAKE 1 TABLET EVERY DAY                  furosemide 20 mg tablet  Commonly known as:  LASIX    Your last dose was: Your next dose is: Take 1 Tab by mouth daily. 20 mg                 glycopyrrolate-formoterol 9-4.8 mcg Hfaa  Commonly known as:  [address]    Your last dose was: Your next dose is: Take 2 Inhalation by inhalation two (2) times a day. 2 Inhalation                 lisinopril 5 mg tablet  Commonly known as:  Waldo Todd    Your last dose was: Your next dose is: Take 1 Tab by mouth daily. 5 mg                 nitroglycerin 0.4 mg SL tablet  Commonly known as:  NITROSTAT    Your last dose was: Your next dose is:          1 Tab by SubLINGual route every five (5) minutes as needed for Chest Pain. 0.4 mg                 omeprazole 40 mg capsule  Commonly known as:  PRILOSEC    Your last dose was: Your next dose is: Take 1 Cap by mouth daily. 40 mg                 OTHER    Your last dose was: Your next dose is:          Cbc, BMP in 1 week  Dx: CHF  Fax results to Dr. Nanda Clay                  potassium chloride SR 10 mEq tablet  Commonly known as:  KLOR-CON 10    Your last dose was: Your next dose is: Take 1 Tab by mouth daily. 10 mEq                 tamsulosin 0.4 mg capsule  Commonly known as:  FLOMAX    Your last dose was: Your next dose is: Take 1 Cap by mouth daily. 0.4 mg                          Lance Oz  March 13, 2019    My signature above authenticates this document and my orders, the final    diagnosis (es), discharge prescription (s), and instructions in the Epic    record. If you have any questions please contact [phone].     Nursing notes have been reviewed by the physician/ advanced practice    Clinician.               Scribe Attestation     López Clemente acting as a scribe for and in the presence of Alejandro Boston MD      March 13, 2019 at 9:55 AM       Provider Attestation:      I personally performed the services described in the documentation, reviewed the documentation, as recorded by the scribe in my presence, and it accurately and completely records my words and actions.  March 13, 2019 at 9:55 AM - Alejandro Boston MD

## 2019-03-13 NOTE — ROUTINE PROCESS
Verbal shift change report given to [place], RN (oncoming nurse) by Stiven Bruno RN (offgoing nurse). Report included the following information SBAR and ED SummaryPt has not arrived to the unit, so all information passed on in report is from telephone report received from ED nurse and chart review.

## 2019-03-13 NOTE — DISCHARGE SUMMARY
[address] [place] [address]    Name:  Rylan Sanchez  MR#:   [id number]  :  1926  ACCOUNT #:  [de-identified]  ADMIT DATE:  2019  DISCHARGE DATE:  2019    PRIMARY CARE PHYSICIAN:  Dr. Hitesh Landers. DISPOSITION:  Discharged to home with home healthcare. DISCHARGE CONDITION:  Stable. DISCHARGE DIAGNOSES:  1. Acute on chronic systolic heart failure present on admission, improved now. 2.  Elevated trop not consistent with ACS, status post cardiac catheterization and stent placement in left anterior descending. 3.  Recent gastrointestinal bleed due to arteriovenous malformations in 2019. 4.  Hypertension. 5.  Coronary artery disease, status post percutaneous coronary intervention in the past.  6.  Mild protein-calorie malnutrition. 7.  Dyslipidemia. 8.  Coronary artery disease. 9.  Mild-to-moderate mitral regurgitation. 10.  Mild-to-moderate aortic valve stenosis. 6.  Advanced age. DISCHARGE MEDICATIONS:  Albuterol HFA 2 puffs every 4 hours p.r.n., aspirin 81 mg daily, Lipitor 40 mg at bedtime, Symbicort 2 puffs b.i.d., Coreg 3.125 mg b.i.d., Plavix 75 mg daily, Lasix 20 mg daily. Lisinopril 5 mg daily. Nitrostat p.r.n. Omeprazole 40 mg daily. Potassium chloride 10 mEq daily. Flomax 0.4 mg daily. CONSULTATIONS IN THE HOSPITAL:  Dr. Jason Medrano of cardiology. MAJOR PROCEDURES IN THE HOSPITAL:  The patient underwent cardiac cath done by Dr Ellen Tineo, and LAD stent was placed on 2019. The patient also had an echocardiogram, which showed ejection fraction of 26-30%. HOSPITAL COURSE:  This is a 80-year-old  male who presents to the emergency room with chest pain and shortness of breath, noted to have acute systolic heart failure. The patient was admitted to hospital, started on IV Lasix. Cardiology was consulted. Cardiac enzymes were done which were slightly elevated, but not consistent with ACS.   Cardiology saw the patient and recommended to continue aggressive diuresis, and echo was ordered. The patient diuresed well with Lasix and his creatinine started trending up, so Lasix was stopped. The patient's symptoms improved with diuresis. He had more than 2.5 L of negative balance in the hospital.  Echo was done, which showed decreased EF concerning for coronary artery disease. Cath was planned. The patient underwent cardiac cath yesterday which showed LAD lesion, which was stented. Post-stenting, the patient was monitored in the step-down unit. The patient tolerated the procedure without any problem, did not have any complications. The patient was also seen and evaluated by physical therapy, which he tolerated very well. PT recommended home healthcare. Cardiology followed up on the patient today, recommended okay for discharge. The patient is currently ambulating. He has been off oxygen. No chest pain, no melena. He is hemodynamically and medically stable for discharge. The patient will be discharged to home today. Discharge instructions, followup appointments, and physical exam, please refer to the previous discharge summary. The patient tolerated the current stay along with daughter at the bedside, I discussed with them about the discharge plan and followup appointments. Discussed with them about the medication changes and also side effects of the medication. I also discussed with them about the close monitoring of his hemoglobin and also bowel movements. If he develops any melena or bloody stools, he is recommended to come back to the emergency room. The patient and his daughter completely understood and agreed with the plan. I answered all their questions and concerns appropriately. I have discussed with Dr. Juan Freeman, and he recommends to start low dose of Lasix 20 mg daily along with potassium supplements, and they will follow up in the office and adjust the dosing accordingly.   I am going to order repeat labs for him in 1 week, to be followed with Dr. Roosevelt Harris, both CBC and also his BMP.       Franc Sarabia MD      [id number]  D:  03/12/2019 15:29  T:  03/13/2019 13:54  JOB #:  [id number]  CC:  Tod Laird MD

## 2019-03-13 NOTE — H&P
[facility]  Pulmonary, Critical Care, and Sleep Medicine    Name: Ann Messina MRN: [id number]   : 1926 Hospital: [facility]   Date: 3/13/2019        Critical Care History and Physical      IMPRESSION:   · Acute hypoxic respiratory failure requiring BIPAP - improved at this time. Patient on NC w/ O2 saturations at 100%. Likely secondary to pulmonary edema from CHF, will treat for PNA empirically as there is a questionable infiltrate on CXR, however due to sx and leukocytosis. · Leukocytosis - possibly due to PNA however no other signs/symptoms of sepsis. Normal lactic acid. Will trend  · Hx of recent NSTEMI - w/ PCI and stent placement 1 week ago  · Ischemic cardiomyopathy and HFrEF - most recent echo 3/7/19 w/ EF 26-30%  · Hx of recent GIB 2/2 AVM, S/P EGD  · Valvular heart disease - aortic valve stenosis and MR  · CKD     Patient Active Problem List   Diagnosis Code    Asthma J45.909    Prostate cancer ([facility] C61    Hypercholesterolemia E78.00    Angina, class I ([facility] I20.9    Left bundle branch block I44.7    Coronary artery disease I25.10    Hypertension I11.9    Dyslipidemia E78.5    Carotid artery disease (HCC) I77.9    Aortic valve stenosis I35.0    Anemia D64.9    NSTEMI (non-ST elevated myocardial infarction) (HCC) I21.4    Congestive heart disease (HCC) I50.9        RECOMMENDATIONS:   · Resp: O2 via NC PRN.   · I/D: Afebrile; f/u BxCx. ABX :vanc and cefepime, deescalate ABX once Cx's finalize. · Hem/Onc: Daily CBC; H/H, and plts are stable. Monitor for any signs of active bleeding. · CVS: HD stable; not currently requiring vasopressor support. Goal MAP >65mmHg, trending CE, and getting a repeat echo tomorrow. Will start diuresing the patient, 20 mg lasix ordered. SCDs for DVT prophylaxis. · Metabolic: Daily BMP; monitor e-lytes; replace PRN  · Renal: Trend Renal indices; strict I/O w/ diuresis. Avoid nephrotoxic agents.   · Endocrine: POC Glucose q6  · GI: LFTs WNL, NPO for now, will have nurse perform bedside swallow evaluation for clearance to take PO.   · Musc/Skin: No acute issues, wound care as needed  · Neuro: PRN pain medications, avoid sedation  · Fluids: N/A  · Code Status: full code     Best practice:  · Sepsis Bundle per Hospital Protocol  · Glycemic control; avoid Hypoglycemia  · DVT prophylaxis. SCDs  · Need for Lines, salgado assessed. · Restraints need. · Palliative care evaluation. Subjective/History:     Patient is a 80 y.o. male w/ pmhx of asthma, recent GIB 2/2 AVM, CAD w/ PCI and stent placement 1 week ago and HTN who presented to the ED today complaining of acute onset SOB. Patient's daughter also notes that for the past several days the patient's legs have had worsening swelling bilaterally. Noted decrease in oral intake over the last several days. Daughter reports 1 week ago the patient was seen and examined for worsening SOB and lower extremity edema. Work up included PCI and stent placement. Patient was diuresed and symptoms improved, and the patient was discharged yesterday. No chest pain, abdominal pain, N/V, fevers or chills noted. Upon my evaluation patient was awake, alert, oriented in no acute distress. Hemodynamically stable. On NC w/ O2 saturations at 100%. Diffuse rales and rhonchi noted on ausculation. Notable lab findings of elevated troponin at 3.84 and BNP of 14,092. Cardiology has been consulted on the patient and think this is related to prior PCI and not an acute cardiac event. Past Medical History:   Diagnosis Date    Asthma     Cardiac cath 04/28/2011    oLM 30%. pLAD 30%. oD1 50%. CX 90% (3 x 18 [place] DEMAR). dRCA 90%. RPLB subtotal.  RPDA 100%.  Cardiac echocardiogram 01/21/2014    EF 55-60%. Mod LVH. Gr 1 DDfx. Mild AS (mean grad 13). Mild AI. Unchanged from study of 11/30/11.  Cardiac nuclear imaging test, mod risk 01/22/2016    Intermediate risk.   Medium-sized inferior, inferoseptal infarction. No ischemia. EF 54%. Nondiagnostic EKG on pharm stress test.    Carotid duplex 03/02/2016    Mod 50-69% bilateral ICA stenosis. Probable significant RECA stenosis. >50% stenosis of left subclavian. No significant change from study of 1/8/15.  Coronary artery disease     Status post PCI with drug-eluting stent, [place] 3 x 18 mm in the proximal circumflex.  Hx of carotid artery disease ([address]     Hypercholesterolemia     Hypertension     Prostate cancer [facility])         Past Surgical History:   Procedure Laterality Date    HX CORONARY STENT PLACEMENT  4/28/11    PCI with drug-eluting stent, [place] 3 x 18 mm in the proximal circumflex (post dialated with 3.25 mm noncompliant balloon at high pressure). Prior to Admission medications    Medication Sig Start Date End Date Taking? Authorizing Provider   atorvastatin (LIPITOR) 40 mg tablet Take 1 Tab by mouth nightly. 3/12/19   Tomas Quintanilla MD   carvedilol (COREG) 3.125 mg tablet Take 1 Tab by mouth two (2) times daily (with meals). 3/12/19   Tomas Quintanilla MD   lisinopril (PRINIVIL, ZESTRIL) 5 mg tablet Take 1 Tab by mouth daily. 3/13/19   Tomas Quintanilla MD   nitroglycerin (NITROSTAT) 0.4 mg SL tablet 1 Tab by SubLINGual route every five (5) minutes as needed for Chest Pain. 3/12/19   Tomas Quintanilla MD   furosemide (LASIX) 20 mg tablet Take 1 Tab by mouth daily. 3/12/19   Tomas Quintanilla MD   potassium chloride SR (KLOR-CON 10) 10 mEq tablet Take 1 Tab by mouth daily. 3/12/19   Tomas Quintanilla MD   OTHER Cbc, BMP in 1 week  Dx: CHF  Fax results to Dr. Miranda Mendez 3/12/19   Tomas Quintanilla MD   budesonide-formoterol (SYMBICORT) 160-4.5 mcg/actuation HFAA Take 2 Puffs by inhalation two (2) times a day.  3/1/19   Lilia Daniel MD   glycopyrrolate-formoterol (BEVESPI AEROSPHERE) 9-4.8 mcg HFAA Take 2 Inhalation by inhalation two (2) times a day. 3/1/19   Kate Burrell MD   clopidogrel (PLAVIX) 75 mg tab TAKE 1 TABLET EVERY DAY 19   Michael Daniel MD   omeprazole (PRILOSEC) 40 mg capsule Take 1 Cap by mouth daily. 19   Sal Macedo MD   albuterol (PROVENTIL HFA, VENTOLIN HFA, PROAIR HFA) 90 mcg/actuation inhaler Take 2 Puffs by inhalation every four (4) hours as needed for Wheezing or Shortness of Breath. 3/2/15   AMITA Wong   tamsulosin (FLOMAX) 0.4 mg capsule Take 1 Cap by mouth daily. 3/13/13   Kate Burrell MD   aspirin delayed-release 81 mg tablet Take 81 mg by mouth daily. Kate Burrell MD       Current Facility-Administered Medications   Medication Dose Route Frequency    sodium chloride 0.9 % bolus infusion 500 mL  500 mL IntraVENous ONCE    piperacillin-tazobactam (ZOSYN) 3.375 g in 0.9% sodium chloride (MBP/ADV) 100 mL MBP  3.375 g IntraVENous Q6H    vancomycin (VANCOCIN) 1250 mg in  ml infusion  1,250 mg IntraVENous ONCE    VANCOMYCIN INFORMATION NOTE   Other CONTINUOUS    NOREPINephrine (LEVOPHED) 8 mg in 5% dextrose 250mL infusion  2-16 mcg/min IntraVENous TITRATE    [START ON 3/14/2019] cefepime (MAXIPIME) 2 g in sterile water (preservative free) 10 mL IV syringe  2 g IntraVENous Q8H    furosemide (LASIX) injection 20 mg  20 mg IntraVENous ONCE    sodium chloride (NS) flush 5-40 mL  5-40 mL IntraVENous Q8H       No Known Allergies     Social History     Tobacco Use    Smoking status: Former Smoker     Packs/day: 1.00     Years: 15.00     Pack years: 15.00     Last attempt to quit: 1960     Years since quittin.8    Smokeless tobacco: Never Used   Substance Use Topics    Alcohol use: No        History reviewed. No pertinent family history. Review of Systems:  A comprehensive review of systems was negative except for that written in the HPI.     Objective:   Vital Signs:    Visit Vitals  /74 (BP Patient Position: At rest)   Pulse (!) 118   Temp 97.5 °F (36.4 °C) Resp 22   SpO2 100%       O2 Device: Nasal cannula   O2 Flow Rate (L/min): 6 l/min   Temp (24hrs), Av.5 °F (36.4 °C), Min:97.5 °F (36.4 °C), Max:97.5 °F (36.4 °C)       Intake/Output:   Last shift:      No intake/output data recorded. Last 3 shifts: No intake/output data recorded. No intake or output data in the 24 hours ending 19 1805      Physical Exam:     General:  Alert, cooperative, NAD   Head:  Normocephalic, without obvious abnormality, atraumatic. Eyes:  Conjunctivae/corneas clear. PERRL,   Throat: Dry oral mucosa. Teeth and gums normal.   Neck: Supple, symmetrical, trachea midline, no adenopathy   Lungs:   Symmetrical chest rise; diffuse rales and rhonchi throughout w/ diminished breath sounds bibasilarly. Heart:  RRR, S1, S2 normal, no m/r/g   Abdomen:   Soft, non-tender. Bowel sounds normal. No masses,  No organomegaly. Extremities: Extremities normal, atraumatic, no cyanosis. Trace pitting edema bilateral LE. Pulses: 2+ and symmetric all extremities. Skin: Skin color, texture, turgor normal. No rashes or lesions   Neurologic: Grossly nonfocal     Devices:  · ETT: N/A  · OGT: N/A  · Lines: PIV  · Drains: N/A  · Robertson: N/A    Data:     Recent Results (from the past 24 hour(s))   EKG, 12 LEAD, INITIAL    Collection Time: 19 10:14 AM   Result Value Ref Range    Ventricular Rate 96 BPM    Atrial Rate 94 BPM    QRS Duration 130 ms    Q-T Interval 370 ms    QTC Calculation (Bezet) 467 ms    Calculated R Axis 44 degrees    Calculated T Axis -147 degrees    Diagnosis       Wide QRS rhythm with occasional premature ventricular complexes  Left bundle branch block  Abnormal ECG  When compared with ECG of 2016 09:27,  Wide QRS rhythm has replaced Sinus rhythm  Vent.  rate has increased BY  32 BPM     CBC WITH AUTOMATED DIFF    Collection Time: 19 10:25 AM   Result Value Ref Range    WBC 13.4 (H) 4.6 - 13.2 K/uL    RBC 3.55 (L) 4.70 - 5.50 M/uL    HGB 9.9 (L) 13.0 - 16.0 g/dL HCT 31.9 (L) 36.0 - 48.0 %    MCV 89.9 74.0 - 97.0 FL    MCH 27.9 24.0 - 34.0 PG    MCHC 31.0 31.0 - 37.0 g/dL    RDW 14.8 (H) 11.6 - 14.5 %    PLATELET 894 851 - 540 K/uL    MPV 9.9 9.2 - 11.8 FL    NEUTROPHILS 88 (H) 42 - 75 %    LYMPHOCYTES 5 (L) 20 - 51 %    MONOCYTES 7 2 - 9 %    EOSINOPHILS 0 0 - 5 %    BASOPHILS 0 0 - 3 %    ABS. NEUTROPHILS 11.8 (H) 1.8 - 8.0 K/UL    ABS. LYMPHOCYTES 0.7 (L) 0.8 - 3.5 K/UL    ABS. MONOCYTES 0.9 0 - 1.0 K/UL    ABS. EOSINOPHILS 0.0 0.0 - 0.4 K/UL    ABS. BASOPHILS 0.0 0.0 - 0.06 K/UL    DF MANUAL      PLATELET COMMENTS ADEQUATE PLATELETS      RBC COMMENTS OVALOCYTES  2+        RBC COMMENTS POIKILOCYTOSIS  1+       METABOLIC PANEL, COMPREHENSIVE    Collection Time: 03/13/19 10:25 AM   Result Value Ref Range    Sodium 139 136 - 145 mmol/L    Potassium 4.3 3.5 - 5.5 mmol/L    Chloride 103 100 - 108 mmol/L    CO2 29 21 - 32 mmol/L    Anion gap 7 3.0 - 18 mmol/L    Glucose 158 (H) 74 - 99 mg/dL    BUN 38 (H) 7.0 - 18 MG/DL    Creatinine 1.39 (H) 0.6 - 1.3 MG/DL    BUN/Creatinine ratio 27 (H) 12 - 20      GFR est AA 58 (L) >60 ml/min/1.73m2    GFR est non-AA 48 (L) >60 ml/min/1.73m2    Calcium 9.4 8.5 - 10.1 MG/DL    Bilirubin, total 0.7 0.2 - 1.0 MG/DL    ALT (SGPT) 20 16 - 61 U/L    AST (SGOT) 37 15 - 37 U/L    Alk.  phosphatase 61 45 - 117 U/L    Protein, total 7.5 6.4 - 8.2 g/dL    Albumin 3.5 3.4 - 5.0 g/dL    Globulin 4.0 2.0 - 4.0 g/dL    A-G Ratio 0.9 0.8 - 1.7     NT-PRO BNP    Collection Time: 03/13/19 10:25 AM   Result Value Ref Range    NT pro-BNP 14,092 (H) 0 - 1,800 PG/ML   CARDIAC PANEL,(CK, CKMB & TROPONIN)    Collection Time: 03/13/19 10:25 AM   Result Value Ref Range     39 - 308 U/L    CK - MB 15.0 (H) <3.6 ng/ml    CK-MB Index 8.0 (H) 0.0 - 4.0 %    Troponin-I, QT 3.84 (HH) 0.0 - 0.045 NG/ML   PROTHROMBIN TIME + INR    Collection Time: 03/13/19 10:45 AM   Result Value Ref Range    Prothrombin time 12.5 11.5 - 15.2 sec    INR 1.0 0.8 - 1.2     D DIMER Collection Time: 03/13/19 10:45 AM   Result Value Ref Range    D DIMER 3.08 (H) <0.46 ug/ml(FEU)   INFLUENZA A & B AG (RAPID TEST)    Collection Time: 03/13/19 10:45 AM   Result Value Ref Range    Influenza A Antigen NEGATIVE  NEG      Influenza B Antigen NEGATIVE  NEG     POC LACTIC ACID    Collection Time: 03/13/19  2:43 PM   Result Value Ref Range    Lactic Acid (POC) 1.32 0.40 - 2.00 mmol/L           No results for input(s): FIO2I, IFO2, HCO3I, IHCO3, HCOPOC, PCO2I, PCOPOC, IPHI, PHI, PHPOC, PO2I, PO2POC in the last 72 hours. No lab exists for component: IPOC2    Telemetry:normal sinus rhythm    Imaging:  I have personally reviewed the patients radiographs and have reviewed the reports:    CXR Results  (Last 48 hours)               03/13/19 1047  XR CHEST PORT Final result    Impression:  IMPRESSION:       Suboptimal inspiration; bilateral patchy opacities, as described; infiltrates   versus some interstitial edema. Narrative:  EXAM: CHEST  CPT CODE: 08820       CLINICAL INDICATION/HISTORY: Shortness of breath. COMPARISON: 25 April 2011. TECHNIQUE: Single AP portable view of chest at 1025. FINDINGS: There is suboptimal inspiratory effort. Subtle patchy opacities are   seen in the lateral mid right lung and at the base as well as scattered in the   left lung. Blunting left costophrenic angle suggests possible   effusion/atelectasis. The cardiomediastinal silhouette is normal.  The bones   and soft tissues are unremarkable. CT Results  (Last 48 hours)               03/13/19 1501  CTA CHEST W OR W WO CONT Final result    Impression:  IMPRESSION:           1. Unusual pulmonary artery opacification with gradual decrease in luminal   opacification in the basal regions, more pronounced on the left side than on the   right.   Possibly related to presence of moderate to large pleural effusion with   associated atelectatic changes as the explanation for this pattern vs. intrinsic   intracardiac abnormality. No convincing evidence of pulmonary embolism is   detected within the technical limitations of the study. 2. Moderate to large pleural effusion bilaterally with associated passive   atelectatic changes. Multiple foci of consolidative opacities in both lungs. Most pronounced in the left upper lobe. Query developing pneumonia vs.   pulmonary hemorrhage vs. nonspecific inflammatory process. 3.  Atypical appearance of the descending with smooth crescentic outpouchings   and presence of lamellated calcifications along the aortic wall. Perhaps   related to chronic dissection. With current technique, i.e. lack of IV contrast   in the aorta, detailed analysis is difficult. Recommend CTA of the aorta for   further delineation. 4.  Cholelithiasis. Narrative:  EXAM:  CTA Chest with Contrast (CT Pulmonary Study for PE). CLINICAL INDICATION:  Acute shortness of breath which began this morning. Recent hospitalization for shortness of breath and fatigue. COMPARISON:  None. TECHNIQUE:         - Helical volumetric sections of the chest are obtained with CT pulmonary   angiogram protocol. Subsequently, sagittal and coronal multiplanar   reconstruction images are obtained. Maximum intensity projection images are   generated to better delineate the pulmonary vasculature, differentiate between   the pulmonary arteries and veins and to increase sensitivity to pulmonary   emboli.     - IV contrast dose of 100 mL Isovue-370.   - Radiation dose optimization techniques are utilized as appropriate to the   exam, with combination of automated exposure control, adjustment of the mA   and/or kV according to patient's size (Including appropriate matching for   site-specific examinations), or use of iterative reconstruction technique.        FINDINGS:        Pulmonary Arteries:         - Unusual pulmonary artery opacification pattern is noted. The upper lung zone   pulmonary artery opacification is complete whereas the lower lung zone pulmonary   arteries appear suboptimally opacified particularly in the left lower lobe basal   segments. Less pronounced atypical poor opacification of the basal segments is   also noted on the right side. Instead of sharply demarcated distinct filling   defects, is involved segments demonstrate gradual fading of the pulmonary artery   luminal opacification. This makes it difficult to exclude small filling defects   in the segmental and subsegmental arteries at the left lower lobe basal region. The presence of atelectatic changes with pleural effusion may contribute to this   atypical pulmonary artery opacification pattern. Alternatively, intrinsic   intracardiac abnormalities may be responsible for the atypical pulmonary artery   opacification. Notably, the left inferior pulmonary vein opacification is much   less than that of the right pulmonary venous opacification.   - Within the technical limitations of the study, no definite suspicious filling   defects are identified in the main trunk, right and left pulmonary artery, lobar   and interlobar arteries, segmental and intrasegmental arteries. Basal region   distal segmental artery and subsegmental arteries are poorly opacified. Aorta:  No opacification of the aorta. The caliber of the ascending aorta and   aortic arch appear within normal limits. The mid descending aortography   demonstrates atypical aorta contour, with apparent smoothly outlined bulging   contour along the medial aspect (axial #). Further inferiorly, the aorta   appears to dilated slightly, measuring up to about 3.6 cm in diameter and with   crescentic outpouching. At the level of the diaphragmatic hiatus, the right   lateral wall of the aorta demonstrates crescentic appearance with lamellated   pattern. 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   Recommend dedicated CTA of   the aorta on routine basis for further delineation of this finding. Lung, Pleura, Airways:         - Moderate to large bilateral pleural effusion. Associated atelectatic changes. - Consolidative opacities in the left upper lobe and in the right lower lobe. Groundglass opacities in the right upper lobe and right middle lobe. Possible   consolidative opacities vs. atelectatic changes in the left lower lobe. Mediastinum, Radha:  No definite mediastinal adenopathy. No definite hilar   adenopathy. Base of Neck, Axillae:  Unremarkable. Abdomen:  Multiple gallstones. Skeletal Structures:  No acute findings. Total of 45 min critical care time spent at bedside during the course of care providing evaluation,management and care decisions and ordering appropriate treatment related to critical care problems exclusive of procedures. The reason for providing this level of medical care for this critically ill patient was due a critical illness that impaired one or more vital organ systems such that there was a high probability of imminent or life threatening deterioration in the patients condition. This care involved high complexity decision making to assess, manipulate, and support vital system functions, to treat this degree vital organ system failure and to prevent further life threatening deterioration of the patients condition.     Melinda Viera PA-C   03/13/19   Pulmonary, Critical Care Medicine  [facility]

## 2019-03-13 NOTE — CONSULTS
[facility] - Consult Note    Consultation request by No admitting provider for patient encounter. for advice/opinion related to evaluating No admission diagnoses are documented for this encounter. CHF    Date of  Admission: 3/13/2019  9:53 AM   Primary Care Physician:  Ivette Plaza MD     Assessment:     Patient Active Problem List   Diagnosis Code    Asthma J45.909    Prostate cancer ([facility] C61    Hypercholesterolemia E78.00    Angina, class I ([facility] I20.9    Left bundle branch block I44.7    Coronary artery disease I25.10    Hypertension I11.9    Dyslipidemia E78.5    Carotid artery disease ([facility] I77.9    Aortic valve stenosis I35.0    Anemia D64.9    NSTEMI (non-ST elevated myocardial infarction) ([facility] I21.4       -Acute respiratory distress requiring bipap. CXR with pulmonary edema, questionable infiltrate (previous CXR was done at [facility]). -Elevated troponin, unclear significance, s/p PCI to LAD 3/11/2019, ECG with known LBBB, denies CP.  -Heart failure with reduced EF. Discharged on coreg, lisinopril, lasix 3/12/2019.  -Ischemic cardiomyopathy with EF 26-30% on echo last admission. Conservatively managing in setting of advanced age. -CAD s/p LAD PCI with DEMAR 3/11/2019 with previous PCI to LCx 2011. Cath 3/11/2019 (Occluded mid RCA which appears to be chronic. There is faint collateral from the left coronary system, Left main artery with ostial 30-40%, LAD mid focal severely calcified tortuous 99% stenosis with CARLENE II flow, Circumflex coronary artery with diffuse mild 20-30% stenosis throughout). Discharged on ASA, plavix, statin, BB.  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 -Mild kidney injury. -Valvular heart disease with moderate aortic valve stenosis and mild to moderate MR.  -Carotid artery disease.  Moderate bilateral ICA stenosis medically managed.  -Peripheral vascular disease.  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  Because of this his blood pressure should be checked in his right arm.  -Hypertension.  -Dyslipidemia. -H/o intermittent LBBB. -Asthma, former smoker. -Advanced age, full code, but independent, lives alone and cares for self. Primary cardiologist Dr. Jany Peterson. Plan:     Would attempt diuresis if BP will allow following renal function closely. Would empirically treat for PNA with questionable infiltrate and elevated WBC on admission. Noted elevated troponin, unclear significance, do not suspect acute stent closure, would continue to trend will avoid anticoagulation at this time with recent GIB, but would continue ASA, plavix, statin. No BB or ace at this time with low BP. History of Present Illness: This is a 80 y.o. male admitted for No admission diagnoses are documented for this encounter. .      Patient complains of:  SOB. Patient is a 80year old male who was recently admitted to [facility] with CHF, decline in LV function, which led to cath and LAD PCI 3/11/2019. Patient was monitored overnight, walked with PT yesterday and and discharged yesterday afternoon to home. Patient does live alone and cares for self. Breathing was overall stable on discharge. Patient denies any CP. Patient went to sleep. At some point overnight patient woke up with acute respiratory distress. Patient was found on floor by family this morning gasping for air. Patient denied any CP. Patient is currently on bipap so history is somewhat limited. Cardiac risk factors: dyslipidemia, male gender, hypertension      Review of Symptoms:  Limited 10 point ROS due to acute distress. Past Medical History:     Past Medical History:   Diagnosis Date    Asthma     Cardiac cath 04/28/2011    oLM 30%. pLAD 30%. oD1 50%. CX 90% (3 x 18 [place] DEMAR). dRCA 90%. RPLB subtotal.  RPDA 100%.  Cardiac echocardiogram 01/21/2014    EF 55-60%. Mod LVH. Gr 1 DDfx. Mild AS (mean grad 13). Mild AI. Unchanged from study of 11/30/11.     Cardiac nuclear imaging test, mod risk 2016    Intermediate risk. Medium-sized inferior, inferoseptal infarction. No ischemia. EF 54%. Nondiagnostic EKG on pharm stress test.    Carotid duplex 2016    Mod 50-69% bilateral ICA stenosis. Probable significant RECA stenosis. >50% stenosis of left subclavian. No significant change from study of 1/8/15.  Coronary artery disease     Status post PCI with drug-eluting stent, [place] 3 x 18 mm in the proximal circumflex.  [facility]     Hypercholesterolemia     Hypertension     Prostate cancer ([facility]          Social History:     Social History     Socioeconomic History    Marital status: SINGLE     Spouse name: Not on file    Number of children: Not on file    Years of education: Not on file    Highest education level: Not on file   Tobacco Use    Smoking status: Former Smoker     Packs/day: 1.00     Years: 15.00     Pack years: 15.00     Last attempt to quit: 1960     Years since quittin.8    Smokeless tobacco: Never Used   Substance and Sexual Activity    Alcohol use: No    Drug use: No    Sexual activity: Not Currently        Family History:   History reviewed. No pertinent family history. Medications:   No Known Allergies     No current facility-administered medications for this encounter. Current Outpatient Medications   Medication Sig    atorvastatin (LIPITOR) 40 mg tablet Take 1 Tab by mouth nightly.  carvedilol (COREG) 3.125 mg tablet Take 1 Tab by mouth two (2) times daily (with meals).  lisinopril (PRINIVIL, ZESTRIL) 5 mg tablet Take 1 Tab by mouth daily.  nitroglycerin (NITROSTAT) 0.4 mg SL tablet 1 Tab by SubLINGual route every five (5) minutes as needed for Chest Pain.  furosemide (LASIX) 20 mg tablet Take 1 Tab by mouth daily.  potassium chloride SR (KLOR-CON 10) 10 mEq tablet Take 1 Tab by mouth daily.     OTHER Cbc, BMP in 1 week  Dx: CHF  Fax results to Dr. Abraham Butcher  budesonide-formoterol (SYMBICORT) 160-4.5 mcg/actuation HFAA Take 2 Puffs by inhalation two (2) times a day.  glycopyrrolate-formoterol (BEVESPI AEROSPHERE) 9-4.8 mcg HFAA Take 2 Inhalation by inhalation two (2) times a day.  clopidogrel (PLAVIX) 75 mg tab TAKE 1 TABLET EVERY DAY    omeprazole (PRILOSEC) 40 mg capsule Take 1 Cap by mouth daily.  albuterol (PROVENTIL HFA, VENTOLIN HFA, PROAIR HFA) 90 mcg/actuation inhaler Take 2 Puffs by inhalation every four (4) hours as needed for Wheezing or Shortness of Breath.  tamsulosin (FLOMAX) 0.4 mg capsule Take 1 Cap by mouth daily.  aspirin delayed-release 81 mg tablet Take 81 mg by mouth daily. Physical Exam:     Visit Vitals  /74 (BP Patient Position: At rest)   Pulse (!) 118   Temp 97.5 °F (36.4 °C)   Resp 22   SpO2 91%     BP Readings from Last 3 Encounters:   03/13/19 118/74   03/12/19 108/57   03/01/19 126/54     Pulse Readings from Last 3 Encounters:   03/13/19 (!) 118   03/12/19 72   03/01/19 98     Wt Readings from Last 3 Encounters:   03/12/19 67.6 kg (149 lb 0.5 oz)   03/01/19 73 kg (161 lb)   02/23/19 74 kg (163 lb 2.3 oz)       General:  fatigued, mild distress  Neck:  no JVD  Lungs:  diminished breath sounds   Heart:  regular rate and rhythm  Abdomen:  abdomen is soft without significant tenderness, masses, organomegaly or guarding  Extremities:  extremities normal, atraumatic, no cyanosis or edema  Skin: Warm and dry.  no hyperpigmentation, vitiligo, or suspicious lesions  Neuro: alert, oriented x3, affect appropriate  Psych: non focal     Data Review:     Recent Labs     03/13/19  1025   WBC 13.4*   HGB 9.9*   HCT 31.9*        Recent Labs     03/13/19  1045 03/13/19  1025 03/11/19  0110   NA  --  139 139   K  --  4.3 3.8   CL  --  103 99*   CO2  --  29 34*   GLU  --  158* 120*   BUN  --  38* 39*   CREA  --  1.39* 1.13   CA  --  9.4 8.5   ALB  --  3.5  --    SGOT  --  37  --    ALT  --  20  --    INR 1.0  --   -- Results for orders placed or performed during the hospital encounter of 03/13/19   EKG, 12 LEAD, INITIAL   Result Value Ref Range    Ventricular Rate 96 BPM    Atrial Rate 94 BPM    QRS Duration 130 ms    Q-T Interval 370 ms    QTC Calculation (Bezet) 467 ms    Calculated R Axis 44 degrees    Calculated T Axis -147 degrees    Diagnosis       Wide QRS rhythm with occasional premature ventricular complexes  Left bundle branch block  Abnormal ECG  When compared with ECG of 22-JAN-2016 09:27,  Wide QRS rhythm has replaced Sinus rhythm  Vent. rate has increased BY  32 BPM     Results for orders placed or performed in visit on 01/16/19   AMB POC EKG ROUTINE W/ 12 LEADS, INTER & REP    Impression    See progress note.        All Cardiac Markers in the last 24 hours:    Lab Results   Component Value Date/Time     03/13/2019 10:25 AM    CKMB 15.0 (H) 03/13/2019 10:25 AM    CKND1 8.0 (H) 03/13/2019 10:25 AM    TROIQ 3.84 ([facility]) 03/13/2019 10:25 AM       Last Lipid:    Lab Results   Component Value Date/Time    Cholesterol, total 87 03/08/2019 05:28 AM    HDL Cholesterol 44 03/08/2019 05:28 AM    LDL, calculated 30 03/08/2019 05:28 AM    Triglyceride 65 03/08/2019 05:28 AM    CHOL/HDL Ratio 2.0 03/08/2019 05:28 AM       Signed By: AMITA Handy     March 13, 2019

## 2019-03-14 NOTE — PROGRESS NOTES
TRANSFER - OUT REPORT:    Verbal report given to 5N RN (name) on [facility]  being transferred to 5N (unit) for routine progression of care       Report consisted of patients Situation, Background, Assessment and   Recommendations(SBAR). Information from the following report(s) SBAR and Kardex was reviewed with the receiving nurse. Lines:   Peripheral IV 03/13/19 Left Antecubital (Active)   Site Assessment Clean, dry, & intact 3/14/2019  3:30 PM   Phlebitis Assessment 0 3/14/2019  3:30 PM   Infiltration Assessment 0 3/14/2019  3:30 PM   Dressing Status Clean, dry, & intact 3/14/2019  3:30 PM   Dressing Type Transparent 3/14/2019  8:00 AM   Hub Color/Line Status Pink 3/14/2019  8:00 AM   Action Taken Open ports on tubing capped 3/14/2019  8:00 AM   Alcohol Cap Used Yes 3/14/2019  8:00 AM       Peripheral IV 03/13/19 Anterior;Proximal;Right Forearm (Active)   Site Assessment Clean, dry, & intact 3/14/2019  3:30 PM   Phlebitis Assessment 0 3/14/2019  3:30 PM   Infiltration Assessment 0 3/14/2019  3:30 PM   Dressing Status Clean, dry, & intact 3/14/2019  3:30 PM   Dressing Type Transparent 3/14/2019  8:00 AM   Hub Color/Line Status Pink 3/14/2019  8:00 AM   Action Taken Open ports on tubing capped 3/14/2019  8:00 AM   Alcohol Cap Used Yes 3/14/2019  8:00 AM        Opportunity for questions and clarification was provided.       Patient transported with:   Monitor  O2 @ 4 liters

## 2019-03-14 NOTE — PROGRESS NOTES
[facility]ists - Progress Note  Admit Date: 3/13/2019    Assessment:     Hospital Problems  Date Reviewed: 3/1/2019          Codes Class Noted POA    Congestive heart disease ([facility] ICD-10-CM: I50.9  ICD-9-CM: 428.0  3/13/2019 Unknown                 -Acute respiratory distress requiring bipap. CXR with pulmonary edema, questionable infiltrate (previous CXR was done at [facility]). Improving off bipap with Abx, nebs and gentle diuresis. -NSTEMI, suspect secondary in setting of acute illness, troponin peak 7.58, s/p PCI to LAD 3/11/2019 with occluded mid RCA which appeared to be chronic medically managed, ECG with known LBBB, denies CP.  -Heart failure with reduced EF. Discharged on coreg, lisinopril, lasix 3/12/2019.  -Ischemic cardiomyopathy with EF 26-30% on echo last admission. Conservatively managing in setting of advanced age. -CAD s/p LAD PCI with DEMAR 3/11/2019 with previous PCI to LCx 2011. Cath 3/11/2019 (Occluded mid RCA which appears to be chronic. There is faint collateral from the left coronary system, Left main artery with ostial 30-40%, LAD mid focal severely calcified tortuous 99% stenosis with CARLENE II flow, Circumflex coronary artery with diffuse mild 20-30% stenosis throughout). Discharged on ASA, plavix, statin, BB.  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 -Mild kidney injury. -Valvular heart disease with moderate aortic valve stenosis and mild to moderate MR.  -Carotid artery disease. Moderate bilateral ICA stenosis medically managed.  -Peripheral vascular disease.  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  Because of this his blood pressure should be checked in his right arm.  -Hypertension. Low normal on admission requiring pressor support.  -Dyslipidemia. On statin. -H/o intermittent LBBB, now appears persistent.  -Asthma, former smoker.   -Advanced age, full code, but independent, lives alone and cares for self, significant decline in functional capacity follow recent hospital stays.     Primary cardiologist Dr. Gurjit Pires.       Plan:     Continue pulmonary treatment and support. Continue empiric abx and nebs as needed. Continue diuresis as hemodynamics and renal function will allow. Continue heparin drip for 48 hours unless concerns of bleeding. Continue ASA and Plavix. Continue statin. No BB or ace given low BP. Limited follow up echo pending. Would continue conservative management in setting of advanced age, comorbidities    Subjective:     Had cough and SOB this AM, seems to significantly improve with breathing treatment.      Objective:      Patient Vitals for the past 8 hrs:   Temp Pulse Resp BP SpO2   03/14/19 0700  89 24 105/59 100 %   03/14/19 0601  83 23  100 %   03/14/19 0600  84 20 102/55 100 %   03/14/19 0545  92 21 98/76 99 %   03/14/19 0530  90 18 105/56 99 %   03/14/19 0500  88 22 96/57 100 %   03/14/19 0415  87 28 106/49 100 %   03/14/19 0400 97.8 °F (36.6 °C) 88 23 107/66 100 %   03/14/19 0300  91 25 100/46 100 %         Patient Vitals for the past 96 hrs:   Weight   03/14/19 0400 66.3 kg (146 lb 2.6 oz)   03/13/19 2019 66.3 kg (146 lb 2.6 oz)                    Intake/Output Summary (Last 24 hours) at 3/14/2019 0935  Last data filed at 3/14/2019 0700  Gross per 24 hour   Intake 259.07 ml   Output 900 ml   Net -640.93 ml       Physical Exam:  General:  alert, cooperative, no distress, appears stated age  Neck:  no JVD  Lungs:  diminished breath sounds   Heart:  regular rate and rhythm 2/6 systolic murmur sternal border  Abdomen:  abdomen is soft without significant tenderness, masses, organomegaly or guarding  Extremities:  extremities normal, atraumatic, no cyanosis or edema    Data Review:     Labs: Results:       Chemistry Recent Labs     03/14/19  0442 03/13/19  1025   * 158*    139   K 3.8 4.3    103   CO2 29 29   BUN 38* 38*   CREA 1.44* 1.39*   CA 9.3 9.4   MG 2.3  --    PHOS 4.5  --    AGAP 8 7 BUCR 26* 27*   AP  --  61   TP  --  7.5   ALB  --  3.5   GLOB  --  4.0   AGRAT  --  0.9      CBC w/Diff Recent Labs     03/14/19  0442 03/14/19  0133 03/13/19  1025   WBC 10.0 10.6 13.4*   RBC 3.03* 3.00* 3.55*   HGB 8.3* 8.3* 9.9*   HCT 27.4* 27.2* 31.9*    254 319   GRANS 68 80* 88*   LYMPH 29 11* 5*   EOS 0 0 0      Cardiac Enzymes Lab Results   Component Value Date/Time     03/14/2019 04:42 AM     03/13/2019 10:55 PM     03/13/2019 02:40 PM     03/13/2019 10:25 AM    CKMB 7.6 (H) 03/14/2019 04:42 AM    CKMB 11.0 (H) 03/13/2019 10:55 PM    CKMB 20.4 (H) 03/13/2019 02:40 PM    CKMB 15.0 (H) 03/13/2019 10:25 AM    CKND1 4.9 (H) 03/14/2019 04:42 AM    CKND1 6.1 (H) 03/13/2019 10:55 PM    CKND1 8.0 (H) 03/13/2019 02:40 PM    CKND1 8.0 (H) 03/13/2019 10:25 AM    TROIQ 6.70 (HH) 03/14/2019 04:42 AM    TROIQ 7.58 (HH) 03/13/2019 10:55 PM    TROIQ 5.98 (HH) 03/13/2019 02:40 PM    TROIQ 3.84 (HH) 03/13/2019 10:25 AM      Coagulation Recent Labs     03/14/19  0809 03/14/19  0133 03/13/19  1045   PTP  --   --  12.5   INR  --   --  1.0   APTT 54.1* 32.5  --        Lipid Panel Lab Results   Component Value Date/Time    Cholesterol, total 87 03/08/2019 05:28 AM    HDL Cholesterol 44 03/08/2019 05:28 AM    LDL, calculated 30 03/08/2019 05:28 AM    VLDL, calculated 13 03/08/2019 05:28 AM    Triglyceride 65 03/08/2019 05:28 AM    CHOL/HDL Ratio 2.0 03/08/2019 05:28 AM      BNP No results found for: BNP, BNPP, XBNPT   Liver Enzymes Recent Labs     03/13/19  1025   TP 7.5   ALB 3.5   AP 61   SGOT 37      Digoxin    Thyroid Studies Lab Results   Component Value Date/Time    TSH 1.22 03/07/2019 05:29 AM          Signed By: AMITA Denny     March 14, 2019

## 2019-03-14 NOTE — ROUTINE PROCESS
Pt. C/o of SOB, O2 sats. 100% on N/C @ 3LPM, lungs auscultated, BLBS with crackles at bases referred to Ankush Conklin NP.  0400 Lasix 10 mg IV given. 0425Pt. Voided 200ml urine. 0600 Pt. With productive cough,sounds coarse in upper airway, oral suctioning done and obtained small amount of thin white secretions.

## 2019-03-14 NOTE — ADT AUTH CERT NOTES
INITIAL PHYSICIAN ORDER: INPATIENT Intensive Care; 4. Patient requires ICU level of care interventions (further clarification in H&P documentation) [[id number]] (Order [id number])   ADT Transfer   Date and Time: 3/13/2019  5:56 PM Department: [facility] 3 INTENSIVE CARE UNIT Released By/Authorizing: Juan Tolbert PA-C (auto-released)   Single Transfusion Record     Product Status Volume Start End   INITIAL PHYSICIAN ORDER: INPATIENT Intensive Care; 4. Patient requires ICU leve* Completed 03/13/19 2359      Order Providers     Authorizing   Juan Tolbert          Order Information     Order Date/Time Release Date/Time Start Date/Time End Date/Time   03/13/19 05:56 PM 03/13/19 05:56 PM 03/13/19 06:00 PM 03/13/19 06:00 PM   CSN:   [id number]   Order Details     Frequency Duration Priority Order Class   ONE TIME 1  occurrence Routine ADT Pend Transfer   Reprint OrderRequisition - Outpatient Only     INITIAL PHYSICIAN ORDER: INPATIENT Intensive Care; 4. Patient requires ICU level of care interventions (further clarification in H&P documentation) (Order #[id number]) on 3/13/19   Original Order     Ordered On Ordered By    3/13/2019  5:56 PM Tanya Gonzales PA-C                Transfer Level of Care     Department   [facility] ADMINISTRATION   Order Questions     Question Answer Comment   Status: INPATIENT    Type of Bed Intensive Care    Inpatient Hospitalization Certified Necessary for the Following Reasons 4.  Patient requires ICU level of care interventions (further clarification in H&P documentation)    Admitting Diagnosis Congestive heart disease [facility]    Admitting Physician Niecy Olsen    Attending Physician Niecy Olsen    Estimated Length of Stay 2 Midnights    Discharge Plan: Home with Office Follow-up           Collection [facility] By Acknowledged On   Placing Order 03/13/19 1479 Rodrigo Atkinson RN 03/13/19 2033   Cosign Order Info     Action Created on Responsible Provider Signed by Signed on   Ordering 03/13/19 1756 MD Gumaro Herring MD 03/14/19 1024   Cosign Info     Action Created on Responsible Provider Signed by Signed on   Ordering 03/13/19 1756 MD Gumaro Herring MD 03/14/19 1024   Additional Information     Associated Reports External References   View Encounter CLICK HERE-** FAQ-NEW CMS RULES FOR INPATIENT CERTIFICATION **   Priority and Order Details        Process Instructions     Inpatient admit orders should be restricted to patients who are expected to require care that spans at least two consecutive midnights, starting from the time care was initiated at this facility, including in the emergency department or other outpatient departments. That means that the first midnight may have already passed at the time this order is signed. If this criterion cannot be met, an observation services order should be placed instead. Surgeries on the Medicare Inpatient Only list are exempted from the two midnight benchmark and should be inpatients regardless of their length of stay. CHANGE IN MEDICARE CERTIFICATION REQUIREMENT:   Signing this order, I hereby certify that the inpatient services I have ordered are ordered in accordance with applicable Medicare/Medicaid regulations and the inpatient services I have ordered are both reasonable and necessary for the reasons identified above. Medication Detail      Disp Refills Start End    INITIAL PHYSICIAN ORDER: INPATIENT Intensive Care; 4. Patient requires ICU level of care interventions (further clarification in H&P documentation)   3/13/2019 3/13/2019    Sig: One time.     Class: ADT Pend Transfer    NPI Information        Electronically signed by Authorizing Provider: Pato Dunaway [id number]     Order start date/time: 3/13/2019 6:00 PM  Electronically signed by Co-signing Provider (if required):    Gumaro Jules MD [id number]

## 2019-03-14 NOTE — PROGRESS NOTES
Medical Progress Note      NAME: Marcy Goldberg   :  1926  MRN:             [id number]    Date/Time: 3/14/2019  6:54 PM      Assessment:     1. Acute hypoxic respiratory failure  2. NSTEMI  3. CAD s/p PCI to LAD 19  4. Chronic systolic CHF,   5. Ischemic cardiomyopathy with EF 26-30%  6. Bilateral opacities with suspected pneumonia  7. HTN  8. Hyperlipidemia  9. PVD  10. 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 Continue for now and de-escalate based on Cx results. · D/w his daughter at bedside    Subjective:     SOB, no chest pain, afebrile    Objective:     Vitals:    Last 24hrs VS reviewed since prior progress note. Most recent are:  Visit Vitals  /56   Pulse 97   Temp 98.4 °F (36.9 °C)   Resp 25   Ht 5' 7\" (1.702 m)   Wt 66.2 kg (146 lb)   SpO2 100%   BMI 22.87 kg/m²     SpO2 Readings from Last 6 Encounters:   19 100%   19 97%   19 95%   19 97%   19 94%   19 96%    O2 Flow Rate (L/min): 4 l/min       Intake/Output Summary (Last 24 hours) at 3/14/2019 1854  Last data filed at 3/14/2019 1800  Gross per 24 hour   Intake 374.57 ml   Output 1400 ml   Net -1025.43 ml        Exam:     General:   Not in acute distress  HEENT: PERRLA, Neck Supple,  No JVD  Respiratory:   CTA bilaterally-no wheezes, rales, rhonchi, or crackles  Cardiac:  Regular Rate and Rythmn  - no murmurs, rubs or gallops  Abdominal:  Soft, non-tender, non-distended, positive bowel sounds  Extremities:  No cyanosis, or edema.   Skin: No rash  Neurological:  No focal neurological deficits    Medication:   Current Medications Reviewed    Current Facility-Administered Medications:     heparin 25,000 units in D5W 250 ml infusion, 12-25 Units/kg/hr, IntraVENous, TITRATE, Fabi Kahn PA, Last Rate: 9 mL/hr at 03/14/19 1730, 895.6 Units/hr at 03/14/19 1730    cefepime (MAXIPIME) 2 g in sterile water (preservative free) 10 mL IV syringe, 2 g, IntraVENous, Q12H, Josette Peralta MD, 2 g at 03/14/19 1800    vancomycin (VANCOCIN) 1250 mg in  ml infusion, 1,250 mg, IntraVENous, Q36H, Josette Peralta MD, Last Rate: 125 mL/hr at 03/14/19 0941, 1,250 mg at 03/14/19 0941    [START ON 3/15/2019] VANCOMYCIN, Racheal Sebastian, , , ONE TIME **AND** [START ON 3/15/2019] Vancomycin Lab Information, 1 Each, Other, Daily, Josette Peralta MD    atorvastatin (LIPITOR) tablet 40 mg, 40 mg, Oral, QHS, Aidan Cutler MD    aspirin chewable tablet 81 mg, 81 mg, Oral, DAILY, Fabi Kahn PA, 81 mg at 03/14/19 0940    albuterol-ipratropium (DUO-NEB) 2.5 MG-0.5 MG/3 ML, 3 mL, Nebulization, Q6H RT, Winnie Osuna PA-C, 3 mL at 03/14/19 1609    budesonide-formoterol (SYMBICORT) 160-4.5 mcg/actuation HFA inhaler 2 Puff, 2 Puff, Inhalation, BID RT, Winnie Osuna PA-C, 2 Puff at 03/14/19 1800    [START ON 3/15/2019] tamsulosin (FLOMAX) capsule 0.4 mg, 0.4 mg, Oral, DAILY, Winnie Osuna PA-C    famotidine (PEPCID) tablet 20 mg, 20 mg, Oral, DAILY, Josette Peralta MD, 20 mg at 03/14/19 1301    potassium chloride (KLOR-CON) packet for solution 20 mEq, 20 mEq, Oral, ONCE, Winnie Osuna PA-C    sodium chloride (NS) flush 5-10 mL, 5-10 mL, IntraVENous, PRN, Shadia Lyon MD    sodium chloride (NS) flush 5-40 mL, 5-40 mL, IntraVENous, Q8H, Luiza Gonzales PA-C, 10 mL at 03/14/19 0581    sodium chloride (NS) flush 5-40 mL, 5-40 mL, IntraVENous, PRN, Luiza Gonzales PA-C    clopidogrel (PLAVIX) tablet 75 mg, 75 mg, Oral, DAILY, Yadira Gonzales PA-C, 75 mg at 03/14/19 6261      Lab:     Lab Data Reviewed: (see below)  Recent Results (from the past 24 hour(s))   INFLUENZA A/B BY PCR    Collection Time: 03/13/19  8:00 PM   Result Value Ref Range    Influenza A by PCR NEGATIVE  NEG Influenza B by PCR NEGATIVE  NEG     LACTIC ACID    Collection Time: 03/13/19  9:12 PM   Result Value Ref Range    Lactic acid 1.0 0.4 - 2.0 MMOL/L   TYPE & SCREEN    Collection Time: 03/13/19  9:12 PM   Result Value Ref Range    Crossmatch Expiration 03/16/2019     ABO/Rh(D) A NEGATIVE     Antibody screen NEG    MRSA SCREEN - PCR (NASAL)    Collection Time: 03/13/19 10:00 PM   Result Value Ref Range    Special Requests: NO SPECIAL REQUESTS      Culture result:        MRSA target DNA is not detected (presumptive not colonized with MRSA)   CARDIAC PANEL,(CK, CKMB & TROPONIN)    Collection Time: 03/13/19 10:55 PM   Result Value Ref Range     39 - 308 U/L    CK - MB 11.0 (H) <3.6 ng/ml    CK-MB Index 6.1 (H) 0.0 - 4.0 %    Troponin-I, QT 7.58 (HH) 0.0 - 0.045 NG/ML   EKG, 12 LEAD, SUBSEQUENT    Collection Time: 03/14/19 12:01 AM   Result Value Ref Range    Ventricular Rate 87 BPM    Atrial Rate 87 BPM    P-R Interval 138 ms    QRS Duration 150 ms    Q-T Interval 402 ms    QTC Calculation (Bezet) 483 ms    Calculated P Axis 50 degrees    Calculated R Axis 5 degrees    Calculated T Axis 179 degrees    Diagnosis       Normal sinus rhythm  Left axis deviation  Left bundle branch block    When compared with ECG of 13-MAR-2019 10:14,  PREVIOUS ECG IS PRESENT  Confirmed by Hinkle Foot ([phone]) on 3/14/2019 10:02:51 AM     CBC WITH AUTOMATED DIFF    Collection Time: 03/14/19  1:33 AM   Result Value Ref Range    WBC 10.6 4.6 - 13.2 K/uL    RBC 3.00 (L) 4.70 - 5.50 M/uL    HGB 8.3 (L) 13.0 - 16.0 g/dL    HCT 27.2 (L) 36.0 - 48.0 %    MCV 90.7 74.0 - 97.0 FL    MCH 27.7 24.0 - 34.0 PG    MCHC 30.5 (L) 31.0 - 37.0 g/dL    RDW 15.6 (H) 11.6 - 14.5 %    PLATELET 643 661 - 416 K/uL    MPV 10.1 9.2 - 11.8 FL    NEUTROPHILS 80 (H) 42 - 75 %    LYMPHOCYTES 11 (L) 20 - 51 %    MONOCYTES 9 2 - 9 %    EOSINOPHILS 0 0 - 5 %    BASOPHILS 0 0 - 3 %    ABS. NEUTROPHILS 8.4 (H) 1.8 - 8.0 K/UL    ABS.  LYMPHOCYTES 1.2 0.8 - 3.5 K/UL ABS. MONOCYTES 1.0 0 - 1.0 K/UL    ABS. EOSINOPHILS 0.0 0.0 - 0.4 K/UL    ABS. BASOPHILS 0.0 0.0 - 0.06 K/UL    DF MANUAL      PLATELET COMMENTS ADEQUATE PLATELETS      RBC COMMENTS OVALOCYTES  1+        RBC COMMENTS POIKILOCYTOSIS  1+       PTT    Collection Time: 03/14/19  1:33 AM   Result Value Ref Range    aPTT 32.5 23.0 - 36.4 SEC   STREP PNEUMO AG, URINE    Collection Time: 03/14/19  4:25 AM   Result Value Ref Range    Strep pneumo Ag, urine NEGATIVE  NEG     LEGIONELLA PNEUMOPHILA AG, URINE    Collection Time: 03/14/19  4:25 AM   Result Value Ref Range    Legionella Ag, urine NEGATIVE  NEG     CBC WITH AUTOMATED DIFF    Collection Time: 03/14/19  4:42 AM   Result Value Ref Range    WBC 10.0 4.6 - 13.2 K/uL    RBC 3.03 (L) 4.70 - 5.50 M/uL    HGB 8.3 (L) 13.0 - 16.0 g/dL    HCT 27.4 (L) 36.0 - 48.0 %    MCV 90.4 74.0 - 97.0 FL    MCH 27.4 24.0 - 34.0 PG    MCHC 30.3 (L) 31.0 - 37.0 g/dL    RDW 15.6 (H) 11.6 - 14.5 %    PLATELET 868 164 - 510 K/uL    MPV 10.0 9.2 - 11.8 FL    NEUTROPHILS 68 40 - 73 %    LYMPHOCYTES 29 21 - 52 %    MONOCYTES 3 3 - 10 %    EOSINOPHILS 0 0 - 5 %    BASOPHILS 0 0 - 2 %    ABS. NEUTROPHILS 6.8 1.8 - 8.0 K/UL    ABS. LYMPHOCYTES 2.9 0.9 - 3.6 K/UL    ABS. MONOCYTES 0.3 0.05 - 1.2 K/UL    ABS. EOSINOPHILS 0.0 0.0 - 0.4 K/UL    ABS.  BASOPHILS 0.0 0.0 - 0.1 K/UL    DF AUTOMATED     METABOLIC PANEL, BASIC    Collection Time: 03/14/19  4:42 AM   Result Value Ref Range    Sodium 139 136 - 145 mmol/L    Potassium 3.8 3.5 - 5.5 mmol/L    Chloride 102 100 - 108 mmol/L    CO2 29 21 - 32 mmol/L    Anion gap 8 3.0 - 18 mmol/L    Glucose 100 (H) 74 - 99 mg/dL    BUN 38 (H) 7.0 - 18 MG/DL    Creatinine 1.44 (H) 0.6 - 1.3 MG/DL    BUN/Creatinine ratio 26 (H) 12 - 20      GFR est AA 55 (L) >60 ml/min/1.73m2    GFR est non-AA 46 (L) >60 ml/min/1.73m2    Calcium 9.3 8.5 - 10.1 MG/DL   MAGNESIUM    Collection Time: 03/14/19  4:42 AM   Result Value Ref Range    Magnesium 2.3 1.6 - 2.6 mg/dL PHOSPHORUS    Collection Time: 03/14/19  4:42 AM   Result Value Ref Range    Phosphorus 4.5 2.5 - 4.9 MG/DL   VANCOMYCIN, RANDOM    Collection Time: 03/14/19  4:42 AM   Result Value Ref Range    Vancomycin, random 10.7 5.0 - 40.0 UG/ML   CARDIAC PANEL,(CK, CKMB & TROPONIN)    Collection Time: 03/14/19  4:42 AM   Result Value Ref Range     39 - 308 U/L    CK - MB 7.6 (H) <3.6 ng/ml    CK-MB Index 4.9 (H) 0.0 - 4.0 %    Troponin-I, QT 6.70 (HH) 0.0 - 0.045 NG/ML   PTT    Collection Time: 03/14/19  8:09 AM   Result Value Ref Range    aPTT 54.1 (H) 23.0 - 36.4 SEC   HGB & HCT    Collection Time: 03/14/19 11:48 AM   Result Value Ref Range    HGB 8.7 (L) 13.0 - 16.0 g/dL    HCT 28.4 (L) 36.0 - 52.2 %   METABOLIC PANEL, BASIC    Collection Time: 03/14/19  3:14 PM   Result Value Ref Range    Sodium 139 136 - 145 mmol/L    Potassium 3.4 (L) 3.5 - 5.5 mmol/L    Chloride 101 100 - 108 mmol/L    CO2 29 21 - 32 mmol/L    Anion gap 9 3.0 - 18 mmol/L    Glucose 112 (H) 74 - 99 mg/dL    BUN 37 (H) 7.0 - 18 MG/DL    Creatinine 1.39 (H) 0.6 - 1.3 MG/DL    BUN/Creatinine ratio 27 (H) 12 - 20      GFR est AA 58 (L) >60 ml/min/1.73m2    GFR est non-AA 48 (L) >60 ml/min/1.73m2    Calcium 8.5 8.5 - 10.1 MG/DL   PTT    Collection Time: 03/14/19  3:14 PM   Result Value Ref Range    aPTT 115.0 (H) 23.0 - 36.4 SEC   HGB & HCT    Collection Time: 03/14/19  3:14 PM   Result Value Ref Range    HGB 8.1 (L) 13.0 - 16.0 g/dL    HCT 26.5 (L) 36.0 - 48.0 %   MAGNESIUM    Collection Time: 03/14/19  3:14 PM   Result Value Ref Range    Magnesium 2.2 1.6 - 2.6 mg/dL   ECHO ADULT FOLLOW-UP OR LIMITED    Collection Time: 03/14/19  4:04 PM   Result Value Ref Range    PASP 60.0 mmHg       Xr Chest Port    Result Date: 3/14/2019  INDICATION: Fluid overload, and STEMI COMPARISON:  recent prior FINDINGS: A portable AP radiograph of the chest was obtained at 0627 hours. Stable mild prominence of the cardiac silhouette.  Interval increase in left upper lung opacity as well as streaky opacity right upper lung. Similar bilateral lung base patchy opacities. Similar congestion. Small bilateral pleural effusions. Osseous structures are stable. IMPRESSION: Bilateral opacities with slight worsening of aeration in the upper lobes. Active Problems:    Congestive heart disease ([address] (3/13/2019)      ____________________________________________________________________      Attending Physician:  Arslan Gonzalez MD

## 2019-03-14 NOTE — PROGRESS NOTES
conducted an initial consultation and Spiritual Assessment for Dennise Poretr, who is a 80 y. o.,male. Patients Primary Language is: [place]. According to the patients EMR Pentecostalism Affiliation is: No Mu-ism. The reason the Patient came to the hospital is:   Patient Active Problem List    Diagnosis Date Noted    Congestive heart disease ([facility] 2019    [facility]STEM[facility] (non-ST elevated myocardial infarction) [facility] 2019    Anemia 2019    Aortic valve stenosis 2017    Carotid artery disease ([facility] 2013    Left bundle branch block 2011    Dyslipidemia 2011    Coronary artery disease     Hypertension     Angina, class I ([facility] 2011    Asthma     Prostate cancer ([facility]     Hypercholesterolemia         The  provided the following Interventions:  Initiated a relationship of care and support while patient is in the ICU. His daughter was with him. His wife is . Explored issues of sneha, spirituality and/or Orthodoxy needs while hospitalized. He is a member of [employer] and someone from [employer] has already visited him. Listened empathically. Patient said he is feeling better. Provided chaplaincy education. Provided information about Spiritual Care Services. Offered assurance of continued prayers on patient's behalf. Chart reviewed. The following outcomes were achieved:  Patient shared some information about their medical narrative and spiritual journey/beliefs. Patient processed feeling about current hospitalization. Patient expressed gratitude for the 's visit. Assessment:  Patient did not indicate any spiritual or Orthodoxy issues which require Spiritual Care Services interventions at this time. Patient does not have any Orthodoxy/cultural needs that will affect patients preferences in health care.     Plan:  Chaplains will continue to follow and will provide pastoral care on an as needed or requested basis.   recommends bedside caregivers page  on duty if patient shows signs of acute spiritual or emotional distress. Usman Forman MDiv.   Board Certified [employer] [phone]

## 2019-03-14 NOTE — PROGRESS NOTES
attended the interdisciplinary rounds for Xavier Pisano, who is a 80 y. o.,male. Patients Primary Language is: [place]. According to the patients EMR Latter day Affiliation is: Amauri Zabala.     The reason the Patient came to the hospital is:   Patient Active Problem List    Diagnosis Date Noted    Congestive heart disease ([facility] 03/13/2019    NSTEMI (non-ST elevated myocardial infarction) ([facility] 03/07/2019    Anemia 02/21/2019    Aortic valve stenosis 11/02/2017    Carotid artery disease ([facility] 06/03/2013    Left bundle branch block 05/20/2011    Dyslipidemia 05/20/2011    Coronary artery disease     Hypertension     Angina, class I ([facility] 04/25/2011    Asthma     Prostate cancer ([facility]     Hypercholesterolemia       Plan:  Chaplains will continue to follow and will provide pastoral care on an as needed/requested basis.  recommends bedside caregivers page  on duty if patient shows signs of acute spiritual or emotional distress.     [address] [facility]  Board Certified [address] [facility]   [phone]

## 2019-03-14 NOTE — PROGRESS NOTES
[facility]  Pulmonary, Critical Care, and Sleep Medicine    Critical Care Progress Note/Transfer Summary     New Admitting MD:    Name: Sharona Alvarez MRN: [id number]   : 1926 Hospital: [address] [place] [address] Dr   Date: 3/14/2019  Admission Date: 3/13/2019     Chart and notes reviewed. Data reviewed. I have evaluated all findings. Brief Summary of ICU Care/Course:  Pt is a 80 y.o. male w/ pmhx of asthma, recent GIB 2/2 AVM, CAD w/ PCI and stent placement 1 wk ago and HTN who presented to the ED on 19 complaining of acute onset SOB. Patient's daughter also notes that for the past several days the patient's legs have had worsening swelling bilaterally. Noted decrease in oral intake over the last several days. Pt admitted for hypotension with acute CHF exacerbation. Pt given lasix in ER which dropped his BP and he briefly required levophed. Levophed was turned off after several hrs. Cardiology consulted. Troponin continued to trend up and pt was started on heparin gtt overnight for NSTEMI. Pt complained of SOB this AM and was given additional lasix, BP tolerating well with appropriate diuresis. Pt also restarted on home asthma meds which significantly improved his dyspnea. Mild peripheral edema. Denies chest pain, palpitations, sputum production, hemoptysis, n/v/d, fever/chills, abdominal pain, HA, dizziness, or paresthesia. Procedures: None     Consultants:  Cardiology    [x]I have reviewed the flowsheet and previous days notes. 19    Overnight events, pt with increasing troponin, started on heparin gtt per Cardiology, complaining of dyspnea this AM, resolved with lasix and duo-nebz, soft wheezing noted on exam.   Mentation: A&O x 4 in NAD. Respiratory/ Secretions: Dry cough, 4 L/min NC. Hemodynamics: HD stable with SBP > 95 post lasix. Urine output: Adequate, but inaccurate count due to some incontinence.                ROS:Pertinent items are noted in HPI.    Events and notes from last 24 hours reviewed. Care plan discussed on multidisciplinary rounds. Patient Active Problem List   Diagnosis Code    Asthma J45.909    Prostate cancer ([facility] C61    Hypercholesterolemia E78.00    [facility] I20.9    Left bundle branch block I44.7    Coronary artery disease I25.10    Hypertension I11.9    Dyslipidemia E78.5    Carotid artery disease (HCC) I77.9    Aortic valve stenosis I35.0    Anemia D64.9    NSTEMI (non-ST elevated myocardial infarction) ([facility]) I21.4    Congestive heart disease ([facility]) I50.9     Vital Signs:  Visit Vitals  /59   Pulse 89   Temp 97.8 °F (36.6 °C)   Resp 24   Wt 66.3 kg (146 lb 2.6 oz)   SpO2 100%   BMI 22.89 kg/m²       O2 Device: Nasal cannula   O2 Flow Rate (L/min): 6 l/min   Temp (24hrs), Av.9 °F (36.6 °C), Min:97.6 °F (36.4 °C), Max:98.2 °F (36.8 °C)       Intake/Output:   Last shift:      No intake/output data recorded. Last 3 shifts:  1901 -  0700  In: 259.1 [P.O.:120;  I.V.:139.1]  Out: 900 [Urine:900]    Intake/Output Summary (Last 24 hours) at 3/14/2019 1149  Last data filed at 3/14/2019 0700  Gross per 24 hour   Intake 259.07 ml   Output 900 ml   Net -640.93 ml      Current Facility-Administered Medications   Medication Dose Route Frequency    heparin 25,000 units in D5W 250 ml infusion  12-25 Units/kg/hr IntraVENous TITRATE    cefepime (MAXIPIME) 2 g in sterile water (preservative free) 10 mL IV syringe  2 g IntraVENous Q12H    vancomycin (VANCOCIN) 1250 mg in  ml infusion  1,250 mg IntraVENous Q36H    [START ON 3/15/2019] Vancomycin Lab Information  1 Each Other Daily    atorvastatin (LIPITOR) tablet 40 mg  40 mg Oral QHS    aspirin chewable tablet 81 mg  81 mg Oral DAILY    albuterol-ipratropium (DUO-NEB) 2.5 MG-0.5 MG/3 ML  3 mL Nebulization Q6H RT    sodium chloride (NS) flush 5-40 mL  5-40 mL IntraVENous Q8H    clopidogrel (PLAVIX) tablet 75 mg  75 mg Oral DAILY     Telemetry: NSR    Physical Exam:    General: in no apparent distress and oriented times 3   HEENT: Normal   Neck: No abnormally enlarged lymph nodes. Chest: normal   Lungs: normal air entry and end expiratory wheeze with soft bibasilar rales and diminished breath sounds in lower lobes bilaterally. NO rhonchi. Heart: Regular rate and rhythm or without murmur or extra heart sounds   Abdomen: non distended, bowel sounds normoactive, tympanic, abdomen is soft without significant tenderness, masses, organomegaly or guarding   Extremity: Trace edema   Neuro: alert   Skin: Skin color, texture, turgor normal. No rashes or lesions     Labs:  Serum Creatinine Lab Results   Component Value Date/Time    Creatinine 1.44 (H) 03/14/2019 04:42 AM      Creatinine Clearance Estimated Creatinine Clearance: 30 mL/min (A) (based on SCr of 1.44 mg/dL (H)). BUN Lab Results   Component Value Date/Time    BUN 38 (H) 03/14/2019 04:42 AM       WBC Lab Results   Component Value Date/Time    WBC 10.0 03/14/2019 04:42 AM       H/H Lab Results   Component Value Date/Time    HGB 8.3 (L) 03/14/2019 04:42 AM      Platelets Lab Results   Component Value Date/Time    PLATELET 082 31/88/2203 04:42 AM      INR Lab Results   Component Value Date/Time    INR 1.0 03/13/2019 10:45 AM      Temp @[facility]@     Imaging:  [x] I have personally reviewed the patients radiographs and reports  XR Results (most recent):  Results from Hospital Encounter encounter on 03/13/19   XR CHEST PORT    Narrative EXAM: CHEST  CPT CODE: 55707    CLINICAL INDICATION/HISTORY: Shortness of breath. COMPARISON: 25 April 2011. TECHNIQUE: Single AP portable view of chest at 1025. FINDINGS: There is suboptimal inspiratory effort. Subtle patchy opacities are  seen in the lateral mid right lung and at the base as well as scattered in the  left lung. Blunting left costophrenic angle suggests possible  effusion/atelectasis.   The cardiomediastinal silhouette is normal.  The bones  and soft tissues are unremarkable. Impression IMPRESSION:    Suboptimal inspiration; bilateral patchy opacities, as described; infiltrates  versus some interstitial edema. CT Results (most recent):  Results from Hospital Encounter encounter on 03/13/19   CTA CHEST W OR W WO CONT    Narrative EXAM:  CTA Chest with Contrast (CT Pulmonary Study for PE). CLINICAL INDICATION:  Acute shortness of breath which began this morning. Recent hospitalization for shortness of breath and fatigue. COMPARISON:  None. TECHNIQUE:      - Helical volumetric sections of the chest are obtained with CT pulmonary  angiogram protocol. Subsequently, sagittal and coronal multiplanar  reconstruction images are obtained. Maximum intensity projection images are  generated to better delineate the pulmonary vasculature, differentiate between  the pulmonary arteries and veins and to increase sensitivity to pulmonary  emboli.    - IV contrast dose of 100 mL Isovue-370.  - Radiation dose optimization techniques are utilized as appropriate to the  exam, with combination of automated exposure control, adjustment of the mA  and/or kV according to patient's size (Including appropriate matching for  site-specific examinations), or use of iterative reconstruction technique. FINDINGS:     Pulmonary Arteries:      - Unusual pulmonary artery opacification pattern is noted. The upper lung zone  pulmonary artery opacification is complete whereas the lower lung zone pulmonary  arteries appear suboptimally opacified particularly in the left lower lobe basal  segments. Less pronounced atypical poor opacification of the basal segments is  also noted on the right side. Instead of sharply demarcated distinct filling  defects, is involved segments demonstrate gradual fading of the pulmonary artery  luminal opacification.   This makes it difficult to exclude small filling defects  in the segmental and subsegmental arteries at the left lower lobe basal region. The presence of atelectatic changes with pleural effusion may contribute to this  atypical pulmonary artery opacification pattern. Alternatively, intrinsic  intracardiac abnormalities may be responsible for the atypical pulmonary artery  opacification. Notably, the left inferior pulmonary vein opacification is much  less than that of the right pulmonary venous opacification.  - Within the technical limitations of the study, no definite suspicious filling  defects are identified in the main trunk, right and left pulmonary artery, lobar  and interlobar arteries, segmental and intrasegmental arteries. Basal region  distal segmental artery and subsegmental arteries are poorly opacified. Aorta:  No opacification of the aorta. The caliber of the ascending aorta and  aortic arch appear within normal limits. The mid descending aortography  demonstrates atypical aorta contour, with apparent smoothly outlined bulging  contour along the medial aspect (axial #). Further inferiorly, the aorta  appears to dilated slightly, measuring up to about 3.6 cm in diameter and with  crescentic outpouching. At the level of the diaphragmatic hiatus, the right  lateral wall of the aorta demonstrates crescentic appearance with lamellated  pattern. 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 Recommend dedicated CTA of  the aorta on routine basis for further delineation of this finding. Lung, Pleura, Airways:      - Moderate to large bilateral pleural effusion. Associated atelectatic changes. - Consolidative opacities in the left upper lobe and in the right lower lobe. Groundglass opacities in the right upper lobe and right middle lobe. Possible  consolidative opacities vs. atelectatic changes in the left lower lobe. Mediastinum, Radha:  No definite mediastinal adenopathy. No definite hilar  adenopathy. Base of Neck, Axillae:  Unremarkable. Abdomen:  Multiple gallstones. Skeletal Structures:  No acute findings. Impression IMPRESSION:         1. Unusual pulmonary artery opacification with gradual decrease in luminal  opacification in the basal regions, more pronounced on the left side than on the  right. Possibly related to presence of moderate to large pleural effusion with  associated atelectatic changes as the explanation for this pattern vs. intrinsic  intracardiac abnormality. No convincing evidence of pulmonary embolism is  detected within the technical limitations of the study. 2. Moderate to large pleural effusion bilaterally with associated passive  atelectatic changes. Multiple foci of consolidative opacities in both lungs. Most pronounced in the left upper lobe. Query developing pneumonia vs.  pulmonary hemorrhage vs. nonspecific inflammatory process. 3.  Atypical appearance of the descending with smooth crescentic outpouchings  and presence of lamellated calcifications along the aortic wall. 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 With current technique, i.e. lack of IV contrast  in the aorta, detailed analysis is difficult. Recommend CTA of the aorta for  further delineation. 4.  Cholelithiasis.      Problem List :  IMPRESSION:   · Acute Hypoxic Respiratory Failure requiring BIPAP - secondary to pulmonary edema from CHF, will treat for PNA empirically as there is a questionable infiltrate on CXR, CTA Chest negative for PE, showing bilateral pleural effusions with pulmonary edema, transitioned to NC   · Leukocytosis - likely stress response, resolved   · Hypotension- transient with post diuresis, resolved, off vasopressors   · Hx of recent NSTEMI/CAD - w/ PCI in LAD and stent placement 1 week ago, occluded RCA   · Ischemic cardiomyopathy and HFrEF - most recent echo 3/7/19 w/ EF 26-30%  · Hx of recent UGIB 2/2 AVM, S/P EGD  · Valvular heart disease - aortic valve stenosis and MR  · Hx of Asthma- does not appear to be in acute exacerbation   · CKD     Patient Active Problem List   Diagnosis Code    Asthma J45.909    Prostate cancer ([facility] C61    Hypercholesterolemia E78.00    Angina, class I ([facility] I20.9    Left bundle branch block I44.7    Coronary artery disease I25.10    Hypertension I11.9    Dyslipidemia E78.5    Carotid artery disease (HCC) I77.9    Aortic valve stenosis I35.0    Anemia D64.9    NSTEMI (non-ST elevated myocardial infarction) ([facility] I21.4    Congestive heart disease ([facility]) I50.9      RECOMMENDATIONS:   · Resp: Titrate oxygen to maintain oxygen saturation > 93%. Aspiration Precautions. HOB > 30 degrees. Add home symbicort with duo-nebz. CXR in AM.  · I/D:Trend temp and WBC Curve. f/u BxCx. ABX :vanc and cefepime, deescalate ABX once Cx's finalize. · Hem/Onc: Daily CBC; H/H, and plts are stable. Monitor for any signs of active bleeding while on heparin gtt. · CVS: Monitor Hemodynamics. Goal MAP >65mmHg. Echo-pending. Diuresis as BP tolerates, Lasix 30 mg total given today. Continue plavix, aspirin, statin. Hold home BP meds for now. Continue heparin gtt for NSTEMI. Cardiology Following closely. · Metabolic: Daily BMP; monitor e-lytes; replace PRN  · Renal: Trend Renal indices; strict I/O w/ diuresis. Avoid nephrotoxic agents. Flomax. · Endocrine: POC Glucose q6. Avoid hypoglycemia. · GI: Cardiac Diet. · Neuro: PRN pain medications, avoid sedation  · Fluids: N/A  · Code Status: Full code      Review:     Rationale  Recommendations    DVT Proph: On Heparin gtt   Is DVT prophylaxis still   required? Ulcer Prophylaxis: Pepcid For GERD   PPI / H2 required after transfer? Reason? Renal/  hepatic Medications need adjustment:  Renal Dosing    Cardiac Pressors DC from [facility]? AMI: (ASA, ACEI/ARB, Statin, B-blocker):  Hold Home BP meds, further recommendations per Cardiology    ID Antibiotics listed:          Coverage Appropriate? Candidate for Streamlining? Recommendations for duration of therapy: Zosyn and Vanc, may de-escalate if culture remain negative        Endocrine  Insulin coverage/thyroid appropriate? None   Pain  medications  Are current pain medications appropriate for the floor? None   Home   Medications  Recommendations for revision: Added Symbicort   Central lines  None   Robertson  None   Other Devices       High complexity decision making was performed during this consultation and evaluation. [x]       Pt is at high risk for further organ failure and dysfunction.      Signed By: Moshe Arriaza PA-C     March 14, 2019 12:03 PM

## 2019-03-14 NOTE — ROUTINE PROCESS
Bedside and Verbal shift change report given to Marlene Joyner (oncoming nurse) by Dwight Burroughs RN   (offgoing nurse). Report included the following information Kardex, ED Summary, Procedure Summary, Intake/Output, MAR and Recent Results.

## 2019-03-14 NOTE — PROGRESS NOTES
PCCM Update    Spoke with Cardiology Archie Woods regarding patient's increasing Troponin levels. Patient does not complain of chest pain at present. Recent Cath with PCI on 3/11/19. Hx GI bleed from AVM on EGD 2/22/19. EKG does not show any changes from previous. NSR with LBB still present and mild ST elevation in Vs,V2 V3 seen on previous EKG. Cardiology has asked to place patient on heparin gtt per ACS protocol with no initial bolus. Watch for any acute bleeding. Will check H/H q 8 hrs. Trend Cardiac Enzymes until Trops start to decrease.     [facility]     Pulmonary, Critical Care Medicine  [facility]

## 2019-03-15 NOTE — LETTER
3/25/2019 8:28 AM 
 
Mr. Lenora Garner [place] [address] Dear  Suzanne Helga, We have been unable to reach you by phone. Please call our office at [phone] and ask to speak with my nurse in reference to your concerns, and advise you of any recommendations. Sincerely, Demetrius Saldivar MD

## 2019-03-15 NOTE — PROGRESS NOTES
[facility]  Progress Note    Patient: Juliana Rodriguez Age: 80 y.o. : 1926 MR#: [id number] SSN: [id number]  Date/Time: 3/15/2019 9:34 AM    Subjective/24-hour events:     Patient transferred out of ICU overnight. Continues to be quite SOB at rest.  Reports being hungry this AM but PO intake was limited due to worsening SOB with eating. Assessment:   Acute hypoxic respiratory failure requiring BIPAP, resolved  Abnormal chest imaging, concern for possible pneumonia   Acute on chronic systolic CHF  Ischemic cardiomyopathy, EF 26-30%  Recent NSTEMI s/p PCI/stent placement  CAD  Dyslipidemia  CKD 2-3  Chronic anemia  Valvular heart disease  Advanced age  Probable COPD  Tobacco use hx    Plan:  Continue antibiotics but can probably de-escalate at this point. Not convince  Continue nebs/symbicort/usual pulmonary hygiene. Bronchial hygiene protocol ordered. Diuresis per cardiology - IV lasix given this AM.  Not on b-blocker or ACE-I due to marginal BPs. PT/OT evals. Based on current clinical status, will likely not be able to return home alone at discharge. Case discussed with:  [x]Patient  []Family  [x]Nursing  []Case Management  DVT Prophylaxis:  []Lovenox  []Hep SQ  []SCDs  []Coumadin   [x]On Heparin gtt    Objective:   VS:   Visit Vitals  /61 (BP 1 Location: Left arm, BP Patient Position: At rest)   Pulse (!) 107   Temp 97.3 °F (36.3 °C)   Resp 20   Ht 5' 7\" (1.702 m)   Wt 59.9 kg (132 lb 0.9 oz)   SpO2 95%   BMI 20.68 kg/m²      Tmax/24hrs: Temp (24hrs), Av.6 °F (36.4 °C), Min:97.1 °F (36.2 °C), Max:98.4 °F (36.9 °C)      Intake/Output Summary (Last 24 hours) at 3/15/2019 0934  Last data filed at 3/15/2019 0913  Gross per 24 hour   Intake 339.15 ml   Output 600 ml   Net -260.85 ml       General:  Sitting up in bed, in NAD. Nontoxic-appearing. Cardiovascular:  S1, S2. Tachycardic. Pulmonary:  No active wheezing but BS decreased throughout.   GI: Abdomen soft, NTTP. Extremities:  Warm, no ischemia. Neuro:  Awake and alert, motor nofocal.    Labs:    Recent Results (from the past 24 hour(s))   HGB & HCT    Collection Time: 03/14/19 11:48 AM   Result Value Ref Range    HGB 8.7 (L) 13.0 - 16.0 g/dL    HCT 28.4 (L) 36.0 - 11.6 %   METABOLIC PANEL, BASIC    Collection Time: 03/14/19  3:14 PM   Result Value Ref Range    Sodium 139 136 - 145 mmol/L    Potassium 3.4 (L) 3.5 - 5.5 mmol/L    Chloride 101 100 - 108 mmol/L    CO2 29 21 - 32 mmol/L    Anion gap 9 3.0 - 18 mmol/L    Glucose 112 (H) 74 - 99 mg/dL    BUN 37 (H) 7.0 - 18 MG/DL    Creatinine 1.39 (H) 0.6 - 1.3 MG/DL    BUN/Creatinine ratio 27 (H) 12 - 20      GFR est AA 58 (L) >60 ml/min/1.73m2    GFR est non-AA 48 (L) >60 ml/min/1.73m2    Calcium 8.5 8.5 - 10.1 MG/DL   PTT    Collection Time: 03/14/19  3:14 PM   Result Value Ref Range    aPTT 115.0 (H) 23.0 - 36.4 SEC   HGB & HCT    Collection Time: 03/14/19  3:14 PM   Result Value Ref Range    HGB 8.1 (L) 13.0 - 16.0 g/dL    HCT 26.5 (L) 36.0 - 48.0 %   MAGNESIUM    Collection Time: 03/14/19  3:14 PM   Result Value Ref Range    Magnesium 2.2 1.6 - 2.6 mg/dL   ECHO ADULT FOLLOW-UP OR LIMITED    Collection Time: 03/14/19  4:04 PM   Result Value Ref Range    PASP 60.0 mmHg   PTT    Collection Time: 03/14/19 11:38 PM   Result Value Ref Range    aPTT 88.8 (H) 23.0 - 36.4 SEC   CBC WITH AUTOMATED DIFF    Collection Time: 03/15/19  5:24 AM   Result Value Ref Range    WBC 9.7 4.6 - 13.2 K/uL    RBC 2.98 (L) 4.70 - 5.50 M/uL    HGB 8.3 (L) 13.0 - 16.0 g/dL    HCT 26.6 (L) 36.0 - 48.0 %    MCV 89.3 74.0 - 97.0 FL    MCH 27.9 24.0 - 34.0 PG    MCHC 31.2 31.0 - 37.0 g/dL    RDW 15.5 (H) 11.6 - 14.5 %    PLATELET 771 878 - 425 K/uL    MPV 10.8 9.2 - 11.8 FL    NEUTROPHILS 68 42 - 75 %    LYMPHOCYTES 14 (L) 20 - 51 %    MONOCYTES 18 (H) 2 - 9 %    EOSINOPHILS 0 0 - 5 %    BASOPHILS 0 0 - 3 %    ABS. NEUTROPHILS 6.6 1.8 - 8.0 K/UL    ABS.  LYMPHOCYTES 1.4 0.8 - 3.5 K/UL ABS. MONOCYTES 1.7 (H) 0 - 1.0 K/UL    ABS. EOSINOPHILS 0.0 0.0 - 0.4 K/UL    ABS.  BASOPHILS 0.0 0.0 - 0.06 K/UL    DF MANUAL      PLATELET COMMENTS ADEQUATE PLATELETS      RBC COMMENTS HYPOCHROMIA  1+        RBC COMMENTS POLYCHROMASIA  1+        RBC COMMENTS ACANTHOCYTES  1+       METABOLIC PANEL, BASIC    Collection Time: 03/15/19  5:24 AM   Result Value Ref Range    Sodium 138 136 - 145 mmol/L    Potassium 3.8 3.5 - 5.5 mmol/L    Chloride 102 100 - 108 mmol/L    CO2 28 21 - 32 mmol/L    Anion gap 8 3.0 - 18 mmol/L    Glucose 112 (H) 74 - 99 mg/dL    BUN 32 (H) 7.0 - 18 MG/DL    Creatinine 1.31 (H) 0.6 - 1.3 MG/DL    BUN/Creatinine ratio 24 (H) 12 - 20      GFR est AA >60 >60 ml/min/1.73m2    GFR est non-AA 51 (L) >60 ml/min/1.73m2    Calcium 9.1 8.5 - 10.1 MG/DL   MAGNESIUM    Collection Time: 03/15/19  5:24 AM   Result Value Ref Range    Magnesium 2.2 1.6 - 2.6 mg/dL   PHOSPHORUS    Collection Time: 03/15/19  5:24 AM   Result Value Ref Range    Phosphorus 3.0 2.5 - 4.9 MG/DL   PTT    Collection Time: 03/15/19  5:24 AM   Result Value Ref Range    aPTT 65.9 (H) 23.0 - 36.4 SEC       Signed By: Sandra Montelongo MD     March 15, 2019 9:34 AM

## 2019-03-15 NOTE — PROGRESS NOTES
Respiratory Care Assessment for Bronchial hygiene or Lung Expansion Therapy  Patient  Ann Messina     80 y.o.   male     3/15/2019  12:13 PM  Patient Active Problem List   Diagnosis Code    Asthma J45.909    Prostate cancer ([facility] C61    Hypercholesterolemia E78.00    Angina, class I ([facility] I20.9    Left bundle branch block I44.7    Coronary artery disease I25.10    Hypertension I11.9    Dyslipidemia E78.5    Carotid artery disease (HCC) I77.9    Aortic valve stenosis I35.0    Anemia D64.9    NSTEMI (non-ST elevated myocardial infarction) ([facility] I21.4    Congestive heart disease (HCC) I50.9       ABG:  Date:3/15/2019  No results found for: PH, PHI, PCO2, PCO2I, PO2, PO2I, HCO3, HCO3I, FIO2, FIO2I    Chest X-ray:  Date:3/15/2019  Results from Hospital Encounter encounter on 03/13/19   XR CHEST PORT    Narrative EXAM: PORTABLE CHEST 0527 hours    CLINICAL HISTORY/INDICATION: evaluate resolution of pulmonary edema acute  hypoxic respiratory failure, chronic systolic congestive heart failure, ischemic  cardiomyopathy with ejection fraction of 26-30%, non-ST segment elevated  myocardial infarction,         COMPARISON: Chest x-ray March 14, March 13, 2019, April 25, 2011. TECHNIQUE: Single AP view    FINDINGS:     Cardiac silhouette is top normal in size. Mild tortuosity of the arch unchanged. Lungs are adequately inflated. Linear scarring in the right upper lung. Increased interstitial markings throughout the lungs. Mild blunting of both  costophrenic angles. .      Impression IMPRESSION:    Small bilateral pleural effusions. Persistent increased interstitial markings bilaterally on a background of  emphysema. Interstitial edema vs. interstitial lung disease.        Lab Test:  Date:3/15/2019  WBC:   Lab Results   Component Value Date/Time    WBC 9.7 03/15/2019 05:24 AM   HGB:   Lab Results   Component Value Date/Time    HGB 8.3 (L) 03/15/2019 05:24 AM    PLTS:   Lab Results   Component Value Date/Time PLATELET 262 12/96/7994 05:24 AM       SaO2%/flow: @FRIPPQG2(3)@    Vital Signs:     Patient Vitals for the past 8 hrs:   Temp Pulse Resp BP SpO2   03/15/19 1130     98 %   03/15/19 1101 98.4 °F (36.9 °C) (!) 105 20 101/61 99 %   03/15/19 0910 97.3 °F (36.3 °C) (!) 107   95 %   03/15/19 0806     99 %         RA/O2 flow/device: NC        First Inital Assesment:     [x]Yes []No   Reevaluation/Reassessment:    []Yes [x]No     CHART REVIEW   Points 0 X 1 X 2 X 3 X 4 X Points   Pulmonary History Smoking History (1) none  Recent Smoking History <1 PPD  Recent Smoking History >1 PPD  Pulmonary Disease or Impairment  Severe Pulmonary Disease  3   Surgical History No Surgery  General Surgery  Lower Abdominal  Thoracic or Upper Abdominal  Thoracic & Pulmonary Disease  0   CXR Clear or not indicated  Chronic changes or CXR Pending  Infiltrate, atelectasis or pleural effusions  Infiltrates in more than 1lobe  Infiltrates +atelectasis  +/or pleural effusions  2     PATIENT ASSESSMENT    0 X 1 X 2 X 3 X 4 X Points   Respiratory Pattern Regular pattern RR 12-20  Increased RR 21-27   Mild Dyspnea at rest, irregular pattern RR 28-32  Moderate Dyspnea at rest, Use of accessory muscles, RR 33-36  Severe Dyspnea, Use of accessory muscles RR >36  2   Mental Status Alert Oriented cooperative  Confused, Follows commands  Lethargic, Does not follow commands  Obtunded  Unresponsive  0   Breath Sounds Clear  Decreased Unilaterally  Decreased Bilaterally  Mild Scattered wheezing or Crackles in bases  Severe Wheezing or rhonchi  2   Cough Strong dry NPC  Strong Productive  Weak NPC  Weak productive or weak with rhonchi  No cough or may require suctioning  0   Level of Activity Ambulatory  Ambulatory with assistance  Temporarily Non-ambulatory  Non-Ambulatory, able to position self  Unable to position self, confined to bed  3   Total Points/Score:   12     Specific Intervention Chart(Cough and deep breathing)    Bronchial Hygiene/Secretion Clearance:    []EZPAP []Rotation bed with vibration    []CPT with percussor []CPT via vest   []Oscillastiang positive pressure expiratory device      Lung Expansion:    []Incentive Spirometer w/RT visits []Incentive Spirometer w/nursing    []EZPAP      *Suctioning:    []Nasal Tracheal []Tracheal     *suctioning will be ordered and done PRN with an associated frequency such as QID/PRN based on score       Other:    Care Plan   Level # Score Modality Frequency Comment   Level 1 >17 - -    Level 2 14-17 - -    Level 3 10-13 Cough, deep brearthing Continuous    Level 4 1-9 - -      BRONCHIAL HYGIENE SCORING AND FREQUENCY GUIDELINES   Frequency Indications/Findings Level #   Q4 ATC Copious secretions, SOB, unable to sleep 1   QID & PRN at night Moderate amounts of secretions 2   TID or Q6 wa Small amounts of secretions and poor cough: recent history of secretions 3   BID or Q8 wa Unable to deep breathe and cough effectively 4        Comments:  -    Respiratory Therapist: Devika Mckinney, RT

## 2019-03-15 NOTE — ROUTINE PROCESS
Received report from [facility]. Pt AAOx3, NAD, breathing non labored, on O2 NC at 4L an hour., HOB up. IV site clean, dry and intact. Heparin drip going per ordered protocol-rate verified done. Bed at the lowest level on lock position, call bell w/i reach. Pt had episode of SOB and chest pain. 12 lead ECG done. Md came and saw pt. NTP put in place per order. Cardiac panel ordered. Bedside and Verbal shift change report given to KATYA Gong (oncoming nurse) by me (offgoing nurse).  Report included the following information SBAR, Kardex, Intake/Output, MAR, Recent Results and Cardiac Rhythm SR.

## 2019-03-15 NOTE — PROGRESS NOTES
Reason for Readmission:     Congestive Heart Failure         RRAT Score and Risk Level:    21      Level of Readmission:    High      Care Conference scheduled:   No       Resources/supports as identified by patient/family:   Daughter lives close-by and assists patient as needed. Lila Landry ([phone]). Top Challenges facing patient (as identified by patient/family and CM):  Per the daughter, during the last hospital stay, the patient was to go to acute rehab, however, he went home instead. He was set up with [address]. He was readmitted prior to [facility] seeing him. Daughter is interested in SNF and will require a [place]. Will need PT/OT evals for auth. [address] signed for [facility]. Voice message left with Leticia. Finances/Medication cost?    Has prescriptions filled at [facility]: patient was driving prior to admission      Support system or lack thereof? Supportive daughter who lives close-by  Living arrangements? Lives alone and was independent prior to admission     Self-care/ADLs/Cognition? Alert and oriented x 4          Current Advanced Directive/Advance Care Plan:  Per daughter, the patient has an ACP, but is not on file here. Plan for utilizing home health:  Plan is for SNF. HH would be a back-up. Would like [facility] if SNF is not approved. Likelihood of additional readmission:   High             Transition of Care Plan:    Based on readmission, the patient's previous Plan of Care   has been evaluated and/or modified. The current Transition of Care Plan is:     SNF at [facility] and then home w/ Home Health.      Palma Freeman RN  [phone]

## 2019-03-15 NOTE — PROGRESS NOTES
[facility] - Progress Note  Admit Date: 3/13/2019    Assessment:     Hospital Problems  Date Reviewed: 3/1/2019          Codes Class Noted POA    Congestive heart disease ([facility] ICD-10-CM: I50.9  ICD-9-CM: 428.0  3/13/2019 Unknown                   -Acute respiratory distress requiring bipap. CXR with pulmonary edema, questionable infiltrate (previous CXR was done at [facility]). Improving off bipap with Abx, nebs and gentle diuresis. -NSTEMI, suspect secondary in setting of acute illness, troponin peak 7.58, s/p PCI to LAD 3/11/2019 with occluded mid RCA which appeared to be chronic medically managed, ECG with known LBBB, denies CP.  -Heart failure with reduced EF. Discharged on coreg, lisinopril, lasix 3/12/2019.  -Ischemic cardiomyopathy with EF 26-30% on echo this admission, unchanged from last admission. Conservatively managing in setting of advanced age. -CAD s/p LAD PCI with DEMAR 3/11/2019 with previous PCI to LCx 2011. Cath 3/11/2019 (Occluded mid RCA which appears to be chronic. There is faint collateral from the left coronary system, Left main artery with ostial 30-40%, LAD mid focal severely calcified tortuous 99% stenosis with CARLENE II flow, Circumflex coronary artery with diffuse mild 20-30% stenosis throughout). Discharged on ASA, plavix, statin, BB.  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 -Mild kidney injury. -Valvular heart disease with moderate aortic valve stenosis and mild to moderate MR.  -Carotid artery disease. Moderate bilateral ICA stenosis medically managed.  -Peripheral vascular disease.  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  Because of this his blood pressure should be checked in his right arm.  -Hypertension. Low normal on admission requiring pressor support.  -Dyslipidemia. On statin. -H/o intermittent LBBB, now appears persistent.  -Asthma, former smoker.   -Advanced age, full code, but independent, lives alone and cares for self, significant decline in functional capacity follow recent hospital stays.     Primary cardiologist Dr. Ghislaine Leonard. Plan:     Continues to improve with gentle diuresis as BP allows, abx and nebs. CXR appears to still have vascular congestion, giving another 20 IV lasix today. Troponin trending downward, would continue medical management, would continue heparin infusion for 48 hours if no concerns of bleeding. Continued on ASA, plavix, statin. Will resume BB +/- ace when BP will allow. Would recommend evaluation for rehab prior to discharge, patient lives alone and cares for self. **Went back to re-evaluate twice this afternoon to discuss care and goals of care with daughter. She unfortunately was not present. Patients breathing appears stable at present on oxygen support s/p IV lasix and nebs. Subjective:     Breathing improved, remains on oxygen support. No CP.     Objective:      Patient Vitals for the past 8 hrs:   Temp Pulse Resp BP SpO2   03/15/19 0400 97.5 °F (36.4 °C) 94 20 101/61 98 %   03/15/19 0226     99 %         Patient Vitals for the past 96 hrs:   Weight   03/15/19 0400 59.9 kg (132 lb 0.9 oz)   03/14/19 1445 66.2 kg (146 lb)   03/14/19 0400 66.3 kg (146 lb 2.6 oz)   03/13/19 2019 66.3 kg (146 lb 2.6 oz)                    Intake/Output Summary (Last 24 hours) at 3/15/2019 0806  Last data filed at 3/15/2019 1876  Gross per 24 hour   Intake 347.15 ml   Output 500 ml   Net -152.85 ml       Physical Exam:  General:  alert, cooperative, no distress, appears stated age  Neck:  no JVD  Lungs:  rales R base, L base  Heart:  regular rate and rhythm  Abdomen:  abdomen is soft without significant tenderness, masses, organomegaly or guarding  Extremities:  extremities normal, atraumatic, no cyanosis or edema    Data Review:     Labs: Results:       Chemistry Recent Labs     03/15/19  0524 03/14/19  1514 03/14/19  0442 03/13/19  1025   * 112* 100* 158*    139 139 139   K 3.8 3.4* 3.8 4.3    101 102 103   CO2 28 29 29 29   BUN 32* 37* 38* 38*   CREA 1.31* 1.39* 1.44* 1.39*   CA 9.1 8.5 9.3 9.4   MG 2.2 2.2 2.3  --    PHOS 3.0  --  4.5  --    AGAP 8 9 8 7   BUCR 24* 27* 26* 27*   AP  --   --   --  61   TP  --   --   --  7.5   ALB  --   --   --  3.5   GLOB  --   --   --  4.0   AGRAT  --   --   --  0.9      CBC w/Diff Recent Labs     03/15/19  0524 03/14/19  1514 03/14/19  1148 03/14/19  0442 03/14/19  0133   WBC 9.7  --   --  10.0 10.6   RBC 2.98*  --   --  3.03* 3.00*   HGB 8.3* 8.1* 8.7* 8.3* 8.3*   HCT 26.6* 26.5* 28.4* 27.4* 27.2*     --   --  264 254   GRANS PENDING  --   --  68 80*   LYMPH PENDING  --   --  29 11*   EOS PENDING  --   --  0 0      Cardiac Enzymes No results found for: CPK, CK, CKMMB, CKMB, RCK3, CKMBT, CKNDX, CKND1, CHRISTINE, TROPT, TROIQ, NGHIA, TROPT, TNIPOC, BNP, BNPP   Coagulation Recent Labs     03/15/19  0524 03/14/19  2338  03/13/19  1045   PTP  --   --   --  12.5   INR  --   --   --  1.0   APTT 65.9* 88.8*   < >  --     < > = values in this interval not displayed.        Lipid Panel Lab Results   Component Value Date/Time    Cholesterol, total 87 03/08/2019 05:28 AM    HDL Cholesterol 44 03/08/2019 05:28 AM    LDL, calculated 30 03/08/2019 05:28 AM    VLDL, calculated 13 03/08/2019 05:28 AM    Triglyceride 65 03/08/2019 05:28 AM    CHOL/HDL Ratio 2.0 03/08/2019 05:28 AM      BNP No results found for: BNP, BNPP, XBNPT   Liver Enzymes Recent Labs     03/13/19  1025   TP 7.5   ALB 3.5   AP 61   SGOT 37      Digoxin    Thyroid Studies Lab Results   Component Value Date/Time    TSH 1.22 03/07/2019 05:29 AM          Signed By: AMITA Murguia     March 15, 2019

## 2019-03-15 NOTE — PROGRESS NOTES
Bedside shift report received from Jefry(offgoing nurse who received report from ICU)  including SBAR, MAR, and Recent Results.  Patient not yet in room

## 2019-03-15 NOTE — TELEPHONE ENCOUNTER
Able to speak to patients daughter, daughter was concerned about the different physicians rounding on her father and wanted the advice of Dr. Nick Ndiaye. Reviewed this with Dr. Nick Ndiaye. Whom informed he has spoken to his colleagues and they are rounding on the patient daily and are aware of his thoughts on the patient's care. Unable to leave voicemail on daughter's phone voicemail was full.

## 2019-03-15 NOTE — PROGRESS NOTES
ABG drawn and analyzed due to apparent respiratory distress, pt with significant WOB, RR 24, , SpO2 98%. BS expiratory wheeze over bilateral crackles. ABG: (Venous sample)  PH       7.471  PCO    2 38.7  PO2     32  HCO3- 28.2  sO2      67%    No significant findings on ABG (note sample is VENOUS, PO2 within VBG normal limits of 30-40). Discussed with RN, Lasix was administered this AM. Will monitor.

## 2019-03-15 NOTE — PROGRESS NOTES
Bedside shift report given to [address] [place] [address])  including SBAR, MAR, and Recent Results, and plan for day. Heparin gtt verified, inhaler given per patient request. Opportunity for questions provided. Patient comfortable in bed, denies pain, call light in reach, side rails up x2, bed alarm on, denies any needs at this time.

## 2019-03-15 NOTE — PROGRESS NOTES
ADULT PROTOCOL: JET AEROSOL ASSESSMENT    Patient  Keshawn Smith     80 y.o.   male     3/15/2019  12:40 PM    Breath Sounds Pre Procedure:  Breath Sounds Bilateral: Expiratory wheezing, Crackles                                            Breath Sounds Post Procedure: Breath Sounds Bilateral: Crackles                                               Breathing pattern: Pre procedure  Breathing Pattern: Dyspnea at rest, Accessory muscles, Tachypneic          Post procedure  Breathing Pattern: Dyspnea at rest, Tachypneic, Accessory muscles    Cough: Pre procedure  Cough: Non-productive               Post procedure Cough: Non-productive    Heart Rate: Pre procedure Pulse: 107           Post procedure Pulse: 106    Resp Rate: Pre procedure  Respirations: 24           Post procedure          Nebulizer Therapy: Current medications Aerosolized Medications: DuoNeb       Problem List:   Patient Active Problem List   Diagnosis Code    Asthma J45.909    Prostate cancer ([facility] C61    Hypercholesterolemia E78.00    Angina, class I ([facility] I20.9    Left bundle branch block I44.7    Coronary artery disease I25.10    Hypertension I11.9    Dyslipidemia E78.5    Carotid artery disease (HCC) I77.9    Aortic valve stenosis I35.0    Anemia D64.9    NSTEMI (non-ST elevated myocardial infarction) (HCC) I21.4    Congestive heart disease (HCC) I50.9       Patient alert and cooperative to use MDI: Yes    Home Respiratory Therapy Regimen/Frequency:  YES  Medication   Device  Frequency     SEVERITY INDEX:    ITEM 0 1 2 3 4 Score   Respiratory Pattern and or Rate Regular  10-19 Regular  20-24   24-30    30-34 Severe SOB or   Greater than 35 2   Breath Sounds Clear Occasional Wheeze Mild Wheezing Moderate Wheezing  wheezing/Absent breath sounds 3   Shortness of Breath None Dyspnea on Exertion Dyspnea at Rest Moderate Shortness of Breath at Rest Severe Shortness of Breath - Limited Speech 3       Total Score:  8    * Scoring Guidelines  0-4 pts:  PRN-BID   5-7 pts:  BID, TID, QID  8-9 pts:  TID, QID, Q6  10-12 pts:  Q4-Q6  * - Guidelines used with clinical judgement. PRN Treatments can be ordered to supplement scheduled treatments. Regardless of score, frequency should not be less than normal home regimen.     Recommended Order/Frequency:  Q4RT    Comments:          Respiratory Therapist: Fortunato Basilio RT

## 2019-03-16 NOTE — PROGRESS NOTES
Pnt resting quietly in bed  Verbal and alert  RR noted to be labored; SPO2 @ 94%; exp wheezing noted  Denies any other complaints @ this time  Pnt stable  Will continue to monitor   NADN

## 2019-03-16 NOTE — PROGRESS NOTES
[facility] Hospitalist Group  Progress Note    Patient: Erasto Willard Age: 80 y.o. : 1926 MR#: [id number] SSN: [id number]  Date/Time: 3/16/2019 9:34 AM    Subjective/24-hour events:     Pt states his breathing is improved, still requiring 02 supplementation    Assessment:   Acute hypoxic respiratory failure required BIPAP, pulm input noted, appreciate assistance  Abnormal chest imaging, concern for possible pneumonia  Pleural effusions  Acute on chronic systolic CHF  Ischemic cardiomyopathy, EF 26-30%  Recent NSTEMI s/p PCI/stent placement trop downtrending  CAD  Dyslipidemia  CKD 2-3  Chronic anemia  Valvular heart disease  Advanced age  Probable COPD  Tobacco use hx    Plan:  Dc vanc  Continue nebs/symbicort/usual pulmonary hygiene. Bronchial hygiene protocol ordered. Diuresis per cardiology - IV lasix   Not on b-blocker or ACE-I due to marginal BPs. PT/OT evals. Based on current clinical status, will likely not be able to return home alone at discharge. Case discussed with:  [x]Patient  []Family  [x]Nursing  []Case Management  DVT Prophylaxis:  []Lovenox  []Hep SQ  []SCDs  []Coumadin   [x]On Heparin gtt    Objective:   VS:   Visit Vitals  /62 (BP 1 Location: Right arm, BP Patient Position: At rest)   Pulse (!) 112   Temp 97 °F (36.1 °C)   Resp 18   Ht 5' 7\" (1.702 m)   Wt 67 kg (147 lb 11.3 oz)   SpO2 98%   BMI 23.13 kg/m²      Tmax/24hrs: Temp (24hrs), Av.4 °F (36.3 °C), Min:97 °F (36.1 °C), Max:97.7 °F (36.5 °C)      Intake/Output Summary (Last 24 hours) at 3/16/2019 1733  Last data filed at 3/15/2019 2211  Gross per 24 hour   Intake 100 ml   Output    Net 100 ml       General:  Sitting up in bed, in NAD. Nontoxic-appearing. Cardiovascular:  S1, S2. Tachycardic. Pulmonary:  No active wheezing but BS decreased throughout. GI:  Abdomen soft, NTTP. Extremities:  Warm, no ischemia.   Neuro:  Awake and alert, motor nofocal.    Labs:    Recent Results (from the past 24 hour(s))   VANCOMYCIN, TROUGH    Collection Time: 03/15/19  9:17 PM   Result Value Ref Range    Vancomycin,trough 10.5 10.0 - 20.0 ug/mL    Reported dose date: 20190314      Reported dose time: 1000      Reported dose: 1250 MG UNITS   EKG, 12 LEAD, INITIAL    Collection Time: 03/16/19  3:56 AM   Result Value Ref Range    Ventricular Rate 114 BPM    Atrial Rate 117 BPM    P-R Interval 240 ms    QRS Duration 144 ms    Q-T Interval 298 ms    QTC Calculation (Bezet) 410 ms    Calculated P Axis 0 degrees    Calculated R Axis 10 degrees    Calculated T Axis -178 degrees    Diagnosis       Sinus tachycardia with 1st degree AV block with premature supraventricular   complexes with occasional premature ventricular complexes and fusion  complexes  Left bundle branch block  Abnormal ECG  When compared with ECG of 14-MAR-2019 00:01,  PREVIOUS ECG IS PRESENT     CBC WITH AUTOMATED DIFF    Collection Time: 03/16/19  4:15 AM   Result Value Ref Range    WBC 9.8 4.6 - 13.2 K/uL    RBC 3.12 (L) 4.70 - 5.50 M/uL    HGB 8.5 (L) 13.0 - 16.0 g/dL    HCT 27.5 (L) 36.0 - 48.0 %    MCV 88.1 74.0 - 97.0 FL    MCH 27.2 24.0 - 34.0 PG    MCHC 30.9 (L) 31.0 - 37.0 g/dL    RDW 15.6 (H) 11.6 - 14.5 %    PLATELET 260 197 - 049 K/uL    MPV 10.5 9.2 - 11.8 FL    NEUTROPHILS 68 40 - 73 %    LYMPHOCYTES 20 (L) 21 - 52 %    MONOCYTES 12 (H) 3 - 10 %    EOSINOPHILS 0 0 - 5 %    BASOPHILS 0 0 - 2 %    ABS. NEUTROPHILS 6.6 1.8 - 8.0 K/UL    ABS. LYMPHOCYTES 2.0 0.9 - 3.6 K/UL    ABS. MONOCYTES 1.2 0.05 - 1.2 K/UL    ABS. EOSINOPHILS 0.0 0.0 - 0.4 K/UL    ABS.  BASOPHILS 0.0 0.0 - 0.1 K/UL    DF MANUAL      PLATELET COMMENTS ADEQUATE PLATELETS      RBC COMMENTS ANISOCYTOSIS  1+        RBC COMMENTS HYPOCHROMIA  1+        RBC COMMENTS POLYCHROMASIA  1+       METABOLIC PANEL, BASIC    Collection Time: 03/16/19  4:15 AM   Result Value Ref Range    Sodium 140 136 - 145 mmol/L    Potassium 3.9 3.5 - 5.5 mmol/L    Chloride 102 100 - 108 mmol/L    CO2 28 21 - 32 mmol/L    Anion gap 10 3.0 - 18 mmol/L    Glucose 154 (H) 74 - 99 mg/dL    BUN 30 (H) 7.0 - 18 MG/DL    Creatinine 1.29 0.6 - 1.3 MG/DL    BUN/Creatinine ratio 23 (H) 12 - 20      GFR est AA >60 >60 ml/min/1.73m2    GFR est non-AA 52 (L) >60 ml/min/1.73m2    Calcium 9.2 8.5 - 10.1 MG/DL   MAGNESIUM    Collection Time: 03/16/19  4:15 AM   Result Value Ref Range    Magnesium 2.3 1.6 - 2.6 mg/dL   PHOSPHORUS    Collection Time: 03/16/19  4:15 AM   Result Value Ref Range    Phosphorus 3.2 2.5 - 4.9 MG/DL   PTT    Collection Time: 03/16/19  4:15 AM   Result Value Ref Range    aPTT 70.9 (H) 23.0 - 36.4 SEC   CARDIAC PANEL,(CK, CKMB & TROPONIN)    Collection Time: 03/16/19  4:15 AM   Result Value Ref Range    CK 56 39 - 308 U/L    CK - MB 1.4 <3.6 ng/ml    CK-MB Index 2.5 0.0 - 4.0 %    Troponin-I, QT 1.94 (HH) 0.0 - 0.045 NG/ML       Signed By: Jasmin Chopra MD     March 16, 2019 9:34 AM

## 2019-03-16 NOTE — PROGRESS NOTES
[facility]  -  Progress Note      Patient: Carlos Freeman MRN: [id number]  SSN: [id number]    YOB: 1926  Age: 80 y.o. Sex: male      Admit Date: 3/13/2019    Assessment:     Hospital Problems  Date Reviewed: 3/1/2019          Codes Class Noted POA    Congestive heart disease ([facility] [address] ICD-10-CM: I50.9  ICD-9-CM: 428.0  3/13/2019 Unknown            -Acute respiratory distress requiring bipap. CXR with pulmonary edema, questionable infiltrate (previous CXR was done at [facility]).  Improving off bipap with Abx, nebs and gentle diuresis. -NSTEMI, suspect secondary in setting of acute illness, troponin peak 7.58, s/p PCI to LAD 3/11/2019 with occluded mid RCA which appeared to be chronic medically managed, ECG with known LBBB, denies CP.  -Heart failure with reduced EF. Discharged on coreg, lisinopril, lasix 3/12/2019.  -Ischemic cardiomyopathy with EF 26-30% on echo this admission, unchanged from last admission. Conservatively managing in setting of advanced age. -CAD s/p LAD PCI with DEMAR 3/11/2019 with previous PCI to LCx 2011. Cath 3/11/2019 (Occluded mid RCA which appears to be chronic. There is faint collateral from the left coronary system, Left main artery with ostial 30-40%, LAD mid focal severely calcified tortuous 99% stenosis with CARLENE II flow, Circumflex coronary artery with diffuse mild 20-30% stenosis throughout). Discharged on ASA, plavix, statin, BB.  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 -Mild kidney injury. -Valvular heart disease with moderate aortic valve stenosis and mild to moderate MR.  -Carotid artery disease. Moderate bilateral ICA stenosis medically managed.  -Peripheral vascular disease.  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  Because of this his blood pressure should be checked in his right arm.  -Hypertension. Low normal on admission requiring pressor support.  -Dyslipidemia. On statin.   -H/o intermittent LBBB, now appears persistent.  -Asthma, former smoker. -Advanced age, full code, but independent, lives alone and cares for self, significant decline in functional capacity follow recent hospital stays.     Primary cardiologist Dr. Onesimo Bone. Plan:     Stable  He is negative 785cc's and remains short of breath, which I suspect may be more related to the L pleural effusion. He is continuing to receive nebulizer treatments. Would appreciate pulmonary's opinion on if a thoracentesis may be beneficial in this patient. Will continue with ASA, Plavix and statin  Hold on BB with BP at 100. Activity as tolerated. Subjective:     Had some abdominal pain earlier but improved after BM. He states that his breathing is still continuing to be short of breath and no improvement from yesterday. No chest pain, and remains on heparin drip.     Objective:      Patient Vitals for the past 8 hrs:   Temp Pulse Resp BP SpO2   03/16/19 0557    104/67    03/16/19 0330 97.6 °F (36.4 °C) (!) 118 20 108/64 97 %   03/16/19 0302     91 %   03/16/19 0035 97.5 °F (36.4 °C) (!) 111 20 107/66 96 %   03/15/19 2308     91 %         Patient Vitals for the past 96 hrs:   Weight   03/16/19 0607 67 kg (147 lb 11.3 oz)   03/15/19 0400 59.9 kg (132 lb 0.9 oz)   03/14/19 1445 66.2 kg (146 lb)   03/14/19 0400 66.3 kg (146 lb 2.6 oz)   03/13/19 2019 66.3 kg (146 lb 2.6 oz)         Intake/Output Summary (Last 24 hours) at 3/16/2019 0703  Last data filed at 3/15/2019 2211  Gross per 24 hour   Intake 100 ml   Output 100 ml   Net 0 ml       Physical Exam:  General:  alert, cooperative, no distress, appears stated age  Neck:  nontender, no JVD  Lungs:  diminished breath sounds L base  Heart:  regular rate and rhythm, S1, S2 normal, no murmur, click, rub or gallop  Abdomen:  abdomen is soft without significant tenderness, masses, organomegaly or guarding  Extremities:  extremities normal, atraumatic, no cyanosis or edema    Data Review:     Labs: Results: Chemistry Recent Labs     03/16/19  0415 03/15/19  0524 03/14/19  1514 03/14/19  0442  03/13/19  1025   * 112* 112* 100*  --  158*    138 139 139  --  139   K 3.9 3.8 3.4* 3.8  --  4.3    102 101 102  --  103   CO2 28 28 29 29  --  29   BUN 30* 32* 37* 38*  --  38*   CREA 1.29 1.31* 1.39* 1.44*  --  1.39*   CA 9.2 9.1 8.5 9.3  --  9.4   MG 2.3 2.2 2.2 2.3   < >  --    PHOS 3.2 3.0  --  4.5  --   --    AGAP 10 8 9 8  --  7   BUCR 23* 24* 27* 26*  --  27*   AP  --   --   --   --   --  61   TP  --   --   --   --   --  7.5   ALB  --   --   --   --   --  3.5   GLOB  --   --   --   --   --  4.0   AGRAT  --   --   --   --   --  0.9    < > = values in this interval not displayed. CBC w/Diff Recent Labs     03/16/19  0415 03/15/19  0524 03/14/19  1514  03/14/19  0442   WBC 9.8 9.7  --   --  10.0   RBC 3.12* 2.98*  --   --  3.03*   HGB 8.5* 8.3* 8.1*   < > 8.3*   HCT 27.5* 26.6* 26.5*   < > 27.4*    278  --   --  264   GRANS PENDING 68  --   --  68   LYMPH PENDING 14*  --   --  29   EOS PENDING 0  --   --  0    < > = values in this interval not displayed. Cardiac Enzymes Lab Results   Component Value Date/Time    CPK 56 03/16/2019 04:15 AM    CKMB 1.4 03/16/2019 04:15 AM    CKND1 2.5 03/16/2019 04:15 AM    TROIQ 1.94 () 03/16/2019 04:15 AM      Coagulation Recent Labs     03/16/19  0415 03/15/19  0524  03/13/19  1045   PTP  --   --   --  12.5   INR  --   --   --  1.0   APTT 70.9* 65.9*   < >  --     < > = values in this interval not displayed.        Lipid Panel Lab Results   Component Value Date/Time    Cholesterol, total 87 03/08/2019 05:28 AM    HDL Cholesterol 44 03/08/2019 05:28 AM    LDL, calculated 30 03/08/2019 05:28 AM    VLDL, calculated 13 03/08/2019 05:28 AM    Triglyceride 65 03/08/2019 05:28 AM    CHOL/HDL Ratio 2.0 03/08/2019 05:28 AM      BNP No results found for: BNP, BNPP, XBNPT   Liver Enzymes Recent Labs     03/13/19  1025   TP 7.5   ALB 3.5   AP 61   SGOT 37 Digoxin    Thyroid Studies Lab Results   Component Value Date/Time    TSH 1.22 03/07/2019 05:29 AM

## 2019-03-16 NOTE — CONSULTS
[facility]  Pulmonary, Critical Care, and Sleep Medicine    Initial Patient Consult    Name: Kennedy Rose MRN: [id number]   : 1926 Hospital: [address] [place] [address]    Date: 3/16/2019        IMPRESSION:   · Acute hypoxic respiratory failure in a patient with HFrEF (EF 26%). My impression is that this hypoxic respiratory failure is secondary to bilateral pleural effusions and pulmonary edema and patient will benefit from thoracentesis and ongoing management of his heart failure. · Evidence of bilateral pleural effusions and interstitial marking concerning for pulmonary edema. · NSTEMi and S/P PCI to LAD on 3/11/19  · Ischemic cardiomyopathy and HFrEF  · Anemia with hx of recent GI bleed. · Significant peripheral vascular disease CAD, ICA stenosis. · Hx of HTN and DLD  · Hx of smoking - COPD from hx. RECOMMENDATIONS:   · Continue supplemental oxygen to keep SpO2>90%. · Will consider thoracentesis for symptoms improvement. · Bronchial hygiene protocol  · Bronchodilators PRN  · Steroids - not indicated. · Antibiotics - On vancomycin and cefepime. No growth on cultures. Consider sending sputum cultures. · Can discontinue vancomycin. · Aspiration precautions  · Out patient testing- PFT, 6 min walk. · Assess home Oxygen needs at discharge  · OT, PT, OOB and ambulate  · Healthy weight  · Will Follow  · DVT, PUD prophylaxis     Subjective: This patient has been seen and evaluated at the request of Dr. Sobia Armstrong for evaluation of hypoxic respiratory failure. Patient is a 80 y.o. male with past medical history significant for asthma, ischemic cardiomyopathy (S/P PCI), GI bleed (recent) and hypertension who has presented with 1 week history of worsening SOB and leg swelling. Patient is able to provide most of the history and states that he lives alone but does get some help from his daughter. No hx of cough, fevers, phlegm production.  Complains of chest pains across the chest in the lower chest that started during the same period of time and is associated with cough or taking deep breaths. This pleuritic sounding chest pain is non radiating. Patient was admitted to hospital and his SOB was worked up and the work up included a CTA which was done on 3/13/19 and it showed bilateral GGOs and pleural effusions. Patient states that he is an ex smoker. Smoked 1ppd but quit 50 years ago. Worked for [employer], But denies any history of asbestos exposure. Past Medical History:   Diagnosis Date    Asthma     Cardiac cath 04/28/2011    oLM 30%. pLAD 30%. oD1 50%. CX 90% (3 x 18 Stafford DEMAR). dRCA 90%. RPLB subtotal.  RPDA 100%.  Cardiac echocardiogram 01/21/2014    EF 55-60%. Mod LVH. Gr 1 DDfx. Mild AS (mean grad 13). Mild AI. Unchanged from study of 11/30/11.  Cardiac nuclear imaging test, mod risk 01/22/2016    Intermediate risk. Medium-sized inferior, inferoseptal infarction. No ischemia. EF 54%. Nondiagnostic EKG on pharm stress test.    Carotid duplex 03/02/2016    Mod 50-69% bilateral ICA stenosis. Probable significant RECA stenosis. >50% stenosis of left subclavian. No significant change from study of 1/8/15.  Coronary artery disease     Status post PCI with drug-eluting stent, Stafford 3 x 18 mm in the proximal circumflex.  Hx of carotid artery disease ([address]     Hypercholesterolemia     Hypertension     [facility]state [facility])       Past Surgical History:   Procedure Laterality Date    HX CORONARY STENT PLACEMENT  4/28/11    PCI with drug-eluting stent, Stafford 3 x 18 mm in the proximal circumflex (post dialated with 3.25 mm noncompliant balloon at high pressure). Prior to Admission medications    Medication Sig Start Date End Date Taking? Authorizing Provider   atorvastatin (LIPITOR) 40 mg tablet Take 1 Tab by mouth nightly.  3/12/19   Jonathan Moreno MD   carvedilol (COREG) 3.125 mg tablet Take 1 Tab by mouth two (2) times daily (with meals). 3/12/19   Theta Najjar, MD   lisinopril (PRINIVIL, ZESTRIL) 5 mg tablet Take 1 Tab by mouth daily. 3/13/19   Theta Najjar, MD   nitroglycerin (NITROSTAT) 0.4 mg SL tablet 1 Tab by SubLINGual route every five (5) minutes as needed for Chest Pain. 3/12/19   Theta Najjar, MD   furosemide (LASIX) 20 mg tablet Take 1 Tab by mouth daily. 3/12/19   Theta Najjar, MD   potassium chloride SR (KLOR-CON 10) 10 mEq tablet Take 1 Tab by mouth daily. 3/12/19   Theta Najjar, MD   OTHER Cbc, BMP in 1 week  Dx: CHF  Fax results to Dr. Giovana Potts 3/12/19   Theta Najjar, MD   budesonide-formoterol (SYMBICORT) 160-4.5 mcg/actuation HFAA Take 2 Puffs by inhalation two (2) times a day. 3/1/19   Suyapa Daniel MD   glycopyrrolate-formoterol (BEVESPI AEROSPHERE) 9-4.8 mcg HFAA Take 2 Inhalation by inhalation two (2) times a day. 3/1/19   Collin Flores MD   clopidogrel (PLAVIX) 75 mg tab TAKE 1 TABLET EVERY DAY 19   Suyapa Daniel MD   omeprazole (PRILOSEC) 40 mg capsule Take 1 Cap by mouth daily. 19   Jose Eduardo Flores MD   albuterol (PROVENTIL HFA, VENTOLIN HFA, PROAIR HFA) 90 mcg/actuation inhaler Take 2 Puffs by inhalation every four (4) hours as needed for Wheezing or Shortness of Breath. 3/2/15   AMITA Stubbs   tamsulosin (FLOMAX) 0.4 mg capsule Take 1 Cap by mouth daily. 3/13/13   Collin Flores MD   aspirin delayed-release 81 mg tablet Take 81 mg by mouth daily. Collin Flores MD     No Known Allergies   Social History     Tobacco Use    Smoking status: Former Smoker     Packs/day: 1.00     Years: 15.00     Pack years: 15.00     Last attempt to quit: 1960     Years since quittin.8    Smokeless tobacco: Never Used   Substance Use Topics    Alcohol use: No      History reviewed. No pertinent family history.    @DBLINKMRCHARTING([id number])@  Immunization status: up to date and documented. Current Facility-Administered Medications   Medication Dose Route Frequency    nitroglycerin (NITROBID) 2 % ointment        famotidine (PEPCID) tablet 20 mg  20 mg Oral ONCE    furosemide (LASIX) injection 20 mg  20 mg IntraVENous DAILY    albuterol-ipratropium (DUO-NEB) 2.5 MG-0.5 MG/3 ML  3 mL Nebulization Q4H RT    vancomycin (VANCOCIN) 1000 mg in  ml infusion  1,000 mg IntraVENous Q24H    cefepime (MAXIPIME) 2 g in sterile water (preservative free) 10 mL IV syringe  2 g IntraVENous Q12H    atorvastatin (LIPITOR) tablet 40 mg  40 mg Oral QHS    aspirin chewable tablet 81 mg  81 mg Oral DAILY    budesonide-formoterol (SYMBICORT) 160-4.5 mcg/actuation HFA inhaler 2 Puff  2 Puff Inhalation BID RT    tamsulosin (FLOMAX) capsule 0.4 mg  0.4 mg Oral DAILY    famotidine (PEPCID) tablet 20 mg  20 mg Oral DAILY    sodium chloride (NS) flush 5-40 mL  5-40 mL IntraVENous Q8H    clopidogrel (PLAVIX) tablet 75 mg  75 mg Oral DAILY       Review of Systems:  Pertinent items are noted in HPI. Objective:   Vital Signs:    Visit Vitals  /68 (BP 1 Location: Right arm, BP Patient Position: At rest)   Pulse (!) 111   Temp 97.1 °F (36.2 °C)   Resp 18   Ht 5' 7\" (1.702 m)   Wt 67 kg (147 lb 11.3 oz)   SpO2 97%   BMI 23.13 kg/m²       O2 Device: Nasal cannula   O2 Flow Rate (L/min): 4 l/min   Temp (24hrs), Av.5 °F (36.4 °C), Min:97.1 °F (36.2 °C), Max:97.7 °F (36.5 °C)       Intake/Output:   Last shift:      No intake/output data recorded. Last 3 shifts:  1901 -  0700  In: 339.7 [P.O.:220; I.V.:119.7]  Out: 100 [Urine:100]    Intake/Output Summary (Last 24 hours) at 3/16/2019 1231  Last data filed at 3/15/2019 2211  Gross per 24 hour   Intake 100 ml   Output    Net 100 ml      Physical Exam:   General:  Alert, cooperative, no distress, appears stated age. Head:  Normocephalic, without obvious abnormality, atraumatic. Eyes:  Conjunctivae/corneas clear. PERRL, EOMs intact. Nose: Nares normal. Septum midline. Mucosa normal. No drainage or sinus tenderness. Throat: Lips, mucosa, and tongue normal. Teeth and gums normal.   Neck: Supple, symmetrical, trachea midline, no adenopathy, thyroid: no enlargment/tenderness/nodules, no carotid bruit and no JVD. Back:   Symmetric, no curvature. ROM normal.   Lungs:   Reduced air entry at the lung bases. No crackles. No wheeze. Chest wall:  No tenderness or deformity. Heart:  Regular rate and rhythm, S1, S2 normal, no murmur, click, rub or gallop. Abdomen:   Soft, non-tender. Bowel sounds normal. No masses,  No organomegaly. Extremities: Extremities normal, atraumatic, no cyanosis or edema. Pulses: 2+ and symmetric all extremities.    Skin: Skin color, texture, turgor normal. No rashes or lesions   Lymph nodes: Cervical, supraclavicular, and axillary nodes normal.   Neurologic: Grossly nonfocal     Data review:     Recent Results (from the past 24 hour(s))   VANCOMYCIN, TROUGH    Collection Time: 03/15/19  9:17 PM   Result Value Ref Range    Vancomycin,trough 10.5 10.0 - 20.0 ug/mL    Reported dose date: 20190314      Reported dose time: 1000      Reported dose: 1250 MG UNITS   EKG, 12 LEAD, INITIAL    Collection Time: 03/16/19  3:56 AM   Result Value Ref Range    Ventricular Rate 114 BPM    Atrial Rate 117 BPM    P-R Interval 240 ms    QRS Duration 144 ms    Q-T Interval 298 ms    QTC Calculation (Bezet) 410 ms    Calculated P Axis 0 degrees    Calculated R Axis 10 degrees    Calculated T Axis -178 degrees    Diagnosis       Sinus tachycardia with 1st degree AV block with premature supraventricular   complexes with occasional premature ventricular complexes and fusion  complexes  Left bundle branch block  Abnormal ECG  When compared with ECG of 14-MAR-2019 00:01,  PREVIOUS ECG IS PRESENT     CBC WITH AUTOMATED DIFF    Collection Time: 03/16/19  4:15 AM   Result Value Ref Range    WBC 9.8 4.6 - 13.2 K/uL    RBC 3.12 (L) 4.70 - 5.50 M/uL    HGB 8.5 (L) 13.0 - 16.0 g/dL    HCT 27.5 (L) 36.0 - 48.0 %    MCV 88.1 74.0 - 97.0 FL    MCH 27.2 24.0 - 34.0 PG    MCHC 30.9 (L) 31.0 - 37.0 g/dL    RDW 15.6 (H) 11.6 - 14.5 %    PLATELET 515 077 - 618 K/uL    MPV 10.5 9.2 - 11.8 FL    NEUTROPHILS 68 40 - 73 %    LYMPHOCYTES 20 (L) 21 - 52 %    MONOCYTES 12 (H) 3 - 10 %    EOSINOPHILS 0 0 - 5 %    BASOPHILS 0 0 - 2 %    ABS. NEUTROPHILS 6.6 1.8 - 8.0 K/UL    ABS. LYMPHOCYTES 2.0 0.9 - 3.6 K/UL    ABS. MONOCYTES 1.2 0.05 - 1.2 K/UL    ABS. EOSINOPHILS 0.0 0.0 - 0.4 K/UL    ABS.  BASOPHILS 0.0 0.0 - 0.1 K/UL    DF MANUAL      PLATELET COMMENTS ADEQUATE PLATELETS      RBC COMMENTS ANISOCYTOSIS  1+        RBC COMMENTS HYPOCHROMIA  1+        RBC COMMENTS POLYCHROMASIA  1+       METABOLIC PANEL, BASIC    Collection Time: 03/16/19  4:15 AM   Result Value Ref Range    Sodium 140 136 - 145 mmol/L    Potassium 3.9 3.5 - 5.5 mmol/L    Chloride 102 100 - 108 mmol/L    CO2 28 21 - 32 mmol/L    Anion gap 10 3.0 - 18 mmol/L    Glucose 154 (H) 74 - 99 mg/dL    BUN 30 (H) 7.0 - 18 MG/DL    Creatinine 1.29 0.6 - 1.3 MG/DL    BUN/Creatinine ratio 23 (H) 12 - 20      GFR est AA >60 >60 ml/min/1.73m2    GFR est non-AA 52 (L) >60 ml/min/1.73m2    Calcium 9.2 8.5 - 10.1 MG/DL   MAGNESIUM    Collection Time: 03/16/19  4:15 AM   Result Value Ref Range    Magnesium 2.3 1.6 - 2.6 mg/dL   PHOSPHORUS    Collection Time: 03/16/19  4:15 AM   Result Value Ref Range    Phosphorus 3.2 2.5 - 4.9 MG/DL   PTT    Collection Time: 03/16/19  4:15 AM   Result Value Ref Range    aPTT 70.9 (H) 23.0 - 36.4 SEC   CARDIAC PANEL,(CK, CKMB & TROPONIN)    Collection Time: 03/16/19  4:15 AM   Result Value Ref Range    CK 56 39 - 308 U/L    CK - MB 1.4 <3.6 ng/ml    CK-MB Index 2.5 0.0 - 4.0 %    Troponin-I, QT 1.94 (HH) 0.0 - 0.045 NG/ML       Imaging:  I have personally reviewed the patients radiographs and have reviewed the reports:  XR Results (most recent):  Results from Hospital Encounter encounter on 03/13/19   XR CHEST PORT    Narrative EXAM: PORTABLE CHEST 0527 hours    CLINICAL HISTORY/INDICATION: evaluate resolution of pulmonary edema acute  hypoxic respiratory failure, chronic systolic congestive heart failure, ischemic  cardiomyopathy with ejection fraction of 26-30%, non-ST segment elevated  myocardial infarction,         COMPARISON: Chest x-ray March 14, March 13, 2019, April 25, 2011. TECHNIQUE: Single AP view    FINDINGS:     Cardiac silhouette is top normal in size. Mild tortuosity of the arch unchanged. Lungs are adequately inflated. Linear scarring in the right upper lung. Increased interstitial markings throughout the lungs. Mild blunting of both  costophrenic angles. .      Impression IMPRESSION:    Small bilateral pleural effusions. Persistent increased interstitial markings bilaterally on a background of  emphysema. Interstitial edema vs. interstitial lung disease. CT Results (most recent):  Results from Hospital Encounter encounter on 03/13/19   CTA CHEST W OR W WO CONT    Narrative EXAM:  CTA Chest with Contrast (CT Pulmonary Study for PE). CLINICAL INDICATION:  Acute shortness of breath which began this morning. Recent hospitalization for shortness of breath and fatigue. COMPARISON:  None. TECHNIQUE:      - Helical volumetric sections of the chest are obtained with CT pulmonary  angiogram protocol. Subsequently, sagittal and coronal multiplanar  reconstruction images are obtained.   Maximum intensity projection images are  generated to better delineate the pulmonary vasculature, differentiate between  the pulmonary arteries and veins and to increase sensitivity to pulmonary  emboli.    - IV contrast dose of 100 mL Isovue-370.  - Radiation dose optimization techniques are utilized as appropriate to the  exam, with combination of automated exposure control, adjustment of the mA  and/or kV according to patient's size (Including appropriate matching for  site-specific examinations), or use of iterative reconstruction technique. FINDINGS:     Pulmonary Arteries:      - Unusual pulmonary artery opacification pattern is noted. The upper lung zone  pulmonary artery opacification is complete whereas the lower lung zone pulmonary  arteries appear suboptimally opacified particularly in the left lower lobe basal  segments. Less pronounced atypical poor opacification of the basal segments is  also noted on the right side. Instead of sharply demarcated distinct filling  defects, is involved segments demonstrate gradual fading of the pulmonary artery  luminal opacification. This makes it difficult to exclude small filling defects  in the segmental and subsegmental arteries at the left lower lobe basal region. The presence of atelectatic changes with pleural effusion may contribute to this  atypical pulmonary artery opacification pattern. Alternatively, intrinsic  intracardiac abnormalities may be responsible for the atypical pulmonary artery  opacification. Notably, the left inferior pulmonary vein opacification is much  less than that of the right pulmonary venous opacification.  - Within the technical limitations of the study, no definite suspicious filling  defects are identified in the main trunk, right and left pulmonary artery, lobar  and interlobar arteries, segmental and intrasegmental arteries. Basal region  distal segmental artery and subsegmental arteries are poorly opacified. Aorta:  No opacification of the aorta. The caliber of the ascending aorta and  aortic arch appear within normal limits. The mid descending aortography  demonstrates atypical aorta contour, with apparent smoothly outlined bulging  contour along the medial aspect (axial #). Further inferiorly, the aorta  appears to dilated slightly, measuring up to about 3.6 cm in diameter and with  crescentic outpouching.   At the level of the diaphragmatic hiatus, the right  lateral wall of the aorta demonstrates crescentic appearance with lamellated  pattern. 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 Recommend dedicated CTA of  the aorta on routine basis for further delineation of this finding. Lung, Pleura, Airways:      - Moderate to large bilateral pleural effusion. Associated atelectatic changes. - Consolidative opacities in the left upper lobe and in the right lower lobe. Groundglass opacities in the right upper lobe and right middle lobe. Possible  consolidative opacities vs. atelectatic changes in the left lower lobe. Mediastinum, Radha:  No definite mediastinal adenopathy. No definite hilar  adenopathy. Base of Neck, Axillae:  Unremarkable. Abdomen:  Multiple gallstones. Skeletal Structures:  No acute findings. Impression IMPRESSION:         1. Unusual pulmonary artery opacification with gradual decrease in luminal  opacification in the basal regions, more pronounced on the left side than on the  right. Possibly related to presence of moderate to large pleural effusion with  associated atelectatic changes as the explanation for this pattern vs. intrinsic  intracardiac abnormality. No convincing evidence of pulmonary embolism is  detected within the technical limitations of the study. 2. Moderate to large pleural effusion bilaterally with associated passive  atelectatic changes. Multiple foci of consolidative opacities in both lungs. Most pronounced in the left upper lobe. Query developing pneumonia vs.  pulmonary hemorrhage vs. nonspecific inflammatory process. 3.  Atypical appearance of the descending with smooth crescentic outpouchings  and presence of lamellated calcifications along the aortic wall. 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 With current technique, i.e. lack of IV contrast  in the aorta, detailed analysis is difficult. Recommend CTA of the aorta for  further delineation. 4.  Cholelithiasis.           03/13/19 ECHO ADULT FOLLOW-UP OR LIMITED 03/15/2019 3/15/2019    Narrative · Left ventricular severely decreased systolic function. Estimated left   ventricular ejection fraction is 26 - 30%. Visually measured ejection   fraction. Left ventricular mild concentric hypertrophy. Abnormal left   ventricular wall motion as described on the wall scoring diagram below. · Severe tricuspid valve regurgitation is present. Moderate to severe   pulmonary hypertension is present. · Moderate mitral valve regurgitation.         Signed by: MD José Manuel Nava MD

## 2019-03-16 NOTE — PROGRESS NOTES
Resting quietly in bed  Verbal and alert  RR even and unlabored  Denies any complaints @ this time  Pnt stable  Will continue to monitor  NADN

## 2019-03-16 NOTE — PROGRESS NOTES
Provider notified of MEWS score  Advised by provider to continue with scheduled breathing txs; understanding voiced by this RN.   Pnt stable at this time  Daughter at bedside  Will continue to monitor  NADN    Vitals w/ MEWS Score (last day)     Date/Time MEWS Score Pulse Resp Temp BP Level of Consciousness SpO2    03/16/19 1131  3  111  (Abnormal)   18  97.1 °F (36.2 °C)  106/68  Alert  97 %    03/16/19 0748  2  105  (Abnormal)   20  97.7 °F (36.5 °C)  102/67  Alert  97 %    03/16/19 0557          104/67        03/16/19 0330  3  118  (Abnormal)   20  97.6 °F (36.4 °C)  108/64  Alert  97 %    03/16/19 0302              91 %    03/16/19 0035  3  111  (Abnormal)   20  97.5 °F (36.4 °C)  107/66  Alert  96 %    03/15/19 2308  Brit Small        91 %    03/15/19 1935  Brit Small        98 %    03/15/19 1925              97 %    03/15/19 1858  2  110  (Abnormal)   20  97.5 °F (36.4 °C)  107/65  Alert  98 %    03/15/19 1616              97 %    03/15/19 1130              98 %    03/15/19 1101  2  105  (Abnormal)   20  98.4 °F (36.9 °C)  101/61  Alert  99 %    03/15/19 0910    107  (Abnormal)     97.3 °F (36.3 °C)    Alert  95 %    03/15/19 0806              99 %    03/15/19 0400  1  94  20  97.5 °F (36.4 °C)  101/61  Alert  98 %    03/15/19 0226              99 %    03/15/19 0000  1  96  20  97.1 °F (36.2 °C)  102/59  Alert  100 %

## 2019-03-16 NOTE — PROGRESS NOTES
7:26 PM Beside shift change report given by Gallo Jiang RN  to ADAL Rodrigues RN. Report included the information from the [facility]as I/KARLA, and recent results. Denies pain or discomfort. Call light within reach. Bed in low position. Family at bedside. 7:12 AM Beside shift change report given by ADAL Rodrigues RN to KATYA MORENO. Report included the information from the Geetha Rosas I/KARLA, and recent results.

## 2019-03-16 NOTE — PROGRESS NOTES
MEWS d/t elevated HR, asymptomatic, baseline. Will continue to monitor. Pt on HHN treatment every 4 hours.      03/16/19 0035   Vital Signs   Temp 97.5 °F (36.4 °C)   Pulse (Heart Rate) (!) 111   Resp Rate 20   O2 Sat (%) 96 %   Level of Consciousness Alert   /66   MAP (Calculated) 80   MEWS Score 3

## 2019-03-17 NOTE — PROGRESS NOTES
[facility]  Progress Note    Patient: Patricia Lobo Age: 80 y.o. : 1926 MR#: [id number] SSN: [id number]  Date/Time: 3/17/2019 9:34 AM    Subjective/24-hour events:     Pt states his breathing improved after getting thoracentesis this am.    Assessment:   -Acute hypoxic respiratory failure required BIPAP, likely due to heart failure pulm input noted, s/p thoracentesis this am, to have a repeat one, on other side, tomorrow. appreciate assistance  -Abnormal chest imaging, concern for possible pneumonia  -Pleural effusions s/p thoracentesis     -Acute on chronic systolic CHF on IV lasix  -Ischemic cardiomyopathy, EF 26-30%  -Recent NSTEMI s/p PCI/stent placement trop downtrending  -CAD  -Dyslipidemia  -CKD 2-3  -Chronic anemia  -Valvular heart disease  -Advanced age  -Probable COPD  -Tobacco use hx    Plan:  Cont to diurese  Dc cefepime 7 day course  Continue nebs/symbicort/usual pulmonary hygiene. .  Diuresis per cardiology - IV lasix   Not on b-blocker or ACE-I due to marginal BPs. PT/OT evals. Based on current clinical status, will likely not be able to return home alone at discharge. Case discussed with:  [x]Patient  []Family  [x]Nursing  []Case Management  DVT Prophylaxis:  []Lovenox  []Hep SQ  []SCDs  []Coumadin   [x]On Heparin gtt    Objective:   VS:   Visit Vitals  /65 (BP 1 Location: Left arm, BP Patient Position: At rest)   Pulse (!) 105   Temp 97.6 °F (36.4 °C)   Resp 18   Ht 5' 7\" (1.702 m)   Wt 67.3 kg (148 lb 6.4 oz)   SpO2 98%   BMI 23.24 kg/m²      Tmax/24hrs: Temp (24hrs), Av.3 °F (36.3 °C), Min:96.3 °F (35.7 °C), Max:98 °F (36.7 °C)      Intake/Output Summary (Last 24 hours) at 3/17/2019 1632  Last data filed at 3/17/2019 0313  Gross per 24 hour   Intake    Output 700 ml   Net -700 ml       General:  Sitting up in bed, in NAD. Nontoxic-appearing. Cardiovascular:  S1, S2. Tachycardic.   Pulmonary:  No active wheezing but BS decreased throughout. GI:  Abdomen soft, NTTP. Extremities:  Warm, no ischemia. Neuro:  Awake and alert, motor nofocal.    Labs:    Recent Results (from the past 24 hour(s))   MAGNESIUM    Collection Time: 03/17/19  5:17 AM   Result Value Ref Range    Magnesium 2.3 1.6 - 2.6 mg/dL   PHOSPHORUS    Collection Time: 03/17/19  5:17 AM   Result Value Ref Range    Phosphorus 2.9 2.5 - 4.9 MG/DL   HGB & HCT    Collection Time: 03/17/19  5:17 AM   Result Value Ref Range    HGB 7.9 (L) 13.0 - 16.0 g/dL    HCT 25.9 (L) 36.0 - 48.0 %   PTT    Collection Time: 03/17/19  5:17 AM   Result Value Ref Range    aPTT 40.9 (H) 23.0 - 28.2 SEC   METABOLIC PANEL, BASIC    Collection Time: 03/17/19  5:17 AM   Result Value Ref Range    Sodium 136 136 - 145 mmol/L    Potassium 3.4 (L) 3.5 - 5.5 mmol/L    Chloride 100 100 - 108 mmol/L    CO2 31 21 - 32 mmol/L    Anion gap 5 3.0 - 18 mmol/L    Glucose 132 (H) 74 - 99 mg/dL    BUN 32 (H) 7.0 - 18 MG/DL    Creatinine 1.16 0.6 - 1.3 MG/DL    BUN/Creatinine ratio 28 (H) 12 - 20      GFR est AA >60 >60 ml/min/1.73m2    GFR est non-AA 59 (L) >60 ml/min/1.73m2    Calcium 9.0 8.5 - 10.1 MG/DL   CELL COUNT AND DIFF, BODY FLUID    Collection Time: 03/17/19 11:00 AM   Result Value Ref Range    BODY FLUID TYPE PLEURAL FLUID      FLUID COLOR YELLOW      FLUID APPEARANCE HAZY      FLUID RBC CT. 1,805 (A) NRRE /cu mm    FLUID NUCLEATED CELLS 435 (A) NRRE /cu mm    FLD NEUTROPHILS 43 %    FLD BANDS 0 %    FLD LYMPHS 25 %    FLD MONOCYTES 0 %    FLD EOSINS 0 %    MACROPHAGE 32 %    WBC COMMENTS       MODERATE MESOTHELIAL CELLS SEEN. PLEASE REFER TO CYTOLOGY SPECIMEN.    CULTURE, BODY FLUID [place] STAIN    Collection Time: 03/17/19 11:00 AM   Result Value Ref Range    Special Requests: NO SPECIAL REQUESTS      GRAM STAIN FEW WBC'S      GRAM STAIN NO ORGANISMS SEEN      Culture result: PENDING    PROTEIN TOTAL, FLUID    Collection Time: 03/17/19 11:00 AM   Result Value Ref Range    Fluid Type: PLEURAL FLUID Protein total, body fld. 2.1 g/dL   GLUCOSE, FLUID    Collection Time: 03/17/19 11:00 AM   Result Value Ref Range    Fluid Type: PLEURAL FLUID      Glucose, body fld.  147 MG/DL       Signed By: Angelia Cardoso MD     March 17, 2019 9:34 AM

## 2019-03-17 NOTE — ROUTINE PROCESS
Received report from Cesar Mast. Pt AAOx3, NAD, breathing non labored, on O2 NC at 4L, HOB up. IV site clean, dry and intact. Family member at the bedside. Bed at the lowest level on lock position, call bell w/i reach. Bedside and Verbal shift change report given to KATYA Gong (oncoming nurse) by me (offgoing nurse).  Report included the following information SBAR, Kardex, Procedure Summary, Intake/Output, MAR, Recent Results and Cardiac Rhythm SA.

## 2019-03-17 NOTE — PROGRESS NOTES
Procedure performed on pnt at bedside  Daughter present for support  Time-out taken prior to procedure  Pnt tolerated well; site asymptomatic  Denies any complaints @ this time  Labs collected and walked to lab by this RN  Pnt stable  Daughter @ bedside  AGUSTIN

## 2019-03-17 NOTE — PROCEDURES
[facility]  Pulmonary, Critical Care, and Sleep Medicine    Name: Kateryna Tovar MRN: [id number]   : 1926 Hospital: [facility]   Date: 3/17/2019        Thoracentesis Procedure Note    PROCEDURE:  DIAGNOSTIC/THERAPEUTIC THORACENTESIS  CPT code: without Ultrasound- 50015  CPT code: With Ultrasound imaging- 43923    INDICATION:  PLEURAL EFFUSION    ANESTHESIA:  LOCAL ANESTHESIA WITH 1% LIDOCAINE      CHEST ULTRASOUND FINDINGS:  Ultrasound was used to image the chest and localize the medium left pleural effusion. DESCRIPTION OF PROCEDURE:  After obtaining informed consent and localizing the safest location for thoracentesis, the  left 10th intercostal space was marked with a blunt, plastic needle cap in the mid scapular line. A thoracentesis kit was used to perform the procedure. The skin was prepped and draped in the usual sterile fashion. Using the previously marked location as a giude, 1% lidocaine was injected into the skin and subcutaneous tissue, as well as onto the underlying rib and inter-costal muscles, pleural fluid was aspirated to assure proper location, prior to removing the anesthesia needle. The needle and thoracentesis catheter were then introduced into the chest at the localized site over the lower rib the rib localized with the needle, and the catheter then marched over the rib into the pleural space. After aspirating fluid, the thoracentesis catheter was then placed into the chest using the needle itself as a trocar. The needle was then removed and the catheter was attached to the supplied tubing without complication. A total of 600 cc of dark yellow fluid, was aspirated and sent for analysis. The patient tolerated the procedure well without complications. Post procedure CXR is pending.   Estimated blood loss Minimal          Chucky Arizmendi MD

## 2019-03-17 NOTE — PROGRESS NOTES
[facility]  Pulmonary, Critical Care, and Sleep Medicine    Follow up patient note    Name: Haseeb Suacedo MRN: [id number]   : 1926 Hospital: [address] [place] [address]    Date: 3/17/2019        IMPRESSION:   · Acute hypoxic respiratory failure in a patient with HFrEF (EF 26%). My impression is that this hypoxic respiratory failure is secondary to bilateral pleural effusions and pulmonary edema and patient will benefit from thoracentesis and ongoing management of his heart failure. · Evidence of bilateral pleural effusions and interstitial marking concerning for pulmonary edema. · NSTEMi and S/P PCI to LAD on 3/11/19  · Ischemic cardiomyopathy and HFrEF  · Anemia with hx of recent GI bleed. · Significant peripheral vascular disease CAD, ICA stenosis. · Hx of HTN and DLD  · Hx of smoking - COPD from hx. RECOMMENDATIONS:   · Continue supplemental oxygen to keep SpO2>90%. · I have discussed with patient and his daughter re the thoracentesis procedure and explained the risks and benefits of the procedure and the rationale behind doing the procedure. Patient has agreed for the procedure. · Bronchial hygiene protocol  · Bronchodilators PRN  · Steroids - not indicated. · Antibiotics - On vancomycin and cefepime. No growth on cultures. Consider sending sputum cultures. · Can discontinue vancomycin. Complete the course for 1 week for cefepime and then discontinue. · Aspiration precautions  · Out patient testing- PFT, 6 min walk. · Assess home Oxygen needs at discharge  · OT, PT, OOB and ambulate  · Healthy weight  · Will Follow  · DVT, PUD prophylaxis     Subjective: This patient has been seen and evaluated at the request of Dr. Diane Garner for evaluation of hypoxic respiratory failure.  Patient is a 80 y.o. male with past medical history significant for asthma, ischemic cardiomyopathy (S/P PCI), GI bleed (recent) and hypertension who has presented with 1 week history of worsening SOB and leg swelling. Patient is able to provide most of the history and states that he lives alone but does get some help from his daughter. No hx of cough, fevers, phlegm production. Complains of chest pains across the chest in the lower chest that started during the same period of time and is associated with cough or taking deep breaths. This pleuritic sounding chest pain is non radiating. Patient was admitted to hospital and his SOB was worked up and the work up included a CTA which was done on 3/13/19 and it showed bilateral GGOs and pleural effusions. Patient states that he is an ex smoker. Smoked 1ppd but quit 50 years ago. Worked for [employer], But denies any history of asbestos exposure. Interval history:  3/17/19:  No acute issues overnight. Remained hemodynamically stable. Afebrile. Discussed with patient and his daughter the rationale behind doing thoracentesis and agreed for the procedure. Past Medical History:   Diagnosis Date    Asthma     Cardiac cath 04/28/2011    oLM 30%. pLAD 30%. oD1 50%. CX 90% (3 x 18 [place] DEMAR). dRCA 90%. RPLB subtotal.  RPDA 100%.  Cardiac echocardiogram 01/21/2014    EF 55-60%. Mod LVH. Gr 1 DDfx. Mild AS (mean grad 13). Mild AI. Unchanged from study of 11/30/11.  Cardiac nuclear imaging test, mod risk 01/22/2016    Intermediate risk. Medium-sized inferior, inferoseptal infarction. No ischemia. EF 54%. Nondiagnostic EKG on pharm stress test.    Carotid duplex 03/02/2016    Mod 50-69% bilateral ICA stenosis. Probable significant RECA stenosis. >50% stenosis of left subclavian. No significant change from study of 1/8/15.  Coronary artery disease     Status post PCI with drug-eluting stent, [place] 3 x 18 mm in the proximal circumflex.     Hx of carotid artery disease ([address]     Hypercholesterolemia     Hypertension     Prostate cancer [facility])       Past Surgical History:   Procedure Laterality Date    HX CORONARY STENT PLACEMENT  4/28/11    PCI with drug-eluting stent, [place] 3 x 18 mm in the proximal circumflex (post dialated with 3.25 mm noncompliant balloon at high pressure). Prior to Admission medications    Medication Sig Start Date End Date Taking? Authorizing Provider   atorvastatin (LIPITOR) 40 mg tablet Take 1 Tab by mouth nightly. 3/12/19   Micheal Gustafson MD   carvedilol (COREG) 3.125 mg tablet Take 1 Tab by mouth two (2) times daily (with meals). 3/12/19   Micheal Gustafson MD   lisinopril (PRINIVIL, ZESTRIL) 5 mg tablet Take 1 Tab by mouth daily. 3/13/19   Micheal Gustafson MD   nitroglycerin (NITROSTAT) 0.4 mg SL tablet 1 Tab by SubLINGual route every five (5) minutes as needed for Chest Pain. 3/12/19   Micheal Gustafson MD   furosemide (LASIX) 20 mg tablet Take 1 Tab by mouth daily. 3/12/19   Micheal Gustafson MD   potassium chloride SR (KLOR-CON 10) 10 mEq tablet Take 1 Tab by mouth daily. 3/12/19   Micheal Gustafson MD   OTHER Cbc, BMP in 1 week  Dx: CHF  Fax results to Dr. Tabitha Rivera 3/12/19   Micheal Gustafson MD   budesonide-formoterol (SYMBICORT) 160-4.5 mcg/actuation HFAA Take 2 Puffs by inhalation two (2) times a day. 3/1/19   Vanda Daniel MD   glycopyrrolate-formoterol (BEVESPI AEROSPHERE) 9-4.8 mcg HFAA Take 2 Inhalation by inhalation two (2) times a day. 3/1/19   Alvaro Amaya MD   clopidogrel (PLAVIX) 75 mg tab TAKE 1 TABLET EVERY DAY 2/26/19   Vanda Daniel MD   omeprazole (PRILOSEC) 40 mg capsule Take 1 Cap by mouth daily. 2/23/19   Yani Simpson MD   albuterol (PROVENTIL HFA, VENTOLIN HFA, PROAIR HFA) 90 mcg/actuation inhaler Take 2 Puffs by inhalation every four (4) hours as needed for Wheezing or Shortness of Breath. 3/2/15   AMITA Lincoln   tamsulosin (FLOMAX) 0.4 mg capsule Take 1 Cap by mouth daily.  3/13/13   Alvaro Amaya MD   aspirin delayed-release 81 mg tablet Take 81 mg by mouth daily. Skyler Macdonald MD     No Known Allergies   Social History     Tobacco Use    Smoking status: Former Smoker     Packs/day: 1.00     Years: 15.00     Pack years: 15.00     Last attempt to quit: 1960     Years since quittin.8    Smokeless tobacco: Never Used   Substance Use Topics    Alcohol use: No      History reviewed. No pertinent family history. @DBLINKMRCHARTING(EPT,3173)@  Immunization status: up to date and documented. Current Facility-Administered Medications   Medication Dose Route Frequency    furosemide (LASIX) injection 20 mg  20 mg IntraVENous DAILY    albuterol-ipratropium (DUO-NEB) 2.5 MG-0.5 MG/3 ML  3 mL Nebulization Q4H RT    cefepime (MAXIPIME) 2 g in sterile water (preservative free) 10 mL IV syringe  2 g IntraVENous Q12H    atorvastatin (LIPITOR) tablet 40 mg  40 mg Oral QHS    aspirin chewable tablet 81 mg  81 mg Oral DAILY    budesonide-formoterol (SYMBICORT) 160-4.5 mcg/actuation HFA inhaler 2 Puff  2 Puff Inhalation BID RT    tamsulosin (FLOMAX) capsule 0.4 mg  0.4 mg Oral DAILY    famotidine (PEPCID) tablet 20 mg  20 mg Oral DAILY    sodium chloride (NS) flush 5-40 mL  5-40 mL IntraVENous Q8H    clopidogrel (PLAVIX) tablet 75 mg  75 mg Oral DAILY       Review of Systems:  Pertinent items are noted in HPI. Objective:   Vital Signs:    Visit Vitals  /64 (BP 1 Location: Right arm, BP Patient Position: At rest)   Pulse (!) 120   Temp 96.3 °F (35.7 °C)   Resp 18   Ht 5' 7\" (1.702 m)   Wt 67.3 kg (148 lb 6.4 oz)   SpO2 96%   BMI 23.24 kg/m²       O2 Device: Nasal cannula   O2 Flow Rate (L/min): 4 l/min   Temp (24hrs), Av.3 °F (36.3 °C), Min:96.3 °F (35.7 °C), Max:98 °F (36.7 °C)       Intake/Output:   Last shift:      No intake/output data recorded.   Last 3 shifts: 03/15 1901 -  0700  In: 100 [P.O.:100]  Out: 700 [Urine:700]    Intake/Output Summary (Last 24 hours) at 3/17/2019 0971  Last data filed at 3/17/2019 0313  Gross per 24 hour Intake    Output 700 ml   Net -700 ml      Physical Exam:   General:  Alert, cooperative, no distress, appears stated age. Head:  Normocephalic, without obvious abnormality, atraumatic. Eyes:  Conjunctivae/corneas clear. PERRL, EOMs intact. Nose: Nares normal. Septum midline. Mucosa normal. No drainage or sinus tenderness. Throat: Lips, mucosa, and tongue normal. Teeth and gums normal.   Neck: Supple, symmetrical, trachea midline, no adenopathy, thyroid: no enlargment/tenderness/nodules, no carotid bruit and no JVD. Back:   Symmetric, no curvature. ROM normal.   Lungs:   Reduced air entry at the lung bases. Few crackles. Chest wall:  No tenderness or deformity. Heart:  Regular rate and rhythm, S1, S2 normal, no murmur, click, rub or gallop. Abdomen:   Soft, non-tender. Bowel sounds normal. No masses,  No organomegaly. Extremities: Extremities normal, atraumatic, no cyanosis or edema. Pulses: 2+ and symmetric all extremities.    Skin: Skin color, texture, turgor normal. No rashes or lesions   Lymph nodes: Cervical, supraclavicular, and axillary nodes normal.   Neurologic: Grossly nonfocal     Data review:     Recent Results (from the past 24 hour(s))   MAGNESIUM    Collection Time: 03/17/19  5:17 AM   Result Value Ref Range    Magnesium 2.3 1.6 - 2.6 mg/dL   PHOSPHORUS    Collection Time: 03/17/19  5:17 AM   Result Value Ref Range    Phosphorus 2.9 2.5 - 4.9 MG/DL   HGB & HCT    Collection Time: 03/17/19  5:17 AM   Result Value Ref Range    HGB 7.9 (L) 13.0 - 16.0 g/dL    HCT 25.9 (L) 36.0 - 48.0 %   PTT    Collection Time: 03/17/19  5:17 AM   Result Value Ref Range    aPTT 40.9 (H) 23.0 - 43.4 SEC   METABOLIC PANEL, BASIC    Collection Time: 03/17/19  5:17 AM   Result Value Ref Range    Sodium 136 136 - 145 mmol/L    Potassium 3.4 (L) 3.5 - 5.5 mmol/L    Chloride 100 100 - 108 mmol/L    CO2 31 21 - 32 mmol/L    Anion gap 5 3.0 - 18 mmol/L    Glucose 132 (H) 74 - 99 mg/dL    BUN 32 (H) 7.0 - 18 MG/DL    Creatinine 1.16 0.6 - 1.3 MG/DL    BUN/Creatinine ratio 28 (H) 12 - 20      GFR est AA >60 >60 ml/min/1.73m2    GFR est non-AA 59 (L) >60 ml/min/1.73m2    Calcium 9.0 8.5 - 10.1 MG/DL       Imaging:  I have personally reviewed the patients radiographs and have reviewed the reports:  XR Results (most recent):  Results from Hospital Encounter encounter on 03/13/19   XR CHEST PORT    Narrative EXAM: PORTABLE CHEST 0527 hours    CLINICAL HISTORY/INDICATION: evaluate resolution of pulmonary edema acute  hypoxic respiratory failure, chronic systolic congestive heart failure, ischemic  cardiomyopathy with ejection fraction of 26-30%, non-ST segment elevated  myocardial infarction,         COMPARISON: Chest x-ray March 14, March 13, 2019, April 25, 2011. TECHNIQUE: Single AP view    FINDINGS:     Cardiac silhouette is top normal in size. Mild tortuosity of the arch unchanged. Lungs are adequately inflated. Linear scarring in the right upper lung. Increased interstitial markings throughout the lungs. Mild blunting of both  costophrenic angles. .      Impression IMPRESSION:    Small bilateral pleural effusions. Persistent increased interstitial markings bilaterally on a background of  emphysema. Interstitial edema vs. interstitial lung disease. CT Results (most recent):  Results from Hospital Encounter encounter on 03/13/19   CTA CHEST W OR W WO CONT    Narrative EXAM:  CTA Chest with Contrast (CT Pulmonary Study for PE). CLINICAL INDICATION:  Acute shortness of breath which began this morning. Recent hospitalization for shortness of breath and fatigue. COMPARISON:  None. TECHNIQUE:      - Helical volumetric sections of the chest are obtained with CT pulmonary  angiogram protocol. Subsequently, sagittal and coronal multiplanar  reconstruction images are obtained.   Maximum intensity projection images are  generated to better delineate the pulmonary vasculature, differentiate between  the pulmonary arteries and veins and to increase sensitivity to pulmonary  emboli.    - IV contrast dose of 100 mL Isovue-370.  - Radiation dose optimization techniques are utilized as appropriate to the  exam, with combination of automated exposure control, adjustment of the mA  and/or kV according to patient's size (Including appropriate matching for  site-specific examinations), or use of iterative reconstruction technique. FINDINGS:     Pulmonary Arteries:      - Unusual pulmonary artery opacification pattern is noted. The upper lung zone  pulmonary artery opacification is complete whereas the lower lung zone pulmonary  arteries appear suboptimally opacified particularly in the left lower lobe basal  segments. Less pronounced atypical poor opacification of the basal segments is  also noted on the right side. Instead of sharply demarcated distinct filling  defects, is involved segments demonstrate gradual fading of the pulmonary artery  luminal opacification. This makes it difficult to exclude small filling defects  in the segmental and subsegmental arteries at the left lower lobe basal region. The presence of atelectatic changes with pleural effusion may contribute to this  atypical pulmonary artery opacification pattern. Alternatively, intrinsic  intracardiac abnormalities may be responsible for the atypical pulmonary artery  opacification. Notably, the left inferior pulmonary vein opacification is much  less than that of the right pulmonary venous opacification.  - Within the technical limitations of the study, no definite suspicious filling  defects are identified in the main trunk, right and left pulmonary artery, lobar  and interlobar arteries, segmental and intrasegmental arteries. Basal region  distal segmental artery and subsegmental arteries are poorly opacified. Aorta:  No opacification of the aorta.   The caliber of the ascending aorta and  aortic arch appear within normal limits. The mid descending aortography  demonstrates atypical aorta contour, with apparent smoothly outlined bulging  contour along the medial aspect (axial #). Further inferiorly, the aorta  appears to dilated slightly, measuring up to about 3.6 cm in diameter and with  crescentic outpouching. At the level of the diaphragmatic hiatus, the right  lateral wall of the aorta demonstrates crescentic appearance with lamellated  pattern. 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 Recommend dedicated CTA of  the aorta on routine basis for further delineation of this finding. Lung, Pleura, Airways:      - Moderate to large bilateral pleural effusion. Associated atelectatic changes. - Consolidative opacities in the left upper lobe and in the right lower lobe. Groundglass opacities in the right upper lobe and right middle lobe. Possible  consolidative opacities vs. atelectatic changes in the left lower lobe. Mediastinum, Radha:  No definite mediastinal adenopathy. No definite hilar  adenopathy. Base of Neck, Axillae:  Unremarkable. Abdomen:  Multiple gallstones. Skeletal Structures:  No acute findings. Impression IMPRESSION:         1. Unusual pulmonary artery opacification with gradual decrease in luminal  opacification in the basal regions, more pronounced on the left side than on the  right. Possibly related to presence of moderate to large pleural effusion with  associated atelectatic changes as the explanation for this pattern vs. intrinsic  intracardiac abnormality. No convincing evidence of pulmonary embolism is  detected within the technical limitations of the study. 2. Moderate to large pleural effusion bilaterally with associated passive  atelectatic changes. Multiple foci of consolidative opacities in both lungs. Most pronounced in the left upper lobe.   Query developing pneumonia vs.  pulmonary hemorrhage vs. nonspecific inflammatory process. 3.  Atypical appearance of the descending with smooth crescentic outpouchings  and presence of lamellated calcifications along the aortic wall. 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 With current technique, i.e. lack of IV contrast  in the aorta, detailed analysis is difficult. Recommend CTA of the aorta for  further delineation. 4.  Cholelithiasis. 03/13/19   ECHO ADULT FOLLOW-UP OR LIMITED 03/15/2019 3/15/2019    Narrative · Left ventricular severely decreased systolic function. Estimated left   ventricular ejection fraction is 26 - 30%. Visually measured ejection   fraction. Left ventricular mild concentric hypertrophy. Abnormal left   ventricular wall motion as described on the wall scoring diagram below. · Severe tricuspid valve regurgitation is present. Moderate to severe   pulmonary hypertension is present. · Moderate mitral valve regurgitation.         Signed by: MD Brian Raza MD

## 2019-03-17 NOTE — PROGRESS NOTES
Problem: Falls - Risk of  Goal: *Absence of Falls  Document Gabe Fall Risk and appropriate interventions in the flowsheet. Fall Risk Interventions:  Mobility Interventions: Assess mobility with egress test, Bed/chair exit alarm, Strengthening exercises (ROM-active/passive)    Mentation Interventions: Adequate sleep, hydration, pain control, Bed/chair exit alarm, Door open when patient unattended, Eyeglasses and hearing aids, Family/sitter at bedside, Update white board         Elimination Interventions: Bed/chair exit alarm, Toilet paper/wipes in reach, Toileting schedule/hourly rounds, Patient to call for help with toileting needs, Call light in reach             Problem: Pressure Injury - Risk of  Goal: *Prevention of pressure injury  Document William Scale and appropriate interventions in the flowsheet.   Outcome: Progressing Towards Goal  Pressure Injury Interventions:  Sensory Interventions: Assess changes in LOC, Check visual cues for pain, Keep linens dry and wrinkle-free, Minimize linen layers, Pressure redistribution bed/mattress (bed type)    Moisture Interventions: Absorbent underpads, Internal/External urinary devices, Minimize layers    Activity Interventions: PT/OT evaluation, Increase time out of bed    Mobility Interventions: HOB 30 degrees or less, PT/OT evaluation    Nutrition Interventions: Document food/fluid/supplement intake, Offer support with meals,snacks and hydration    Friction and Shear Interventions: Minimize layers, HOB 30 degrees or less

## 2019-03-17 NOTE — PROGRESS NOTES
[facility]  -  Progress Note      Patient: Olegario Parr MRN: [id number]  SSN: [id number]    YOB: 1926  Age: 80 y.o. Sex: male      Admit Date: 3/13/2019    Assessment:     Hospital Problems  Date Reviewed: 3/1/2019          Codes Class Noted POA    Congestive heart disease ([address] ICD-10-CM: I50.9  ICD-9-CM: 428.0  3/13/2019 Unknown            -Acute respiratory distress requiring bipap. CXR with pulmonary edema, questionable infiltrate (previous CXR was done at [facility]).  Improving off bipap with Abx, nebs and gentle diuresis. -NSTEMI, suspect secondary in setting of acute illness, troponin peak 7.58, s/p PCI to LAD 3/11/2019 with occluded mid RCA which appeared to be chronic medically managed, ECG with known LBBB, denies CP.  -Heart failure with reduced EF. Discharged on coreg, lisinopril, lasix 3/12/2019.  -Ischemic cardiomyopathy with EF 26-30% on echo this admission, unchanged from last admission. Conservatively managing in setting of advanced age. -CAD s/p LAD PCI with DEMAR 3/11/2019 with previous PCI to LCx 2011. Cath 3/11/2019 (Occluded mid RCA which appears to be chronic. There is faint collateral from the left coronary system, Left main artery with ostial 30-40%, LAD mid focal severely calcified tortuous 99% stenosis with CARLENE II flow, Circumflex coronary artery with diffuse mild 20-30% stenosis throughout). Discharged on ASA, plavix, statin, BB.  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 -Mild kidney injury. -Valvular heart disease with moderate aortic valve stenosis and mild to moderate MR.  -Carotid artery disease. Moderate bilateral ICA stenosis medically managed.  -Peripheral vascular disease.  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  Because of this his blood pressure should be checked in his right arm.  -Hypertension. Low normal on admission requiring pressor support.  -Dyslipidemia. On statin.   -H/o intermittent LBBB, now appears persistent.  -Asthma, former smoker. -Advanced age, full code, but independent, lives alone and cares for self, significant decline in functional capacity follow recent hospital stays.     Primary cardiologist Dr. Addison Cornejo. Plan:     Rates slightly higher this am and sinus. Pulmonary to perform thoracentesis- pending. Hgb at 7.9 and will monitor in light of prior GIB     Subjective:     No new complaints. Still feels short of breath.     Objective:      Patient Vitals for the past 8 hrs:   Temp Pulse Resp BP SpO2   03/17/19 0755 96.3 °F (35.7 °C) (!) 120 18 101/64 96 %   03/17/19 0511     94 %   03/17/19 0312 97.4 °F (36.3 °C) (!) 102 18 113/67 97 %         Patient Vitals for the past 96 hrs:   Weight   03/17/19 0312 67.3 kg (148 lb 6.4 oz)   03/16/19 0607 67 kg (147 lb 11.3 oz)   03/15/19 0400 59.9 kg (132 lb 0.9 oz)   03/14/19 1445 66.2 kg (146 lb)   03/14/19 0400 66.3 kg (146 lb 2.6 oz)   03/13/19 2019 66.3 kg (146 lb 2.6 oz)         Intake/Output Summary (Last 24 hours) at 3/17/2019 1111  Last data filed at 3/17/2019 0313  Gross per 24 hour   Intake    Output 700 ml   Net -700 ml       Physical Exam:  General:  alert, cooperative, no distress, appears stated age  Neck:  nontender, no JVD  Lungs:  diminished breath sounds at bases  Heart:  regular rate and rhythm, S1, S2 normal, no murmur, click, rub or gallop  Abdomen:  abdomen is soft without significant tenderness, masses, organomegaly or guarding  Extremities:  extremities normal, atraumatic, no cyanosis or edema    Data Review:     Labs: Results:       Chemistry Recent Labs     03/17/19  0517 03/16/19  0415 03/15/19  0524   * 154* 112*    140 138   K 3.4* 3.9 3.8    102 102   CO2 31 28 28   BUN 32* 30* 32*   CREA 1.16 1.29 1.31*   CA 9.0 9.2 9.1   MG 2.3 2.3 2.2   PHOS 2.9 3.2 3.0   AGAP 5 10 8   BUCR 28* 23* 24*      CBC w/Diff Recent Labs     03/17/19  0517 03/16/19  0415 03/15/19  0524   WBC  --  9.8 9.7   RBC  --  3.12* 2.98*   HGB 7.9* 8.5* 8.3*   HCT 25.9* 27.5* 26.6*   PLT  --  287 278   GRANS  --  68 68   LYMPH  --  20* 14*   EOS  --  0 0      Cardiac Enzymes No results found for: CPK, CK, CKMMB, CKMB, RCK3, CKMBT, CKNDX, CKND1, CHRISTINE, TROPT, TROIQ, NGHIA, TROPT, TNIPOC, BNP, BNPP   Coagulation Recent Labs     03/17/19  0517 03/16/19  0415   APTT 40.9* 70.9*       Lipid Panel Lab Results   Component Value Date/Time    Cholesterol, total 87 03/08/2019 05:28 AM    HDL Cholesterol 44 03/08/2019 05:28 AM    LDL, calculated 30 03/08/2019 05:28 AM    VLDL, calculated 13 03/08/2019 05:28 AM    Triglyceride 65 03/08/2019 05:28 AM    CHOL/HDL Ratio 2.0 03/08/2019 05:28 AM      BNP No results found for: BNP, BNPP, XBNPT   Liver Enzymes No results for input(s): TP, ALB, TBIL, AP, SGOT, GPT in the last 72 hours.     No lab exists for component: DBIL   Digoxin    Thyroid Studies Lab Results   Component Value Date/Time    TSH 1.22 03/07/2019 05:29 AM

## 2019-03-17 NOTE — PROGRESS NOTES
Resting quietly in bed  Ambulatory verbal alert and oriented  RR even and unlabored  Denies any complaints @ this time  Pnt stable  Will continue to monitor  NADN

## 2019-03-17 NOTE — PROGRESS NOTES
Provider aware of V/S  Pnt s/p thoracentesis  Pnt stable; denies any complaints  Will continue to monitor  NADN      Vitals w/ MEWS Score (last day)     Date/Time MEWS Score Pulse Resp Temp BP Level of Consciousness SpO2    03/17/19 1157  4  120  (Abnormal)   18  97 °F (36.1 °C)  94/60  Alert  97 %    03/17/19 1116  4  120  (Abnormal)   18  97.4 °F (36.3 °C)  88/50  (Abnormal)   Alert  95 %    03/17/19 0755  3  120  (Abnormal)   18  96.3 °F (35.7 °C)  101/64  Alert  96 %    03/17/19 0511              94 %    03/17/19 0312  2  102  (Abnormal)   18  97.4 °F (36.3 °C)  113/67  Alert  97 %    03/17/19 0006              100 %    03/16/19 2317  2  104  (Abnormal)   18  97.9 °F (36.6 °C)  108/64  Alert  97 %    03/16/19 2145              97 %    03/16/19 1920  2  108  (Abnormal)   18  98 °F (36.7 °C)  109/54  Alert  97 %    03/16/19 1658  3  112  (Abnormal)   18  97 °F (36.1 °C)  104/62  Alert  98 %    03/16/19 1131  3  111  (Abnormal)   18  97.1 °F (36.2 °C)  106/68  Alert  97 %    03/16/19 0748  2  105  (Abnormal)   20  97.7 °F (36.5 °C)  102/67  Alert  97 %    03/16/19 0557          104/67   03/16/19 0330  3  118  (Abnormal)   20  97.6 °F (36.4 °C)  108/64  Alert  97 %    03/16/19 0302              91 %    03/16/19 0035  3  111  (Abnormal)   20  97.5 °F (36.4 °C)  107/66  Alert  96 %

## 2019-03-18 NOTE — PROGRESS NOTES
[facility]  Pulmonary, Critical Care, and Sleep Medicine    Follow up patient note    Name: Giovanny An MRN: [id number]   : 1926 Hospital: [facility]   Date: 3/18/2019        IMPRESSION:   · Acute hypoxic respiratory failure in a patient with HFrEF (EF 26%). My impression is that this hypoxic respiratory failure is secondary to bilateral pleural effusions and pulmonary edema. S/P L thoracentesis 3/17/19. · Likely transudative pleural effusion - awaiting comparative studies from serum. · Evidence of bilateral pleural effusions and interstitial marking concerning for pulmonary edema. · NSTEMi and S/P PCI to LAD on 3/11/19  · Ischemic cardiomyopathy and HFrEF  · Anemia with hx of recent GI bleed. · Significant peripheral vascular disease CAD, ICA stenosis. · Hx of HTN and DLD  · Hx of smoking - COPD from hx. RECOMMENDATIONS:   · Continue supplemental oxygen to keep SpO2>90%. · I have discussed with patient and his daughter re the thoracentesis procedure on the right side today. Patient and his daughter again agreed for the procedure. · Bronchial hygiene protocol  · Bronchodilators PRN  · Steroids - not indicated. · Antibiotics - On cefepime. No growth on cultures. Consider sending sputum cultures. · Complete the course for 1 week for cefepime and then discontinue. · Aspiration precautions  · Out patient testing- PFT, 6 min walk. · Assess home Oxygen needs at discharge  · OT, PT, OOB and ambulate  · Healthy weight  · Will Follow  · DVT, PUD prophylaxis     Subjective: This patient has been seen and evaluated at the request of Dr. Hua Cardoso for evaluation of hypoxic respiratory failure. Patient is a 80 y.o. male with past medical history significant for asthma, ischemic cardiomyopathy (S/P PCI), GI bleed (recent) and hypertension who has presented with 1 week history of worsening SOB and leg swelling.  Patient is able to provide most of the history and states that he lives alone but does get some help from his daughter. No hx of cough, fevers, phlegm production. Complains of chest pains across the chest in the lower chest that started during the same period of time and is associated with cough or taking deep breaths. This pleuritic sounding chest pain is non radiating. Patient was admitted to hospital and his SOB was worked up and the work up included a CTA which was done on 3/13/19 and it showed bilateral GGOs and pleural effusions. Patient states that he is an ex smoker. Smoked 1ppd but quit 50 years ago. Worked for [employer], But denies any history of asbestos exposure. Interval history:  3/18/19:  No acute issues overnight. Feels much better after the thoracentesis procedure. No pneumothorax post procedure. Remained hemodynamically stable. Afebrile. Discussed with patient and his daughter the rationale behind repeating thoracentesis and they agreed for the procedure. Past Medical History:   Diagnosis Date    Asthma     Cardiac cath 04/28/2011    oLM 30%. pLAD 30%. oD1 50%. CX 90% (3 x 18 [place] DEMAR). dRCA 90%. RPLB subtotal.  RPDA 100%.  Cardiac echocardiogram 01/21/2014    EF 55-60%. Mod LVH. Gr 1 DDfx. Mild AS (mean grad 13). Mild AI. Unchanged from study of 11/30/11.  Cardiac nuclear imaging test, mod risk 01/22/2016    Intermediate risk. Medium-sized inferior, inferoseptal infarction. No ischemia. EF 54%. Nondiagnostic EKG on pharm stress test.    Carotid duplex 03/02/2016    Mod 50-69% bilateral ICA stenosis. Probable significant RECA stenosis. >50% stenosis of left subclavian. No significant change from study of 1/8/15.  Coronary artery disease     Status post PCI with drug-eluting stent, [place] 3 x 18 mm in the proximal circumflex.     Hx of carotid artery disease ([address]     Hypercholesterolemia     Hypertension     [facility]state [facility])       Past Surgical History:   Procedure Laterality Date    HX CORONARY STENT PLACEMENT  4/28/11    PCI with drug-eluting stent, [place] 3 x 18 mm in the proximal circumflex (post dialated with 3.25 mm noncompliant balloon at high pressure). Prior to Admission medications    Medication Sig Start Date End Date Taking? Authorizing Provider   atorvastatin (LIPITOR) 40 mg tablet Take 1 Tab by mouth nightly. 3/12/19   Priscilla Lizarraga MD   carvedilol (COREG) 3.125 mg tablet Take 1 Tab by mouth two (2) times daily (with meals). 3/12/19   Priscilla Lizarraga MD   lisinopril (PRINIVIL, ZESTRIL) 5 mg tablet Take 1 Tab by mouth daily. 3/13/19   Priscilla Lizarraga MD   nitroglycerin (NITROSTAT) 0.4 mg SL tablet 1 Tab by SubLINGual route every five (5) minutes as needed for Chest Pain. 3/12/19   Priscilla Lizarraga MD   furosemide (LASIX) 20 mg tablet Take 1 Tab by mouth daily. 3/12/19   Priscilla Lizarraga MD   potassium chloride SR (KLOR-CON 10) 10 mEq tablet Take 1 Tab by mouth daily. 3/12/19   Priscilla Lizarraga MD   OTHER Cbc, BMP in 1 week  Dx: CHF  Fax results to Dr. Tsang Argue 3/12/19   Priscilla Lizarraga MD   budesonide-formoterol (SYMBICORT) 160-4.5 mcg/actuation HFAA Take 2 Puffs by inhalation two (2) times a day. 3/1/19   Benito Daniel MD   glycopyrrolate-formoterol (BEVESPI AEROSPHERE) 9-4.8 mcg HFAA Take 2 Inhalation by inhalation two (2) times a day. 3/1/19   Keerthi Toledo MD   clopidogrel (PLAVIX) 75 mg tab TAKE 1 TABLET EVERY DAY 2/26/19   Benito Daniel MD   omeprazole (PRILOSEC) 40 mg capsule Take 1 Cap by mouth daily. 2/23/19   Atul Angela MD   albuterol (PROVENTIL HFA, VENTOLIN HFA, PROAIR HFA) 90 mcg/actuation inhaler Take 2 Puffs by inhalation every four (4) hours as needed for Wheezing or Shortness of Breath. 3/2/15   AMITA Duke   tamsulosin (FLOMAX) 0.4 mg capsule Take 1 Cap by mouth daily.  3/13/13   Keerthi Toledo MD   aspirin delayed-release 81 mg tablet Take 81 mg by mouth daily. Carly Gaona MD     No Known Allergies   Social History     Tobacco Use    Smoking status: Former Smoker     Packs/day: 1.00     Years: 15.00     Pack years: 15.00     Last attempt to quit: 1960     Years since quittin.8    Smokeless tobacco: Never Used   Substance Use Topics    Alcohol use: No      History reviewed. No pertinent family history. @DBLINKMRCHARTING(EPT,5988)@  Immunization status: up to date and documented. Current Facility-Administered Medications   Medication Dose Route Frequency    heparin (porcine) injection 5,000 Units  5,000 Units SubCUTAneous Q8H    lidocaine (PF) (XYLOCAINE) 10 mg/mL (1 %) injection 1 mL  10 mg IntraDERMal ONCE    albuterol-ipratropium (DUO-NEB) 2.5 MG-0.5 MG/3 ML  3 mL Nebulization TID RT    furosemide (LASIX) injection 20 mg  20 mg IntraVENous DAILY    cefepime (MAXIPIME) 2 g in sterile water (preservative free) 10 mL IV syringe  2 g IntraVENous Q12H    atorvastatin (LIPITOR) tablet 40 mg  40 mg Oral QHS    aspirin chewable tablet 81 mg  81 mg Oral DAILY    budesonide-formoterol (SYMBICORT) 160-4.5 mcg/actuation HFA inhaler 2 Puff  2 Puff Inhalation BID RT    tamsulosin (FLOMAX) capsule 0.4 mg  0.4 mg Oral DAILY    famotidine (PEPCID) tablet 20 mg  20 mg Oral DAILY    sodium chloride (NS) flush 5-40 mL  5-40 mL IntraVENous Q8H    clopidogrel (PLAVIX) tablet 75 mg  75 mg Oral DAILY       Review of Systems:  Pertinent items are noted in HPI. Objective:   Vital Signs:    Visit Vitals  /67 (BP 1 Location: Left arm, BP Patient Position: At rest)   Pulse (!) 105   Temp 98 °F (36.7 °C)   Resp 18   Ht 5' 7\" (1.702 m)   Wt 66.9 kg (147 lb 8 oz)   SpO2 97%   BMI 23.10 kg/m²       O2 Device: Nasal cannula   O2 Flow Rate (L/min): 4 l/min   Temp (24hrs), Av.6 °F (36.4 °C), Min:97 °F (36.1 °C), Max:98.1 °F (36.7 °C)       Intake/Output:   Last shift:      No intake/output data recorded.   Last 3 shifts:  1901 - 03/18 0700  In: 110 [P.O.:100; I.V.:10]  Out: 1530 [Urine:900]    Intake/Output Summary (Last 24 hours) at 3/18/2019 1125  Last data filed at 3/18/2019 0650  Gross per 24 hour   Intake 110 ml   Output 830 ml   Net -720 ml      Physical Exam:   General:  Alert, cooperative, no distress, appears stated age. Head:  Normocephalic, without obvious abnormality, atraumatic. Eyes:  Conjunctivae/corneas clear. PERRL, EOMs intact. Nose: Nares normal. Septum midline. Mucosa normal. No drainage or sinus tenderness. Throat: Lips, mucosa, and tongue normal. Teeth and gums normal.   Neck: Supple, symmetrical, trachea midline, no adenopathy, thyroid: no enlargment/tenderness/nodules, no carotid bruit and no JVD. Back:   Symmetric, no curvature. ROM normal.   Lungs:   Reduced air entry at the lung bases. Few crackles. Dullness to percussion. Chest wall:  No tenderness or deformity. Heart:  Regular rate and rhythm, S1, S2 normal, no murmur, click, rub or gallop. Abdomen:   Soft, non-tender. Bowel sounds normal. No masses,  No organomegaly. Extremities: Extremities normal, atraumatic, no cyanosis or edema. Pulses: 2+ and symmetric all extremities.    Skin: Skin color, texture, turgor normal. No rashes or lesions   Lymph nodes: Cervical, supraclavicular, and axillary nodes normal.   Neurologic: Grossly nonfocal     Data review:     Recent Results (from the past 24 hour(s))   PTT    Collection Time: 03/18/19  3:43 AM   Result Value Ref Range    aPTT 45.0 (H) 23.0 - 36.4 SEC   HGB & HCT    Collection Time: 03/18/19  3:43 AM   Result Value Ref Range    HGB 7.9 (L) 13.0 - 16.0 g/dL    HCT 26.0 (L) 36.0 - 48.0 %       Imaging:  I have personally reviewed the patients radiographs and have reviewed the reports:  XR Results (most recent):  Results from Hospital Encounter encounter on 03/13/19   XR CHEST PORT    Narrative Portable CXR:    Comparison March 16, 2019    Stable mild cardiomegaly and vascular congestion with persistent bilateral lower  lung consolidation, bilateral pleural effusion. No significant interval change. Impression IMPRESSION: As described. No pneumothorax. CT Results (most recent):  Results from Hospital Encounter encounter on 03/13/19   CTA CHEST W OR W WO CONT    Narrative EXAM:  CTA Chest with Contrast (CT Pulmonary Study for PE). CLINICAL INDICATION:  Acute shortness of breath which began this morning. Recent hospitalization for shortness of breath and fatigue. COMPARISON:  None. TECHNIQUE:      - Helical volumetric sections of the chest are obtained with CT pulmonary  angiogram protocol. Subsequently, sagittal and coronal multiplanar  reconstruction images are obtained. Maximum intensity projection images are  generated to better delineate the pulmonary vasculature, differentiate between  the pulmonary arteries and veins and to increase sensitivity to pulmonary  emboli.    - IV contrast dose of 100 mL Isovue-370.  - Radiation dose optimization techniques are utilized as appropriate to the  exam, with combination of automated exposure control, adjustment of the mA  and/or kV according to patient's size (Including appropriate matching for  site-specific examinations), or use of iterative reconstruction technique. FINDINGS:     Pulmonary Arteries:      - Unusual pulmonary artery opacification pattern is noted. The upper lung zone  pulmonary artery opacification is complete whereas the lower lung zone pulmonary  arteries appear suboptimally opacified particularly in the left lower lobe basal  segments. Less pronounced atypical poor opacification of the basal segments is  also noted on the right side. Instead of sharply demarcated distinct filling  defects, is involved segments demonstrate gradual fading of the pulmonary artery  luminal opacification.   This makes it difficult to exclude small filling defects  in the segmental and subsegmental arteries at the left lower lobe basal region. The presence of atelectatic changes with pleural effusion may contribute to this  atypical pulmonary artery opacification pattern. Alternatively, intrinsic  intracardiac abnormalities may be responsible for the atypical pulmonary artery  opacification. Notably, the left inferior pulmonary vein opacification is much  less than that of the right pulmonary venous opacification.  - Within the technical limitations of the study, no definite suspicious filling  defects are identified in the main trunk, right and left pulmonary artery, lobar  and interlobar arteries, segmental and intrasegmental arteries. Basal region  distal segmental artery and subsegmental arteries are poorly opacified. Aorta:  No opacification of the aorta. The caliber of the ascending aorta and  aortic arch appear within normal limits. The mid descending aortography  demonstrates atypical aorta contour, with apparent smoothly outlined bulging  contour along the medial aspect (axial #). Further inferiorly, the aorta  appears to dilated slightly, measuring up to about 3.6 cm in diameter and with  crescentic outpouching. At the level of the diaphragmatic hiatus, the right  lateral wall of the aorta demonstrates crescentic appearance with lamellated  pattern. 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 Recommend dedicated CTA of  the aorta on routine basis for further delineation of this finding. Lung, Pleura, Airways:      - Moderate to large bilateral pleural effusion. Associated atelectatic changes. - Consolidative opacities in the left upper lobe and in the right lower lobe. Groundglass opacities in the right upper lobe and right middle lobe. Possible  consolidative opacities vs. atelectatic changes in the left lower lobe. Mediastinum, Radha:  No definite mediastinal adenopathy. No definite hilar  adenopathy. Base of Neck, Axillae:  Unremarkable.               Abdomen:  Multiple gallstones. Skeletal Structures:  No acute findings. Impression IMPRESSION:         1. Unusual pulmonary artery opacification with gradual decrease in luminal  opacification in the basal regions, more pronounced on the left side than on the  right. Possibly related to presence of moderate to large pleural effusion with  associated atelectatic changes as the explanation for this pattern vs. intrinsic  intracardiac abnormality. No convincing evidence of pulmonary embolism is  detected within the technical limitations of the study. 2. Moderate to large pleural effusion bilaterally with associated passive  atelectatic changes. Multiple foci of consolidative opacities in both lungs. Most pronounced in the left upper lobe. Query developing pneumonia vs.  pulmonary hemorrhage vs. nonspecific inflammatory process. 3.  Atypical appearance of the descending with smooth crescentic outpouchings  and presence of lamellated calcifications along the aortic wall. 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 With current technique, i.e. lack of IV contrast  in the aorta, detailed analysis is difficult. Recommend CTA of the aorta for  further delineation. 4.  Cholelithiasis. 03/13/19   ECHO ADULT FOLLOW-UP OR LIMITED 03/15/2019 3/15/2019    Narrative · Left ventricular severely decreased systolic function. Estimated left   ventricular ejection fraction is 26 - 30%. Visually measured ejection   fraction. Left ventricular mild concentric hypertrophy. Abnormal left   ventricular wall motion as described on the wall scoring diagram below. · Severe tricuspid valve regurgitation is present. Moderate to severe   pulmonary hypertension is present. · Moderate mitral valve regurgitation.         Signed by: MD Elieser Steven MD

## 2019-03-18 NOTE — PROCEDURES
[facility]  Pulmonary, Critical Care, and Sleep Medicine    Name: Carmelo Guaman MRN: [id number]   : 1926 Hospital: [address] [place] [address]   Date: 3/18/2019        Thoracentesis Procedure Note    PROCEDURE:  DIAGNOSTIC/THERAPEUTIC THORACENTESIS  CPT code: With Ultrasound imaging- 81024    INDICATION:  PLEURAL EFFUSION    ANESTHESIA:  LOCAL ANESTHESIA WITH 1% LIDOCAINE      CHEST ULTRASOUND FINDINGS:  Ultrasound was used to image the chest and localize the medium right pleural effusion. DESCRIPTION OF PROCEDURE:  After obtaining informed consent and localizing the safest location for thoracentesis, the  Right 9th intercostal space was marked with a blunt, plastic needle cap in the mid scapular line. A thoracentesis kit was used to perform the procedure. The skin was prepped and draped in the usual sterile fashion. Using the previously marked location as a giude, 1% lidocaine was injected into the skin and subcutaneous tissue, as well as onto the underlying rib and inter-costal muscles, pleural fluid was aspirated to assure proper location, prior to removing the anesthesia needle. The needle and thoracentesis catheter were then introduced into the chest at the localized site over the lower rib the rib localized with the needle, and the catheter then marched over the rib into the pleural space. After aspirating fluid, the thoracentesis catheter was then placed into the chest using the needle itself as a trocar. The needle was then removed and the catheter was attached to the supplied tubing without complication. A total of 1200 cc of dark yellow fluid, was aspirated and sent for analysis. The patient tolerated the procedure well without complications. Post procedure CXR is pending.   Estimated blood loss Minimal          Poncho Copeland MD

## 2019-03-18 NOTE — PROGRESS NOTES
Problem: Falls - Risk of  Goal: *Absence of Falls  Document Gabe Fall Risk and appropriate interventions in the flowsheet. Outcome: Progressing Towards Goal  Fall Risk Interventions:  Mobility Interventions: Assess mobility with egress test, Bed/chair exit alarm, Communicate number of staff needed for ambulation/transfer, OT consult for ADLs, Patient to call before getting OOB, PT Consult for mobility concerns, PT Consult for assist device competence, Strengthening exercises (ROM-active/passive), Utilize walker, cane, or other assistive device    Mentation Interventions: Adequate sleep, hydration, pain control, Bed/chair exit alarm, Door open when patient unattended, Evaluate medications/consider consulting pharmacy, Eyeglasses and hearing aids, Familiar objects from home, Family/sitter at bedside, Increase mobility, More frequent rounding, Room close to nurse's station, Toileting rounds, Update white board    Medication Interventions: Assess postural VS orthostatic hypotension, Bed/chair exit alarm, Evaluate medications/consider consulting pharmacy, Patient to call before getting OOB    Elimination Interventions: Bed/chair exit alarm, Call light in reach, Patient to call for help with toileting needs, Toileting schedule/hourly rounds, Urinal in reach             Problem: Pressure Injury - Risk of  Goal: *Prevention of pressure injury  Document William Scale and appropriate interventions in the flowsheet.   Outcome: Progressing Towards Goal  Pressure Injury Interventions:  Sensory Interventions: Assess changes in LOC, Assess need for specialty bed, Avoid rigorous massage over bony prominences, Chair cushion, Check visual cues for pain, Discuss PT/OT consult with provider, Float heels, Keep linens dry and wrinkle-free, Maintain/enhance activity level, Minimize linen layers, Monitor skin under medical devices, Pad between skin to skin, Pressure redistribution bed/mattress (bed type), Sit a 90-degree angle/use footstool if needed, Turn and reposition approx. every two hours (pillows and wedges if needed), Use 30-degree side-lying position    Moisture Interventions: Absorbent underpads, Apply protective barrier, creams and emollients, Assess need for specialty bed, Check for incontinence Q2 hours and as needed, Internal/External urinary devices, Limit adult briefs, Maintain skin hydration (lotion/cream), Minimize layers, Moisture barrier, Offer toileting Q_hr    Activity Interventions: Assess need for specialty bed, Chair cushion, Increase time out of bed, Pressure redistribution bed/mattress(bed type), PT/OT evaluation    Mobility Interventions: Assess need for specialty bed, Chair cushion, Float heels, HOB 30 degrees or less, Pressure redistribution bed/mattress (bed type), PT/OT evaluation, Turn and reposition approx.  every two hours(pillow and wedges)    Nutrition Interventions: Document food/fluid/supplement intake, Discuss nutritional consult with provider, Offer support with meals,snacks and hydration    Friction and Shear Interventions: Apply protective barrier, creams and emollients, Feet elevated on foot rest, Foam dressings/transparent film/skin sealants, HOB 30 degrees or less, Lift sheet, Lift team/patient mobility team, Minimize layers, Sit at 90-degree angle, Transferring/repositioning devices

## 2019-03-18 NOTE — PROGRESS NOTES
[facility]  -  Progress Note      Patient: Juice Vergara MRN: [id number]  SSN: [id number]    YOB: 1926  Age: 80 y.o. Sex: male      Admit Date: 3/13/2019    Assessment:     Hospital Problems  Date Reviewed: 3/1/2019          Codes Class Noted POA    Congestive heart disease ([facility] ICD-10-CM: I50.9  ICD-9-CM: 428.0  3/13/2019 Unknown            -Acute respiratory distress requiring bipap. CXR with pulmonary edema, questionable infiltrate (previous CXR was done at [facility]).  Improving off bipap with Abx, nebs and gentle diuresis. -NSTEMI, suspect secondary in setting of acute illness, troponin peak 7.58, s/p PCI to LAD 3/11/2019 with occluded mid RCA which appeared to be chronic medically managed, ECG with known LBBB, denies CP.  -Heart failure with reduced EF. Discharged on coreg, lisinopril, lasix 3/12/2019.  -Ischemic cardiomyopathy with EF 26-30% on echo this admission, unchanged from last admission. Conservatively managing in setting of advanced age. -CAD s/p LAD PCI with DEMAR 3/11/2019 with previous PCI to LCx 2011. Cath 3/11/2019 (Occluded mid RCA which appears to be chronic. There is faint collateral from the left coronary system, Left main artery with ostial 30-40%, LAD mid focal severely calcified tortuous 99% stenosis with CARLENE II flow, Circumflex coronary artery with diffuse mild 20-30% stenosis throughout). Discharged on ASA, plavix, statin, BB.  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 -Mild kidney injury. -Valvular heart disease with moderate aortic valve stenosis and mild to moderate MR.  -Carotid artery disease. Moderate bilateral ICA stenosis medically managed.  -Peripheral vascular disease.  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  Because of this his blood pressure should be checked in his right arm.  -Hypertension. Low normal on admission requiring pressor support.  -Dyslipidemia. On statin.   -H/o intermittent LBBB, now appears persistent.  -Asthma, former smoker. -Advanced age, full code, but independent, lives alone and cares for self, significant decline in functional capacity follow recent hospital stays.     Primary cardiologist Dr. Candi Sheth. Plan:     Improved symptoms after tap and feeling better. Would continue with ASA, statin, Plavix, Lasix  Increase activity as tolerated  Effusion appears to be transudate and will continue to monitor symptoms    Subjective:     Patient feeling better after thoracentesis from yesterday.   600 cc's removed out of L thorax  No chest pain    Objective:      Patient Vitals for the past 8 hrs:   Temp Pulse Resp BP SpO2   03/18/19 0822 98 °F (36.7 °C) (!) 105 18 113/67 97 %   03/18/19 0726     98 %   03/18/19 0414 98.1 °F (36.7 °C) (!) 108 18 112/66 96 %         Patient Vitals for the past 96 hrs:   Weight   03/18/19 0414 66.9 kg (147 lb 8 oz)   03/17/19 0312 67.3 kg (148 lb 6.4 oz)   03/16/19 0607 67 kg (147 lb 11.3 oz)   03/15/19 0400 59.9 kg (132 lb 0.9 oz)   03/14/19 1445 66.2 kg (146 lb)         Intake/Output Summary (Last 24 hours) at 3/18/2019 1015  Last data filed at 3/18/2019 0650  Gross per 24 hour   Intake 110 ml   Output 830 ml   Net -720 ml       Physical Exam:  General:  alert, cooperative, no distress, appears stated age  Neck:  nontender, no JVD  Lungs:  diminished breath sounds at bases  Heart:  irregularly irregular rhythm  Abdomen:  abdomen is soft without significant tenderness, masses, organomegaly or guarding  Extremities:  extremities normal, atraumatic, no cyanosis or edema    Data Review:     Labs: Results:       Chemistry Recent Labs     03/17/19  0517 03/16/19  0415   * 154*    140   K 3.4* 3.9    102   CO2 31 28   BUN 32* 30*   CREA 1.16 1.29   CA 9.0 9.2   MG 2.3 2.3   PHOS 2.9 3.2   AGAP 5 10   BUCR 28* 23*      CBC w/Diff Recent Labs     03/18/19  0343 03/17/19  0517 03/16/19  0415   WBC  --   --  9.8   RBC  --   --  3.12*   HGB 7.9* 7.9* 8.5* HCT 26.0* 25.9* 27.5*   PLT  --   --  287   GRANS  --   --  68   LYMPH  --   --  20*   EOS  --   --  0      Cardiac Enzymes No results found for: CPK, CK, CKMMB, CKMB, RCK3, CKMBT, CKNDX, CKND1, CHRISTINE, TROPT, TROIQ, NGHIA, TROPT, TNIPOC, BNP, BNPP   Coagulation Recent Labs     03/18/19  0343 03/17/19  0517   APTT 45.0* 40.9*       Lipid Panel Lab Results   Component Value Date/Time    Cholesterol, total 87 03/08/2019 05:28 AM    HDL Cholesterol 44 03/08/2019 05:28 AM    LDL, calculated 30 03/08/2019 05:28 AM    VLDL, calculated 13 03/08/2019 05:28 AM    Triglyceride 65 03/08/2019 05:28 AM    CHOL/HDL Ratio 2.0 03/08/2019 05:28 AM      BNP No results found for: BNP, BNPP, XBNPT   Liver Enzymes No results for input(s): TP, ALB, TBIL, AP, SGOT, GPT in the last 72 hours.     No lab exists for component: DBIL   Digoxin    Thyroid Studies Lab Results   Component Value Date/Time    TSH 1.22 03/07/2019 05:29 AM

## 2019-03-18 NOTE — PROGRESS NOTES
Problem: Self Care Deficits Care Plan (Adult)  Goal: *Acute Goals and Plan of Care (Insert Text)  Occupational Therapy Goals  Initiated 3/18/2019 within 7 day(s). 1.  Patient will perform upper body dressing/bathing with supervision/set-up   2. Patient will perform grooming seated at EOB with F balance x 5 min with supervision/set-up. 3.  Patient will perform sit>stand with CGA in order to prep for participation with ADLs. 4.  Patient will participate in upper extremity therapeutic exercise/activities with modified independence for 5-8 minutes. 5.  Patient will utilize energy conservation techniques during functional activities with min verbal cues. Occupational Therapy EVALUATION    Patient: Marcy Goldberg (30 y.o. male)  Date: 3/18/2019  Primary Diagnosis: [facility]est[facility] ([facility] [I50.9]       Precautions:   Fall(O2 dependent )    ASSESSMENT :  Based on the objective data described below, the patient presents with impaired UB strength, activity tolerance, sitting balance, and independence in functional mobility and self care. Pt stated he is feeling much better since thoracentesis completed earlier in the day. Pt performed supine>sit with min A. OT began UB assessment with pt becoming SOB and stating he felt an asthma attack. Pt educated to take a rest break and sit statically to allow breathing to normalize. Pt requested to lay down following 30 second rest break stating his back was hurting too much. Pt was able to demonstrate attempt at inferior reaching to feet, demonstrating inability to touch socks. Pt returned to supine with min A. Pt is able to reach for items on tray and perform self feeding, however, states he has not had an appetite. O2 saturation following sit>supine 98% on 3 L and BP 93/57. Pt educated on the recommendation for rehab prior to returning home. Patient will benefit from skilled intervention to address the above impairments.   Patients rehabilitation potential is considered to be Good  Factors which may influence rehabilitation potential include:   []             None noted  []             Mental ability/status  []             Medical condition  [x]             Home/family situation and support systems  []             Safety awareness  []             Pain tolerance/management  []             Other:      PLAN :  Recommendations and Planned Interventions:   [x]               Self Care Training                  [x]        Therapeutic Activities  [x]               Functional Mobility Training    []        Cognitive Retraining  [x]               Therapeutic Exercises           [x]        Endurance Activities  [x]               Balance Training                   [x]        Neuromuscular Re-Education  []               Visual/Perceptual Training     [x]   Home Safety Training  [x]               Patient Education                 [x]        Family Training/Education  []               Other (comment):    Frequency/Duration: Patient will be followed by occupational therapy 1-2 times per day/4-7 days per week to address goals. Discharge Recommendations: Aric Kim  Further Equipment Recommendations for Discharge: TBD at next level of care     Barriers to Learning/Limitations: None  Compensate with: visual, verbal, tactile, kinesthetic cues/model     PATIENT COMPLEXITY      Eval Complexity: History: MEDIUM Complexity : Expanded review of history including physical, cognitive and psychosocial  history ; Examination: HIGH Complexity : 5 or more performance deficits relating to physical, cognitive , or psychosocial skils that result in activity limitations and / or participation restrictions; Decision Making:MEDIUM Complexity : Patient may present with comorbidities that affect occupational performnce.  Miniml to moderate modification of tasks or assistance (eg, physical or verbal ) with assesment(s) is necessary to enable patient to complete evaluation  Assessment: mod Complexity     SUBJECTIVE:   Patient stated I feel weak.     OBJECTIVE DATA SUMMARY:     Past Medical History:   Diagnosis Date    Asthma     Cardiac cath 04/28/2011    oLM 30%. pLAD 30%. oD1 50%. CX 90% (3 x 18 [place] DEMAR). dRCA 90%. RPLB subtotal.  RPDA 100%.  Cardiac echocardiogram 01/21/2014    EF 55-60%. Mod LVH. Gr 1 DDfx. Mild AS (mean grad 13). Mild AI. Unchanged from study of 11/30/11.  Cardiac nuclear imaging test, mod risk 01/22/2016    Intermediate risk. Medium-sized inferior, inferoseptal infarction. No ischemia. EF 54%. Nondiagnostic EKG on pharm stress test.    Carotid duplex 03/02/2016    Mod 50-69% bilateral ICA stenosis. Probable significant RECA stenosis. >50% stenosis of left subclavian. No significant change from study of 1/8/15.  Coronary artery disease     Status post PCI with drug-eluting stent, [place] 3 x 18 mm in the proximal circumflex.  Hx of carotid artery disease ([facility] [address]     Hypercholesterolemia     Hypertension     Prostate cancer [facility])      Past Surgical History:   Procedure Laterality Date    HX CORONARY STENT PLACEMENT  4/28/11    PCI with drug-eluting stent, [place] 3 x 18 mm in the proximal circumflex (post dialated with 3.25 mm noncompliant balloon at high pressure). Prior Level of Function/Home Situation: PTA pt states he was MI in ADLs and used walker in the home. Pt states he was MI in cooking/cleaning.  Has daughter for support  Home Situation  Home Environment: Private residence  # Steps to Enter: (3)  One/Two Story Residence: One story  Living Alone: Yes  Support Systems: Child(marisela)(daughter loves around the corner)  Patient Expects to be Discharged to[de-identified] Unknown  Current DME Used/Available at Home: Shower chair  Tub or Shower Type: Tub/Shower combination  [x]  Right hand dominant   []  Left hand dominant  Cognitive/Behavioral Status:  Neurologic State: Alert  Orientation Level: Oriented X4  Cognition: Follows commands  Safety/Judgement: Fall prevention;Home safety  Skin: intact BUEs  Edema: none noted BUEs  Coordination:  Coordination: Within functional limits(BUEs)  Fine Motor Skills-Upper: Left Intact; Right Intact    Gross Motor Skills-Upper: Left Intact; Right Intact  Balance:  Sitting: With support; Impaired  Sitting - Static: Fair (occasional)  Sitting - Dynamic: Poor (constant support)  Strength:  Strength: Generally decreased, functional(BUEs)   Tone & Sensation:  Sensation: Intact(BUEs)   Range of Motion:  AROM: Within functional limits(BUEs)   Functional Mobility and Transfers for ADLs:  Bed Mobility:  Supine to Sit: Minimum assistance  Sit to Supine: Minimum assistance  ADL Assessment:  Feeding: Modified independent  Oral Facial Hygiene/Grooming: Setup  Bathing: Maximum assistance  Upper Body Dressing: Minimum assistance  Lower Body Dressing: Maximum assistance  Toileting: Total assistance   ADL Intervention:   Cognitive Retraining  Safety/Judgement: Fall prevention;Home safety  Pain:  Pt reports intermittent pain in back of head and lower back, subs ding with rest.   Activity Tolerance:   poor    Please refer to the flowsheet for vital signs taken during this treatment. After treatment:   [] Patient left in no apparent distress sitting up in chair  [x] Patient left in no apparent distress in bed  [x] Call bell left within reach  [x] Nursing notified  [] Caregiver present  [] Bed alarm activated    COMMUNICATION/EDUCATION:   [x] Home safety education was provided and the patient/caregiver indicated understanding. [x] Patient/family have participated as able in goal setting and plan of care. [x] Patient/family agree to work toward stated goals and plan of care. [] Patient understands intent and goals of therapy, but is neutral about his/her participation. [] Patient is unable to participate in goal setting and plan of care.     Thank you for this referral.  Sakshi Yancey, OT  Time Calculation: 21 mins

## 2019-03-18 NOTE — PROGRESS NOTES
Pnt sitting up in bed  Verbal alert and oriented  RR even and unlabored  Denies any complaints @ this time  Pnt stable  Will continue to monitor  NADN

## 2019-03-18 NOTE — ROUTINE PROCESS
ADULT PROTOCOL: JET AEROSOL ASSESSMENT    Patient  Marcy Goldberg     80 y.o.   male     3/17/2019  8:27 PM    Breath Sounds Pre Procedure:  Breath Sounds Bilateral: Clear                                            Breath Sounds Post Procedure: Breath Sounds Bilateral: Clear                                               Breathing pattern: Pre procedure  Breathing Pattern: Regular          Post procedure  Breathing Pattern: Regular    Cough: Pre procedure  Cough: Non-productive               Post procedure Cough: Non-productive    Heart Rate: Pre procedure Pulse: 111           Post procedure Pulse: 112    Resp Rate: Pre procedure  Respirations: 22           Post procedure          Nebulizer Therapy: Current medications Aerosolized Medications: Symbicort       Problem List:   Patient Active Problem List   Diagnosis Code    Asthma J45.909    [facility] C61    Hypercholesterolemia E78.00    Angina, class I ([facility] I20.9    Left bundle branch block I44.7    Coronary artery disease I25.10    Hypertension I11.9    Dyslipidemia E78.5    Carotid artery disease ([facility]) I77.9    Aortic valve stenosis I35.0    Anemia D64.9    NSTEMI (non-ST elevated myocardial infarction) ([facility]) I21.4    Congestive heart disease ([facility]) I50.9       Patient alert and cooperative to use MDI: Yes    Home Respiratory Therapy Regimen/Frequency:  YES  Medication ProAir  Device inhaler Q4 PRN    SEVERITY INDEX:    ITEM 0 1 2 3 4 Score   Respiratory Pattern and or Rate Regular  10-19 Regular  20-24   24-30    30-34 Severe SOB or   Greater than 35 1   Breath Sounds Clear Occasional Wheeze Mild Wheezing Moderate Wheezing  wheezing/Absent breath sounds 1   Shortness of Breath None Dyspnea on Exertion Dyspnea at Rest Moderate Shortness of Breath at Rest Severe Shortness of Breath - Limited Speech 1       Total Score:  3    * Scoring Guidelines  0-4 pts:  PRN-BID   5-7 pts:  BID, TID, QID  8-9 pts:  TID, QID, Q6  10-12 pts:  Q4-Q6  * - Guidelines used with clinical judgement. PRN Treatments can be ordered to supplement scheduled treatments. Regardless of score, frequency should not be less than normal home regimen.     Recommended Order/Frequency:  Change to TID    Comments:          Respiratory Therapist: Bartolo Davenport RT

## 2019-03-18 NOTE — PROGRESS NOTES
1121: PT orders received and chart reviewed. PT evaluation attempted. Patient pending thoracocentesis. Will f/u with patient post procedure.      Nicci Dupree PT, DPT  Office extension: [phone]  Pager #: [phone]

## 2019-03-18 NOTE — PROGRESS NOTES
Problem: Mobility Impaired (Adult and Pediatric)  Goal: *Acute Goals and Plan of Care (Insert Text)  Physical Therapy Goals   Initiated 3/18/2019 and to be accomplished within 7 days. 1.  Patient will complete all bed mobility with modified independence in order to prepare for EOB/OOB activity. 2.  Patient will perform sit <> stand with supervision/set-up in order to prepare for OOB/gait activity. 3.  Patient will perform bed to chair transfers with supervision/set-up in order to promote mobility and encourage seated activity to progress towards their prior level of function. 4.  Patient will ambulate 100 feet with supervision/set-up using LRAD in order to prepare for safe negotiation of their environment. 5.  Patient will ascend/descend 3 steps with S handrail use with supervision/set-up in order to prepare for safe exit/entry into their home environment. Outcome: Progressing Towards Goal  PHYSICAL THERAPY: Initial Assessment   INPATIENT: Medicare: Hospital Day: 6     Patient: Roscoe Giron (36 y.o. male)    Date: 3/18/2019  Primary Diagnosis: Congestive heart disease ([facility] [address] [I50.9]   ,     Precautions: Fall     PLOF: Independent; ambulation with RW    ASSESSMENT :  Patient supine in bed, agreeable to participation with PT. Family present. Patient reports that he lives alone in a single story home with 3 GRETA; he has a RW which he uses for ambulation at home. Denies use of O2 at home. O2 removed and patient on RA for session. MIN A x 1 for supine > sit; goo static/ fair dynamic seated balance. MIN A x 1 for sit > stand with RW for support; patient requires verbal cuing for strategy. Fair standing balance with RW. Patient with BM upon standing; bedding changed. Patient tolerates standing x 10 minutes for pericare. Patient Total A for pericare. Patient fatigued following activity and verbalizing back pain; no rating provided. Further activity declined. Patient returned to bed with MIN A x 1.  Left supine in bed with all needs within reach. O2 via NC returned to patient at end of session. Unable to d/w nursing as RN phone at nursing station. Pulse oximetry assessment   100% at rest on room air (if 88% or less, skip next steps)  96% while standing on room air  96% at rest following activity on room air    Recommend d/c to SNF as patient lives alone and is currently requiring assist for mobility. Patient presents with deficits in:  Bed Mobility, Transfers, Gait, Strength and Stairs    Patient will benefit from skilled intervention to address the above impairments. Patients rehabilitation potential is considered to be Good  Factors which may influence rehabilitation potential include:   []         None noted  []         Mental ability/status  [x]         Medical condition  [x]         Home/family situation and support systems  []         Safety awareness  []         Pain tolerance/management  []         Other:      PLAN :   Recommendations and Planned Interventions:  [x]           Bed Mobility Training             [x]    Neuromuscular Re-Education  [x]           Transfer Training                   []    Orthotic/Prosthetic Training  [x]           Gait Training                          []    Modalities  [x]           Therapeutic Exercises          []    Edema Management/Control  [x]           Therapeutic Activities            [x]    Patient and Family Training/Education  []           Other (comment):      EDUCATION:   Education:  Patient was educated on the following topics: Purpose of PT, PT POC, bed mobility, transfers, safety. Verbalizes understanding. Barriers to Learning/Limitations: None  Compensate with: visual, verbal, tactile, kinesthetic cues/model    Recommendations for the next treatment session: Gait   Frequency/Duration: Patient will be followed by physical therapy 3 - 5 times a week to address goals.   Discharge Recommendations: Aric Kim  Further Equipment Recommendations for Discharge: rolling walker  Factors which may impact discharge planning: N/A     SUBJECTIVE:   Patient stated I'm trying to get my granddaughter to move in with me.     OBJECTIVE DATA SUMMARY:     Past Medical History:   Diagnosis Date    Asthma     Cardiac cath 04/28/2011    oLM 30%. pLAD 30%. oD1 50%. CX 90% (3 x 18 [place] DEMAR). dRCA 90%. RPLB subtotal.  RPDA 100%.  Cardiac echocardiogram 01/21/2014    EF 55-60%. Mod LVH. Gr 1 DDfx. Mild AS (mean grad 13). Mild AI. Unchanged from study of 11/30/11.  Cardiac nuclear imaging test, mod risk 01/22/2016    Intermediate risk. Medium-sized inferior, inferoseptal infarction. No ischemia. EF 54%. Nondiagnostic EKG on pharm stress test.    Carotid duplex 03/02/2016    Mod 50-69% bilateral ICA stenosis. Probable significant RECA stenosis. >50% stenosis of left subclavian. No significant change from study of 1/8/15.  Coronary artery disease     Status post PCI with drug-eluting stent, [place] 3 x 18 mm in the proximal circumflex.  Hx of carotid artery disease ([facility] [address]     Hypercholesterolemia     Hypertension     Prostate cancer [facility])      Past Surgical History:   Procedure Laterality Date    HX CORONARY STENT PLACEMENT  4/28/11    PCI with drug-eluting stent, [place] 3 x 18 mm in the proximal circumflex (post dialated with 3.25 mm noncompliant balloon at high pressure).          Eval Complexity: History: MEDIUM  Complexity : 1-2 comorbidities / personal factors will impact the outcome/ POC Exam:MEDIUM Complexity : 3 Standardized tests and measures addressing body structure, function, activity limitation and / or participation in recreation  Presentation: MEDIUM Complexity : Evolving with changing characteristics  Clinical Decision Making:Medium Complexity MIN A for bed mobility, transfers Overall Complexity:MEDIUM    Prior Level of Function/Home Situation:   Home Situation  Home Environment: Private residence  # Steps to Enter: [address] [facility] to Enter: Yes  One/Two Story Residence: One story  Living Alone: Yes  Support Systems: Child(marisela)  Patient Expects to be Discharged to[de-identified] Unknown  Current DME Used/Available at Home: Walker, rolling  Tub or Shower Type: Tub/Shower combination  Critical Behavior:  Neurologic State: Alert  Orientation Level: Oriented X4  Cognition: Follows commands  Safety/Judgement: Fall prevention;Home safety  Psychosocial  Patient Behaviors: Calm; Cooperative  Needs Expressed: Emotional  Purposeful Interaction: Yes    Manual Muscle Testing (LE)         R     L    Hip Flexion:   4+/5  4+/5  Knee EXT:   4+/5  4+/5  Knee FLEX:   4+/5  4+/5  Ankle DF:   4+/5  4+/5  _________________________________________________   Tone : Normal  Sensation: NT  Range Of Motion: B LE AROM WFL    Functional Mobility:      Functional Status      Indep   (I)   Mod I   Super-vision   Min A   Mod A   Max A   Total A   Assist x2 Verbal cues Additional time Not tested   Comments   Rolling []  []  [] []    []    []  []  [] [] [] []    Supine to sit []  []  [] []  []  []  []  [] [] [] []    Sit to supine []  []  [] []  []  []  []  [] [] [] []    Sit to stand []  []  [] []  []  []  []  [] [] [] []    Stand to sit []  []  [] []  []  []  []  [] [] [] []    Bed to chair transfers []  []  [] []  []  []  []  [] [] [] []        Balance    Good   Fair   Poor   Unable   Not tested   Comments   Sitting static [x]  []  []  []  []    Sitting dynamic []  [x]  []  []  []    Standing static []  [x]  []  []  []    Standing dynamic []  [x]  []  []  []      Pain: Back pain   Pre treatment pain level: no rating provided  Post treatment pain level: no rating provided    Vital Signs  Temp: 98 °F (36.7 °C)     Pulse (Heart Rate): (!) 105     BP: 113/67     Resp Rate: 18     O2 Sat (%): 99 %    Activity Tolerance:   Fair     Please refer to the flowsheet for vital signs taken during this treatment.   After treatment:   []         Patient left in no apparent distress sitting up in chair  [x]         Patient left in no apparent distress in bed  [x]         Call bell left within reach  []         Nursing notified  []         Caregiver present  []         Bed alarm activated    COMMUNICATION/EDUCATION:   [x]         Fall prevention education was provided and the patient/caregiver indicated understanding. [x]         Patient/family have participated as able in goal setting and plan of care. [x]         Patient/family agree to work toward stated goals and plan of care. []         Patient understands intent and goals of therapy, but is neutral about his/her participation. []         Patient is unable to participate in goal setting and plan of care.     Thank you for this referral.  Wm Elder   Time Calculation: 39 mins

## 2019-03-18 NOTE — PROGRESS NOTES
[facility] Hospitalist Group  Progress Note    Patient: Dustin Peak Age: 80 y.o. : 1926 MR#: [id number] SSN: [id number]  Date/Time: 3/18/2019 9:34 AM    Subjective/24-hour events:     Pt states his breathing improved after getting thoracentesis this am yesterday, seen with daughter at bedside. .    Assessment:   -Acute hypoxic respiratory failure required BIPAP, likely due to heart failure pulm input noted, s/p thoracentesis yesterday am, to have a repeat one, on other side, today. appreciate assistance  -Abnormal chest imaging, concern for possible pneumonia  -Pleural effusions s/p thoracentesis     -Acute on chronic systolic CHF on IV lasix  -Ischemic cardiomyopathy, EF 26-30%  -Recent NSTEMI s/p PCI/stent placement trop downtrending  -CAD  -Dyslipidemia  -CKD 2-3  -Chronic anemia  -Valvular heart disease  -Advanced age  -Probable COPD  -Tobacco use hx    Plan:  Cont to diurese  Dc cefepime 7 day course  Continue nebs/symbicort/usual pulmonary hygiene. .  Diuresis per cardiology - IV lasix   Had Not been on b-blocker or ACE-I due to marginal BPs. Started on bb b/c of improvement in bp.  PT/OT evals. Based on current clinical status, will likely not be able to return home alone at discharge. Case discussed with:  [x]Patient  []Family  [x]Nursing  []Case Management  DVT Prophylaxis:  []Lovenox  []Hep SQ  []SCDs  []Coumadin   [x]On Heparin gtt    Objective:   VS:   Visit Vitals  /67 (BP 1 Location: Left arm, BP Patient Position: At rest)   Pulse (!) 105   Temp 98 °F (36.7 °C)   Resp 18   Ht 5' 7\" (1.702 m)   Wt 66.9 kg (147 lb 8 oz)   SpO2 99%   BMI 23.10 kg/m²      Tmax/24hrs: Temp (24hrs), Av.7 °F (36.5 °C), Min:97.3 °F (36.3 °C), Max:98.1 °F (36.7 °C)      Intake/Output Summary (Last 24 hours) at 3/18/2019 1634  Last data filed at 3/18/2019 1329  Gross per 24 hour   Intake 110 ml   Output 1180 ml   Net -1070 ml       General:  Sitting up in bed, in NAD. Nontoxic-appearing. Cardiovascular:  S1, S2. Tachycardic. Pulmonary:  No active wheezing but BS decreased throughout. GI:  Abdomen soft, NTTP. Extremities:  Warm, no ischemia. Neuro:  Awake and alert, motor nofocal.    Labs:    Recent Results (from the past 24 hour(s))   PTT    Collection Time: 03/18/19  3:43 AM   Result Value Ref Range    aPTT 45.0 (H) 23.0 - 36.4 SEC   HGB & HCT    Collection Time: 03/18/19  3:43 AM   Result Value Ref Range    HGB 7.9 (L) 13.0 - 16.0 g/dL    HCT 26.0 (L) 36.0 - 48.0 %   PROTEIN, TOTAL    Collection Time: 03/18/19  3:43 AM   Result Value Ref Range    Protein, total 6.7 6.4 - 8.2 g/dL   CELL COUNT AND DIFF, BODY FLUID    Collection Time: 03/18/19 12:05 PM   Result Value Ref Range    BODY FLUID TYPE PLEURAL FLUID      FLUID COLOR YELLOW      FLUID APPEARANCE HAZY      FLUID RBC CT. 1,425 (A) NRRE /cu mm    FLUID NUCLEATED CELLS 50 (A) NRRE /cu mm    FLD NEUTROPHILS 16 %    FLD BANDS 0 %    FLD LYMPHS 82 %    FLD MONOCYTES 2 %    FLD EOSINS 0 %    WBC COMMENTS MODERATE MESOTHELIAL CELLS SEEN    CULTURE, BODY FLUID W GRAM STAIN    Collection Time: 03/18/19 12:05 PM   Result Value Ref Range    Special Requests: NO SPECIAL REQUESTS      GRAM STAIN FEW WBC'S      GRAM STAIN NO ORGANISMS SEEN      Culture result: PENDING    PROTEIN TOTAL, FLUID    Collection Time: 03/18/19 12:05 PM   Result Value Ref Range    Fluid Type: PLEURAL FLUID      Protein total, body fld. 1.9 g/dL   GLUCOSE, FLUID    Collection Time: 03/18/19 12:05 PM   Result Value Ref Range    Fluid Type: PLEURAL FLUID      Glucose, body fld.  139 MG/DL       Signed By: Duy Sifuentes MD     March 18, 2019 9:34 AM

## 2019-03-19 NOTE — PROGRESS NOTES
[facility] Hospitalist Group Progress Note Patient: Ganesh Bhagat Age: 80 y.o. : 1926 MR#: [id number] SSN: [id number] Date/Time: 3/19/2019 9:34 AM 
 
Subjective/24-hour events:  
 
Pt has no complaints. Per nursing low bp. Assessment:  
-Acute hypoxic respiratory failure required BIPAP, likely due to heart failure pulm input noted, s/p thoracentesis bilatera (3/18 and 3/17)l, with some improvement in respiratory status. 
-Low bp in setting of heart failure and iatrogenic component  
-Abnormal chest imaging, concern for possible pneumonia 
-Pleural effusions s/p thoracentesis -Acute on chronic systolic CHF on lasix -Ischemic cardiomyopathy, EF 26-30% -Recent NSTEMI s/p PCI/stent placement trop downtrending 
-CAD 
-Dyslipidemia 
-CKD 2-3 
-Chronic anemia 
-Valvular heart disease 
-Advanced age 
-Probable COPD 
-Tobacco use hx Plan: 
-monitor hemodynamics Cont to diurese Dc cefepime 7 day course (d/c on ) Continue nebs/symbicort/usual pulmonary hygiene. . 
Diuresis per cardiology -  Now on b-blocker not on ACE-I due to marginal BPs. PT/OT evals. Based on current clinical status, will likely not be able to return home alone at discharge. Case discussed with:  [x]Patient  []Family  [x]Nursing  []Case Management DVT Prophylaxis:  []Lovenox  []Hep SQ  []SCDs  []Coumadin   [x]On Heparin gtt Objective:  
VS:  
Visit Vitals /62 Pulse 85 Temp 96.5 °F (35.8 °C) Resp 24 Ht 5' 7\" (1.702 m) Wt 69.5 kg (153 lb 3.2 oz) SpO2 98% BMI 23.99 kg/m² Tmax/24hrs: Temp (24hrs), Av.5 °F (36.4 °C), Min:96.5 °F (35.8 °C), Max:98.6 °F (37 °C) Intake/Output Summary (Last 24 hours) at 3/19/2019 1504 Last data filed at 3/19/2019 4680 Gross per 24 hour Intake 125 ml Output 100 ml Net 25 ml General: alert, awake, in NAD. Nontoxic-appearing. Cardiovascular:  S1, S2. Tachycardic. Pulmonary:  No active wheezing but BS decreased throughout. GI:  Abdomen soft, NTTP. Extremities:  Warm, no ischemia. Neuro:  Awake and alert, motor nofocal. 
 
Labs:   
Recent Results (from the past 24 hour(s)) PTT Collection Time: 03/19/19  5:38 AM  
Result Value Ref Range aPTT 47.1 (H) 23.0 - 36.4 SEC Signed By: Amrik Leyva MD   
 March 19, 2019 9:34 AM

## 2019-03-19 NOTE — PROGRESS NOTES
NUTRITION Nutrition Screen RECOMMENDATIONS / PLAN:  
 
- Modify supplements: Ensure Enlive, 4x daily & Magic Cup, BID. - Add soft solids with chopped meats/vegetables to diet order. 
- Assist with meals as needed. - Continue RD inpatient monitoring and evaluation. NUTRITION INTERVENTIONS & DIAGNOSIS:  
 
[x] Meals/snacks: modify composition 
[x] Medical food supplement therapy: Ensure Enlive, 6x daily- modify Nutrition Diagnosis: Inadequate oral intake related to SOB and decreased appetite as evidenced by pt with poor meal intake since admission. ASSESSMENT:  
 
S/p thoracentesis 3/17 with improvement in breathing but still reporting poor appetite and meal intake. Daughter encouraging and assisting with intake and stating pt consuming supplements. Agreeable to try softer foods with chopped meats to improve tolerance. Average po intake adequate to meet patients estimated nutritional needs:   [] Yes     [x] No   [] Unable to determine at this time Diet: DIET NUTRITIONAL SUPPLEMENTS HS Snack, Dinner, PM Snack, Lunch, AM Snack, Breakfast; ENSURE ENLIVE 
DIET REGULAR 
DIET NUTRITIONAL SUPPLEMENTS All Meals; Meredith [facility]hal Food Allergies: NKFA Current Appetite:   [] Good     [] Fair     [x] Poor     [] Other: 
Appetite/meal intake prior to admission:   [] Good     [] Fair     [x] Poor x 1 week     [] Other: 
Feeding Limitations:  [] Swallowing difficulty    [x] Chewing difficulty: loose fitting dentures    [] Other: 
Current Meal Intake:  
Patient Vitals for the past 100 hrs: 
 % Diet Eaten 03/18/19 2044 15 % 03/16/19 0939 50 % BM: 3/19 Skin Integrity: WDL Edema:   [x] No     [] Yes Pertinent Medications: Reviewed: pepcid, lasix Recent Labs  
  03/17/19 
4759   
K 3.4*  
 CO2 31 * BUN 32* CREA 1.16  
CA 9.0 MG 2.3 PHOS 2.9 Intake/Output Summary (Last 24 hours) at 3/19/2019 1058 Last data filed at 3/19/2019 0472 Gross per 24 hour Intake 125 ml Output 350 ml Net -225 ml Anthropometrics: 
Ht Readings from Last 1 Encounters:  
03/19/19 5' 7\" (1.702 m) Last 3 Recorded Weights in this Encounter 03/17/19 4101 03/18/19 3686 03/19/19 0405 Weight: 67.3 kg (148 lb 6.4 oz) 66.9 kg (147 lb 8 oz) 69.5 kg (153 lb 3.2 oz) Body mass index is 23.99 kg/m². Weight History: Pt and family report recent fluctuations in pt's weight hx 2/2 fluid retention, recent thoracentesis, and diuresis. Weight Metrics 3/19/2019 3/12/2019 3/7/2019 3/1/2019 2/23/2019 2/12/2019 1/24/2019 Weight 153 lb 3.2 oz 149 lb 0.5 oz - 161 lb 163 lb 2.3 oz 152 lb 157 lb BMI 23.99 kg/m2 - 23.34 kg/m2 25.22 kg/m2 25.55 kg/m2 23.11 kg/m2 23.87 kg/m2 Admitting Diagnosis: Congestive heart disease ([facility] [address] [I50.9] Pertinent PMHx: CAD, hypercholesterolemia, HTN, prostate cancer Education Needs:        [x] None identified  [] Identified - Not appropriate at this time  []  Identified and addressed - refer to education log Learning Limitations:   [x] None identified  [] Identified Cultural, Muslim & ethnic food preferences:  [x] None identified    [] Identified and addressed ESTIMATED NUTRITION NEEDS:  
 
Calories: 8752-3201 kcal (MSJx1.2-1.4) based on  [x] Actual BW: 70 kg      [] IBW Protein: 56-84 gm (0.8-1.2 gm/kg) based on  [x] Actual BW      [] IBW Fluid: 1 mL/kcal 
  
MONITORING & EVALUATION:  
 
Nutrition Goal(s): 1. Po intake of meals will meet >75% of patient estimated nutritional needs within the next 7 days. Outcome:  [] Met/Ongoing    []  Not Met    [x] New/Initial Goal  
 
Monitoring:   [x] Food and beverage intake   [x] Diet order   [x] Nutrition-focused physical findings   [x] Treatment/therapy   [] Weight   [] Enteral nutrition intake Previous Recommendations (for follow-up assessments only):     []   Implemented       []   Not Implemented (RD to address)      [] No Longer Appropriate     [] No Recommendation Made Discharge Planning: cardiac diet; consistency as tolerated  
[x] Participated in care planning, discharge planning, & interdisciplinary rounds as appropriate Bernard Buckley RD Pager: [phone]

## 2019-03-19 NOTE — PROGRESS NOTES
Problem: Falls - Risk of  Goal: *Absence of Falls  Document Gabe Fall Risk and appropriate interventions in the flowsheet. Outcome: Progressing Towards Goal  Fall Risk Interventions:  Mobility Interventions: Communicate number of staff needed for ambulation/transfer, Patient to call before getting OOB, Strengthening exercises (ROM-active/passive)    Mentation Interventions: Door open when patient unattended, Evaluate medications/consider consulting pharmacy, Eyeglasses and hearing aids, More frequent rounding, Toileting rounds, Update white board, Adequate sleep, hydration, pain control    Medication Interventions: Evaluate medications/consider consulting pharmacy, Teach patient to arise slowly, Patient to call before getting OOB    Elimination Interventions: Call light in reach, Toileting schedule/hourly rounds, Patient to call for help with toileting needs             Problem: Pressure Injury - Risk of  Goal: *Prevention of pressure injury  Document William Scale and appropriate interventions in the flowsheet.   Outcome: Progressing Towards Goal  Pressure Injury Interventions:  Sensory Interventions: Check visual cues for pain, Keep linens dry and wrinkle-free, Maintain/enhance activity level, Minimize linen layers, Monitor skin under medical devices, Pad between skin to skin, Pressure redistribution bed/mattress (bed type)    Moisture Interventions: Check for incontinence Q2 hours and as needed, Apply protective barrier, creams and emollients, Absorbent underpads, Internal/External urinary devices, Minimize layers    Activity Interventions: Pressure redistribution bed/mattress(bed type)    Mobility Interventions: Pressure redistribution bed/mattress (bed type)    Nutrition Interventions: Document food/fluid/supplement intake    Friction and Shear Interventions: Apply protective barrier, creams and emollients, Foam dressings/transparent film/skin sealants, Lift sheet, Minimize layers, Transferring/repositioning devices, HOB 30 degrees or less

## 2019-03-19 NOTE — PROGRESS NOTES
Bedside and Verbal shift change report given to Brittany Zepeda (oncoming nurse) by NEAL Walton RN (offgoing nurse). Report included the following information SBAR, Kardex, Procedure Summary, Intake/Output, MAR, Recent Results and Cardiac Rhythm.

## 2019-03-19 NOTE — PROGRESS NOTES
[facility] - Progress Note Admit Date: 3/13/2019 Assessment:  
 
Hospital Problems  Date Reviewed: 3/1/2019 Codes Class Noted POA Congestive heart disease ([facility] [address] ICD-10-CM: I50.9 ICD-9-CM: 428.0  3/13/2019 Unknown  
   
  
 
 
  
  
-Acute respiratory distress requiring bipap. CXR with pulmonary edema, questionable infiltrate (previous CXR was done at [facility]).  Improving off bipap with Abx, nebs and gentle diuresis, thoracentesis. -NSTEMI, suspect secondary in setting of acute illness, troponin peak 7.58, s/p PCI to LAD 3/11/2019 with occluded mid RCA which appeared to be chronic medically managed, ECG with known LBBB, denies CP. 
-Heart failure with reduced EF. Discharged on coreg, lisinopril, lasix 3/12/2019. 
-Bilateral pleural effusions, s/p left (3/17/2019 600cc) and right (3/18/2019 1200cc) thoracentesis this admission. 
-Ischemic cardiomyopathy with EF 26-30% on echo this admission, unchanged from last admission. Conservatively managing in setting of advanced age. -CAD s/p LAD PCI with DEMAR 3/11/2019 with previous PCI to LCx 2011. Cath 3/11/2019 (Occluded mid RCA which appears to be chronic. There is faint collateral from the left coronary system, Left main artery with ostial 30-40%, LAD mid focal severely calcified tortuous 99% stenosis with CARLENE II flow, Circumflex coronary artery with diffuse mild 20-30% stenosis throughout). Discharged on ASA, plavix, statin, BB. 
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 -Mild kidney injury. -Valvular heart disease with moderate aortic valve stenosis and mild to moderate MR. 
-Carotid artery disease. Moderate bilateral ICA stenosis medically managed. 
-Peripheral vascular disease.  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  Because of this his blood pressure should be checked in his right arm. 
-Hypertension. Low normal on admission requiring pressor support. 
-Dyslipidemia. On statin. -H/o intermittent LBBB, now appears persistent. 
-Asthma, former smoker. -Advanced age, full code, but independent, lives alone and cares for self, significant decline in functional capacity follow recent hospital stays. 
  
Primary cardiologist Dr. Beto Blankenship. Plan:  
 
Patients breathing overall stable and much improved s/p thoracentesis. Low BP was noted today, patient without dizziness, repeat pending. IV lasix stopped. Low dose maintenance lasix if BP allows. Low dose lopressor and aldactone as BP allows. Continued on ASA, plavix, statin. Subjective:  
 
Breathing improved Objective:  
  
Patient Vitals for the past 8 hrs: 
 Temp Pulse Resp BP SpO2  
03/19/19 1145 96.5 °F (35.8 °C) 85 24  98 % 03/19/19 1023  (!) 103  100/62   
03/19/19 0730 98.6 °F (37 °C) 96 26 98/62 99 % Patient Vitals for the past 96 hrs: 
 Weight 03/19/19 0405 69.5 kg (153 lb 3.2 oz) 03/18/19 0414 66.9 kg (147 lb 8 oz) 03/17/19 0312 67.3 kg (148 lb 6.4 oz) 03/16/19 0607 67 kg (147 lb 11.3 oz) Intake/Output Summary (Last 24 hours) at 3/19/2019 1248 Last data filed at 3/19/2019 8671 Gross per 24 hour Intake 125 ml Output 450 ml Net -325 ml Physical Exam: 
General:  alert, cooperative, no distress, appears stated age Neck:  no JVD Lungs:  diminished breath sounds Heart:  regular rate and rhythm Abdomen:  abdomen is soft without significant tenderness, masses, organomegaly or guarding Extremities:  extremities normal, atraumatic, no cyanosis or edema Data Review:  
 
Labs: Results:  
   
Chemistry Recent Labs  
  03/18/19 
0343 03/17/19 
9496 GLU  --  132* NA  --  136 K  --  3.4*  
CL  --  100 CO2  --  31 BUN  --  32* CREA  --  1.16  
CA  --  9.0 MG  --  2.3 PHOS  --  2.9 AGAP  --  5  
BUCR  --  28*  
TP 6.7  --   
  
CBC w/Diff Recent Labs  
  03/18/19 
0343 03/17/19 
0517 HGB 7.9* 7.9*  
HCT 26.0* 25.9*  
  
 Cardiac Enzymes No results found for: CPK, CK, CKMMB, CKMB, RCK3, CKMBT, CKNDX, CKND1, CHRISTINE, TROPT, TROIQ, NGHIA, TROPT, TNIPOC, BNP, BNPP Coagulation Recent Labs  
  03/19/19 
0538 03/18/19 
0343 APTT 47.1* 45.0* Lipid Panel Lab Results Component Value Date/Time Cholesterol, total 87 03/08/2019 05:28 AM  
 HDL Cholesterol 44 03/08/2019 05:28 AM  
 LDL, calculated 30 03/08/2019 05:28 AM  
 VLDL, calculated 13 03/08/2019 05:28 AM  
 Triglyceride 65 03/08/2019 05:28 AM  
 CHOL/HDL Ratio 2.0 03/08/2019 05:28 AM  
  
BNP No results found for: BNP, BNPP, XBNPT Liver Enzymes Recent Labs  
  03/18/19 
0343 TP 6.7 Digoxin Thyroid Studies Lab Results Component Value Date/Time TSH 1.22 03/07/2019 05:29 AM  
    
 
Signed By: AMITA Duarte March 19, 2019

## 2019-03-19 NOTE — PROGRESS NOTES
Problem: Mobility Impaired (Adult and Pediatric) Goal: *Acute Goals and Plan of Care (Insert Text) Physical Therapy Goals Initiated 3/18/2019 and to be accomplished within 7 days. 1.  Patient will complete all bed mobility with modified independence in order to prepare for EOB/OOB activity. 2.  Patient will perform sit <> stand with supervision/set-up in order to prepare for OOB/gait activity. 3.  Patient will perform bed to chair transfers with supervision/set-up in order to promote mobility and encourage seated activity to progress towards their prior level of function. 4.  Patient will ambulate 100 feet with supervision/set-up using LRAD in order to prepare for safe negotiation of their environment. 5.  Patient will ascend/descend 3 steps with S handrail use with supervision/set-up in order to prepare for safe exit/entry into their home environment. Outcome: Progressing Towards Goal 
physical Therapy TREATMENT Patient: Ganesh Bhagat (86 y.o. male) Date: 3/19/2019 Diagnosis: [facility] [I50.9] <principal problem not specified> Precautions: Fall Chart, physical therapy assessment, plan of care and goals were reviewed. OBJECTIVE/ ASSESSMENT: 
Patient found supine in bed willing to work with PT. Pt sat at EOB this tx with increased time then stood to RW. Pt was found to have been incontinent of stool and urine. Pt attempted to ambulate toward bathroom, but becomes too fatigued to continue, sitting back at EOB. Pt then stood and justin-care was performed by this LPTA in standing, pt continuing to have BM. Pt ambulated toward [facility] then was returned to supine. Appreciate help from CNA who performed with justin-care as pt rolled side to side. Pt scooted toward [facility] with draw sheet assistance max a x 2. Pt was positioned comfortably with HOB raised. Pt instructed to sit more upright as able to allow for clearance of lungs.  Pt progressing slowly and will benefit from SNF at [place]. Recommend brief be donned for mobility as this is the second time pt was incontinent during PT tx. Pt reports he did not have this propblem PTA. Education: 
[x]         Bed mobility [x]         Transfers 
[x]         Ambulation / gait [x]         Assistive device management 
[]         Stairs [x]         Body mechanics []         Position change  
[]         Therapeutic exercise [x]         Activity pacing / energy conservation 
[]         Other: 
 
Progression toward goals: 
[]      Improving appropriately and progressing toward goals [x]      Improving slowly and progressing toward goals 
[]      Not making progress toward goals and plan of care will be adjusted PLAN: 
Patient continues to benefit from skilled intervention to address the above impairments. Continue treatment per established plan of care. Discharge Recommendations:  Aric Kim Further Equipment Recommendations for Discharge:  rolling walker SUBJECTIVE:  
Patient stated I get real short of breath when I stand up.  OBJECTIVE DATA SUMMARY:  
Critical Behavior: 
Neurologic State: Alert Orientation Level: Oriented X4, Appropriate for age Cognition: Follows commands Safety/Judgement: Fall prevention, Home safety Functional Mobility Training: 
Bed Mobility: 
Supine to Sit: Minimum assistance Sit to Supine: Stand-by assistance Balance: 
Sitting: Impaired; With support Sitting - Static: Good (unsupported) Sitting - Dynamic: Fair (occasional) Standing: Impaired; With support Standing - Static: Fair(+) Standing - Dynamic : FairAmbulation/Gait Training: 
Distance (ft): 8 Feet (ft) Assistive Device: Walker, rolling Ambulation - Level of Assistance: Contact guard assistance Gait Abnormalities: Decreased step clearance Base of Support: Widened Stance: Weight shift Speed/Janee: Slow Step Length: Left shortened;Right shortened Pain: 
Pre tx pain: 0Post tx pain: 0Pain Scale 1: Numeric (0 - 10) Pain Intensity 1: 0 Activity Tolerance:  
Fair Please refer to the flowsheet for vital signs taken during this treatment. After treatment:  
[] Patient left in no apparent distress sitting up in chair 
[x] Patient left in no apparent distress in bed 
[x] Call bell left within reach 
[] Nursing notified 
[] Caregiver present 
[] Bed alarm activated 
[] SCDs applied 
[] Ice applied Legrand Kehr, PTA Time Calculation: 28 mins

## 2019-03-19 NOTE — PROGRESS NOTES
[facility] Pulmonary, Critical Care, and Sleep Medicine Follow up patient note Name: Meche Israel MRN: [id number] : 1926 Hospital: [address] [place] [address]  Date: 3/19/2019 IMPRESSION:  
· Acute hypoxic respiratory failure in a patient with HFrEF (EF 26%). My impression is that this hypoxic respiratory failure is secondary to bilateral pleural effusions and pulmonary edema. S/P L thoracentesis 3/17/19 (600 cc fluid removed) and R thoracentesis 3/18/19 (1200 cc fluid removed) resulting in significant improvement in patients symptoms and also radiological improvement as shown: ·  
· Transudative bilateral pleural effusion secondary to HFrEF. · Hx of NSTEMi and S/P PCI to LAD on 3/11/19 and Ischemic cardiomyopathy and HFrEF · Anemia with hx of recent GI bleed. · Significant peripheral vascular disease CAD, ICA stenosis. · Hx of HTN and DLD · Hx of smoking - COPD from hx. RECOMMENDATIONS:  
· Continue supplemental oxygen to keep SpO2>90%. · There is significant improvement from symptoms stand point. · I will defer the management of heart failure and volume management to the primary team.  
· Bronchial hygiene protocol · Bronchodilators PRN 
· Steroids - not indicated. · Antibiotics - On cefepime. No growth on cultures. I would recommend a course of total of 7 days and then stop the abx. · Aspiration precautions · Out patient testing- PFT, 6 min walk. · Assess home Oxygen needs at discharge · OT, PT, OOB and ambulate · Healthy weight · Pulmonary medicine will sign off. · DVT, PUD prophylaxis Subjective: This patient has been seen and evaluated at the request of Dr. Susan Mcdonnell for evaluation of hypoxic respiratory failure.  Patient is a 80 y.o. male with past medical history significant for asthma, ischemic cardiomyopathy (S/P PCI), GI bleed (recent) and hypertension who has presented with 1 week history of worsening SOB and leg swelling. Patient is able to provide most of the history and states that he lives alone but does get some help from his daughter. No hx of cough, fevers, phlegm production. Complains of chest pains across the chest in the lower chest that started during the same period of time and is associated with cough or taking deep breaths. This pleuritic sounding chest pain is non radiating. Patient was admitted to hospital and his SOB was worked up and the work up included a CTA which was done on 3/13/19 and it showed bilateral GGOs and pleural effusions. Patient states that he is an ex smoker. Smoked 1ppd but quit 50 years ago. Worked for [employer], But denies any history of asbestos exposure. Interval history: 
3/19/19: 
No acute issues overnight. Feels much better after the 2nd thoracentesis procedure. No pneumothorax post procedure. Remained hemodynamically stable. Afebrile. Complains of loss of appetite but no other issues. Past Medical History:  
Diagnosis Date  Asthma  Cardiac cath 04/28/2011 oLM 30%. pLAD 30%. oD1 50%. CX 90% (3 x 18 [place] DEMAR). dRCA 90%. RPLB subtotal.  RPDA 100%.  Cardiac echocardiogram 01/21/2014 EF 55-60%. Mod LVH. Gr 1 DDfx. Mild AS (mean grad 13). Mild AI. Unchanged from study of 11/30/11.  Cardiac nuclear imaging test, mod risk 01/22/2016 Intermediate risk. Medium-sized inferior, inferoseptal infarction. No ischemia. EF 54%. Nondiagnostic EKG on pharm stress test.  
 Carotid duplex 03/02/2016 Mod 50-69% bilateral ICA stenosis. Probable significant RECA stenosis. >50% stenosis of left subclavian. No significant change from study of 1/8/15.  Coronary artery disease Status post PCI with drug-eluting stent, [place] 3 x 18 mm in the proximal circumflex.  Hx of carotid artery disease ([address]  Hypercholesterolemia  Hypertension  Prostate cancer ([address] Past Surgical History:  
Procedure Laterality Date  HX CORONARY STENT PLACEMENT  4/28/11 PCI with drug-eluting stent, [place] 3 x 18 mm in the proximal circumflex (post dialated with 3.25 mm noncompliant balloon at high pressure). Prior to Admission medications Medication Sig Start Date End Date Taking? Authorizing Provider  
atorvastatin (LIPITOR) 40 mg tablet Take 1 Tab by mouth nightly. 3/12/19   Georgia Clay MD  
carvedilol (COREG) 3.125 mg tablet Take 1 Tab by mouth two (2) times daily (with meals). 3/12/19   Georgia Clay MD  
lisinopril (PRINIVIL, ZESTRIL) 5 mg tablet Take 1 Tab by mouth daily. 3/13/19   Georgia Clay MD  
nitroglycerin (NITROSTAT) 0.4 mg SL tablet 1 Tab by SubLINGual route every five (5) minutes as needed for Chest Pain. 3/12/19   Georgia Clay MD  
furosemide (LASIX) 20 mg tablet Take 1 Tab by mouth daily. 3/12/19   Georgia Clay MD  
potassium chloride SR (KLOR-CON 10) 10 mEq tablet Take 1 Tab by mouth daily. 3/12/19   Georgia Clay MD  
OTHER Cbc, BMP in 1 week Dx: CHF Fax results to Dr. Shauna Kang 3/12/19   Georgia Clay MD  
budesonide-formoterol (SYMBICORT) 160-4.5 mcg/actuation HFAA Take 2 Puffs by inhalation two (2) times a day. 3/1/19   Jay Daniel MD  
glycopyrrolate-formoterol (BEVESPI AEROSPHERE) 9-4.8 mcg HFAA Take 2 Inhalation by inhalation two (2) times a day. 3/1/19   Preeti Luna MD  
clopidogrel (PLAVIX) 75 mg tab TAKE 1 TABLET EVERY DAY 2/26/19   Jay Daniel MD  
omeprazole (PRILOSEC) 40 mg capsule Take 1 Cap by mouth daily. 2/23/19   Marino Tate MD  
albuterol (PROVENTIL HFA, VENTOLIN HFA, PROAIR HFA) 90 mcg/actuation inhaler Take 2 Puffs by inhalation every four (4) hours as needed for Wheezing or Shortness of Breath. 3/2/15   AMITA Penaloza  
tamsulosin (FLOMAX) 0.4 mg capsule Take 1 Cap by mouth daily.  3/13/13 Keerthi Toledo MD  
aspirin delayed-release 81 mg tablet Take 81 mg by mouth daily. Keerthi Toledo MD  
 
No Known Allergies Social History Tobacco Use  Smoking status: Former Smoker Packs/day: 1.00 Years: 15.00 Pack years: 15.00 Last attempt to quit: 1960 Years since quittin.8  Smokeless tobacco: Never Used Substance Use Topics  Alcohol use: No  
  
History reviewed. No pertinent family history. @DBLINKMRCHARTING(EPT,1664)@ Immunization status: up to date and documented. Current Facility-Administered Medications Medication Dose Route Frequency  metoprolol tartrate (LOPRESSOR) tablet 12.5 mg  12.5 mg Oral Q12H  
 heparin (porcine) injection 5,000 Units  5,000 Units SubCUTAneous Q8H  
 albuterol-ipratropium (DUO-NEB) 2.5 MG-0.5 MG/3 ML  3 mL Nebulization TID RT  
 furosemide (LASIX) injection 20 mg  20 mg IntraVENous DAILY  cefepime (MAXIPIME) 2 g in sterile water (preservative free) 10 mL IV syringe  2 g IntraVENous Q12H  
 atorvastatin (LIPITOR) tablet 40 mg  40 mg Oral QHS  aspirin chewable tablet 81 mg  81 mg Oral DAILY  budesonide-formoterol (SYMBICORT) 160-4.5 mcg/actuation HFA inhaler 2 Puff  2 Puff Inhalation BID RT  
 tamsulosin (FLOMAX) capsule 0.4 mg  0.4 mg Oral DAILY  famotidine (PEPCID) tablet 20 mg  20 mg Oral DAILY  sodium chloride (NS) flush 5-40 mL  5-40 mL IntraVENous Q8H  
 clopidogrel (PLAVIX) tablet 75 mg  75 mg Oral DAILY Review of Systems: 
Pertinent items are noted in HPI. Objective: 
Vital Signs:   
Visit Vitals BP 98/62 (BP 1 Location: Right arm, BP Patient Position: At rest) Pulse 96 Temp 98.6 °F (37 °C) Resp 26 Ht 5' 7\" (1.702 m) Wt 69.5 kg (153 lb 3.2 oz) SpO2 99% BMI 23.99 kg/m² O2 Device: Nasal cannula O2 Flow Rate (L/min): 4 l/min Temp (24hrs), Av.8 °F (36.6 °C), Min:97 °F (36.1 °C), Max:98.6 °F (37 °C) Intake/Output: Last shift:      No intake/output data recorded. Last 3 shifts: 03/17 1901 - 03/19 0700 In: 235 [P.O.:225; I.V.:10] Out: 550 [Urine:550] Intake/Output Summary (Last 24 hours) at 3/19/2019 3182 Last data filed at 3/19/2019 0765 Gross per 24 hour Intake 125 ml Output 350 ml Net -225 ml Physical Exam:  
General:  Alert, cooperative, no distress, appears stated age. Head:  Normocephalic, without obvious abnormality, atraumatic. Eyes:  Conjunctivae/corneas clear. PERRL, EOMs intact. Nose: Nares normal. Septum midline. Mucosa normal. No drainage or sinus tenderness. Throat: Lips, mucosa, and tongue normal. Teeth and gums normal.  
Neck: Supple, symmetrical, trachea midline, no adenopathy, thyroid: no enlargment/tenderness/nodules, no carotid bruit and no JVD. Back:   Symmetric, no curvature. ROM normal.  
Lungs:   Improved air entry with few occasional crackles. Chest wall:  No tenderness or deformity. Heart:  Regular rate and rhythm, S1, S2 normal, no murmur, click, rub or gallop. Abdomen:   Soft, non-tender. Bowel sounds normal. No masses,  No organomegaly. Extremities: Extremities normal, atraumatic, no cyanosis or edema. Pulses: 2+ and symmetric all extremities. Skin: Skin color, texture, turgor normal. No rashes or lesions Lymph nodes: Cervical, supraclavicular, and axillary nodes normal.  
Neurologic: Grossly nonfocal  
 
Data review:  
 
Recent Results (from the past 24 hour(s)) CELL COUNT AND DIFF, BODY FLUID Collection Time: 03/18/19 12:05 PM  
Result Value Ref Range BODY FLUID TYPE PLEURAL FLUID FLUID COLOR YELLOW    
 FLUID APPEARANCE HAZY FLUID RBC CT. 1,425 (A) NRRE /cu mm FLUID NUCLEATED CELLS 50 (A) NRRE /cu mm  
 FLD NEUTROPHILS 16 % FLD BANDS 0 % FLD LYMPHS 82 % FLD MONOCYTES 2 % FLD EOSINS 0 % WBC COMMENTS MODERATE MESOTHELIAL CELLS SEEN   
LDH, BODY FLUID Collection Time: 03/18/19 12:05 PM  
Result Value Ref Range Fluid Type: PLEURAL FLUID    
 LD, body fld. 100 U/L  
CULTURE, BODY FLUID W GRAM STAIN Collection Time: 03/18/19 12:05 PM  
Result Value Ref Range Special Requests: NO SPECIAL REQUESTS    
 GRAM STAIN FEW WBC'S    
 GRAM STAIN NO ORGANISMS SEEN Culture result: PENDING   
PROTEIN TOTAL, FLUID Collection Time: 03/18/19 12:05 PM  
Result Value Ref Range Fluid Type: PLEURAL FLUID Protein total, body fld. 1.9 g/dL GLUCOSE, FLUID Collection Time: 03/18/19 12:05 PM  
Result Value Ref Range Fluid Type: PLEURAL FLUID Glucose, body fld. 139 MG/DL  
PH, FLUID Collection Time: 03/18/19 12:05 PM  
Result Value Ref Range FLUID TYPE(15) PLEURAL FLUID FLUID PH 7.7 PTT Collection Time: 03/19/19  5:38 AM  
Result Value Ref Range aPTT 47.1 (H) 23.0 - 36.4 SEC Imaging: 
I have personally reviewed the patients radiographs and have reviewed the reports: XR Results (most recent): 
Results from Hospital Encounter encounter on 03/13/19 XR CHEST PORT Narrative EXAM:  XR CHEST PORT INDICATION:   Tube Placement COMPARISON: 8/17/2019 FINDINGS: 
 
No new tubes identified. Stable cardiomediastinal silhouette. Small bilateral 
pleural effusions. Diffuse interstitial opacities and patchy hazy opacities left 
greater than right. Right upper lobe subsegmental atelectasis. No pneumothorax. Stable osseous structures. Impression IMPRESSION: 
 
No evidence of interval tube placement. Persistent small bilateral pleural effusions and patchy lung opacities. CT Results (most recent): 
Results from Hospital Encounter encounter on 03/13/19 CTA CHEST W OR W WO CONT Narrative EXAM:  CTA Chest with Contrast (CT Pulmonary Study for PE). CLINICAL INDICATION:  Acute shortness of breath which began this morning. Recent hospitalization for shortness of breath and fatigue. COMPARISON:  None.  
         
TECHNIQUE:   
 
 - Helical volumetric sections of the chest are obtained with CT pulmonary 
angiogram protocol. Subsequently, sagittal and coronal multiplanar 
reconstruction images are obtained. Maximum intensity projection images are 
generated to better delineate the pulmonary vasculature, differentiate between 
the pulmonary arteries and veins and to increase sensitivity to pulmonary 
emboli.   
- IV contrast dose of 100 mL Isovue-370. 
- Radiation dose optimization techniques are utilized as appropriate to the 
exam, with combination of automated exposure control, adjustment of the mA 
and/or kV according to patient's size (Including appropriate matching for 
site-specific examinations), or use of iterative reconstruction technique. FINDINGS:  
 
Pulmonary Arteries: - Unusual pulmonary artery opacification pattern is noted. The upper lung zone 
pulmonary artery opacification is complete whereas the lower lung zone pulmonary 
arteries appear suboptimally opacified particularly in the left lower lobe basal 
segments. Less pronounced atypical poor opacification of the basal segments is 
also noted on the right side. Instead of sharply demarcated distinct filling 
defects, is involved segments demonstrate gradual fading of the pulmonary artery 
luminal opacification. This makes it difficult to exclude small filling defects 
in the segmental and subsegmental arteries at the left lower lobe basal region. The presence of atelectatic changes with pleural effusion may contribute to this 
atypical pulmonary artery opacification pattern. Alternatively, intrinsic 
intracardiac abnormalities may be responsible for the atypical pulmonary artery 
opacification. Notably, the left inferior pulmonary vein opacification is much 
less than that of the right pulmonary venous opacification. 
- Within the technical limitations of the study, no definite suspicious filling defects are identified in the main trunk, right and left pulmonary artery, lobar 
and interlobar arteries, segmental and intrasegmental arteries. Basal region 
distal segmental artery and subsegmental arteries are poorly opacified. Aorta:  No opacification of the aorta. The caliber of the ascending aorta and 
aortic arch appear within normal limits. The mid descending aortography 
demonstrates atypical aorta contour, with apparent smoothly outlined bulging 
contour along the medial aspect (axial #). Further inferiorly, the aorta 
appears to dilated slightly, measuring up to about 3.6 cm in diameter and with 
crescentic outpouching. At the level of the diaphragmatic hiatus, the right 
lateral wall of the aorta demonstrates crescentic appearance with lamellated 
pattern. 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 Recommend dedicated CTA of 
the aorta on routine basis for further delineation of this finding. Lung, Pleura, Airways: - Moderate to large bilateral pleural effusion. Associated atelectatic changes. - Consolidative opacities in the left upper lobe and in the right lower lobe. Groundglass opacities in the right upper lobe and right middle lobe. Possible 
consolidative opacities vs. atelectatic changes in the left lower lobe. Mediastinum, Radha:  No definite mediastinal adenopathy. No definite hilar 
adenopathy. Base of Neck, Axillae:  Unremarkable. Abdomen:  Multiple gallstones. Skeletal Structures:  No acute findings. Impression IMPRESSION: 
      
1. Unusual pulmonary artery opacification with gradual decrease in luminal 
opacification in the basal regions, more pronounced on the left side than on the 
right. Possibly related to presence of moderate to large pleural effusion with 
associated atelectatic changes as the explanation for this pattern vs. intrinsic 
intracardiac abnormality. No convincing evidence of pulmonary embolism is detected within the technical limitations of the study. 2. Moderate to large pleural effusion bilaterally with associated passive 
atelectatic changes. Multiple foci of consolidative opacities in both lungs. Most pronounced in the left upper lobe. Query developing pneumonia vs. 
pulmonary hemorrhage vs. nonspecific inflammatory process. 3.  Atypical appearance of the descending with smooth crescentic outpouchings 
and presence of lamellated calcifications along the aortic wall. Perhaps 
related to chronic dissection. With current technique, i.e. lack of IV contrast 
in the aorta, detailed analysis is difficult. Recommend CTA of the aorta for 
further delineation. 4.  Cholelithiasis. 03/13/19 ECHO ADULT FOLLOW-UP OR LIMITED 03/15/2019 3/15/2019 Narrative · Left ventricular severely decreased systolic function. Estimated left  
ventricular ejection fraction is 26 - 30%. Visually measured ejection  
fraction. Left ventricular mild concentric hypertrophy. Abnormal left  
ventricular wall motion as described on the wall scoring diagram below. · Severe tricuspid valve regurgitation is present. Moderate to severe  
pulmonary hypertension is present. · Moderate mitral valve regurgitation.  
   
  Signed by: MD Brian Raza MD

## 2019-03-20 NOTE — PROGRESS NOTES
Problem: Self Care Deficits Care Plan (Adult) Goal: *Acute Goals and Plan of Care (Insert Text) Description Occupational Therapy Goals Initiated 3/18/2019 within 7 day(s). 1.  Patient will perform upper body dressing/bathing with supervision/set-up 2. Patient will perform grooming seated at EOB with F balance x 5 min with supervision/set-up. 3.  Patient will perform sit>stand with CGA in order to prep for participation with ADLs. 4.  Patient will participate in upper extremity therapeutic exercise/activities with modified independence for 5-8 minutes. 5.  Patient will utilize energy conservation techniques during functional activities with min verbal cues. Outcome: Progressing Towards Goal 
OCCUPATIONAL THERAPY TREATMENT Patient: Carlos Freeman (24 y.o. male) Date: 3/20/2019 Diagnosis: [facility] disease ([facility] [I50.9] <principal problem not specified> Precautions: Fall Chart, occupational therapy assessment, plan of care, and goals were reviewed. ASSESSMENT: 
Pt is very pleasant and cooperative, reports feeling tired this morning, however, agreeable to participate in EOB activities. Pt required additional time and VCs for safe sequencing to maneuver to EOB. Pt was set up for grooming and was able to perform mult grooming/UB ADls w/Set-up. Pt c/o back pain w/sitting unsupported. Educated pt on scapular setting/stabilization techniques to improve endurance and activity tolerance for carryover w/ADLs. Pt was able to perform scapular program w/min visual cues. Pt is requesting to return to sup following ~ 12 min of EOB activities, required additional time to scoot to [facility], was able to perform w/SBA. Progression toward goals: 
?          Improving appropriately and progressing toward goals ? Improving slowly and progressing toward goals ? Not making progress toward goals and plan of care will be adjusted PLAN: 
 Patient continues to benefit from skilled intervention to address the above impairments. Continue treatment per established plan of care. Discharge Recommendations:  Aric Kim Further Equipment Recommendations for Discharge:  N/A  
  
 
SUBJECTIVE:  
Patient stated ? I am just so tired, I want to be comfortable. ? OBJECTIVE DATA SUMMARY:  
Cognitive/Behavioral Status: 
Neurologic State: Alert Orientation Level: Oriented X4 Cognition: Follows commands Safety/Judgement: Fall prevention, Home safety Functional Mobility and Transfers for ADLs: 
Bed Mobility: 
  
Supine to Sit: Stand-by assistance Sit to Supine: Stand-by assistance Scooting: Stand-by assistance Balance: 
Sitting: Impaired Sitting - Static: Good (unsupported) Sitting - Dynamic: Fair (occasional) ADL Intervention: 
Grooming Grooming Assistance: Supervision(seated EOB) Washing Face: Supervision/set-up Brushing Teeth: Supervision/set-up Upper Body Dressing Assistance Hospital Gown: Supervision/ set-up Therapeutic Exercises:  
Scapular setting/stabilization program 
Pain: 
Pt reports 0/10 pain or discomfort prior to treatment. Pt reports 2/10 pain or discomfort post treatment. Pain in lower back Activity Tolerance:   
Fair Please refer to the flowsheet for vital signs taken during this treatment. After treatment:  
?  Patient left in no apparent distress sitting up in chair ? Patient left in no apparent distress in bed 
? Call bell left within reach ? Nursing notified ? Caregiver present ? Bed alarm activated KATARINA Lockett/L Time Calculation: 23 mins

## 2019-03-20 NOTE — ROUTINE PROCESS
1930 Assumed care of patient from off going nurse. Patient resting in bed. No distress noted. Call bell within reach, siderails up x 3, bed in lowest position, and patient instructed to use call bell for assistance. Will continue to monitor. 0720 Bedside and Verbal shift change report given to [address] [place] [address] (oncoming nurse) by Junie Moreno RN(offgoing nurse). Report included the following information Kardex, Intake/Output, MAR, Recent Results and Cardiac Rhythm SR tele box# [id number].

## 2019-03-20 NOTE — PROGRESS NOTES
[facility] - Progress Note Admit Date: 3/13/2019 Assessment:  
 
Hospital Problems  Date Reviewed: 3/1/2019 Codes Class Noted POA Congestive heart disease ([facility] [address] ICD-10-CM: I50.9 ICD-9-CM: 428.0  3/13/2019 Unknown  
   
  
 
 
  
  
-Acute respiratory distress requiring bipap. CXR with pulmonary edema, questionable infiltrate (previous CXR was done at [facility]).  Improving off bipap with Abx, nebs and gentle diuresis, thoracentesis. -NSTEMI, suspect secondary in setting of acute illness, troponin peak 7.58, s/p PCI to LAD 3/11/2019 with occluded mid RCA which appeared to be chronic medically managed, ECG with known LBBB, denies CP. 
-Heart failure with reduced EF. Discharged on coreg, lisinopril, lasix 3/12/2019. 
-Bilateral pleural effusions, s/p left (3/17/2019 600cc) and right (3/18/2019 1200cc) thoracentesis this admission. 
-Ischemic cardiomyopathy with EF 26-30% on echo this admission, unchanged from last admission. Conservatively managing in setting of advanced age. -CAD s/p LAD PCI with DEMAR 3/11/2019 with previous PCI to LCx 2011. Cath 3/11/2019 (Occluded mid RCA which appears to be chronic. There is faint collateral from the left coronary system, Left main artery with ostial 30-40%, LAD mid focal severely calcified tortuous 99% stenosis with CRALENE II flow, Circumflex coronary artery with diffuse mild 20-30% stenosis throughout). Discharged on ASA, plavix, statin, BB. 
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 -Mild kidney injury. -Valvular heart disease with moderate aortic valve stenosis and mild to moderate MR. 
-Carotid artery disease. Moderate bilateral ICA stenosis medically managed. 
-Peripheral vascular disease.  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  Because of this his blood pressure should be checked in his right arm. 
-Hypertension. Low normal on admission requiring pressor support. 
-Dyslipidemia. On statin. -H/o intermittent LBBB, now appears persistent. 
-Asthma, former smoker. -Advanced age, full code, but independent, lives alone and cares for self, significant decline in functional capacity follow recent hospital stays. 
  
Primary cardiologist Dr. Kanchan Campos. 
  
Plan:  
 
BP remains low normal but asymptomatic. Will continue low dose BB and low dose aldactone as hemodynamics allow. Starting low dose maintenance lasix today. Continued on ASA and plavix and statin. Continue PT/OT eval, may benefit from placement. Conservative medical management from cardiac standpoint, no further cardiac work-up. Subjective:  
 
Breathing overall stable, remains weak and tired Objective:  
  
Patient Vitals for the past 8 hrs: 
 Temp Pulse Resp BP SpO2  
03/20/19 0802 96.8 °F (36 °C) 91 16 93/63 99 % 03/20/19 0546 96.5 °F (35.8 °C) 89 19 91/60 96 % Patient Vitals for the past 96 hrs: 
 Weight  
03/20/19 0546 68 kg (149 lb 14.6 oz) 03/19/19 0405 69.5 kg (153 lb 3.2 oz) 03/18/19 0414 66.9 kg (147 lb 8 oz) 03/17/19 0312 67.3 kg (148 lb 6.4 oz) Intake/Output Summary (Last 24 hours) at 3/20/2019 1021 Last data filed at 3/20/2019 5797 Gross per 24 hour Intake 370 ml Output 200 ml Net 170 ml Physical Exam: 
General:  alert, cooperative, no distress, appears stated age Neck:  no JVD Lungs:  diminished breath sounds Heart:  regular rate and rhythm Abdomen:  abdomen is soft without significant tenderness, masses, organomegaly or guarding Extremities:  extremities normal, atraumatic, no cyanosis or edema Data Review:  
 
Labs: Results:  
   
Chemistry Recent Labs  
  03/18/19 
7169 TP 6.7 CBC w/Diff Recent Labs  
  03/20/19 
0509 03/18/19 
0343 WBC 8.5  --   
RBC 3.03*  --   
HGB 8.2* 7.9*  
HCT 27.1* 26.0*  
  --   
  
Cardiac Enzymes No results found for: CPK, CK, CKMMB, CKMB, RCK3, CKMBT, CKNDX, CKND1, CHRISTINE, TROPT, TROIQ, NGHIA, TROPT, TNIPOC, BNP, BNPP  
 Coagulation Recent Labs  
  03/20/19 
0509 03/19/19 
7324 APTT 48.2* 47.1* Lipid Panel Lab Results Component Value Date/Time Cholesterol, total 87 03/08/2019 05:28 AM  
 HDL Cholesterol 44 03/08/2019 05:28 AM  
 LDL, calculated 30 03/08/2019 05:28 AM  
 VLDL, calculated 13 03/08/2019 05:28 AM  
 Triglyceride 65 03/08/2019 05:28 AM  
 CHOL/HDL Ratio 2.0 03/08/2019 05:28 AM  
  
BNP No results found for: BNP, BNPP, XBNPT Liver Enzymes Recent Labs  
  03/18/19 
0343 TP 6.7 Digoxin Thyroid Studies Lab Results Component Value Date/Time TSH 1.22 03/07/2019 05:29 AM  
    
 
Signed By: AMITA Chambers March 20, 2019

## 2019-03-20 NOTE — PROGRESS NOTES
Problem: Mobility Impaired (Adult and Pediatric) Goal: *Acute Goals and Plan of Care (Insert Text) Description Physical Therapy Goals Initiated 3/18/2019 and to be accomplished within 7 days. 1.  Patient will complete all bed mobility with modified independence in order to prepare for EOB/OOB activity. 2.  Patient will perform sit <> stand with supervision/set-up in order to prepare for OOB/gait activity. 3.  Patient will perform bed to chair transfers with supervision/set-up in order to promote mobility and encourage seated activity to progress towards their prior level of function. 4.  Patient will ambulate 100 feet with supervision/set-up using LRAD in order to prepare for safe negotiation of their environment. 5.  Patient will ascend/descend 3 steps with S handrail use with supervision/set-up in order to prepare for safe exit/entry into their home environment. Outcome: Progressing Towards Goal 
 PHYSICAL THERAPY TREATMENT Patient: Breanne Thomas (29 y.o. male) Date: 3/20/2019 Diagnosis: Congestive heart disease ([email or web address] [facility] [I50.9] <principal problem not specified> Precautions: Fall Chart, physical therapy assessment, plan of care and goals were reviewed. OBJECTIVE/ ASSESSMENT: 
Patient found supine in bed willing to work with PT. Pt was found to have wet pad underneath of him which were changed while he was standing. Pt ambulated forward and backward a total of 8 feet before needing a seated rest break. Pt performed LAQ while seated, then stood a second time to take side steps toward [facility]. Pt was returned to supine, then bed was positioned in chair position. Educated pt's daughter on chair position at least 1 hour, 3 times per day. Pt was left with needs in reach. Pt continues to progress slowly with PT. Education: 
?         Bed mobility ? Transfers ? Ambulation / gait ? Assistive device management ? Stairs ? Body mechanics ? Position change ? Therapeutic exercise ? Activity pacing / energy conservation ? Other: 
 
Progression toward goals: 
?      Improving appropriately and progressing toward goals ? Improving slowly and progressing toward goals ? Not making progress toward goals and plan of care will be adjusted PLAN: 
Patient continues to benefit from skilled intervention to address the above impairments. Continue treatment per established plan of care. Discharge Recommendations:  Aric Kim Further Equipment Recommendations for Discharge:  rolling walker SUBJECTIVE:  
Patient stated My back is hurting.  pt reports 10/10 back pain while sitting EOB. OBJECTIVE DATA SUMMARY:  
Critical Behavior: 
Neurologic State: Alert Orientation Level: Oriented X4 Cognition: Follows commands Safety/Judgement: Fall prevention, Home safety Functional Mobility Training: 
Bed Mobility Supine to Sit: Minimum assistance Sit to Supine: Stand-by assistance Scooting: Stand-by assistance Balance: 
Sitting: Impaired Sitting - Static: Good (unsupported) Sitting - Dynamic: Fair (occasional) Standing: Impaired; With support Standing - Static: Fair(+) Standing - Dynamic : Fair Ambulation/Gait Training: 
Distance (ft): 10 Feet (ft) Assistive Device: Walker, rolling Ambulation - Level of Assistance: Stand-by assistance Gait Abnormalities: Decreased step clearance Base of Support: Narrowed Stance: Weight shift Speed/Janee: Slow Step Length: Left shortened;Right shortened Therapeutic Exercises:  
 
 
EXERCISE Sets Reps Active Active Assist  
Passive Self- assited ROM Comments Ankle Pumps   ? ? ? ?   
Quad Sets   ? ? ? ? Glut Sets   ? ? ? ? Short Arc Quads   ? ? ? ? Heel Slides   ? ? ? ? Straight Leg Raises   ? ? ? ? Hip Abd   ? ? ? ? Long Arc Quads 1 10 ? ? ? ? Bilaterally Seated Marching   ? ? ? ? Seated Knee Flexion   ?  ? ? ?   
 Standing Marching   ? ? ? ? Pain: 
Pre tx pain: not rated Post tx pain: not rated Activity Tolerance:  
Fair Please refer to the flowsheet for vital signs taken during this treatment. After treatment:  
? Patient left in no apparent distress sitting up in chair ? Patient left in no apparent distress in bed 
? Call bell left within reach ? Nursing notified ? Caregiver present ? Bed alarm activated ? SCDs applied ? Ice applied Kaye Quiles PTA Time Calculation: 33 mins

## 2019-03-20 NOTE — PROGRESS NOTES
[facility] Hospitalist Group Progress Note Patient: Haseeb Saucedo Age: 80 y.o. : 1926 MR#: [id number] SSN: [id number] Date/Time: 3/20/2019 9:34 AM 
 
Subjective/24-hour events:  
 
Pt has no complaints. Per nursing low bp. Assessment:  
-Acute hypoxic respiratory failure required BIPAP, likely due to heart failure pulm input noted, s/p thoracentesis bilatera (3/18 and 3/17), with some improvement in respiratory status. 
-Low bp in setting of heart failure and iatrogenic component  
-Abnormal chest imaging, concern for possible pneumonia 
-Pleural effusions s/p thoracentesis -Acute on chronic systolic CHF on lasix -Ischemic cardiomyopathy, EF 26-30% -Recent NSTEMI s/p PCI/stent placement trop downtrending 
-CAD 
-Dyslipidemia 
-CKD 2-3 
-Chronic anemia 
-Valvular heart disease 
-Advanced age 
-Probable COPD 
-Tobacco use hx Plan: 
-monitor hemodynamics, will dc antihypertensives all together for now due to hypotension Cont to diurese Dc cefepime 7 day course (d/c on ) Continue nebs/symbicort/usual pulmonary hygiene. . 
Diuresis per cardiology -  Will hold bb and spironolactone due to low bp 
PT/OT evals. Based on current clinical status, will likely not be able to return home alone at discharge. Case discussed with:  [x]Patient  []Family  [x]Nursing  []Case Management DVT Prophylaxis:  []Lovenox  []Hep SQ  []SCDs  []Coumadin   [x]On Heparin gtt Objective:  
VS:  
Visit Vitals BP (!) 80/50 (BP 1 Location: Right arm, BP Patient Position: At rest) Pulse 85 Temp 95.9 °F (35.5 °C) Resp 19 Ht 5' 7\" (1.702 m) Wt 68 kg (149 lb 14.6 oz) SpO2 99% BMI 23.48 kg/m² Tmax/24hrs: Temp (24hrs), Av.8 °F (36 °C), Min:95.9 °F (35.5 °C), Max:97.9 °F (36.6 °C) Intake/Output Summary (Last 24 hours) at 3/20/2019 1601 Last data filed at 3/20/2019 2155 Gross per 24 hour Intake 370 ml Output 200 ml Net 170 ml  
 
 
 General: alert, awake, in NAD. Nontoxic-appearing. Cardiovascular:  S1, S2. Tachycardic. Pulmonary:  No active wheezing but BS decreased throughout. GI:  Abdomen soft, NTTP. Extremities:  Warm, no ischemia. Neuro:  Awake and alert, motor nofocal. 
 
Labs:   
Recent Results (from the past 24 hour(s)) PTT Collection Time: 03/20/19  5:09 AM  
Result Value Ref Range aPTT 48.2 (H) 23.0 - 36.4 SEC  
CBC W/O DIFF Collection Time: 03/20/19  5:09 AM  
Result Value Ref Range WBC 8.5 4.6 - 13.2 K/uL  
 RBC 3.03 (L) 4.70 - 5.50 M/uL HGB 8.2 (L) 13.0 - 16.0 g/dL HCT 27.1 (L) 36.0 - 48.0 % MCV 89.4 74.0 - 97.0 FL  
 MCH 27.1 24.0 - 34.0 PG  
 MCHC 30.3 (L) 31.0 - 37.0 g/dL  
 RDW 15.8 (H) 11.6 - 14.5 % PLATELET 405 004 - 308 K/uL MPV 10.8 9.2 - 49.3 FL  
METABOLIC PANEL, BASIC Collection Time: 03/20/19 12:37 PM  
Result Value Ref Range Sodium 136 136 - 145 mmol/L Potassium 4.1 3.5 - 5.5 mmol/L Chloride 98 (L) 100 - 108 mmol/L  
 CO2 29 21 - 32 mmol/L Anion gap 9 3.0 - 18 mmol/L Glucose 128 (H) 74 - 99 mg/dL BUN 55 (H) 7.0 - 18 MG/DL Creatinine 1.55 (H) 0.6 - 1.3 MG/DL  
 BUN/Creatinine ratio 35 (H) 12 - 20 GFR est AA 51 (L) >60 ml/min/1.73m2 GFR est non-AA 42 (L) >60 ml/min/1.73m2 Calcium 9.0 8.5 - 10.1 MG/DL MAGNESIUM Collection Time: 03/20/19 12:37 PM  
Result Value Ref Range Magnesium 2.9 (H) 1.6 - 2.6 mg/dL Signed By: Mallorie Michelle MD   
 March 20, 2019 9:34 AM

## 2019-03-20 NOTE — PROGRESS NOTES
Discharge planning: 
 
Patient has been accepted to [facility] authorization has been obtained. Patient's pressure has been running low and the medical team and the skilled facility agree that it will be best to discharge tomorrow (3/21/2019), to the skilled nursing facility. Patient's [employer] Authorization will  tomorrow (3/21/2019) evening. He has to be discharged tomorrow early. Care manager will discharge appropriately tomorrow. Niki Burns. [facility] Care Manager Pager#: [phone]

## 2019-03-21 NOTE — ROUTINE PROCESS
1925 Assumed care of patient from off going nurse. Patient resting in bed. No distress noted. Call bell within reach, siderails up x 3, bed in lowest position, and patient instructed to use call bell for assistance. Will continue to monitor. Daughter at bedside. Patient Vitals for the past 12 hrs: 
 Temp Pulse Resp BP SpO2  
03/21/19 0412 97.4 °F (36.3 °C) 92 18 107/57 99 % 03/21/19 0014 97.3 °F (36.3 °C) 95 18 101/62 93 % 03/20/19 1951     98 % 03/1934     98 % 03/20/19 1913 97.3 °F (36.3 °C) 92 18 93/64 99 % 03/20/19 1807    (!) 86/50   
 
0722 Bedside and Verbal shift change report given to [address] [place] [address] (oncoming nurse) by Junie Moreno RN(offgoing nurse). Report included the following information Kardex, Intake/Output, MAR, Recent Results and Cardiac Rhythm SR tele box# [id number].

## 2019-03-21 NOTE — PROGRESS NOTES
Problem: Mobility Impaired (Adult and Pediatric) Goal: *Acute Goals and Plan of Care (Insert Text) Description Physical Therapy Goals Initiated 3/18/2019 and to be accomplished within 7 days. 1.  Patient will complete all bed mobility with modified independence in order to prepare for EOB/OOB activity. 2.  Patient will perform sit <> stand with supervision/set-up in order to prepare for OOB/gait activity. 3.  Patient will perform bed to chair transfers with supervision/set-up in order to promote mobility and encourage seated activity to progress towards their prior level of function. 4.  Patient will ambulate 100 feet with supervision/set-up using LRAD in order to prepare for safe negotiation of their environment. 5.  Patient will ascend/descend 3 steps with S handrail use with supervision/set-up in order to prepare for safe exit/entry into their home environment. Outcome: Progressing Towards Goal 
  
PHYSICAL THERAPY TREATMENT Patient: Breanne Thomas (16 y.o. male) Date: 3/21/2019 Diagnosis: Congestive heart disease ([facility] [address] [I50.9] <principal problem not specified> Precautions: Fall Chart, physical therapy assessment, plan of care and goals were reviewed. OBJECTIVE/ ASSESSMENT: 
Patient found supine in bed willing to work with PT. Pt now plans to DC home with hospice. Spoke at length with pt and daughter about equipment needs. Pt has fallen out of his bed in the past because it is too high for him. Pt will benefit from hospital bed at home for safety / to allow safe entry and exit. Educated daughter on gait belt and where to obtain them as well as using waist belt if cost is too high. Pt will greatly benefit from 3-in-1 commode and shower chair is commode does not fit the shower. And finally pt will requires transport wheelchair due to very quick fatigue and inability to ambulate long distance.  Pt has ambulated 10 feet at most with one seated rest break while progressing with PT here at [facility]. Nursing monitoring pt's SpO2 during PT tx. Pt was first transitioned to room air but reports increased difficulty breathing though SpO2 reads 89-91%. Americo Ingram returned to pt via nasal cannula. Pt began to mobilize with PT, sat at EOB then stood for about 1 minute, performing standing marches through very limited range. Pt sat back at EOB and SpO2 was 91-94% throughout. Pt was transitioned to room air and performed LAQ, before standing and taking side steps toward HOB. Pt's Spo2 during this process remains 91-94%. Pt returned to bed and began to c/o SOB. Pt SpO2 is 89-91%. HOB lowered and pt drops to 88% while on room air. Americo Ingram was returned to pt and pt was left supine with needs in reach. Pt does endorse some anxiety and responds well to distraction. Pt continues to progress slowly, pt's daughter encouraged to call in future if she has any questions. Education: X         Bed mobility X         Transfers X         Ambulation / gait X         Assistive device management ? Stairs X         Body mechanics X         Position change ? Therapeutic exercise X         Activity pacing / energy conservation ? Other: 
 
Progression toward goals: 
?      Improving appropriately and progressing toward goals X      Improving slowly and progressing toward goals ? Not making progress toward goals and plan of care will be adjusted PLAN: 
Patient continues to benefit from skilled intervention to address the above impairments. Continue treatment per established plan of care. Discharge Recommendations: Pt will be discharged home with hospice Further Equipment Recommendations for Discharge:  rolling walker SUBJECTIVE:  
Patient stated I'm having trouble breathing.  OBJECTIVE DATA SUMMARY:  
Critical Behavior: 
Neurologic State: Alert, Appropriate for age Orientation Level: Oriented X4 
 Cognition: Follows commands Safety/Judgement: Fall prevention, Home safety Functional Mobility Training: 
Bed Mobility: 
Supine to Sit: Minimum assistance Sit to Supine: Stand-by assistance Balance: 
Sitting: Impaired Sitting - Static: Good (unsupported) Sitting - Dynamic: Fair (occasional) Standing: Impaired; With support Standing - Static: Fair Standing - Dynamic : Fair Ambulation/Gait Training: 
Distance (ft): 3 Feet (ft) Assistive Device: Walker, rolling Ambulation - Level of Assistance: Stand-by assistance Gait Abnormalities: Decreased step clearance Base of Support: Narrowed Stance: Weight shift Speed/Janee: Slow Step Length: Left shortened;Right shortened Therapeutic Exercises:  
Reviewed Pain: 
Pre tx pain: 0 Post tx pain: 0 Pain Scale 1: Numeric (0 - 10) Pain Intensity 1: 0 Activity Tolerance:  
Fair Please refer to the flowsheet for vital signs taken during this treatment. After treatment:  
? Patient left in no apparent distress sitting up in chair X Patient left in no apparent distress in bed X Call bell left within reach ? Nursing notified X Caregiver present ? Bed alarm activated ? SCDs applied ? Ice applied Kaye Quiles PTA Time Calculation: 63 mins 8 minutes spent out of room making list of equipment for pt's daughter

## 2019-03-21 NOTE — ROUTINE PROCESS
Received report on pt.from off going RN. Resting quietly in bed on rounds. Denies c/o pain or SOB at this time. No acute distress noted. HOB elevated. Call bell at side. Bed alarm on. Will cont to monitor for any changes in status. 0900 sitting up in bed eating breakfast.able to feed hinself but sts his appetite is very poor and he only ate applesauce and half glass of ensure. 1155 BP 76/53 by dynamap. Manual BP taken and was 84/50. DR Reuben Wilson paged. Will cont to monitor for changes. Pt denies dizziness, pain, or SOB. 1410 BP manual in bilateral arms was 80/50 and 80/40. DR Reuben Wilson notified. 1455 250 cc bolus of NS hung to infuse as ordered. 1530 bolus completed. 1630 BP remains in the 00'G systolic. DR Reuben Wilson notified and discharge cancelled for today. Daughter at bedside and pt and daughter aware of changes. 1800 cont to take in minimal food and liquids. Family at bedside. 1930 Bedside and Verbal shift change report given to Lexus Jaimes (oncoming nurse) by Karissa Doran, KATYA (offgoing nurse). Report given with Chauncey MEHTA and MAR.

## 2019-03-21 NOTE — PROGRESS NOTES
Occupational Therapy Note Patient: Juice Vergara (89 y.o. male) Date: 3/21/2019 [facility] <principal problem not specified> Precautions: Fall Chart, occupational therapy assessment, plan of care, and goals were reviewed. Occupational Therapy treatment attempted. Patient is unable to participate due to: 
[]  Nausea/vomiting 
[]  Eating 
[]  Pain 
[]  Pt lethargic 
[]  Off Unit 
[x] Other: 
Attempt: 9341 - pt family speaking with Palliative Care team. 
               9146 - family still speaking with Palliative Care team. 
 
Will f/u later as schedule allows. Thank you.  
 
SHAVON Mcnulty

## 2019-03-21 NOTE — PROGRESS NOTES
Received a call from Dr. Asad Do stating that the plan was now for SNF. Has been accepted to [facility]. Leticia notified that patient may be ready for discharge tomorrow. He will require an updated auth from [facility]. Amy Haskins, RN 
[phone]

## 2019-03-21 NOTE — PROGRESS NOTES
Received report on pt.from off going RN. Resting quietly in bed on rounds. Denies c/o pain or SOB at this time. HOB elevated. 02 on. Call bell at side. Bed alarm on. No acute distress noted. Will cont to monitor for any changes in status. 0900 assisted to sit up in bed to eat. appetite very poor. 1110 02 sats on room air at rest 88%. 02 reapplied at 2 LPM and sats 94%. 1300 assisted to sit up in bed to eat. Cont to have very poor appetite. Becomes tachypenic with any activity. 1800 daughter remains at bedside. No changes in pts status. 1930 Bedside and Verbal shift change report given to Fabi Child RN (oncoming nurse) by Ree Moreno RN (offgoing nurse). Report given with Chauncey MEHTA and MAR. pts discharge home was cancelled today. [facility] bed to be ordered tomorrow am due ti increased redness, excoriation on sacral area, pts inability to lie on sides well and poor nutrition.

## 2019-03-21 NOTE — PROGRESS NOTES
Thank you for your referral for home oxygen and hospital bed. Order has been cancelled-patient DC to SNF 
  
[facility] Liaison [facility]

## 2019-03-21 NOTE — PROGRESS NOTES
[facility] and I met Mr Rich Comment (only) daughter Yeimi Harvey and her daughter in hallway outside Mr Gant's room. Mr Cassandra Ott is asleep. Compassion and listening support were offered as Yeimi Harvey shared a little about her dad, his independence - lived alone (spouse  21 years ago), bowled, played golf, did his own grocery shopping, cut grass. Yeimi Harvey shared that her Dad served in the [place] in [employer] [facility] and [employer] and is not currently connected to the [place] for health benefits. She added that he was one of 13 children and grew up on a farm in [place]. Yeimi Harvey shared some of her concerns about her Dad's recent medical decline since endoscopy about 3 weeks ago. Team listened as Yeimi Harvey expressed some of her frustrations that she feels like \"something is being missed. \" Her questions were answered as well as we could. Yeimi Harvey asked for prayer and shared her gratitude for support and information. Yeimi Harvey said that her Dad started attending [employer] about 5 years ago with some of his bowling buddies. Team's contact information was shared. Team meeting set up tomorrow at 1 pm with Yeimi Harvey to continue conversation with Mr Cassandra Ott about his medical wishes.

## 2019-03-21 NOTE — PROGRESS NOTES
[facility] - Progress Note Admit Date: 3/13/2019 Assessment:  
 
Hospital Problems  Date Reviewed: 3/1/2019 Codes Class Noted POA Congestive heart disease ([facility] [address] ICD-10-CM: I50.9 ICD-9-CM: 428.0  3/13/2019 Unknown  
   
  
 
 
 
   
  
-Acute respiratory distress requiring bipap. CXR with pulmonary edema, questionable infiltrate (previous CXR was done at [facility]).  Improving off bipap with Abx, nebs and gentle diuresis, thoracentesis. -NSTEMI, suspect secondary in setting of acute illness, troponin peak 7.58, s/p PCI to LAD 3/11/2019 with occluded mid RCA which appeared to be chronic medically managed, ECG with known LBBB, denies CP. 
-Heart failure with reduced EF. Discharged on coreg, lisinopril, lasix 3/12/2019. 
-Bilateral pleural effusions, s/p left (3/17/2019 600cc) and right (3/18/2019 1200cc) thoracentesis this admission. 
-Ischemic cardiomyopathy with EF 26-30% on echo this admission, unchanged from last admission. Conservatively managing in setting of advanced age. -CAD s/p LAD PCI with DEMAR 3/11/2019 with previous PCI to LCx 2011. Cath 3/11/2019 (Occluded mid RCA which appears to be chronic. There is faint collateral from the left coronary system, Left main artery with ostial 30-40%, LAD mid focal severely calcified tortuous 99% stenosis with CARLENE II flow, Circumflex coronary artery with diffuse mild 20-30% stenosis throughout). Discharged on ASA, plavix, statin, BB. 
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 -Mild kidney injury. -Valvular heart disease with moderate aortic valve stenosis and mild to moderate MR. 
-Carotid artery disease. Moderate bilateral ICA stenosis medically managed. 
-Peripheral vascular disease.  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  Because of this his blood pressure should be checked in his right arm. 
-Hypertension. Low normal on admission requiring pressor support. 
-Dyslipidemia. On statin. -H/o intermittent LBBB, now appears persistent. 
-Asthma, former smoker. -Advanced age, full code, but independent, lives alone and cares for self, significant decline in functional capacity follow recent hospital stays. 
  
Primary cardiologist Dr. Jaz Enrique. Plan:  
 
Remains mildly SOB, continuing lasix as hemodynamics and renal function allow. Patient remains weak, fatigued, only able to tolerate minimal ambulation with PT. Would benefit from placement on discharge. May need further discussions regarding goal of care and code status. Continued on ASA, plavix, statin. Subjective:  
 
Weak, sob Objective:  
  
Patient Vitals for the past 8 hrs: 
 Temp Pulse Resp BP SpO2  
03/21/19 0805 96.9 °F (36.1 °C) 96 18 98/55 98 % 03/21/19 0412 97.4 °F (36.3 °C) 92 18 107/57 99 % Patient Vitals for the past 96 hrs: 
 Weight  
03/21/19 0412 68.1 kg (150 lb 3.2 oz)  
03/20/19 0546 68 kg (149 lb 14.6 oz) 03/19/19 0405 69.5 kg (153 lb 3.2 oz) 03/18/19 0414 66.9 kg (147 lb 8 oz) Intake/Output Summary (Last 24 hours) at 3/21/2019 1143 Last data filed at 3/20/2019 3677 Gross per 24 hour Intake 600 ml Output 175 ml Net 425 ml Physical Exam: 
General:  alert, cooperative, no distress, appears stated age Neck:  no JVD Lungs:  diminished breath sounds R base, L base Heart:  regular rate and rhythm Abdomen:  abdomen is soft without significant tenderness, masses, organomegaly or guarding Extremities:  extremities normal, atraumatic, no cyanosis or edema Data Review:  
 
Labs: Results:  
   
Chemistry Recent Labs  
  03/20/19 
1237 *   
K 4.1 CL 98* CO2 29 BUN 55* CREA 1.55* CA 9.0 MG 2.9* AGAP 9  
BUCR 35* CBC w/Diff Recent Labs  
  03/20/19 
0509 WBC 8.5  
RBC 3.03* HGB 8.2* HCT 27.1*  
 Cardiac Enzymes No results found for: CPK, CK, CKMMB, CKMB, RCK3, CKMBT, CKNDX, CKND1, CHRISTINE, TROPT, TROIQ, NGHIA, TROPT, TNIPOC, BNP, BNPP Coagulation Recent Labs  
  03/21/19 
0551 03/20/19 
0509 APTT 40.5* 48.2* Lipid Panel Lab Results Component Value Date/Time Cholesterol, total 87 03/08/2019 05:28 AM  
 HDL Cholesterol 44 03/08/2019 05:28 AM  
 LDL, calculated 30 03/08/2019 05:28 AM  
 VLDL, calculated 13 03/08/2019 05:28 AM  
 Triglyceride 65 03/08/2019 05:28 AM  
 CHOL/HDL Ratio 2.0 03/08/2019 05:28 AM  
  
BNP No results found for: BNP, BNPP, XBNPT Liver Enzymes No results for input(s): TP, ALB, TBIL, AP, SGOT, GPT in the last 72 hours. No lab exists for component: DBIL Digoxin Thyroid Studies Lab Results Component Value Date/Time TSH 1.22 03/07/2019 05:29 AM  
    
 
Signed By: AMITA Sarmiento March 21, 2019

## 2019-03-21 NOTE — PROGRESS NOTES
[facility] Progress Note Patient: Carmelo Guaman Age: 80 y.o. : 1926 MR#: [id number] SSN: [id number] Date/Time: 3/21/2019 9:34 AM 
 
Subjective/24-hour events: Pt per nursing with episodes of tachypnea, he states his SOB is \"not to good, not too bad\". Assessment:  
-Acute hypoxic respiratory failure required BIPAP, likely due to heart failure pulm input noted, s/p thoracentesis bilatera (3/18 and 3/17), with some improvement in respiratory status initially but now w/ some degree of labored breathing, discussed w/ pulmonary, limited therapeutic options at this stage, due to no cardiovascular reserve unable to diurese effectively. 
-Low bp in setting of heart failure and iatrogenic component  
-Abnormal chest imaging, concern for possible pneumonia 
-Pleural effusions s/p thoracentesis -Acute on chronic systolic CHF on lasix -Ischemic cardiomyopathy, EF 26-30% -Recent NSTEMI s/p PCI/stent placement trop downtrending 
-CAD 
-Dyslipidemia 
-CKD 2-3 
-Chronic anemia 
-Valvular heart disease 
-Advanced age 
-Probable COPD 
-Tobacco use hx Overall pt has had significant decline most likely due to his advanced systolic heart failure. I believe at this stage it would be futile to attempt aggressive measures including aggressive diuresis as pt has low cardiovascular reserve. Despite measures taken thus far he has not had significant improvement and in fact he seems to have declined further over the past few days. Both pt and daughter wish to maintain his full code status. Plan: 
-monitor hemodynamics, will dc antihypertensives all together for now due to hypotension Cont to diurese if bp allows 
-Palliative care consult Dc cefepime 7 day course (d/c on ) Continue nebs/symbicort/usual pulmonary hygiene.   . 
Diuresis per cardiology -  Will hold bb and spironolactone due to low bp 
 PT/OT evals. Based on current clinical status, will likely not be able to return home alone at discharge. Case discussed with:  [x]Patient  []Family  [x]Nursing  []Case Management DVT Prophylaxis:  []Lovenox  []Hep SQ  []SCDs  []Coumadin   [x]On Heparin gtt Objective:  
VS:  
Visit Vitals /66 (BP 1 Location: Left arm, BP Patient Position: At rest) Pulse (!) 103 Temp 98.6 °F (37 °C) Resp 18 Ht 5' 7\" (1.702 m) Wt 68.1 kg (150 lb 3.2 oz) SpO2 94% BMI 23.52 kg/m² Tmax/24hrs: Temp (24hrs), Av.4 °F (36.3 °C), Min:96.9 °F (36.1 °C), Max:98.6 °F (37 °C) Intake/Output Summary (Last 24 hours) at 3/21/2019 1615 Last data filed at 3/20/2019 2873 Gross per 24 hour Intake 360 ml Output 175 ml Net 185 ml General: alert, awake, in NAD. Nontoxic-appearing. Cardiovascular:  S1, S2. Tachycardic. Pulmonary:  No active wheezing clear breath sounds. GI:  Abdomen soft, NTTP. Extremities:  Warm, no ischemia. Neuro:  Awake and alert, motor nofocal. 
 
Labs:   
Recent Results (from the past 24 hour(s)) PTT Collection Time: 19  5:51 AM  
Result Value Ref Range aPTT 40.5 (H) 23.0 - 36.4 SEC Signed By: Petra Lakhani MD   
 2019 9:34 AM

## 2019-03-21 NOTE — PROGRESS NOTES
Thank you for your referral for home oxygen and hospital bed. Waiting for walk test results and insurance approval of bed. [facility] Hospital Liaison [facility]

## 2019-03-21 NOTE — PROGRESS NOTES
Palliative Med Team 
 
 
Palliative Med Team consisting of compa Malave, Ruther Form[place], , and Mark Connor, RN, met with Pt's daughter,  Hannah Benavidez and grand daughter, Atlas Saint, in the allen. Pt was asleep in the room. Introduced our team as a supportive team to assist Pt and families. Hannah Benavidez is an only child, and her mom  almost 21 years ago. Pt served in both [employer] and [employer], but he doesn't receive care at the [facility] and doesn't have any service connected disabilities. Hannah Benavidez shared that Pt was independent and very active prior to having an endoscopy with cauterization of GI bleed 3 wks ago. \"He bowled on a league every week, golfed, mowed his lawn and went to the store 4 times/wk. \"  He walked w/o assistive device, drove and managed his own finances. Since then, his voice is raspy and his appetite has been poor with resulting weight loss. Hannah Benavidez is worried that \"we may be missing something since everything has declined since the endoscopy? Now he has problems with fluid in his lungs and his iron and blood levels. \"  She also has questions about why Pt isn't on the \"Heart unit or where he's being monitored? If his main problem is his heart like they're saying, I'd think that would be appropriate. He could die overnight while I'm at home, and no one would know because he's not wearing any monitors? \"  This thought causes her a lot of anxiety. She also shared frustrations about feeling \"like we're being speed skated out of here all of a sudden? \"  Hannah Benavidez broached the idea of goals of care as another provider had brought that up earlier. \"My dad just keeps saying that he wants to get better and get out of here. \"  Melita Allen offered a prayer for Pt, and the family was grateful for that. Hannah Benavidez agreed to meet with this team again for further discussion tomorrow, [email or web address]. Thank you for the consult and the opportunity to assist in Mr. Scarlett Jewell care. We will cont to follow to provide support to Pt and family. Twila Lugo, [facility] Palliative Medicine

## 2019-03-21 NOTE — ROUTINE PROCESS
Assumed patient care. Bedside shift report received from Cachorro Manley. Patient in bed, awake, alert and oriented x3, with family at bedside. Denies pain or discomforts at this time. Call light placed within reach. Bed in low position. 5:27 AM - Pt complaining of SOB, oxygen sats 95-96% on 2 li NC. Pt pulled up and repositioned in bed with HOB elevated to 35 degrees for comfort. 7:52 AM - Bedside shift change report given to Tray Feliz (oncoming nurse) by Emerald Mendez RN (offgoing nurse). Report given with SBAR, Kardex, Intake/Output, MAR and Recent Results.

## 2019-03-22 NOTE — PROGRESS NOTES
Attending physician aware of elevated heart rate,patient presents no s/s of distress. will continue to monitor.

## 2019-03-22 NOTE — PROGRESS NOTES
[facility] - Progress Note Admit Date: 3/13/2019 Assessment:  
 
Hospital Problems  Date Reviewed: 3/1/2019 Codes Class Noted POA Congestive heart disease ([facility] [address] ICD-10-CM: I50.9 ICD-9-CM: 428.0  3/13/2019 Unknown Plan:  
 
Patient s/p repeat left thoracentesis today with 1200 CC fluid removed. Continuing low dose lasix as hemodynamics allow. No longer tolerating ace or bb due to low BP. Continued on ASA, plavix, statin. Continue to address goals of care, code status and disposition. No further cardiac work-up. Subjective:  
 
Remains weak with maria Objective:  
  
Patient Vitals for the past 8 hrs: 
 Temp Pulse Resp BP SpO2  
03/22/19 1133 97.5 °F (36.4 °C) (!) 102 16 100/65 94 % 03/22/19 0835     97 % 03/22/19 0812 97.4 °F (36.3 °C) (!) 101 16 112/67 96 % Patient Vitals for the past 96 hrs: 
 Weight  
03/22/19 0432 69.2 kg (152 lb 9.6 oz)  
03/21/19 0412 68.1 kg (150 lb 3.2 oz)  
03/20/19 0546 68 kg (149 lb 14.6 oz) 03/19/19 0405 69.5 kg (153 lb 3.2 oz) Intake/Output Summary (Last 24 hours) at 3/22/2019 1238 Last data filed at 3/22/2019 9620 Gross per 24 hour Intake 360 ml Output 650 ml Net -290 ml Physical Exam: 
General:  alert, cooperative, no distress, appears stated age Neck:  no JVD Lungs:  diminished breath sounds Heart:  regular rate and rhythm Abdomen:  abdomen is soft without significant tenderness, masses, organomegaly or guarding Extremities:  extremities normal, atraumatic, no cyanosis or edema Data Review:  
 
Labs: Results:  
   
Chemistry Recent Labs  
  03/20/19 
1237 *   
K 4.1 CL 98* CO2 29 BUN 55* CREA 1.55* CA 9.0 MG 2.9* AGAP 9  
BUCR 35* CBC w/Diff Recent Labs  
  03/22/19 
0435 03/20/19 
0509 WBC 8.5 8.5  
RBC 3.11* 3.03* HGB 8.3* 8.2* HCT 27.0* 27.1*  
 294 Cardiac Enzymes No results found for: CPK, CK, CKMMB, CKMB, RCK3, CKMBT, CKNDX, CKND1, CHRISTINE, TROPT, TROIQ, NGHIA, TROPT, TNIPOC, BNP, BNPP Coagulation Recent Labs  
  03/22/19 
0435 03/21/19 
0551 APTT 42.6* 40.5* Lipid Panel Lab Results Component Value Date/Time Cholesterol, total 87 03/08/2019 05:28 AM  
 HDL Cholesterol 44 03/08/2019 05:28 AM  
 LDL, calculated 30 03/08/2019 05:28 AM  
 VLDL, calculated 13 03/08/2019 05:28 AM  
 Triglyceride 65 03/08/2019 05:28 AM  
 CHOL/HDL Ratio 2.0 03/08/2019 05:28 AM  
  
BNP No results found for: BNP, BNPP, XBNPT Liver Enzymes No results for input(s): TP, ALB, TBIL, AP, SGOT, GPT in the last 72 hours. No lab exists for component: DBIL Digoxin Thyroid Studies Lab Results Component Value Date/Time TSH 1.22 03/07/2019 05:29 AM  
    
 
Signed By: AMITA Page March 22, 2019

## 2019-03-22 NOTE — PROGRESS NOTES
Problem: Self Care Deficits Care Plan (Adult) Goal: *Acute Goals and Plan of Care (Insert Text) Description Occupational Therapy Goals Initiated 3/18/2019 within 7 day(s). 1.  Patient will perform upper body dressing/bathing with supervision/set-up 2. Patient will perform grooming seated at EOB with F balance x 5 min with supervision/set-up. 3.  Patient will perform sit>stand with CGA in order to prep for participation with ADLs. 4.  Patient will participate in upper extremity therapeutic exercise/activities with modified independence for 5-8 minutes. 5.  Patient will utilize energy conservation techniques during functional activities with min verbal cues. Outcome: Progressing Towards Goal 
 OCCUPATIONAL THERAPY TREATMENT Patient: Dennise Porter (56 y.o. male) Date: 3/22/2019 Diagnosis[facility] [I50.9] <principal problem not specified> Precautions: Fall Chart, occupational therapy assessment, plan of care, and goals were reviewed. ASSESSMENT: 
Pt is very pleasant and cooperative, reports LB pain, however, is agreeable to participate in toilet txfr this session. Pt maneuvered to EOB w/Min A, participated in ADL grooming task w/SBA demonstrating Fair dynamic balance. Pt required Min A to std and attempted to maneuver to the BR, however, noted to have a BM on the crissy pad, reporting he needs to go again. Pt was provided w/BSC, required min A to maneuver to the [facility] w/FWW and Min A for sequencing. Pt required Max A for toileting hygiene after a BM, and assisted to the bed. Pt educated on and performed seated scapular setting/stabilization strategies to alleviate back pain and improve endurance and activity tolerance. Pt and pt's daughter verbalized understanding. Progression toward goals: 
?          Improving appropriately and progressing toward goals ? Improving slowly and progressing toward goals ?          Not making progress toward goals and plan of care will be adjusted PLAN: 
Patient continues to benefit from skilled intervention to address the above impairments. Continue treatment per established plan of care. Discharge Recommendations:  Aric Kim Further Equipment Recommendations for Discharge:  N/A  
  
 
SUBJECTIVE:  
Patient stated ? I am weak, but I'll go dancing.? OBJECTIVE DATA SUMMARY:  
Cognitive/Behavioral Status: 
Neurologic State: Alert Orientation Level: Oriented to person Cognition: Follows commands Safety/Judgement: Fall prevention, Home safety Functional Mobility and Transfers for ADLs: 
Bed Mobility: 
  
Supine to Sit: Minimum assistance Sit to Supine: Minimum assistance Transfers: 
Sit to Stand: Minimum assistance Toilet Transfer : Minimum assistance(to [facility]) Balance: 
Sitting: Impaired Sitting - Static: Good (unsupported) Sitting - Dynamic: Fair (occasional) Standing: Impaired; With support Standing - Static: Fair Standing - Dynamic : Fair ADL Intervention: 
 Grooming Grooming Assistance: Supervision(seated EOB) Washing Face: Supervision/set-up Upper Body Dressing Assistance Hospital Gown: Stand-by assistance Therapeutic Exercises:  
Scapular program 
Pain: 
Pt didn't rate pain or discomfort prior to treatment. Pt didn't rate pain or discomfort post treatment. Activity Tolerance:   
Fair Please refer to the flowsheet for vital signs taken during this treatment. After treatment:  
?  Patient left in no apparent distress sitting up in chair ? Patient left in no apparent distress in bed 
? Call bell left within reach ? Nursing notified ? Caregiver present ? Bed alarm activated SHAVON Smith Time Calculation: 31 mins

## 2019-03-22 NOTE — PROGRESS NOTES
Occupational Therapy Note Patient: Xavier Pisano (49 y.o. male) Date: 3/22/2019 Diagnosis: Congestive heart disease ([facility] [address] [I50.9] <principal problem not specified> Precautions: Fall Chart, occupational therapy assessment, plan of care, and goals were reviewed. Occupational Therapy treatment attempted. Patient is unable to participate due to: 
[]  Nausea/vomiting 
[]  Eating 
[]  Pain 
[]  Pt lethargic 
[]  Off Unit 
[x] Other: 
Attempt: x1 per nurse pt is getting ready to have thorasenthesis (10:09) Attempt: x2 pt is having thoracentesis performed (11:16) Will f/u later as schedule allows. Thank you.  
 
KATARINA Saeed/SENIA

## 2019-03-22 NOTE — PROGRESS NOTES
Problem: Dysphagia (Adult) Goal: *Acute Goals and Plan of Care (Insert Text) Description Patient will: 1. Tolerate PO trials with 0 s/s overt distress in 4/5 trials 2. Utilize compensatory swallow strategies/maneuvers (decrease bite/sip, size/rate, alt. liq/sol) with min cues in 4/5 trials 3. Perform oral-motor/laryngeal exercises to increase oropharyngeal swallow function with min cues 4. Complete an objective swallow study (i.e., MBSS) to assess swallow integrity, r/o aspiration, and determine of safest LRD, min A per MD discretion Rec:    
Soft solid with thin liquids Aspiration precautions HOB >45 during po intake, remain >30 for 30-45 minutes after po Small bites/sips; alternate liquid/solid with slow feeding rate Oral care TID Meds per pt preference Outcome: Progressing Towards Goal 
 
SPEECH LANGUAGE PATHOLOGY BEDSIDE SWALLOW EVALUATION/TREATMENT Patient: Kristin Hameed (62 y.o. male) Date: 3/22/2019 Primary Diagnosis: Congestive heart disease ([facility] [address] [I50.9] Precautions: aspiration  Fall ASSESSMENT : 
Based on the objective data described below, the patient presents with mild oropharyngeal dysphagia. Pt edentulous for evaluation. Upper/lower dentures available; however, pt and daughter report that he does not always use dentures for PO. He had hoarse/breathy vocal quality. Per daughter: pt with similar vocal quality once before this admission with subsequent dysphagia Pt tolerated reg solid and thin liquids +/- straw with congested cough x 1/8 liquid trials. Pt also with congested cough at baseline. Laryngeal elevation appeared functional/timely to palpation. Pt with labored bolus manipulation and ~10% lingual stasis post reg solid trials. Pt safe for soft solid diet with thin liquids, aspiration precautions, oral care TID, and meds as tolerated. He may benefit from a MBS, per MD discretion, if silent aspiration is a concern. D/w RN. ST will continue to follow. TREATMENT : 
Skilled therapy initiated; Educated pt and pt's daughter on aspiration precautions and importance of compensatory swallow techniques to decrease aspiration risk (decrease rate of intake & sip/bite size, upright @HOB for all po intake and ~30 minutes after po); verbalized comprehension. Further educated on role of SLP and SLP POC. SLP to follow as indicated. Patient will benefit from skilled intervention to address the above impairments. Patient?s rehabilitation potential is considered to be Fair Factors which may influence rehabilitation potential include: ?            Medical condition PLAN : 
Recommendations and Planned Interventions: See above Frequency/Duration: Patient will be followed by speech-language pathology 1-2 times per day/4-7 days per week to address goals. Discharge Recommendations: [address] and To Be Determined SUBJECTIVE:  
Patient stated ? I have lots of girlfriends up on this floor. I'm a popular hebert! ?. 
 
OBJECTIVE:  
 
Past Medical History:  
Diagnosis Date Asthma Cardiac cath 04/28/2011 oLM 30%. pLAD 30%. oD1 50%. CX 90% (3 x 18 [place] DEMAR). dRCA 90%. RPLB subtotal.  RPDA 100%. Cardiac echocardiogram 01/21/2014 EF 55-60%. Mod LVH. Gr 1 DDfx. Mild AS (mean grad 13). Mild AI. Unchanged from study of 11/30/11. Cardiac nuclear imaging test, mod risk 01/22/2016 Intermediate risk. Medium-sized inferior, inferoseptal infarction. No ischemia. EF 54%. Nondiagnostic EKG on pharm stress test.  
 Carotid duplex 03/02/2016 Mod 50-69% bilateral ICA stenosis. Probable significant RECA stenosis. >50% stenosis of left subclavian. No significant change from study of 1/8/15. Coronary artery disease Status post PCI with drug-eluting stent, [place] 3 x 18 mm in the proximal circumflex. Hx of carotid artery disease ([address] Hypercholesterolemia Hypertension Prostate cancer ([address] Past Surgical History:  
Procedure Laterality Date HX CORONARY STENT PLACEMENT  4/28/11 PCI with drug-eluting stent, [place] 3 x 18 mm in the proximal circumflex (post dialated with 3.25 mm noncompliant balloon at high pressure). Prior Level of Function/Home Situation: see below Home Situation Home Environment: Private residence # Steps to Enter: 3 Rails to Enter: Yes One/Two Story Residence: One story Living Alone: Yes Support Systems: Child(marisela) Patient Expects to be Discharged to[de-identified] Unknown Current DME Used/Available at Home: Walker, rolling Tub or Shower Type: Tub/Shower combination Diet prior to admission: reg solid with thin liquids Current Diet:  soft solid with thin  
Cognitive and Communication Status: 
Neurologic State: Alert Orientation Level: Oriented X4 Cognition: Follows commands Perception: Appears intact Perseveration: No perseveration noted Safety/Judgement: Fall prevention, Home safety Oral Assessment: 
Oral Assessment Labial: No impairment Dentition: Upper & lower dentures Oral Hygiene: adeequate Lingual: No impairment Velum: No impairment Mandible: No impairment P.O. Trials: 
Patient Position: [facility] at [facility] Vocal quality prior to P.O.: Hoarse;Phonation breaks;Breathy Consistency Presented: Solid; Thin liquid How Presented: Self-fed/presented;Cup/sip;Straw Bolus Acceptance: No impairment Bolus Formation/Control: Impaired Type of Impairment: Delayed;Mastication Propulsion: Delayed (# of seconds) Oral Residue: Less than 10% of bolus; Lingual 
Initiation of Swallow: No impairment Laryngeal Elevation: Functional 
Aspiration Signs/Symptoms: Infiltrate on chest xray(congested cough x 1/8 thin liquid trials; also at baseline) Pharyngeal Phase Characteristics: No impairment, issues, or problems Effective Modifications: Small sips and bites; Alternate liquids/solids Cues for Modifications: Moderate Oral Phase Severity: Mild Pharyngeal Phase Severity : Mild The severity rating is based on the following outcomes:   
Clinical Judgment Pain: 
Pt c/o 0/10 pain prior to evaluation. Pt c/o 0/10 pain post evaluation. After treatment:  
?            Patient left in no apparent distress sitting up in chair ? Patient left in no apparent distress in bed ? Call bell left within reach ? Nursing notified ? Caregiver present ? Bed alarm activated COMMUNICATION/EDUCATION:  
?            SLP educated pt with regard to compensatory swallow strategies and 
     aspiration/reflux precautions including: small bites/sips, 
     alternate liquids/solids, decrease feeding rate, HOB > 45 with all po, and  
     upright in bed at 30 degrees after po for at least 45 minutes. ?            Patient/family have participated as able in goal setting and plan of care. Thank you for this referral. 
 
Hi Russell M.S. CCC-SLP/L Speech-Language Pathologist

## 2019-03-22 NOTE — TELEPHONE ENCOUNTER
Daughter aware once patient has been stabilized she can request to have patient transferred to [facility].  Daughter verbalizes understanding.

## 2019-03-22 NOTE — PROGRESS NOTES
Dysphagia eval/tx completed with recs of soft solid diet and thin liquids, meds as tolerated. Full report to follow. Thank you for this referral. 
 
Delbert Telles M.S. CCC-SLP/L Speech-Language Pathologist

## 2019-03-22 NOTE — PROGRESS NOTES
[facility] Hospitalist Group Progress Note Patient: Sharona Alvarez Age: 80 y.o. : 1926 MR#: [id number] SSN: [id number] Date/Time: 3/22/2019 9:34 AM 
 
Subjective/24-hour events: Pt per nursing notes w/ c/o sob overnight. He states his breathing is not all that great. Assessment:  
-Acute hypoxic respiratory failure required BIPAP, likely due to heart failure pulm input noted, s/p thoracentesis bilatera (3/18 and 3/17, 3/22), with some improvement in respiratory status initially but has gone back to having  C/o SOB w/ previous atherogenesis. Limited therapeutic options at this stage, due to no cardiovascular reserve unable to diurese effectively. 
-Low bp in setting of heart failure and iatrogenic component  
-Abnormal chest imaging, concern for possible pneumonia, ? Aspiration?  
-Pleural effusions s/p thoracentesis -Acute on chronic systolic CHF on lasix -Ischemic cardiomyopathy, EF 26-30% -Recent NSTEMI s/p PCI/stent placement trop downtrending 
-CAD 
-Dyslipidemia 
-CKD 2-3 
-Chronic anemia 
-Valvular heart disease 
-Advanced age 
-Probable COPD 
-Tobacco use hx Overall pt has had significant decline most likely due to his advanced systolic heart failure. I believe at this stage it would be futile to attempt aggressive measures including aggressive diuresis as pt has low cardiovascular reserve. Despite measures taken thus far he has not had significant improvement and in fact he seems to have declined further over the past few days. Both pt and daughter wish to maintain his full code status. Plan: 
-monitor hemodynamics, will dc antihypertensives all together for now due to hypotension Cont to diurese if bp allows 
-Palliative care consult, input noted,  
-will start augmentin for aspiration pna Continue nebs/symbicort/usual pulmonary hygiene.   . 
Diuresis per cardiology -  Will hold bb and spironolactone due to low bp 
 PT/OT evals. Based on current clinical status, will likely not be able to return home alone at discharge. Case discussed with:  [x]Patient  []Family  [x]Nursing  []Case Management DVT Prophylaxis:  []Lovenox  []Hep SQ  []SCDs  []Coumadin   [x]On Heparin gtt Objective:  
VS:  
Visit Vitals /65 (BP 1 Location: Right arm, BP Patient Position: At rest) Pulse (!) 102 Temp 97.5 °F (36.4 °C) Resp 16 Ht 5' 7\" (1.702 m) Wt 69.2 kg (152 lb 9.6 oz) SpO2 94% BMI 23.90 kg/m² Tmax/24hrs: Temp (24hrs), Av.9 °F (36.6 °C), Min:97.4 °F (36.3 °C), Max:98.4 °F (36.9 °C) Intake/Output Summary (Last 24 hours) at 3/22/2019 1634 Last data filed at 3/22/2019 5233 Gross per 24 hour Intake 220 ml Output 650 ml Net -430 ml General: alert, awake, in NAD. Nontoxic-appearing. Cardiovascular:  S1, S2. Tachycardic. Pulmonary:  No active wheezing clear breath sounds. GI:  Abdomen soft, NTTP. Extremities:  Warm, no ischemia. Neuro:  Awake and alert, motor nofocal. 
 
Labs:   
Recent Results (from the past 24 hour(s)) GLUCOSE, POC Collection Time: 19  5:01 PM  
Result Value Ref Range Glucose (POC) 128 (H) 70 - 110 mg/dL PTT Collection Time: 19  4:35 AM  
Result Value Ref Range aPTT 42.6 (H) 23.0 - 36.4 SEC  
CBC W/O DIFF Collection Time: 19  4:35 AM  
Result Value Ref Range WBC 8.5 4.6 - 13.2 K/uL  
 RBC 3.11 (L) 4.70 - 5.50 M/uL HGB 8.3 (L) 13.0 - 16.0 g/dL HCT 27.0 (L) 36.0 - 48.0 % MCV 86.8 74.0 - 97.0 FL  
 MCH 26.7 24.0 - 34.0 PG  
 MCHC 30.7 (L) 31.0 - 37.0 g/dL  
 RDW 15.6 (H) 11.6 - 14.5 % PLATELET 578 197 - 043 K/uL MPV 10.3 9.2 - 11.8 FL  
CELL COUNT AND DIFF, BODY FLUID Collection Time: 19 11:00 AM  
Result Value Ref Range BODY FLUID TYPE PLEURAL FLUID FLUID COLOR YELLOW    
 FLUID APPEARANCE SL HAZY FLUID RBC CT. 910 (A) NRRE /cu mm FLUID NUCLEATED CELLS 270 (A) NRRE /cu mm  
 FLD NEUTROPHILS 48 % FLD BANDS 0 % FLD LYMPHS 34 % FLD MONOCYTES 18 % FLD EOSINS 0 % WBC COMMENTS Moderate Mesothelial cells CULTURE, BODY FLUID W GRAM STAIN Collection Time: 03/22/19 11:00 AM  
Result Value Ref Range Special Requests: NO SPECIAL REQUESTS    
 GRAM STAIN RARE WBC'S    
 GRAM STAIN NO ORGANISMS SEEN Culture result: PENDING Signed By: Harjit Ramirez MD   
 March 22, 2019 9:34 AM

## 2019-03-22 NOTE — PROGRESS NOTES
[facility] Pulmonary, Critical Care, and Sleep Medicine Follow up patient note Name: Marcelle Yuen MRN: [id number] : 1926 Hospital: [facility] Date: 3/22/2019 IMPRESSION:  
· Acute hypoxic respiratory failure in a patient with HFrEF (EF 26%). · Transudative bilateral pleural effusion secondary to HFrEF. · New right lower lung zone air space process which could be a combination of atelectasis and pleural effusion. An alveolar infiltrate concerning for pneumonia is also in the differentials but remains afebrile and WCC is not elevated. · Hx of NSTEMi and S/P PCI to LAD on 3/11/19 and Ischemic cardiomyopathy and HFrEF · Anemia with hx of recent GI bleed. · Significant peripheral vascular disease CAD, ICA stenosis. · Hx of HTN and DLD · Hx of smoking - COPD from hx. RECOMMENDATIONS:  
· Continue supplemental oxygen to keep SpO2>90%. · There is significant improvement from symptoms stand point post thoracentesis today. · I will defer the management of heart failure and volume management to the primary team.  
· Consider 3% saline, chest PT and IS. · Bronchial hygiene protocol · Bronchodilators PRN 
· Steroids - not indicated. · Antibiotics - Can monitor off abx for now. And depending on clinical progression can consider starting the patient on broad spectrum abx to cover for hospital acquired infections. · Aspiration precautions. · Out patient testing- PFT, 6 min walk. · Assess home Oxygen needs at discharge · OT, PT, OOB and ambulate · Healthy weight · Pulmonary medicine will sign off. · DVT, PUD prophylaxis Subjective: This patient has been seen and evaluated at the request of Dr. Cristóbal Melara for evaluation of hypoxic respiratory failure.  Patient is a 80 y.o. male with past medical history significant for asthma, ischemic cardiomyopathy (S/P PCI), GI bleed (recent) and hypertension who has presented with 1 week history of worsening SOB and leg swelling. Patient is able to provide most of the history and states that he lives alone but does get some help from his daughter. No hx of cough, fevers, phlegm production. Complains of chest pains across the chest in the lower chest that started during the same period of time and is associated with cough or taking deep breaths. This pleuritic sounding chest pain is non radiating. Patient was admitted to hospital and his SOB was worked up and the work up included a CTA which was done on 3/13/19 and it showed bilateral GGOs and pleural effusions. Patient states that he is an ex smoker. Smoked 1ppd but quit 50 years ago. Worked for [employer], But denies any history of asbestos exposure. Interval history: 
3/22/19: 
No acute issues overnight. Feels better after the thoracentesis procedure. Remained hemodynamically stable. Afebrile. Past Medical History:  
Diagnosis Date  Asthma  Cardiac cath 04/28/2011 oLM 30%. pLAD 30%. oD1 50%. CX 90% (3 x 18 Blair DEMAR). dRCA 90%. RPLB subtotal.  RPDA 100%.  Cardiac echocardiogram 01/21/2014 EF 55-60%. Mod LVH. Gr 1 DDfx. Mild AS (mean grad 13). Mild AI. Unchanged from study of 11/30/11.  Cardiac nuclear imaging test, mod risk 01/22/2016 Intermediate risk. Medium-sized inferior, inferoseptal infarction. No ischemia. EF 54%. Nondiagnostic EKG on pharm stress test.  
 Carotid duplex 03/02/2016 Mod 50-69% bilateral ICA stenosis. Probable significant RECA stenosis. >50% stenosis of left subclavian. No significant change from study of 1/8/15.  Coronary artery disease Status post PCI with drug-eluting stent, Blair 3 x 18 mm in the proximal circumflex.  Hx of carotid artery disease ([address]  Hypercholesterolemia  Hypertension  Prostate cancer ([address] Past Surgical History:  
Procedure Laterality Date  HX CORONARY STENT PLACEMENT  4/28/11 PCI with drug-eluting stent, [employer] 3 x 18 mm in the proximal circumflex (post dialated with 3.25 mm noncompliant balloon at high pressure). Prior to Admission medications Medication Sig Start Date End Date Taking? Authorizing Provider  
albuterol-ipratropium (DUO-NEB) 2.5 mg-0.5 mg/3 ml nebu 3 mL by Nebulization route every four (4) hours as needed (wheezing, sob). 3/20/19  Yes Maximino Hall MD  
cefpodoxime (VANTIN) 200 mg tablet Take 1 Tab by mouth daily for 2 days. 3/20/19 3/22/19 Yes Maximino Hall MD  
atorvastatin (LIPITOR) 40 mg tablet Take 1 Tab by mouth nightly. 3/12/19   Latonia Jarrett MD  
carvedilol (COREG) 3.125 mg tablet Take 1 Tab by mouth two (2) times daily (with meals). 3/12/19   Latonia Jarrett MD  
lisinopril (PRINIVIL, ZESTRIL) 5 mg tablet Take 1 Tab by mouth daily. 3/13/19   Latonia Jarrett MD  
nitroglycerin (NITROSTAT) 0.4 mg SL tablet 1 Tab by SubLINGual route every five (5) minutes as needed for Chest Pain. 3/12/19   Latonia Jarrett MD  
furosemide (LASIX) 20 mg tablet Take 1 Tab by mouth daily. 3/12/19   Latonia Jarrett MD  
potassium chloride SR (KLOR-CON 10) 10 mEq tablet Take 1 Tab by mouth daily. 3/12/19   Latonia Jarrett MD  
OTHER Cbc, BMP in 1 week Dx: CHF Fax results to Dr. Adelso Lopez 3/12/19   Latonia Jarrett MD  
budesonide-formoterol (SYMBICORT) 160-4.5 mcg/actuation HFAA Take 2 Puffs by inhalation two (2) times a day. 3/1/19   Ashley Daniel MD  
glycopyrrolate-formoterol (BEVESPI AEROSPHERE) 9-4.8 mcg HFAA Take 2 Inhalation by inhalation two (2) times a day. 3/1/19   Carlos Carroll MD  
clopidogrel (PLAVIX) 75 mg tab TAKE 1 TABLET EVERY DAY 2/26/19   Ashley Daniel MD  
omeprazole (PRILOSEC) 40 mg capsule Take 1 Cap by mouth daily.  2/23/19   Ramírez Villarreal MD  
albuterol (PROVENTIL HFA, VENTOLIN HFA, PROAIR HFA) 90 mcg/actuation inhaler Take 2 Puffs by inhalation every four (4) hours as needed for Wheezing or Shortness of Breath. 3/2/15   AMITA Lo  
tamsulosin (FLOMAX) 0.4 mg capsule Take 1 Cap by mouth daily. 3/13/13   Dony Elmore MD  
aspirin delayed-release 81 mg tablet Take 81 mg by mouth daily. Dony Elmore MD  
 
No Known Allergies Social History Tobacco Use  Smoking status: Former Smoker Packs/day: 1.00 Years: 15.00 Pack years: 15.00 Last attempt to quit: 1960 Years since quittin.8  Smokeless tobacco: Never Used Substance Use Topics  Alcohol use: No  
  
History reviewed. No pertinent family history. @DBLINKMRCHARTING(EPT,6154)@ Immunization status: up to date and documented. Current Facility-Administered Medications Medication Dose Route Frequency  lidocaine (PF) (XYLOCAINE) 10 mg/mL (1 %) injection 10 mL  10 mL IntraVENous ONCE  
 furosemide (LASIX) tablet 20 mg  20 mg Oral DAILY  heparin (porcine) injection 5,000 Units  5,000 Units SubCUTAneous Q8H  
 albuterol-ipratropium (DUO-NEB) 2.5 MG-0.5 MG/3 ML  3 mL Nebulization TID RT  
 atorvastatin (LIPITOR) tablet 40 mg  40 mg Oral QHS  aspirin chewable tablet 81 mg  81 mg Oral DAILY  budesonide-formoterol (SYMBICORT) 160-4.5 mcg/actuation HFA inhaler 2 Puff  2 Puff Inhalation BID RT  
 tamsulosin (FLOMAX) capsule 0.4 mg  0.4 mg Oral DAILY  famotidine (PEPCID) tablet 20 mg  20 mg Oral DAILY  sodium chloride (NS) flush 5-40 mL  5-40 mL IntraVENous Q8H  
 clopidogrel (PLAVIX) tablet 75 mg  75 mg Oral DAILY Review of Systems: 
Pertinent items are noted in HPI. Objective: 
Vital Signs:   
Visit Vitals /65 (BP 1 Location: Right arm, BP Patient Position: At rest) Pulse (!) 102 Temp 97.5 °F (36.4 °C) Resp 16 Ht 5' 7\" (1.702 m) Wt 69.2 kg (152 lb 9.6 oz) SpO2 94% BMI 23.90 kg/m² O2 Device: Nasal cannula O2 Flow Rate (L/min): 2 l/min Temp (24hrs), Av.9 °F (36.6 °C), Min:97.4 °F (36.3 °C), Max:98.4 °F (36.9 °C) Intake/Output:  
Last shift:      No intake/output data recorded. Last 3 shifts:  190 -  0700 In: 740 [P.O.:740] Out: 650 [Urine:650] Intake/Output Summary (Last 24 hours) at 3/22/2019 1329 Last data filed at 3/22/2019 8955 Gross per 24 hour Intake 360 ml Output 650 ml Net -290 ml Physical Exam:  
General:  Alert, cooperative, no distress, appears stated age. Head:  Normocephalic, without obvious abnormality, atraumatic. Eyes:  Conjunctivae/corneas clear. PERRL, EOMs intact. Nose: Nares normal. Septum midline. Mucosa normal. No drainage or sinus tenderness. Throat: Lips, mucosa, and tongue normal. Teeth and gums normal.  
Neck: Supple, symmetrical, trachea midline, no adenopathy, thyroid: no enlargment/tenderness/nodules, no carotid bruit and no JVD. Back:   Symmetric, no curvature. ROM normal.  
Lungs:   Reduced air entry at bilateral lung bases. Free flowing fluid noted in the left lower zone which was concerning for pleural effusion. Chest wall:  No tenderness or deformity. Heart:  Regular rate and rhythm, S1, S2 normal, no murmur, click, rub or gallop. Abdomen:   Soft, non-tender. Bowel sounds normal. No masses,  No organomegaly. Extremities: Extremities normal, atraumatic, no cyanosis or edema. Pulses: 2+ and symmetric all extremities. Skin: Skin color, texture, turgor normal. No rashes or lesions Lymph nodes: Cervical, supraclavicular, and axillary nodes normal.  
Neurologic: Grossly nonfocal  
 
Data review:  
 
Recent Results (from the past 24 hour(s)) GLUCOSE, POC Collection Time: 19  5:01 PM  
Result Value Ref Range Glucose (POC) 128 (H) 70 - 110 mg/dL PTT Collection Time: 19  4:35 AM  
Result Value Ref Range aPTT 42.6 (H) 23.0 - 36.4 SEC  
CBC W/O DIFF Collection Time: 19  4:35 AM  
Result Value Ref Range WBC 8.5 4.6 - 13.2 K/uL  
 RBC 3.11 (L) 4.70 - 5.50 M/uL HGB 8.3 (L) 13.0 - 16.0 g/dL HCT 27.0 (L) 36.0 - 48.0 % MCV 86.8 74.0 - 97.0 FL  
 MCH 26.7 24.0 - 34.0 PG  
 MCHC 30.7 (L) 31.0 - 37.0 g/dL  
 RDW 15.6 (H) 11.6 - 14.5 % PLATELET 296 658 - 367 K/uL MPV 10.3 9.2 - 11.8 FL Imaging: 
I have personally reviewed the patients radiographs and have reviewed the reports: XR Results (most recent): 
Results from Hospital Encounter encounter on 03/13/19 XR CHEST PORT Narrative EXAM:  XR CHEST PORT INDICATION:   Tube Placement COMPARISON: 8/17/2019 FINDINGS: 
 
No new tubes identified. Stable cardiomediastinal silhouette. Small bilateral 
pleural effusions. Diffuse interstitial opacities and patchy hazy opacities left 
greater than right. Right upper lobe subsegmental atelectasis. No pneumothorax. Stable osseous structures. Impression IMPRESSION: 
 
No evidence of interval tube placement. Persistent small bilateral pleural effusions and patchy lung opacities. CT Results (most recent): 
Results from Hospital Encounter encounter on 03/13/19 CTA CHEST W OR W WO CONT Narrative EXAM:  CTA Chest with Contrast (CT Pulmonary Study for PE). CLINICAL INDICATION:  Acute shortness of breath which began this morning. Recent hospitalization for shortness of breath and fatigue. COMPARISON:  None. TECHNIQUE:   
 
- Helical volumetric sections of the chest are obtained with CT pulmonary 
angiogram protocol. Subsequently, sagittal and coronal multiplanar 
reconstruction images are obtained.   Maximum intensity projection images are 
generated to better delineate the pulmonary vasculature, differentiate between 
the pulmonary arteries and veins and to increase sensitivity to pulmonary 
emboli.   
- IV contrast dose of 100 mL Isovue-370. 
- Radiation dose optimization techniques are utilized as appropriate to the 
 exam, with combination of automated exposure control, adjustment of the mA 
and/or kV according to patient's size (Including appropriate matching for 
site-specific examinations), or use of iterative reconstruction technique. FINDINGS:  
 
Pulmonary Arteries: - Unusual pulmonary artery opacification pattern is noted. The upper lung zone 
pulmonary artery opacification is complete whereas the lower lung zone pulmonary 
arteries appear suboptimally opacified particularly in the left lower lobe basal 
segments. Less pronounced atypical poor opacification of the basal segments is 
also noted on the right side. Instead of sharply demarcated distinct filling 
defects, is involved segments demonstrate gradual fading of the pulmonary artery 
luminal opacification. This makes it difficult to exclude small filling defects 
in the segmental and subsegmental arteries at the left lower lobe basal region. The presence of atelectatic changes with pleural effusion may contribute to this 
atypical pulmonary artery opacification pattern. Alternatively, intrinsic 
intracardiac abnormalities may be responsible for the atypical pulmonary artery 
opacification. Notably, the left inferior pulmonary vein opacification is much 
less than that of the right pulmonary venous opacification. 
- Within the technical limitations of the study, no definite suspicious filling 
defects are identified in the main trunk, right and left pulmonary artery, lobar 
and interlobar arteries, segmental and intrasegmental arteries. Basal region 
distal segmental artery and subsegmental arteries are poorly opacified. Aorta:  No opacification of the aorta. The caliber of the ascending aorta and 
aortic arch appear within normal limits. The mid descending aortography 
demonstrates atypical aorta contour, with apparent smoothly outlined bulging 
contour along the medial aspect (axial #). Further inferiorly, the aorta appears to dilated slightly, measuring up to about 3.6 cm in diameter and with 
crescentic outpouching. At the level of the diaphragmatic hiatus, the right 
lateral wall of the aorta demonstrates crescentic appearance with lamellated 
pattern. 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 Recommend dedicated CTA of 
the aorta on routine basis for further delineation of this finding. Lung, Pleura, Airways: - Moderate to large bilateral pleural effusion. Associated atelectatic changes. - Consolidative opacities in the left upper lobe and in the right lower lobe. Groundglass opacities in the right upper lobe and right middle lobe. Possible 
consolidative opacities vs. atelectatic changes in the left lower lobe. Mediastinum, Radha:  No definite mediastinal adenopathy. No definite hilar 
adenopathy. Base of Neck, Axillae:  Unremarkable. Abdomen:  Multiple gallstones. Skeletal Structures:  No acute findings. Impression IMPRESSION: 
      
1. Unusual pulmonary artery opacification with gradual decrease in luminal 
opacification in the basal regions, more pronounced on the left side than on the 
right. Possibly related to presence of moderate to large pleural effusion with 
associated atelectatic changes as the explanation for this pattern vs. intrinsic 
intracardiac abnormality. No convincing evidence of pulmonary embolism is 
detected within the technical limitations of the study. 2. Moderate to large pleural effusion bilaterally with associated passive 
atelectatic changes. Multiple foci of consolidative opacities in both lungs. Most pronounced in the left upper lobe. Query developing pneumonia vs. 
pulmonary hemorrhage vs. nonspecific inflammatory process. 3.  Atypical appearance of the descending with smooth crescentic outpouchings 
and presence of lamellated calcifications along the aortic wall. Perhaps related to chronic dissection. With current technique, i.e. lack of IV contrast 
in the aorta, detailed analysis is difficult. Recommend CTA of the aorta for 
further delineation. 4.  Cholelithiasis. 03/13/19 ECHO ADULT FOLLOW-UP OR LIMITED 03/15/2019 3/15/2019 Narrative · Left ventricular severely decreased systolic function. Estimated left  
ventricular ejection fraction is 26 - 30%. Visually measured ejection  
fraction. Left ventricular mild concentric hypertrophy. Abnormal left  
ventricular wall motion as described on the wall scoring diagram below. · Severe tricuspid valve regurgitation is present. Moderate to severe  
pulmonary hypertension is present. · Moderate mitral valve regurgitation.  
   
  Signed by: MD Anika Angelo MD

## 2019-03-22 NOTE — PROGRESS NOTES
Problem: Mobility Impaired (Adult and Pediatric) Goal: *Acute Goals and Plan of Care (Insert Text) Description Physical Therapy Goals Initiated 3/18/2019 and to be accomplished within 7 days. 1.  Patient will complete all bed mobility with modified independence in order to prepare for EOB/OOB activity. 2.  Patient will perform sit <> stand with supervision/set-up in order to prepare for OOB/gait activity. 3.  Patient will perform bed to chair transfers with supervision/set-up in order to promote mobility and encourage seated activity to progress towards their prior level of function. 4.  Patient will ambulate 100 feet with supervision/set-up using LRAD in order to prepare for safe negotiation of their environment. 5.  Patient will ascend/descend 3 steps with S handrail use with supervision/set-up in order to prepare for safe exit/entry into their home environment. Outcome: Progressing Towards Goal 
  
PHYSICAL THERAPY TREATMENT Patient: Carmelo Guaman (65 y.o. male) Date: 3/22/2019 Diagnosis: Congestive heart disease ([facility] [address] [I50.9] <principal problem not specified> Precautions: Fall Chart, physical therapy assessment, plan of care and goals were reviewed. OBJECTIVE/ ASSESSMENT: 
Patient found supine in bed willing to work with PT. Patient seen with OT to maximize safety of patient and staff. Pt sat at EOB this tx and stood to RW. Pt ambulated to b/s commode where he sat and had BM. Pt stood and justin-care was performed by BRAY while pt remained standing. Pt then ambulated back to bed and returned to supine. Pt continues to make slow progress due to back pain and SOB. Pt has family meeting this afternoon to determine his next level of care. Education: 
?         Bed mobility ? Transfers ? Ambulation / gait ? Assistive device management ? Stairs ? Body mechanics ? Position change ? Therapeutic exercise ?         Activity pacing / energy conservation ? Other: 
 
Progression toward goals: 
?      Improving appropriately and progressing toward goals ? Improving slowly and progressing toward goals ? Not making progress toward goals and plan of care will be adjusted PLAN: 
Patient continues to benefit from skilled intervention to address the above impairments. Continue treatment per established plan of care. Discharge Recommendations:  [place] vs Hospice vs LTC Further Equipment Recommendations for Discharge:  rolling walker SUBJECTIVE:  
Patient stated ? I'm going to get up and dance when I count to 3.? OBJECTIVE DATA SUMMARY:  
Critical Behavior: 
Neurologic State: Alert Orientation Level: Oriented to person Cognition: Follows commands Safety/Judgement: Fall prevention, Home safety Functional Mobility Training: 
Bed Mobility: 
Supine to Sit: Minimum assistance Sit to Supine: Minimum assistance Balance: 
Sitting: Impaired Sitting - Static: Good (unsupported) Sitting - Dynamic: Fair (occasional) Standing: Impaired; With support Standing - Static: Fair Standing - Dynamic : Fair Ambulation/Gait Training: 
Distance (ft): 5 Feet (ft) Assistive Device: Walker, rolling Ambulation - Level of Assistance: Contact guard assistance Gait Abnormalities: Decreased step clearance Base of Support: Narrowed Stance: Weight shift Speed/Janee: Slow Step Length: Left shortened;Right shortened Pain: 
Pre tx pain: Not rated Post tx pain: Not rated Pain Scale 1: Visual 
Pain Intensity 1: 0 Activity Tolerance:  
Fair Please refer to the flowsheet for vital signs taken during this treatment. After treatment:  
? Patient left in no apparent distress sitting up in chair ? Patient left in no apparent distress in bed 
? Call bell left within reach ? Nursing notified ? Daughter present ? Bed alarm activated ? SCDs applied ? Ice applied Arthurine Adam, PTA 
 Time Calculation: 31 mins

## 2019-03-22 NOTE — CONSULTS
Palliative Medicine Consult DR. MEEKS'S [facility]: [id number] ([id number]) [facility]: [phone] [facility]: [phone] Patient Name: Joe Steen YOB: 1926 Date of Initial Consult: 3/22/2019 Reason for Consult: care decisions Requesting Provider: Deandre Moreno MD 
Primary Care Physician: Vani Johnson MD 
  
 SUMMARY:  
Joe Steen is a 80y.o. year old with a past history of prostate cancer, hypercholesterolemia, NSTEMI, CAD with stents, aortic valve stenosis, CHF, GI bleed, HTN who was admitted on 3/13/2019 from home with a diagnosis of acute hypoxic respiratory failure. In February, 2019 patient developed melena and saw his GI physician and was diagnosed with anemia. He was hospitalized at [facility]. He had EGD on 2/22/2018 and had AVM cauterized. He was discharged on omeprazole and it was recommended he have repeat EGD in 8 weeks. March 7-12 he was hospitalized here for NSTEMI. CTA chest on 3/13/2019 showed moderate to large bilateral pleural effusions as well as DANA pneumonia vs inflammation. He was treated with aggressive diuresis and underwent cardiac cath with stenting of his LAD. Discharge was planned to SNF but patient went home instead. Then on 3/13/2019 he developed acute shortness of breath and called EMS. EMS reports desaturation to 70's. CXR: bilateral patchy opacities, infiltrates vs some interstitial edema. He was initiated treated with BiPap, antibiotics and gentle diuresis. He was also noted to have NSTEMI this admission and placed on heparin gtt. Bilateral pleural effusions tapped. Left thoracentesis on 3/17/2019 with 600ml of non-malignant fluid tapped. Right thoracentesis on 3/18/2019 with 100 ml non-malignant fluid tapped. Current medical issues leading to Palliative Medicine involvement include: advanced age, end stage heart disease and care decisions. Bria Gearing PALLIATIVE DIAGNOSES:  
1. Advanced care planning 2. Acute hypoxic respiratory failure 3. NSTEMI 4. Acute on chronic systolic CHF 5. Bilateral pleural effusions PLAN:  
1. Advanced care planning: Palliative care team including Christelle Woodward LCSW and myself met with patient and his daughter, Junior Santana present. Patient is a  who lived alone prior to this admission. He ambulated without assistance and was independent with IADL's. Patient has one child, Junior Santana who is his legal MPOA. No AMD exists. Long discussion held reviewing health history of the past 3 months. Explained CHF as a progressive disease. Mr. Alfonso Hull and Junior Santana deny previous knowledge of CHF diagnosis prior to February. Junior Santana voiced complaints she had regarding items such as \"vest for chest therapy\" and \"specialty mattress\" and \"iron studies\" that she has talked with other providers about that have not been carried through. Discussed goals of care. At one point, when asked of patient if his heart and breathing should stop, would he want CPR to bring him back he stated \"Let me go\". I asked him to repeat if loud enough for daughter. She reminded him of previous conversations where he stated he wanted to all aggressive measures and he responded to her \"Do you want me to stay\". At this point, both were very emotional and Junior Santana indicated she wanted us to leave so they could discuss privately. Goals of care remain: FULL CODE with all aggressive measures. 2. Acute hypoxic respiratory failure: As described above in summary likely secondary to #4 and #5 below. Has been reduced with use of oxygen per Bipap, nasal cannula, antibiotics and thoracentesis. Pulmonary disease continues to follow and manage. 3. NSTEMI: History of NSTEMI earlier in March and now this admission. Cardiac cath as above. Cardiology following. 4. Acute on chronic systolic CHF: likely contributing to #2 above. Echo 3/14/2019 with LVEF of 26-30%.  Per cardiology notes there are limited therapeutic options due to no cardiovascular reserve and inability to diurese effectively. Recommended d/c of all anti-hypertensives due to hypotension and continue to diurese if BP allows. Cardiology continues to follow. 5. Bilateral pleural effusions: Diagnosed on CTA chest 3/13/2019 with moderate to large bilateral pleural effusions. Has had thoracentesis on 3/17 and 3/18 as in summary above. Thoracentesis of left sided effusion today and 1200 ml removed. Pulmonary disease continues to follow and manage. 6. Initial consult note routed to primary continuity provider 7. Communicated plan of care with: Palliative IDT 
 
GOALS OF CARE: 
Patient/Health Care Proxy Stated Goals: Prolong life TREATMENT PREFERENCES:  
Code Status: Full Code Advance Care Planning: 
Advance Care Planning 3/13/2019 Patient's Healthcare Decision Maker is: Legal Next of Kin Confirm Advance Directive None Patient Would Like to Complete Advance Directive No  
Does the patient have other document types - Medical Interventions: Full interventions Other: As far as possible, the palliative care team has discussed with patient / health care proxy about goals of care / treatment preferences for patient. HISTORY:  
 
History obtained from: chart CHIEF COMPLAINT: shortness of breath HPI/SUBJECTIVE: The patient is:  
[x] Verbal and participatory [] Non-participatory due to:  
Elderly, frail white male in no distress. He is alert and oriented and appears able to make complex healthcare decisions. He fully engaged in conversations in spite of being hard of hearing. Clinical Pain Assessment (nonverbal scale for nonverbal patients): Clinical Pain Assessment Severity: 0 Duration: for how long has pt been experiencing pain (e.g., 2 days, 1 month, years) Frequency: how often pain is an issue (e.g., several times per day, once every few days, constant)  FUNCTIONAL ASSESSMENT:  
 
 Palliative Performance Scale (PPS): PPS: 50 ECOG 
ECOG Status : Ambulatory, but unable to carry out work activities PSYCHOSOCIAL/SPIRITUAL SCREENING:  
  
Any spiritual / Religion concerns: 
[] Yes /  [x] No 
 
Caregiver Burnout: 
[] Yes /  [x] No /  [] No Caregiver Present Anticipatory grief assessment:  
[x] Normal  / [] Maladaptive REVIEW OF SYSTEMS:  
 
Positive and pertinent negative findings in ROS are noted above in HPI. The following systems were [x] reviewed / [] unable to be reviewed as noted in HPI Other findings are noted below. Systems: constitutional, ears/nose/mouth/throat, respiratory, gastrointestinal, genitourinary, musculoskeletal, integumentary, neurologic, psychiatric, endocrine. Positive findings noted below. Modified ESAS Completed by: provider Fatigue: 7 Pain: 0 Nausea: 0 Dyspnea: 7 Stool Occurrence(s): 1 PHYSICAL EXAM:  
 
Wt Readings from Last 3 Encounters:  
03/22/19 69.2 kg (152 lb 9.6 oz) 03/12/19 67.6 kg (149 lb 0.5 oz) 03/01/19 73 kg (161 lb) Blood pressure 100/65, pulse (!) 102, temperature 97.5 °F (36.4 °C), resp. rate 16, height 5' 7\" (1.702 m), weight 69.2 kg (152 lb 9.6 oz), SpO2 94 %. Pain: 
Pain Scale 1: Visual 
Pain Intensity 1: 0 Pain Location 1: Chest 
Pain Orientation 1: Left, Right Pain Description 1: Other (comment)(\"it hurts\") Pain Intervention(s) 1: MD notified (comment), Other (comment)(12 lead ecg done) Constitutional: Elderly, frail white male who appears in no apparent distress. Eyes: pupils equal, anicteric ENMT: no nasal discharge, moist mucous membranes Cardiovascular: no edema, distal pulses intact Respiratory: breathing slightly labored with use of abdominal muscles, symmetric Musculoskeletal: no deformity, no tenderness to palpation Skin: warm, dry Neurologic: following commands, moving all extremities Psychiatric: full affect, no hallucinations  HISTORY:  
 
 Active Problems: 
  Congestive heart disease ([facility] [address] (3/13/2019) Past Medical History:  
Diagnosis Date  Asthma  Cardiac cath 2011 oLM 30%. pLAD 30%. oD1 50%. CX 90% (3 x 18 [place] DEMAR). dRCA 90%. RPLB subtotal.  RPDA 100%.  Cardiac echocardiogram 2014 EF 55-60%. Mod LVH. Gr 1 DDfx. Mild AS (mean grad 13). Mild AI. Unchanged from study of 11.  Cardiac nuclear imaging test, mod risk 2016 Intermediate risk. Medium-sized inferior, inferoseptal infarction. No ischemia. EF 54%. Nondiagnostic EKG on pharm stress test.  
 Carotid duplex 2016 Mod 50-69% bilateral ICA stenosis. Probable significant RECA stenosis. >50% stenosis of left subclavian. No significant change from study of 1/8/15.  Coronary artery disease Status post PCI with drug-eluting stent, [place] 3 x 18 mm in the proximal circumflex.  Hx of carotid artery disease ([facility] [address]  Hypercholesterolemia  Hypertension  Prostate cancer ([facility] [address] Past Surgical History:  
Procedure Laterality Date  HX CORONARY STENT PLACEMENT  11 PCI with drug-eluting stent, [place] 3 x 18 mm in the proximal circumflex (post dialated with 3.25 mm noncompliant balloon at high pressure). History reviewed. No pertinent family history. History reviewed, no pertinent family history. Social History Tobacco Use  Smoking status: Former Smoker Packs/day: 1.00 Years: 15.00 Pack years: 15.00 Last attempt to quit: 1960 Years since quittin.8  Smokeless tobacco: Never Used Substance Use Topics  Alcohol use: No  
 
No Known Allergies Current Facility-Administered Medications Medication Dose Route Frequency  lidocaine (PF) (XYLOCAINE) 10 mg/mL (1 %) injection 10 mL  10 mL IntraVENous ONCE  
 furosemide (LASIX) tablet 20 mg  20 mg Oral DAILY  heparin (porcine) injection 5,000 Units  5,000 Units SubCUTAneous Q8H  
  albuterol-ipratropium (DUO-NEB) 2.5 MG-0.5 MG/3 ML  3 mL Nebulization TID RT  
 nitroglycerin (NITROBID) 2 % ointment 0.5 Inch  0.5 Inch Topical Q8H PRN  
 albuterol-ipratropium (DUO-NEB) 2.5 MG-0.5 MG/3 ML  3 mL Nebulization Q4H PRN  
 atorvastatin (LIPITOR) tablet 40 mg  40 mg Oral QHS  aspirin chewable tablet 81 mg  81 mg Oral DAILY  budesonide-formoterol (SYMBICORT) 160-4.5 mcg/actuation HFA inhaler 2 Puff  2 Puff Inhalation BID RT  
 tamsulosin (FLOMAX) capsule 0.4 mg  0.4 mg Oral DAILY  famotidine (PEPCID) tablet 20 mg  20 mg Oral DAILY  sodium chloride (NS) flush 5-40 mL  5-40 mL IntraVENous Q8H  
 sodium chloride (NS) flush 5-40 mL  5-40 mL IntraVENous PRN  
 clopidogrel (PLAVIX) tablet 75 mg  75 mg Oral DAILY  
 
 
 LAB AND IMAGING FINDINGS:  
 
Lab Results Component Value Date/Time WBC 8.5 03/22/2019 04:35 AM  
 HGB 8.3 (L) 03/22/2019 04:35 AM  
 PLATELET 189 61/18/6768 04:35 AM  
 
Lab Results Component Value Date/Time Sodium 136 03/20/2019 12:37 PM  
 Potassium 4.1 03/20/2019 12:37 PM  
 Chloride 98 (L) 03/20/2019 12:37 PM  
 CO2 29 03/20/2019 12:37 PM  
 BUN 55 (H) 03/20/2019 12:37 PM  
 Creatinine 1.55 (H) 03/20/2019 12:37 PM  
 Calcium 9.0 03/20/2019 12:37 PM  
 Magnesium 2.9 (H) 03/20/2019 12:37 PM  
 Phosphorus 2.9 03/17/2019 05:17 AM  
  
Lab Results Component Value Date/Time AST (SGOT) 37 03/13/2019 10:25 AM  
 Alk. phosphatase 61 03/13/2019 10:25 AM  
 Protein, total 6.7 03/18/2019 03:43 AM  
 Albumin 3.5 03/13/2019 10:25 AM  
 Globulin 4.0 03/13/2019 10:25 AM  
 
Lab Results Component Value Date/Time INR 1.0 03/13/2019 10:45 AM  
 Prothrombin time 12.5 03/13/2019 10:45 AM  
 aPTT 42.6 (H) 03/22/2019 04:35 AM  
  
No results found for: IRON, FE, TIBC, IBCT, PSAT, FERR No results found for: PH, PCO2, PO2 No components found for: [place] Lab Results Component Value Date/Time  CK 56 03/16/2019 04:15 AM  
 CK - MB 1.4 03/16/2019 04:15 AM  
  
 
   
 
 Total time: 70 minutes Counseling / coordination time, spent as noted above: 60 minutes > 50% counseling / coordination: patient, daughter, RN and Dr. Corey Reed Prolonged service was provided for  []30 min   []75 min in face to face time in the presence of the patient, spent as noted above. Time Start:  
Time End:  
Note: this can only be billed with 64231 (initial) or 30045 (follow up). If multiple start / stop times, list each separately.

## 2019-03-22 NOTE — TELEPHONE ENCOUNTER
Patients daughter Damaris Stokes calling states patient is at [facility] right now but wanted to know if it was any way Dr. Seble Grossman could get him transferred to [facility]. Daughter would like a call back  
 
pls advise

## 2019-03-22 NOTE — PROGRESS NOTES
Palliative Medicine Pt does does not have an Advance Directive on file in EMR. Primary Decision Maker (Health Care Agent): Rukhsana Fraser Relationship to patient:daughter Phone number:[phone] Named in a scanned document X Legal Next of Kin Guardian ACP documents you current have include: 
Advance Directive or Living Will Durable Do Not Resuscitate Physician Orders for Scope of Treatment (POST) Medical Power of [employer] Other GOALS OF CARE:  Pt is a full code/full care. His discharge plan will likely be to go to SNF for PT/OT/ST. He was living independently until a month ago but is unlikely to return to that level of independence given his progressive CHF and recent deterioration. Ramon Garcia, BARBI,  had  Broached goals of care and resuscitation options with Pt, and he had stated, \"let me go\" if his heart/breathing stopped. He then turned Brain Cotton and asked, \"would you want me to stay? \"   His daughter, Brain Cotton, got very upset and tearful at that point, and she asked us to leave the room so they could talk. Thank you for the Palliative Medicine consult and allowing us to participate in the care of Mr. Latosha Jacobs. Will continue to follow up with Pt and family to provide support. Becky Whelan [facility] Palliative Medicine

## 2019-03-22 NOTE — PROCEDURES
[employer] [facility] Pulmonary, Critical Care, and Sleep Medicine Name: Zi Lange MRN: [id number] : 1926 Hospital: [facility] Date: 3/22/2019 Thoracentesis Procedure Note PROCEDURE: 
THERAPEUTIC THORACENTESIS 
CPT code: without Ultrasound- 77231 CPT code: With Ultrasound imaging- 56537 INDICATION: 
Left sided PLEURAL EFFUSION 
 
ANESTHESIA: 
LOCAL ANESTHESIA WITH 1% LIDOCAINE 
 
CHEST ULTRASOUND FINDINGS: 
Ultrasound was used to image the chest and localize the moderate left pleural effusion. DESCRIPTION OF PROCEDURE: 
After obtaining informed consent and localizing the safest location for thoracentesis, the  left intercostal space was marked with a blunt, plastic needle cap in the mid scapular line. A thoracentesis kit was used to perform the procedure. The skin was prepped and draped in the usual sterile fashion. Using the previously marked location as a giude, 1% lidocaine was injected into the skin and subcutaneous tissue, as well as onto the underlying rib and inter-costal muscles, pleural fluid was aspirated to assure proper location, prior to removing the anesthesia needle. The needle and thoracentesis catheter were then introduced into the chest at the localized site over the lower rib the rib localized with the needle, and the catheter then marched over the rib into the pleural space. After aspirating fluid, the thoracentesis catheter was then placed into the chest using the needle itself as a trocar. The needle was then removed and the catheter was attached to the supplied tubing without complication. A total of 1200 cc of yellowish colored fluid, was aspirated and sent for analysis. The patient tolerated the procedure well without complications. Post procedure CXR is pending.  
Estimated blood loss: Minimal 
 
 
 
Sandeep Nugent MD

## 2019-03-22 NOTE — PROGRESS NOTES
NUTRITION Nutrition Screen RECOMMENDATIONS / PLAN:  
 
- Continue nutritional supplements: Ensure Enlive, 4x daily & Magic Cup, BID 
- Assist with meals as needed and monitor diet tolerance. - Continue RD inpatient monitoring and evaluation. NUTRITION INTERVENTIONS & DIAGNOSIS:  
 
[x] Meals/snacks: modify composition 
[x] Medical food supplement therapy: Ensure Enlive, 4x daily. [x] Collaboration and referral of nutrition care: Discussed diet consistency with Dr. Sobia Armstrong, changed to mechanical soft today to promote meal intake. Nutrition Diagnosis: Inadequate oral intake related to SOB and decreased appetite as evidenced by pt with poor meal intake since admission. Patient meets criteria for Severe Protein Calorie Malnutrition as evidenced by:  
ASPEN Malnutrition Criteria Acute Illness, Chronic Illness, or Social/Enviornmental: (P) Acute illness. ASSESSMENT:  
 
3/22: Thoracentesis today. Meal intake remains poor but daughter reports pt consuming supplements. Diet changed to pureed for hopeful improvement in meal intake, pt's daughter report pt dislikes pureed meals and diet changed to mechanical soft. NFPE completed. 3/19: S/p thoracentesis 3/17 with improvement in breathing but still reporting poor appetite and meal intake. Daughter encouraging and assisting with intake and stating pt consuming supplements. Agreeable to try softer foods with chopped meats to improve tolerance. Average po intake adequate to meet patients estimated nutritional needs:   [] Yes     [] No   [x] Unable to determine at this time Diet: DIET NUTRITIONAL SUPPLEMENTS All Meals, Breakfast, Lunch, Dinner, HS Snack; ENSURE ENLIVE 
DIET NUTRITIONAL SUPPLEMENTS Lunch, Dinner; MAGIC CUPS 
DIET DYSPHAGIA MECH ALTERED (NDD2) Food Allergies: NKFA Current Appetite:   [] Good     [] Fair     [x] Poor     [] Other: 
Appetite/meal intake prior to admission:   [] Good     [] Fair     [x] Poor x 1 week     [] Other: 
Feeding Limitations:  [] Swallowing difficulty    [x] Chewing difficulty: loose fitting dentures    [] Other: 
Current Meal Intake:  
Patient Vitals for the past 100 hrs: 
 % Diet Eaten  
03/21/19 1845 10 % 03/21/19 1330 10 % 03/21/19 0929 10 % 03/20/19 1807 10 % 03/20/19 1300 10 % 03/20/19 0900 10 % 03/18/19 2044 15 % BM: 3/20 Skin Integrity: WDL Edema:   [x] No     [] Yes Pertinent Medications: Reviewed: pepcid, lasix Recent Labs  
  03/20/19 
1237   
K 4.1 CL 98* CO2 29 * BUN 55* CREA 1.55* CA 9.0 MG 2.9* Intake/Output Summary (Last 24 hours) at 3/22/2019 1328 Last data filed at 3/22/2019 0065 Gross per 24 hour Intake 360 ml Output 650 ml Net -290 ml Anthropometrics: 
Ht Readings from Last 1 Encounters:  
03/20/19 5' 7\" (1.702 m) Last 3 Recorded Weights in this Encounter 03/20/19 0925 03/21/19 6604 03/22/19 4422 Weight: 68 kg (149 lb 14.6 oz) 68.1 kg (150 lb 3.2 oz) 69.2 kg (152 lb 9.6 oz) Body mass index is 23.9 kg/m². Weight History: Pt and family report recent fluctuations in pt's weight hx 2/2 fluid retention, recent thoracentesis, and diuresis. Weight Metrics 3/22/2019 3/12/2019 3/7/2019 3/1/2019 2/23/2019 2/12/2019 1/24/2019 Weight 152 lb 9.6 oz 149 lb 0.5 oz - 161 lb 163 lb 2.3 oz 152 lb 157 lb BMI 23.9 kg/m2 - 23.34 kg/m2 25.22 kg/m2 25.55 kg/m2 23.11 kg/m2 23.87 kg/m2 Admitting Diagnosis: Congestive heart disease ([facility] [address] [I50.9] Pertinent PMHx: CAD, hypercholesterolemia, HTN, prostate cancer Education Needs:        [x] None identified  [] Identified - Not appropriate at this time  []  Identified and addressed - refer to education log Learning Limitations:   [x] None identified  [] Identified Cultural, Moravian & ethnic food preferences:  [x] None identified    [] Identified and addressed ESTIMATED NUTRITION NEEDS:  
 
 Calories: 0813-8853 kcal (MSJx1.2-1.4) based on  [x] Actual BW: 70 kg      [] IBW Protein: 56-84 gm (0.8-1.2 gm/kg) based on  [x] Actual BW      [] IBW Fluid: 1 mL/kcal 
  
MONITORING & EVALUATION:  
 
Nutrition Goal(s): 1. Po intake of meals will meet >75% of patient estimated nutritional needs within the next 7 days. Outcome:  [] Met/Ongoing    [x]  Not Met    [] New/Initial Goal  
 
Monitoring:   [x] Food and beverage intake   [x] Diet order   [x] Nutrition-focused physical findings   [x] Treatment/therapy   [] Weight   [] Enteral nutrition intake Previous Recommendations (for follow-up assessments only):     [x]   Implemented       []   Not Implemented (RD to address)      [] No Longer Appropriate     [] No Recommendation Made Discharge Planning: cardiac diet; consistency as tolerated  
[x] Participated in care planning, discharge planning, & interdisciplinary rounds as appropriate Juana Kovacs RD Pager: [phone]

## 2019-03-23 NOTE — PROGRESS NOTES
Received in bed awake and alert. In no acute distress. Family at bedside. Vital signs stable. B/p=99/62. Pt is asymp and denies pain ,dizziness or any discomfort.

## 2019-03-23 NOTE — PROGRESS NOTES
[employer] [facility] Pulmonary, Critical Care, and Sleep Medicine Follow up patient note Name: Kennedy Rose MRN: [id number] : 1926 Hospital: [address] [place] [address] Dr Date: 3/23/2019 IMPRESSION:  
· Acute hypoxic respiratory failure in a patient with HFrEF (EF 26%). · Recurrent transudative bilateral pleural effusion secondary to HFrEF. · New right lower lung zone air space process which could be a combination of atelectasis and pleural effusion. An alveolar infiltrate concerning for pneumonia is also in the differentials but remains afebrile and WCC is not elevated. Noted primary team commenced on abx. · Hx of NSTEMi and S/P PCI to LAD on 3/11/19 and Ischemic cardiomyopathy and HFrEF · Anemia with hx of recent GI bleed. · Significant peripheral vascular disease CAD, ICA stenosis. · Hx of HTN and DLD · Hx of smoking - COPD from hx. RECOMMENDATIONS:  
· Continue supplemental oxygen to keep SpO2>90%. · I will defer the management of heart failure and volume management to the primary team.  
· Continue chest PT. · Bronchial hygiene protocol · Bronchodilators PRN 
· Steroids - not indicated. · Antibiotics - As per the primary team.  
· Aspiration precautions. · Out patient testing- PFT, 6 min walk. · Assess home Oxygen needs at discharge · OT, PT, OOB and ambulate · Healthy weight · DVT, PUD prophylaxis Subjective: This patient has been seen and evaluated at the request of Dr. Sobia Armstrong for evaluation of hypoxic respiratory failure. Patient is a 80 y.o. male with past medical history significant for asthma, ischemic cardiomyopathy (S/P PCI), GI bleed (recent) and hypertension who has presented with 1 week history of worsening SOB and leg swelling. Patient is able to provide most of the history and states that he lives alone but does get some help from his daughter. No hx of cough, fevers, phlegm production.  Complains of chest pains across the chest in the lower chest that started during the same period of time and is associated with cough or taking deep breaths. This pleuritic sounding chest pain is non radiating. Patient was admitted to hospital and his SOB was worked up and the work up included a CTA which was done on 3/13/19 and it showed bilateral GGOs and pleural effusions. Patient states that he is an ex smoker. Smoked 1ppd but quit 50 years ago. Worked for [employer], But denies any history of asbestos exposure. Interval history: 
3/23/19: 
No acute issues overnight. Feels better after the thoracentesis procedure. Remained hemodynamically stable. Afebrile. Past Medical History:  
Diagnosis Date  Asthma  Cardiac cath 04/28/2011 oLM 30%. pLAD 30%. oD1 50%. CX 90% (3 x 18 [place] DEMAR). dRCA 90%. RPLB subtotal.  RPDA 100%.  Cardiac echocardiogram 01/21/2014 EF 55-60%. Mod LVH. Gr 1 DDfx. Mild AS (mean grad 13). Mild AI. Unchanged from study of 11/30/11.  Cardiac nuclear imaging test, mod risk 01/22/2016 Intermediate risk. Medium-sized inferior, inferoseptal infarction. No ischemia. EF 54%. Nondiagnostic EKG on pharm stress test.  
 Carotid duplex 03/02/2016 Mod 50-69% bilateral ICA stenosis. Probable significant RECA stenosis. >50% stenosis of left subclavian. No significant change from study of 1/8/15.  Coronary artery disease Status post PCI with drug-eluting stent, [place] 3 x 18 mm in the proximal circumflex.  Hx of carotid artery disease ([address]  Hypercholesterolemia  Hypertension  Prostate cancer ([address] Past Surgical History:  
Procedure Laterality Date  HX CORONARY STENT PLACEMENT  4/28/11 PCI with drug-eluting stent, [place] 3 x 18 mm in the proximal circumflex (post dialated with 3.25 mm noncompliant balloon at high pressure). Prior to Admission medications Medication Sig Start Date End Date Taking? Authorizing Provider  
albuterol-ipratropium (DUO-NEB) 2.5 mg-0.5 mg/3 ml nebu 3 mL by Nebulization route every four (4) hours as needed (wheezing, sob). 3/20/19  Yes Elaine Marquez MD  
cefpodoxime (VANTIN) 200 mg tablet Take 1 Tab by mouth daily for 2 days. 3/20/19 3/22/19 Yes Elaine Marquez MD  
atorvastatin (LIPITOR) 40 mg tablet Take 1 Tab by mouth nightly. 3/12/19   John Ferrell MD  
carvedilol (COREG) 3.125 mg tablet Take 1 Tab by mouth two (2) times daily (with meals). 3/12/19   John Ferrell MD  
lisinopril (PRINIVIL, ZESTRIL) 5 mg tablet Take 1 Tab by mouth daily. 3/13/19   John Ferrell MD  
nitroglycerin (NITROSTAT) 0.4 mg SL tablet 1 Tab by SubLINGual route every five (5) minutes as needed for Chest Pain. 3/12/19   John Ferrell MD  
furosemide (LASIX) 20 mg tablet Take 1 Tab by mouth daily. 3/12/19   John Ferrell MD  
potassium chloride SR (KLOR-CON 10) 10 mEq tablet Take 1 Tab by mouth daily. 3/12/19   John Ferrell MD  
OTHER Cbc, BMP in 1 week Dx: CHF Fax results to Dr. Zahira Ruiz 3/12/19   John Ferrell MD  
budesonide-formoterol (SYMBICORT) 160-4.5 mcg/actuation HFAA Take 2 Puffs by inhalation two (2) times a day. 3/1/19   Ana Maria Daniel MD  
glycopyrrolate-formoterol (BEVESPI AEROSPHERE) 9-4.8 mcg HFAA Take 2 Inhalation by inhalation two (2) times a day. 3/1/19   Annabel Blanco MD  
clopidogrel (PLAVIX) 75 mg tab TAKE 1 TABLET EVERY DAY 2/26/19   Ana Maria Daniel MD  
omeprazole (PRILOSEC) 40 mg capsule Take 1 Cap by mouth daily. 2/23/19   Bob Lauren MD  
albuterol (PROVENTIL HFA, VENTOLIN HFA, PROAIR HFA) 90 mcg/actuation inhaler Take 2 Puffs by inhalation every four (4) hours as needed for Wheezing or Shortness of Breath. 3/2/15   AMITA Adam  
tamsulosin (FLOMAX) 0.4 mg capsule Take 1 Cap by mouth daily.  3/13/13 Chidi Conte MD  
aspirin delayed-release 81 mg tablet Take 81 mg by mouth daily. Chidi Conte MD  
 
No Known Allergies Social History Tobacco Use  Smoking status: Former Smoker Packs/day: 1.00 Years: 15.00 Pack years: 15.00 Last attempt to quit: 1960 Years since quittin.8  Smokeless tobacco: Never Used Substance Use Topics  Alcohol use: No  
  
History reviewed. No pertinent family history. @DBLINKMRCHARTING(EPT,3940)@ Immunization status: up to date and documented. Current Facility-Administered Medications Medication Dose Route Frequency  furosemide (LASIX) tablet 20 mg  20 mg Oral DAILY  heparin (porcine) injection 5,000 Units  5,000 Units SubCUTAneous Q8H  
 albuterol-ipratropium (DUO-NEB) 2.5 MG-0.5 MG/3 ML  3 mL Nebulization TID RT  
 atorvastatin (LIPITOR) tablet 40 mg  40 mg Oral QHS  aspirin chewable tablet 81 mg  81 mg Oral DAILY  budesonide-formoterol (SYMBICORT) 160-4.5 mcg/actuation HFA inhaler 2 Puff  2 Puff Inhalation BID RT  
 tamsulosin (FLOMAX) capsule 0.4 mg  0.4 mg Oral DAILY  famotidine (PEPCID) tablet 20 mg  20 mg Oral DAILY  sodium chloride (NS) flush 5-40 mL  5-40 mL IntraVENous Q8H  
 clopidogrel (PLAVIX) tablet 75 mg  75 mg Oral DAILY Review of Systems: 
Pertinent items are noted in HPI. Objective: 
Vital Signs:   
Visit Vitals /64 (BP 1 Location: Right arm, BP Patient Position: At rest) Pulse 94 Temp 97.5 °F (36.4 °C) Resp 20 Ht 5' 7\" (1.702 m) Wt 68 kg (149 lb 14.6 oz) SpO2 99% BMI 23.48 kg/m² O2 Device: Nasal cannula O2 Flow Rate (L/min): 2 l/min Temp (24hrs), Av.1 °F (36.2 °C), Min:96.5 °F (35.8 °C), Max:97.7 °F (36.5 °C) Intake/Output:  
Last shift:      No intake/output data recorded. Last 3 shifts:  1901 -  0700 In: 360 [P.O.:360] Out: 900 [Urine:850] Intake/Output Summary (Last 24 hours) at 3/23/2019 7427 Last data filed at 3/23/2019 2384 Gross per 24 hour Intake 240 ml Output 650 ml Net -410 ml Physical Exam:  
General:  Alert, cooperative, no distress, appears stated age. Head:  Normocephalic, without obvious abnormality, atraumatic. Eyes:  Conjunctivae/corneas clear. PERRL, EOMs intact. Nose: Nares normal. Septum midline. Mucosa normal. No drainage or sinus tenderness. Throat: Lips, mucosa, and tongue normal. Teeth and gums normal.  
Neck: Supple, symmetrical, trachea midline, no adenopathy, thyroid: no enlargment/tenderness/nodules, no carotid bruit and no JVD. Back:   Symmetric, no curvature. ROM normal.  
Lungs:   Improved air entry on the left side. Right sided few occasional crackles. Chest wall:  No tenderness or deformity. Heart:  Regular rate and rhythm, S1, S2 normal, no murmur, click, rub or gallop. Abdomen:   Soft, non-tender. Bowel sounds normal. No masses,  No organomegaly. Extremities: Extremities normal, atraumatic, no cyanosis or edema. Pulses: 2+ and symmetric all extremities. Skin: Skin color, texture, turgor normal. No rashes or lesions Lymph nodes: Cervical, supraclavicular, and axillary nodes normal.  
Neurologic: Grossly nonfocal  
 
Data review:  
 
Recent Results (from the past 24 hour(s)) PTT Collection Time: 03/23/19  5:56 AM  
Result Value Ref Range aPTT 44.0 (H) 23.0 - 36.4 SEC  
CBC W/O DIFF Collection Time: 03/23/19  5:56 AM  
Result Value Ref Range WBC 8.2 4.6 - 13.2 K/uL  
 RBC 3.10 (L) 4.70 - 5.50 M/uL HGB 8.4 (L) 13.0 - 16.0 g/dL HCT 26.9 (L) 36.0 - 48.0 % MCV 86.8 74.0 - 97.0 FL  
 MCH 27.1 24.0 - 34.0 PG  
 MCHC 31.2 31.0 - 37.0 g/dL  
 RDW 15.8 (H) 11.6 - 14.5 % PLATELET 798 767 - 579 K/uL MPV 10.1 9.2 - 11.8 FL Imaging: 
I have personally reviewed the patients radiographs and have reviewed the reports: XR Results (most recent): 
Results from Hospital Encounter encounter on 03/13/19 XR CHEST PORT  
 Narrative EXAM:  Chest Portable. INDICATION:  Status post thoracentesis. COMPARISON:  03/18/19 TECHNIQUE:  Portable AP chest study FINDINGS:  
  
- Hazy opacities are noted in the right mid to lower lung zones, with increasing 
gradient towards the right lung base obscuring the right diaphragmatic outline. 
- No pneumothorax is detected. - Retrocardiac opacification. Subtle hazy opacity in the left lung base with 
minimal left costophrenic angle blunting and obscuration of the left 
hemidiaphragmatic outline. 
- Borderline prominent cardiac silhouette. Impression IMPRESSION: 
 
1. No convincing evidence of pneumothorax. 2.  Right-sided moderate pleural effusion with associated atelectatic changes 
vs. less likely infiltrate. 3.  Small left pleural effusion with atelectatic changes vs. infiltrate. CT Results (most recent): 
Results from Hospital Encounter encounter on 03/13/19 CTA CHEST W OR W WO CONT Narrative EXAM:  CTA Chest with Contrast (CT Pulmonary Study for PE). CLINICAL INDICATION:  Acute shortness of breath which began this morning. Recent hospitalization for shortness of breath and fatigue. COMPARISON:  None. TECHNIQUE:   
 
- Helical volumetric sections of the chest are obtained with CT pulmonary 
angiogram protocol. Subsequently, sagittal and coronal multiplanar 
reconstruction images are obtained. Maximum intensity projection images are 
generated to better delineate the pulmonary vasculature, differentiate between 
the pulmonary arteries and veins and to increase sensitivity to pulmonary 
emboli.   
- IV contrast dose of 100 mL Isovue-370. 
- Radiation dose optimization techniques are utilized as appropriate to the 
exam, with combination of automated exposure control, adjustment of the mA 
and/or kV according to patient's size (Including appropriate matching for site-specific examinations), or use of iterative reconstruction technique. FINDINGS:  
 
Pulmonary Arteries: - Unusual pulmonary artery opacification pattern is noted. The upper lung zone 
pulmonary artery opacification is complete whereas the lower lung zone pulmonary 
arteries appear suboptimally opacified particularly in the left lower lobe basal 
segments. Less pronounced atypical poor opacification of the basal segments is 
also noted on the right side. Instead of sharply demarcated distinct filling 
defects, is involved segments demonstrate gradual fading of the pulmonary artery 
luminal opacification. This makes it difficult to exclude small filling defects 
in the segmental and subsegmental arteries at the left lower lobe basal region. The presence of atelectatic changes with pleural effusion may contribute to this 
atypical pulmonary artery opacification pattern. Alternatively, intrinsic 
intracardiac abnormalities may be responsible for the atypical pulmonary artery 
opacification. Notably, the left inferior pulmonary vein opacification is much 
less than that of the right pulmonary venous opacification. 
- Within the technical limitations of the study, no definite suspicious filling 
defects are identified in the main trunk, right and left pulmonary artery, lobar 
and interlobar arteries, segmental and intrasegmental arteries. Basal region 
distal segmental artery and subsegmental arteries are poorly opacified. Aorta:  No opacification of the aorta. The caliber of the ascending aorta and 
aortic arch appear within normal limits. The mid descending aortography 
demonstrates atypical aorta contour, with apparent smoothly outlined bulging 
contour along the medial aspect (axial #). Further inferiorly, the aorta 
appears to dilated slightly, measuring up to about 3.6 cm in diameter and with 
crescentic outpouching. At the level of the diaphragmatic hiatus, the right lateral wall of the aorta demonstrates crescentic appearance with lamellated 
pattern. 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 Recommend dedicated CTA of 
the aorta on routine basis for further delineation of this finding. Lung, Pleura, Airways: - Moderate to large bilateral pleural effusion. Associated atelectatic changes. - Consolidative opacities in the left upper lobe and in the right lower lobe. Groundglass opacities in the right upper lobe and right middle lobe. Possible 
consolidative opacities vs. atelectatic changes in the left lower lobe. Mediastinum, Radha:  No definite mediastinal adenopathy. No definite hilar 
adenopathy. Base of Neck, Axillae:  Unremarkable. Abdomen:  Multiple gallstones. Skeletal Structures:  No acute findings. Impression IMPRESSION: 
      
1. Unusual pulmonary artery opacification with gradual decrease in luminal 
opacification in the basal regions, more pronounced on the left side than on the 
right. Possibly related to presence of moderate to large pleural effusion with 
associated atelectatic changes as the explanation for this pattern vs. intrinsic 
intracardiac abnormality. No convincing evidence of pulmonary embolism is 
detected within the technical limitations of the study. 2. Moderate to large pleural effusion bilaterally with associated passive 
atelectatic changes. Multiple foci of consolidative opacities in both lungs. Most pronounced in the left upper lobe. Query developing pneumonia vs. 
pulmonary hemorrhage vs. nonspecific inflammatory process. 3.  Atypical appearance of the descending with smooth crescentic outpouchings 
and presence of lamellated calcifications along the aortic wall. Perhaps 
related to chronic dissection. With current technique, i.e. lack of IV contrast 
in the aorta, detailed analysis is difficult. Recommend CTA of the aorta for 
further delineation. 4.  Cholelithiasis. 03/13/19 ECHO ADULT FOLLOW-UP OR LIMITED 03/15/2019 3/15/2019 Narrative · Left ventricular severely decreased systolic function. Estimated left  
ventricular ejection fraction is 26 - 30%. Visually measured ejection  
fraction. Left ventricular mild concentric hypertrophy. Abnormal left  
ventricular wall motion as described on the wall scoring diagram below. · Severe tricuspid valve regurgitation is present. Moderate to severe  
pulmonary hypertension is present. · Moderate mitral valve regurgitation.  
   
  Signed by: MD José Manuel Nava MD

## 2019-03-23 NOTE — PROGRESS NOTES
1046 - Pt out of room with his daughter at this time. Will follow up later in day. 56 - Pt and daughter speaking with cardiology MD and staff at this time. Will f/u per pt's schedule.

## 2019-03-23 NOTE — ROUTINE PROCESS
Bedside and Verbal shift change report given to [address] [place] [address] (oncoming nurse) by Gloria Beyer RN (offgoing nurse). Report included the following information SBAR, Kardex and MAR.

## 2019-03-23 NOTE — PROGRESS NOTES
[facility]  -  Progress Note Patient: Kelli Record MRN: [id number]  SSN: [id number] YOB: 1926  Age: 80 y.o. Sex: male Admit Date: 3/13/2019 Assessment:  
 
Hospital Problems  Date Reviewed: 3/1/2019 Codes Class Noted POA Congestive heart disease ([facility] ICD-10-CM: I50.9 ICD-9-CM: 428.0  3/13/2019 Unknown  
   
  
 
-Acute on chronic systolic heart failure. Patient with moderate bilateral pleural effusions which have been now drained with serial thoracentesis. His breathing is better though he is still quite weak and deconditioned. -NSTEMI, suspect secondary in setting of acute illness, troponin peak 7.58, s/p PCI to LAD 3/11/2019 with occluded mid RCA which appeared to be chronic medically managed, ECG with known LBBB, denies CP. 
-Ischemic cardiomyopathy with EF 26-30% on echo this admission, unchanged from last admission. Conservatively managing in setting of advanced age. -CAD s/p LAD PCI with DEMAR 3/11/2019 with previous PCI to LCx 2011. Cath 3/11/2019 (Occluded mid RCA which appears to be chronic. There is faint collateral from the left coronary system, Left main artery with ostial 30-40%, LAD mid focal severely calcified tortuous 99% stenosis with CARLENE II flow, Circumflex coronary artery with diffuse mild 20-30% stenosis throughout). Discharged on ASA, plavix, statin, BB. 
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 H&H relatively stable 
-Mild kidney injury. 
-Peripheral vascular disease.  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  Because of this his blood pressure should be checked in his right arm. 
-Hypertension. Low normal on admission  
-Dyslipidemia. On statin. -H/o intermittent LBBB, now appears persistent. 
-Asthma, former smoker.  
-Advanced age, full code, but independent, lives alone and cares for self, significant decline in functional capacity follow recent hospital stays. 
  
 Primary cardiologist Dr. Rell Oconnell. Plan:  
 
Would continue low-dose furosemide as long as his renal function will tolerate. We will check a repeat BMP tomorrow. At this point the patient is requesting physical rehab if able. He is not ready for palliative care or hospice at this juncture. I discussed at length with the patient's family and have encouraged him to get him up out of bed and sitting in the chair to improve his conditioning. However, he is unable to tolerate even minimal PT and OT, he may not be a candidate for acute rehab. Subjective:  
Still weak but shortness of breath has improved Objective:  
  
Patient Vitals for the past 8 hrs: 
 Temp Pulse Resp BP SpO2  
03/23/19 0809 97.5 °F (36.4 °C) 94 20 102/64 99 % Patient Vitals for the past 96 hrs: 
 Weight  
03/23/19 0400 68 kg (149 lb 14.6 oz)  
03/22/19 0432 69.2 kg (152 lb 9.6 oz)  
03/21/19 0412 68.1 kg (150 lb 3.2 oz)  
03/20/19 0546 68 kg (149 lb 14.6 oz) Intake/Output Summary (Last 24 hours) at 3/23/2019 1518 Last data filed at 3/23/2019 8383 Gross per 24 hour Intake 240 ml Output 650 ml Net -410 ml Physical Exam: 
General:  fatigued, no distress, appears stated age Neck:  no JVD Lungs:  diminished breath sounds R base, L base Heart:  regular rate and rhythm, systolic murmur: systolic ejection 2/6, RUSB Abdomen:  abdomen is soft without significant tenderness, masses, organomegaly or guarding Extremities:  extremities normal, atraumatic, no cyanosis or edema Data Review:  
 
Labs: Results:  
   
Chemistry No results for input(s): GLU, NA, K, CL, CO2, BUN, CREA, CA, MG, PHOS, AGAP, BUCR, TBIL, GPT, AP, TP, ALB, GLOB, AGRAT in the last 72 hours. CBC w/Diff Recent Labs  
  03/23/19 
0556 03/22/19 
0435 WBC 8.2 8.5  
RBC 3.10* 3.11* HGB 8.4* 8.3* HCT 26.9* 27.0*  
 305 Cardiac Enzymes No results found for: CPK, CK, CKMMB, CKMB, RCK3, CKMBT, CKNDX, CKND1, CHRISTINE, TROPT, TROIQ, NGHIA, TROPT, TNIPOC, BNP, BNPP Coagulation Recent Labs  
  03/23/19 
0556 03/22/19 
0435 APTT 44.0* 42.6* Lipid Panel Lab Results Component Value Date/Time Cholesterol, total 87 03/08/2019 05:28 AM  
 HDL Cholesterol 44 03/08/2019 05:28 AM  
 LDL, calculated 30 03/08/2019 05:28 AM  
 VLDL, calculated 13 03/08/2019 05:28 AM  
 Triglyceride 65 03/08/2019 05:28 AM  
 CHOL/HDL Ratio 2.0 03/08/2019 05:28 AM  
  
BNP No results found for: BNP, BNPP, XBNPT Liver Enzymes No results for input(s): TP, ALB, TBIL, AP, SGOT, GPT in the last 72 hours. No lab exists for component: DBIL Digoxin Thyroid Studies Lab Results Component Value Date/Time  TSH 1.22 03/07/2019 05:29 AM

## 2019-03-24 NOTE — PROGRESS NOTES
Problem: Self Care Deficits Care Plan (Adult) Goal: *Acute Goals and Plan of Care (Insert Text) Description Occupational Therapy Goals Initiated 3/18/2019 within 7 day(s). 1.  Patient will perform upper body dressing/bathing with supervision/set-up 2. Patient will perform grooming seated at EOB with F balance x 5 min with supervision/set-up. 3.  Patient will perform sit>stand with CGA in order to prep for participation with ADLs. 4.  Patient will participate in upper extremity therapeutic exercise/activities with modified independence for 5-8 minutes. 5.  Patient will utilize energy conservation techniques during functional activities with min verbal cues. Outcome: Progressing Towards Goal 
 OCCUPATIONAL THERAPY TREATMENT Patient: Pepe Aguilar (17 y.o. male) Date: 3/24/2019 Diagnosis: [facility] [I50.9] <principal problem not specified> Precautions: Fall Chart, occupational therapy assessment, plan of care, and goals were reviewed. ASSESSMENT: 
Pt presented supine in bed with HOB elevated, asleep. Pt was very easily aroused and agreeable to skilled OT services this date. Pt maneuvered to seated EOB. Please see below for levels of assistance required for functional mobility. Pt participated in BUE strengthening exercises to facilitate increased independence and safety with ADLs and functional tasks. Pt performed exercises seated EOB to challenge dynamic sitting balance and to increase core strength. Pt performed shoulder press, chest press, scapular retraction, shoulder shrug and bicep curls (x10 each exercise). Pt required extended rest breaks between exercises 2/2 fatigue. Educated pt on PLB technique due to pt repeated breathing in through mouth. Pt receptive to education however required several cues for proper technique. Pt returned to supine in bed. Pt left comfortable in bed and call bell within reach. Progression toward goals: ?          Improving appropriately and progressing toward goals ? Improving slowly and progressing toward goals ? Not making progress toward goals and plan of care will be adjusted PLAN: 
Patient continues to benefit from skilled intervention to address the above impairments. Continue treatment per established plan of care. Discharge Recommendations:  Aric Kim Further Equipment Recommendations for Discharge:  N/A  
  
 
SUBJECTIVE:  
Patient stated ? I used to work on cars. ? OBJECTIVE DATA SUMMARY:  
Cognitive/Behavioral Status: 
Neurologic State: Alert Orientation Level: Oriented X4 Cognition: Follows commands Safety/Judgement: Fall prevention, Home safety Functional Mobility and Transfers for ADLs: 
Bed Mobility: 
  
Supine to Sit: Minimum assistance Sit to Supine: Minimum assistance Balance: 
Sitting: Impaired Sitting - Static: Good (unsupported) Sitting - Dynamic: Fair (occasional) ADL Intervention: 
Basic ADL Type of Bath: Full Therapeutic Exercises:  
Pt participated in BUE strengthening exercises to facilitate increased independence and safety with ADLs and functional tasks. Pt performed exercises seated EOB to challenge dynamic sitting balance and to increase core strength. Pt performed shoulder press, chest press, scapular retraction, shoulder shrug and bicep curls (x10 each exercise). Pt required extended rest breaks between exercises 2/2 fatigue. Pain: 
Pt reports 0/10 pain or discomfort prior to treatment. Pt reports 0/10 pain or discomfort post treatment. Activity Tolerance:   
Fair Please refer to the flowsheet for vital signs taken during this treatment. After treatment:  
?  Patient left in no apparent distress sitting up in chair ? Patient left in no apparent distress in bed 
? Call bell left within reach ? Nursing notified ? Caregiver present ? Bed alarm activated Leslie Barajas BRAY/L 
 Time Calculation: 16 mins

## 2019-03-24 NOTE — PROGRESS NOTES
[facility] Pulmonary, Critical Care, and Sleep Medicine Follow up patient note Name: Joe Steen MRN: [id number] : 1926 Hospital: [facility] Date: 3/24/2019 IMPRESSION:  
· Acute hypoxic respiratory failure in a patient with HFrEF (EF 26%). · Recurrent transudative bilateral pleural effusion secondary to HFrEF. S/P thoracentesis X3 with temporary relief of symptoms. · New right lower lung zone air space process which could be a combination of atelectasis and pleural effusion. An alveolar infiltrate concerning for pneumonia is also in the differentials but remains afebrile and WCC is not elevated. Noted primary team commenced on abx. · NEL in the setting of HFrEF - possibly cardiorenal syndrome. · Hx of NSTEMi and S/P PCI to LAD on 3/11/19 and Ischemic cardiomyopathy and HFrEF · Anemia with hx of recent GI bleed. · Significant peripheral vascular disease CAD, ICA stenosis. · Hx of HTN and DLD · Hx of smoking - COPD from hx. RECOMMENDATIONS:  
· Continue supplemental oxygen to keep SpO2>90%. · I will defer the management of heart failure and volume management to the primary team.  
· Continue chest PT.  
· Please repeat CXR tomorrow 3/25/19. · Given improvement in his symptoms with thoracentesis, we can consider referring him to IR for pleurX catheter placement for palliative purpose. · Bronchial hygiene protocol · Bronchodilators PRN 
· Steroids - not indicated. · Monitor renal functions and urine OP closely. · Antibiotics - As per the primary team.  
· Aspiration precautions. · Out patient testing- PFT, 6 min walk. · Assess home Oxygen needs at discharge · OT, PT, OOB and ambulate · Healthy weight · DVT, PUD prophylaxis Subjective: This patient has been seen and evaluated at the request of Dr. Elvia Santana for evaluation of hypoxic respiratory failure.  Patient is a 80 y.o. male with past medical history significant for asthma, ischemic cardiomyopathy (S/P PCI), GI bleed (recent) and hypertension who has presented with 1 week history of worsening SOB and leg swelling. Patient is able to provide most of the history and states that he lives alone but does get some help from his daughter. No hx of cough, fevers, phlegm production. Complains of chest pains across the chest in the lower chest that started during the same period of time and is associated with cough or taking deep breaths. This pleuritic sounding chest pain is non radiating. Patient was admitted to hospital and his SOB was worked up and the work up included a CTA which was done on 3/13/19 and it showed bilateral GGOs and pleural effusions. Patient states that he is an ex smoker. Smoked 1ppd but quit 50 years ago. Worked for [employer], But denies any history of asbestos exposure. Interval history: 
3/24/19: 
No acute issues overnight. Feels better. Remained afebrile and hemodynamically stable. Worsening renal functions. Past Medical History:  
Diagnosis Date  Asthma  Cardiac cath 04/28/2011 oLM 30%. pLAD 30%. oD1 50%. CX 90% (3 x 18 [place] DEMAR). dRCA 90%. RPLB subtotal.  RPDA 100%.  Cardiac echocardiogram 01/21/2014 EF 55-60%. Mod LVH. Gr 1 DDfx. Mild AS (mean grad 13). Mild AI. Unchanged from study of 11/30/11.  Cardiac nuclear imaging test, mod risk 01/22/2016 Intermediate risk. Medium-sized inferior, inferoseptal infarction. No ischemia. EF 54%. Nondiagnostic EKG on pharm stress test.  
 Carotid duplex 03/02/2016 Mod 50-69% bilateral ICA stenosis. Probable significant RECA stenosis. >50% stenosis of left subclavian. No significant change from study of 1/8/15.  Coronary artery disease Status post PCI with drug-eluting stent, [place] 3 x 18 mm in the proximal circumflex.  Hx of carotid artery disease ([address]  Hypercholesterolemia  Hypertension  Prostate cancer ([facility] Past Surgical History:  
Procedure Laterality Date  HX CORONARY STENT PLACEMENT  4/28/11 PCI with drug-eluting stent, [place] 3 x 18 mm in the proximal circumflex (post dialated with 3.25 mm noncompliant balloon at high pressure). Prior to Admission medications Medication Sig Start Date End Date Taking? Authorizing Provider  
albuterol-ipratropium (DUO-NEB) 2.5 mg-0.5 mg/3 ml nebu 3 mL by Nebulization route every four (4) hours as needed (wheezing, sob). 3/20/19  Yes Delilah Coffman MD  
atorvastatin (LIPITOR) 40 mg tablet Take 1 Tab by mouth nightly. 3/12/19   Belen Julio MD  
carvedilol (COREG) 3.125 mg tablet Take 1 Tab by mouth two (2) times daily (with meals). 3/12/19   Belen Julio MD  
lisinopril (PRINIVIL, ZESTRIL) 5 mg tablet Take 1 Tab by mouth daily. 3/13/19   Belen Julio MD  
nitroglycerin (NITROSTAT) 0.4 mg SL tablet 1 Tab by SubLINGual route every five (5) minutes as needed for Chest Pain. 3/12/19   Belen Julio MD  
furosemide (LASIX) 20 mg tablet Take 1 Tab by mouth daily. 3/12/19   Belen Julio MD  
potassium chloride SR (KLOR-CON 10) 10 mEq tablet Take 1 Tab by mouth daily. 3/12/19   Belen Julio MD  
OTHER Cbc, BMP in 1 week Dx: CHF Fax results to Dr. Nanda Clay 3/12/19   Belen Julio MD  
budesonide-formoterol (SYMBICORT) 160-4.5 mcg/actuation HFAA Take 2 Puffs by inhalation two (2) times a day. 3/1/19   Briseyda Daniel MD  
glycopyrrolate-formoterol (BEVESPI AEROSPHERE) 9-4.8 mcg HFAA Take 2 Inhalation by inhalation two (2) times a day. 3/1/19   Ganesh Pritchett MD  
clopidogrel (PLAVIX) 75 mg tab TAKE 1 TABLET EVERY DAY 2/26/19   Briseyda Daniel MD  
omeprazole (PRILOSEC) 40 mg capsule Take 1 Cap by mouth daily.  2/23/19   Emil An MD  
albuterol (PROVENTIL HFA, VENTOLIN HFA, PROAIR HFA) 90 mcg/actuation inhaler Take 2 Puffs by inhalation every four (4) hours as needed for Wheezing or Shortness of Breath. 3/2/15   AMITA Garnica  
tamsulosin (FLOMAX) 0.4 mg capsule Take 1 Cap by mouth daily. 3/13/13   Micheal Arteaga MD  
aspirin delayed-release 81 mg tablet Take 81 mg by mouth daily. Micheal Arteaga MD  
 
No Known Allergies Social History Tobacco Use  Smoking status: Former Smoker Packs/day: 1.00 Years: 15.00 Pack years: 15.00 Last attempt to quit: 1960 Years since quittin.8  Smokeless tobacco: Never Used Substance Use Topics  Alcohol use: No  
  
History reviewed. No pertinent family history. @DBLINKMRCHARTING(EPT,7354)@ Immunization status: up to date and documented. Current Facility-Administered Medications Medication Dose Route Frequency  sodium chloride 3% hypertonic nebulizer soln  3 mL Nebulization BID RT  
 furosemide (LASIX) tablet 20 mg  20 mg Oral DAILY  heparin (porcine) injection 5,000 Units  5,000 Units SubCUTAneous Q8H  
 albuterol-ipratropium (DUO-NEB) 2.5 MG-0.5 MG/3 ML  3 mL Nebulization TID RT  
 atorvastatin (LIPITOR) tablet 40 mg  40 mg Oral QHS  aspirin chewable tablet 81 mg  81 mg Oral DAILY  budesonide-formoterol (SYMBICORT) 160-4.5 mcg/actuation HFA inhaler 2 Puff  2 Puff Inhalation BID RT  
 tamsulosin (FLOMAX) capsule 0.4 mg  0.4 mg Oral DAILY  famotidine (PEPCID) tablet 20 mg  20 mg Oral DAILY  sodium chloride (NS) flush 5-40 mL  5-40 mL IntraVENous Q8H  
 clopidogrel (PLAVIX) tablet 75 mg  75 mg Oral DAILY Review of Systems: 
Pertinent items are noted in HPI. Objective: 
Vital Signs:   
Visit Vitals BP 94/57 (BP 1 Location: Right arm) Pulse 76 Temp 97.3 °F (36.3 °C) Resp 24 Ht 5' 7\" (1.702 m) Wt 69 kg (152 lb 1.9 oz) SpO2 98% BMI 23.82 kg/m² O2 Device: Nasal cannula O2 Flow Rate (L/min): 2 l/min Temp (24hrs), Av °F (36.1 °C), Min:96.6 °F (35.9 °C), Max:97.3 °F (36.3 °C) Intake/Output:  
Last shift:      No intake/output data recorded. Last 3 shifts: 03/22 1901 - 03/24 0700 In: 480 [P.O.:480] Out: 1200 [RFVEF:7290] Intake/Output Summary (Last 24 hours) at 3/24/2019 1011 Last data filed at 3/24/2019 3305 Gross per 24 hour Intake 240 ml Output 550 ml Net -310 ml Physical Exam:  
General:  Alert, cooperative, no distress, appears stated age. Head:  Normocephalic, without obvious abnormality, atraumatic. Eyes:  Conjunctivae/corneas clear. PERRL, EOMs intact. Nose: Nares normal. Septum midline. Mucosa normal. No drainage or sinus tenderness. Throat: Lips, mucosa, and tongue normal. Teeth and gums normal.  
Neck: Supple, symmetrical, trachea midline, no adenopathy, thyroid: no enlargment/tenderness/nodules, no carotid bruit and no JVD. Back:   Symmetric, no curvature. ROM normal.  
Lungs:   Coarse breath sounds with occasional crackles. Chest wall:  No tenderness or deformity. Heart:  Regular rate and rhythm, S1, S2 normal, no murmur, click, rub or gallop. Abdomen:   Soft, non-tender. Bowel sounds normal. No masses,  No organomegaly. Extremities: Extremities normal, atraumatic, no cyanosis or edema. Pulses: 2+ and symmetric all extremities. Skin: Skin color, texture, turgor normal. No rashes or lesions Lymph nodes: Cervical, supraclavicular, and axillary nodes normal.  
Neurologic: Grossly nonfocal  
 
Data review:  
 
Recent Results (from the past 24 hour(s)) PTT Collection Time: 03/24/19  5:09 AM  
Result Value Ref Range aPTT 43.2 (H) 23.0 - 36.4 SEC METABOLIC PANEL, BASIC Collection Time: 03/24/19  5:09 AM  
Result Value Ref Range Sodium 136 136 - 145 mmol/L Potassium 3.6 3.5 - 5.5 mmol/L Chloride 99 (L) 100 - 108 mmol/L  
 CO2 31 21 - 32 mmol/L Anion gap 6 3.0 - 18 mmol/L Glucose 110 (H) 74 - 99 mg/dL BUN 54 (H) 7.0 - 18 MG/DL  Creatinine 1.63 (H) 0.6 - 1.3 MG/DL  
 BUN/Creatinine ratio 33 (H) 12 - 20 GFR est AA 48 (L) >60 ml/min/1.73m2 GFR est non-AA 40 (L) >60 ml/min/1.73m2 Calcium 9.2 8.5 - 10.1 MG/DL Imaging: 
I have personally reviewed the patients radiographs and have reviewed the reports: XR Results (most recent): 
Results from Hospital Encounter encounter on 03/13/19 XR CHEST PORT Narrative EXAM:  Chest Portable. INDICATION:  Status post thoracentesis. COMPARISON:  03/18/19 TECHNIQUE:  Portable AP chest study FINDINGS:  
  
- Hazy opacities are noted in the right mid to lower lung zones, with increasing 
gradient towards the right lung base obscuring the right diaphragmatic outline. 
- No pneumothorax is detected. - Retrocardiac opacification. Subtle hazy opacity in the left lung base with 
minimal left costophrenic angle blunting and obscuration of the left 
hemidiaphragmatic outline. 
- Borderline prominent cardiac silhouette. Impression IMPRESSION: 
 
1. No convincing evidence of pneumothorax. 2.  Right-sided moderate pleural effusion with associated atelectatic changes 
vs. less likely infiltrate. 3.  Small left pleural effusion with atelectatic changes vs. infiltrate. CT Results (most recent): 
Results from Hospital Encounter encounter on 03/13/19 CTA CHEST W OR W WO CONT Narrative EXAM:  CTA Chest with Contrast (CT Pulmonary Study for PE). CLINICAL INDICATION:  Acute shortness of breath which began this morning. Recent hospitalization for shortness of breath and fatigue. COMPARISON:  None. TECHNIQUE:   
 
- Helical volumetric sections of the chest are obtained with CT pulmonary 
angiogram protocol. Subsequently, sagittal and coronal multiplanar 
reconstruction images are obtained. Maximum intensity projection images are 
generated to better delineate the pulmonary vasculature, differentiate between the pulmonary arteries and veins and to increase sensitivity to pulmonary 
emboli.   
- IV contrast dose of 100 mL Isovue-370. 
- Radiation dose optimization techniques are utilized as appropriate to the 
exam, with combination of automated exposure control, adjustment of the mA 
and/or kV according to patient's size (Including appropriate matching for 
site-specific examinations), or use of iterative reconstruction technique. FINDINGS:  
 
Pulmonary Arteries: - Unusual pulmonary artery opacification pattern is noted. The upper lung zone 
pulmonary artery opacification is complete whereas the lower lung zone pulmonary 
arteries appear suboptimally opacified particularly in the left lower lobe basal 
segments. Less pronounced atypical poor opacification of the basal segments is 
also noted on the right side. Instead of sharply demarcated distinct filling 
defects, is involved segments demonstrate gradual fading of the pulmonary artery 
luminal opacification. This makes it difficult to exclude small filling defects 
in the segmental and subsegmental arteries at the left lower lobe basal region. The presence of atelectatic changes with pleural effusion may contribute to this 
atypical pulmonary artery opacification pattern. Alternatively, intrinsic 
intracardiac abnormalities may be responsible for the atypical pulmonary artery 
opacification. Notably, the left inferior pulmonary vein opacification is much 
less than that of the right pulmonary venous opacification. 
- Within the technical limitations of the study, no definite suspicious filling 
defects are identified in the main trunk, right and left pulmonary artery, lobar 
and interlobar arteries, segmental and intrasegmental arteries. Basal region 
distal segmental artery and subsegmental arteries are poorly opacified. Aorta:  No opacification of the aorta.   The caliber of the ascending aorta and 
 aortic arch appear within normal limits. The mid descending aortography 
demonstrates atypical aorta contour, with apparent smoothly outlined bulging 
contour along the medial aspect (axial #). Further inferiorly, the aorta 
appears to dilated slightly, measuring up to about 3.6 cm in diameter and with 
crescentic outpouching. At the level of the diaphragmatic hiatus, the right 
lateral wall of the aorta demonstrates crescentic appearance with lamellated 
pattern. 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 Recommend dedicated CTA of 
the aorta on routine basis for further delineation of this finding. Lung, Pleura, Airways: - Moderate to large bilateral pleural effusion. Associated atelectatic changes. - Consolidative opacities in the left upper lobe and in the right lower lobe. Groundglass opacities in the right upper lobe and right middle lobe. Possible 
consolidative opacities vs. atelectatic changes in the left lower lobe. Mediastinum, Radha:  No definite mediastinal adenopathy. No definite hilar 
adenopathy. Base of Neck, Axillae:  Unremarkable. Abdomen:  Multiple gallstones. Skeletal Structures:  No acute findings. Impression IMPRESSION: 
      
1. Unusual pulmonary artery opacification with gradual decrease in luminal 
opacification in the basal regions, more pronounced on the left side than on the 
right. Possibly related to presence of moderate to large pleural effusion with 
associated atelectatic changes as the explanation for this pattern vs. intrinsic 
intracardiac abnormality. No convincing evidence of pulmonary embolism is 
detected within the technical limitations of the study. 2. Moderate to large pleural effusion bilaterally with associated passive 
atelectatic changes. Multiple foci of consolidative opacities in both lungs. Most pronounced in the left upper lobe.   Query developing pneumonia vs. 
 pulmonary hemorrhage vs. nonspecific inflammatory process. 3.  Atypical appearance of the descending with smooth crescentic outpouchings 
and presence of lamellated calcifications along the aortic wall. Perhaps 
related to chronic dissection. With current technique, i.e. lack of IV contrast 
in the aorta, detailed analysis is difficult. Recommend CTA of the aorta for 
further delineation. 4.  Cholelithiasis. 03/13/19 ECHO ADULT FOLLOW-UP OR LIMITED 03/15/2019 3/15/2019 Narrative · Left ventricular severely decreased systolic function. Estimated left  
ventricular ejection fraction is 26 - 30%. Visually measured ejection  
fraction. Left ventricular mild concentric hypertrophy. Abnormal left  
ventricular wall motion as described on the wall scoring diagram below. · Severe tricuspid valve regurgitation is present. Moderate to severe  
pulmonary hypertension is present. · Moderate mitral valve regurgitation.  
   
  Signed by: Phillip Rubenstein, MD Sanjuanita Galeazzi, MD

## 2019-03-24 NOTE — PROGRESS NOTES
[facility] Hospitalist Group Progress Note Patient: Kelli Record Age: 80 y.o. : 1926 MR#: [id number] SSN: [id number] Date/Time: 3/23/2019 9:34 AM 
 
Subjective/24-hour events:  
 
Seen w/ daughter at bedside. Pt somnolent, appears comfortable. Assessment:  
-Acute hypoxic respiratory failure required BIPAP, likely due to heart failure pulm input noted, s/p thoracentesis bilatera (3/18 and 3/17, 3/22), with some improvement in respiratory status initially but has with previous throas gone back to having c/o SOB. Limited therapeutic options at this stage, due to no cardiovascular reserve unable to diurese effectively. 
-Low bp in setting of heart failure and iatrogenic component -improved off bp meds 
-Abnormal chest imaging, concern for possible pneumonia, ? Aspiration? Passed swallow eval, s/p cefepime. Due to lack of supporting evidence for aspiration and due to recent rx w/cefepime will not treat w/ abx. 
-Pleural effusions s/p thoracentesis, follow for evidence of re accumulation if pt has worsening respiratory status, may need to consider pleurex cath. -Acute on chronic systolic CHF on lasix -Ischemic cardiomyopathy, EF 26-30% -Recent NSTEMI s/p PCI/stent placement trop downtrending 
-CAD 
-Dyslipidemia 
-CKD 2-3 
-Chronic anemia 
-Valvular heart disease 
-Advanced age 
-Probable COPD 
-Tobacco use hx Overall pt has had significant decline most likely due to his advanced systolic heart failure. I believe at this stage it would be futile to attempt aggressive measures including aggressive diuresis as pt has low cardiovascular reserve. Despite measures taken thus far he has not had significant improvement and in fact he seems to have declined further over the past few days. Both pt and daughter wish to maintain his full code status. S/p palliative team eval, however pt's daughter states she does not want to continue w/ such interventions. Plan: -monitor hemodynamics, will dc antihypertensives all together for now due to hypotension Cont to diurese if bp allows Continue nebs/symbicort/usual pulmonary hygiene. . 
Diuresis per cardiology -  Will hold bb and spironolactone due to low bp Dispo:PT/OT evals. Based on current clinical status, will likely not be able to return home alone at discharge. Will discuss w/ cm to continue working on SNF dc Monday. Case discussed with:  [x]Patient  []Family  [x]Nursing  []Case Management DVT Prophylaxis:  []Lovenox  []Hep SQ  []SCDs  []Coumadin   [x]On Heparin gtt Objective:  
VS:  
Visit Vitals BP 97/56 Pulse 84 Temp 96.9 °F (36.1 °C) Resp 18 Ht 5' 7\" (1.702 m) Wt 68 kg (149 lb 14.6 oz) SpO2 97% BMI 23.48 kg/m² Tmax/24hrs: Temp (24hrs), Av °F (36.1 °C), Min:96.5 °F (35.8 °C), Max:97.5 °F (36.4 °C) Intake/Output Summary (Last 24 hours) at 3/23/2019 2355 Last data filed at 3/23/2019 9678 Gross per 24 hour Intake  Output 500 ml Net -500 ml General: alert, awake, in NAD. Nontoxic-appearing. Cardiovascular:  S1, S2. Tachycardic. Pulmonary:  No active wheezing clear breath sounds. GI:  Abdomen soft, NTTP. Extremities:  Warm, no ischemia. Neuro:  Awake and alert, motor nofocal. 
 
Labs:   
Recent Results (from the past 24 hour(s)) PTT Collection Time: 19  5:56 AM  
Result Value Ref Range aPTT 44.0 (H) 23.0 - 36.4 SEC  
CBC W/O DIFF Collection Time: 19  5:56 AM  
Result Value Ref Range WBC 8.2 4.6 - 13.2 K/uL  
 RBC 3.10 (L) 4.70 - 5.50 M/uL HGB 8.4 (L) 13.0 - 16.0 g/dL HCT 26.9 (L) 36.0 - 48.0 % MCV 86.8 74.0 - 97.0 FL  
 MCH 27.1 24.0 - 34.0 PG  
 MCHC 31.2 31.0 - 37.0 g/dL  
 RDW 15.8 (H) 11.6 - 14.5 % PLATELET 689 692 - 070 K/uL MPV 10.1 9.2 - 11.8 FL Signed By: Petra Lakhani MD   
 2019 9:34 AM

## 2019-03-24 NOTE — ROUTINE PROCESS
Bedside and Verbal shift change report given to RIDGE RN (oncoming nurse) by Noni Burdick RN (offgoing nurse). Report included the following information SBAR, Kardex and MAR.

## 2019-03-24 NOTE — PROGRESS NOTES
Received in bed awake and alert. In no acute distress. S/p CPT per resp. Denies discomfort. Family in earlier.

## 2019-03-24 NOTE — PROGRESS NOTES
Consent for blood signed by Agnes Brumfield, daughter, to receive blood. This Rn explained the  Indications for blood as well as the risk and benefits. Chandan Campos (this RN) and Tommy Mcallister RN witnessed consent. Dr. Stokes Settler notified that blood consent is on the chart ready for him to sign.

## 2019-03-24 NOTE — PROGRESS NOTES
[facility] - Progress Note Admit Date: 3/13/2019 Assessment:  
 
Hospital Problems  Date Reviewed: 3/1/2019 Codes Class Noted POA Congestive heart disease ([facility] [address] ICD-10-CM: I50.9 ICD-9-CM: 428.0  3/13/2019 Unknown  
   
  
 
  
-Acute on chronic systolic heart failure. Patient with moderate bilateral pleural effusions which have been now drained with serial thoracentesis. His breathing is better though he is still quite weak and deconditioned. -NSTEMI, suspect secondary in setting of acute illness, troponin peak 7.58, s/p PCI to LAD 3/11/2019 with occluded mid RCA which appeared to be chronic medically managed, ECG with known LBBB, denies CP. 
-Ischemic cardiomyopathy with EF 26-30% on echo this admission, unchanged from last admission. Conservatively managing in setting of advanced age. -CAD s/p LAD PCI with DEMAR 3/11/2019 with previous PCI to LCx 2011. Cath 3/11/2019 (Occluded mid RCA which appears to be chronic. There is faint collateral from the left coronary system, Left main artery with ostial 30-40%, LAD mid focal severely calcified tortuous 99% stenosis with CARLENE II flow, Circumflex coronary artery with diffuse mild 20-30% stenosis throughout). Discharged on ASA, plavix, statin, BB. 
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 H&H relatively stable 
-Mild kidney injury. 
-Peripheral vascular disease.  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  Because of this his blood pressure should be checked in his right arm. 
-Hypertension. Low normal on admission  
-Dyslipidemia. On statin. -H/o intermittent LBBB, now appears persistent. 
-Asthma, former smoker. -Advanced age, full code, but independent, lives alone and cares for self, significant decline in functional capacity follow recent hospital stays. 
  
Primary cardiologist Dr. Jaz Enrique. 
  
Plan:  
 
- Continue PO diuresis, monitor renal function 
- Continue ASA, Plavix, Statin - Continue therapy as tolerated - Possible Pleur-x catheter per pulmonary for palliative purposes - Acute rehab vs SNF for disposition Subjective:  
 
Patient in no acute distress, denies any CV symptoms Objective:  
  
Patient Vitals for the past 8 hrs: 
 Temp Pulse Resp BP SpO2  
03/24/19 0742 97.3 °F (36.3 °C) 76 24 94/57 98 % 03/24/19 0400 96.6 °F (35.9 °C) 89 18 96/55 98 % Patient Vitals for the past 96 hrs: 
 Weight  
03/24/19 0400 152 lb 1.9 oz (69 kg) 03/23/19 0400 149 lb 14.6 oz (68 kg) 03/22/19 0432 152 lb 9.6 oz (69.2 kg) 03/21/19 0412 150 lb 3.2 oz (68.1 kg) Intake/Output Summary (Last 24 hours) at 3/24/2019 1041 Last data filed at 3/24/2019 5010 Gross per 24 hour Intake 240 ml Output 550 ml Net -310 ml Physical Exam: 
General:  alert, cooperative, appears older than stated age Neck:  nontender Lungs:  rhonchi R anterior, L anterior Heart:  regular rate and rhythm, S1, S2 normal, no murmur, click, rub or gallop Abdomen:  abdomen is soft without significant tenderness, masses, organomegaly or guarding Extremities:  extremities normal, atraumatic, no cyanosis or edema Data Review:  
 
Labs: Results:  
   
Chemistry Recent Labs  
  03/24/19 
4920 *   
K 3.6 CL 99* CO2 31 BUN 54* CREA 1.63* CA 9.2 AGAP 6  
BUCR 33* CBC w/Diff Recent Labs  
  03/23/19 
0556 03/22/19 
0435 WBC 8.2 8.5  
RBC 3.10* 3.11* HGB 8.4* 8.3* HCT 26.9* 27.0*  
 305 Cardiac Enzymes No results found for: CPK, CK, CKMMB, CKMB, RCK3, CKMBT, CKNDX, CKND1, CHRISTINE, TROPT, TROIQ, NGHIA, TROPT, TNIPOC, BNP, BNPP Coagulation Recent Labs  
  03/24/19 
0509 03/23/19 
0556 APTT 43.2* 44.0* Lipid Panel Lab Results Component Value Date/Time  Cholesterol, total 87 03/08/2019 05:28 AM  
 HDL Cholesterol 44 03/08/2019 05:28 AM  
 LDL, calculated 30 03/08/2019 05:28 AM  
 VLDL, calculated 13 03/08/2019 05:28 AM  
 Triglyceride 65 03/08/2019 05:28 AM  
 CHOL/HDL Ratio 2.0 03/08/2019 05:28 AM  
  
BNP No results found for: BNP, BNPP, XBNPT Liver Enzymes No results for input(s): TP, ALB, TBIL, AP, SGOT, GPT in the last 72 hours. No lab exists for component: DBIL Digoxin Thyroid Studies Lab Results Component Value Date/Time TSH 1.22 03/07/2019 05:29 AM  
    
 
Signed By: Naeem Mehta NP March 24, 2019

## 2019-03-24 NOTE — PROGRESS NOTES
[facility] Progress Note Patient: Kateryna Tovar Age: 80 y.o. : 1926 MR#: [id number] SSN: [id number] Date/Time: 3/24/2019 9:34 AM 
 
Subjective/24-hour events:  
 
Pt is seen and examined. Alert and oriented but very weak. Daughter at bed side. In agreement with blood transfusion trial.  
 
Assessment:  
-Acute hypoxic respiratory failure required BIPAP, likely due to heart failure pulm input noted, s/p thoracentesis bilatera (3/18 and 3/17, 3/22), with some improvement in respiratory status initially but has with previous throas gone back to having c/o SOB. Limited therapeutic options at this stage, due to no cardiovascular reserve unable to diurese effectively. 
-Low bp in setting of heart failure and iatrogenic component -improved off bp meds 
-Abnormal chest imaging, concern for possible pneumonia, ? Aspiration? Passed swallow eval, s/p cefepime. Due to lack of supporting evidence for aspiration and due to recent rx w/cefepime will not treat w/ abx. 
-Pleural effusions s/p thoracentesis, follow for evidence of re accumulation if pt has worsening respiratory status, may need to consider pleurex cath. -Acute on chronic systolic CHF on lasix -Ischemic cardiomyopathy, EF 26-30% -Recent NSTEMI s/p PCI/stent placement trop downtrending 
-CAD 
-Dyslipidemia 
-CKD 2-3 
-Chronic anemia 
-Valvular heart disease 
-Advanced age 
-Probable COPD 
-Tobacco use hx Plan: 
 
Symptomatic anemia with Hypotension not allowing for diuresis >> will transfuse one unit of PRBCs One dose of Venofer One dose of lasix IV after transfusion if BP allows Will split Lasix PO 20 mg to 10 mg BID  
-monitor hemodynamics, will dc antihypertensives all together for now due to hypotension Cont to diurese if bp allows Continue nebs/symbicort/usual pulmonary hygiene.   . 
Diuresis per cardiology -  Will hold bb and spironolactone due to low bp 
 Dispo:PT/OT evals. Based on current clinical status, will likely not be able to return home alone at discharge. Will discuss w/ cm to continue working on SNF dc Monday. Poor prognosis considering , age, severe HF and hypotension Case discussed with:  [x]Patient  []Family  [x]Nursing  []Case Management DVT Prophylaxis:  []Lovenox  []Hep SQ  []SCDs  []Coumadin   [x]On Heparin gtt Objective:  
VS:  
Visit Vitals BP (!) 86/50 (BP 1 Location: Left arm, BP Patient Position: Head of bed elevated (Comment degrees)) Comment (BP Patient Position): 34 degrees Pulse 92 Temp 96.4 °F (35.8 °C) Resp 22 Ht 5' 7\" (1.702 m) Wt 69 kg (152 lb 1.9 oz) SpO2 99% BMI 23.82 kg/m² Tmax/24hrs: Temp (24hrs), Av °F (36.1 °C), Min:96 °F (35.6 °C), Max:98 °F (36.7 °C) Intake/Output Summary (Last 24 hours) at 3/24/2019 1911 Last data filed at 3/24/2019 4913 Gross per 24 hour Intake 240 ml Output 550 ml Net -310 ml General: alert, awake, in NAD. Nontoxic-appearing. Cardiovascular:  S1, S2. Tachycardic. Pulmonary:  No active wheezing clear breath sounds. GI:  Abdomen soft, NTTP. Extremities:  Warm, no ischemia. Neuro:  Awake and alert, motor nofocal. 
 
Labs:   
Recent Results (from the past 24 hour(s)) PTT Collection Time: 19  5:09 AM  
Result Value Ref Range aPTT 43.2 (H) 23.0 - 36.4 SEC METABOLIC PANEL, BASIC Collection Time: 19  5:09 AM  
Result Value Ref Range Sodium 136 136 - 145 mmol/L Potassium 3.6 3.5 - 5.5 mmol/L Chloride 99 (L) 100 - 108 mmol/L  
 CO2 31 21 - 32 mmol/L Anion gap 6 3.0 - 18 mmol/L Glucose 110 (H) 74 - 99 mg/dL BUN 54 (H) 7.0 - 18 MG/DL Creatinine 1.63 (H) 0.6 - 1.3 MG/DL  
 BUN/Creatinine ratio 33 (H) 12 - 20 GFR est AA 48 (L) >60 ml/min/1.73m2 GFR est non-AA 40 (L) >60 ml/min/1.73m2 Calcium 9.2 8.5 - 10.1 MG/DL  
TYPE + CROSSMATCH Collection Time: 19  3:04 PM  
Result Value Ref Range Crossmatch Expiration 03/27/2019 ABO/Rh(D) A NEGATIVE Antibody screen NEG   
 CALLED TO: MICHELINE BRAYAN ON 03/24/2019 @1724 BY [facility] Unit number [id number] Blood component type RC LR,1 Unit division 00 Status of unit ISSUED Crossmatch result Compatible Signed By: Tom Serrano MD   
 March 24, 2019 9:34 AM

## 2019-03-25 NOTE — PROGRESS NOTES
[facility]  -  Progress Note Patient: Giovanny An MRN: [id number]  SSN: [id number] YOB: 1926  Age: 80 y.o. Sex: male Admit Date: 3/13/2019 Assessment:  
 
-Acute on chronic systolic heart failure.  Patient with moderate- large bilateral pleural effusions which have been now drained with serial thoracentesis.  His breathing is better though he is still quite weak and deconditioned. Repeat CXR today continued to show moderate bilateral effusions which were shown some improvement since his last thoracentesis. -NSTEMI, suspect secondary in setting of acute illness, troponin peak 7.58, s/p PCI to LAD 3/11/2019 with occluded mid RCA which appeared to be chronic medically managed, ECG with known LBBB, denies CP. 
-Ischemic cardiomyopathy with EF 26-30% on echo this admission, unchanged from last admission. Conservatively managing in setting of advanced age. -CAD s/p LAD PCI with DEMAR 3/11/2019 with previous PCI to LCx 2011. Cath 3/11/2019 (Occluded mid RCA which appears to be chronic. There is faint collateral from the left coronary system, Left main artery with ostial 30-40%, LAD mid focal severely calcified tortuous 99% stenosis with CARLENE II flow, Circumflex coronary artery with diffuse mild 20-30% stenosis throughout). Discharged on ASA, plavix, statin, BB. 
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   H&H relatively stable 
-Mild kidney injury. 
-Peripheral vascular disease.  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  Because of this his blood pressure should be checked in his right arm. 
-Hypertension. Low normal on admission  
-Dyslipidemia. On statin. -H/o intermittent LBBB, now appears persistent. 
-Asthma, former smoker. -Advanced age, full code, but independent, lives alone and cares for self, significant decline in functional capacity follow recent hospital stays. 
  
Primary cardiologist Dr. Breanna Hale. Plan: Continue low-dose diuretics as his blood pressure will allow. Increase activity as tolerated. Daily OT and PT required. He will likely need skilled nursing facility upon discharge. Subjective:  
 
Shortness of breath unchanged. Weakness unchanged. Objective:  
  
Patient Vitals for the past 8 hrs: 
 Temp Pulse Resp BP SpO2  
03/25/19 0855 98.4 °F (36.9 °C) 91 18 91/52 96 % 03/25/19 C/ Jason Sawyer 33 91/62  Patient Vitals for the past 96 hrs: 
 Weight  
03/25/19 0351 67.2 kg (148 lb 3.2 oz)  
03/24/19 0400 69 kg (152 lb 1.9 oz)  
03/23/19 0400 68 kg (149 lb 14.6 oz)  
03/22/19 0432 69.2 kg (152 lb 9.6 oz) Intake/Output Summary (Last 24 hours) at 3/25/2019 1433 Last data filed at 3/25/2019 1777 Gross per 24 hour Intake 790 ml Output 400 ml Net 390 ml Physical Exam: 
General:  fatigued, no distress, appears stated age Neck:  no JVD Lungs:  diminished breath sounds R base, L base Heart:  regular rate and rhythm Abdomen:  abdomen is soft without significant tenderness, masses, organomegaly or guarding Extremities:  extremities normal, atraumatic, no cyanosis or edema Data Review:  
 
Labs: Results:  
   
Chemistry Recent Labs  
  03/24/19 
7473 *   
K 3.6 CL 99* CO2 31 BUN 54* CREA 1.63* CA 9.2 AGAP 6  
BUCR 33* CBC w/Diff Recent Labs  
  03/25/19 
0614 03/23/19 
0556 WBC 6.6 8.2  
RBC 3.28* 3.10* HGB 8.9* 8.4* HCT 28.5* 26.9*  
 311 Cardiac Enzymes No results found for: CPK, CK, CKMMB, CKMB, RCK3, CKMBT, CKNDX, CKND1, CHRISTINE, TROPT, TROIQ, NGHIA, TROPT, TNIPOC, BNP, BNPP Coagulation Recent Labs  
  03/25/19 
8083 03/24/19 
4424 APTT 39.4* 43.2* Lipid Panel Lab Results Component Value Date/Time  Cholesterol, total 87 03/08/2019 05:28 AM  
 HDL Cholesterol 44 03/08/2019 05:28 AM  
 LDL, calculated 30 03/08/2019 05:28 AM  
 VLDL, calculated 13 03/08/2019 05:28 AM  
 Triglyceride 65 03/08/2019 05:28 AM  
 CHOL/HDL Ratio 2.0 03/08/2019 05:28 AM  
  
BNP No results found for: BNP, BNPP, XBNPT Liver Enzymes No results for input(s): TP, ALB, TBIL, AP, SGOT, GPT in the last 72 hours. No lab exists for component: DBIL Digoxin Thyroid Studies Lab Results Component Value Date/Time  TSH 1.22 03/07/2019 05:29 AM

## 2019-03-25 NOTE — PROGRESS NOTES
Problem: Mobility Impaired (Adult and Pediatric) Goal: *Acute Goals and Plan of Care (Insert Text) Description Physical Therapy Goals Initiated 3/18/2019, updated 3/25/19 and to be accomplished within 7 days. 1.  Patient will complete all bed mobility with modified independence in order to prepare for EOB/OOB activity. 2.  Patient will perform sit <> stand with supervision/set-up in order to prepare for OOB/gait activity. 3.  Patient will perform bed to chair transfers with supervision/set-up in order to promote mobility and encourage seated activity to progress towards their prior level of function. 4.  Patient will ambulate 100 feet with supervision/set-up using LRAD in order to prepare for safe negotiation of their environment. 5.  Patient will ascend/descend 3 steps with S handrail use with supervision/set-up in order to prepare for safe exit/entry into their home environment. 3/25/2019 2947 by Cheko Solano PT Outcome: Progressing Towards Goal 
 
PHYSICAL THERAPY RE-EVALUATION AND TREATMENT Patient: Zi Lange (77 y.o. male) Date: 3/25/2019 Diagnosis: [facility] [I50.9] <principal problem not specified> Precautions: Fall OBJECTIVE/ASSESSMENT: 
Pt was cleared by nursing to participate in therapy. Pt was received semi-reclined in bed, daughter at bedside, agreeable to physical therapy treatment. Pt was able to perform SLR in supine on B LEs prior to getting to EOB. Pt transferred supine-sit with CGA. He displayed good static sitting balance but required a rest break after supine-sit to \"catch his breath. \" Pt sat on EOB x 4-5 minutes, displaying good balance. Educated pt on pursed lip breathing. Pt then transferred sit-stand with CGA and ambulated x 10 feet from bed-chair with rolling walker, CGA.   Pt sat in chair and then scooted himself back in chair with no supervision and cues for technique. Instructed pt to stay in chair for an hour and to start increasing time spent out of bed daily. Pt and daughter verbalized understanding. Education:  
?         Bed mobility ? Transfers ? Ambulation ? Assistive device management ? Stairs ? Body mechanics ? Position change ? Activity pacing/energy conservation ? Other:  
Hospital course since last evaluation and reason for re-evaluation: Pt has been treated for CHF while in hospital. 
Patient's progression toward goals since last assessment: Pt has made some progress toward goals since last assessment, including demonstrating improved sitting balance and requiring less assistance for transfers. PLAN: 
Goals have been updated based on progression since last assessment. Patient continues to benefit from skilled intervention to address the above impairments. Continue to follow the patient 1-2 times per day/4-7 days per week to address goals. Planned Interventions: 
?     Bed Mobility Training          ? Neuromuscular Re-Education ? Transfer Training                ? Orthotic/Prosthetic Training 
? Gait Training                       ? Modalities ? Therapeutic Exercises       ? Edema Management/Control ? Therapeutic Activities         ? Patient and Family Training/Education ? Other (comment): 
Discharge Recommendations: Rehab vs [facility] Further Equipment Recommendations for Discharge: N/A  
 
SUBJECTIVE:  
Patient stated ? i'll get in a wheelchair and you'll have to nicole me down.? OBJECTIVE DATA SUMMARY:  
Critical Behavior: 
Neurologic State: Alert, Appropriate for age Orientation Level: Oriented X4 Cognition: Follows commands Safety/Judgement: Fall prevention, Home safety Functional Mobility Training: 
Bed Mobility: 
Supine to Sit: Contact guard assistance Transfers: Sit to Stand: Contact guard assistance Stand to Sit: Contact guard assistance Balance: 
Sitting: Intact Sitting - Static: Good (unsupported) Sitting - Dynamic: Good (unsupported) Standing: Impaired; With support Standing - Static: Fair Standing - Dynamic : Fair Ambulation/Gait Training: 
Distance (ft): 10 Feet (ft) Assistive Device: Walker, rolling Ambulation - Level of Assistance: Contact guard assistance Gait Abnormalities: Decreased step clearance Base of Support: Narrowed Speed/Janee: Slow Therapeutic Exercises: SLR Pain: 
Pre session: 0 Post session: 0 Activity Tolerance:  
fair Please refer to the flowsheet for vital signs taken during this treatment. After treatment:  
?  Patient left in no apparent distress sitting up in chair ? Patient left in no apparent distress in bed 
? Call bell left within reach ? Nursing notified ? Caregiver present ? Bed alarm activated Jonas Cornejo PT Time Calculation: 25 mins

## 2019-03-25 NOTE — PROGRESS NOTES
Aerobika and gonzalez coughing therapy started: 
 
Was effective in loosening mucus and allowing patient to get to the back of his throat. Patient encouraged to not to swallow mucus. Therapy was stopped due to patient becoming SOB due to the coughing induced by PEP. Respiratory will continue to work with patient Q4 wa. 
 
 
 03/25/19 1600 Oxygen Therapy O2 Device Nasal cannula O2 Flow Rate (L/min) 2 l/min PEP Therapy Details Number of Breaths 4 
(all patient could tolerate with coughing, due to SOB) PEP Device Oscillating positive expiratory device Remonia Hung) Pre-Treatment Breathing Pattern Regular Cough Non-productive; Congested;Strong Breath Sounds Bilateral Other (comment) (clear upper lobes but crackles in the bases) Post-Treatment Breathing Pattern Regular Cough Non-productive; Congested;Strong Breath Sounds Bilateral Other (comment) (clear upper lobes but crackles in the bases) Treatment Tolerance Patient tolerated

## 2019-03-25 NOTE — PROGRESS NOTES
Bedside shift change report given to Lenin Keys (oncoming nurse) by Howard Alvarez (offgoing nurse). Report included the following information SBAR, Kardex, Intake/Output, Recent Results, Med Rec Status and Cardiac Rhythm NSR- .

## 2019-03-25 NOTE — CONSULTS
Interventional Radiology 
  
Consult received from Dr. Reuben Wilson for evaluation of bilateral pleural effusions. History of heart failure and recurrent pleural effusions bilaterally. R thora x 1 (3/18) and left thora x 2 (3/17 and 3/22). Most recent CXR from this AM with moderate right pleural effusion and enlarging left pleural effusion.  
  
Case and images reviewed by Dr. Garret Degroot. Will plan for image-guided right thoracentesis on 3/26 and left thoracentesis on 3/27. Heparin SQ to be held 6 hours prior. Orders placed. Consent to be obtained prior to procedure.  
  
Full consult note to follow.  
  
Ellie Oliveira PA-C [address]

## 2019-03-25 NOTE — CONSULTS
Consult Note Patient: Eliza Mortensen               Sex: male          DOA: 3/13/2019 YOB: 1926      Age:  80 y.o.        LOS:  LOS: 12 days HPI:  
 
Eliza Mortensen is a 80 y.o. male with a history of CAD, valvular heart disease, CKD and tobacco use who has been seen in evaluation of bilateral pleural effusions at the request of Dr. Sanket Hinojosa. Patient is currently being managed for acute hypoxic respiratory failure requiring BiPAP, acute on chronic CHF causing hypotension, bilateral pleural effusions and recent NSTEMI s/p PCI/stent. He had one R thoracentesis on 3/18 with the removal of 1.2L and two left thoracenteses on 3/17 and 3/22 with the removal of 600mL and 1.2L, respectively. After each thoracentesis, he had significant improvements in breathing for a short period of time. Most recent CXR on 3/25 showed a moderate right pleural effusion and enlarging small left pleural effusion. Pulmonology following. Past Medical History:  
Diagnosis Date  Asthma  Cardiac cath 04/28/2011 oLM 30%. pLAD 30%. oD1 50%. CX 90% (3 x 18 [employer] DEMAR). dRCA 90%. RPLB subtotal.  RPDA 100%.  Cardiac echocardiogram 01/21/2014 EF 55-60%. Mod LVH. Gr 1 DDfx. Mild AS (mean grad 13). Mild AI. Unchanged from study of 11/30/11.  Cardiac nuclear imaging test, mod risk 01/22/2016 Intermediate risk. Medium-sized inferior, inferoseptal infarction. No ischemia. EF 54%. Nondiagnostic EKG on pharm stress test.  
 Carotid duplex 03/02/2016 Mod 50-69% bilateral ICA stenosis. Probable significant RECA stenosis. >50% stenosis of left subclavian. No significant change from study of 1/8/15.  Coronary artery disease Status post PCI with drug-eluting stent, [employer] 3 x 18 mm in the proximal circumflex.  Hx of carotid artery disease ([facility] [address]  Hypercholesterolemia  Hypertension  Prostate cancer ([facility] [address] Past Surgical History: Procedure Laterality Date  HX CORONARY STENT PLACEMENT  11 PCI with drug-eluting stent, [place] 3 x 18 mm in the proximal circumflex (post dialated with 3.25 mm noncompliant balloon at high pressure). History reviewed. No pertinent family history. Social History Socioeconomic History  Marital status: SINGLE Spouse name: Not on file  Number of children: Not on file  Years of education: Not on file  Highest education level: Not on file Tobacco Use  Smoking status: Former Smoker Packs/day: 1.00 Years: 15.00 Pack years: 15.00 Last attempt to quit: 1960 Years since quittin.8  Smokeless tobacco: Never Used Substance and Sexual Activity  Alcohol use: No  
 Drug use: No  
 Sexual activity: Not Currently Prior to Admission medications Medication Sig Start Date End Date Taking? Authorizing Provider  
albuterol-ipratropium (DUO-NEB) 2.5 mg-0.5 mg/3 ml nebu 3 mL by Nebulization route every four (4) hours as needed (wheezing, sob). 3/20/19  Yes Mejia Noble MD  
atorvastatin (LIPITOR) 40 mg tablet Take 1 Tab by mouth nightly. 3/12/19   Jonathan Moreno MD  
carvedilol (COREG) 3.125 mg tablet Take 1 Tab by mouth two (2) times daily (with meals). 3/12/19   Jonathan Moreno MD  
lisinopril (PRINIVIL, ZESTRIL) 5 mg tablet Take 1 Tab by mouth daily. 3/13/19   Jonathan Moreno MD  
nitroglycerin (NITROSTAT) 0.4 mg SL tablet 1 Tab by SubLINGual route every five (5) minutes as needed for Chest Pain. 3/12/19   Jonathan Moreno MD  
furosemide (LASIX) 20 mg tablet Take 1 Tab by mouth daily. 3/12/19   Jonathan Moreno MD  
potassium chloride SR (KLOR-CON 10) 10 mEq tablet Take 1 Tab by mouth daily. 3/12/19   Jonathan Moreno MD  
OTHER Cbc, BMP in 1 week Dx: CHF Fax results to Dr. Vivienne Ruiz 3/12/19   Jonathan Moreno MD  
 budesonide-formoterol (SYMBICORT) 160-4.5 mcg/actuation HFAA Take 2 Puffs by inhalation two (2) times a day. 3/1/19   Lilia Daniel MD  
glycopyrrolate-formoterol (BEVESPI AEROSPHERE) 9-4.8 mcg HFAA Take 2 Inhalation by inhalation two (2) times a day. 3/1/19   Johnny Mary MD  
clopidogrel (PLAVIX) 75 mg tab TAKE 1 TABLET EVERY DAY 2/26/19   Lilia Daniel MD  
omeprazole (PRILOSEC) 40 mg capsule Take 1 Cap by mouth daily. 2/23/19   Maren Collado MD  
albuterol (PROVENTIL HFA, VENTOLIN HFA, PROAIR HFA) 90 mcg/actuation inhaler Take 2 Puffs by inhalation every four (4) hours as needed for Wheezing or Shortness of Breath. 3/2/15   Merlinda Hof, PA  
tamsulosin (FLOMAX) 0.4 mg capsule Take 1 Cap by mouth daily. 3/13/13   Johnny Mary MD  
aspirin delayed-release 81 mg tablet Take 81 mg by mouth daily. Johnny Mary MD  
 
 
No Known Allergies Review of Systems Pertinent items are noted in the History of Present Illness. Physical Exam:  
  
Visit Vitals BP 91/52 (BP 1 Location: Right arm, BP Patient Position: At rest) Pulse 91 Temp 98.4 °F (36.9 °C) Resp 18 Ht 5' 7\" (1.702 m) Wt 67.2 kg (148 lb 3.2 oz) SpO2 96% BMI 23.21 kg/m² Physical Exam: 
Constitutional: Sitting in the bedside chair. Alert. Oriented. Cooperative. Respiratory: Increased respiratory effort. Cardiovascular: Regular rate. Gastrointestinal: Soft, NT, ND Labs Reviewed: 
CMP: No results found for: NA, K, CL, CO2, AGAP, GLU, BUN, CREA, GFRAA, GFRNA, CA, MG, PHOS, ALB, TBIL, TP, ALB, GLOB, AGRAT, SGOT, ALT, GPT 
CBC:  
Lab Results Component Value Date/Time WBC 6.6 03/25/2019 06:14 AM  
 HGB 8.9 (L) 03/25/2019 06:14 AM  
 HCT 28.5 (L) 03/25/2019 06:14 AM  
  03/25/2019 06:14 AM  
 
COAGS:  
Lab Results Component Value Date/Time APTT 39.4 (H) 03/25/2019 06:14 AM  
 
 
Assessment Active Problems: 
  Congestive heart disease ([facility] [address] (3/13/2019) Omar Elliott is a 80 y.o. male with acute on chronic heart failure, acute hypoxic respiratory failure and recurrent bilateral pleural effusions. Plan Case and images reviewed by Dr. Conner Rob. Discussions held between Dr. Conner Rob and Dr. Roshni Adam as well as Dr. Roshni Adam and myself regarding treatments options and they include thoracentesis bilaterally, image-guided pleur-x catheter placement (if in hospice care as discussed by Pulmonology team) or no intervention. Given that he has only had one large volume thoracentesis bilaterally, it is reasonable to demonstrate recurrence and improvemet with three large-volume thoracenteses. Although his breathing status and shortness of breath have improved after prior drainage, it is likely that his severe CHF plays the most dominant role in respiratory status. Will plan for image-guided right thoracentesis today and left thoracenteis on Wednesday due to limited respiratory reserve and risk of flash pulmonary edema. No need to remain NPO. Heparin SQ to be held after midnight prior to each procedure. Consent to be obtained from patient's daughter prior to procedures.

## 2019-03-25 NOTE — PROGRESS NOTES
Problem: Mobility Impaired (Adult and Pediatric) Goal: *Acute Goals and Plan of Care (Insert Text) Description Physical Therapy Goals Initiated 3/18/2019, updated 3/25/19 and to be accomplished within 7 days. 1.  Patient will complete all bed mobility with modified independence in order to prepare for EOB/OOB activity. 2.  Patient will perform sit <> stand with supervision/set-up in order to prepare for OOB/gait activity. 3.  Patient will perform bed to chair transfers with supervision/set-up in order to promote mobility and encourage seated activity to progress towards their prior level of function. 4.  Patient will ambulate 100 feet with supervision/set-up using LRAD in order to prepare for safe negotiation of their environment. 5.  Patient will ascend/descend 3 steps with S handrail use with supervision/set-up in order to prepare for safe exit/entry into their home environment.   
 
   
Outcome: Progressing Towards Goal

## 2019-03-25 NOTE — PROGRESS NOTES
Respiratory Care Assessment for Bronchial hygiene or Lung Expansion Therapy Patient  Ann Messina     80 y.o.   male     3/25/2019  4:11 PM 
Patient Active Problem List  
Diagnosis Code  Asthma J45.909  Prostate cancer ([facility] [address] C61  Hypercholesterolemia E78.00  Angina, class I ([facility] [address] I20.9  Left bundle branch block I44.7  Coronary artery disease I25.10  Hypertension I11.9  Dyslipidemia E78.5  Carotid artery disease ([facility]) I77.9  Aortic valve stenosis I35.0  Anemia D64.9  
 NSTEMI (non-ST elevated myocardial infarction) ([facility]) I21.4  Congestive heart disease ([facility]) I50.9 ABG: 
Date:3/25/2019 No results found for: PH, PHI, PCO2, PCO2I, PO2, PO2I, HCO3, HCO3I, FIO2, FIO2I Chest X-ray: 
Date:3/25/2019 Results from Hospital Encounter encounter on 03/13/19 XR CHEST PA LAT Narrative History: Evaluate for reaccumulation of pleural fluid. COMPARISON: 3/22/2019 Frontal and lateral views of the chest. 
 
FINDINGS: 
 
Chronic osseous findings without acute osseous abnormality. Stable cardiac silhouette. Atherosclerosis noted. Linear opacity is seen overlying the right upper lung zone on the frontal 
projection. There is also a tubular-like structure overlying the right 
hemithorax on the frontal view. Correlate clinically. Moderate right pleural effusion with adjacent airspace consolidation grossly 
similar to prior examination. Probable enlarging small left layering pleural 
effusion. Bilateral interstitial opacities persist. 
  
 Impression IMPRESSION: 
 
Right pleural effusion with adjacent airspace consolidation stable. Persistent 
bilateral interstitial opacities. Probable enlarging small left pleural effusion. Tubular structure overlying the right hemithorax on the frontal projection. Correlate clinically. Please see report for additional findings and details. Lab Test: 
Date:3/25/2019 WBC:  
Lab Results Component Value Date/Time WBC 6.6 03/25/2019 06:14 AM  
HGB:  
Lab Results Component Value Date/Time HGB 8.9 (L) 03/25/2019 06:14 AM  
 PLTS:  
Lab Results Component Value Date/Time PLATELET 302 20/97/0939 06:14 AM  
 
 
 
 
Vital Signs:    
Patient Vitals for the past 8 hrs: 
 Temp Pulse Resp BP SpO2  
03/25/19 0855 98.4 °F (36.9 °C) 91 18 91/52 96 % 03/25/19 0853    91/62   
   
 
RA/O2 flow/device:  2 LPM 
 
 
First Inital Assesment:    
[x]Yes []No  
Reevaluation/Reassessment:   
[]Yes [x]No  
 
CHART REVIEW Points 0 X 1 X 2 X 3 X 4 X Points Pulmonary History Smoking History (1) none  Quit 50+ years ago  Recent Smoking History >1 PPD  Pulmonary Disease or Impairment  Severe Pulmonary Disease  1 Surgical History No Surgery  General Surgery  Lower Abdominal  Thoracic or Upper Abdominal  Thoracic & Pulmonary Disease  0 CXR Clear or not indicated  Chronic changes or CXR Pending  Infiltrate, atelectasis or pleural effusions  Infiltrates in more than 1lobe  Infiltrates +atelectasis +/or pleural effusions  3 PATIENT ASSESSMENT  
 0 X 1 X 2 X 3 X 4 X Points Respiratory Pattern Regular pattern RR 12-20  Increased RR 21-27   Mild Dyspnea at rest, irregular pattern RR 28-32  Moderate Dyspnea at rest, Use of accessory muscles, RR 33-36  Severe Dyspnea, Use of accessory muscles RR >36  0 Mental Status Alert Oriented cooperative  Confused, Follows commands  Lethargic, Does not follow commands  Obtunded  Unresponsive  0 Breath Sounds Clear  Decreased Unilaterally  Decreased Bilaterally  Mild Scattered wheezing or Crackles in bases  Severe Wheezing or rhonchi  3 Cough Strong dry NPC  Strong Productive  Strong congested NPC  Weak productive or weak with rhonchi  No cough or may require suctioning  2 Level of Activity Ambulatory  Ambulatory with assistance  Temporarily Non-ambulatory  Non-Ambulatory, able to position self  Unable to position self, confined to bed  2 Total Points/Score:  11  
 
 Specific Intervention Chart. Bronchial Hygiene/Secretion Clearance:   
[]EZPAP []Rotation bed with vibration []CPT with percussor []CPT via vest  
[x]Oscillastating positive pressure expiratory device Lung Expansion:   
[]Incentive Spirometer w/RT visits []Incentive Spirometer w/nursing []EZPAP *Suctioning:   
[]Nasal Tracheal []Tracheal   
 *suctioning will be ordered and done PRN with an associated frequency such as QID/PRN based on score Other:   
Care Plan Level # Score Modality Frequency Comment Level 1 >17 . Jenna Dejon Level 2 14-17 . Jenna Dejon Level 3 10-13 Oscillating PEP Q4 wa Level 4 1-9 . [facility] Dejon BRONCHIAL HYGIENE SCORING AND FREQUENCY GUIDELINES Frequency Indications/Findings Level # Q4 ATC Copious secretions, SOB, unable to sleep 1 QID & PRN at night Moderate amounts of secretions 2  
TID or Q6 wa Small amounts of secretions and poor cough: recent history of secretions 3 BID or Q8 wa Unable to deep breathe and cough effectively 4 Comments:   
Education and coaching for using the "[employer] with gonzalez coughing was performed and was effective in loosing the congestion to the back of patient's throat. Respiratory will continue to work with patient with this modality, and re-assess in 3 days, per protocol.   
 
Respiratory Therapist: Tom Sue, RT

## 2019-03-25 NOTE — ROUTINE PROCESS
1920 Assumed care of patient from off going nurse. Patient resting in bed. No distress noted. 1 unit of PBBCs infusing in LAC. No infiltration noted. No s/s of transfusion reaction. Call bell within reach, siderails up x 3, bed in lowest position, and patient instructed to use call bell for assistance. Will continue to monitor. Daughter at bedside. 2230 Transfusion completed. Patient tolerated well. No post transfusion reaction noted. Patient Vitals for the past 12 hrs: 
 Temp Pulse Resp BP SpO2  
03/25/19 0351 97.4 °F (36.3 °C) 90 18 100/60 97 % 03/24/19 2347 97.2 °F (36.2 °C) 91 18 94/60 98 % 03/24/19 2230 98.4 °F (36.9 °C) 95 17 90/56 97 % 03/24/19 2052 97.8 °F (36.6 °C) 99 19 92/53 98 % 03/24/19 2017     98 % 03/24/19 2002 97 °F (36.1 °C) 91 19 92/56 100 % 03/24/19 1912 97.9 °F (36.6 °C) 97 20 99/58 99 % 03/24/19 1850 96.4 °F (35.8 °C) 92  (!) 86/50   
03/24/19 1841 96 °F (35.6 °C) 91 22 (!) 85/55 99 % 03/24/19 1829 96 °F (35.6 °C) 92 21 96/53 99 % 2300 IV sites outdated. Patient probably will be discharged today. If not. ..will need new IV site. 
 
0745 Bedside and Verbal shift change report given to [facility] (oncoming nurse) by Juan Antonio Morales RN(offgoing nurse). Report included the following information ED Summary, Intake/Output, MAR, Recent Results and Cardiac Rhythm SR tele box# [id number].

## 2019-03-26 NOTE — PROGRESS NOTES
Physical Therapy Goals Initiated 3/18/2019, updated 3/25/19 and to be accomplished within 7 days. 1. Patient will complete all bed mobility with modified independence in order to prepare for EOB/OOB activity. 2. Patient will perform sit <> stand with supervision/set-up in order to prepare for OOB/gait activity. 3. Patient will perform bed to chair transfers with supervision/set-up in order to promote mobility and encourage seated activity to progress towards their prior level of function. 4. Patient will ambulate 100 feet with supervision/set-up using LRAD in order to prepare for safe negotiation of their environment. 5. Patient will ascend/descend 3 steps with S handrail use with supervision/set-up in order to prepare for safe exit/entry into their home environment. PHYSICAL THERAPY TREATMENT Patient: Haseeb Saucedo (79 y.o. male) Date: 3/26/2019 Diagnosis: Congestive heart disease ([facility] [address] [I50.9] <principal problem not specified> Precautions: Fall Chart, physical therapy assessment, plan of care and goals were reviewed. OBJECTIVE/ ASSESSMENT: 
Patient found supine in bed willing to work with PT. Patient seen with OT to maximize safety of patient and staff. Pt sat at EOB this tx then stood with HHA to ambulate to b/s commode. Pt had BM , then stood for justin-care which was performed by OT. Pt took steps to b/s chair with bilateral HHA and sat. Pt performed seated LE therex before he was left in chair with needs in reach. Pt requires less assistance compared to previous tx sessions and appears less SOB when performing activity. Pt will benefit from SNF / Re[place]. Pt's family member has brought the patient hand weight that she has sewn and filled with rice. Pt encouraged to utilize weights on his own but to also take breaks if muscles become sore. Education: 
?         Bed mobility ? Transfers ? Ambulation / gait ? Assistive device management ? Stairs ? Body mechanics ? Position change ? Therapeutic exercise ? Activity pacing / energy conservation ? Other: 
 
Progression toward goals: 
?      Improving appropriately and progressing toward goals ? Improving slowly and progressing toward goals ? Not making progress toward goals and plan of care will be adjusted PLAN: 
Patient continues to benefit from skilled intervention to address the above impairments. Continue treatment per established plan of care. Discharge Recommendations:  Aric Kim / Wanda Further Equipment Recommendations for Discharge:  rolling walker SUBJECTIVE:  
Patient stated Bebeto Carrion we going to do the Jitterbug?  OBJECTIVE DATA SUMMARY:  
Critical Behavior: 
Neurologic State: Alert Orientation Level: Oriented X4 Cognition: Follows commands Safety/Judgement: Fall prevention, Home safety Functional Mobility Training: 
Bed Mobility: 
Supine to Sit: Contact guard assistance Transfers: 
Sit to Stand: Contact guard assistance;Stand-by assistance Stand to Sit: Contact guard assistance;Stand-by assistance Balance: 
Sitting: Intact Standing: Impaired; With support Standing - Static: Fair Standing - Dynamic : Fair Ambulation/Gait Training: 
Distance (ft): 6 Feet (ft) Assistive Device: (HHA) Ambulation - Level of Assistance: Contact guard assistance(HHA) Gait Abnormalities: Decreased step clearance Base of Support: Narrowed Speed/Janee: Slow Step Length: Left shortened; Other (comment) Therapeutic Exercises:  
 
 
EXERCISE Sets Reps Active Active Assist  
Passive Self- assited ROM Comments Ankle Pumps 1 10 ? ? ? ? Bilaterally Quad Sets   ? ? ? ? Glut Sets   ? ? ? ? Short Arc Quads   ? ? ? ? Heel Slides   ? ? ? ? Straight Leg Raises   ? ? ? ? Hip Abd   ? ? ? ? Long Arc Quads 1 10 ? ? ? ? Bilaterally Seated Marching   ?  ? ? ?   
 Seated Knee Flexion   ? ? ? ? Standing Marching   ? ? ? ? Pain: 
Pre tx pain: 0 Post tx pain: 0 Activity Tolerance:  
Fair Please refer to the flowsheet for vital signs taken during this treatment. After treatment:  
? Patient left in no apparent distress sitting up in chair ? Patient left in no apparent distress in bed 
? Call bell left within reach ? Nursing notified ? Caregiver present ? Bed alarm activated ? SCDs applied ? Ice applied Particia Hidden, PTA Time Calculation: 24 mins

## 2019-03-26 NOTE — ROUTINE PROCESS
Bedside and Verbal shift change report given to Yesenia Castellanos RN 
 (oncoming nurse) by Johnathan Avendano RN (offgoing nurse). Report included the following information SBAR, Kardex, Intake/Output, MAR and Recent Results.   
 
2040 To CT

## 2019-03-26 NOTE — PROGRESS NOTES
Received report on pt.from off going RN. Resting quietly in bed on rounds. Denies c/o pain or SOB at this time. No acute distress noted. Bed alarm on. Call bell at side. Will cont to monitor for any changes in status. 0024 MEWS score 3 due to elevated respirations. Pt is scheduled for a thoracentesis today. MD;'s aware of increased dyspnea. Will cont to monitor for any changes. 0900 MEWS score remains a 3 due to RR 22. MD's aware. No  new changes in status. Will cont to monitor. 1030 to ultrasound per stretcher for thoracentesis. 1200 returned to room. Vitals taken. Alert and oriented x4. Daughter at bedside. sm 2x2 dressing with transparent cover intact to rt back. No bleeding or hematoma noted. 02 on at 2 LPM. HOB elevated. Tele reapplied. Denies pain or SOB. 1300 resting in bed. Denies c/o pain or increased SOB. sts \" Im breathing a little better\". 1400 visitors at bedside. 1600 resting in bed without noted distress. Daughter remains at bedside. 1800 sitting up in bed eating dinner and watching TV. Denies distress. Daughter at bedside. 1915 Bedside and Verbal shift change report given to [facility] (oncoming nurse) by Dereck Boeck, RN (offgoing nurse). Report given with Chauncey MEHTA and MAR.

## 2019-03-26 NOTE — PROGRESS NOTES
[facility] - Progress Note Admit Date: 3/13/2019 Assessment:  
 
Hospital Problems  Date Reviewed: 3/1/2019 Codes Class Noted POA Congestive heart disease ([facility] [address] ICD-10-CM: I50.9 ICD-9-CM: 428.0  3/13/2019 Unknown  
   
  
-Acute on chronic systolic heart failure.  Patient with moderate- large bilateral pleural effusions which have been now drained with serial thoracentesis.  His breathing is better though he is still quite weak and deconditioned. Repeat CXR today continued to show moderate bilateral effusions which were shown some improvement since his last thoracentesis. -NSTEMI, suspect secondary in setting of acute illness, troponin peak 7.58, s/p PCI to LAD 3/11/2019 with occluded mid RCA which appeared to be chronic medically managed, ECG with known LBBB, denies CP. 
-Ischemic cardiomyopathy with EF 26-30% on echo this admission, unchanged from last admission. Conservatively managing in setting of advanced age. -CAD s/p LAD PCI with DEMAR 3/11/2019 with previous PCI to LCx 2011. Cath 3/11/2019 (Occluded mid RCA which appears to be chronic. There is faint collateral from the left coronary system, Left main artery with ostial 30-40%, LAD mid focal severely calcified tortuous 99% stenosis with CARLENE II flow, Circumflex coronary artery with diffuse mild 20-30% stenosis throughout). Discharged on ASA, plavix, statin, BB. 
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   H&H relatively stable 
-Mild kidney injury. 
-Peripheral vascular disease.  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  Because of this his blood pressure should be checked in his right arm. 
-Hypertension. Low normal on admission  
-Dyslipidemia. On statin. -H/o intermittent LBBB, now appears persistent. 
-Asthma, former smoker.  
-Advanced age, full code, but independent, lives alone and cares for self, significant decline in functional capacity follow recent hospital stays. 
  
 Primary cardiologist Dr. Samaria Shrestha. 
  
 
Plan:  
 
- Continue ASA, Statin - Low dose diuretics as BP allows - Beta blocker held due to hypotension, slow re-introduction as BP allows - Continue PT/OT 
- Disposition planning Subjective:  
 
Patient denies any dyspnea, chest pain, tolerating therapy better Objective:  
  
Patient Vitals for the past 8 hrs: 
 Temp Pulse Resp BP SpO2  
03/26/19 1159 97.1 °F (36.2 °C) 92 24 102/66 97 % 03/26/19 0900 97.9 °F (36.6 °C) 83 22 96/55 97 % 03/26/19 0745 97.5 °F (36.4 °C) 80 24 95/59 98 % 03/26/19 0438 98.7 °F (37.1 °C) 83 20 95/60 98 % Patient Vitals for the past 96 hrs: 
 Weight  
03/26/19 0438 152 lb 3.2 oz (69 kg) 03/25/19 0351 148 lb 3.2 oz (67.2 kg) 03/24/19 0400 152 lb 1.9 oz (69 kg) 03/23/19 0400 149 lb 14.6 oz (68 kg) Intake/Output Summary (Last 24 hours) at 3/26/2019 1205 Last data filed at 3/26/2019 1201 Gross per 24 hour Intake 960 ml Output 845 ml Net 115 ml Physical Exam: 
General:  alert, cooperative, no distress, appears stated age Neck:  nontender Lungs:  diminished breath sounds R anterior, L anterior Heart:  regular rate and rhythm, S1, S2 normal, 2/6 systolic murmur, no click, rub or gallop Abdomen:  abdomen is soft without significant tenderness, masses, organomegaly or guarding Extremities:  extremities normal, atraumatic, no cyanosis or edema Data Review:  
 
Labs: Results:  
   
Chemistry Recent Labs  
  03/25/19 
1817 03/24/19 
4432 GLU  --  110* NA  --  136 K  --  3.6 CL  --  99* CO2  --  31 BUN  --  54* CREA 1.47* 1.63* CA  --  9.2 AGAP  --  6  
BUCR  --  33* CBC w/Diff Recent Labs  
  03/25/19 
2210 WBC 6.6  
RBC 3.28* HGB 8.9* HCT 28.5*  
 Cardiac Enzymes No results found for: CPK, CK, CKMMB, CKMB, RCK3, CKMBT, CKNDX, CKND1, CHRISTINE, TROPT, TROIQ, NGHIA, TROPT, TNIPOC, BNP, BNPP Coagulation Recent Labs  
  03/26/19 
0404 03/25/19 [phone] 03/25/19 
2921 PTP  --  14.3  --   
INR  --  1.1  --   
APTT 48.0*  --  39.4* Lipid Panel Lab Results Component Value Date/Time Cholesterol, total 87 03/08/2019 05:28 AM  
 HDL Cholesterol 44 03/08/2019 05:28 AM  
 LDL, calculated 30 03/08/2019 05:28 AM  
 VLDL, calculated 13 03/08/2019 05:28 AM  
 Triglyceride 65 03/08/2019 05:28 AM  
 CHOL/HDL Ratio 2.0 03/08/2019 05:28 AM  
  
BNP No results found for: BNP, BNPP, XBNPT Liver Enzymes No results for input(s): TP, ALB, TBIL, AP, SGOT, GPT in the last 72 hours. No lab exists for component: DBIL Digoxin Thyroid Studies Lab Results Component Value Date/Time TSH 1.22 03/07/2019 05:29 AM  
    
 
Signed By: Lilia Rey NP March 26, 2019

## 2019-03-26 NOTE — PROCEDURES
RADIOLOGY POST THORACENTESIS NOTE March 26, 2019       11:29 AM  
 
:  Annalise Jackson PA-C Assistant:  None. Procedure:  US-guided right thoracentesis Pre-operative Diagnosis: Right pleural effusion Post-operative Diagnosis: same Procedure Details: Informed consent obtained. Standard aseptic technique. Ultrasound guidance. Right posterior intercostal approach. 1% lidocaine for local anesthesia. 5 [employer] sheathed needle connected to vacuum bottle. 1.6L of fluid removed. Please see final dictated report for full details. Complications:  No immediate. Estimated blood Loss:  Minimal 
            
Condition: Stable. Impression:  Successful thoracentesis. Plan: Post procedure chest xray pending.  
 
 
Archie Padilla

## 2019-03-26 NOTE — PROGRESS NOTES
[facility] Hospitalist Group Progress Note Patient: Pepe Aguilar Age: 80 y.o. : 1926 MR#: [id number] SSN: [id number] Date/Time: 3/26/2019 9:34 AM 
 
Subjective/24-hour events:  
 
Pt seen with daughter at bedside. Pt states his breathing is better post thoracentesis today. Assessment:  
-Acute hypoxic respiratory failure required BIPAP, likely due to heart failure and associated pleural effusions, pulm input noted, s/p thoracentesis bilateral (3/18 and 3/17, 3/22), with some improvement in respiratory status initially but has gone back to having  C/o SOB post previous thoracentesis procedures. Limited therapeutic options at this stage, due to no cardiovascular reserve unable to diurese effectively. 
-Low bp in setting of heart failure and iatrogenic component  
-Abnormal chest imaging, concern for possible pneumonia, ? Aspiration?  
-Pleural effusions s/p thoracentesis -Acute on chronic systolic CHF on lasix -Ischemic cardiomyopathy, EF 26-30% -Recent NSTEMI s/p PCI/stent placement trop downtrending 
-CAD 
-Dyslipidemia 
-CKD 2-3 
-Chronic anemia 
-Valvular heart disease 
-Advanced age 
-Probable COPD 
-Tobacco use hx Plan: 
-monitor hemodynamics, cont to hold all antihypertensives all together for now due to hypotension 
-add midodrine 
-Cont to diurese if bp allows 
-Continue nebs/symbicort/usual pulmonary hygiene. . 
-Diuresis per cardiology -  Will hold bb and spironolactone due to low bp 
PT/OT evals. Based on current clinical status, will likely not be able to return home alone at discharge. Case discussed with:  [x]Patient  []Family  [x]Nursing  []Case Management DVT Prophylaxis:  []Lovenox  []Hep SQ  []SCDs  []Coumadin   [x]On Heparin gtt Objective:  
VS:  
Visit Vitals /66 (BP 1 Location: Right arm, BP Patient Position: At rest) Pulse 92 Temp 97.1 °F (36.2 °C) Resp 24 Ht 5' 7\" (1.702 m) Wt 69 kg (152 lb 3.2 oz) SpO2 97% BMI 23.84 kg/m² Tmax/24hrs: Temp (24hrs), Av °F (36.7 °C), Min:97.1 °F (36.2 °C), Max:98.7 °F (37.1 °C) Intake/Output Summary (Last 24 hours) at 3/26/2019 1531 Last data filed at 3/26/2019 1201 Gross per 24 hour Intake 720 ml Output 845 ml Net -125 ml General: alert, awake, in NAD. Nontoxic-appearing. Cardiovascular:  S1, S2. Tachycardic. Pulmonary:  No active wheezing clear breath sounds. GI:  Abdomen soft, NTTP. Extremities:  Warm, no ischemia. Neuro:  Awake and alert, motor nofocal. 
 
Labs:   
Recent Results (from the past 24 hour(s)) PROTHROMBIN TIME + INR Collection Time: 19  5:20 PM  
Result Value Ref Range Prothrombin time 14.3 11.5 - 15.2 sec INR 1.1 0.8 - 1.2 CREATININE Collection Time: 19  6:17 PM  
Result Value Ref Range Creatinine 1.47 (H) 0.6 - 1.3 MG/DL  
 GFR est AA 54 (L) >60 ml/min/1.73m2 GFR est non-AA 45 (L) >60 ml/min/1.73m2 PTT Collection Time: 19  4:04 AM  
Result Value Ref Range aPTT 48.0 (H) 23.0 - 36.4 SEC Signed By: Anel Nye MD   
 2019 9:34 AM

## 2019-03-26 NOTE — PROGRESS NOTES
NUTRITION Nutrition Screen RECOMMENDATIONS / PLAN:  
 
- Continue nutritional supplements. 
- Recommend adding daily MVI. 
- Assist with meals as needed and monitor diet tolerance. - Continue RD inpatient monitoring and evaluation. NUTRITION INTERVENTIONS & DIAGNOSIS:  
 
[x] Meals/snacks: modify composition 
[x] Medical food supplement therapy: Ensure Enlive, 4x daily and Magic Cup BID [x] Vitamin and mineral supplement therapy: recommend MVI [x] Collaboration and referral of nutrition care: Discussed nutrition recommendation with Dr. Reuben Wilson via [facility] Text Nutrition Diagnosis: Inadequate energy intake related to SOB and decreased appetite as evidenced by pt with poor/fair meal intake since admission. Patient meets criteria for Severe Protein Calorie Malnutrition as evidenced by:  
ASPEN Malnutrition Criteria Acute Illness, Chronic Illness, or Social/Enviornmental: Acute illness Energy Intake: Less than/equal to 50% est energy req for greater than/equal to 5 days Body Fat: Moderate(orbital, thoracic, upper arm regions) Muscle Mass: Moderate(temple, clavicle, patellar body regions) ASPEN Malnutrition Score - Acute Illness: 18 
Acute Illness - Malnutrition Diagnosis: Severe malnutrition. ASSESSMENT:  
 
3/26: S/p thoracentesis again today. Consuming around 25% of recent meals with variable supplement consumption. Diet upgraded to soft solids per SLP with slight improvement noted in meal intake/appetite. 3/22: Thoracentesis today. Meal intake remains poor but daughter reports pt consuming supplements. Diet changed to pureed for hopeful improvement in meal intake, pt's daughter report pt dislikes pureed meals and diet changed to mechanical soft. NFPE completed. 3/19: S/p thoracentesis 3/17 with improvement in breathing but still reporting poor appetite and meal intake. Daughter encouraging and assisting with intake and stating pt consuming supplements.  Agreeable to try softer foods with chopped meats to improve tolerance. Average po intake adequate to meet patients estimated nutritional needs:   [] Yes     [x] No   [] Unable to determine at this time Diet: DIET NUTRITIONAL SUPPLEMENTS All Meals, Breakfast, Lunch, Dinner, HS Snack; ENSURE ENLIVE 
DIET NUTRITIONAL SUPPLEMENTS Lunch, Dinner; [employer] CUPS 
DIET DENTAL SOFT (SOFT SOLID) Food Allergies: NKFA Current Appetite:   [] Good     [x] Fair     [] Poor     [x] Other: improving Appetite/meal intake prior to admission:   [] Good     [] Fair     [x] Poor x 1 week     [] Other: 
Feeding Limitations:  [x] Swallowing difficulty: SLP following    [x] Chewing difficulty: loose fitting dentures    [] Other: 
Current Meal Intake:  
Patient Vitals for the past 100 hrs: 
 % Diet Eaten  
03/26/19 1201 33 % 03/25/19 1820 20 % 03/25/19 1309 20 % 03/25/19 0812 25 % BM: 3/23 Skin Integrity: WDL Edema:   [x] No     [] Yes Pertinent Medications: Reviewed: pepcid, lasix Recent Labs  
  03/25/19 
1817 03/24/19 
4727 NA  --  136 K  --  3.6 CL  --  99* CO2  --  31  
GLU  --  110* BUN  --  54* CREA 1.47* 1.63* CA  --  9.2 Intake/Output Summary (Last 24 hours) at 3/26/2019 1317 Last data filed at 3/26/2019 1201 Gross per 24 hour Intake 720 ml Output 845 ml Net -125 ml Anthropometrics: 
Ht Readings from Last 1 Encounters:  
03/26/19 5' 7\" (1.702 m) Last 3 Recorded Weights in this Encounter 03/24/19 0400 03/25/19 0351 03/26/19 6500 Weight: 69 kg (152 lb 1.9 oz) 67.2 kg (148 lb 3.2 oz) 69 kg (152 lb 3.2 oz) Body mass index is 23.84 kg/m². Weight History: Pt and family report recent fluctuations in pt's weight hx 2/2 fluid retention, recent thoracentesis, and diuresis. Weight Metrics 3/26/2019 3/12/2019 3/7/2019 3/1/2019 2/23/2019 2/12/2019 1/24/2019 Weight 152 lb 3.2 oz 149 lb 0.5 oz - 161 lb 163 lb 2.3 oz 152 lb 157 lb BMI 23.84 kg/m2 - 23.34 kg/m2 25.22 kg/m2 25.55 kg/m2 23.11 kg/m2 23.87 kg/m2 Admitting Diagnosis: Congestive heart disease ([facility] [address] [I50.9] Pertinent PMHx: CAD, hypercholesterolemia, HTN, prostate cancer Education Needs:        [x] None identified  [] Identified - Not appropriate at this time  []  Identified and addressed - refer to education log Learning Limitations:   [x] None identified  [] Identified Cultural, Jew & ethnic food preferences:  [x] None identified    [] Identified and addressed ESTIMATED NUTRITION NEEDS:  
 
Calories: 5031-9699 kcal (MSJx1.2-1.4) based on  [x] Actual BW: 70 kg      [] IBW Protein: 56-84 gm (0.8-1.2 gm/kg) based on  [x] Actual BW      [] IBW Fluid: 1 mL/kcal 
  
MONITORING & EVALUATION:  
 
Nutrition Goal(s): 1. Po intake of meals will meet >75% of patient estimated nutritional needs within the next 7 days. Outcome:  [] Met/Ongoing    [x]  Not Met    [] New/Initial Goal  
 
Monitoring:   [x] Food and beverage intake   [x] Diet order   [x] Nutrition-focused physical findings   [x] Treatment/therapy   [] Weight   [] Enteral nutrition intake Previous Recommendations (for follow-up assessments only):     [x]   Implemented       []   Not Implemented (RD to address)      [] No Longer Appropriate     [] No Recommendation Made Discharge Planning: cardiac diet; consistency as tolerated  
[x] Participated in care planning, discharge planning, & interdisciplinary rounds as appropriate Wu Mars RD Pager: [phone]

## 2019-03-26 NOTE — PROGRESS NOTES
[employer] [facility] Pulmonary, Critical Care, and Sleep Medicine Follow up patient note Name: Juice Vergara MRN: [id number] : 1926 Hospital: [facility] Date: 3/25/2019 Consulting physician: Candace Cruz Reason: Pleural effusions IMPRESSION:  
· Acute hypoxic respiratory failure in a patient with HFrEF (EF 26%) · Recurrent transudative bilateral pleural effusion secondary to CHF exacerbation, with compressive atelectasis:  S/P thoracentesis X3 with temporary relief of symptoms. · New right lower lung zone air space process which could be a combination of atelectasis and pleural effusion. An alveolar infiltrate concerning for pneumonia is also in the differentials but remains afebrile and WCC is not elevated. Noted primary team commenced on abx. · Cough:  Due to atelectasis · CHF exacerbation (combined systolic and diastolic) · NEL in the setting of HFrEF - possibly cardiorenal syndrome. · Hx of NSTEMi and S/P PCI to LAD on 3/11/19 and Ischemic cardiomyopathy and HFrEF and diastolic dysfunction · Anemia with hx of recent GI bleed. · Significant peripheral vascular disease CAD, ICA stenosis. · Hx of HTN and DLD · Hx of smoking - COPD from hx --likely mild, no evidence of exacerbation RECOMMENDATIONS:  
Advise to continue diuresis to maintain net negative fluid balance as renal function tolerates given significant bilateral effusions. Diuresis per primary service and Cardiology Discussed with the patient and his daughter at bedside that repeated therapeutic thoracentesis very frequently is not the treatment for effusions due to heart failure. Patient will qualify for a Pleurx catheter only if he was on hospice -- however he would likely require bilateral tubes which is generally not performed. I explained to them this was due to the risk of infection as well as malnutrition and progressive decompensation. Antibiotics per primary service Supplemental oxygen to maintain SpO2 >88%. May use Bipap PRN Please assess for home oxygen need prior to discharge Aggressive pulmonary toileting/bronchial hygiene -- discussed with RT at bedside Frequent incentive spirometry Aspiration precautions including elevating HOB >30deg PT/OT, OOB, ambulate with assistance as tolerated DVT ppx per primary service Will follow Subjective:  
03/25/19 Patient seen and examined at bedside. No acute events overnight. Daughter reports that the patient continues to have a cough which is only intermittently productive. Patient is too weak to get up his secretions when he coughs. They deny any chest pain, hemoptysis, nausea, vomiting, diarrhea. HPI per Dr. Martinez Mar: This patient has been seen and evaluated at the request of Dr. Yadi Nesbitt for evaluation of hypoxic respiratory failure. Patient is a 80 y.o. male with past medical history significant for asthma, ischemic cardiomyopathy (S/P PCI), GI bleed (recent) and hypertension who has presented with 1 week history of worsening SOB and leg swelling. Patient is able to provide most of the history and states that he lives alone but does get some help from his daughter. No hx of cough, fevers, phlegm production. Complains of chest pains across the chest in the lower chest that started during the same period of time and is associated with cough or taking deep breaths. This pleuritic sounding chest pain is non radiating. Patient was admitted to hospital and his SOB was worked up and the work up included a CTA which was done on 3/13/19 and it showed bilateral GGOs and pleural effusions. Patient states that he is an ex smoker. Smoked 1ppd but quit 50 years ago. Worked for [employer], But denies any history of asbestos exposure. Past Medical History:  
Diagnosis Date  Asthma  Cardiac cath 04/28/2011 oLM 30%. pLAD 30%. oD1 50%. CX 90% (3 x 18 [place] DEMAR). dRCA 90%. RPLB subtotal.  RPDA 100%.  Cardiac echocardiogram 01/21/2014 EF 55-60%. Mod LVH. Gr 1 DDfx. Mild AS (mean grad 13). Mild AI. Unchanged from study of 11/30/11.  Cardiac nuclear imaging test, mod risk 01/22/2016 Intermediate risk. Medium-sized inferior, inferoseptal infarction. No ischemia. EF 54%. Nondiagnostic EKG on pharm stress test.  
 Carotid duplex 03/02/2016 Mod 50-69% bilateral ICA stenosis. Probable significant RECA stenosis. >50% stenosis of left subclavian. No significant change from study of 1/8/15.  Coronary artery disease Status post PCI with drug-eluting stent, [place] 3 x 18 mm in the proximal circumflex.  Hx of carotid artery disease ([facility] [address]  Hypercholesterolemia  Hypertension  Prostate cancer ([facility] [address] Past Surgical History:  
Procedure Laterality Date  HX CORONARY STENT PLACEMENT  4/28/11 PCI with drug-eluting stent, [place] 3 x 18 mm in the proximal circumflex (post dialated with 3.25 mm noncompliant balloon at high pressure). Prior to Admission medications Medication Sig Start Date End Date Taking? Authorizing Provider  
albuterol-ipratropium (DUO-NEB) 2.5 mg-0.5 mg/3 ml nebu 3 mL by Nebulization route every four (4) hours as needed (wheezing, sob). 3/20/19  Yes Breonna Kelley MD  
atorvastatin (LIPITOR) 40 mg tablet Take 1 Tab by mouth nightly. 3/12/19   Trever Chávez MD  
carvedilol (COREG) 3.125 mg tablet Take 1 Tab by mouth two (2) times daily (with meals). 3/12/19   Trever Chávez MD  
lisinopril (PRINIVIL, ZESTRIL) 5 mg tablet Take 1 Tab by mouth daily. 3/13/19   Trever Chávez MD  
nitroglycerin (NITROSTAT) 0.4 mg SL tablet 1 Tab by SubLINGual route every five (5) minutes as needed for Chest Pain. 3/12/19   Trever Chávez MD  
furosemide (LASIX) 20 mg tablet Take 1 Tab by mouth daily.  3/12/19   Trever Chávez MD  
 potassium chloride SR (KLOR-CON 10) 10 mEq tablet Take 1 Tab by mouth daily. 3/12/19   Ewelina Fajardo MD  
OTHER Cbc, BMP in 1 week Dx: CHF Fax results to Dr. Jeffery Ng 3/12/19   Ewelina Fajardo MD  
budesonide-formoterol (SYMBICORT) 160-4.5 mcg/actuation HFAA Take 2 Puffs by inhalation two (2) times a day. 3/1/19   Eliecer Daniel MD  
glycopyrrolate-formoterol (BEVESPI AEROSPHERE) 9-4.8 mcg HFAA Take 2 Inhalation by inhalation two (2) times a day. 3/1/19   Skyler Macdonald MD  
clopidogrel (PLAVIX) 75 mg tab TAKE 1 TABLET EVERY DAY 19   Eliecer Daniel MD  
omeprazole (PRILOSEC) 40 mg capsule Take 1 Cap by mouth daily. 19   Nhi Alberto MD  
albuterol (PROVENTIL HFA, VENTOLIN HFA, PROAIR HFA) 90 mcg/actuation inhaler Take 2 Puffs by inhalation every four (4) hours as needed for Wheezing or Shortness of Breath. 3/2/15   AMITA Oviedo  
tamsulosin (FLOMAX) 0.4 mg capsule Take 1 Cap by mouth daily. 3/13/13   Skyler Macdonald MD  
aspirin delayed-release 81 mg tablet Take 81 mg by mouth daily. Skyler Macdonald MD  
 
No Known Allergies Social History Tobacco Use  Smoking status: Former Smoker Packs/day: 1.00 Years: 15.00 Pack years: 15.00 Last attempt to quit: 1960 Years since quittin.8  Smokeless tobacco: Never Used Substance Use Topics  Alcohol use: No  
  
History reviewed. No pertinent family history. @DBLINKMRCHARTING(EPT,8023)@ Immunization status: up to date and documented. Current Facility-Administered Medications Medication Dose Route Frequency  sodium chloride 3% hypertonic nebulizer soln  3 mL Nebulization BID RT  
 tiotropium (SPIRIVA) inhalation capsule 18 mcg  1 Cap Inhalation QAM RT  
 furosemide (LASIX) tablet 10 mg  10 mg Oral ACB&D  
 heparin (porcine) injection 5,000 Units  5,000 Units SubCUTAneous Q8H  
 atorvastatin (LIPITOR) tablet 40 mg  40 mg Oral QHS  aspirin chewable tablet 81 mg  81 mg Oral DAILY  budesonide-formoterol (SYMBICORT) 160-4.5 mcg/actuation HFA inhaler 2 Puff  2 Puff Inhalation BID RT  
 tamsulosin (FLOMAX) capsule 0.4 mg  0.4 mg Oral DAILY  famotidine (PEPCID) tablet 20 mg  20 mg Oral DAILY  sodium chloride (NS) flush 5-40 mL  5-40 mL IntraVENous Q8H Review of Systems: A comprehensive review of systems was negative except for that written in the HPI. Objective: 
Vital Signs:   
Visit Vitals BP 96/55 (BP 1 Location: Right arm, BP Patient Position: At rest) Pulse 97 Temp 98.3 °F (36.8 °C) Resp 20 Ht 5' 7\" (1.702 m) Wt 67.2 kg (148 lb 3.2 oz) SpO2 95% BMI 23.21 kg/m² O2 Device: Nasal cannula O2 Flow Rate (L/min): 2 l/min Temp (24hrs), Av °F (36.7 °C), Min:97.2 °F (36.2 °C), Max:98.4 °F (36.9 °C) Intake/Output:  
Last shift:      No intake/output data recorded. Last 3 shifts:  0701 -  1900 In: 1510 [P.O.:1200] Out: 400 [Urine:400] Intake/Output Summary (Last 24 hours) at 3/25/2019 2218 Last data filed at 3/25/2019 1820 Gross per 24 hour Intake 1270 ml Output 400 ml Net 870 ml Physical Exam:  
General:  Alert, cooperative, no distress, appears stated age, sitting up in bed wearing nasal cannula, appears frail Head:  Normocephalic, without obvious abnormality, atraumatic. Eyes:  Conjunctivae/corneas clear. PERRL, EOMs intact. Nose: Nares normal. Septum midline. Mucosa normal. No drainage or sinus tenderness. Throat: Lips, mucosa, and tongue normal.  No oral lesions, poor dentition Neck: Supple, symmetrical, trachea midline, no adenopathy, no carotid bruit and no JVD. Lungs:    Poor air entry in bilateral bases with decreased breath sounds, clear to auscultation otherwise bilaterally, no wheezes, rales, rhonchi Heart:  Regular rate and rhythm, S1, S2 normal, no murmur, click, rub or gallop. Abdomen:   Soft, non-tender.  Bowel sounds normal. No masses,  No organomegaly. Extremities: Extremities normal, atraumatic, no cyanosis or edema. Or clubbing Pulses: 2+ and symmetric all extremities. Skin: Skin color, texture, turgor normal. No rashes or lesions Lymph nodes: Cervical, supraclavicular, and axillary nodes normal.  
Neurologic: Grossly nonfocal, grossly intact strength and coordination Data review:  
 
Recent Results (from the past 24 hour(s)) PTT Collection Time: 03/25/19  6:14 AM  
Result Value Ref Range aPTT 39.4 (H) 23.0 - 36.4 SEC  
CBC W/O DIFF Collection Time: 03/25/19  6:14 AM  
Result Value Ref Range WBC 6.6 4.6 - 13.2 K/uL  
 RBC 3.28 (L) 4.70 - 5.50 M/uL HGB 8.9 (L) 13.0 - 16.0 g/dL HCT 28.5 (L) 36.0 - 48.0 % MCV 86.9 74.0 - 97.0 FL  
 MCH 27.1 24.0 - 34.0 PG  
 MCHC 31.2 31.0 - 37.0 g/dL  
 RDW 15.6 (H) 11.6 - 14.5 % PLATELET 038 355 - 958 K/uL MPV 9.9 9.2 - 11.8 FL PROTHROMBIN TIME + INR Collection Time: 03/25/19  5:20 PM  
Result Value Ref Range Prothrombin time 14.3 11.5 - 15.2 sec INR 1.1 0.8 - 1.2 CREATININE Collection Time: 03/25/19  6:17 PM  
Result Value Ref Range Creatinine 1.47 (H) 0.6 - 1.3 MG/DL  
 GFR est AA 54 (L) >60 ml/min/1.73m2 GFR est non-AA 45 (L) >60 ml/min/1.73m2 Imaging: 
I have personally reviewed the patients radiographs and have reviewed the reports: XR Results (most recent): 
Results from Hospital Encounter encounter on 03/13/19 XR CHEST PA LAT Narrative History: Evaluate for reaccumulation of pleural fluid. COMPARISON: 3/22/2019 Frontal and lateral views of the chest. 
 
FINDINGS: 
 
Chronic osseous findings without acute osseous abnormality. Stable cardiac silhouette. Atherosclerosis noted. Linear opacity is seen overlying the right upper lung zone on the frontal 
projection. There is also a tubular-like structure overlying the right 
hemithorax on the frontal view. Correlate clinically. Moderate right pleural effusion with adjacent airspace consolidation grossly 
similar to prior examination. Probable enlarging small left layering pleural 
effusion. Bilateral interstitial opacities persist. 
  
 Impression IMPRESSION: 
 
Right pleural effusion with adjacent airspace consolidation stable. Persistent 
bilateral interstitial opacities. Probable enlarging small left pleural effusion. Tubular structure overlying the right hemithorax on the frontal projection. Correlate clinically. Please see report for additional findings and details. CT Results (most recent): 
Results from Hospital Encounter encounter on 03/13/19 CTA CHEST W OR W WO CONT Narrative EXAM:  CTA Chest with Contrast (CT Pulmonary Study for PE). CLINICAL INDICATION:  Acute shortness of breath which began this morning. Recent hospitalization for shortness of breath and fatigue. COMPARISON:  None. TECHNIQUE:   
 
- Helical volumetric sections of the chest are obtained with CT pulmonary 
angiogram protocol. Subsequently, sagittal and coronal multiplanar 
reconstruction images are obtained. Maximum intensity projection images are 
generated to better delineate the pulmonary vasculature, differentiate between 
the pulmonary arteries and veins and to increase sensitivity to pulmonary 
emboli.   
- IV contrast dose of 100 mL Isovue-370. 
- Radiation dose optimization techniques are utilized as appropriate to the 
exam, with combination of automated exposure control, adjustment of the mA 
and/or kV according to patient's size (Including appropriate matching for 
site-specific examinations), or use of iterative reconstruction technique. FINDINGS:  
 
Pulmonary Arteries: - Unusual pulmonary artery opacification pattern is noted. The upper lung zone 
pulmonary artery opacification is complete whereas the lower lung zone pulmonary arteries appear suboptimally opacified particularly in the left lower lobe basal 
segments. Less pronounced atypical poor opacification of the basal segments is 
also noted on the right side. Instead of sharply demarcated distinct filling 
defects, is involved segments demonstrate gradual fading of the pulmonary artery 
luminal opacification. This makes it difficult to exclude small filling defects 
in the segmental and subsegmental arteries at the left lower lobe basal region. The presence of atelectatic changes with pleural effusion may contribute to this 
atypical pulmonary artery opacification pattern. Alternatively, intrinsic 
intracardiac abnormalities may be responsible for the atypical pulmonary artery 
opacification. Notably, the left inferior pulmonary vein opacification is much 
less than that of the right pulmonary venous opacification. 
- Within the technical limitations of the study, no definite suspicious filling 
defects are identified in the main trunk, right and left pulmonary artery, lobar 
and interlobar arteries, segmental and intrasegmental arteries. Basal region 
distal segmental artery and subsegmental arteries are poorly opacified. Aorta:  No opacification of the aorta. The caliber of the ascending aorta and 
aortic arch appear within normal limits. The mid descending aortography 
demonstrates atypical aorta contour, with apparent smoothly outlined bulging 
contour along the medial aspect (axial #). Further inferiorly, the aorta 
appears to dilated slightly, measuring up to about 3.6 cm in diameter and with 
crescentic outpouching. At the level of the diaphragmatic hiatus, the right 
lateral wall of the aorta demonstrates crescentic appearance with lamellated 
pattern. 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 Recommend dedicated CTA of 
the aorta on routine basis for further delineation of this finding. Lung, Pleura, Airways: - Moderate to large bilateral pleural effusion. Associated atelectatic changes. - Consolidative opacities in the left upper lobe and in the right lower lobe. Groundglass opacities in the right upper lobe and right middle lobe. Possible 
consolidative opacities vs. atelectatic changes in the left lower lobe. Mediastinum, Radha:  No definite mediastinal adenopathy. No definite hilar 
adenopathy. Base of Neck, Axillae:  Unremarkable. Abdomen:  Multiple gallstones. Skeletal Structures:  No acute findings. Impression IMPRESSION: 
      
1. Unusual pulmonary artery opacification with gradual decrease in luminal 
opacification in the basal regions, more pronounced on the left side than on the 
right. Possibly related to presence of moderate to large pleural effusion with 
associated atelectatic changes as the explanation for this pattern vs. intrinsic 
intracardiac abnormality. No convincing evidence of pulmonary embolism is 
detected within the technical limitations of the study. 2. Moderate to large pleural effusion bilaterally with associated passive 
atelectatic changes. Multiple foci of consolidative opacities in both lungs. Most pronounced in the left upper lobe. Query developing pneumonia vs. 
pulmonary hemorrhage vs. nonspecific inflammatory process. 3.  Atypical appearance of the descending with smooth crescentic outpouchings 
and presence of lamellated calcifications along the aortic wall. Perhaps 
related to chronic dissection. With current technique, i.e. lack of IV contrast 
in the aorta, detailed analysis is difficult. Recommend CTA of the aorta for 
further delineation. 4.  Cholelithiasis. 03/13/19 ECHO ADULT FOLLOW-UP OR LIMITED 03/15/2019 3/15/2019 Narrative · Left ventricular severely decreased systolic function. Estimated left  
ventricular ejection fraction is 26 - 30%. Visually measured ejection fraction. Left ventricular mild concentric hypertrophy. Abnormal left  
ventricular wall motion as described on the wall scoring diagram below. · Severe tricuspid valve regurgitation is present. Moderate to severe  
pulmonary hypertension is present. · Moderate mitral valve regurgitation. Signed by: MD Cony Harris MD/MPH Pulmonary, Critical Care Medicine [facility]

## 2019-03-26 NOTE — PROGRESS NOTES
[facility]/IP[facility] REFERRAL CONTACT NOTE [id number] [place] [address] for Physical Rehabilitation RE: Mariposa Newman Thank you for the opportunity to review this patient's case for admission to [address] [place] [address] for Physical Rehabilitation. Based on our pre-admission screening:  
 
[x ] This patient does not meet criteria for admission to [facility] for Physical  Rehabilitation [x ] We agree with therapy recommendation for SNF Again, Thank you for this referral. Should you have any questions please do not hesitate to call. Sincerely, Meenu Vargas [facility] 
[phone]

## 2019-03-26 NOTE — PROGRESS NOTES
Problem: Dysphagia (Adult) Goal: *Acute Goals and Plan of Care (Insert Text) Description Patient will: 1. Tolerate PO trials with 0 s/s overt distress in 4/5 trials 2. Utilize compensatory swallow strategies/maneuvers (decrease bite/sip, size/rate, alt. liq/sol) with min cues in 4/5 trials 3. Perform oral-motor/laryngeal exercises to increase oropharyngeal swallow function with min cues 4. Complete an objective swallow study (i.e., MBSS) to assess swallow integrity, r/o aspiration, and determine of safest LRD, min A per MD discretion Rec:    
Soft solid with thin liquids Aspiration precautions HOB >45 during po intake, remain >30 for 30-45 minutes after po Small bites/sips; alternate liquid/solid with slow feeding rate Oral care TID Meds per pt preference Outcome: Progressing Towards Goal 
 
SPEECH LANGUAGE PATHOLOGY DYSPHAGIA TREATMENT Patient: Zi Lange (18 y.o. male) Date: 3/26/2019 Diagnosis: Congestive heart disease ([facility] [address] [I50.9] <principal problem not specified> Precautions: aspiration Fall ASSESSMENT: 
Pt was seen at bedside for follow up dysphagia management. Pt observed tolerating breakfast tray of soft solid with thin liquids without any overt s/sx of aspiration. Positive oral clearance noted with minimally increased mastication. Laryngeal elevation was functional to palpation. Reviewed safe swallowing techniques/strategies and aspiration precautions with pt and daughter with verbalized understanding. Continue to rec soft solid diet with thin liquids, aspiration precautions, oral care TID, and meds as tolerated. ST will continue to follow. Progression toward goals: 
?         Improving appropriately and progressing toward goals ? Improving slowly and progressing toward goals ? Not making progress toward goals and plan of care will be adjusted PLAN: 
Recommendations and Planned Interventions: See above Patient continues to benefit from skilled intervention to address the above impairments. Continue treatment per established plan of care. Discharge Recommendations:  [address] [facility] and To Be Determined SUBJECTIVE:  
Patient stated ? I just don't like the potatoes here? . 
 
OBJECTIVE:  
Cognitive and Communication Status: 
Neurologic State: Alert Orientation Level: Oriented X4 Cognition: Follows commands Perception: Appears intact Perseveration: No perseveration noted Safety/Judgement: Fall prevention, Home safety Dysphagia Treatment: 
Oral Assessment: 
Oral Assessment Labial: No impairment Dentition: Upper & lower dentures Oral Hygiene: adeequate Lingual: No impairment Velum: No impairment Mandible: No impairment P.O. Trials: 
Patient Position: 55 at [facility] Vocal quality prior to P.O.: Hoarse, Phonation breaks, Breathy Consistency Presented: Solid, Thin liquid How Presented: Self-fed/presented, Cup/sip, Straw Bolus Acceptance: No impairment Bolus Formation/Control: Impaired Type of Impairment: Delayed, Mastication Propulsion: Delayed (# of seconds) Oral Residue: None Initiation of Swallow: No impairment Laryngeal Elevation: Functional 
 Aspiration Signs/Symptoms: Infiltrate on chest xray Pharyngeal Phase Characteristics: No impairment, issues, or problems Effective Modifications: Small sips and bites, Alternate liquids/solids Cues for Modifications: Moderate Oral Phase Severity: Mild Pharyngeal Phase Severity : Mild PAIN: 
Pt reports 0/10 pain or discomfort prior to tx. Pt reports 0/10 pain or discomfort post tx. After treatment:  
?              Patient left in no apparent distress sitting up in chair ? Patient left in no apparent distress in bed 
? Call bell left within reach ? Nursing notified ? Caregiver present ? Bed alarm activated COMMUNICATION/EDUCATION:  
 ?              SLP educated pt with regard to compensatory swallow strategies and 
      aspiration/reflux precautions including: small bites/sips, 
      alternate liquids/solids, decrease feeding rate, HOB > 45 with all po, and 
                 upright in bed at 30 degrees after po for at least 45 
      minutes. Thank you for this referral. 
 
Emerita Bender M.S. CCC-SLP/L Speech-Language Pathologist

## 2019-03-26 NOTE — ROUTINE PROCESS
Bedside and Verbal shift change report given to Héctor Henderson RN 
 (oncoming nurse) by Regi Walker RN (offgoing nurse). Report included the following information SBAR, Kardex, Intake/Output, MAR and Recent Results.

## 2019-03-26 NOTE — PROGRESS NOTES
[facility] Hospitalist Group Progress Note Patient: Keshawn Smith Age: 80 y.o. : 1926 MR#: [id number] SSN: [id number] Date/Time: 3/25/2019 9:34 AM 
 
Subjective/24-hour events:  
 
Pt seen with daughter at bedside. He has no specific complaints. Assessment:  
-Acute hypoxic respiratory failure required BIPAP, likely due to heart failure and associated pleural effusions, pulm input noted, s/p thoracentesis bilateral (3/18 and 3/17, 3/22), with some improvement in respiratory status initially but has gone back to having  C/o SOB post previous thoracentesis procedures. Limited therapeutic options at this stage, due to no cardiovascular reserve unable to diurese effectively. 
-Low bp in setting of heart failure and iatrogenic component  
-Abnormal chest imaging, concern for possible pneumonia, ? Aspiration?  
-Pleural effusions s/p thoracentesis -Acute on chronic systolic CHF on lasix -Ischemic cardiomyopathy, EF 26-30% -Recent NSTEMI s/p PCI/stent placement trop downtrending 
-CAD 
-Dyslipidemia 
-CKD 2-3 
-Chronic anemia 
-Valvular heart disease 
-Advanced age 
-Probable COPD 
-Tobacco use hx Overall pt has had significant decline most likely due to his advanced systolic heart failure. I believe at this stage it would be futile to attempt aggressive measures including aggressive diuresis as pt has low cardiovascular reserve. Despite measures taken thus far he has not had significant improvement and in fact he seems to have declined further over the past few days. Both pt and daughter wish to maintain his full code status and daughter has declined palliative team assistance after code discussions brought up with daughter by palliative team. 
 
Plan: 
-monitor hemodynamics, cont to hold all antihypertensives all together for now due to hypotension Cont to diurese if bp allows Consult IR for consideration of pleurx cath Continue nebs/symbicort/usual pulmonary hygiene. . 
Diuresis per cardiology -  Will hold bb and spironolactone due to low bp 
PT/OT evals. Based on current clinical status, will likely not be able to return home alone at discharge. Case discussed with:  [x]Patient  []Family  [x]Nursing  []Case Management DVT Prophylaxis:  []Lovenox  []Hep SQ  []SCDs  []Coumadin   [x]On Heparin gtt Objective:  
VS:  
Visit Vitals BP 96/55 (BP 1 Location: Right arm, BP Patient Position: At rest) Pulse 97 Temp 98.3 °F (36.8 °C) Resp 20 Ht 5' 7\" (1.702 m) Wt 67.2 kg (148 lb 3.2 oz) SpO2 95% BMI 23.21 kg/m² Tmax/24hrs: Temp (24hrs), Av °F (36.7 °C), Min:97.2 °F (36.2 °C), Max:98.4 °F (36.9 °C) Intake/Output Summary (Last 24 hours) at 3/25/2019 2238 Last data filed at 3/25/2019 1820 Gross per 24 hour Intake 960 ml Output 400 ml Net 560 ml General: alert, awake, in NAD. Nontoxic-appearing. Cardiovascular:  S1, S2. Tachycardic. Pulmonary:  No active wheezing clear breath sounds. GI:  Abdomen soft, NTTP. Extremities:  Warm, no ischemia. Neuro:  Awake and alert, motor nofocal. 
 
Labs:   
Recent Results (from the past 24 hour(s)) PTT Collection Time: 19  6:14 AM  
Result Value Ref Range aPTT 39.4 (H) 23.0 - 36.4 SEC  
CBC W/O DIFF Collection Time: 19  6:14 AM  
Result Value Ref Range WBC 6.6 4.6 - 13.2 K/uL  
 RBC 3.28 (L) 4.70 - 5.50 M/uL HGB 8.9 (L) 13.0 - 16.0 g/dL HCT 28.5 (L) 36.0 - 48.0 % MCV 86.9 74.0 - 97.0 FL  
 MCH 27.1 24.0 - 34.0 PG  
 MCHC 31.2 31.0 - 37.0 g/dL  
 RDW 15.6 (H) 11.6 - 14.5 % PLATELET 019 771 - 675 K/uL MPV 9.9 9.2 - 11.8 FL PROTHROMBIN TIME + INR Collection Time: 19  5:20 PM  
Result Value Ref Range Prothrombin time 14.3 11.5 - 15.2 sec INR 1.1 0.8 - 1.2 CREATININE Collection Time: 19  6:17 PM  
Result Value Ref Range  Creatinine 1.47 (H) 0.6 - 1.3 MG/DL  
 GFR est AA 54 (L) >60 ml/min/1.73m2 GFR est non-AA 45 (L) >60 ml/min/1.73m2 Signed By: Akhil Edouard MD   
 March 25, 2019 9:34 AM

## 2019-03-26 NOTE — PROGRESS NOTES
Problem: Mobility Impaired (Adult and Pediatric) Goal: *Acute Goals and Plan of Care (Insert Text) Description Physical Therapy Goals Initiated 3/18/2019, updated 3/25/19 and to be accomplished within 7 days. 1.  Patient will complete all bed mobility with modified independence in order to prepare for EOB/OOB activity. 2.  Patient will perform sit <> stand with supervision/set-up in order to prepare for OOB/gait activity. 3.  Patient will perform bed to chair transfers with supervision/set-up in order to promote mobility and encourage seated activity to progress towards their prior level of function. 4.  Patient will ambulate 100 feet with supervision/set-up using LRAD in order to prepare for safe negotiation of their environment. 5.  Patient will ascend/descend 3 steps with S handrail use with supervision/set-up in order to prepare for safe exit/entry into their home environment. Outcome: Progressing Towards Goal 
 OCCUPATIONAL THERAPY REEVALUATION Patient: Kristin Hameed (89 y.o. male) Date: 3/26/2019 Diagnosis: Congestive heart disease ([facility], [place] [address] [I50.9] <principal problem not specified> Precautions: Fall ASSESSMENT : 
Based on the objective data described below, the patient presents with continued impairment in functional activity tolerance, UB strength, standing balance, and ability to perform ADLs independently. Pt agreeable to OT re-evaluation and seen with PTA for patient safety and optimal participation. Pt performed supine>sit with SBA and demonstrated WFL AROM BUEs and 4/5 strength BUEs. Pt stated he needed to use the [facility] prior to sitting in arm chair. Pt performed bed>BSC pivot transfer with CGA and ed on hand placement. Pt performed sit>stand from commode with CGA and required max A for bowel movement justin care with F- balance x ~1 min holding onto the PTA hands for support.  Pt then maneuvered to arm chair with CGA. Pt educated on and performed there ex with homemade fabric HW that family member brought pt (weight about ~1 lb each). Pt demonstrated SBA to stand several times from arm chair to readjust sitting position and gown. Patient will benefit from skilled intervention to address the above impairments. Patient?s rehabilitation potential is considered to be Good Factors which may influence rehabilitation potential include:  
? None noted ? Mental ability/status ? Medical condition ? Home/family situation and support systems ? Safety awareness ? Pain tolerance/management ? Other: PLAN : 
Recommendations and Planned Interventions:  
?                  Self Care Training                  ? Therapeutic Activities ? Functional Mobility Training    ? Cognitive Retraining 
? Therapeutic Exercises           ? Endurance Activities ? Balance Training                   ? Neuromuscular Re-Education ? Visual/Perceptual Training     ? Home Safety Training 
? Patient Education                 ? Family Training/Education ? Other (comment): Frequency/Duration: Patient will be followed by occupational therapy 1-2 times per day/ 3-5 days per week to address goals. Discharge Recommendations: Home Health with 24 hour assistance vs. SNF Further Equipment Recommendations for Discharge: bedside commode, shower chair and rolling walker Barriers to Learning/Limitations: None Compensate with: visual, verbal, tactile, kinesthetic cues/model SUBJECTIVE:  
Patient stated ? Are we going to do the jitterbug?? OBJECTIVE DATA SUMMARY:  
Hospital course since last seen and reason for reevaluation: Pt has made progress with functional transfers, functional activity tolerance, sitting balance, and ability to participate in self care activities. Pt has been denied from inpatient rehab and would do well to continue therapy in a familiar environment with 24 hour supervision once dc from acute hospital. Pt is motivated and receptive to all training. Cognitive/Behavioral Status: 
Neurologic State: Alert Orientation Level: Oriented X4 Cognition: Follows commands, Appropriate decision making, Appropriate for age attention/concentration Safety/Judgement: Fall prevention Skin: intact BUEs Edema: none noted BUEs Coordination: 
Coordination: Within functional limits(BUEs) Fine Motor Skills-Upper: Left Intact; Right Intact Gross Motor Skills-Upper: Left Intact; Right Intact Balance: 
Sitting: Intact Standing: Impaired; With support Standing - Static: Fair Standing - Dynamic : Fair Strength: 
Strength: Generally decreased, functional(BUEs) Tone & Sensation: 
Sensation: Intact(BUEs) Range of Motion: 
AROM: Within functional limits(BUEs) Functional Mobility and Transfers for ADLs: 
Bed Mobility: 
Supine to Sit: Contact guard assistance Transfers: 
Sit to Stand: Contact guard assistance;Stand-by assistance Toilet Transfer : Contact guard assistance ADL Assessment: 
Feeding: Modified independent Oral Facial Hygiene/Grooming: Setup Bathing: Moderate assistance Upper Body Dressing: Stand-by assistance Lower Body Dressing: Maximum assistance Toileting: Moderate assistance ADL Intervention: 
 Cognitive Retraining Safety/Judgement: Fall prevention Pain: 
Pt reports no pain during session Activity Tolerance:  
fair Please refer to the flowsheet for vital signs taken during this treatment. After treatment:  
? Patient left in no apparent distress sitting up in chair ? Patient left in no apparent distress in bed 
? Call bell left within reach ? Nursing notified ? Caregiver present ? Bed alarm activated COMMUNICATION/EDUCATION:  
?    Home safety education was provided and the patient/caregiver indicated understanding. ? Patient/family have participated as able in goal setting and plan of care. ?    Patient/family agree to work toward stated goals and plan of care. ?    Patient understands intent and goals of therapy, but is neutral about his/her participation. ? Patient is unable to participate in goal setting and plan of care. This patient?s plan of care is appropriate for delegation to [facility]. Thank you for this referral. 
Radha Palacios, OT Time Calculation: 24 mins

## 2019-03-26 NOTE — PROGRESS NOTES
Received report on pt.from off going RN. Resting quietly in bed on rounds. Denies c/o pain or SOB at this time. No acute distress noted. Bed alarm on. Call betzy t side. Will cont to monitor for any changes in status. 0930 daughter at bedside. Pt sitting up in  Bed eating. 1200 sitting up in chair at bedside. Call bell at side. Daughter at bedside. 1430 back to bed with help. Gait weak and unsteady but able to transfer and take small steps with asist of one.  
 
1920 Bedside and Verbal shift change report given to Julissa Morales (oncoming nurse) by Robert Irwin RN (offgoing nurse). Report given with Chauncey MEHTA and MAR.

## 2019-03-26 NOTE — CDMP QUERY
Pt admitted with NSTEMI and has malnutrition documented by RD . Please further specify type of malnutrition.  Severe Protein calorie malnutrition  (mild, moderate, severe)  Other (please specify)  Unable to determine The medical record reflects the following: 
  Risk Factors: 80 y.o. 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 
- Assist with meals as needed and monitor diet tolerance. - Continue RD inpatient monitoring and evaluation. NUTRITION INTERVENTIONS & DIAGNOSIS: 
  
 Meals/snacks: modify composition Medical food supplement therapy: Ensure Enlive, 4x daily. Collaboration and referral of nutrition care: Discussed diet consistency with Dr. Irina Reyes, changed to mechanical soft today to promote meal intake. 
  
Nutrition Diagnosis: Inadequate oral intake related to SOB and decreased appetite as evidenced by pt with poor meal intake since admission. 
  
Patient meets criteria for Severe Protein Calorie Malnutrition as evidenced by:  
ASPEN Malnutrition Criteria Acute Illness, Chronic Illness, or Social/Environmental : (P) Acute illness.  
  
 
 
 
If you DECLINE this query or would like to communicate with [employer], please utilize the \"[employer] message box\" at the TOP of the Progress Note on the right. Thank you,ISAIAH Abad RN    ext [phone]

## 2019-03-27 NOTE — PROGRESS NOTES
[facility] Pulmonary, Critical Care, and Sleep Medicine Follow up patient note Name: Janey England MRN: [id number] : 1926 Hospital: [facility] Date: 3/26/2019 Consulting physician: Dipika Aguayo Reason: Pleural effusions IMPRESSION:  
· Acute hypoxic respiratory failure in a patient with HFrEF (EF 26%) · Recurrent transudative bilateral pleural effusion secondary to CHF exacerbation, with compressive atelectasis:  S/P thoracentesis multiple thoracenteses with one done last week and now again today on the right - 1.6L -- right effusion appears completely drained · Cough:  Due to atelectasis · CHF exacerbation (combined systolic and diastolic) · NEL in the setting of HFrEF - possibly cardiorenal syndrome. · Hx of NSTEMi and S/P PCI to LAD on 3/11/19 and Ischemic cardiomyopathy and HFrEF and diastolic dysfunction · Anemia with hx of recent GI bleed. · Significant peripheral vascular disease CAD, ICA stenosis. · Hx of HTN and DLD · Hx of smoking - COPD from hx --likely mild, no evidence of exacerbation RECOMMENDATIONS:  
Advise to continue diuresis to maintain net negative fluid balance as renal function tolerates given significant bilateral effusions. Diuresis per primary service and Cardiology. Advise starting midodrine 5mg today TID and increasing to 10mg TID tomorrow to increase BP to allow for further volume removal 
Discussed with the patient again today at bedside that repeated therapeutic thoracentesis (especially frequently) is not the treatment for pleural effusions secondary to heart failure as it leads to large volume shifts and protein losses and malnutrition -- as well as potential procedural complications. He will likely have recurrence of effusion in the next few days.   Patient would qualify for a Pleurx catheter only if he was on hospice -- however he would likely require bilateral tubes which is generally not performed. I explained to them this was due to the risk of infection as well as malnutrition and progressive decompensation. Antibiotics per primary service -- can likely discontinue as there is no infiltrates Supplemental oxygen to maintain SpO2 >88%. May use Bipap PRN Please assess for home oxygen need prior to discharge Aggressive pulmonary toileting/bronchial hygiene Frequent incentive spirometry Scheduled bronchodilators with duonebs q6h and pulmicort 0.5mg BID Aspiration precautions including elevating HOB >30deg PT/OT, OOB, ambulate with assistance as tolerated DVT ppx per primary service Will follow Poor longterm prognosis Subjective:  
03/26/19 Patient seen and examined at bedside. No acute events overnight. Patient underwent thoracentesis today by IR, 1.6 L were removed. Patient reports improvement in dyspnea again. HPI per Dr. Burak Sun: This patient has been seen and evaluated at the request of Dr. Hua Cardoso for evaluation of hypoxic respiratory failure. Patient is a 80 y.o. male with past medical history significant for asthma, ischemic cardiomyopathy (S/P PCI), GI bleed (recent) and hypertension who has presented with 1 week history of worsening SOB and leg swelling. Patient is able to provide most of the history and states that he lives alone but does get some help from his daughter. No hx of cough, fevers, phlegm production. Complains of chest pains across the chest in the lower chest that started during the same period of time and is associated with cough or taking deep breaths. This pleuritic sounding chest pain is non radiating. Patient was admitted to hospital and his SOB was worked up and the work up included a CTA which was done on 3/13/19 and it showed bilateral GGOs and pleural effusions. Patient states that he is an ex smoker. Smoked 1ppd but quit 50 years ago. Worked for [employer], But denies any history of asbestos exposure. Past Medical History:  
Diagnosis Date  Asthma  Cardiac cath 04/28/2011 oLM 30%. pLAD 30%. oD1 50%. CX 90% (3 x 18 [place] DEMAR). dRCA 90%. RPLB subtotal.  RPDA 100%.  Cardiac echocardiogram 01/21/2014 EF 55-60%. Mod LVH. Gr 1 DDfx. Mild AS (mean grad 13). Mild AI. Unchanged from study of 11/30/11.  Cardiac nuclear imaging test, mod risk 01/22/2016 Intermediate risk. Medium-sized inferior, inferoseptal infarction. No ischemia. EF 54%. Nondiagnostic EKG on pharm stress test.  
 Carotid duplex 03/02/2016 Mod 50-69% bilateral ICA stenosis. Probable significant RECA stenosis. >50% stenosis of left subclavian. No significant change from study of 1/8/15.  Coronary artery disease Status post PCI with drug-eluting stent, [place] 3 x 18 mm in the proximal circumflex.  Hx of carotid artery disease ([facility] [place] [address]  Hypercholesterolemia  Hypertension  Prostate cancer ([facility] [address] Past Surgical History:  
Procedure Laterality Date  HX CORONARY STENT PLACEMENT  4/28/11 PCI with drug-eluting stent, [place] 3 x 18 mm in the proximal circumflex (post dialated with 3.25 mm noncompliant balloon at high pressure). Prior to Admission medications Medication Sig Start Date End Date Taking? Authorizing Provider  
albuterol-ipratropium (DUO-NEB) 2.5 mg-0.5 mg/3 ml nebu 3 mL by Nebulization route every four (4) hours as needed (wheezing, sob). 3/20/19  Yes Germania Melchor MD  
atorvastatin (LIPITOR) 40 mg tablet Take 1 Tab by mouth nightly. 3/12/19   Saniya Loo MD  
carvedilol (COREG) 3.125 mg tablet Take 1 Tab by mouth two (2) times daily (with meals). 3/12/19   Saniya Loo MD  
lisinopril (PRINIVIL, ZESTRIL) 5 mg tablet Take 1 Tab by mouth daily. 3/13/19   Saniya Loo MD  
nitroglycerin (NITROSTAT) 0.4 mg SL tablet 1 Tab by SubLINGual route every five (5) minutes as needed for Chest Pain.  3/12/19   Anaya Ida Stratton MD  
furosemide (LASIX) 20 mg tablet Take 1 Tab by mouth daily. 3/12/19   Angelic Baron MD  
potassium chloride SR (KLOR-CON 10) 10 mEq tablet Take 1 Tab by mouth daily. 3/12/19   Angelic Baron MD  
OTHER Cbc, BMP in 1 week Dx: CHF Fax results to Dr. Rowena Underwood 3/12/19   Angelic Baron MD  
budesonide-formoterol (SYMBICORT) 160-4.5 mcg/actuation HFAA Take 2 Puffs by inhalation two (2) times a day. 3/1/19   Edouard Daniel MD  
glycopyrrolate-formoterol (BEVESPI AEROSPHERE) 9-4.8 mcg HFAA Take 2 Inhalation by inhalation two (2) times a day. 3/1/19   Dony Elmore MD  
clopidogrel (PLAVIX) 75 mg tab TAKE 1 TABLET EVERY DAY 19   Edouard Daniel MD  
omeprazole (PRILOSEC) 40 mg capsule Take 1 Cap by mouth daily. 19   Laila Bland MD  
albuterol (PROVENTIL HFA, VENTOLIN HFA, PROAIR HFA) 90 mcg/actuation inhaler Take 2 Puffs by inhalation every four (4) hours as needed for Wheezing or Shortness of Breath. 3/2/15   AMITA Lo  
tamsulosin (FLOMAX) 0.4 mg capsule Take 1 Cap by mouth daily. 3/13/13   Dony Elmore MD  
aspirin delayed-release 81 mg tablet Take 81 mg by mouth daily. Dony Elmore MD  
 
No Known Allergies Social History Tobacco Use  Smoking status: Former Smoker Packs/day: 1.00 Years: 15.00 Pack years: 15.00 Last attempt to quit: 1960 Years since quittin.8  Smokeless tobacco: Never Used Substance Use Topics  Alcohol use: No  
  
History reviewed. No pertinent family history. @DBLINKMRCHARTING(EPT,1694)@ Immunization status: up to date and documented. Current Facility-Administered Medications Medication Dose Route Frequency  midodrine (PROAMITINE) tablet 5 mg  5 mg Oral TID WITH MEALS  
 albuterol-ipratropium (DUO-NEB) 2.5 MG-0.5 MG/3 ML  3 mL Nebulization Q6H RT  
 budesonide (PULMICORT) 500 mcg/2 ml nebulizer suspension  500 mcg Nebulization BID RT  
  sodium chloride 3% hypertonic nebulizer soln  3 mL Nebulization BID RT  
 furosemide (LASIX) tablet 10 mg  10 mg Oral ACB&D  
 heparin (porcine) injection 5,000 Units  5,000 Units SubCUTAneous Q8H  
 atorvastatin (LIPITOR) tablet 40 mg  40 mg Oral QHS  aspirin chewable tablet 81 mg  81 mg Oral DAILY  tamsulosin (FLOMAX) capsule 0.4 mg  0.4 mg Oral DAILY  famotidine (PEPCID) tablet 20 mg  20 mg Oral DAILY  sodium chloride (NS) flush 5-40 mL  5-40 mL IntraVENous Q8H Review of Systems: A comprehensive review of systems was negative except for that written in the HPI. Objective: 
Vital Signs:   
Visit Vitals BP 92/58 (BP 1 Location: Right arm, BP Patient Position: At rest) Pulse 91 Temp 97.9 °F (36.6 °C) Resp 20 Ht 5' 7\" (1.702 m) Wt 69 kg (152 lb 3.2 oz) SpO2 98% BMI 23.84 kg/m² O2 Device: Nasal cannula O2 Flow Rate (L/min): 2 l/min Temp (24hrs), Av.9 °F (36.6 °C), Min:97.1 °F (36.2 °C), Max:98.7 °F (37.1 °C) Intake/Output:  
Last shift:      No intake/output data recorded. Last 3 shifts:  0701 -  1900 In: 1800 [P.O.:1800] Out: 945 [Urine:945] Intake/Output Summary (Last 24 hours) at 3/26/2019 2250 Last data filed at 3/26/2019 3558 Gross per 24 hour Intake 960 ml Output 825 ml Net 135 ml Physical Exam:  
General:  Alert, cooperative, no distress, appears stated age, sitting up in bed wearing nasal cannula, appears frail Head:  Normocephalic, without obvious abnormality, atraumatic. Eyes:  Conjunctivae/corneas clear. PERRL, EOMs intact. Nose: Nares normal. Septum midline. Mucosa normal. No drainage or sinus tenderness. Throat: Lips, mucosa, and tongue normal.  No oral lesions, poor dentition Neck: Supple, symmetrical, trachea midline, no adenopathy, no carotid bruit and no JVD. Lungs:    Poor air entry in left base with decreased breath sounds, right is clear to auscultation, no wheezes, rales, rhonchi Heart:  Regular rate and rhythm, S1, S2 normal, no murmur, click, rub or gallop. Abdomen:   Soft, non-tender. Bowel sounds normal. No masses,  No organomegaly. Extremities: Extremities normal, atraumatic, no cyanosis or edema. Or clubbing Pulses: 2+ and symmetric all extremities. Skin: Skin color, texture, turgor normal. No rashes or lesions Lymph nodes: Cervical, supraclavicular, and axillary nodes normal.  
Neurologic: Grossly nonfocal, grossly intact strength and coordination Data review:  
 
Recent Results (from the past 24 hour(s)) PTT Collection Time: 03/26/19  4:04 AM  
Result Value Ref Range aPTT 48.0 (H) 23.0 - 36.4 SEC Imaging: 
I have personally reviewed the patients radiographs and have reviewed the reports: XR Results (most recent): 
Results from Hospital Encounter encounter on 03/13/19 XR CHEST PA LAT Narrative History: Evaluate for reaccumulation of pleural fluid. COMPARISON: 3/22/2019 Frontal and lateral views of the chest. 
 
FINDINGS: 
 
Chronic osseous findings without acute osseous abnormality. Stable cardiac silhouette. Atherosclerosis noted. Linear opacity is seen overlying the right upper lung zone on the frontal 
projection. There is also a tubular-like structure overlying the right 
hemithorax on the frontal view. Correlate clinically. Moderate right pleural effusion with adjacent airspace consolidation grossly 
similar to prior examination. Probable enlarging small left layering pleural 
effusion. Bilateral interstitial opacities persist. 
  
 Impression IMPRESSION: 
 
Right pleural effusion with adjacent airspace consolidation stable. Persistent 
bilateral interstitial opacities. Probable enlarging small left pleural effusion. Tubular structure overlying the right hemithorax on the frontal projection. Correlate clinically. Please see report for additional findings and details.   
 
 
CT Results (most recent): 
 Results from Hospital Encounter encounter on 03/13/19 CT NECK SOFT TISSUE W CONT Narrative EXAM: CT NECK SOFT TISSUE W CONT INDICATION: Admitted with respiratory failure, heart failure, pleural effusions, 
shortness of breath, hypotension with voice changes, stridor and recent 
intubation. COMPARISON: CT chest 3/13/2019. CONTRAST: 70 mL of Isovue-300. TECHNIQUE: Axial CT neck images obtained following administration of 70 cc Isovue-300 IV contrast. Subsequent coronal and sagittal reformatted views. All 
CT scans at this facility are performed using dose optimization technique as 
appropriate to a performed exam, to include automated exposure control, 
adjustment of the mA and/or kV according to patient size (including appropriate 
matching first site-specific examinations), or use of iterative reconstruction 
technique. FINDINGS: 
Moderate brain parenchymal volume loss is incompletely visualized. Status post 
cataract surgery. There is leftward nasal septum deviation. Nasal cavity and 
oral cavity are otherwise normal. Vocal folds are symmetric. No focal and 
epiglottis are normal. Parotid and submandibular glands are symmetric. There is 
a homogenous 1.2 x 0.9 cm soft tissue nodule in the left parotid (series 2, 
image 42). Small rounded opacities in the ethmoid sinuses anteriorly with larger 
rounded opacification in the right maxillary sinus most compatible with mucous 
retention cysts or polyps. Mastoid air cells are patent. Moderate to large bilateral pleural effusions, as before. Similar partially 
calcified and mildly spiculated 3.7 x 2.4 cm consolidation at the posterior 
right apex (4). There is dependent groundglass and reticulations with patchy and 
other subpleural opacities, overall similar. Moderate atherosclerosis. There is severe stenosis of the bilateral carotid 
arteries at the bulb and at the proximal internal carotid arteries. Stenosis at 
the carotid siphons. Osteopenia. Severe degenerative disc disease C5-7. Impression IMPRESSION: 
1. No acute findings to explain patient's symptoms. 2.  Similar moderate to large bilateral pleural effusions. 3.  Similar findings of likely mild pulmonary edema and other patchy opacities. Similar partially calcified spiculated consolidation right lung apex, possibly 
inflammatory changes but malignancy is not excluded. 4.  Severe stenosis bilateral carotid arteries. 5.  Moderate brain parenchymal volume loss. 6.  Homogenous nodule left parotid a represent a lymph node or other parotid 
mass. No priors for comparison. Follow-up likely not indicated given patient's 
age. 03/13/19 ECHO ADULT FOLLOW-UP OR LIMITED 03/15/2019 3/15/2019 Narrative · Left ventricular severely decreased systolic function. Estimated left  
ventricular ejection fraction is 26 - 30%. Visually measured ejection  
fraction. Left ventricular mild concentric hypertrophy. Abnormal left  
ventricular wall motion as described on the wall scoring diagram below. · Severe tricuspid valve regurgitation is present. Moderate to severe  
pulmonary hypertension is present. · Moderate mitral valve regurgitation. Signed by: MD Jennifer Nava MD/MPH Pulmonary, Critical Care Medicine [facility]

## 2019-03-27 NOTE — PROGRESS NOTES
PT eval order received and chart reviewed. Unable to see patient at this time as respiratory in room with patient for treatment. Will follow up as patient schedule allows. Thank you for this referral. Ankita Rangel, PT, DPT.

## 2019-03-27 NOTE — PROGRESS NOTES
[facility] - Progress Note Admit Date: 3/13/2019 The patient was seen, examined, and independently evaluated and I agree with the below assessment and plan by Seema Cedillo NP with the following comments. This gentleman is showing slow steady improvement despite advanced age and no new recommendations at this time. Assessment:  
 
Hospital Problems  Date Reviewed: 3/1/2019 Codes Class Noted POA Congestive heart disease ([facility] [address] ICD-10-CM: I50.9 ICD-9-CM: 428.0  3/13/2019 Unknown  
   
  
-Acute on chronic systolic heart failure.  Patient with moderate- large bilateral pleural effusions which have been now drained with serial thoracentesis.  His breathing is better though he is still quite weak and deconditioned. Repeat CXR today continued to show moderate bilateral effusions which were shown some improvement since his last thoracentesis. -NSTEMI, suspect secondary in setting of acute illness, troponin peak 7.58, s/p PCI to LAD 3/11/2019 with occluded mid RCA which appeared to be chronic medically managed, ECG with known LBBB, denies CP. 
-Ischemic cardiomyopathy with EF 26-30% on echo this admission, unchanged from last admission. Conservatively managing in setting of advanced age. -CAD s/p LAD PCI with DEMAR 3/11/2019 with previous PCI to LCx 2011. Cath 3/11/2019 (Occluded mid RCA which appears to be chronic. There is faint collateral from the left coronary system, Left main artery with ostial 30-40%, LAD mid focal severely calcified tortuous 99% stenosis with CARLENE II flow, Circumflex coronary artery with diffuse mild 20-30% stenosis throughout). Discharged on ASA, plavix, statin, BB. 
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   H&H relatively stable 
-Mild kidney injury. 
-Peripheral vascular disease.  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  Because of this his blood pressure should be checked in his right arm. -Hypertension. Low normal on admission  
-Dyslipidemia. On statin. -H/o intermittent LBBB, now appears persistent. 
-Asthma, former smoker. -Advanced age, full code, but independent, lives alone and cares for self, significant decline in functional capacity follow recent hospital stays. 
  
Primary cardiologist Dr. Seamus Licona. 
  
 
Plan:  
 
- Continue ASA, Statin, diuretics - Patient is S/P right thoracentesis yesterday 
- Continue midodrine - Continue to hold antihypertensives until BP stabilizes - Continue PT/OT 
- Possible SNF placement on discharge Subjective:  
 
Patient denies any CV symptoms, improved breathing after thoracentesis Objective:  
  
Patient Vitals for the past 8 hrs: 
 Temp Pulse Resp BP SpO2  
03/27/19 0834 97.9 °F (36.6 °C) 88 16 97/58 99 % 03/27/19 0703     98 % 03/27/19 0509 98.4 °F (36.9 °C) 92 20 92/50 99 % 03/27/19 0145     98 % Patient Vitals for the past 96 hrs: 
 Weight  
03/27/19 0509 148 lb 14.4 oz (67.5 kg) 03/26/19 0438 152 lb 3.2 oz (69 kg) 03/25/19 0351 148 lb 3.2 oz (67.2 kg) 03/24/19 0400 152 lb 1.9 oz (69 kg) Intake/Output Summary (Last 24 hours) at 3/27/2019 7459 Last data filed at 3/27/2019 7785 Gross per 24 hour Intake 720 ml Output 650 ml Net 70 ml Physical Exam: 
General:  alert, cooperative, no distress, appears stated age Neck:  nontender Lungs:  diminished breath sounds R anterior, L anterior Heart:  regular rate and rhythm, S1, S2 normal, 2/6 systolic murmur, no click, rub or gallop Abdomen:  abdomen is soft without significant tenderness, masses, organomegaly or guarding Extremities:  extremities normal, atraumatic, no cyanosis or edema Data Review:  
 
Labs: Results:  
   
Chemistry Recent Labs  
  03/25/19 
1817 CREA 1.47* CBC w/Diff Recent Labs  
  03/25/19 
5087 WBC 6.6  
RBC 3.28* HGB 8.9* HCT 28.5*  
 Cardiac Enzymes No results found for: CPK, CK, CKMMB, CKMB, RCK3, CKMBT, CKNDX, CKND1, CHRISTINE, TROPT, TROIQ, NGHIA, TROPT, TNIPOC, BNP, BNPP Coagulation Recent Labs  
  03/27/19 
0440 03/26/19 
0404 03/25/19 
1720 PTP  --   --  14.3 INR  --   --  1.1 APTT 43.9* 48.0*  --   
   
Lipid Panel Lab Results Component Value Date/Time Cholesterol, total 87 03/08/2019 05:28 AM  
 HDL Cholesterol 44 03/08/2019 05:28 AM  
 LDL, calculated 30 03/08/2019 05:28 AM  
 VLDL, calculated 13 03/08/2019 05:28 AM  
 Triglyceride 65 03/08/2019 05:28 AM  
 CHOL/HDL Ratio 2.0 03/08/2019 05:28 AM  
  
BNP No results found for: BNP, BNPP, XBNPT Liver Enzymes No results for input(s): TP, ALB, TBIL, AP, SGOT, GPT in the last 72 hours. No lab exists for component: DBIL Digoxin Thyroid Studies Lab Results Component Value Date/Time TSH 1.22 03/07/2019 05:29 AM  
    
 
Signed By: Ana Laura oCte NP March 27, 2019

## 2019-03-27 NOTE — PROGRESS NOTES
Bedside shift change report given to Fabi RN (oncoming nurse) by Marla Rosales RN (offgoing nurse). Report included the following information SBAR, Kardex, MAR and Cardiac Rhythm NSR 87.

## 2019-03-27 NOTE — PROCEDURES
RADIOLOGY POST THORACENTESIS NOTE March 27, 2019       2:21 PM  
 
:  Bartolo Gustafson PA-C Assistant:  None. Procedure:  US-guided left thoracentesis Pre-operative Diagnosis: Left pleural effusion Post-operative Diagnosis: same Procedure Details: Informed consent obtained. Standard aseptic technique. Ultrasound guidance. Left posterior intercostal approach. 1% lidocaine for local anesthesia. 5 [place] Stefani Yueh sheathed needle connected to vacuum bottle. 1.35L of fluid removed. Please see final dictated report for full details. Complications:  No immediate. Estimated blood Loss:  Minimal 
            
Condition: Stable. Impression:  Successful thoracentesis. Plan: Post procedure chest xray pending.  
 
 
Archie Mosquera

## 2019-03-27 NOTE — PROGRESS NOTES
PT eval order received and chart reviewed. Unable to see patient at this time as patient is off unit. Will follow up as patient schedule allows. Thank you for this referral. Tiffanie Carmona, PT, DPT.

## 2019-03-27 NOTE — ROUTINE PROCESS
Assumed patient care. Bedside shift report received from [facility]. Patient in bed, awake, alert and oriented x3. Daughter Rich Gorman at the bedside,updated of current plan of care. HOB elevated to 35 degrees. Denies pain or discomforts at this time. Call light placed within reach. Bed in low position. 7:47 AM - Bedside shift change report given to Tran Umanzor (oncoming nurse) by Lawence Boeck RN (offgoing nurse). Report given with SBAR, Kardex, Intake/Output, MAR and Recent Results.

## 2019-03-27 NOTE — DISCHARGE INSTRUCTIONS
DISCHARGE SUMMARY from Nurse    PATIENT INSTRUCTIONS:    After general anesthesia or intravenous sedation, for 24 hours or while taking prescription Narcotics:  · Limit your activities  · Do not drive and operate hazardous machinery  · Do not make important personal or business decisions  · Do  not drink alcoholic beverages  · If you have not urinated within 8 hours after discharge, please contact your surgeon on call. Report the following to your surgeon:  · Excessive pain, swelling, redness or odor of or around the surgical area  · Temperature over 100.5  · Nausea and vomiting lasting longer than 4 hours or if unable to take medications  · Any signs of decreased circulation or nerve impairment to extremity: change in color, persistent  numbness, tingling, coldness or increase pain  · Any questions    What to do at Home:  Recommended activity: Activity as tolerated, assistance with 1-2 person. If you experience any of the following symptoms fever, chills, swelling and or pain in arms or legs, vomiting for >4 hours or unable to keep down meds please follow up with primary care physician. *  Please give a list of your current medications to your Primary Care Provider. *  Please update this list whenever your medications are discontinued, doses are      changed, or new medications (including over-the-counter products) are added. *  Please carry medication information at all times in case of emergency situations. These are general instructions for a healthy lifestyle:    No smoking/ No tobacco products/ Avoid exposure to second hand smoke  Surgeon General's Warning:  Quitting smoking now greatly reduces serious risk to your health.     Obesity, smoking, and sedentary lifestyle greatly increases your risk for illness    A healthy diet, regular physical exercise & weight monitoring are important for maintaining a healthy lifestyle    You may be retaining fluid if you have a history of heart failure or if you experience any of the following symptoms:  Weight gain of 3 pounds or more overnight or 5 pounds in a week, increased swelling in our hands or feet or shortness of breath while lying flat in bed. Please call your doctor as soon as you notice any of these symptoms; do not wait until your next office visit. Recognize signs and symptoms of STROKE:    F-face looks uneven    A-arms unable to move or move unevenly    S-speech slurred or non-existent    T-time-call 911 as soon as signs and symptoms begin-DO NOT go       Back to bed or wait to see if you get better-TIME IS BRAIN. Warning Signs of HEART ATTACK     Call 911 if you have these symptoms:   Chest discomfort. Most heart attacks involve discomfort in the center of the chest that lasts more than a few minutes, or that goes away and comes back. It can feel like uncomfortable pressure, squeezing, fullness, or pain.  Discomfort in other areas of the upper body. Symptoms can include pain or discomfort in one or both arms, the back, neck, jaw, or stomach.  Shortness of breath with or without chest discomfort.  Other signs may include breaking out in a cold sweat, nausea, or lightheadedness. Don't wait more than five minutes to call 911 - MINUTES MATTER! Fast action can save your life. Calling 911 is almost always the fastest way to get lifesaving treatment. Emergency Medical Services staff can begin treatment when they arrive -- up to an hour sooner than if someone gets to the hospital by car. The discharge information has been reviewed with the patient and daughter, Lexa Prater. The patient verbalized understanding. Discharge medications reviewed with the patient and daughter and appropriate educational materials and side effects teaching were provided.   ___________________________________________________________________________________________________________________________________    Patient Education        Learning About a Pleural Effusion  What is it? A pleural effusion (say \"PLER-trenton yc-GDGJ-ruuf\") is the buildup of fluid in the space between tissues lining the lungs and the chest wall. Because of the fluid buildup, the lungs may not be able to expand completely. This can make it hard to breathe. A pleural empyema (say \"cj-vt-PN-muh\") is a problem that can happen with pleural effusion. Bacteria or other infections cause pus to form in the pleural fluid. But most pleural effusions don't become infected. What causes it? A pleural effusion has many causes. They include pneumonia, cancer, inflammation of the tissues around the lungs, and heart failure. What are the symptoms? Symptoms of a pleural effusion may include:  · Trouble breathing. · Shortness of breath. · Chest pain. · Fever. · A cough. A minor pleural effusion may not cause any symptoms. How is it diagnosed? A pleural effusion is usually diagnosed with an X-ray and a physical exam. The doctor listens to the airflow in your lungs. How is it treated? A pleural effusion can be treated by removing fluid from the space between the tissues around the lungs. This is done with a needle that's put into the chest (thoracentesis). A small amount of the fluid may be sent to a lab to find out what is causing the buildup of fluid. Removing the fluid may help to relieve symptoms, such as shortness of breath and chest pain. It can help the lungs to expand more fully. If the pleural effusion doesn't get better, a catheter may be placed in the chest. This is a flexible tube that allows fluid to drain from the lungs. The catheter stays in the chest until the doctor removes it. Some people may get a treatment that removes the fluid and then puts a medicine into the chest cavity. This helps to prevent too much fluid from building up again. A minor pleural effusion often goes away on its own. Doctors may need to treat the condition that is causing the pleural effusion.  For example, you may get medicines to treat pneumonia or congestive heart failure. When the condition is treated, the effusion usually goes away. For a pleural empyema, the pus needs to be drained. It may drain from a flexible tube placed in the chest. Or you may have surgery to drain it. You also will get antibiotics. Follow-up care is a key part of your treatment and safety. Be sure to make and go to all appointments, and call your doctor if you are having problems. It's also a good idea to know your test results and keep a list of the medicines you take. Where can you learn more? Go to http://monty-nirali.info/. Enter A920 in the search box to learn more about \"Learning About a Pleural Effusion. \"  Current as of: September 5, 2018  Content Version: 11.9  © 2024-1774 [employer]. Care instructions adapted under license by [employer] (which disclaims liability or warranty for this information). If you have questions about a medical condition or this instruction, always ask your healthcare professional. [place] [address] any warranty or liability for your use of this information. Patient Education        Thoracentesis: What to Expect at Home  Your Recovery  Thoracentesis (say \"uswx-xc-ytq-LACEY-sis\") is a procedure to remove fluid from the space between the lungs and the chest wall (pleural space). This procedure may also be called a \"chest tap. \" It is normal to have a small amount of fluid in the pleural space. But too much fluid can build up because of problems such as infection, heart failure, or lung cancer. The procedure may have been done to help with shortness of breath and pain caused by the fluid buildup. Or you may have had this procedure so the doctor could test the fluid to find the cause of the buildup. Your chest may be sore where the doctor put the needle or catheter into your skin (the puncture site). This usually gets better after a day or two.  You can go back to work or your normal activities as soon as you feel up to it. If the doctor sent the fluid to a lab for testing, it may take several days to get the results. The doctor or nurse will discuss the results with you. This care sheet gives you a general idea about how long it will take for you to recover. But each person recovers at a different pace. Follow the steps below to feel better as quickly as possible. How can you care for yourself at home? Activity    · Rest when you feel tired. Getting enough sleep will help you recover.     · Avoid strenuous activities, such as bicycle riding, jogging, weight lifting, or aerobic exercise, until your doctor says it is okay.     · You may shower. Do not take a bath until the puncture site has healed, or until your doctor tells you it is okay.     · Ask your doctor when you can drive again.     · You may need to take 1 or 2 days off from work. It depends on the type of work you do and how you feel. Diet    · You can eat your normal diet.     · Drink plenty of fluids (unless your doctor tells you not to). Medicines    · Your doctor will tell you if and when you can restart your medicines. He or she will also give you instructions about taking any new medicines.     · If you take blood thinners, such as warfarin (Coumadin), clopidogrel (Plavix), or aspirin, be sure to talk to your doctor. He or she will tell you if and when to start taking those medicines again. Make sure that you understand exactly what your doctor wants you to do.     · Be safe with medicines. Take pain medicines exactly as directed. ? If the doctor gave you a prescription medicine for pain, take it as prescribed. ? If you are not taking a prescription pain medicine, ask your doctor if you can take an over-the-counter medicine. ? Do not take two or more pain medicines at the same time unless the doctor told you to. Many pain medicines have acetaminophen, which is Tylenol.  Too much acetaminophen (Tylenol) can be harmful.     · If you think your pain medicine is making you sick to your stomach:  ? Take your medicine after meals (unless your doctor has told you not to). ? Ask your doctor for a different pain medicine.     · If your doctor prescribed antibiotics, take them as directed. Do not stop taking them just because you feel better. You need to take the full course of antibiotics.    Care of the puncture site    · Wash the area daily with warm, soapy water, and pat it dry. Don't use hydrogen peroxide or alcohol, which may delay healing. You may cover the area with a gauze bandage if it weeps or rubs against clothing. Change the bandage every day.     · Keep the area clean and dry. Follow-up care is a key part of your treatment and safety. Be sure to make and go to all appointments, and call your doctor if you are having problems. It's also a good idea to know your test results and keep a list of the medicines you take. When should you call for help? Call 911 anytime you think you may need emergency care. For example, call if:    · You passed out (lost consciousness).     · You have severe trouble breathing.     · You have sudden chest pain and shortness of breath, or you cough up blood.    Call your doctor now or seek immediate medical care if:    · You have shortness of breath that is new or getting worse.     · You have new or worse pain in your chest, especially when you take a deep breath.     · You are sick to your stomach or cannot keep fluids down.     · You have a fever over 100°F.     · Bright red blood has soaked through the bandage over your puncture site.     · You have signs of infection, such as:  ? Increased pain, swelling, warmth, or redness. ? Red streaks leading from the puncture site. ? Pus draining from the puncture site. ? Swollen lymph nodes in your neck, armpits, or groin.   ? A fever.     · You cough up a lot more mucus than normal, or your mucus changes color.    Watch closely for changes in your health, and be sure to contact your doctor if you have any problems. Where can you learn more? Go to http://monty-nirali.info/. Enter H547 in the search box to learn more about \"Thoracentesis: What to Expect at Home. \"  Current as of: 2018  Content Version: 11.9  © 2685-1836 [employer]. Care instructions adapted under license by [employer] (which disclaims liability or warranty for this information). If you have questions about a medical condition or this instruction, always ask your healthcare professional. [address] any warranty or liability for your use of this information. Patient armband removed and shredded    MyChart Activation    Thank you for requesting access to [employer]. Please follow the instructions below to securely access and download your online medical record. [employer] allows you to send messages to your doctor, view your test results, renew your prescriptions, schedule appointments, and more. How Do I Sign Up? 1. In your internet browser, go to [email or web address]  2. Click on the First Time User? Click Here link in the Sign In box. You will be redirect to the New Member Sign Up page. 3. Enter your [employer] Access Code exactly as it appears below. You will not need to use this code after youve completed the sign-up process. If you do not sign up before the expiration date, you must request a new code. Re-APP Access Code: [email or web address]  Expires: 2019  4:59 PM (This is the date your Re-APP access code will )    4. Enter the last four digits of your Social Security Number (xxxx) and Date of Birth (mm/dd/yyyy) as indicated and click Submit. You will be taken to the next sign-up page. 5. Create a Re-APP ID. This will be your [employer] login ID and cannot be changed, so think of one that is secure and easy to remember. 6. Create a Re-APP password. You can change your password at any time. 7. Enter your Password Reset Question and Answer. This can be used at a later time if you forget your password. 8. Enter your e-mail address. You will receive e-mail notification when new information is available in [address] 9. Click Sign Up. You can now view and download portions of your medical record. 10. Click the Download Summary menu link to download a portable copy of your medical information. Additional Information    If you have questions, please visit the Frequently Asked Questions section of the [employer] website at [email or web address] Remember, [employer] is NOT to be used for urgent needs. For medical emergencies, dial 911.

## 2019-03-28 NOTE — PROGRESS NOTES
Problem: Self Care Deficits Care Plan (Adult) Goal: *Acute Goals and Plan of Care (Insert Text) Description Occupational Therapy Goals Initiated 3/18/2019 within 7 day(s). 1.  Patient will perform functional standing activity with F balance x 1-3 min in order to prep for ADL participation. 2.  Patient will participate in upper extremity therapeutic exercise/activities with modified independence for 10 minutes. 3.  Patient will utilize energy conservation techniques during functional activities with min verbal cues. 4. Patient will perform toileting transfers with SBA and good safety awareness with BSC/raised toilet. Initiated 3/18/2019 within 7 day(s). 1.  Patient will perform upper body dressing/bathing with supervision/set-up (goal met) 2. Patient will perform grooming seated at EOB with F balance x 5 min with supervision/set-up. (goal met) 3. Patient will perform sit>stand with CGA in order to prep for participation with ADLs. (upgrade) 4. Patient will participate in upper extremity therapeutic exercise/activities with modified independence for 5-8 minutes. 5.  Patient will utilize energy conservation techniques during functional activities with min verbal cues. Outcome: Progressing Towards Goal 
 OCCUPATIONAL THERAPY TREATMENT Patient: Stephen Hensley (77 y.o. male) Date: 3/28/2019 [facility] [I50.9] <principal problem not specified> Precautions: Fall Chart, occupational therapy assessment, plan of care, and goals were reviewed. ASSESSMENT: 
Pt sleeping with daughter in room upon approach for OT; daughter agreeable to wake pt. Pt woke up to verbal/tactile stimuli and agreeable to skilled OT services. He maneuvered to EOB with Supervision in prep for B UE therex. Noted pt on 2L supplemental O2. He completed therex with mod rest breaks between reps and deep breathing exercises encouraged throughout session.  Pt tolerated approx 20 mins of therex sitting EOB prior to complaint of lower back pain. He completed x2 scoots toward [facility] with Supervision and transferred back to supine with mgmt of catheter only. All needs left in reach and pt comfortable at end of session. Pt and daughter educated on difference of intensity between Inpatient Rehab vs SNF; daughter appreciative of education. Progression toward goals: 
?          Improving appropriately and progressing toward goals ? Improving slowly and progressing toward goals ? Not making progress toward goals and plan of care will be adjusted PLAN: 
Patient continues to benefit from skilled intervention to address the above impairments. Continue treatment per established plan of care. Discharge Recommendations:  Aric Kim Further Equipment Recommendations for Discharge:  shower chair, BSC, RW    
  
 
SUBJECTIVE:  
Patient stated ? My back is starting to hurt. ? OBJECTIVE DATA SUMMARY:  
Cognitive/Behavioral Status: 
Neurologic State: Alert Orientation Level: Oriented X4(min vcs ) Cognition: Follows commands Safety/Judgement: Fall prevention Functional Mobility and Transfers for ADLs: 
Bed Mobility: 
Supine to Sit: Supervision Balance: 
Sitting: Intact Therapeutic Exercises:  
Sitting EOB, pt completed B UE therex to increase strength/ROM in prep for ADLs. Using red theraband, pt completed 10x2 reps shoulder abduction/adduction and shoulder abduction/adduction (in both directions); attempted bicep curls using theraband, however pt unable to maintain band in left hand. Pt utilized min resistance and completed 10x2 reps bicep curls. Rest breaks provided between each rep with deep breathing exercises encouraged. Pain: 
Pt reported mild pain to left side of his head that \"came and went\" with one rep of right bicep curl.   
Per daughter report, pt states this happens off and on however they remain concerned about it. Education provided to encourage pt's daughter to report this to his physician. Activity Tolerance:   
Fair; pt required rest breaks with DB techniques Please refer to the flowsheet for vital signs taken during this treatment. After treatment:  
?  Patient left in no apparent distress sitting up in chair ? Patient left in no apparent distress in bed 
? Call bell left within reach ? Nursing notified ? Daughter present ? Bed alarm activated COMMUNICATION/EDUCATION:  
Pt and daughter educated on difference of intensity between Inpatient Rehab vs SNF. Pt's daughter appreciative of education and reported she has not been told this information; pt was denied for ARU 2/2 intensity of setting vs pt tolerance. Pt's daughter verbalized understanding. KATARINA Lazo/SENIA Time Calculation: 25 mins

## 2019-03-28 NOTE — PROGRESS NOTES
Problem: General Medical Care Plan Goal: *Vital signs within specified parameters Outcome: Progressing Towards Goal

## 2019-03-28 NOTE — PROGRESS NOTES
Patient will: 1. Tolerate PO trials with 0 s/s overt distress in 4/5 trials 2. Utilize compensatory swallow strategies/maneuvers (decrease bite/sip, size/rate, alt. liq/sol) with min cues in 4/5 trials 3. Perform oral-motor/laryngeal exercises to increase oropharyngeal swallow function with min cues 4. Complete an objective swallow study (i.e., MBSS) to assess swallow integrity, r/o aspiration, and determine of safest LRD, min A Rec:    
Soft solid with nectar-thick liquids Aspiration precautions HOB >45 during po intake, remain >30 for 30-45 minutes after po Small bites/sips; alternate liquid/solid with slow feeding rate Oral care TID Meds per pt preference SPEECH LANGUAGE PATHOLOGY DYSPHAGIA TREATMENT Patient: Joe Steen (30 y.o. male) Date: 3/28/2019 Diagnosis: [facility] [I50.9] <principal problem not specified> Precautions: aspiration Fall ASSESSMENT: 
Pt was seen at bedside for follow up dysphagia management. Per chart review: concern for possible aspiration. This AM, pt tolerated soft solid, puree, thin, and nectar-thick liquids without any overt s/sx of aspiration. Minimally increased mastication of reg solids noted, improved with soft solids. Laryngeal elevation appeared functional/timely to palpation. Will change diet to soft solid with nectar-thick liquids, aspiration precautions, oral care TID, and meds as tolerated. Reviewed thickening instructions, reasoning behind thickened liquids for decreased risk of aspiration, and safe swallowing techniques/strategies. SLP available for MBS, per MD discretion, if silent aspiration is a concern. Pt and daughter verbalized understanding and are in agreement with SLP POC. ST will continue to follow. Progression toward goals: 
?         Improving appropriately and progressing toward goals ? Improving slowly and progressing toward goals ?         Not making progress toward goals and plan of care will be adjusted PLAN: 
Recommendations and Planned Interventions: See above Patient continues to benefit from skilled intervention to address the above impairments. Continue treatment per established plan of care. Discharge Recommendations:  Aric Kim and To Be Determined SUBJECTIVE:  
Patient stated That doesn't taste too bad thick. OBJECTIVE:  
Cognitive and Communication Status: 
Neurologic State: Alert Orientation Level: Oriented X4 Cognition: Appropriate decision making Perception: Appears intact Perseveration: No perseveration noted Safety/Judgement: Fall prevention Dysphagia Treatment: 
Oral Assessment: 
Oral Assessment Labial: No impairment Dentition: Upper & lower dentures Oral Hygiene: adeequate Lingual: No impairment Velum: No impairment Mandible: No impairment P.O. Trials: 
 Patient Position: [facility] at [facility] Vocal quality prior to P.O.: Hoarse, Phonation breaks, Breathy Consistency Presented: Solid, Thin liquid, nectar-thick How Presented: Self-fed/presented, Cup/sip, Straw Bolus Acceptance: No impairment Bolus Formation/Control: Impaired Type of Impairment: Delayed, Mastication Propulsion: Delayed (# of seconds) Oral Residue: Less than 10% of bolus, Lingual 
 Initiation of Swallow: No impairment Laryngeal Elevation: Functional 
 Aspiration Signs/Symptoms: Infiltrate on chest xray Pharyngeal Phase Characteristics: No impairment, issues, or problems Effective Modifications: Small sips and bites, Alternate liquids/solids Cues for Modifications: Moderate Oral Phase Severity: Mild Pharyngeal Phase Severity : Mild PAIN: 
Pt reports 0/10 pain or discomfort prior to tx. Pt reports 0/10 pain or discomfort post tx. After treatment:  
?              Patient left in no apparent distress sitting up in chair ? Patient left in no apparent distress in bed ?              Call bell left within reach ? Nursing notified ? Caregiver present ? Bed alarm activated COMMUNICATION/EDUCATION:  
?              SLP educated pt with regard to compensatory swallow strategies and 
      aspiration/reflux precautions including: small bites/sips, 
      alternate liquids/solids, decrease feeding rate, HOB > 45 with all po, and 
                 upright in bed at 30 degrees after po for at least 45 
      minutes. Thank you for this referral. 
 
Ann Reilly M.S. CCC-SLP/L Speech-Language Pathologist

## 2019-03-28 NOTE — PROGRESS NOTES
[facility] Pulmonary, Critical Care, and Sleep Medicine Follow up patient note Name: Juliana Rodriguez MRN: [id number] : 1926 Hospital: [address] [place] [address] Date: 3/27/2019 Consulting physician: Teressa Pretty Reason: Pleural effusions IMPRESSION:  
· Acute hypoxic respiratory failure in a patient with HFrEF (EF 26%) · Recurrent transudative bilateral pleural effusion secondary to CHF exacerbation, with compressive atelectasis:  S/P thoracentesis multiple thoracenteses with one done last week and now again yesterday on the right - 1.6L, in the left side today for 1.35 L. Right-sided effusion is already meeting reaccumulate · Cough: Multifactorial, atelectasis versus silent aspiration · Suspect silent aspiration · CHF exacerbation (combined systolic and diastolic) · NEL in the setting of HFrEF - possibly cardiorenal syndrome · Hx of NSTEMi and S/P PCI to LAD on 3/11/19 and Ischemic cardiomyopathy and HFrEF and diastolic dysfunction · Anemia with hx of recent GI bleed. · Significant peripheral vascular disease CAD, ICA stenosis. · Hx of HTN and DLD · Hx of smoking - COPD from hx --likely mild, no evidence of exacerbation RECOMMENDATIONS:  
Advise to continue diuresis to maintain net negative fluid balance as renal function tolerates given significant bilateral effusions. Diuresis per primary service and Cardiology. Increase midodrine to 10 mg 3 times daily today Discussed with the patient and daughter again today at bedside that repeated therapeutic thoracentesis (especially frequently) is not the treatment for pleural effusions secondary to heart failure as it leads to large volume shifts and protein losses and malnutrition -- as well as potential procedural complications. He will likely have recurrence of effusion in the next few days.   Patient would qualify for a Pleurx catheter only if he was on hospice -- however he would likely require bilateral tubes which is generally not performed. I explained to them this was due to the risk of infection as well as malnutrition and progressive decompensation. Repeat CXR PA/Lat in the morning Antibiotics per primary service -- can likely discontinue as there is no infiltrates Supplemental oxygen to maintain SpO2 >88%. May use Bipap PRN Please assess for home oxygen need prior to discharge Aggressive pulmonary toileting/bronchial hygiene Frequent incentive spirometry Scheduled bronchodilators with duonebs q6h and pulmicort 0.5mg BID Aspiration precautions including elevating HOB >30deg and lifestyle modifications PT/OT, OOB, ambulate with assistance as tolerated DVT ppx per primary service Will follow Poor longterm prognosis which was discussed with the patient and his daughter Subjective:  
03/27/19 Patient seen and examined at bedside. No acute events overnight. Patient underwent thoracentesis today by IR, 1.35 L were removed. Patient reports improvement in dyspnea again. Denies any nausea, vomiting, diarrhea, chest pain. The daughter reports that the patient has coughing paroxysms that occur after eating meals. HPI per Dr. Anel Davis: This patient has been seen and evaluated at the request of Dr. Celia Bhatt for evaluation of hypoxic respiratory failure. Patient is a 80 y.o. male with past medical history significant for asthma, ischemic cardiomyopathy (S/P PCI), GI bleed (recent) and hypertension who has presented with 1 week history of worsening SOB and leg swelling. Patient is able to provide most of the history and states that he lives alone but does get some help from his daughter. No hx of cough, fevers, phlegm production. Complains of chest pains across the chest in the lower chest that started during the same period of time and is associated with cough or taking deep breaths. This pleuritic sounding chest pain is non radiating. Patient was admitted to hospital and his SOB was worked up and the work up included a CTA which was done on 3/13/19 and it showed bilateral GGOs and pleural effusions. Patient states that he is an ex smoker. Smoked 1ppd but quit 50 years ago. Worked for [employer], But denies any history of asbestos exposure. Past Medical History:  
Diagnosis Date  Asthma  Cardiac cath 04/28/2011 oLM 30%. pLAD 30%. oD1 50%. CX 90% (3 x 18 [place] DEMAR). dRCA 90%. RPLB subtotal.  RPDA 100%.  Cardiac echocardiogram 01/21/2014 EF 55-60%. Mod LVH. Gr 1 DDfx. Mild AS (mean grad 13). Mild AI. Unchanged from study of 11/30/11.  Cardiac nuclear imaging test, mod risk 01/22/2016 Intermediate risk. Medium-sized inferior, inferoseptal infarction. No ischemia. EF 54%. Nondiagnostic EKG on pharm stress test.  
 Carotid duplex 03/02/2016 Mod 50-69% bilateral ICA stenosis. Probable significant RECA stenosis. >50% stenosis of left subclavian. No significant change from study of 1/8/15.  Coronary artery disease Status post PCI with drug-eluting stent, [place] 3 x 18 mm in the proximal circumflex.  Hx of carotid artery disease ([address]  Hypercholesterolemia  Hypertension  Prostate cancer ([facility] [address] Past Surgical History:  
Procedure Laterality Date  HX CORONARY STENT PLACEMENT  4/28/11 PCI with drug-eluting stent, [place] 3 x 18 mm in the proximal circumflex (post dialated with 3.25 mm noncompliant balloon at high pressure). Prior to Admission medications Medication Sig Start Date End Date Taking? Authorizing Provider  
albuterol-ipratropium (DUO-NEB) 2.5 mg-0.5 mg/3 ml nebu 3 mL by Nebulization route every four (4) hours as needed (wheezing, sob). 3/20/19  Yes Krupa Fisher MD  
atorvastatin (LIPITOR) 40 mg tablet Take 1 Tab by mouth nightly.  3/12/19   Angelic Baron MD  
 carvedilol (COREG) 3.125 mg tablet Take 1 Tab by mouth two (2) times daily (with meals). 3/12/19   Belen Julio MD  
lisinopril (PRINIVIL, ZESTRIL) 5 mg tablet Take 1 Tab by mouth daily. 3/13/19   Belen Julio MD  
nitroglycerin (NITROSTAT) 0.4 mg SL tablet 1 Tab by SubLINGual route every five (5) minutes as needed for Chest Pain. 3/12/19   Belen Julio MD  
furosemide (LASIX) 20 mg tablet Take 1 Tab by mouth daily. 3/12/19   Belen Julio MD  
potassium chloride SR (KLOR-CON 10) 10 mEq tablet Take 1 Tab by mouth daily. 3/12/19   Belen Julio MD  
OTHER Cbc, BMP in 1 week Dx: CHF Fax results to Dr. Nanda Clay 3/12/19   Belen Julio MD  
budesonide-formoterol (SYMBICORT) 160-4.5 mcg/actuation HFAA Take 2 Puffs by inhalation two (2) times a day. 3/1/19   Briseyda Daniel MD  
glycopyrrolate-formoterol (BEVESPI AEROSPHERE) 9-4.8 mcg HFAA Take 2 Inhalation by inhalation two (2) times a day. 3/1/19   Ganesh Pritchett MD  
clopidogrel (PLAVIX) 75 mg tab TAKE 1 TABLET EVERY DAY 19   Briseyda Daniel MD  
omeprazole (PRILOSEC) 40 mg capsule Take 1 Cap by mouth daily. 19   Emil An MD  
albuterol (PROVENTIL HFA, VENTOLIN HFA, PROAIR HFA) 90 mcg/actuation inhaler Take 2 Puffs by inhalation every four (4) hours as needed for Wheezing or Shortness of Breath. 3/2/15   AMITA Tate  
tamsulosin (FLOMAX) 0.4 mg capsule Take 1 Cap by mouth daily. 3/13/13   Ganesh Pritchett MD  
aspirin delayed-release 81 mg tablet Take 81 mg by mouth daily. Ganesh Pritchett MD  
 
No Known Allergies Social History Tobacco Use  Smoking status: Former Smoker Packs/day: 1.00 Years: 15.00 Pack years: 15.00 Last attempt to quit: 1960 Years since quittin.8  Smokeless tobacco: Never Used Substance Use Topics  Alcohol use: No  
  
History reviewed. No pertinent family history. @DBLINKMRCHARTING(EPT,7576)@ Immunization status: up to date and documented. Current Facility-Administered Medications Medication Dose Route Frequency  midodrine (PROAMITINE) tablet 10 mg  10 mg Oral TID WITH MEALS  
 albuterol-ipratropium (DUO-NEB) 2.5 MG-0.5 MG/3 ML  3 mL Nebulization Q6H RT  
 budesonide (PULMICORT) 500 mcg/2 ml nebulizer suspension  500 mcg Nebulization BID RT  
 furosemide (LASIX) tablet 10 mg  10 mg Oral ACB&D  
 heparin (porcine) injection 5,000 Units  5,000 Units SubCUTAneous Q8H  
 atorvastatin (LIPITOR) tablet 40 mg  40 mg Oral QHS  aspirin chewable tablet 81 mg  81 mg Oral DAILY  tamsulosin (FLOMAX) capsule 0.4 mg  0.4 mg Oral DAILY  famotidine (PEPCID) tablet 20 mg  20 mg Oral DAILY  sodium chloride (NS) flush 5-40 mL  5-40 mL IntraVENous Q8H Review of Systems: A comprehensive review of systems was negative except for that written in the HPI. Objective: 
Vital Signs:   
Visit Vitals BP 93/59 (BP 1 Location: Left arm, BP Patient Position: At rest) Pulse 90 Temp 98.3 °F (36.8 °C) Resp 18 Ht 5' 7\" (1.702 m) Wt 67.5 kg (148 lb 14.4 oz) SpO2 98% BMI 23.32 kg/m² O2 Device: Nasal cannula O2 Flow Rate (L/min): 2 l/min Temp (24hrs), Av.1 °F (36.7 °C), Min:97.3 °F (36.3 °C), Max:98.9 °F (37.2 °C) Intake/Output:  
Last shift:      1901 -  0700 In: -  
Out: 700 [MQPYC:236] Last 3 shifts:  07 -  190 In: 960 [P.O.:960] Out: 650 [Urine:650] Intake/Output Summary (Last 24 hours) at 3/27/2019 2226 Last data filed at 3/27/2019 2019 Gross per 24 hour Intake  Output 950 ml Net -950 ml Physical Exam:  
General:  Alert, cooperative, no distress, appears stated age, sitting up in bed wearing nasal cannula, appears frail Head:  Normocephalic, without obvious abnormality, atraumatic. Eyes:  Conjunctivae/corneas clear. PERRL, EOMs intact. Nose: Nares normal. Septum midline. Mucosa normal. No drainage or sinus tenderness. Throat: Lips, mucosa, and tongue normal.  No oral lesions, poor dentition Neck: Supple, symmetrical, trachea midline, no adenopathy, no carotid bruit and no JVD. Lungs:    Lungs have more air entry bilaterally, right lower lobe mildly decreased from yesterday, also right lower lobe rales present, no wheezes or rhonchi Heart:  Regular rate and rhythm, S1, S2 normal, no murmur, click, rub or gallop. Abdomen:   Soft, non-tender. Bowel sounds normal. No masses,  No organomegaly. Extremities: Extremities normal, atraumatic, no cyanosis or edema. Or clubbing Pulses: 2+ and symmetric all extremities. Skin: Skin color, texture, turgor normal. No rashes or lesions Lymph nodes: Cervical, supraclavicular, and axillary nodes normal.  
Neurologic: Grossly nonfocal, grossly intact strength and coordination Data review:  
 
Recent Results (from the past 24 hour(s)) PTT Collection Time: 03/27/19  4:40 AM  
Result Value Ref Range aPTT 43.9 (H) 23.0 - 36.4 SEC Imaging: 
I have personally reviewed the patients radiographs and have reviewed the reports: XR Results (most recent): 
Results from Hospital Encounter encounter on 03/13/19 XR CHEST SNGL V EXPIR Narrative History: Status post left thoracentesis. COMPARISON: March 26, 2019. Frontal view of the chest in expiration. FINDINGS: 
 
Cardiac silhouette is stable. Atherosclerosis noted. Chronic osseous findings without definite acute osseous abnormality given 
limitations of decreased bone mineral density. Interval increase in right basilar airspace opacity and probable small right 
pleural effusion. Interval decrease in left pleural effusion with persistent left basilar airspace 
opacity. Surgical clips noted in the upper abdomen. Impression IMPRESSION: 
 
Interval decrease in left pleural effusion.  Persistent small effusion noted with 
adjacent airspace consolidation. Interval increase in right basilar airspace opacity and pleural effusion. Please see report for details and further findings. CT Results (most recent): 
Results from Hospital Encounter encounter on 03/13/19 CT NECK SOFT TISSUE W CONT Narrative EXAM: CT NECK SOFT TISSUE W CONT INDICATION: Admitted with respiratory failure, heart failure, pleural effusions, 
shortness of breath, hypotension with voice changes, stridor and recent 
intubation. COMPARISON: CT chest 3/13/2019. CONTRAST: 70 mL of Isovue-300. TECHNIQUE: Axial CT neck images obtained following administration of 70 cc Isovue-300 IV contrast. Subsequent coronal and sagittal reformatted views. All 
CT scans at this facility are performed using dose optimization technique as 
appropriate to a performed exam, to include automated exposure control, 
adjustment of the mA and/or kV according to patient size (including appropriate 
matching first site-specific examinations), or use of iterative reconstruction 
technique. FINDINGS: 
Moderate brain parenchymal volume loss is incompletely visualized. Status post 
cataract surgery. There is leftward nasal septum deviation. Nasal cavity and 
oral cavity are otherwise normal. Vocal folds are symmetric. No focal and 
epiglottis are normal. Parotid and submandibular glands are symmetric. There is 
a homogenous 1.2 x 0.9 cm soft tissue nodule in the left parotid (series 2, 
image 42). Small rounded opacities in the ethmoid sinuses anteriorly with larger 
rounded opacification in the right maxillary sinus most compatible with mucous 
retention cysts or polyps. Mastoid air cells are patent. Moderate to large bilateral pleural effusions, as before. Similar partially 
calcified and mildly spiculated 3.7 x 2.4 cm consolidation at the posterior 
right apex (4).  There is dependent groundglass and reticulations with patchy and 
 other subpleural opacities, overall similar. Moderate atherosclerosis. There is severe stenosis of the bilateral carotid 
arteries at the bulb and at the proximal internal carotid arteries. Stenosis at 
the carotid siphons. Osteopenia. Severe degenerative disc disease C5-7. Impression IMPRESSION: 
1. No acute findings to explain patient's symptoms. 2.  Similar moderate to large bilateral pleural effusions. 3.  Similar findings of likely mild pulmonary edema and other patchy opacities. Similar partially calcified spiculated consolidation right lung apex, possibly 
inflammatory changes but malignancy is not excluded. 4.  Severe stenosis bilateral carotid arteries. 5.  Moderate brain parenchymal volume loss. 6.  Homogenous nodule left parotid a represent a lymph node or other parotid 
mass. No priors for comparison. Follow-up likely not indicated given patient's 
age. 03/13/19 ECHO ADULT FOLLOW-UP OR LIMITED 03/15/2019 3/15/2019 Narrative · Left ventricular severely decreased systolic function. Estimated left  
ventricular ejection fraction is 26 - 30%. Visually measured ejection  
fraction. Left ventricular mild concentric hypertrophy. Abnormal left  
ventricular wall motion as described on the wall scoring diagram below. · Severe tricuspid valve regurgitation is present. Moderate to severe  
pulmonary hypertension is present. · Moderate mitral valve regurgitation. Signed by: MD Poly Hawthorne MD/MPH Pulmonary, Critical Care Medicine [facility]

## 2019-03-28 NOTE — PROGRESS NOTES
[facility] Hospitalist Group Progress Note Patient: Ganesh Bhagat Age: 80 y.o. : 1926 MR#: [id number] SSN: [id number] Date/Time: 3/27/2019 9:34 AM 
 
Subjective/24-hour events:  
 
Pt seen with daughter at bedside. Seen post thoracentesis. States breathing improved. Assessment:  
-Acute hypoxic respiratory failure required BIPAP, likely due to heart failure and associated pleural effusions, pulm input noted, s/p thoracentesis bilateral (3/18 and 3/17, 3/22, 3/26,3/27), with some improvement in respiratory status initially but has gone back to having  C/o SOB post previous thoracentesis procedures. Limited therapeutic options at this stage, due to no cardiovascular reserve unable to diurese effectively. 
-Low bp in setting of heart failure started on midodrine 
-Pleural effusions s/p thoracentesis -Acute on chronic systolic CHF on lasix -Ischemic cardiomyopathy, EF 26-30% -CADRecent NSTEMI s/p PCI/stent placement  trop downtrending 
-Dyslipidemia 
-CKD 2-3 
-Chronic anemia 
-Valvular heart disease 
-Advanced age 
-Probable COPD 
-Tobacco use hx Overall limited treatment options, diuresing aggressively to improve his respiratory status would be at the expense of his bp. At this stage, any other measures (thoracentesis) futile and have no other treatment options available. Will discuss w/ daughter that at this point pt is ready for discharge and will work w/ CM in resolving disposition issues. Plan: 
-monitor hemodynamics, cont to hold all antihypertensives all together for now due to hypotension 
-midodrine increased 
-Cont to diurese as bp allows 
-Continue nebs/symbicort/usual pulmonary hygiene. . 
-Diuresis per cardiology -  Will hold bb and spironolactone due to low bp 
PT/OT evals. Based on current clinical status, will likely not be able to return home alone at discharge. Discussed w/ CM and daughter/patient.  At this stage, daughter is interested in SNF but states she has looked up SNF info online and has not found that she would like her father to go to. Case discussed with:  [x]Patient  []Family  [x]Nursing  []Case Management DVT Prophylaxis:  []Lovenox  []Hep SQ  []SCDs  []Coumadin   [x]On Heparin gtt Objective:  
VS:  
Visit Vitals BP 93/59 (BP 1 Location: Left arm, BP Patient Position: At rest) Pulse 90 Temp 98.3 °F (36.8 °C) Resp 18 Ht 5' 7\" (1.702 m) Wt 67.5 kg (148 lb 14.4 oz) SpO2 98% BMI 23.32 kg/m² Tmax/24hrs: Temp (24hrs), Av.1 °F (36.7 °C), Min:97.3 °F (36.3 °C), Max:98.9 °F (37.2 °C) Intake/Output Summary (Last 24 hours) at 3/27/2019 2103 Last data filed at 3/27/2019 2019 Gross per 24 hour Intake  Output 950 ml Net -950 ml General: alert, awake, in NAD. Nontoxic-appearing. Cardiovascular:  S1, S2. Tachycardic. Pulmonary:  No active wheezing clear breath sounds. GI:  Abdomen soft, NTTP. Extremities:  Warm, no ischemia. Neuro:  Awake and alert, motor nofocal. 
 
Labs:   
Recent Results (from the past 24 hour(s)) PTT Collection Time: 19  4:40 AM  
Result Value Ref Range aPTT 43.9 (H) 23.0 - 36.4 SEC Signed By: Mallorie Michelle MD   
 2019 9:34 AM

## 2019-03-28 NOTE — PROGRESS NOTES
NUTRITION Nutrition Screen RECOMMENDATIONS / PLAN:  
 
- Add daily multivitamin. 
- Monitor and encourage meal & supplement intake. - Continue RD inpatient monitoring and evaluation. NUTRITION INTERVENTIONS & DIAGNOSIS:  
 
[x] Meals/snacks: modify composition 
[x] Medical food supplement therapy: Ensure Enlive, 4x daily and Magic Cup BID [x] Vitamin and mineral supplement therapy: add MVI [x] Collaboration and referral of nutrition care: Obtained verbal order from Dr. Teressa Pretty to add daily MVI Nutrition Diagnosis: Predicted inadequate energy intake related to SOB and early satiety as evidenced by pt with poor/fair meal intake since admission, variable supplement consumption. Patient meets criteria for Severe Protein Calorie Malnutrition as evidenced by:  
ASPEN Malnutrition Criteria Acute Illness, Chronic Illness, or Social/Enviornmental: Acute illness Energy Intake: Less than/equal to 50% est energy req for greater than/equal to 5 days Body Fat: Moderate(orbital, thoracic, upper arm regions) Muscle Mass: Moderate(temple, clavicle, patellar body regions) ASPEN Malnutrition Score - Acute Illness: 18 
Acute Illness - Malnutrition Diagnosis: Severe malnutrition. ASSESSMENT:  
 
3/28: Requiring frequent thoracentesis, changed to nectar thickened liquids today. Consuming supplements and around 20% of breakfast this am. C/o early satiety. 3/26: S/p thoracentesis again today. Consuming around 25% of recent meals with variable supplement consumption. Diet upgraded to soft solids per SLP with slight improvement noted in meal intake/appetite. 3/22: Thoracentesis today. Meal intake remains poor but daughter reports pt consuming supplements. Diet changed to pureed for hopeful improvement in meal intake, pt's daughter report pt dislikes pureed meals and diet changed to mechanical soft. NFPE completed.  
3/19: S/p thoracentesis 3/17 with improvement in breathing but still reporting poor appetite and meal intake. Daughter encouraging and assisting with intake and stating pt consuming supplements. Agreeable to try softer foods with chopped meats to improve tolerance. Average po intake adequate to meet patients estimated nutritional needs:   [] Yes     [] No   [x] Unable to determine at this time Diet: DIET NUTRITIONAL SUPPLEMENTS All Meals, Breakfast, Lunch, Dinner, HS Snack; ENSURE ENLIVE 
DIET NUTRITIONAL SUPPLEMENTS Lunch, Dinner; MAGIC CUPS 
DIET DENTAL SOFT (SOFT SOLID) 2 [place]/2 Mildly Thick Food Allergies: NKFA Current Appetite:   [] Good     [x] Fair     [] Poor     [] Other Appetite/meal intake prior to admission:   [] Good     [] Fair     [x] Poor x 1 week     [] Other: 
Feeding Limitations:  [x] Swallowing difficulty: SLP following    [x] Chewing difficulty: loose fitting dentures    [] Other: 
Current Meal Intake:  
Patient Vitals for the past 100 hrs: 
 % Diet Eaten  
03/26/19 1802 20 % 03/26/19 1201 33 % 03/26/19 0900 20 % 03/25/19 1820 20 % 03/25/19 1309 20 % 03/25/19 0812 25 % BM: 3/26 Skin Integrity: WDL Edema:   [x] No     [] Yes Pertinent Medications: Reviewed: pepcid, lasix Recent Labs  
  03/25/19 
1817 CREA 1.47* Intake/Output Summary (Last 24 hours) at 3/28/2019 1051 Last data filed at 3/28/2019 3053 Gross per 24 hour Intake 120 ml Output 725 ml Net -605 ml Anthropometrics: 
Ht Readings from Last 1 Encounters:  
03/27/19 5' 7\" (1.702 m) Last 3 Recorded Weights in this Encounter 03/26/19 1828 03/27/19 6916 03/28/19 9742 Weight: 69 kg (152 lb 3.2 oz) 67.5 kg (148 lb 14.4 oz) 64 kg (141 lb 3.2 oz) Body mass index is 22.12 kg/m². Weight History: Pt and family report recent fluctuations in pt's weight hx 2/2 fluid retention, recent thoracentesis, and diuresis. Weight Metrics 3/28/2019 3/12/2019 3/7/2019 3/1/2019 2/23/2019 2/12/2019 1/24/2019 Weight 141 lb 3.2 oz 149 lb 0.5 oz - 161 lb 163 lb 2.3 oz 152 lb 157 lb BMI 22.12 kg/m2 - 23.34 kg/m2 25.22 kg/m2 25.55 kg/m2 23.11 kg/m2 23.87 kg/m2 Admitting Diagnosis: Congestive heart disease ([facility] [address] [I50.9] Pertinent PMHx: CAD, hypercholesterolemia, HTN, prostate cancer Education Needs:        [x] None identified  [] Identified - Not appropriate at this time  []  Identified and addressed - refer to education log Learning Limitations:   [x] None identified  [] Identified Cultural, Adventism & ethnic food preferences:  [x] None identified    [] Identified and addressed ESTIMATED NUTRITION NEEDS:  
 
Calories: 8368-1386 kcal (MSJx1.2-1.4) based on  [x] Actual BW: 70 kg      [] IBW Protein: 56-84 gm (0.8-1.2 gm/kg) based on  [x] Actual BW      [] IBW Fluid: 1 mL/kcal 
  
MONITORING & EVALUATION:  
 
Nutrition Goal(s): 1. Po intake of meals will meet >75% of patient estimated nutritional needs within the next 7 days. Outcome:  [x] Met/Ongoing    []  Not Met    [] New/Initial Goal  
 
Monitoring:   [x] Food and beverage intake   [x] Diet order   [x] Nutrition-focused physical findings   [x] Treatment/therapy   [] Weight   [] Enteral nutrition intake Previous Recommendations (for follow-up assessments only):     []   Implemented       [x]   Not Implemented (RD to address)      [] No Longer Appropriate     [] No Recommendation Made Discharge Planning: cardiac diet; consistency per SLP [x] Participated in care planning, discharge planning, & interdisciplinary rounds as appropriate Kim Morales RD Pager: [phone]

## 2019-03-28 NOTE — PROGRESS NOTES
03/28/19 1045 03/28/19 1120 03/28/19 1128 Vitals BP (!) 84/53 
(dinamap) (!) 84/50 
(manual w/ reg cuff) 110/58 
(manual w/ small cuff) Pulmonologist informed. MD suggested to continue monitoring,continue givng the modified lasix 20 mg PO.

## 2019-03-28 NOTE — PROGRESS NOTES
Problem: Mobility Impaired (Adult and Pediatric) Goal: *Acute Goals and Plan of Care (Insert Text) Description Physical Therapy Goals Initiated 3/18/2019, updated 3/25/19 and to be accomplished within 7 days. 1.  Patient will complete all bed mobility with modified independence in order to prepare for EOB/OOB activity. 2.  Patient will perform sit <> stand with supervision/set-up in order to prepare for OOB/gait activity. 3.  Patient will perform bed to chair transfers with supervision/set-up in order to promote mobility and encourage seated activity to progress towards their prior level of function. 4.  Patient will ambulate 100 feet with supervision/set-up using LRAD in order to prepare for safe negotiation of their environment. 5.  Patient will ascend/descend 3 steps with S handrail use with supervision/set-up in order to prepare for safe exit/entry into their home environment. Outcome: Progressing Towards Goal 
 PHYSICAL THERAPY TREATMENT Patient: Ann Messina (89 y.o. male) Date: 3/28/2019 Diagnosis: [facility] [I50.9] <principal problem not specified> Precautions: Fall Chart, physical therapy assessment, plan of care and goals were reviewed. OBJECTIVE/ ASSESSMENT: 
Patient found supine in bed willing to work with PT. Pt sat at EOB and stood to RW with decreased assistance needed. Pt ambulated an increased distance this tx. Pt fist ambulated 16 feet in room before returning to b/s chair. Pt then asked to use b/s commode. Pt ambulated to commode and had very small BM, justin-care performed by this LPTA in standing. Pt performed standing marches before ambulating back to chair. Pt performed LAQ, then was left with needs in reach. Pt progressing well toward goals. Education: 
?         Bed mobility ? Transfers ? Ambulation / gait ? Assistive device management ? Stairs ? Body mechanics ? Position change ? Therapeutic exercise ? Activity pacing / energy conservation ? Other: 
 
Progression toward goals: 
?      Improving appropriately and progressing toward goals ? Improving slowly and progressing toward goals ? Not making progress toward goals and plan of care will be adjusted PLAN: 
Patient continues to benefit from skilled intervention to address the above impairments. Continue treatment per established plan of care. Discharge Recommendations:  Aric Kim / Suzan Holter Further Equipment Recommendations for Discharge:  rolling walker SUBJECTIVE:  
Patient stated ? Are we going to walk?? OBJECTIVE DATA SUMMARY:  
Critical Behavior: 
Neurologic State: Alert Orientation Level: Oriented X4 Cognition: Follows commands Safety/Judgement: Fall prevention Functional Mobility Training: 
Bed Mobility: 
Supine to Sit: Supervision Transfers: 
Sit to Stand: Stand-by assistance Stand to Sit: Stand-by assistance Balance: 
Sitting: Intact Standing: Impaired; With support Standing - Static: Fair(+) Standing - Dynamic : Fair Ambulation/Gait Training: 
Distance (ft): 20 Feet (ft) Assistive Device: Walker, rolling Ambulation - Level of Assistance: Supervision Gait Abnormalities: Decreased step clearance Base of Support: Narrowed Speed/Janee: Slow Step Length: Right shortened;Left shortened Therapeutic Exercises:  
 
 
EXERCISE Sets Reps Active Active Assist  
Passive Self- assited ROM Comments Ankle Pumps   ? ? ? ?   
Quad Sets   ? ? ? ? Glut Sets   ? ? ? ? Short Arc Quads   ? ? ? ? Heel Slides   ? ? ? ? Straight Leg Raises   ? ? ? ? Hip Abd   ? ? ? ? Long Arc Quads 1 10 ? ? ? ? Bilaterally Seated Marching   ? ? ? ? Seated Knee Flexion   ? ? ? ? Standing Marching 1 10 ? ? ? ? Bilaterally Pain: 
Pre tx pain: 0 Post tx pain: 0 Pain Scale 1: Numeric (0 - 10) Pain Intensity 1: 0 
 
 Activity Tolerance:  
Fair Please refer to the flowsheet for vital signs taken during this treatment. After treatment:  
? Patient left in no apparent distress sitting up in chair ? Patient left in no apparent distress in bed 
? Call bell left within reach ? Nursing notified ? Caregiver present ? Bed alarm activated ? SCDs applied ? Ice applied Pham Machuca, ENID Time Calculation: 24 mins

## 2019-03-28 NOTE — PROGRESS NOTES
[facility] - Progress Note Admit Date: 3/13/2019 Assessment:  
 
Hospital Problems  Date Reviewed: 3/1/2019 Codes Class Noted POA Congestive heart disease ([facility] ICD-10-CM: I50.9 ICD-9-CM: 428.0  3/13/2019 Unknown  
   
  
-Acute on chronic systolic heart failure.  Patient with moderate- large bilateral pleural effusions which have been now drained with serial thoracentesis.  His breathing is better though he is still quite weak and deconditioned. Repeat CXR today continued to show moderate bilateral effusions which were shown some improvement since his last thoracentesis. -NSTEMI, suspect secondary in setting of acute illness, troponin peak 7.58, s/p PCI to LAD 3/11/2019 with occluded mid RCA which appeared to be chronic medically managed, ECG with known LBBB, denies CP. 
-Ischemic cardiomyopathy with EF 26-30% on echo this admission, unchanged from last admission. Conservatively managing in setting of advanced age. -CAD s/p LAD PCI with DEMAR 3/11/2019 with previous PCI to LCx 2011. Cath 3/11/2019 (Occluded mid RCA which appears to be chronic. There is faint collateral from the left coronary system, Left main artery with ostial 30-40%, LAD mid focal severely calcified tortuous 99% stenosis with CARLENE II flow, Circumflex coronary artery with diffuse mild 20-30% stenosis throughout). Discharged on ASA, plavix, statin, BB. 
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   H&H relatively stable 
-Mild kidney injury. 
-Peripheral vascular disease.  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  Because of this his blood pressure should be checked in his right arm. 
-Hypertension. Low normal on admission  
-Dyslipidemia. On statin. -H/o intermittent LBBB, now appears persistent. 
-Asthma, former smoker.  
-Advanced age, full code, but independent, lives alone and cares for self, significant decline in functional capacity follow recent hospital stays. 
  
 Primary cardiologist Dr. Jany Peterson. 
  
 
Plan:  
 
- Patient is s/p repeat thoracentesis yesterday 
- Continue ASA, Statin, diuretics - Continue midodrine - Continue to hold antihypertensives until BP stabilizes - Continue PT/OT 
- Possible SNF placement on discharge Subjective:  
 
Patient reports improved breathing, increased activity Objective:  
  
Patient Vitals for the past 8 hrs: 
 Temp Pulse Resp BP SpO2  
03/28/19 0922    92/56   
03/28/19 0920    (!) 85/54   
03/28/19 0903 97.8 °F (36.6 °C) 84 21 97/58 99 % 03/28/19 0844     99 % 03/28/19 0342 97.9 °F (36.6 °C) 86 18 105/64 99 % Patient Vitals for the past 96 hrs: 
 Weight  
03/28/19 0342 141 lb 3.2 oz (64 kg) 03/27/19 0509 148 lb 14.4 oz (67.5 kg) 03/26/19 0438 152 lb 3.2 oz (69 kg) 03/25/19 0351 148 lb 3.2 oz (67.2 kg) Intake/Output Summary (Last 24 hours) at 3/28/2019 1118 Last data filed at 3/28/2019 7659 Gross per 24 hour Intake 120 ml Output 725 ml Net -605 ml Physical Exam: 
General:  alert, cooperative, no distress, appears stated age Neck:  nontender Lungs:  diminished breath sounds R anterior, L anterior; rhonchi R side Heart:  regular rate and rhythm, S1, S2 normal, 2/6 systolic murmur, no click, rub or gallop Abdomen:  abdomen is soft without significant tenderness, masses, organomegaly or guarding Extremities:  extremities normal, atraumatic, no cyanosis or edema Data Review:  
 
Labs: Results:  
   
Chemistry Recent Labs  
  03/25/19 
1817 CREA 1.47* CBC w/Diff No results for input(s): WBC, RBC, HGB, HCT, PLT, GRANS, LYMPH, EOS, HGBEXT, HCTEXT, PLTEXT, HGBEXT, HCTEXT, PLTEXT in the last 72 hours. Cardiac Enzymes No results found for: CPK, CK, CKMMB, CKMB, RCK3, CKMBT, CKNDX, CKND1, CHRISTINE, TROPT, TROIQ, NGHIA, TROPT, TNIPOC, BNP, BNPP Coagulation Recent Labs  
  03/28/19 
0520 03/27/19 
0440  03/25/19 
1720 PTP  --   --   --  14.3 INR  --   --   --  1.1 APTT 53.0* 43.9*   < >  --   
 < > = values in this interval not displayed. Lipid Panel Lab Results Component Value Date/Time Cholesterol, total 87 03/08/2019 05:28 AM  
 HDL Cholesterol 44 03/08/2019 05:28 AM  
 LDL, calculated 30 03/08/2019 05:28 AM  
 VLDL, calculated 13 03/08/2019 05:28 AM  
 Triglyceride 65 03/08/2019 05:28 AM  
 CHOL/HDL Ratio 2.0 03/08/2019 05:28 AM  
  
BNP No results found for: BNP, BNPP, XBNPT Liver Enzymes No results for input(s): TP, ALB, TBIL, AP, SGOT, GPT in the last 72 hours. No lab exists for component: DBIL Digoxin Thyroid Studies Lab Results Component Value Date/Time TSH 1.22 03/07/2019 05:29 AM  
    
 
Signed By: Ronald Sol NP March 28, 2019

## 2019-03-28 NOTE — PROGRESS NOTES
Addended by: DELON RIVERA on: 1/13/2017 08:54 AM     Modules accepted: Orders     Bedside and Verbal shift change report received from  Lia Charles, KATYA (off going nurse). Report included the following information SBAR, Kardex, MAR and Recent Results. Assumed care patient in bed awake laying  comfortably. Fall prevention program maintained,call bell and bedside table within reach. No problems identified at this time,no complaints made by patient ,will continue care. 
  
8182- Patient left the floor via stretcher to radiology for C-XR. Tele box removed temporarily will place back once pt is back on the floor. 0491- Patient back on the floor. Telemetry box hooked back by the students. Patient now eating breakfast. 
 
1120- Helped patient lay back to bed. 1530- Help bathed by nursing students. Urine pouch changed. [address] off report given to [facility]IMO [facility]. Report included the following information SBAR, Kardex, MAR and Recent Results.

## 2019-03-28 NOTE — ROUTINE PROCESS
Assumed patient care. Bedside shift report received from [facility]. Patient in bed, awake, alert and oriented x3. Denies pain or discomforts at this time. Call light placed within reach. Bed in low position. Daughter at bedside. RT at bedside providing neb tx.  
 
7:30 AM - Bedside shift change report given to PETE Mendez (oncoming nurse) by Emerald Mendez RN (offgoing nurse). Report given with SBAR, Kardex, Intake/Output, MAR and Recent Results. Pt rested well overnight, all needs met.

## 2019-03-29 NOTE — PROGRESS NOTES
RESPIRATORY MEDICATION PROTOCOL ASSESSMENT Patient  Stephen Hensley     80 y.o.   male     3/29/2019  1:26 PM 
 
Breath Sounds Pre Procedure:  Breath Sounds Bilateral: Clear, Diminished Left Breath Sounds: Diminished Right Breath Sounds: Clear Breath Sounds Post Procedure: Breath Sounds Bilateral: Clear, Diminished Breathing pattern: Pre procedure  Breathing Pattern: Regular Post procedure  Breathing Pattern: Regular Cough: Pre procedure  Cough: Non-productive Post procedure Cough: Non-productive Heart Rate: Pre procedure Pulse: 89 
         Post procedure Pulse: 89 Resp Rate: Pre procedure  Respirations: 16 Post procedure Nebulizer Therapy: Current medications Aerosolized Medications: DuoNeb Problem List:  
Patient Active Problem List  
Diagnosis Code  Asthma J45.909  Prostate cancer ([facility] C61  Hypercholesterolemia E78.00  Angina, class I ([facility] I20.9  Left bundle branch block I44.7  Coronary artery disease I25.10  Hypertension I11.9  Dyslipidemia E78.5  Carotid artery disease ([facility]) I77.9  Aortic valve stenosis I35.0  Anemia D64.9  
 NSTEMI (non-ST elevated myocardial infarction) ([facility]) I21.4  Congestive heart disease ([facility]) I50.9 Patient alert and cooperative to use MDI: Yes 
 
Home Respiratory Therapy Regimen/Frequency:  YES Medication Symbicort, Bevespi aerosphere, Albuterol Device MDI Frequency BID, BID, PRN 
 
SEVERITY INDEX: 
 
ITEM 0 1 2 3 4 Score Respiratory Pattern and or Rate Regular 10-19 Regular 20-24  
24-30   
30-34 Severe SOB or  
Greater than 35 0 Breath Sounds Clear Occasional Wheeze Mild Wheezing Moderate Wheezing  wheezing/Absent breath sounds 1 Shortness of Breath None Dyspnea on Exertion Dyspnea at Rest Moderate Shortness of Breath at Rest Severe Shortness of Breath - Limited Speech 1  
 
 
 Total Score:  3 * Scoring Guidelines 0-4 pts:  PRN-BID  
5-7 pts:  BID, TID, QID 
8-9 pts:  TID, QID, Q6 
10-12 pts:  Q4-Q6 * - Guidelines used with clinical judgement. PRN Treatments can be ordered to supplement scheduled treatments. Regardless of score, frequency should not be less than normal home regimen. Recommended Order/Frequency:  TID nebs, work toward resuming home regimen Comments:  Requested to family to bring in patient's inhalers from home Respiratory Therapist: Tavo Velasquez, RRT

## 2019-03-29 NOTE — PROGRESS NOTES
[facility] Hospitalist Group Progress Note Patient: Eliza Mortensen Age: 80 y.o. : 1926 MR#: [id number] SSN: [id number] Date/Time: 3/28/2019 9:34 AM 
 
Subjective/24-hour events:  
 
Pt seen with daughter at bedside. Pt states he feels okay. Assessment:  
-Acute hypoxic respiratory failure required BIPAP, likely due to heart failure and associated pleural effusions, pulm input noted, s/p thoracentesis bilateral (3/18 and 3/17, 3/22, 3/26,3/27), with some improvement in respiratory status initially but has gone back to having  C/o SOB post previous thoracentesis procedures. Limited therapeutic options at this stage, due to no cardiovascular reserve unable to diurese effectively. 
-Low bp in setting of heart failure started on midodrine 
-Pleural effusions s/p repeated thoracenteses    
-Acute on chronic systolic CHF on lasix, doubled lasix dose to 20 bid for the day, pt's wt had gone down, has positive fluid balance however, despite increased dosing of lasix. -Ischemic cardiomyopathy, EF 26-30% -CADRecent NSTEMI s/p PCI/stent placement  trop downtrending 
 
-Dyslipidemia 
-CKD 2-3 
-Chronic anemia 
-Valvular heart disease 
-Advanced age 
-Probable COPD 
-Tobacco use hx Overall limited treatment options, diuresing aggressively to improve his respiratory status would be at the expense of his bp. At this stage, any other measures (thoracentesis) futile and have no other treatment options available. Will discuss w/ daughter that at this point pt is ready for discharge and will work w/ CM in resolving disposition issues. Plan: 
-monitor hemodynamics, cont to hold all antihypertensives all together for now due to hypotension 
- cont midodrine  
-Keep higher dose of lasix (20mg in am) and give 10 mg q evening 
-Cont to diurese as bp allows 
-Continue nebs/symbicort/usual pulmonary hygiene.   . 
--  Will hold bb and spironolactone due to low bp 
 PT/OT evals. Based on current clinical status, will likely not be able to return home alone at discharge. Discussed w/ CM and daughter/patient. At this stage, daughter is interested in SNF but states she has looked up SNF info online and has not found that she would like her father to go to. Case discussed with:  [x]Patient  []Family  [x]Nursing  []Case Management DVT Prophylaxis:  []Lovenox  []Hep SQ  []SCDs  []Coumadin   [x]On Heparin gtt Objective:  
VS:  
Visit Vitals BP 90/46 (BP 1 Location: Right arm, BP Patient Position: At rest) Pulse 88 Temp 98.1 °F (36.7 °C) Resp 20 Ht 5' 7\" (1.702 m) Wt 64 kg (141 lb 3.2 oz) SpO2 97% BMI 22.12 kg/m² Tmax/24hrs: Temp (24hrs), Av.6 °F (36.4 °C), Min:97 °F (36.1 °C), Max:98.1 °F (36.7 °C) Intake/Output Summary (Last 24 hours) at 3/28/2019 2208 Last data filed at 3/28/2019 2123 Gross per 24 hour Intake 698 ml Output 375 ml Net 323 ml General: alert, awake, in NAD. Nontoxic-appearing. Cardiovascular:  S1, S2. Tachycardic. Pulmonary:  No active wheezing clear breath sounds. GI:  Abdomen soft, NTTP. Extremities:  Warm, no ischemia. Neuro:  Awake and alert, motor nofocal. 
 
Labs:   
Recent Results (from the past 24 hour(s)) PTT Collection Time: 19  5:20 AM  
Result Value Ref Range aPTT 53.0 (H) 23.0 - 36.4 SEC METABOLIC PANEL, BASIC Collection Time: 19 11:04 AM  
Result Value Ref Range Sodium 133 (L) 136 - 145 mmol/L Potassium 4.0 3.5 - 5.5 mmol/L Chloride 97 (L) 100 - 108 mmol/L  
 CO2 30 21 - 32 mmol/L Anion gap 6 3.0 - 18 mmol/L Glucose 155 (H) 74 - 99 mg/dL BUN 40 (H) 7.0 - 18 MG/DL Creatinine 1.49 (H) 0.6 - 1.3 MG/DL  
 BUN/Creatinine ratio 27 (H) 12 - 20 GFR est AA 53 (L) >60 ml/min/1.73m2 GFR est non-AA 44 (L) >60 ml/min/1.73m2 Calcium 9.0 8.5 - 10.1 MG/DL Signed By: Caitlin Childs MD   
 2019 9:34 AM

## 2019-03-29 NOTE — PROGRESS NOTES
Bedside and Verbal shift change report received from  Chiki Hilliard RN (off going nurse). Report included the following information SBAR, Kardex, MAR and Recent Results. Assumed care patient in bed  Sleeping comfortably. Fall prevention program maintained,call bell and bedside table within reach. No problems identified at this time, 
,will continue care. 8050-   
 03/29/19 1121 Vitals BP (!) 89/46 MAP (Calculated) (!) 60 Due Midodrine given. MD's are  aware of pt's low BP's as long as patient is perfusing well. And mentation is  A & O x4.

## 2019-03-29 NOTE — PROGRESS NOTES
[facility] - Progress Note Admit Date: 3/13/2019 Assessment:  
 
Hospital Problems  Date Reviewed: 3/1/2019 Codes Class Noted POA Congestive heart disease ([facility] ICD-10-CM: I50.9 ICD-9-CM: 428.0  3/13/2019 Unknown  
   
  
-Acute on chronic systolic heart failure.  Patient with moderate- large bilateral pleural effusions which have been now drained with serial thoracentesis.  His breathing is better though he is still quite weak and deconditioned. Repeat CXR today continued to show moderate bilateral effusions which were shown some improvement since his last thoracentesis. -NSTEMI, suspect secondary in setting of acute illness, troponin peak 7.58, s/p PCI to LAD 3/11/2019 with occluded mid RCA which appeared to be chronic medically managed, ECG with known LBBB, denies CP. 
-Ischemic cardiomyopathy with EF 26-30% on echo this admission, unchanged from last admission. Conservatively managing in setting of advanced age. -CAD s/p LAD PCI with DEMAR 3/11/2019 with previous PCI to LCx 2011. Cath 3/11/2019 (Occluded mid RCA which appears to be chronic. There is faint collateral from the left coronary system, Left main artery with ostial 30-40%, LAD mid focal severely calcified tortuous 99% stenosis with CARLENE II flow, Circumflex coronary artery with diffuse mild 20-30% stenosis throughout). Discharged on ASA, plavix, statin, BB. 
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   H&H relatively stable 
-Mild kidney injury. 
-Peripheral vascular disease.  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  Because of this his blood pressure should be checked in his right arm. 
-Hypertension. Low normal on admission  
-Dyslipidemia. On statin. -H/o intermittent LBBB, now appears persistent. 
-Asthma, former smoker.  
-Advanced age, full code, but independent, lives alone and cares for self, significant decline in functional capacity follow recent hospital stays. 
  
 Primary cardiologist Dr. Seamus Licona. 
  
 
Plan:  
 
- Continue ASA, Statin, diuretics - Continue midodrine - Continue PT/OT as pt tolerates - Possible SNF placement on discharge - No further CV recommendations at this time Subjective:  
 
Patient in no acute distress at this time; denies any CV events overnight Objective:  
  
Patient Vitals for the past 8 hrs: 
 Temp Pulse Resp BP SpO2  
03/29/19 1121 97.9 °F (36.6 °C) 88 19 (!) 89/46 97 % 03/29/19 0905     99 % 03/29/19 0756 97.6 °F (36.4 °C) 80 19 93/56 97 % Patient Vitals for the past 96 hrs: 
 Weight  
03/29/19 0310 147 lb 8 oz (66.9 kg) 03/28/19 0342 141 lb 3.2 oz (64 kg) 03/27/19 0509 148 lb 14.4 oz (67.5 kg) 03/26/19 0438 152 lb 3.2 oz (69 kg) Intake/Output Summary (Last 24 hours) at 3/29/2019 1124 Last data filed at 3/29/2019 5997 Gross per 24 hour Intake 220 ml Output 475 ml Net -255 ml Physical Exam: 
General:  alert, cooperative, no distress, appears stated age Neck:  nontender Lungs:  diminished breath sounds R anterior, L anterior; rhonchi R side Heart:  regular rate and rhythm, S1, S2 normal, 2/6 systolic murmur, no click, rub or gallop Abdomen:  abdomen is soft without significant tenderness, masses, organomegaly or guarding Extremities:  extremities normal, atraumatic, no cyanosis or edema Data Review:  
 
Labs: Results:  
   
Chemistry Recent Labs  
  03/28/19 
1104 * * K 4.0  
CL 97* CO2 30 BUN 40* CREA 1.49* CA 9.0 AGAP 6  
BUCR 27* CBC w/Diff No results for input(s): WBC, RBC, HGB, HCT, PLT, GRANS, LYMPH, EOS, HGBEXT, HCTEXT, PLTEXT, HGBEXT, HCTEXT, PLTEXT in the last 72 hours. Cardiac Enzymes No results found for: CPK, CK, CKMMB, CKMB, RCK3, CKMBT, CKNDX, CKND1, CHRISTINE, TROPT, TROIQ, NGHIA, TROPT, TNIPOC, BNP, BNPP Coagulation Recent Labs  
  03/29/19 
0521 03/28/19 
0520 APTT 49.0* 53.0* Lipid Panel Lab Results Component Value Date/Time Cholesterol, total 87 03/08/2019 05:28 AM  
 HDL Cholesterol 44 03/08/2019 05:28 AM  
 LDL, calculated 30 03/08/2019 05:28 AM  
 VLDL, calculated 13 03/08/2019 05:28 AM  
 Triglyceride 65 03/08/2019 05:28 AM  
 CHOL/HDL Ratio 2.0 03/08/2019 05:28 AM  
  
BNP No results found for: BNP, BNPP, XBNPT Liver Enzymes No results for input(s): TP, ALB, TBIL, AP, SGOT, GPT in the last 72 hours. No lab exists for component: DBIL Digoxin Thyroid Studies Lab Results Component Value Date/Time TSH 1.22 03/07/2019 05:29 AM  
    
 
Signed By: Kyler Muniz NP March 29, 2019

## 2019-03-29 NOTE — PROGRESS NOTES
Problem: Heart Failure: Discharge Outcomes Goal: *Demonstrates ability to perform prescribed activity without shortness of breath or discomfort Outcome: Progressing Towards Goal

## 2019-03-29 NOTE — PROGRESS NOTES
1053  - Pt eating breakfast at this time. Will f/u later in day. 26 - Pt speaking with cardiology at this time. Will follow up per pt's schedule.

## 2019-03-29 NOTE — PROGRESS NOTES
[facility] Hospitalist Group Progress Note Patient: Marcelle Yuen Age: 80 y.o. : 1926 MR#: [id number] SSN: [id number] Date/Time: 3/29/2019 9:34 AM 
 
Subjective/24-hour events:  
 
Pt seen with daughter at bedside. Pt states he feels fine, appears to be in good spirits today. Per nursing reports tele called w/ burst of afib, per cardiology likely artifact. Assessment:  
-Acute hypoxic respiratory failure required BIPAP, likely due to heart failure and associated pleural effusions, pulm input noted, s/p thoracentesis bilateral (3/18 and 3/17, 3/22, 3/26,3/27), with some improvement in respiratory status initially but has gone back to having  C/o SOB post previous thoracentesis procedures. Limited therapeutic options at this stage, due to no cardiovascular reserve unable to diurese effectively. 
-Low bp in setting of heart failure started on midodrine 
-Pleural effusions s/p repeated thoracenteses    
-Acute on chronic systolic CHF on lasix, doubled lasix dose to 20 bid for the day, pt's wt had gone down, has positive fluid balance however, despite increased dosing of lasix. -Ischemic cardiomyopathy, EF 26-30% -CADRecent NSTEMI s/p PCI/stent placement  trop downtrending 
 
-Dyslipidemia 
-CKD 2-3 
-Chronic anemia 
-Valvular heart disease 
-Advanced age 
-Probable COPD 
-Tobacco use hx Overall limited treatment options, diuresing aggressively to improve his respiratory status would be at the expense of his bp. At this stage, any other measures (thoracentesis) futile and have no other treatment options available. Will discuss w/ daughter that at this point pt is ready for discharge and will work w/ CM in resolving disposition issues. Plan: 
-monitor hemodynamics, cont to hold all antihypertensives all together for now due to hypotension 
- cont midodrine  
-Keep higher dose of lasix (20mg in am) and give 10 mg q evening 
-Cont to diurese as bp allows -Continue nebs/symbicort/usual pulmonary hygiene. . 
--  Will hold bb and spironolactone due to low bp 
PT/OT evals. Based on current clinical status, will likely not be able to return home alone at discharge. Discussed w/ CM and daughter/patient. At this stage, daughter is interested in SNF but states she has looked up SNF info online and has not found that she would like her father to go to. Case discussed with:  [x]Patient  []Family  [x]Nursing  []Case Management DVT Prophylaxis:  []Lovenox  []Hep SQ  []SCDs  []Coumadin   [x]On Heparin gtt Objective:  
VS:  
Visit Vitals BP (!) 89/46 (BP 1 Location: Right arm) Comment: due Midodrine given Pulse 88 Temp 97.9 °F (36.6 °C) Resp 19 Ht 5' 7\" (1.702 m) Wt 66.9 kg (147 lb 8 oz) SpO2 99% BMI 23.10 kg/m² Tmax/24hrs: Temp (24hrs), Av.1 °F (36.7 °C), Min:97.6 °F (36.4 °C), Max:98.7 °F (37.1 °C) Intake/Output Summary (Last 24 hours) at 3/29/2019 1612 Last data filed at 3/29/2019 6564 Gross per 24 hour Intake 120 ml Output 345 ml Net -225 ml General: alert, awake, in NAD. Nontoxic-appearing. Cardiovascular:  S1, S2. Tachycardic. Pulmonary:  No active wheezing clear breath sounds. GI:  Abdomen soft, NTTP. Extremities:  Warm, no ischemia. Neuro:  Awake and alert, motor nofocal. 
 
Labs:   
Recent Results (from the past 24 hour(s)) PTT Collection Time: 19  5:21 AM  
Result Value Ref Range aPTT 49.0 (H) 23.0 - 36.4 SEC Signed By: Argenis Tomlin MD   
 2019 9:34 AM

## 2019-03-29 NOTE — PROGRESS NOTES
[facility] Pulmonary, Critical Care, and Sleep Medicine Follow up patient note Name: Pepe Aguilar MRN: [id number] : 1926 Hospital: [address] [place] [address] Date: 3/28/2019 Consulting physician: Celia Bhatt Reason: Pleural effusions IMPRESSION:  
· Acute hypoxic respiratory failure in a patient with HFrEF (EF 26%) · Recurrent transudative bilateral pleural effusion secondary to CHF exacerbation, with compressive atelectasis:  S/P thoracentesis multiple thoracenteses with one done last week and now again this week on the right - 1.6L, in the left side yesterday for 1.35 L. Trace effusions on repeat CXR 
· Cough: Multifactorial, atelectasis versus silent aspiration · Suspect silent aspiration · CHF exacerbation (combined systolic and diastolic) · NEL in the setting of HFrEF - possibly cardiorenal syndrome · Hx of NSTEMi and S/P PCI to LAD on 3/11/19 and Ischemic cardiomyopathy and HFrEF and diastolic dysfunction · Anemia with hx of recent GI bleed. · Significant peripheral vascular disease CAD, ICA stenosis. · Hx of HTN and DLD · Hx of smoking - COPD from hx --likely mild, no evidence of exacerbation RECOMMENDATIONS:  
Advise to continue diuresis to maintain net negative fluid balance as renal function tolerates given significant bilateral effusions. Diuresis per primary service and Cardiology. Cotninue midodrine 10 mg 3 times daily Discussed with the patient and daughter again today at bedside that repeated therapeutic thoracentesis (especially frequently) is not the treatment for pleural effusions secondary to heart failure as it leads to large volume shifts and protein losses and malnutrition -- as well as potential procedural complications. He will likely have recurrence of effusion in the next few days.  I also discussed that patient likely has silent aspiration and I asked speech pathologist to come by and reevaluate and provide lifestyle modifications for her and him. Antibiotics per primary service -- can likely discontinue as there is no infiltrates Supplemental oxygen to maintain SpO2 >88%. May use Bipap PRN Please assess for home oxygen need prior to discharge Aggressive pulmonary toileting/bronchial hygiene Frequent incentive spirometry Scheduled bronchodilators with duonebs q6h and pulmicort 0.5mg BID Strict aspiration precautions including elevating HOB >30deg and lifestyle modifications PT/OT, OOB, ambulate with assistance as tolerated DVT ppx per primary service Will follow Poor longterm prognosis which was discussed with the patient and his daughter Subjective:  
03/28/19 Patient seen and examined at bedside. No acute events overnight. Patient reports dyspnea is okay today after thoracenteses the previous days. Patient denies chest pain, nausea, vomiting, diarrhea. Patient just reports feeling weak. HPI per Dr. Leon Fragoso: This patient has been seen and evaluated at the request of Dr. Dipika Aguayo for evaluation of hypoxic respiratory failure. Patient is a 80 y.o. male with past medical history significant for asthma, ischemic cardiomyopathy (S/P PCI), GI bleed (recent) and hypertension who has presented with 1 week history of worsening SOB and leg swelling. Patient is able to provide most of the history and states that he lives alone but does get some help from his daughter. No hx of cough, fevers, phlegm production. Complains of chest pains across the chest in the lower chest that started during the same period of time and is associated with cough or taking deep breaths. This pleuritic sounding chest pain is non radiating. Patient was admitted to hospital and his SOB was worked up and the work up included a CTA which was done on 3/13/19 and it showed bilateral GGOs and pleural effusions. Patient states that he is an ex smoker. Smoked 1ppd but quit 50 years ago. Worked for [employer], But denies any history of asbestos exposure. Past Medical History:  
Diagnosis Date  Asthma  Cardiac cath 04/28/2011 oLM 30%. pLAD 30%. oD1 50%. CX 90% (3 x 18 [place] DEMAR). dRCA 90%. RPLB subtotal.  RPDA 100%.  Cardiac echocardiogram 01/21/2014 EF 55-60%. Mod LVH. Gr 1 DDfx. Mild AS (mean grad 13). Mild AI. Unchanged from study of 11/30/11.  Cardiac nuclear imaging test, mod risk 01/22/2016 Intermediate risk. Medium-sized inferior, inferoseptal infarction. No ischemia. EF 54%. Nondiagnostic EKG on pharm stress test.  
 Carotid duplex 03/02/2016 Mod 50-69% bilateral ICA stenosis. Probable significant RECA stenosis. >50% stenosis of left subclavian. No significant change from study of 1/8/15.  Coronary artery disease Status post PCI with drug-eluting stent, [place] 3 x 18 mm in the proximal circumflex.  Hx of carotid artery disease ([facility] [address]  Hypercholesterolemia  Hypertension  Prostate cancer ([facility] [address] Past Surgical History:  
Procedure Laterality Date  HX CORONARY STENT PLACEMENT  4/28/11 PCI with drug-eluting stent, [place] 3 x 18 mm in the proximal circumflex (post dialated with 3.25 mm noncompliant balloon at high pressure). Prior to Admission medications Medication Sig Start Date End Date Taking? Authorizing Provider  
albuterol-ipratropium (DUO-NEB) 2.5 mg-0.5 mg/3 ml nebu 3 mL by Nebulization route every four (4) hours as needed (wheezing, sob). 3/20/19  Yes Chucky Rivera MD  
atorvastatin (LIPITOR) 40 mg tablet Take 1 Tab by mouth nightly. 3/12/19   Bell Del Toro MD  
carvedilol (COREG) 3.125 mg tablet Take 1 Tab by mouth two (2) times daily (with meals). 3/12/19   Bell Del Toro MD  
lisinopril (PRINIVIL, ZESTRIL) 5 mg tablet Take 1 Tab by mouth daily.  3/13/19   Bell Del Toro MD  
 nitroglycerin (NITROSTAT) 0.4 mg SL tablet 1 Tab by SubLINGual route every five (5) minutes as needed for Chest Pain. 3/12/19   Theta Najjar, MD  
furosemide (LASIX) 20 mg tablet Take 1 Tab by mouth daily. 3/12/19   Theta Najjar, MD  
potassium chloride SR (KLOR-CON 10) 10 mEq tablet Take 1 Tab by mouth daily. 3/12/19   Theta Najjar, MD  
OTHER Cbc, BMP in 1 week Dx: CHF Fax results to Dr. Giovana Potts 3/12/19   Theta Najjar, MD  
budesonide-formoterol (SYMBICORT) 160-4.5 mcg/actuation HFAA Take 2 Puffs by inhalation two (2) times a day. 3/1/19   Suyapa Daniel MD  
glycopyrrolate-formoterol (BEVESPI AEROSPHERE) 9-4.8 mcg HFAA Take 2 Inhalation by inhalation two (2) times a day. 3/1/19   Collin Flores MD  
clopidogrel (PLAVIX) 75 mg tab TAKE 1 TABLET EVERY DAY 19   Suyapa Daniel MD  
omeprazole (PRILOSEC) 40 mg capsule Take 1 Cap by mouth daily. 19   Jose Eduardo Flores MD  
albuterol (PROVENTIL HFA, VENTOLIN HFA, PROAIR HFA) 90 mcg/actuation inhaler Take 2 Puffs by inhalation every four (4) hours as needed for Wheezing or Shortness of Breath. 3/2/15   AMITA Stubbs  
tamsulosin (FLOMAX) 0.4 mg capsule Take 1 Cap by mouth daily. 3/13/13   Collin Flores MD  
aspirin delayed-release 81 mg tablet Take 81 mg by mouth daily. Collin Flores MD  
 
No Known Allergies Social History Tobacco Use  Smoking status: Former Smoker Packs/day: 1.00 Years: 15.00 Pack years: 15.00 Last attempt to quit: 1960 Years since quittin.8  Smokeless tobacco: Never Used Substance Use Topics  Alcohol use: No  
  
History reviewed. No pertinent family history. @DBLINKMRCHARTING(EPT,7705)@ Immunization status: up to date and documented. Current Facility-Administered Medications Medication Dose Route Frequency  therapeutic multivitamin (THERAGRAN) tablet 1 Tab  1 Tab Oral DAILY  [START ON 3/29/2019] furosemide (LASIX) tablet 20 mg  20 mg Oral DAILY  [START ON 3/29/2019] furosemide (LASIX) tablet 10 mg  10 mg Oral QPM  
 midodrine (PROAMITINE) tablet 10 mg  10 mg Oral TID WITH MEALS  
 albuterol-ipratropium (DUO-NEB) 2.5 MG-0.5 MG/3 ML  3 mL Nebulization Q6H RT  
 budesonide (PULMICORT) 500 mcg/2 ml nebulizer suspension  500 mcg Nebulization BID RT  
 heparin (porcine) injection 5,000 Units  5,000 Units SubCUTAneous Q8H  
 atorvastatin (LIPITOR) tablet 40 mg  40 mg Oral QHS  aspirin chewable tablet 81 mg  81 mg Oral DAILY  tamsulosin (FLOMAX) capsule 0.4 mg  0.4 mg Oral DAILY  famotidine (PEPCID) tablet 20 mg  20 mg Oral DAILY  sodium chloride (NS) flush 5-40 mL  5-40 mL IntraVENous Q8H Review of Systems: A comprehensive review of systems was negative except for that written in the HPI. Objective: 
Vital Signs:   
Visit Vitals BP 90/46 (BP 1 Location: Right arm, BP Patient Position: At rest) Pulse 88 Temp 98.1 °F (36.7 °C) Resp 20 Ht 5' 7\" (1.702 m) Wt 64 kg (141 lb 3.2 oz) SpO2 97% BMI 22.12 kg/m² O2 Device: Nasal cannula O2 Flow Rate (L/min): 2 l/min Temp (24hrs), Av.6 °F (36.4 °C), Min:97 °F (36.1 °C), Max:98.1 °F (36.7 °C) Intake/Output:  
Last shift:      1901 -  0700 In: 120 [P.O.:120] Out: - Last 3 shifts:  0701 -  1900 In: 578 [P.O.:578] Out: 975 [Urine:975] Intake/Output Summary (Last 24 hours) at 3/28/2019 2238 Last data filed at 3/28/2019 2123 Gross per 24 hour Intake 698 ml Output 375 ml Net 323 ml Physical Exam:  
General:  Alert, cooperative, no distress, appears stated age, sitting up in bed wearing nasal cannula, appears frail Head:  Normocephalic, without obvious abnormality, atraumatic. Eyes:  Conjunctivae/corneas clear. PERRL, EOMs intact. Nose: Nares normal. Septum midline. Mucosa normal. No drainage or sinus tenderness. Throat: Lips, mucosa, and tongue normal.  No oral lesions, poor dentition Neck: Supple, symmetrical, trachea midline, no adenopathy, no carotid bruit and no JVD. Lungs:    Lungs have more air entry bilaterally, lower lobes have some mild rales bilaterally, no wheezes or rhonchi Heart:  Regular rate and rhythm, S1, S2 normal, no murmur, click, rub or gallop. Abdomen:   Soft, non-tender. Bowel sounds normal. No masses,  No organomegaly. Extremities: Extremities normal, atraumatic, no cyanosis or edema. Or clubbing Skin: Skin color, texture, turgor normal. No rashes or lesions Lymph nodes: Cervical, supraclavicular, and axillary nodes normal.  
Neurologic: Grossly nonfocal, grossly intact strength and coordination Data review:  
 
Recent Results (from the past 24 hour(s)) PTT Collection Time: 03/28/19  5:20 AM  
Result Value Ref Range aPTT 53.0 (H) 23.0 - 36.4 SEC METABOLIC PANEL, BASIC Collection Time: 03/28/19 11:04 AM  
Result Value Ref Range Sodium 133 (L) 136 - 145 mmol/L Potassium 4.0 3.5 - 5.5 mmol/L Chloride 97 (L) 100 - 108 mmol/L  
 CO2 30 21 - 32 mmol/L Anion gap 6 3.0 - 18 mmol/L Glucose 155 (H) 74 - 99 mg/dL BUN 40 (H) 7.0 - 18 MG/DL Creatinine 1.49 (H) 0.6 - 1.3 MG/DL  
 BUN/Creatinine ratio 27 (H) 12 - 20 GFR est AA 53 (L) >60 ml/min/1.73m2 GFR est non-AA 44 (L) >60 ml/min/1.73m2 Calcium 9.0 8.5 - 10.1 MG/DL Imaging: 
I have personally reviewed the patients radiographs and have reviewed the reports: 
Personal review of chest x-ray from today, shows trace bilateral effusions and interstitial edema. No masses or consolidation seen Official report per radiology: XR Results (most recent): 
Results from Hospital Encounter encounter on 03/13/19 XR CHEST PA LAT Narrative PA and lateral CXR: 
 
HISTORY: Bilateral pleural effusion and infiltrate. Congestive heart failure. Comparison March 27 and 20/7/2019 Stable mild cardiomegaly and mild vascular congestion. Slightly better aeration 
in the right lung base, with better inspiration  Discoid atelectasis in the 
right upper lobe. Slightly worse left basilar atelectasis. No significant change 
in suspected small bilateral pleural effusion. Impression IMPRESSION: Persistent bilateral basilar infiltrates, slightly worse on the 
left. Stable small bilateral pleural effusion. Stable vascular congestion. CT Results (most recent): 
Results from Hospital Encounter encounter on 03/13/19 CT NECK SOFT TISSUE W CONT Narrative EXAM: CT NECK SOFT TISSUE W CONT INDICATION: Admitted with respiratory failure, heart failure, pleural effusions, 
shortness of breath, hypotension with voice changes, stridor and recent 
intubation. COMPARISON: CT chest 3/13/2019. CONTRAST: 70 mL of Isovue-300. TECHNIQUE: Axial CT neck images obtained following administration of 70 cc Isovue-300 IV contrast. Subsequent coronal and sagittal reformatted views. All 
CT scans at this facility are performed using dose optimization technique as 
appropriate to a performed exam, to include automated exposure control, 
adjustment of the mA and/or kV according to patient size (including appropriate 
matching first site-specific examinations), or use of iterative reconstruction 
technique. FINDINGS: 
Moderate brain parenchymal volume loss is incompletely visualized. Status post 
cataract surgery. There is leftward nasal septum deviation. Nasal cavity and 
oral cavity are otherwise normal. Vocal folds are symmetric. No focal and 
epiglottis are normal. Parotid and submandibular glands are symmetric. There is 
a homogenous 1.2 x 0.9 cm soft tissue nodule in the left parotid (series 2, 
image 42). Small rounded opacities in the ethmoid sinuses anteriorly with larger 
rounded opacification in the right maxillary sinus most compatible with mucous retention cysts or polyps. Mastoid air cells are patent. Moderate to large bilateral pleural effusions, as before. Similar partially 
calcified and mildly spiculated 3.7 x 2.4 cm consolidation at the posterior 
right apex (4). There is dependent groundglass and reticulations with patchy and 
other subpleural opacities, overall similar. Moderate atherosclerosis. There is severe stenosis of the bilateral carotid 
arteries at the bulb and at the proximal internal carotid arteries. Stenosis at 
the carotid siphons. Osteopenia. Severe degenerative disc disease C5-7. Impression IMPRESSION: 
1. No acute findings to explain patient's symptoms. 2.  Similar moderate to large bilateral pleural effusions. 3.  Similar findings of likely mild pulmonary edema and other patchy opacities. Similar partially calcified spiculated consolidation right lung apex, possibly 
inflammatory changes but malignancy is not excluded. 4.  Severe stenosis bilateral carotid arteries. 5.  Moderate brain parenchymal volume loss. 6.  Homogenous nodule left parotid a represent a lymph node or other parotid 
mass. No priors for comparison. Follow-up likely not indicated given patient's 
age. 03/13/19 ECHO ADULT FOLLOW-UP OR LIMITED 03/15/2019 3/15/2019 Narrative · Left ventricular severely decreased systolic function. Estimated left  
ventricular ejection fraction is 26 - 30%. Visually measured ejection  
fraction. Left ventricular mild concentric hypertrophy. Abnormal left  
ventricular wall motion as described on the wall scoring diagram below. · Severe tricuspid valve regurgitation is present. Moderate to severe  
pulmonary hypertension is present. · Moderate mitral valve regurgitation. Signed by: MD Laurie Wisdom MD/[facility] Pulmonary, Critical Care Medicine 3 [facility]

## 2019-03-29 NOTE — ROUTINE PROCESS
Received report from [facility]. Pt AAOx3, NAD, breathing non labored, on O2 NC at 2L, HOB up. IV site clean, dry and intact. Family member at the bedside. Bed at the lowest level on lock position, call bell w/i reach. Bedside and Verbal shift change report given to KATYA Degroot (oncoming nurse) by me (offgoing nurse).  Report included the following information SBAR, Kardex, Procedure Summary, Intake/Output, MAR, Recent Results and Cardiac Rhythm SR.

## 2019-03-30 NOTE — PROGRESS NOTES
[facility] Pulmonary, Critical Care, and Sleep Medicine Follow up patient note Name: Dennise Porter MRN: [id number] : 1926 Hospital: [facility] Date: 3/30/2019 Consulting physician: Roxanne Fletcher Reason: Pleural effusions IMPRESSION:  
· Acute hypoxic respiratory failure in a patient with HFrEF (EF 26%) · Recurrent transudative bilateral pleural effusion secondary to CHF exacerbation, with compressive atelectasis:  S/P thoracentesis multiple thoracenteses with one done last week and now again this week on the right - 1.6L, in the left side the following day for 1.35 L. Trace effusions on repeat CXR. Dull at bases B/L - consistent with recurring effusions · Cough: Multifactorial, atelectasis versus silent aspiration · Suspect silent aspiration · CHF exacerbation (combined systolic and diastolic) · NEL in the setting of HFrEF - possibly cardiorenal syndrome · Deconditioning, adult failure to thrive · Hx of NSTEMi and S/P PCI to LAD on 3/11/19 and Ischemic cardiomyopathy and HFrEF and diastolic dysfunction · Anemia with hx of recent GI bleed. · Significant peripheral vascular disease CAD, ICA stenosis. · Hx of HTN and DLD · Hx of smoking - COPD from hx --likely mild, no evidence of exacerbation RECOMMENDATIONS:  
Advise to continue diuresis to maintain net negative fluid balance as renal function tolerates given significant bilateral effusions. Diuresis per primary service and Cardiology. Cotninue midodrine 10 mg 3 times daily Advised against repeat thoracentesis as previously noted, discussed with his daughter and the patient Supplemental oxygen to maintain SpO2 >88%. May use Bipap PRN Please assess for home oxygen need prior to discharge Aggressive pulmonary toileting/bronchial hygiene Frequent incentive spirometry Scheduled bronchodilators with duonebs q6h and pulmicort 0.5mg BID 
 Strict aspiration precautions including elevating HOB >45deg while eating and after meals x 3 hours, and lifestyle modifications PT/OT, OOB, ambulate with assistance as tolerated DVT ppx per primary service Will follow Poor longterm prognosis Subjective:  
03/30/19 Patient seen and examined at bedside. No acute events overnight. Patient reports no new symptoms, reports improvement to dyspnea some, able to ambulate some to bathroom and around room with assistance. HPI per Dr. Sterling Harness: This patient has been seen and evaluated at the request of Dr. Teressa Pretty for evaluation of hypoxic respiratory failure. Patient is a 80 y.o. male with past medical history significant for asthma, ischemic cardiomyopathy (S/P PCI), GI bleed (recent) and hypertension who has presented with 1 week history of worsening SOB and leg swelling. Patient is able to provide most of the history and states that he lives alone but does get some help from his daughter. No hx of cough, fevers, phlegm production. Complains of chest pains across the chest in the lower chest that started during the same period of time and is associated with cough or taking deep breaths. This pleuritic sounding chest pain is non radiating. Patient was admitted to hospital and his SOB was worked up and the work up included a CTA which was done on 3/13/19 and it showed bilateral GGOs and pleural effusions. Patient states that he is an ex smoker. Smoked 1ppd but quit 50 years ago. Worked for [employer], But denies any history of asbestos exposure. Past Medical History:  
Diagnosis Date  Asthma  Cardiac cath 04/28/2011 oLM 30%. pLAD 30%. oD1 50%. CX 90% (3 x 18 [place] DEMAR). dRCA 90%. RPLB subtotal.  RPDA 100%.  Cardiac echocardiogram 01/21/2014 EF 55-60%. Mod LVH. Gr 1 DDfx. Mild AS (mean grad 13). Mild AI. Unchanged from study of 11/30/11.  Cardiac nuclear imaging test, mod risk 01/22/2016 Intermediate risk. Medium-sized inferior, inferoseptal infarction. No ischemia. EF 54%. Nondiagnostic EKG on pharm stress test.  
 Carotid duplex 03/02/2016 Mod 50-69% bilateral ICA stenosis. Probable significant RECA stenosis. >50% stenosis of left subclavian. No significant change from study of 1/8/15.  Coronary artery disease Status post PCI with drug-eluting stent, Fleming 3 x 18 mm in the proximal circumflex.  Hx of carotid artery disease ([facility] [address]  Hypercholesterolemia  Hypertension  Prostate cancer ([facility] [address] Past Surgical History:  
Procedure Laterality Date  HX CORONARY STENT PLACEMENT  4/28/11 PCI with drug-eluting stent, Fleming 3 x 18 mm in the proximal circumflex (post dialated with 3.25 mm noncompliant balloon at high pressure). Prior to Admission medications Medication Sig Start Date End Date Taking? Authorizing Provider  
albuterol-ipratropium (DUO-NEB) 2.5 mg-0.5 mg/3 ml nebu 3 mL by Nebulization route every four (4) hours as needed (wheezing, sob). 3/20/19  Yes Shelia Coppola MD  
atorvastatin (LIPITOR) 40 mg tablet Take 1 Tab by mouth nightly. 3/12/19   Gregg Hancock MD  
carvedilol (COREG) 3.125 mg tablet Take 1 Tab by mouth two (2) times daily (with meals). 3/12/19   Gregg Hancock MD  
lisinopril (PRINIVIL, ZESTRIL) 5 mg tablet Take 1 Tab by mouth daily. 3/13/19   Gregg Hancock MD  
nitroglycerin (NITROSTAT) 0.4 mg SL tablet 1 Tab by SubLINGual route every five (5) minutes as needed for Chest Pain. 3/12/19   Gregg Hancock MD  
furosemide (LASIX) 20 mg tablet Take 1 Tab by mouth daily. 3/12/19   Gregg Hancock MD  
potassium chloride SR (KLOR-CON 10) 10 mEq tablet Take 1 Tab by mouth daily. 3/12/19   Gregg Hancock MD  
OTHER Cbc, BMP in 1 week Dx: CHF Fax results to Dr. Rubi Marr 3/12/19   Gregg Hancock MD  
 budesonide-formoterol (SYMBICORT) 160-4.5 mcg/actuation HFAA Take 2 Puffs by inhalation two (2) times a day. 3/1/19   Leti Daniel MD  
glycopyrrolate-formoterol (BEVESPI AEROSPHERE) 9-4.8 mcg HFAA Take 2 Inhalation by inhalation two (2) times a day. 3/1/19   Sera Mukherjee MD  
clopidogrel (PLAVIX) 75 mg tab TAKE 1 TABLET EVERY DAY 19   Leti Daniel MD  
omeprazole (PRILOSEC) 40 mg capsule Take 1 Cap by mouth daily. 19   Philip Lowe MD  
albuterol (PROVENTIL HFA, VENTOLIN HFA, PROAIR HFA) 90 mcg/actuation inhaler Take 2 Puffs by inhalation every four (4) hours as needed for Wheezing or Shortness of Breath. 3/2/15   AMITA Jama  
tamsulosin (FLOMAX) 0.4 mg capsule Take 1 Cap by mouth daily. 3/13/13   Sera Mukherjee MD  
aspirin delayed-release 81 mg tablet Take 81 mg by mouth daily. Sera Mukherjee MD  
 
No Known Allergies Social History Tobacco Use  Smoking status: Former Smoker Packs/day: 1.00 Years: 15.00 Pack years: 15.00 Last attempt to quit: 1960 Years since quittin.8  Smokeless tobacco: Never Used Substance Use Topics  Alcohol use: No  
  
History reviewed. No pertinent family history. Immunization status: up to date and documented. Current Facility-Administered Medications Medication Dose Route Frequency  albuterol-ipratropium (DUO-NEB) 2.5 MG-0.5 MG/3 ML  3 mL Nebulization TID RT  
 therapeutic multivitamin (THERAGRAN) tablet 1 Tab  1 Tab Oral DAILY  furosemide (LASIX) tablet 20 mg  20 mg Oral DAILY  furosemide (LASIX) tablet 10 mg  10 mg Oral QPM  
 midodrine (PROAMITINE) tablet 10 mg  10 mg Oral TID WITH MEALS  
 budesonide (PULMICORT) 500 mcg/2 ml nebulizer suspension  500 mcg Nebulization BID RT  
 heparin (porcine) injection 5,000 Units  5,000 Units SubCUTAneous Q8H  
 atorvastatin (LIPITOR) tablet 40 mg  40 mg Oral QHS  aspirin chewable tablet 81 mg  81 mg Oral DAILY  tamsulosin (FLOMAX) capsule 0.4 mg  0.4 mg Oral DAILY  famotidine (PEPCID) tablet 20 mg  20 mg Oral DAILY  sodium chloride (NS) flush 5-40 mL  5-40 mL IntraVENous Q8H Review of Systems: A comprehensive review of systems was negative except for that written in the HPI. Objective: 
Vital Signs:   
Visit Vitals /59 (BP 1 Location: Right arm, BP Patient Position: At rest) Pulse 83 Temp 97.9 °F (36.6 °C) Resp 18 Ht 5' 7\" (1.702 m) Wt 67 kg (147 lb 11.3 oz) SpO2 99% BMI 23.13 kg/m² O2 Device: Nasal cannula O2 Flow Rate (L/min): 2 l/min Temp (24hrs), Av.7 °F (36.5 °C), Min:97 °F (36.1 °C), Max:98.7 °F (37.1 °C) Intake/Output:  
Last shift:      No intake/output data recorded. Last 3 shifts:  1901 -  0700 In: 26 [P.O.:220] Out: 445 [Urine:445] Intake/Output Summary (Last 24 hours) at 3/30/2019 1710 Last data filed at 3/30/2019 2367 Gross per 24 hour Intake 100 ml Output 100 ml Net 0 ml Physical Exam:  
General:  Alert, cooperative, no distress, appears stated age, sitting up in bed wearing nasal cannula, appears frail Head:  Normocephalic, without obvious abnormality, atraumatic. Eyes:  Conjunctivae/corneas clear. PERRL, EOMs intact. Nose: Nares normal. Septum midline. Mucosa normal. No drainage or sinus tenderness. Throat: Lips, mucosa, and tongue normal.  No oral lesions, poor dentition Neck: Supple, symmetrical, trachea midline, no adenopathy, no carotid bruit and no JVD. Lungs:    Dull to bases B/L with decreased air entry, rales in bases, otherwise clear in other lung fields, no wheezes or rhonchi Heart:  Regular rate and rhythm, S1, S2 normal, no murmur, click, rub or gallop. Abdomen:   Soft, non-tender. Bowel sounds normal. No masses,  No organomegaly. Extremities: Extremities normal, atraumatic, no cyanosis or edema. No clubbing Skin: Skin color, texture, turgor normal. No rashes or lesions Lymph nodes: Cervical, supraclavicular, and axillary nodes normal.  
Neurologic: Grossly nonfocal, grossly intact strength and coordination Data review:  
 
Recent Results (from the past 24 hour(s)) PTT Collection Time: 03/30/19  3:39 AM  
Result Value Ref Range aPTT 45.0 (H) 23.0 - 36.4 SEC Imaging: 
I have personally reviewed the patients radiographs and have reviewed the reports: no new studies Personal review of chest x-ray from today, shows trace bilateral effusions and interstitial edema. No masses or consolidation seen Official report per radiology: XR Results (most recent): 
Results from Hospital Encounter encounter on 03/13/19 XR CHEST PA LAT Narrative PA and lateral CXR: 
 
HISTORY: Bilateral pleural effusion and infiltrate. Congestive heart failure. Comparison March 27 and 20/7/2019 Stable mild cardiomegaly and mild vascular congestion. Slightly better aeration 
in the right lung base, with better inspiration  Discoid atelectasis in the 
right upper lobe. Slightly worse left basilar atelectasis. No significant change 
in suspected small bilateral pleural effusion. Impression IMPRESSION: Persistent bilateral basilar infiltrates, slightly worse on the 
left. Stable small bilateral pleural effusion. Stable vascular congestion. CT Results (most recent): 
Results from Hospital Encounter encounter on 03/13/19 CT NECK SOFT TISSUE W CONT Narrative EXAM: CT NECK SOFT TISSUE W CONT INDICATION: Admitted with respiratory failure, heart failure, pleural effusions, 
shortness of breath, hypotension with voice changes, stridor and recent 
intubation. COMPARISON: CT chest 3/13/2019. CONTRAST: 70 mL of Isovue-300. TECHNIQUE: Axial CT neck images obtained following administration of 70 cc Isovue-300 IV contrast. Subsequent coronal and sagittal reformatted views.  All 
CT scans at this facility are performed using dose optimization technique as 
 appropriate to a performed exam, to include automated exposure control, 
adjustment of the mA and/or kV according to patient size (including appropriate 
matching first site-specific examinations), or use of iterative reconstruction 
technique. FINDINGS: 
Moderate brain parenchymal volume loss is incompletely visualized. Status post 
cataract surgery. There is leftward nasal septum deviation. Nasal cavity and 
oral cavity are otherwise normal. Vocal folds are symmetric. No focal and 
epiglottis are normal. Parotid and submandibular glands are symmetric. There is 
a homogenous 1.2 x 0.9 cm soft tissue nodule in the left parotid (series 2, 
image 42). Small rounded opacities in the ethmoid sinuses anteriorly with larger 
rounded opacification in the right maxillary sinus most compatible with mucous 
retention cysts or polyps. Mastoid air cells are patent. Moderate to large bilateral pleural effusions, as before. Similar partially 
calcified and mildly spiculated 3.7 x 2.4 cm consolidation at the posterior 
right apex (4). There is dependent groundglass and reticulations with patchy and 
other subpleural opacities, overall similar. Moderate atherosclerosis. There is severe stenosis of the bilateral carotid 
arteries at the bulb and at the proximal internal carotid arteries. Stenosis at 
the carotid siphons. Osteopenia. Severe degenerative disc disease C5-7. Impression IMPRESSION: 
1. No acute findings to explain patient's symptoms. 2.  Similar moderate to large bilateral pleural effusions. 3.  Similar findings of likely mild pulmonary edema and other patchy opacities. Similar partially calcified spiculated consolidation right lung apex, possibly 
inflammatory changes but malignancy is not excluded. 4.  Severe stenosis bilateral carotid arteries. 5.  Moderate brain parenchymal volume loss. 6.  Homogenous nodule left parotid a represent a lymph node or other parotid mass. No priors for comparison. Follow-up likely not indicated given patient's 
age. 03/13/19 ECHO ADULT FOLLOW-UP OR LIMITED 03/15/2019 3/15/2019 Narrative · Left ventricular severely decreased systolic function. Estimated left  
ventricular ejection fraction is 26 - 30%. Visually measured ejection  
fraction. Left ventricular mild concentric hypertrophy. Abnormal left  
ventricular wall motion as described on the wall scoring diagram below. · Severe tricuspid valve regurgitation is present. Moderate to severe  
pulmonary hypertension is present. · Moderate mitral valve regurgitation. Signed by: MD Monica Orozco MD/MP[facility] Pulmonary, Critical Care Medicine [facility]

## 2019-03-30 NOTE — PROGRESS NOTES
[employer] [facility] Pulmonary, Critical Care, and Sleep Medicine Follow up patient note Name: Juliana Rodriguez MRN: [id number] : 1926 Hospital: [address] [place] [address] Date: 3/29/2019 Consulting physician: Teressa Pretty Reason: Pleural effusions IMPRESSION:  
· Acute hypoxic respiratory failure in a patient with HFrEF (EF 26%) · Recurrent transudative bilateral pleural effusion secondary to CHF exacerbation, with compressive atelectasis:  S/P thoracentesis multiple thoracenteses with one done last week and now again this week on the right - 1.6L, in the left side the following day for 1.35 L. Trace effusions on repeat CXR 
· Cough: Multifactorial, atelectasis versus silent aspiration · Suspect silent aspiration · CHF exacerbation (combined systolic and diastolic) · NEL in the setting of HFrEF - possibly cardiorenal syndrome · Deconditioning, adult failure to thrive · Hx of NSTEMi and S/P PCI to LAD on 3/11/19 and Ischemic cardiomyopathy and HFrEF and diastolic dysfunction · Anemia with hx of recent GI bleed. · Significant peripheral vascular disease CAD, ICA stenosis. · Hx of HTN and DLD · Hx of smoking - COPD from hx --likely mild, no evidence of exacerbation RECOMMENDATIONS:  
Advise to continue diuresis to maintain net negative fluid balance as renal function tolerates given significant bilateral effusions. Diuresis per primary service and Cardiology. Cotninue midodrine 10 mg 3 times daily Advised against repeat thoracentesis as previously noted. Supplemental oxygen to maintain SpO2 >88%. May use Bipap PRN Please assess for home oxygen need prior to discharge Aggressive pulmonary toileting/bronchial hygiene Frequent incentive spirometry Scheduled bronchodilators with duonebs q6h and pulmicort 0.5mg BID Strict aspiration precautions including elevating HOB >45deg while eating and after meals x 3 hours, and lifestyle modifications PT/OT, OOB, ambulate with assistance as tolerated DVT ppx per primary service Will follow Poor longterm prognosis Subjective:  
03/29/19 Patient seen and examined at bedside. No acute events overnight. Patient reports no new symptoms, reports that dyspnea is better controlled since thoracenteses, denies chest pain, nausea, vomiting, diarrhea. HPI per Dr. Georgette Warren: This patient has been seen and evaluated at the request of Dr. Greg Christina for evaluation of hypoxic respiratory failure. Patient is a 80 y.o. male with past medical history significant for asthma, ischemic cardiomyopathy (S/P PCI), GI bleed (recent) and hypertension who has presented with 1 week history of worsening SOB and leg swelling. Patient is able to provide most of the history and states that he lives alone but does get some help from his daughter. No hx of cough, fevers, phlegm production. Complains of chest pains across the chest in the lower chest that started during the same period of time and is associated with cough or taking deep breaths. This pleuritic sounding chest pain is non radiating. Patient was admitted to hospital and his SOB was worked up and the work up included a CTA which was done on 3/13/19 and it showed bilateral GGOs and pleural effusions. Patient states that he is an ex smoker. Smoked 1ppd but quit 50 years ago. Worked for [employer], But denies any history of asbestos exposure. Past Medical History:  
Diagnosis Date  Asthma  Cardiac cath 04/28/2011 oLM 30%. pLAD 30%. oD1 50%. CX 90% (3 x 18 [place] DEMAR). dRCA 90%. RPLB subtotal.  RPDA 100%.  Cardiac echocardiogram 01/21/2014 EF 55-60%. Mod LVH. Gr 1 DDfx. Mild AS (mean grad 13). Mild AI. Unchanged from study of 11/30/11.  Cardiac nuclear imaging test, mod risk 01/22/2016 Intermediate risk. Medium-sized inferior, inferoseptal infarction. No ischemia. EF 54%.   Nondiagnostic EKG on pharm stress test.  
  Carotid duplex 03/02/2016 Mod 50-69% bilateral ICA stenosis. Probable significant RECA stenosis. >50% stenosis of left subclavian. No significant change from study of 1/8/15.  Coronary artery disease Status post PCI with drug-eluting stent, Johnston 3 x 18 mm in the proximal circumflex.  Hx of carotid artery disease ([facility]  Hypercholesterolemia  Hypertension  Prostate cancer ([facility] Past Surgical History:  
Procedure Laterality Date  HX CORONARY STENT PLACEMENT  4/28/11 PCI with drug-eluting stent, Johnston 3 x 18 mm in the proximal circumflex (post dialated with 3.25 mm noncompliant balloon at high pressure). Prior to Admission medications Medication Sig Start Date End Date Taking? Authorizing Provider  
albuterol-ipratropium (DUO-NEB) 2.5 mg-0.5 mg/3 ml nebu 3 mL by Nebulization route every four (4) hours as needed (wheezing, sob). 3/20/19  Yes Calton Babinski, MD  
atorvastatin (LIPITOR) 40 mg tablet Take 1 Tab by mouth nightly. 3/12/19   Micheal Gustafson MD  
carvedilol (COREG) 3.125 mg tablet Take 1 Tab by mouth two (2) times daily (with meals). 3/12/19   Micheal Gustafson MD  
lisinopril (PRINIVIL, ZESTRIL) 5 mg tablet Take 1 Tab by mouth daily. 3/13/19   Micheal Gustafson MD  
nitroglycerin (NITROSTAT) 0.4 mg SL tablet 1 Tab by SubLINGual route every five (5) minutes as needed for Chest Pain. 3/12/19   Micheal Gustafson MD  
furosemide (LASIX) 20 mg tablet Take 1 Tab by mouth daily. 3/12/19   Micheal Gustafson MD  
potassium chloride SR (KLOR-CON 10) 10 mEq tablet Take 1 Tab by mouth daily. 3/12/19   Micheal Gustafson MD  
OTHER Cbc, BMP in 1 week Dx: CHF Fax results to Dr. Tabitha Rivera 3/12/19   Micheal Gustafson MD  
budesonide-formoterol (SYMBICORT) 160-4.5 mcg/actuation HFAA Take 2 Puffs by inhalation two (2) times a day.  3/1/19   Alvaro Amaya MD  
 glycopyrrolate-formoterol (BEVESPI AEROSPHERE) 9-4.8 mcg HFAA Take 2 Inhalation by inhalation two (2) times a day. 3/1/19   Kate Burrell MD  
clopidogrel (PLAVIX) 75 mg tab TAKE 1 TABLET EVERY DAY 19   Michael Daniel MD  
omeprazole (PRILOSEC) 40 mg capsule Take 1 Cap by mouth daily. 19   Sal Macedo MD  
albuterol (PROVENTIL HFA, VENTOLIN HFA, PROAIR HFA) 90 mcg/actuation inhaler Take 2 Puffs by inhalation every four (4) hours as needed for Wheezing or Shortness of Breath. 3/2/15   AMTIA Wong  
tamsulosin (FLOMAX) 0.4 mg capsule Take 1 Cap by mouth daily. 3/13/13   Kate Burrell MD  
aspirin delayed-release 81 mg tablet Take 81 mg by mouth daily. Kate Burrell MD  
 
No Known Allergies Social History Tobacco Use  Smoking status: Former Smoker Packs/day: 1.00 Years: 15.00 Pack years: 15.00 Last attempt to quit: 1960 Years since quittin.8  Smokeless tobacco: Never Used Substance Use Topics  Alcohol use: No  
  
History reviewed. No pertinent family history. Immunization status: up to date and documented. Current Facility-Administered Medications Medication Dose Route Frequency  albuterol-ipratropium (DUO-NEB) 2.5 MG-0.5 MG/3 ML  3 mL Nebulization TID RT  
 therapeutic multivitamin (THERAGRAN) tablet 1 Tab  1 Tab Oral DAILY  furosemide (LASIX) tablet 20 mg  20 mg Oral DAILY  furosemide (LASIX) tablet 10 mg  10 mg Oral QPM  
 midodrine (PROAMITINE) tablet 10 mg  10 mg Oral TID WITH MEALS  
 budesonide (PULMICORT) 500 mcg/2 ml nebulizer suspension  500 mcg Nebulization BID RT  
 heparin (porcine) injection 5,000 Units  5,000 Units SubCUTAneous Q8H  
 atorvastatin (LIPITOR) tablet 40 mg  40 mg Oral QHS  aspirin chewable tablet 81 mg  81 mg Oral DAILY  tamsulosin (FLOMAX) capsule 0.4 mg  0.4 mg Oral DAILY  famotidine (PEPCID) tablet 20 mg  20 mg Oral DAILY  sodium chloride (NS) flush 5-40 mL  5-40 mL IntraVENous Q8H Review of Systems: A comprehensive review of systems was negative except for that written in the HPI. Objective: 
Vital Signs:   
Visit Vitals BP 92/54 Pulse 89 Temp 97.4 °F (36.3 °C) Resp 18 Ht 5' 7\" (1.702 m) Wt 66.9 kg (147 lb 8 oz) SpO2 94% BMI 23.10 kg/m² O2 Device: Nasal cannula O2 Flow Rate (L/min): 2 l/min Temp (24hrs), Av °F (36.7 °C), Min:97.4 °F (36.3 °C), Max:98.7 °F (37.1 °C) Intake/Output:  
Last shift:      No intake/output data recorded. Last 3 shifts:  0701 -  1900 In: 578 [P.O.:578] Out: 595 [[facility]:583] Intake/Output Summary (Last 24 hours) at 3/29/2019 2156 Last data filed at 3/29/2019 2657 Gross per 24 hour Intake  Output 345 ml Net -345 ml Physical Exam:  
General:  Alert, cooperative, no distress, appears stated age, sitting up in bed wearing nasal cannula, appears frail Head:  Normocephalic, without obvious abnormality, atraumatic. Eyes:  Conjunctivae/corneas clear. PERRL, EOMs intact. Nose: Nares normal. Septum midline. Mucosa normal. No drainage or sinus tenderness. Throat: Lips, mucosa, and tongue normal.  No oral lesions, poor dentition Neck: Supple, symmetrical, trachea midline, no adenopathy, no carotid bruit and no JVD. Lungs:    Lungs have more air entry bilaterally, lower lobes have some mild rales bilaterally - unchanged, no wheezes or rhonchi Heart:  Regular rate and rhythm, S1, S2 normal, no murmur, click, rub or gallop. Abdomen:   Soft, non-tender. Bowel sounds normal. No masses,  No organomegaly. Extremities: Extremities normal, atraumatic, no cyanosis or edema. Or clubbing Skin: Skin color, texture, turgor normal. No rashes or lesions Lymph nodes: Cervical, supraclavicular, and axillary nodes normal.  
Neurologic: Grossly nonfocal, grossly intact strength and coordination Data review: Recent Results (from the past 24 hour(s)) PTT Collection Time: 03/29/19  5:21 AM  
Result Value Ref Range aPTT 49.0 (H) 23.0 - 36.4 SEC Imaging: 
I have personally reviewed the patients radiographs and have reviewed the reports: no new studies Personal review of chest x-ray from today, shows trace bilateral effusions and interstitial edema. No masses or consolidation seen Official report per radiology: XR Results (most recent): 
Results from Hospital Encounter encounter on 03/13/19 XR CHEST PA LAT Narrative PA and lateral CXR: 
 
HISTORY: Bilateral pleural effusion and infiltrate. Congestive heart failure. Comparison March 27 and 20/7/2019 Stable mild cardiomegaly and mild vascular congestion. Slightly better aeration 
in the right lung base, with better inspiration  Discoid atelectasis in the 
right upper lobe. Slightly worse left basilar atelectasis. No significant change 
in suspected small bilateral pleural effusion. Impression IMPRESSION: Persistent bilateral basilar infiltrates, slightly worse on the 
left. Stable small bilateral pleural effusion. Stable vascular congestion. CT Results (most recent): 
Results from Hospital Encounter encounter on 03/13/19 CT NECK SOFT TISSUE W CONT Narrative EXAM: CT NECK SOFT TISSUE W CONT INDICATION: Admitted with respiratory failure, heart failure, pleural effusions, 
shortness of breath, hypotension with voice changes, stridor and recent 
intubation. COMPARISON: CT chest 3/13/2019. CONTRAST: 70 mL of Isovue-300. TECHNIQUE: Axial CT neck images obtained following administration of 70 cc Isovue-300 IV contrast. Subsequent coronal and sagittal reformatted views. All 
CT scans at this facility are performed using dose optimization technique as 
appropriate to a performed exam, to include automated exposure control, 
adjustment of the mA and/or kV according to patient size (including appropriate matching first site-specific examinations), or use of iterative reconstruction 
technique. FINDINGS: 
Moderate brain parenchymal volume loss is incompletely visualized. Status post 
cataract surgery. There is leftward nasal septum deviation. Nasal cavity and 
oral cavity are otherwise normal. Vocal folds are symmetric. No focal and 
epiglottis are normal. Parotid and submandibular glands are symmetric. There is 
a homogenous 1.2 x 0.9 cm soft tissue nodule in the left parotid (series 2, 
image 42). Small rounded opacities in the ethmoid sinuses anteriorly with larger 
rounded opacification in the right maxillary sinus most compatible with mucous 
retention cysts or polyps. Mastoid air cells are patent. Moderate to large bilateral pleural effusions, as before. Similar partially 
calcified and mildly spiculated 3.7 x 2.4 cm consolidation at the posterior 
right apex (4). There is dependent groundglass and reticulations with patchy and 
other subpleural opacities, overall similar. Moderate atherosclerosis. There is severe stenosis of the bilateral carotid 
arteries at the bulb and at the proximal internal carotid arteries. Stenosis at 
the carotid siphons. Osteopenia. Severe degenerative disc disease C5-7. Impression IMPRESSION: 
1. No acute findings to explain patient's symptoms. 2.  Similar moderate to large bilateral pleural effusions. 3.  Similar findings of likely mild pulmonary edema and other patchy opacities. Similar partially calcified spiculated consolidation right lung apex, possibly 
inflammatory changes but malignancy is not excluded. 4.  Severe stenosis bilateral carotid arteries. 5.  Moderate brain parenchymal volume loss. 6.  Homogenous nodule left parotid a represent a lymph node or other parotid 
mass. No priors for comparison. Follow-up likely not indicated given patient's 
age. 03/13/19 ECHO ADULT FOLLOW-UP OR LIMITED 03/15/2019 3/15/2019 Narrative · Left ventricular severely decreased systolic function. Estimated left  
ventricular ejection fraction is 26 - 30%. Visually measured ejection  
fraction. Left ventricular mild concentric hypertrophy. Abnormal left  
ventricular wall motion as described on the wall scoring diagram below. · Severe tricuspid valve regurgitation is present. Moderate to severe  
pulmonary hypertension is present. · Moderate mitral valve regurgitation. Signed by: MD Alonso Resendez MD/MPH Pulmonary, Critical Care Medicine [facility]

## 2019-03-30 NOTE — PROGRESS NOTES
Problem: Self Care Deficits Care Plan (Adult) Goal: *Acute Goals and Plan of Care (Insert Text) Description Occupational Therapy Goals Initiated 3/18/2019 within 7 day(s). 1.  Patient will perform functional standing activity with F balance x 1-3 min in order to prep for ADL participation. 2.  Patient will participate in upper extremity therapeutic exercise/activities with modified independence for 10 minutes. 3.  Patient will utilize energy conservation techniques during functional activities with min verbal cues. 4. Patient will perform toileting transfers with SBA and good safety awareness with BSC/raised toilet. Initiated 3/18/2019 within 7 day(s). 1.  Patient will perform upper body dressing/bathing with supervision/set-up (goal met) 2. Patient will perform grooming seated at EOB with F balance x 5 min with supervision/set-up. (goal met) 3. Patient will perform sit>stand with CGA in order to prep for participation with ADLs. (upgrade) 4. Patient will participate in upper extremity therapeutic exercise/activities with modified independence for 5-8 minutes. 5.  Patient will utilize energy conservation techniques during functional activities with min verbal cues. Outcome: Progressing Towards Goal 
 OCCUPATIONAL THERAPY TREATMENT Patient: Xavier Pisano (5 y.o. male) Date: 3/30/2019 Diagnosis: [facility] [I50.9] <principal problem not specified> Precautions: Fall Chart, occupational therapy assessment, plan of care, and goals were reviewed. ASSESSMENT: 
Pt is pleasant and cooperative, sitting in the chair upon entry, supportive daughter present in room. Pt reports he would like to return to bed 2/2 not being comfortable in the chair.  Pt demonstrates significant improvement this session with functional mobility and ADLs, was able to maneuver to the BR w/SBA, and performed toilet txfr, with simulation of all aspects of toileting w/SBA. Pt required CGA to don gown simulating donning of the jacket. Pt returned to bed at the end of the session, call bell within reach. Progression toward goals: 
?          Improving appropriately and progressing toward goals ? Improving slowly and progressing toward goals ? Not making progress toward goals and plan of care will be adjusted PLAN: 
Patient continues to benefit from skilled intervention to address the above impairments. Continue treatment per established plan of care. Discharge Recommendations:  Aric Kim Further Equipment Recommendations for Discharge:  N/A  
  
 
SUBJECTIVE:  
Patient stated ? I am ready to go!? OBJECTIVE DATA SUMMARY:  
Cognitive/Behavioral Status: 
Neurologic State: Alert Orientation Level: Oriented X4 Cognition: Follows commands Safety/Judgement: Fall prevention Functional Mobility and Transfers for ADLs: 
Bed Mobility: 
 Sit to Supine: Supervision Transfers: 
Sit to Stand: Supervision Toilet Transfer : Stand-by assistance Balance: 
Sitting: Intact Standing: Impaired; With support Standing - Static: Good Standing - Dynamic : Fair(+) ADL Intervention: 
  
Grooming Grooming Assistance: Supervision Washing Hands: Supervision/set-up Upper Body Dressing Assistance Shirt simulation with hospital gown: Contact guard assistance Pain: 
Pt reports 0/10 pain or discomfort prior to treatment. Pt reports 0/10 pain or discomfort post treatment. Activity Tolerance:   
Fair Please refer to the flowsheet for vital signs taken during this treatment. After treatment:  
?  Patient left in no apparent distress sitting up in chair ? Patient left in no apparent distress in bed 
? Call bell left within reach ? Nursing notified ? Caregiver present ? Bed alarm activated KATARINA Barba/L Time Calculation: 17 mins

## 2019-03-30 NOTE — PROGRESS NOTES
Problem: Mobility Impaired (Adult and Pediatric) Goal: *Acute Goals and Plan of Care (Insert Text) Description Physical Therapy Goals Initiated 3/18/2019, updated 3/25/19 and to be accomplished within 7 days. 1.  Patient will complete all bed mobility with modified independence in order to prepare for EOB/OOB activity. 2.  Patient will perform sit <> stand with supervision/set-up in order to prepare for OOB/gait activity. 3.  Patient will perform bed to chair transfers with supervision/set-up in order to promote mobility and encourage seated activity to progress towards their prior level of function. 4.  Patient will ambulate 100 feet with supervision/set-up using LRAD in order to prepare for safe negotiation of their environment. 5.  Patient will ascend/descend 3 steps with S handrail use with supervision/set-up in order to prepare for safe exit/entry into their home environment. Outcome: Progressing Towards Goal 
 
PHYSICAL THERAPY TREATMENT Patient: Marcelle Yuen (74 y.o. male) Date: 3/30/2019 Diagnosis[facility] [I50.[facility]] Precautions: Fall Chart, physical therapy assessment, plan of care and goals were reviewed. OBJECTIVE/ ASSESSMENT: 
Patient found sitting in b/s chair willing to work with PT. Pt stood and ambulated into bathroom, then into hallway. Pt presents with faster valente this tx able to increase distance nearly 4 times his previous best. Pt returned to EOB then to supine and left with needs in reach. Education: 
?         Bed mobility ? Transfers ? Ambulation / gait ? Assistive device management ? Stairs ? Body mechanics ? Position change ? Therapeutic exercise ? Activity pacing / energy conservation ? Other: 
 
Progression toward goals: 
?      Improving appropriately and progressing toward goals ? Improving slowly and progressing toward goals ?      Not making progress toward goals and plan of care will be adjusted PLAN: 
Patient continues to benefit from skilled intervention to address the above impairments. Continue treatment per established plan of care. Discharge Recommendations:  Rehab Further Equipment Recommendations for Discharge:  rolling walker SUBJECTIVE:  
Patient stated Let's do the Jitterbug.  OBJECTIVE DATA SUMMARY:  
Critical Behavior: 
Neurologic State: Alert Orientation Level: Oriented X4(min vcs ) Cognition: Follows commands Safety/Judgement: Fall prevention Functional Mobility Training: 
Bed Mobility: 
Sit to Supine: Supervision Transfers: 
Sit to Stand: Supervision Stand to Sit: Supervision Balance: 
Sitting: Intact Standing: Impaired; With support Standing - Static: Good Standing - Dynamic : Fair(+) Ambulation/Gait Training: 
Distance (ft): 75 Feet (ft) Assistive Device: Walker, rolling Ambulation - Level of Assistance: Supervision Gait Abnormalities: Decreased step clearance Base of Support: Narrowed Speed/Janee: Slow Step Length: Left shortened;Right shortened Pain: 
Pre tx pain: 0 Post tx pain: 0 Pain Scale 1: Numeric (0 - 10) Activity Tolerance:  
Good Please refer to the flowsheet for vital signs taken during this treatment. After treatment:  
? Patient left in no apparent distress sitting up in chair ? Patient left in no apparent distress in bed 
? Call bell left within reach ? Nursing notified ? Caregiver present ? Bed alarm activated ? SCDs applied ? Ice applied Madelyn Garcia PTA Time Calculation: 40 mins

## 2019-03-30 NOTE — PROGRESS NOTES
[facility] Hospitalist Group Progress Note Patient: Ganesh Bhagat Age: 80 y.o. : 1926 MR#: [id number] SSN: [id number] Date/Time: 3/30/2019 9:34 AM 
 
Subjective/24-hour events:  
 
Pt seen with nurse at bedside. He has no complaints. Per nursing, no events. Assessment:  
-Acute hypoxic respiratory failure required BIPAP, likely due to heart failure and associated pleural effusions, pulm input noted, s/p thoracentesis bilateral (3/18 and 3/17, 3/22, 3/26,3/27), with some improvement in respiratory status initially but has gone back to having  C/o SOB post previous thoracentesis procedures. Limited therapeutic options at this stage, due to no cardiovascular reserve unable to diurese effectively. Appears stable at this time. 
-Low bp in setting of heart failure started on midodrine 
-Pleural effusions s/p repeated thoracenteses likely due to HF   
-Acute on chronic systolic CHF on lasix,on with negative fluid balance but wt has bone up, overall breathing has improved. Fl 
-Ischemic cardiomyopathy, EF 26-30% -CADRecent NSTEMI s/p PCI/stent placement  trop downtrending 
 
-Dyslipidemia 
-CKD 2-3 
-Chronic anemia 
-Valvular heart disease 
-Advanced age 
-Probable COPD 
-Tobacco use hx Overall limited treatment options, diuresing aggressively to improve his respiratory status would be at the expense of his bp. At this stage, any other measures (thoracentesis) futile and have no other treatment options available. Will discuss w/ daughter that at this point pt is ready for discharge and will work w/ CM in resolving disposition issues. Plan: 
-monitor hemodynamics, cont to hold all antihypertensives all together for now due to hypotension 
- cont midodrine  
-Keep higher dose of lasix (20mg in am) and 10 mg q evening 
-Cont to diurese as bp allows 
-Continue nebs/symbicort/usual pulmonary hygiene.   . 
--  Will hold bb and spironolactone due to low bp 
 PT/OT evals. Based on current clinical status, will likely not be able to return home alone at discharge. Discussed w/ CM and daughter/patient. At this stage, daughter is interested in SNF and has given CM a list.  
:  [x]Patient  []Family  [x]Nursing  []Case Management DVT Prophylaxis:  []Lovenox  []Hep SQ  []SCDs  []Coumadin   [x]On Heparin gtt Objective:  
VS:  
Visit Vitals /59 (BP 1 Location: Right arm, BP Patient Position: At rest) Pulse 83 Temp 97.9 °F (36.6 °C) Resp 18 Ht 5' 7\" (1.702 m) Wt 67 kg (147 lb 11.3 oz) SpO2 99% BMI 23.13 kg/m² Tmax/24hrs: Temp (24hrs), Av.7 °F (36.5 °C), Min:97 °F (36.1 °C), Max:98.7 °F (37.1 °C) Intake/Output Summary (Last 24 hours) at 3/30/2019 1715 Last data filed at 3/30/2019 3235 Gross per 24 hour Intake 100 ml Output 100 ml Net 0 ml General: alert, awake, in NAD. Nontoxic-appearing. Cardiovascular:  S1, S2. Tachycardic. Pulmonary:  No active wheezing clear breath sounds. GI:  Abdomen soft, NTTP. Extremities:  Warm, no ischemia. Neuro:  Awake and alert, motor nofocal. 
 
Labs:   
Recent Results (from the past 24 hour(s)) PTT Collection Time: 19  3:39 AM  
Result Value Ref Range aPTT 45.0 (H) 23.0 - 36.4 SEC Signed By: Akhil Edouard MD   
 2019 9:34 AM

## 2019-03-30 NOTE — PROGRESS NOTES
Problem: Falls - Risk of 
Goal: *Absence of Falls Description Document Zion Ravi Fall Risk and appropriate interventions in the flowsheet. Outcome: Progressing Towards Goal 
  
Problem: Patient Education: Go to Patient Education Activity Goal: Patient/Family Education Outcome: Progressing Towards Goal 
  
Problem: Pressure Injury - Risk of 
Goal: *Prevention of pressure injury Description Document William Scale and appropriate interventions in the flowsheet. Outcome: Progressing Towards Goal 
  
Problem: Patient Education: Go to Patient Education Activity Goal: Patient/Family Education Outcome: Progressing Towards Goal 
  
Problem: General Medical Care Plan Goal: *Vital signs within specified parameters Outcome: Progressing Towards Goal 
Goal: *Labs within defined limits Outcome: Progressing Towards Goal 
Goal: *Absence of infection signs and symptoms Outcome: Progressing Towards Goal 
Goal: *Optimal pain control at patient's stated goal 
Outcome: Progressing Towards Goal 
Goal: *Skin integrity maintained Outcome: Progressing Towards Goal 
Goal: *Fluid volume balance Outcome: Progressing Towards Goal 
Goal: *Optimize nutritional status Outcome: Progressing Towards Goal 
Goal: *Anxiety reduced or absent Outcome: Progressing Towards Goal 
Goal: *Progressive mobility and function (eg: ADL's) Outcome: Progressing Towards Goal 
  
Problem: Patient Education: Go to Patient Education Activity Goal: Patient/Family Education Description Be able to decrease fluids in the body and able to breathe well and hemodynamically stable. Outcome: Progressing Towards Goal 
  
Problem: Heart Failure: Discharge Outcomes Goal: *Demonstrates ability to perform prescribed activity without shortness of breath or discomfort Outcome: Progressing Towards Goal 
Goal: *Left ventricular function assessment completed prior to or during stay, or planned for post-discharge Outcome: Progressing Towards Goal 
 Goal: *ACEI prescribed if LVEF less than 40% and no contraindications or ARB prescribed Outcome: Progressing Towards Goal 
Goal: *Verbalizes understanding and describes prescribed diet Outcome: Progressing Towards Goal 
Goal: *Verbalizes understanding/describes prescribed medications Outcome: Progressing Towards Goal 
Goal: *Describes available resources and support systems Description 
(eg: Home Health, Palliative Care, Advanced Medical Directive) Outcome: Progressing Towards Goal 
Goal: *Describes smoking cessation resources Outcome: Progressing Towards Goal 
Goal: *Understands and describes signs and symptoms to report to providers(Stroke Metric) Outcome: Progressing Towards Goal 
Goal: *Describes/verbalizes understanding of follow-up/return appt Description 
(eg: to physicians, diabetes treatment coordinator, and other resources Outcome: Progressing Towards Goal 
Goal: *Describes importance of continuing daily weights and changes to report to physician Outcome: Progressing Towards Goal 
  
Problem: Patient Education: Go to Patient Education Activity Goal: Patient/Family Education Outcome: Progressing Towards Goal 
  
Problem: Patient Education: Go to Patient Education Activity Goal: Patient/Family Education Outcome: Progressing Towards Goal 
  
Problem: Nutrition Deficit Goal: *Optimize nutritional status Outcome: Progressing Towards Goal 
  
Problem: Pain Goal: *Control of Pain Outcome: Progressing Towards Goal 
Goal: *PALLIATIVE CARE:  Alleviation of Pain Outcome: Progressing Towards Goal 
  
Problem: Patient Education: Go to Patient Education Activity Goal: Patient/Family Education Outcome: Progressing Towards Goal

## 2019-03-31 NOTE — PROGRESS NOTES
Problem: Falls - Risk of 
Goal: *Absence of Falls Description Document Prabha Rivera Fall Risk and appropriate interventions in the flowsheet. Outcome: Progressing Towards Goal 
  
Problem: Patient Education: Go to Patient Education Activity Goal: Patient/Family Education Outcome: Progressing Towards Goal 
  
Problem: Pressure Injury - Risk of 
Goal: *Prevention of pressure injury Description Document William Scale and appropriate interventions in the flowsheet. Outcome: Progressing Towards Goal 
  
Problem: Patient Education: Go to Patient Education Activity Goal: Patient/Family Education Outcome: Progressing Towards Goal 
  
Problem: General Medical Care Plan Goal: *Vital signs within specified parameters Outcome: Progressing Towards Goal 
Goal: *Labs within defined limits Outcome: Progressing Towards Goal 
Goal: *Absence of infection signs and symptoms Outcome: Progressing Towards Goal 
Goal: *Optimal pain control at patient's stated goal 
Outcome: Progressing Towards Goal 
Goal: *Skin integrity maintained Outcome: Progressing Towards Goal 
Goal: *Fluid volume balance Outcome: Progressing Towards Goal 
Goal: *Optimize nutritional status Outcome: Progressing Towards Goal 
Goal: *Anxiety reduced or absent Outcome: Progressing Towards Goal 
Goal: *Progressive mobility and function (eg: ADL's) Outcome: Progressing Towards Goal 
  
Problem: Patient Education: Go to Patient Education Activity Goal: Patient/Family Education Description Be able to decrease fluids in the body and able to breathe well and hemodynamically stable. Outcome: Progressing Towards Goal 
  
Problem: Heart Failure: Discharge Outcomes Goal: *Demonstrates ability to perform prescribed activity without shortness of breath or discomfort Outcome: Progressing Towards Goal 
Goal: *Left ventricular function assessment completed prior to or during stay, or planned for post-discharge Outcome: Progressing Towards Goal 
 Goal: *ACEI prescribed if LVEF less than 40% and no contraindications or ARB prescribed Outcome: Progressing Towards Goal 
Goal: *Verbalizes understanding and describes prescribed diet Outcome: Progressing Towards Goal 
Goal: *Verbalizes understanding/describes prescribed medications Outcome: Progressing Towards Goal 
Goal: *Describes available resources and support systems Description 
(eg: Home Health, Palliative Care, Advanced Medical Directive) Outcome: Progressing Towards Goal 
Goal: *Describes smoking cessation resources Outcome: Progressing Towards Goal 
Goal: *Understands and describes signs and symptoms to report to providers(Stroke Metric) Outcome: Progressing Towards Goal 
Goal: *Describes/verbalizes understanding of follow-up/return appt Description 
(eg: to physicians, diabetes treatment coordinator, and other resources Outcome: Progressing Towards Goal 
Goal: *Describes importance of continuing daily weights and changes to report to physician Outcome: Progressing Towards Goal 
  
Problem: Patient Education: Go to Patient Education Activity Goal: Patient/Family Education Outcome: Progressing Towards Goal 
  
Problem: Patient Education: Go to Patient Education Activity Goal: Patient/Family Education Outcome: Progressing Towards Goal 
  
Problem: Nutrition Deficit Goal: *Optimize nutritional status Outcome: Progressing Towards Goal 
  
Problem: Pain Goal: *Control of Pain Outcome: Progressing Towards Goal 
Goal: *PALLIATIVE CARE:  Alleviation of Pain Outcome: Progressing Towards Goal 
  
Problem: Patient Education: Go to Patient Education Activity Goal: Patient/Family Education Outcome: Progressing Towards Goal

## 2019-03-31 NOTE — PROGRESS NOTES
[facility] Pulmonary, Critical Care, and Sleep Medicine Follow up patient note Name: Amadou Iverson MRN: [id number] : 1926 Hospital: [address] [place] [address] Dr Date: 3/31/2019 Consulting physician: Irina Reyes Reason: Pleural effusions IMPRESSION:  
· Acute hypoxic respiratory failure in a patient with HFrEF (EF 26%) · Recurrent transudative bilateral pleural effusion secondary to CHF exacerbation, with compressive atelectasis:  S/P thoracentesis multiple thoracenteses with one done last week and now again this week on the right - 1.6L, in the left side the following day for 1.35 L. Trace effusions on repeat CXR. Dull at bases B/L - consistent with recurring effusions · Cough: Multifactorial, atelectasis versus silent aspiration · Suspect silent aspiration · CHF exacerbation (combined systolic and diastolic) · NEL in the setting of HFrEF - possibly cardiorenal syndrome · Deconditioning, adult failure to thrive · Hx of NSTEMi and S/P PCI to LAD on 3/11/19 and Ischemic cardiomyopathy and HFrEF and diastolic dysfunction · Anemia with hx of recent GI bleed. · Significant peripheral vascular disease CAD, ICA stenosis. · Hx of HTN and DLD · Hx of smoking - COPD from hx --likely mild, no evidence of exacerbation RECOMMENDATIONS:  
Advise to continue diuresis to maintain net negative fluid balance as renal function tolerates. Diuresis per primary service and Cardiology. Cotninue midodrine 10 mg 3 times daily Advised against repeat thoracentesis as previously noted, discussed with his daughter and the patient Supplemental oxygen to maintain SpO2 >88%. May use Bipap PRN Please assess for home oxygen need prior to discharge Aggressive pulmonary toileting/bronchial hygiene Frequent incentive spirometry Can switch to home inhalers -- Symbicort 80/4.5mcg 2 puffs BID, discontinue bevespi as too much LABA dose Strict aspiration precautions including elevating HOB >45deg while eating and after meals x 3 hours, and lifestyle modifications PT/OT, OOB, ambulate with assistance as tolerated DVT ppx per primary service We will sign off at this time Subjective:  
03/31/19 Patient seen and examined at bedside. No acute events overnight. Patient reports no new symptoms, reports improvement to dyspnea some, able to ambulate in the all with some assistance. Daughter reports. Patient denies any nausea, vomiting, diarrhea, chest pain. HPI per Dr. Viola Rodriguez: This patient has been seen and evaluated at the request of Dr. Pritesh Shaw for evaluation of hypoxic respiratory failure. Patient is a 80 y.o. male with past medical history significant for asthma, ischemic cardiomyopathy (S/P PCI), GI bleed (recent) and hypertension who has presented with 1 week history of worsening SOB and leg swelling. Patient is able to provide most of the history and states that he lives alone but does get some help from his daughter. No hx of cough, fevers, phlegm production. Complains of chest pains across the chest in the lower chest that started during the same period of time and is associated with cough or taking deep breaths. This pleuritic sounding chest pain is non radiating. Patient was admitted to hospital and his SOB was worked up and the work up included a CTA which was done on 3/13/19 and it showed bilateral GGOs and pleural effusions. Patient states that he is an ex smoker. Smoked 1ppd but quit 50 years ago. Worked for [employer], But denies any history of asbestos exposure. Past Medical History:  
Diagnosis Date  Asthma  Cardiac cath 04/28/2011 oLM 30%. pLAD 30%. oD1 50%. CX 90% (3 x 18 Ingram DEMAR). dRCA 90%. RPLB subtotal.  RPDA 100%.  Cardiac echocardiogram 01/21/2014 EF 55-60%. Mod LVH. Gr 1 DDfx. Mild AS (mean grad 13). Mild AI. Unchanged from study of 11/30/11.  Cardiac nuclear imaging test, mod risk 01/22/2016 Intermediate risk. Medium-sized inferior, inferoseptal infarction. No ischemia. EF 54%. Nondiagnostic EKG on pharm stress test.  
 Carotid duplex 03/02/2016 Mod 50-69% bilateral ICA stenosis. Probable significant RECA stenosis. >50% stenosis of left subclavian. No significant change from study of 1/8/15.  Coronary artery disease Status post PCI with drug-eluting stent, Deer 3 x 18 mm in the proximal circumflex.  Hx of carotid artery disease ([facility] [address]  Hypercholesterolemia  Hypertension  Prostate cancer ([facility] [address] Past Surgical History:  
Procedure Laterality Date  HX CORONARY STENT PLACEMENT  4/28/11 PCI with drug-eluting stent, Deer 3 x 18 mm in the proximal circumflex (post dialated with 3.25 mm noncompliant balloon at high pressure). Prior to Admission medications Medication Sig Start Date End Date Taking? Authorizing Provider  
albuterol-ipratropium (DUO-NEB) 2.5 mg-0.5 mg/3 ml nebu 3 mL by Nebulization route every four (4) hours as needed (wheezing, sob). 3/20/19  Yes Ailyn Elkins MD  
atorvastatin (LIPITOR) 40 mg tablet Take 1 Tab by mouth nightly. 3/12/19   Tomas Quintanilla MD  
carvedilol (COREG) 3.125 mg tablet Take 1 Tab by mouth two (2) times daily (with meals). 3/12/19   Tomas Quintanilla MD  
lisinopril (PRINIVIL, ZESTRIL) 5 mg tablet Take 1 Tab by mouth daily. 3/13/19   Tomas Quintanilla MD  
nitroglycerin (NITROSTAT) 0.4 mg SL tablet 1 Tab by SubLINGual route every five (5) minutes as needed for Chest Pain. 3/12/19   Tomas Quintanilla MD  
furosemide (LASIX) 20 mg tablet Take 1 Tab by mouth daily. 3/12/19   Tomas Quintanilla MD  
potassium chloride SR (KLOR-CON 10) 10 mEq tablet Take 1 Tab by mouth daily. 3/12/19   Tomas Quintanilla MD  
OTHER Cbc, BMP in 1 week Dx: CHF Fax results to Dr. Mayra Cowden 3/12/19   Kilo Kimball MD  
budesonide-formoterol (SYMBICORT) 160-4.5 mcg/actuation HFAA Take 2 Puffs by inhalation two (2) times a day. 3/1/19   Alex Daniel MD  
glycopyrrolate-formoterol (BEVESPI AEROSPHERE) 9-4.8 mcg HFAA Take 2 Inhalation by inhalation two (2) times a day. 3/1/19   Tracie Carey MD  
clopidogrel (PLAVIX) 75 mg tab TAKE 1 TABLET EVERY DAY 19   Alex Daniel MD  
omeprazole (PRILOSEC) 40 mg capsule Take 1 Cap by mouth daily. 19   Sheila Stout MD  
albuterol (PROVENTIL HFA, VENTOLIN HFA, PROAIR HFA) 90 mcg/actuation inhaler Take 2 Puffs by inhalation every four (4) hours as needed for Wheezing or Shortness of Breath. 3/2/15   AMITA Arndt  
tamsulosin (FLOMAX) 0.4 mg capsule Take 1 Cap by mouth daily. 3/13/13   Tracie Carey MD  
aspirin delayed-release 81 mg tablet Take 81 mg by mouth daily. Tracie Carey MD  
 
No Known Allergies Social History Tobacco Use  Smoking status: Former Smoker Packs/day: 1.00 Years: 15.00 Pack years: 15.00 Last attempt to quit: 1960 Years since quittin.8  Smokeless tobacco: Never Used Substance Use Topics  Alcohol use: No  
  
History reviewed. No pertinent family history. Immunization status: up to date and documented. Current Facility-Administered Medications Medication Dose Route Frequency  albuterol-ipratropium (DUO-NEB) 2.5 MG-0.5 MG/3 ML  3 mL Nebulization TID RT  
 therapeutic multivitamin (THERAGRAN) tablet 1 Tab  1 Tab Oral DAILY  furosemide (LASIX) tablet 20 mg  20 mg Oral DAILY  furosemide (LASIX) tablet 10 mg  10 mg Oral QPM  
 midodrine (PROAMITINE) tablet 10 mg  10 mg Oral TID WITH MEALS  
 budesonide (PULMICORT) 500 mcg/2 ml nebulizer suspension  500 mcg Nebulization BID RT  
 heparin (porcine) injection 5,000 Units  5,000 Units SubCUTAneous Q8H  
 atorvastatin (LIPITOR) tablet 40 mg  40 mg Oral QHS  aspirin chewable tablet 81 mg  81 mg Oral DAILY  tamsulosin (FLOMAX) capsule 0.4 mg  0.4 mg Oral DAILY  famotidine (PEPCID) tablet 20 mg  20 mg Oral DAILY  sodium chloride (NS) flush 5-40 mL  5-40 mL IntraVENous Q8H Review of Systems: A comprehensive review of systems was negative except for that written in the HPI. Objective: 
Vital Signs:   
Visit Vitals /57 (BP 1 Location: Right arm, BP Patient Position: Sitting) Pulse 86 Temp 98.5 °F (36.9 °C) Resp 19 Ht 5' 7\" (1.702 m) Wt 67.6 kg (149 lb 1.6 oz) SpO2 93% BMI 23.35 kg/m² O2 Device: Nasal cannula O2 Flow Rate (L/min): 2 l/min Temp (24hrs), Av.2 °F (36.8 °C), Min:97.2 °F (36.2 °C), Max:99.6 °F (37.6 °C) Intake/Output:  
Last shift:      No intake/output data recorded. Last 3 shifts: 701 - 1900 In: 36 [P.O.:820] Out: 675 Vick Hidalgo Intake/Output Summary (Last 24 hours) at 3/31/2019 1901 Last data filed at 3/31/2019 4991 Gross per 24 hour Intake 100 ml Output 400 ml Net -300 ml Physical Exam:  
General:  Alert, cooperative, no distress, appears stated age, sitting up in bed wearing nasal cannula, appears frail Head:  Normocephalic, without obvious abnormality, atraumatic. Eyes:  Conjunctivae/corneas clear. PERRL, EOMs intact. Nose: Nares normal. Septum midline. Mucosa normal. No drainage or sinus tenderness. Throat: Lips, mucosa, and tongue normal.  No oral lesions, poor dentition Neck: Supple, symmetrical, trachea midline, no adenopathy, no carotid bruit and no JVD. Lungs:    Decreased air entry in the bases, rales in bases, otherwise clear in other lung fields, no wheezes or rhonchi Heart:  Regular rate and rhythm, S1, S2 normal, no murmur, click, rub or gallop. Abdomen:   Soft, non-tender. Bowel sounds normal. No masses,  No organomegaly. Extremities: Extremities normal, atraumatic, no cyanosis or edema. No clubbing Skin: Skin color, texture, turgor normal. No rashes or lesions Lymph nodes: Cervical, supraclavicular, and axillary nodes normal.  
Neurologic: Grossly nonfocal, grossly intact strength and coordination Data review:  
 
Recent Results (from the past 24 hour(s)) PTT Collection Time: 03/31/19 12:48 AM  
Result Value Ref Range aPTT 46.8 (H) 23.0 - 36.4 SEC Imaging: 
I have personally reviewed the patients radiographs and have reviewed the reports: no new studies Personal review of chest x-ray from today, shows trace bilateral effusions and interstitial edema. No masses or consolidation seen Official report per radiology: XR Results (most recent): 
Results from Hospital Encounter encounter on 03/13/19 XR CHEST PA LAT Narrative PA and lateral CXR: 
 
HISTORY: Bilateral pleural effusion and infiltrate. Congestive heart failure. Comparison March 27 and 20/7/2019 Stable mild cardiomegaly and mild vascular congestion. Slightly better aeration 
in the right lung base, with better inspiration  Discoid atelectasis in the 
right upper lobe. Slightly worse left basilar atelectasis. No significant change 
in suspected small bilateral pleural effusion. Impression IMPRESSION: Persistent bilateral basilar infiltrates, slightly worse on the 
left. Stable small bilateral pleural effusion. Stable vascular congestion. CT Results (most recent): 
Results from Hospital Encounter encounter on 03/13/19 CT NECK SOFT TISSUE W CONT Narrative EXAM: CT NECK SOFT TISSUE W CONT INDICATION: Admitted with respiratory failure, heart failure, pleural effusions, 
shortness of breath, hypotension with voice changes, stridor and recent 
intubation. COMPARISON: CT chest 3/13/2019. CONTRAST: 70 mL of Isovue-300. TECHNIQUE: Axial CT neck images obtained following administration of 70 cc Isovue-300 IV contrast. Subsequent coronal and sagittal reformatted views. All CT scans at this facility are performed using dose optimization technique as 
appropriate to a performed exam, to include automated exposure control, 
adjustment of the mA and/or kV according to patient size (including appropriate 
matching first site-specific examinations), or use of iterative reconstruction 
technique. FINDINGS: 
Moderate brain parenchymal volume loss is incompletely visualized. Status post 
cataract surgery. There is leftward nasal septum deviation. Nasal cavity and 
oral cavity are otherwise normal. Vocal folds are symmetric. No focal and 
epiglottis are normal. Parotid and submandibular glands are symmetric. There is 
a homogenous 1.2 x 0.9 cm soft tissue nodule in the left parotid (series 2, 
image 42). Small rounded opacities in the ethmoid sinuses anteriorly with larger 
rounded opacification in the right maxillary sinus most compatible with mucous 
retention cysts or polyps. Mastoid air cells are patent. Moderate to large bilateral pleural effusions, as before. Similar partially 
calcified and mildly spiculated 3.7 x 2.4 cm consolidation at the posterior 
right apex (4). There is dependent groundglass and reticulations with patchy and 
other subpleural opacities, overall similar. Moderate atherosclerosis. There is severe stenosis of the bilateral carotid 
arteries at the bulb and at the proximal internal carotid arteries. Stenosis at 
the carotid siphons. Osteopenia. Severe degenerative disc disease C5-7. Impression IMPRESSION: 
1. No acute findings to explain patient's symptoms. 2.  Similar moderate to large bilateral pleural effusions. 3.  Similar findings of likely mild pulmonary edema and other patchy opacities. Similar partially calcified spiculated consolidation right lung apex, possibly 
inflammatory changes but malignancy is not excluded. 4.  Severe stenosis bilateral carotid arteries. 5.  Moderate brain parenchymal volume loss. 6.  Homogenous nodule left parotid a represent a lymph node or other parotid 
mass. No priors for comparison. Follow-up likely not indicated given patient's 
age. 03/13/19 ECHO ADULT FOLLOW-UP OR LIMITED 03/15/2019 3/15/2019 Narrative · Left ventricular severely decreased systolic function. Estimated left  
ventricular ejection fraction is 26 - 30%. Visually measured ejection  
fraction. Left ventricular mild concentric hypertrophy. Abnormal left  
ventricular wall motion as described on the wall scoring diagram below. · Severe tricuspid valve regurgitation is present. Moderate to severe  
pulmonary hypertension is present. · Moderate mitral valve regurgitation. Signed by: MD Jake Stacy Dears  MD/MPH Pulmonary, Critical Care Medicine [facility]

## 2019-03-31 NOTE — ROUTINE PROCESS
Received report from [facility]. Pt AAOx3, NAD, breathing non labored, on room air, HOB up. IV site clean, dry and intact. Bed at the lowest level on lock position, call bell w/i reach. Bedside and Verbal shift change report given to KATYA Licea (oncoming nurse) by me (offgoing nurse).  Report included the following information SBAR, Kardex, Intake/Output, MAR, Recent Results and Cardiac Rhythm SR.

## 2019-03-31 NOTE — ROUTINE PROCESS
Received report from [facility]. Pt AAOx3, NAD, breathing non labored, on O2 NC at 2L, up in the recliner. I V site clean, dry and intact. Assisted pt back to bed and made comfortable. Bed at the lowest level on lock position, call bell w/i reach. 2200-noted 2 white oblong pills on pt's bedside table. When asked what pills they are , pt got irate and said they are pain pills from her daughter and are not harmful; pt's voice sounds angry. Offered pt Tylenol that he has an order for. He told me to get it. When asked if he wants it now, he said \"just give it to me\". I explained to the pt that I could not leave medication/s at the bedside. Pt replied \"you keep yours, I'll keep mine. Supervisor notified of the situation who came in and talked to the pt. Pt still does not want to give up his own pills that he's holding in his hands. Dr Ej Palmer talked to the pt also; pt surrendered his pills. Called pt's daughter and told her what transpired w/ the medications at the bedside. She said that said pills was hers. Explained to her that home meds are discouraged to be taken by pts or brought in by family members w/o MD's order. She said that there's a bottle of Tylenol in the pt's drawer. I asked her if she could take it home next time she visits her father; she said yes. I told her that late at night during VS taking or when RT gives breathing treatment, I could ask the pt if he wants something for pain. Bedside and Verbal shift change report given to KATYA Degroot (oncoming nurse) by me (offgoing nurse). Report included the following information SBAR, Kardex, Procedure Summary, Intake/Output, MAR, Accordion, Med Rec Status and Alarm Parameters .

## 2019-03-31 NOTE — PROGRESS NOTES
Received report on pt.from off going RN. Resting quietly in bed on rounds. Denies c/o pain or SOB at this time. No acute distress noted. Call bell at side, bed alarm on. Will cont to monitor for any changes in status. 1300  assisted up into chair by daughter for lunch. 1500 back in be resting w/o noted distress. Daughter at bedside. 1800 sitting up in bed eating dinner. 2050 Bedside and Verbal shift change report given to Kush Holguin RN (oncoming nurse) by Ree Moreno RN (offgoing nurse). Report given with Chauncey MEHTA and MAR.

## 2019-03-31 NOTE — PROGRESS NOTES
[facility] Hospitalist Group Progress Note Patient: Radha Brody Age: 80 y.o. : 1926 MR#: [id number] SSN: [id number] Date/Time: 3/31/2019 9:34 AM 
 
Subjective/24-hour events:  
 
Pt has no complaints. Assessment:  
-Acute hypoxic respiratory failure required BIPAP, likely due to heart failure and associated pleural effusions, pulm input noted, s/p thoracentesis bilateral (3/18 and 3/17, 3/22, 3/26,3/27), with some improvement in respiratory status initially but has gone back to having  C/o SOB post previous thoracentesis procedures. Limited therapeutic options at this stage, due to no cardiovascular reserve unable to diurese effectively. Appears stable at this time. 
-Low bp in setting of heart failure started on midodrine 
-Pleural effusions s/p repeated thoracenteses likely due to HF   
-Acute on chronic systolic CHF on lasix,on with negative fluid balance but wt has bone up, overall breathing has improved. Fl 
-Ischemic cardiomyopathy, EF 26-30% -CADRecent NSTEMI s/p PCI/stent placement  trop down trending 
-Severe protein calorie malnutrition   
 
-Dyslipidemia 
-CKD 2-3 
-Chronic anemia 
-Valvular heart disease 
-Advanced age 
-Probable COPD 
-Tobacco use hx Overall limited treatment options, diuresing aggressively to improve his respiratory status would be at the expense of his bp. At this stage, any other measures (thoracentesis) futile and have no other treatment options available. Will discuss w/ daughter that at this point pt is ready for discharge and will work w/ CM in resolving disposition issues. Plan: 
-monitor hemodynamics, cont to hold all antihypertensives all together for now due to hypotension 
- cont midodrine  
-Keep higher dose of lasix (20mg in am) and 10 mg q evening 
-Cont to diurese as bp allows 
-Continue nebs/symbicort/usual pulmonary hygiene.   . 
--  Will hold bb and spironolactone due to low bp 
 PT/OT evals. Based on current clinical status, will likely not be able to return home alone at discharge. Discussed w/ CM and daughter/patient. At this stage, daughter is interested in SNF and has given CM a list.  
:  [x]Patient  []Family  [x]Nursing  []Case Management DVT Prophylaxis:  []Lovenox  []Hep SQ  []SCDs  []Coumadin   [x]On Heparin gtt Objective:  
VS:  
Visit Vitals BP 95/54 (BP 1 Location: Right arm, BP Patient Position: At rest) Pulse 84 Temp 97.2 °F (36.2 °C) Resp 19 Ht 5' 7\" (1.702 m) Wt 67.6 kg (149 lb 1.6 oz) SpO2 99% BMI 23.35 kg/m² Tmax/24hrs: Temp (24hrs), Av.2 °F (36.8 °C), Min:97.2 °F (36.2 °C), Max:99.6 °F (37.6 °C) Intake/Output Summary (Last 24 hours) at 3/31/2019 1659 Last data filed at 3/31/2019 2135 Gross per 24 hour Intake 340 ml Output 675 ml Net -335 ml General: alert, awake, in NAD. Nontoxic-appearing. Cardiovascular:  S1, S2. Tachycardic. Pulmonary:  No active wheezing clear breath sounds. GI:  Abdomen soft, NTTP. Extremities:  Warm, no ischemia. Neuro:  Awake and alert, motor nofocal. 
 
Labs:   
Recent Results (from the past 24 hour(s)) PTT Collection Time: 19 12:48 AM  
Result Value Ref Range aPTT 46.8 (H) 23.0 - 36.4 SEC Signed By: Jasmin Chopra MD   
 2019 9:34 AM

## 2019-04-01 NOTE — PROGRESS NOTES
Problem: Patient Education: Go to Patient Education Activity Goal: Patient/Family Education Outcome: Progressing Towards Goal 
  
Problem: Pressure Injury - Risk of 
Goal: *Prevention of pressure injury Description Document William Scale and appropriate interventions in the flowsheet. Outcome: Progressing Towards Goal 
  
Problem: General Medical Care Plan Goal: *Vital signs within specified parameters Outcome: Progressing Towards Goal 
  
Problem: Falls - Risk of 
Goal: *Absence of Falls Description Document Gemini Soto Fall Risk and appropriate interventions in the flowsheet.  
Outcome: Progressing Towards Goal

## 2019-04-01 NOTE — PROGRESS NOTES
Problem: Falls - Risk of 
Goal: *Absence of Falls Description Document Burak Reas Fall Risk and appropriate interventions in the flowsheet. Outcome: Progressing Towards Goal 
  
Problem: Patient Education: Go to Patient Education Activity Goal: Patient/Family Education Outcome: Progressing Towards Goal 
  
Problem: Pressure Injury - Risk of 
Goal: *Prevention of pressure injury Description Document William Scale and appropriate interventions in the flowsheet. Outcome: Progressing Towards Goal 
  
Problem: Patient Education: Go to Patient Education Activity Goal: Patient/Family Education Outcome: Progressing Towards Goal 
  
Problem: General Medical Care Plan Goal: *Vital signs within specified parameters Outcome: Progressing Towards Goal 
Goal: *Labs within defined limits Outcome: Progressing Towards Goal 
Goal: *Absence of infection signs and symptoms Outcome: Progressing Towards Goal 
Goal: *Optimal pain control at patient's stated goal 
Outcome: Progressing Towards Goal 
Goal: *Skin integrity maintained Outcome: Progressing Towards Goal 
Goal: *Fluid volume balance Outcome: Progressing Towards Goal 
Goal: *Optimize nutritional status Outcome: Progressing Towards Goal 
Goal: *Anxiety reduced or absent Outcome: Progressing Towards Goal 
Goal: *Progressive mobility and function (eg: ADL's) Outcome: Progressing Towards Goal 
  
Problem: Patient Education: Go to Patient Education Activity Goal: Patient/Family Education Description Be able to decrease fluids in the body and able to breathe well and hemodynamically stable. Outcome: Progressing Towards Goal 
  
Problem: Heart Failure: Discharge Outcomes Goal: *Demonstrates ability to perform prescribed activity without shortness of breath or discomfort Outcome: Progressing Towards Goal 
Goal: *Left ventricular function assessment completed prior to or during stay, or planned for post-discharge Outcome: Progressing Towards Goal 
 Goal: *ACEI prescribed if LVEF less than 40% and no contraindications or ARB prescribed Outcome: Progressing Towards Goal 
Goal: *Verbalizes understanding and describes prescribed diet Outcome: Progressing Towards Goal 
Goal: *Verbalizes understanding/describes prescribed medications Outcome: Progressing Towards Goal 
Goal: *Describes available resources and support systems Description 
(eg: Home Health, Palliative Care, Advanced Medical Directive) Outcome: Progressing Towards Goal 
Goal: *Describes smoking cessation resources Outcome: Progressing Towards Goal 
Goal: *Understands and describes signs and symptoms to report to providers(Stroke Metric) Outcome: Progressing Towards Goal 
Goal: *Describes/verbalizes understanding of follow-up/return appt Description 
(eg: to physicians, diabetes treatment coordinator, and other resources Outcome: Progressing Towards Goal 
Goal: *Describes importance of continuing daily weights and changes to report to physician Outcome: Progressing Towards Goal 
  
Problem: Patient Education: Go to Patient Education Activity Goal: Patient/Family Education Outcome: Progressing Towards Goal 
  
Problem: Patient Education: Go to Patient Education Activity Goal: Patient/Family Education Outcome: Progressing Towards Goal 
  
Problem: Nutrition Deficit Goal: *Optimize nutritional status Outcome: Progressing Towards Goal 
  
Problem: Pain Goal: *Control of Pain Outcome: Progressing Towards Goal 
Goal: *PALLIATIVE CARE:  Alleviation of Pain Outcome: Progressing Towards Goal 
  
Problem: Patient Education: Go to Patient Education Activity Goal: Patient/Family Education Outcome: Progressing Towards Goal

## 2019-04-01 NOTE — PROGRESS NOTES
Bedside and Verbal shift change report received from  Rip Mclean RN (off going nurse). Report included the following information SBAR, Kardex, MAR and Recent Results. Assumed care patient   Sleeping comfortably. Fall prevention program maintained,call bell and bedside table within reach.  No problems identified at this time, ,will continue care.

## 2019-04-01 NOTE — PROGRESS NOTES
Received report on pt.from off going RN. Resting quietly in bed on rounds. Denies c/o pain or SOB at this time. No acute distress noted. Call bell at side. Bed alarms on. Will cont to monitor for any changes in status. 1300 02 sat on room air after walking to BR was 94%. Will cont to  monitor on RA and check 02 sats for tolerance. 1400 02 sats on room air at rest 96%. No dyspnea noted. 1600 ambulated in hallway again and back to bed. 02 sats remain at 93 % w ambulation on room air. 1700 remains on room air w/o noted distress. 02 sats 96%. Daughter remains at bedside. 1800 sitting up in chair eating dinner. Had tolerated being on room air since early afternoon. Will cont to monitor for any changes. 1920 Bedside and Verbal shift change report given to [address] (oncoming nurse) by Ferny Colindres RN (offgoing nurse). Report given with JANINE, Chauncey and MAR.

## 2019-04-02 NOTE — PROGRESS NOTES
Problem: Mobility Impaired (Adult and Pediatric) Goal: *Acute Goals and Plan of Care (Insert Text) Description Physical Therapy Goals Initiated 3/18/2019, updated 3/25/19 and to be accomplished within 7 days. 1.  Patient will complete all bed mobility with modified independence in order to prepare for EOB/OOB activity. 2.  Patient will perform sit <> stand with supervision/set-up in order to prepare for OOB/gait activity. 3.  Patient will perform bed to chair transfers with supervision/set-up in order to promote mobility and encourage seated activity to progress towards their prior level of function. 4.  Patient will ambulate 100 feet with supervision/set-up using LRAD in order to prepare for safe negotiation of their environment. 5.  Patient will ascend/descend 3 steps with S handrail use with supervision/set-up in order to prepare for safe exit/entry into their home environment. Outcome: Progressing Towards Goal 
  
PHYSICAL THERAPY TREATMENT Patient: Kelli Record (85 y.o. male) Date: 4/2/2019 Diagnosis: Congestive heart disease ([facility] [address] [I50.9] Precautions: Fall Chart, physical therapy assessment, plan of care and goals were reviewed. OBJECTIVE/ ASSESSMENT: 
Patient found supine in bed willing to work with PT. Patient seen with OT to maximize safety of patient and staff. Pt sat at EOB this tx then stood to RW. Pt ambulated to [facility] and was brought to practice stairs as they are too far for patient due to quick fatigue and back pain with ambulation. Pt was able to negotiate 4 total stairs with bilateral hand rails (CG), but is unable to attempt a second trial with one handrail. (Pt has 2 handrails at home but can only reach one at a time as they are too wide.) Pt reports 5/10 back pain. Pt returned to room where he ambulated to bathroom and had BM. Pt stood for justin-care then ambulated back to b/s chair. Pt was positioned comfortably and call bell left next to pt.  Pt's daughter arrived and plan of care and PT goals were reviewed. Pt has been receiving in-patient PT tx now for 2 weeks and 6 days. Pt has made slow progress toward goals with continued limitations by back pain and quick fatigue. Pt's PLOF is that he was driving, cooking for himself, and living alone independently. Pt has made steady (slow progress) but still has yet to achieve his full potential. Pt would greatly benefit from SNF placement at [place] to allow for pt to gain the strength he needs to return home safely. Pt still requires multiple cues for hand placement and safety with sit <> stand transfers, with a tendency to pull up from RW. Education: X         Bed mobility X         Transfers X         Ambulation / gait X         Assistive device management X         Stairs X         Body mechanics X         Position change ? Therapeutic exercise X         Activity pacing / energy conservation ? Other: 
 
Progression toward goals: 
?      Improving appropriately and progressing toward goals X      Improving slowly and progressing toward goals ? Not making progress toward goals and plan of care will be adjusted PLAN: 
Patient continues to benefit from skilled intervention to address the above impairments. Continue treatment per established plan of care. Discharge Recommendations:  Aric Kim Further Equipment Recommendations for Discharge: Pt has RW. SUBJECTIVE:  
Patient stated We're not going back up there right? My back is about to give out.  OBJECTIVE DATA SUMMARY:  
Critical Behavior: 
Neurologic State: Appropriate for age Orientation Level: Disoriented to time Cognition: Appropriate decision making, Appropriate for age attention/concentration, Follows commands Safety/Judgement: Fall prevention Functional Mobility Training: 
Bed Mobility: 
Supine to Sit: Supervision Transfers: 
Sit to Stand: Stand-by assistance Stand to Sit: Stand-by assistance Balance: 
Sitting: Intact Standing: Impaired; With support Standing - Static: Good Standing - Dynamic : Fair(+) Ambulation/Gait Training: 
Distance (ft): 50 Feet (ft) Assistive Device: Walker, rolling Ambulation - Level of Assistance: Stand-by assistance Gait Abnormalities: Decreased step clearance Base of Support: Narrowed Stance: Weight shift Speed/Janee: Slow Step Length: Left shortened;Right shortened Pain: 
Pre tx pain: not rated Post tx pain: Not rated 5/10 back pain with activity. Activity Tolerance:  
Fair Please refer to the flowsheet for vital signs taken during this treatment. After treatment:  
X Patient left in no apparent distress sitting up in chair ? Patient left in no apparent distress in bed X Call bell left within reach ? Nursing notified X Family present ? Bed alarm activated ? SCDs applied ? Ice applied Emile Garcia PTA Time Calculation: 39 mins

## 2019-04-02 NOTE — PROGRESS NOTES
Verbal report given to Lonnie Pedro RN @ [facility]. Opportunity for questions or concerns given; none voiced. Understanding voiced by receiving nurse

## 2019-04-02 NOTE — PROGRESS NOTES
Bedside shift report given to [address] [place] [address])  including SBAR, MAR, and Recent Results, and plan for day. Opportunity for questions provided. Patient comfortable in bed, denies pain, call light in reach, side rails up x2, bed alarm on, denies any needs at this time.

## 2019-04-02 NOTE — PROGRESS NOTES
I have reviewed discharge instructions with the patient. The patient and caregiver verbalized understanding. Pnt ambulatory verbal alert and oriented RR even and unlabored Denies any complaints @ this time Pnt stable Both pnt and daughter voice understanding of d/c instructions Escorted to main lobby via w/c 
Daughter present for transport to [facility].  
AGUSTIN

## 2019-04-02 NOTE — PROGRESS NOTES
Problem: Self Care Deficits Care Plan (Adult) Goal: *Acute Goals and Plan of Care (Insert Text) Description Occupational Therapy Goals Initiated 3/18/2019 and re-evaluated 4/2/2019 within 7 days 1. Patient will perform functional standing activity with F balance x 3-5 min in order to prep for ADL participation. 2.  Patient will participate in upper extremity therapeutic exercise/activities with modified independence for 10 minutes. 3.  Patient will utilize energy conservation techniques during functional activities with min verbal cues. 4. Patient will perform toileting transfers with SBA and good safety awareness with BSC/raised toilet. Initiated 3/18/2019 within 7 day(s). 1.  Patient will perform functional standing activity with F balance x 1-3 min in order to prep for ADL participation.   (upgrade) 2. Patient will participate in upper extremity therapeutic exercise/activities with modified independence for 10 minutes. 3.  Patient will utilize energy conservation techniques during functional activities with min verbal cues. 4. Patient will perform toileting transfers with SBA and good safety awareness with BSC/raised toilet. (continue for consistency) Initiated 3/18/2019 within 7 day(s). 1.  Patient will perform upper body dressing/bathing with supervision/set-up (goal met) 2. Patient will perform grooming seated at EOB with F balance x 5 min with supervision/set-up. (goal met) 3. Patient will perform sit>stand with CGA in order to prep for participation with ADLs. (upgrade) 4. Patient will participate in upper extremity therapeutic exercise/activities with modified independence for 5-8 minutes. 5.  Patient will utilize energy conservation techniques during functional activities with min verbal cues. OCCUPATIONAL THERAPY RE-EVALUATION Patient: Sharona Alvarez (21 y.o. male) Date: 4/2/2019 Primary Diagnosis: Congestive heart disease ([facility] [address] [I50.9] Precautions:   Fall PLOF: MI BADL and IADL, driving, living alone in 1  with ability to ascend/descend 3 stairs. ASSESSMENT : 
Based on the objective data described below, the patient presents with imporved independence in ADLs as evidenced by SBA for stand>sit commode, CGA sit>stand commode and min a for justin care (for thoroughness) and ability to stand with F balance 1-3 min. Pt agreeable to OT session this date and seen in conjunction with PT for safety with high level balance activities (stair management). Pt performed toileting with use of RW and education on hand placement. Pt complains of back pain following 1-2 minutes in stance and requires seated rest break. Pt is limited by decreased safety awareness, pain in lower back, and UB weakness. Pt's daughter educated on pt's consistent progress with OT as well as continued potential for improvement in independence in ADLs. Pt's daughter is interested in pt dc'ing to rehab. Patient will benefit from skilled intervention to address the above impairments. Patient's rehabilitation potential is considered to be Excellent Factors which may influence rehabilitation potential include: ? None noted ? Mental ability/status ? Medical condition ? Home/family situation and support systems ? Safety awareness ? Pain tolerance/management ? Other: PLAN : 
Recommendations and Planned Interventions:  
?               Self Care Training                  ? Therapeutic Activities ? Functional Mobility Training   ? Cognitive Retraining 
? Therapeutic Exercises           ? Endurance Activities ? Balance Training                    ? Neuromuscular Re-Education ? Visual/Perceptual Training     ? Home Safety Training 
? Patient Education                   ?       Family Training/Education ? Other (comment): Frequency/Duration: Patient will be followed by occupational therapy 1-2 times per day/4-7 days per week to address goals. Discharge Recommendations: Rehab Further Equipment Recommendations for Discharge: TBD at next level of care SUBJECTIVE:  
Patient stated ? I'm gonna take you home with me and you can be my nurse.? OBJECTIVE DATA SUMMARY:  
Hospital course since last seen and reason for reevaluation: Pt has made consistent progress with OT, however, progression is slow. Pt has participated in functional transfer training, neuro re ed, there ex, there act, and pt and family education. Past Medical History:  
Diagnosis Date Asthma Cardiac cath 04/28/2011 oLM 30%. pLAD 30%. oD1 50%. CX 90% (3 x 18 Sawyer DEMAR). dRCA 90%. RPLB subtotal.  RPDA 100%. Cardiac echocardiogram 01/21/2014 EF 55-60%. Mod LVH. Gr 1 DDfx. Mild AS (mean grad 13). Mild AI. Unchanged from study of 11/30/11. Cardiac nuclear imaging test, mod risk 01/22/2016 Intermediate risk. Medium-sized inferior, inferoseptal infarction. No ischemia. EF 54%. Nondiagnostic EKG on pharm stress test.  
 Carotid duplex 03/02/2016 Mod 50-69% bilateral ICA stenosis. Probable significant RECA stenosis. >50% stenosis of left subclavian. No significant change from study of 1/8/15. Coronary artery disease Status post PCI with drug-eluting stent, Sawyer 3 x 18 mm in the proximal circumflex. Hx of carotid artery disease ([facility] [address] Hypercholesterolemia Hypertension Prostate cancer ([facility] [address] Past Surgical History:  
Procedure Laterality Date HX CORONARY STENT PLACEMENT  4/28/11 PCI with drug-eluting stent, Sawyer 3 x 18 mm in the proximal circumflex (post dialated with 3.25 mm noncompliant balloon at high pressure). Barriers to Learning/Limitations: yes;  cognitive Compensate with: visual, verbal, tactile, kinesthetic cues/model Home Situation:  
Home Situation Home Environment: Private residence # Steps to Enter: 3 Rails to Enter: Yes One/Two Story Residence: One story Living Alone: Yes Support Systems: Child(marisela) Patient Expects to be Discharged to[de-identified] Unknown Current DME Used/Available at Home: Walker, rolling Tub or Shower Type: Tub/Shower combination ? Right hand dominant   ? Left hand dominant Cognitive/Behavioral Status: 
Neurologic State: Alert; Appropriate for age Orientation Level: Oriented to person;Oriented to place;Oriented to situation Cognition: Appropriate for age attention/concentration; Follows commands Safety/Judgement: Fall prevention;Home safety Skin: intact BUEs Edema: none noted BUEs Coordination: BUE Coordination: Within functional limits(BUEs) Fine Motor Skills-Upper: Left Intact; Right Intact Gross Motor Skills-Upper: Left Intact; Right Intact Balance: 
Sitting: Intact Standing: Impaired; With support Standing - Static: Good Standing - Dynamic : Fair(+) Strength: BUE Strength: Generally decreased, functional(BUEs) Tone & Sensation: BUE Sensation: Intact(BUEs) Range of Motion: BUE 
AROM: Within functional limits(BUEs) Functional Mobility and Transfers for ADLs: 
Bed Mobility: 
Supine to Sit: Supervision Transfers: 
Sit to Stand: Stand-by assistance Toilet Transfer : Stand-by assistance;Contact guard assistance ADL Assessment:  
Feeding: Modified independent Oral Facial Hygiene/Grooming: Setup Bathing: Moderate assistance Upper Body Dressing: Stand-by assistance Lower Body Dressing: Maximum assistance Toileting: Minimum assistance ADL Intervention: 
 Cognitive Retraining Safety/Judgement: Fall prevention;Home safety Pain: 
Pain level pre-treatment: min to mod pain in lower back Pain level post-treatment: min to mod pain in lower back with movement Pain Intervention(s): Rest,Repositioning Response to intervention: Nurse notified, See doc flow Activity Tolerance:  
Fair+ Please refer to the flowsheet for vital signs taken during this treatment. After treatment:  
? Patient left in no apparent distress sitting up in chair ? Patient left in no apparent distress in bed 
? Call bell left within reach ? Nursing notified ? Caregiver present ? Bed alarm activated COMMUNICATION/EDUCATION:  
? Role of Occupational Therapy in the acute care setting 
? Home safety education was provided and the patient/caregiver indicated understanding. ? Patient/family have participated as able in goal setting and plan of care. ? Patient/family agree to work toward stated goals and plan of care. ? Patient understands intent and goals of therapy, but is neutral about his/her participation. ? Patient is unable to participate in goal setting and plan of care. Thank you for this referral. 
Yanely Gamble OT Time Calculation: 39 mins

## 2019-04-02 NOTE — PROGRESS NOTES
[facility] Hospitalist Group Progress Note Patient: Kelli Record Age: 80 y.o. : 1926 MR#: [id number] SSN: [id number] Date/Time: 2019 9:34 AM 
 
Subjective/24-hour events:  
 
Pt has no complaints. Per daughter was able to walk w/o oxygen. He states he felt sob around 3 am and had to have 02 back on. Assessment:  
-Acute hypoxic respiratory failure required BIPAP, likely due to heart failure and associated pleural effusions, pulm input noted, s/p thoracentesis bilateral (3/18 and 3/17, 3/22, 3/26,3/27), with some improvement in respiratory status initially but has gone back to having  C/o SOB post previous thoracentesis procedures. Limited therapeutic options at this stage, due to no cardiovascular reserve unable to diurese effectively. Appears stable at this time. 
-Low bp in setting of heart failure started on midodrine 
-Pleural effusions s/p repeated thoracenteses likely due to HF   
-Acute on chronic systolic CHF on lasix,on with negative fluid balance but wt has bone up, overall breathing has improved. Fl 
-Ischemic cardiomyopathy, EF 26-30% -CADRecent NSTEMI s/p PCI/stent placement  trop down trending 
-Severe protein calorie malnutrition   
 
-Dyslipidemia 
-CKD 2-3 
-Chronic anemia 
-Valvular heart disease 
-Advanced age 
-Probable COPD 
-Tobacco use hx Overall limited treatment options, diuresing aggressively to improve his respiratory status would be at the expense of his bp. At this stage, any other measures (thoracentesis) futile and have no other treatment options available. Discussed w/ daughter that at this point pt is ready for discharge and will work w/ CM in resolving disposition issues. Plan: 
-monitor hemodynamics, cont to hold all antihypertensives all together for now due to hypotension 
- cont midodrine  
-Keep higher dose of lasix (20mg in am) and 10 mg q evening 
-Cont to diurese as bp allows -Continue nebs/symbicort/usual pulmonary hygiene. . 
--  Will hold bb and spironolactone due to low bp 
PT/OT evals. Based on current clinical status, will likely not be able to return home alone at discharge. Discussed w/ CM and daughter/patient. At this stage, daughter is interested in SNF and has given CM a list. Working on insurance auth.  
:  [x]Patient  []Family  [x]Nursing  []Case Management DVT Prophylaxis:  []Lovenox  []Hep SQ  []SCDs  []Coumadin   [x]On Heparin gtt Objective:  
VS:  
Visit Vitals BP 97/54 Pulse 90 Temp 97.5 °F (36.4 °C) Resp 16 Ht 5' 7\" (1.702 m) Wt 67.2 kg (148 lb 1.6 oz) SpO2 96% BMI 23.20 kg/m² Tmax/24hrs: Temp (24hrs), Av.6 °F (36.4 °C), Min:97.3 °F (36.3 °C), Max:97.9 °F (36.6 °C) Intake/Output Summary (Last 24 hours) at 2019 Last data filed at 2019 4144 Gross per 24 hour Intake 120 ml Output 600 ml Net -480 ml General: alert, awake, in NAD. Nontoxic-appearing. Cardiovascular:  S1, S2. Tachycardic. Pulmonary:  No active wheezing clear breath sounds. GI:  Abdomen soft, NTTP. Extremities:  Warm, no ischemia. Neuro:  Awake and alert, motor nofocal. 
 
Labs:   
Recent Results (from the past 24 hour(s)) PTT Collection Time: 19  3:35 AM  
Result Value Ref Range aPTT 45.8 (H) 23.0 - 36.4 SEC Signed By: Guerrero Molina MD   
 2019 9:34 AM

## 2019-04-02 NOTE — PROGRESS NOTES
Bedside shift report received from Priti(offgoing nurse)  including SBAR, MAR, and Recent Results. Opportunity for questions provided. Patient comfortable in bed, denies pain, call light in reach, side rails up x2, bed alarm on, denies any needs at this time. Will continue to monitor.

## 2019-04-02 NOTE — PROGRESS NOTES
Discharge: 
 
Discharge order noted for today. Patient has been accepted to [facility] skilled nursing facility. Confirmed with Keith Carrasco that bed is available today. Met with patient and his daughter and they are agreeable to the transition plan today. [facility] [place] [facility] to the [facility] Medicare insurance authorization has been obtained. Patient's daughter will transport to the skilled unit. Patient's discharge summary has been forwarded to skilled nursing facility via [facility] Bedside RN, Kiki Reyes, has been updated to the transition plan. Discharge information has been updated on the AVS.  Please call report to [phone]. Lemmie M. Elvan Osgood. [facility] Care Manager Pager#: [phone]

## 2019-04-02 NOTE — ROUTINE PROCESS
TRANSFER - IN REPORT: 
 
Verbal report received from [facility], RN (name) on Giovanny An  being received from [facility] (unit) for routine progression of care Report consisted of patients Situation, Background, Assessment and  
Recommendations(SBAR). Information from the following report(s) SBAR, Kardex and MAR was reviewed with the receiving nurse. Opportunity for questions and clarification was provided. Assessment completed upon patients arrival to unit at 1707p via bed and care assumed.

## 2019-04-02 NOTE — ROUTINE PROCESS
Skin Assessment: Right mid left back incision, bruising yonis mid arms and hands, yonis feet dryness, right / left shin scabs, right heel pitting (soft) +1, mole on left side of neck, dryness and abrasions in sacral area- protective barrier placed.

## 2019-04-02 NOTE — DISCHARGE SUMMARY
[facility] Patient: Corey Avalos Age: 80 y.o. : 1926 MR#: [id number] SSN: [id number] PCP on record: Nina Wang MD 
Admit date: 3/13/2019 Discharge date: 2019 Consults:  -ADAL Vann,-cardiology - Hernán Strong., -pulmonary medicine - Ms Yanely Obregon., NP- palliative medicine - Ms Claudia Grace., PA- interventional radiology Procedures: Multiple thoracentesis - Significant Diagnostic Studies: -CT neck soft tissue 19:IMPRESSION: 
1. No acute findings to explain patient's symptoms. 2.  Similar moderate to large bilateral pleural effusions. 3.  Similar findings of likely mild pulmonary edema and other patchy opacities. Similar partially calcified spiculated consolidation right lung apex, possibly 
inflammatory changes but malignancy is not excluded. 4.  Severe stenosis bilateral carotid arteries. 5.  Moderate brain parenchymal volume loss. 6.  Homogenous nodule left parotid a represent a lymph node or other parotid 
mass. No priors for comparison. Follow-up likely not indicated given patient's 
age. -CTA chest 19: 
IMPRESSION: 
      
1. Unusual pulmonary artery opacification with gradual decrease in luminal 
opacification in the basal regions, more pronounced on the left side than on the 
right. Possibly related to presence of moderate to large pleural effusion with 
associated atelectatic changes as the explanation for this pattern vs. intrinsic 
intracardiac abnormality. No convincing evidence of pulmonary embolism is 
detected within the technical limitations of the study. 
  
2. Moderate to large pleural effusion bilaterally with associated passive 
atelectatic changes. Multiple foci of consolidative opacities in both lungs. Most pronounced in the left upper lobe.   Query developing pneumonia vs. 
pulmonary hemorrhage vs. nonspecific inflammatory process. 
  
 3.  Atypical appearance of the descending with smooth crescentic outpouchings 
and presence of lamellated calcifications along the aortic wall. Perhaps 
related to chronic dissection. With current technique, i.e. lack of IV contrast 
in the aorta, detailed analysis is difficult. Recommend CTA of the aorta for 
further delineation. 
  
4. Cholelithiasis. Discharge Diagnoses: -  Acute hypoxic respiratory failure 
-Low blood pressure 
-Recurrent pleural effusions 
-Acute on chronic systolic heart failure -Ischemic cardiomyopathy 
-Recent NSTEMI (recent admission_ 
-Severe protein calorie malnutrition  
-?chronic aortic dissection, incidental finding on CTA chest                                
Patient Active Problem List  
Diagnosis Code  Asthma J45.909  Prostate cancer ([facility] [address] C61  Hypercholesterolemia E78.00  Angina, class I ([facility] [address] I20.9  Left bundle branch block I44.7  Coronary artery disease I25.10  Hypertension I11.9  Dyslipidemia E78.5  Carotid artery disease (HCC) I77.9  Aortic valve stenosis I35.0  Anemia D64.9  
 NSTEMI (non-ST elevated myocardial infarction) (HCC) I21.4  Congestive heart disease (HCC) I50.9 Hospital Course by Problem 80 y o male w/ h/o ashtma, GI bleed, recent stent placement to LAD lesion on 3/11/19 on DAPT presented to the ED w/ c/o acute onset SOB and worsening lower extremity swelling bilaterally, decreased PO intake. He was noted to have acute hypoxic respiratory failure requiring BiPAP at time of admission thought to be due to pulmonary edema due to CHF.   
  
-Acute hypoxic respiratory failure required BIPAP, likely due to heart failure and associated pleural effusions, evaluated by pulmonary team s/p thoracentesis bilateral (3/18 and 3/17, 3/22 bed side by pulmonary consultant and by IR, ultra sound guided on 3/26,3/27), with some improvement in respiratory status initially but has gone back to having C/o SOB post previous thoracentesis procedures. Limited therapeutic options at this stage, due to no cardiovascular reserve (hypotension) unable to diurese effectively. Appears stable at this time, he has required less oxygen over the past few days and has gone on RA for extended periods of time. . 
-Low bp in setting of heart failure started on midodrine with some improvement. 
-Pleural effusions s/p repeated thoracenteses likely due to HF   
-Acute on chronic systolic CHF on lasix,on with negative fluid balance for most days, weight steady over the past few days and some decrease today. Overall breathing has improved. -Ischemic cardiomyopathy, EF 26-30% -CAD recent NSTEMI s/p PCI/stent placement 03/11 trop down trending, is supposed to be on plavix and asa daily. 
-Severe protein calorie malnutrition   
-Anemia normocytic required blood transfusion. Reason thought to be multifactorial, was in the process of work up by GI consultant in the outpt setting. Pt has had stable hgb last few times it was checked. He will need cont'd work up in the outpt setting.  
-of note pt per daughter had hoarseness of voice since what sounds like upper endoscopy study by GI consultant and had ct of neck which did not show cause for voice change. 
  
-Dyslipidemia 
-CKD 2-3 
-Chronic anemia 
-Valvular heart disease 
-Advanced age 
-Probable COPD 
-Tobacco use hx Overall pt has heart failure as cause for his symptoms. He need continued monitoring of his intake and output w/ recommendations for maintaining negative fluid balance, he also runs low bp and will need monitoring of his vital signs and care should be taken when getting pt up out of bed due to his low bp. If bp tolerates he will need medications such as beta blockers for mortality/morbidity benefit, those were not added on due to his low bp here. Also pt with finding of ?chronic aortic dissection on CTA of chest 3/13. Pt on plavix and asa due to recent PCI w/ DEMAR stent (3/11/19) and will need to be on DAPT. Cardiology team aware. Today's examination of the patient revealed:  
 
Subjective:  
 
Objective:  
VS:  
Visit Vitals /59 (BP 1 Location: Right arm, BP Patient Position: At rest) Pulse 81 Temp 97.8 °F (36.6 °C) Resp 16 Ht 5' 7\" (1.702 m) Wt 66.8 kg (147 lb 4.3 oz) SpO2 96% BMI 23.07 kg/m² Tmax/24hrs: Temp (24hrs), Av.7 °F (36.5 °C), Min:97.3 °F (36.3 °C), Max:97.9 °F (36.6 °C) Input/Output:  
 
Intake/Output Summary (Last 24 hours) at 2019 1345 Last data filed at 2019 0950 Gross per 24 hour Intake  Output 250 ml Net -250 ml General:  Alert, awake in nad Cardiovascular:  Rrr, no murmurs Pulmonary:  ctab GI:  Soft, nt, nd 
Extremities: no edema Additional:   
 
Labs:   
Recent Results (from the past 24 hour(s)) PTT Collection Time: 19  3:53 AM  
Result Value Ref Range aPTT 58.5 (H) 23.0 - 36.4 SEC Additional Data Reviewed: 
  
Condition: fair Disposition:  
 []Home   []Home with Home Health   [x]SNF/NH   []Rehab   []Home with family []Alternate Facility:____________________ Discharge Medications:  
 
Current Discharge Medication List  
  
START taking these medications Details  
aspirin 81 mg chewable tablet Take 1 Tab by mouth daily. Qty: 30 Tab, Refills: 1  
  
budesonide-formoterol (SYMBICORT) 80-4.5 mcg/actuation HFAA Take 2 Puffs by inhalation two (2) times a day. Qty: 1 Inhaler, Refills: 1  
  
!! tamsulosin (FLOMAX) 0.4 mg capsule Take 1 Cap by mouth daily. Qty: 30 Cap, Refills: 1  
  
albuterol-ipratropium (DUO-NEB) 2.5 mg-0.5 mg/3 ml nebu 3 mL by Nebulization route every four (4) hours as needed (sob, wheezing). Qty: 30 Nebule, Refills: 1  
  
famotidine (PEPCID) 20 mg tablet Take 1 Tab by mouth daily.  
Qty: 30 Tab, Refills: 1  
  
midodrine (PROAMITINE) 10 mg tablet Take 1 Tab by mouth three (3) times daily (with meals) for 30 days. Qty: 90 Tab, Refills: 0  
  
therapeutic multivitamin (THERAGRAN) tablet Take 1 Tab by mouth daily. Qty: 30 Tab, Refills: 1  
  
 !! - Potential duplicate medications found. Please discuss with provider. CONTINUE these medications which have CHANGED Details  
!! furosemide (LASIX) 20 mg tablet Take 1 Tab by mouth daily. Qty: 30 Tab, Refills: 1  
  
!! furosemide (LASIX) 20 mg tablet Take 0.5 Tabs by mouth daily. Qty: 30 Tab, Refills: 1  
  
 !! - Potential duplicate medications found. Please discuss with provider. CONTINUE these medications which have NOT CHANGED Details  
atorvastatin (LIPITOR) 40 mg tablet Take 1 Tab by mouth nightly. Qty: 30 Tab, Refills: 0  
  
nitroglycerin (NITROSTAT) 0.4 mg SL tablet 1 Tab by SubLINGual route every five (5) minutes as needed for Chest Pain. Qty: 1 Bottle, Refills: 0  
  
budesonide-formoterol (SYMBICORT) 160-4.5 mcg/actuation HFAA Take 2 Puffs by inhalation two (2) times a day. Qty: 1 Inhaler, Refills: 5 Associated Diagnoses: Mild intermittent asthma with acute exacerbation  
  
glycopyrrolate-formoterol (BEVESPI AEROSPHERE) 9-4.8 mcg HFAA Take 2 Inhalation by inhalation two (2) times a day. Qty: 1 Inhaler, Refills: 1 Associated Diagnoses: Mild intermittent asthma with acute exacerbation  
  
clopidogrel (PLAVIX) 75 mg tab TAKE 1 TABLET EVERY DAY Qty: 90 Tab, Refills: 3  
  
albuterol (PROVENTIL HFA, VENTOLIN HFA, PROAIR HFA) 90 mcg/actuation inhaler Take 2 Puffs by inhalation every four (4) hours as needed for Wheezing or Shortness of Breath. Qty: 1 Inhaler, Refills: 1 Associated Diagnoses: SOB (shortness of breath)  
  
! ! tamsulosin (FLOMAX) 0.4 mg capsule Take 1 Cap by mouth daily. Qty: 30 Cap, Refills: 5 Associated Diagnoses: BPH (benign prostatic hypertrophy) aspirin delayed-release 81 mg tablet Take 81 mg by mouth daily. !! - Potential duplicate medications found. Please discuss with provider. STOP taking these medications  
  
 carvedilol (COREG) 3.125 mg tablet Comments:  
Reason for Stopping:   
   
 lisinopril (PRINIVIL, ZESTRIL) 5 mg tablet Comments:  
Reason for Stopping:   
   
 potassium chloride SR (KLOR-CON 10) 10 mEq tablet Comments:  
Reason for Stopping: OTHER Comments:  
Reason for Stopping:   
   
 omeprazole (PRILOSEC) 40 mg capsule Comments:  
Reason for Stopping: Follow-up Appointments:  
1. Your PCP: Vani Johnson MD, within [id number] 2. Cardiology in 7-10 days 3. Pulmonary medicine in 2 wks Signed: 
Deandre Moreno MD 
4/2/2019 
1:45 PM

## 2019-04-03 NOTE — PROGRESS NOTES
Problem: Mobility Impaired (Adult and Pediatric) Goal: *Acute Goals and Plan of Care (Insert Text) Description PHYSICAL THERAPY STG GOALS : 
Initiated 4/3/2019 and to be accomplished within 2 Weeks 1. Patient will move from supine to sit and sit to supine  in bed with modified independence. 2.  Patient will transfer from bed to chair and chair to bed with supervision/set-up using rolling walker. 3.  Patient will perform sit to stand with supervision/set-up with Good balance and safety awareness. 4.  Patient will ambulate with supervision/set-up for 200 feet with rolling walker on level surfaces with 2 turns. 5.  Patient will ascend/descend 4 stairs with one handrail withminimal assistance/contact guard assist to allow for safe home access/exit. PHYSICAL THERAPY LTG GOALS : 
Initiated 4/3/2019 and to be accomplished within 4 Weeks 1. Patient will move from supine to sit and sit to supine  in bed with independence. 2.  Patient will transfer from bed to chair and chair to bed with modified independence using RW. 3.  Patient will perform sit to stand with modified independence with Good balance and safety awareness. 4.  Patient will ambulate with modified independence for 300 feet with RW on level surfaces and be able to maneuver through narrow spaces and obstacles without loss of balance. 5.  Patient will ascend/descend 4 stairs with one handrail(s) with modified independence to allow for safe home access/exit. Physical Therapist:   Noni Horner PT  on 4/3/2019 Outcome: [id number] PHYSICAL THERAPY EVALUATION Patient: Stephen Hensley (11 y.o. male)                Start of Care Date: 4/3/2019 Referred by : Lisa Pascal MD                
Precautions:  fall Treatment Diagnosis: muscle weakness History:  Patient was admitted to [facility] hospital on  with a Dx of sob from CHF exacerbation, s/p thoracentesis. History of present problem reveals MEDIUM  Complexity : 1-2 comorbidities / personal factors will impact the outcome/ POC . Past Medical history includes:  
Past Medical History:  
Diagnosis Date Asthma Cardiac cath 04/28/2011 oLM 30%. pLAD 30%. oD1 50%. CX 90% (3 x 18 [place] DEMAR). dRCA 90%. RPLB subtotal.  RPDA 100%. Cardiac echocardiogram 01/21/2014 EF 55-60%. Mod LVH. Gr 1 DDfx. Mild AS (mean grad 13). Mild AI. Unchanged from study of 11/30/11. Cardiac nuclear imaging test, mod risk 01/22/2016 Intermediate risk. Medium-sized inferior, inferoseptal infarction. No ischemia. EF 54%. Nondiagnostic EKG on pharm stress test.  
 Carotid duplex 03/02/2016 Mod 50-69% bilateral ICA stenosis. Probable significant RECA stenosis. >50% stenosis of left subclavian. No significant change from study of 1/8/15. Coronary artery disease Status post PCI with drug-eluting stent, [place] 3 x 18 mm in the proximal circumflex. Hx of carotid artery disease ([facility] [address] Hypercholesterolemia Hypertension Prostate cancer ([facility] [address] Past Surgical History:  
Procedure Laterality Date HX CORONARY STENT PLACEMENT  4/28/11 PCI with drug-eluting stent, [place] 3 x 18 mm in the proximal circumflex (post dialated with 3.25 mm noncompliant balloon at high pressure). Cognitive Status: 
  
Barriers to Learning/Limitations: None Compensate with: visual, verbal, tactile, kinesthetic cues/model Prior Level of Function/Home Situation and Assitance recieved: 
Home Situation Home Environment: Private residence One/Two Story Residence: One story Living Alone: No 
Support Systems: Child(marisela), Assisted living, Friends \ neighbors Patient Expects to be Discharged to[de-identified] Private residence Current DME Used/Available at Home: Dorn Olszewski, rollator Level of Assistance received at Home:   Prior to this current hospitalization, the patient required some assistance with house keeping and meal prep from daughter. Otherwise patient was functioning independently and still driving Justification for Evaluation complexity:  Patient is referred to Skilled Physical Therapy services due a functional decline in strength, balance, ambulation, transfers and activity tolerance and required an overall MEDIUM complexity evaluation examination. Eval Complexity: History: MEDIUM  Complexity : 1-2 comorbidities / personal factors will impact the outcome/ POC Exam:MEDIUM Complexity : 3 Standardized tests and measures addressing body structure, function, activity limitation and / or participation in recreation  Presentation: MEDIUM Complexity : Evolving with changing characteristics OBJECTIVE DATA SUMMARY:  
 
Vital Signs: 
Resting BP:  BP: 98/58  HR: Pulse (Heart Rate): 95   
Pain: 
 Denies pain Gross Assessment: 
Gross Assessment AROM: Within functional limits Strength: Generally decreased, functional(3+ to 4-/5 overall B LEs) Coordination: Within functional limits Sensation: Intact Bed Mobility: 
Bed Mobility Supine to Sit: Supervision Balance: 
Balance Sitting - Static: Good (unsupported) Standing: With support Standing - Static: (fair+) Standing - Dynamic : Fair Transfers: 
Sit to Stand: Stand-by assistance Gait: 
Gait Gait Abnormalities: Decreased step clearance Ambulation - Level of Assistance: Contact guard assistance;Stand-by assistance Distance (ft): 100 Feet (ft) Assistive Device: Walker, rolling With 2 turns. Wheelchair Management: 
  Propelled w/c 150 feet with verbal cues for turning with 1 turn. Assessment:  
Clinical Decision Making:Medium Complexity    using standardized patient functional observation Positioning needs/Additional Comments :  none TREATMENT PLAN: Rehab Potential : Good Skilled Physical Therapy Services may include: ?           Bed Mobility Training             ? Neuromuscular Re-Education ? Transfer Training                   ? Orthotic/Prosthetic Training 
? Gait Training                          ? Modalities ? Therapeutic Procedures        ? Manual Therapy ? Therapeutic Activities            ? Patient and Family Training/Education ? Other (comment): Frequency/Duration: Patient will be followed by physical therapy for 1-2 times a day for a minimum of 3 days per week for 4 weeks to address goals. Discharge Recommendations: Home Health Patient's expected Discharge Location:  ? Private Residence  ? detention/ILF  ? LTC  ? Other: COMMUNICATION/EDUCATION:  Patient/Family Agreement with Treatment Plan :  
 
?         The PT evaluation/POC has been reviewed with PT assistant. ?         Fall prevention education was provided and the patient/caregiver indicated understanding. ? Patient/family have participated as able in goal setting and plan of care and Patient/family agree to work toward stated goals and plan of care. ?         Patient is unable to participate in goal setting and plan of care. Therapist/SW will contact family/POA. Treatment session: 
Overall Complexity: MEDIUM Evaluation:  35 mins./ Treatment:  35 mins. Physical Therapist:   José Maier, PT       4/3/2019 Thank you for this referral.

## 2019-04-03 NOTE — PROGRESS NOTES
Problem: Self Care Deficits Care Plan (Adult) Goal: *Acute Goals and Plan of Care (Insert Text) Description OCCUPATIONAL THERAPY SHORT TERM GOALS Initiated 4/3/2019 and to be accomplished within 2 [facility]) 1. Patient will perform Upper body ADL's without adaptive equipment with contact guard assist. 
2.  Patient will perform Lower body ADL's without adaptive equipment with moderate assistance  . 3. Patient will perform toileting task with contact guard assist with Good safety to reduce falls risk. 4.  Patient will perform toilet transfers with Rodri Kristy and standby assist. 
5.  Patient will perform standing static/dynamic balance activities for improved ADL/IADL function with standby assist and Fair+ balance and safety awareness. 6.  Patient will improve Barthel index scores to atleast 65/100 to improve functional mobility. 7.  Patient will utilize energy conservation techniques during functional activities with minimal verbal cues. OCCUPATIONAL THERAPY LONG TERM GOALS Initiated 4/3/2019 and to be accomplished within 4 Week(s) 1. Patient will perform ADL's & IADLs with adaptive equipment as needed with modified independence. 2.  Patient will perform toileting task with modified independence with Good safety to reduce falls risk. 3.  Patient will perform all functional transfers utilizing least restrictive device and durable medical equipment as needed with modified independence and Good balance and safety awareness. 4.  Patient will perform standing static/dynamic activity for improved ADL/IADL function with modified independence and Good balance and safety awareness. 5.  Patient will improve Barthel index score to 95/100 to improve independence with mobility. 6. Patient will perform home making tasks and simple meal prep Mod I utilizing energy conservation techniques and good safety awareness.  
7. Patient will perform UE HEP with Modified [place] to increase BUE strength for continued participation in ADLs. Therapist: Chi John    4/3/2019 Outcome: Progressing Towards Goal 
TR[facility]ITIONAL [facility]ENTER  
OCCUPATIONAL THERAPY EVALUATION Patient: Juliana Rodriguez (89 y.o. male) Start of Care Date: 4/3/2019                         Onset Date:   
Referred by : Papo Leiva MD        
Primary Diagnosis: sob      Treatment Diagnosis Treatment Diagnosis: need for assist with self care Treatment Diagnosis 2: muscle weakness Treatment Diagnosis: need for assist with self care, muscle weakness Patient is a 80 y.o. Male admitted to [facility] 3/13/19 with SOB and diagnosed with congestive heart dz, recent heart cath earlier in the week showed occlusion of RCA and NSTEMI, acute respiratory failre with hypoxia requiring Bipap, b/l pleural effusions requiring thoracentesis 3/18, repeat (L) thoracentesis 3/22 and (R) side  thoracentesis 3/26 and (L) side  Thoracentesis 3/27. Patient unsafe to discharge home and transferred to [facility] and referred to skilled Occupational therapy in order to reach maximal functional potential for safe discharge home at [facility]. Precautions: Fall(Cardiac EF 26-30%) Eval Complexity: History: HIGH Complexity : Extensive review of history including physical, cognitive and psychosocial history . History of present problem reveals HIGH Complexity :3+ comorbidities / personal factors will impact the outcome/ POC . Past Medical history includes:  
Past Medical History:  
Diagnosis Date  Asthma  Cardiac cath 04/28/2011 oLM 30%. pLAD 30%. oD1 50%. CX 90% (3 x 18 [place] DEMAR). dRCA 90%. RPLB subtotal.  RPDA 100%.  Cardiac echocardiogram 01/21/2014 EF 55-60%. Mod LVH. Gr 1 DDfx. Mild AS (mean grad 13). Mild AI. Unchanged from study of 11/30/11.  Cardiac nuclear imaging test, mod risk 01/22/2016 Intermediate risk. Medium-sized inferior, inferoseptal infarction.   No ischemia. EF 54%. Nondiagnostic EKG on pharm stress test.  
 Carotid duplex 03/02/2016 Mod 50-69% bilateral ICA stenosis. Probable significant RECA stenosis. >50% stenosis of left subclavian. No significant change from study of 1/8/15.  Coronary artery disease Status post PCI with drug-eluting stent, [place] 3 x 18 mm in the proximal circumflex.  Hx of carotid artery disease ([facility]  Hypercholesterolemia  Hypertension  Prostate cancer ([facility] Past Surgical History:  
Procedure Laterality Date  HX CORONARY STENT PLACEMENT  4/28/11 PCI with drug-eluting stent, [place] 3 x 18 mm in the proximal circumflex (post dialated with 3.25 mm noncompliant balloon at high pressure). Cognitive Status: 
Mental Status Neurologic State: Alert Orientation Level: Oriented X4 Cognition: Follows commands Perception: Appears intact Perseveration: No perseveration noted Safety/Judgement: Fall prevention Barriers to Learning/Limitations: None Compensate with: visual, verbal, tactile, kinesthetic cues/model Justification for Evaluation complexity:   HIGH Complexity : Patient presents with comorbidities that affect occupational performance. Signifigant modification of tasks or assistance (eg, physical or verbal) with assessment (s) is necessary to enable patient to complete evaluation . Patient is referred to [address] [place] [address],[facility] due to a functional decline in strength, balance, coordination, safety awareness and functional activity tolerance, which is inhibiting independence w/ self-care performance skills and functional mobility. Prior Level of Function/Home Situation:  
Home Situation Home Environment: Private residence # Steps to Enter: 3 Rails to Enter: Yes One/Two Story Residence: One story Living Alone: Yes Support Systems: Child(marisela) Patient Expects to be Discharged to[de-identified] Private residence Current DME Used/Available at Home: Walker, rolling Tub or Shower Type: Shower Level of Assistance received at Home: Prior to this current hospitalization, the patient was independent with ADLs, IADLs, meal prep, home making tasks, Mod I functional mobility w/ RW, still driving, walk in shower without grab bars or shower seat. OBJECTIVE DATA SUMMARY:  
Vital Signs: 
Resting BP:  BP: 106/60  HR: Pulse (Heart Rate): 95    
Pain: Auditory: Auditory Auditory Impairment: None Examination:  
HIGH Complexity : 5 or more performance deficits relating to physical, cognitive , or psychosocial skils that result in activity limitations and / or participation restrictions; Tone and Sensation: 
Tone: Normal(BUEs) Sensation: Intact(BUEs) Visual Perceptual: 
Vision Corrective Lenses: Glasses Coordination: 
Coordination Fine Motor Skills-Upper: Left Intact; Right Intact Gross Motor Skills-Upper: Left Intact; Right Intact Coordination: Within functional limits(BUEs) Fine Motor Skills-Upper: Left Intact; Right Intact Gross Motor Skills-Upper: Left Intact; Right Intact Bed Mobility: 
Bed Mobility Supine to Sit: Supervision Scooting: Setup Transfers: 
Functional Transfers Sit to Stand: Stand-by assistance Stand to Sit: Stand-by assistance Bed to Chair: Contact guard assistance Toilet Transfer : Contact guard assistance(w/ RW) Balance: 
Balance Sitting: Intact Sitting - Static: Good (unsupported) Sitting - Dynamic: Fair (occasional) Standing: With support Standing - Static: Fair Standing - Dynamic : Fair(-) Gross Assesment: 
Gross Assessment AROM: Generally decreased, functional(BUEs) Strength: Generally decreased, functional(BUEs 3+/5) Coordination: Within functional limits(BUEs) Tone: Normal(BUEs) Sensation: Intact(BUEs) ADL self care: ADL Intervention: 
Upper Body Bathing Bathing Assistance: Minimum assistance Upper Body Dressing Assistance Dressing Assistance: Minimum assistance Lower Body Bathing Bathing Assistance: Maximum assistance Toileting Toileting Assistance: Minimum assistance Adaptive Equipment: Elevated seat;Grab bars; [place] Grooming Grooming Assistance: Supervision Feeding Feeding Assistance: Modified independent Wheelchair Management: 
  Not assessed Instrumental ADL's: 
  Not assessed ASSESSMENT:  
A clinical decision making of HIGH  complexity was conducted, which includes an analysis of the Occupational profile, standardized assessments, data and treatment options. THE BARTHEL INDEX 
ACTIVITY 
 SCORE FEEDING 
0=unable 5=needs help cutting,spreading butter,etc., or modified diet 10= independent 10 BATHING 
0=dependent 5=independent (or in shower  
0  
GROOMING 
0=needs help 5=independent face/hair/teeth/shaving (implements provided) 5 DRESSING 
0=dependent 5=needs help but can do about half unaided 10=independent(including buttons, zips,laces etc.) 5 BOWELS 
0=incontinent 5=occasional accident 10=continent 5  
BLADDER 
0=incontinent, or catheterized and unable to manage alone 5=occasional accident 10=continent  
5  
TOILET USE 
0=dependent 5=needs some help, but can do something alone 10=independent (on and off, dressing, wiping) 5 TRANSFER (BED TO CHAIR AND BACK) 0=unable, no sitting balance 5=major help(one or two people,physical), can sit 10=minor help(verbal or physical) 15=independent 10 MOBILITY (ON LEVEL SURFACES) 0=immobile or <50 yards 5=wheelchair independent,including corners,>50 yards 10=walkes with help of one person (verbal or physical) >50 yards 15=independent(but may use any aid; for example, stick) >50 yards  
0 STAIRS 
0=unable 5=needs help (verbal, physical, carrying aid) 10=independent  
0  
   
      TOTAL:             45/100 Additional comments:  Patient is very pleasant and enthusiastic to participate in rehab to reach maximal functional potential for safe d/c home at [facility]. Patient has no c/o pain. Bed mobility: supine to sit edge of bed w/ SBA, toilet transfer: CGA w/ RW, clothing mgmt w/ CGA, pt did not need to void, washed hands at sinkside in stance w/ RW and CGA. Patient agreeable to sit up in w/c to eat lunch, call bell within reach and pt reports good comfort, dtr/Lyndsey present. Patient educated on role of OT and POC; patient verbalized understanding. Pt. Instructed on safety awareness with importance to utilize call bell to request assist with functional transfers to prevent falls, patient verbalized understanding and provided accurate demonstration. Dry erase communication board updated for accuracy w/ carryover for toilet transfers: CGA w/ RW. Therapist issued 3 in 1 commode over toilet in order to grade sit to stand easier and prevent falls. TREATMENT PLAN : Rehab Potential : Excellent Skilled Occupational Therapy Services may include: ? Self Care/Home Mgmt                    ? Cognitive Perceptual Re-training ? Adaptive Equip Training                 ? Therapeutic Exercise ? Sensory Integration                           ? Community Work Integration ? Therapeutic Activities                     ? Other/modalities ? Neuromuscular Re-education         ? Wheelchair Mgmt/propulsion ? Patient/Family/Staff Instruction      : 
?Orthotics/Prosthetic Fitting & training Frequency/Duration: Patient will be followed by Occupational therapy for 1-2 times a day for a minimum of 3 days per week for 4 weeks to address goals. Discharge Recommendations: Rehab Patient expected Discharge Location: ? Private Residence  ? [facility]  ? LTC  ? [facility] COMMUNICATION/EDUCATION:  Patient/Family Agreement with Treatment Plan :  
 
?   OT Evaluation/POC has been reviewed with the BRAY. ? Fall prevention education was provided and the patient/caregiver indicated understanding ?        Patient/family have participated as able in goal setting and plan of care. Patient/family agree to work toward stated goals and plan of care. ?        Patient is unable to participate in goal setting and plan of care. Therapist will contact YUE/POA. Treatment session:  
Overall Complexity:HIGH  Evaluation: 30  mins. Treatment :   30 mins. Occupational Therapist:   Maddison Hu          4/3/2019 Thank You for this referral.

## 2019-04-03 NOTE — PROGRESS NOTES
Nutrition initial assessment/Plan of care [facility] RECOMMENDATIONS:  
1. Regular Diet (liberalized to promote intake) 2. Ensure Enlive TID (chocolate) 3. Monitor labs, weight and PO intake 4. RD to follow GOALS:  
1. PO intake meets >75% of protein/calorie needs by 4/10 
2. Weight Maintenance/gradual gain (1-2 lb/week) by 4/10 ASSESSMENT:  
Wt status is classified as normal per BMI of 21.9. However, Pt at nutrition risk d/t BMI <23 and age >65 years. Recent weight loss confirmed. (Estimated to be 10-12 lb or 8% x 1-2 months PTA) Poor PO intake x 1 month. Ensure Enlive TID to supplement energy needs. Labs noted. Pt w/ elevated BUN/Cr; GFR (48). Nutrition recommendations listed. RD to follow. Nutrition Diagnoses:  
Inadequate energy intake related to decreased appetite/early satiety as evidenced by diet recall with minimal to fair PO intake at best for past month. Noted per Previous RD note on (3/26): Patient meets criteria for Severe Protein Calorie Malnutrition as evidenced by:  
ASPEN Malnutrition Criteria Acute Illness, Chronic Illness, or Social/Enviornmental: Acute illness Energy Intake: Less than/equal to 50% est energy req for greater than/equal to 5 days Body Fat: Moderate(orbital, thoracic, upper arm regions) Muscle Mass: Moderate(temple, clavicle, patellar body regions) ASPEN Malnutrition Score - Acute Illness: 18 
Acute Illness - Malnutrition Diagnosis: Severe malnutrition. Nutrition Risk:  [] High  [x] Moderate []  Low SUBJECTIVE/OBJECTIVE:  
Pt transferred form [facility] to [facility] on 4/2/2019. Pt seen in room with daughter at bedside to assist with Hx. Pt reports was previously consuming 2 meals and 1 Boost per day prior to hospitalization. Denies having any food allergies or problems chewing/swallowing and stable weight prior to recent hospitalization. Spoke with SLP , LEONIE MACE [facility], and will do evaluation as was previously on modified diet PTA.  Pt stated he is feeling hungry in the mornings, but that he now tends to have early satiety. Discussed trying to make breakfast his largest meal and then doing smaller meals/nutrient dense snacks throughout the rest of the day. Will reorder Ensure Enlive TID that he was previously receiving and will possibly add in magic cups but want to promote solid food intake first. Encouraged trying to consume what he can tolerate of solid foods first and then sipping on supplements after or between meals to increase kcal/protein. Also provided menu and encouraged him to implement preferences with dietary. We had the discussion that SLP would be evaluating most likely so things may have to be altered depending on results. Weight loss confirmed but unable to quantify exactly d/t recent fluid fluctuations. (Estimate ~10-12 lb or 8% loss x 1-2 months PTA)  Encourage intake and will monitor. Information Obtained from:  
 [x] Chart Review [x] Patient [x] Family/Caregiver [x] Nurse/Physician 
 [] Interdisciplinary Meeting/Rounds Diet: Cardiac Diet Medications: [x] Reviewed Allergies: [x] Reviewed Patient Active Problem List  
Diagnosis Code  Asthma J45.909  Prostate cancer ([facility] C61  Hypercholesterolemia E78.00  Angina, class I ([facility] I20.9  Left bundle branch block I44.7  Coronary artery disease I25.10  Hypertension I11.9  Dyslipidemia E78.5  Carotid artery disease ([facility]) I77.9  Aortic valve stenosis I35.0  Anemia D64.9  
 NSTEMI (non-ST elevated myocardial infarction) ([facility]) I21.4  Congestive heart disease ([facility]) I50.9 Past Medical History:  
Diagnosis Date  Asthma  Cardiac cath 04/28/2011 oLM 30%. pLAD 30%. oD1 50%. CX 90% (3 x 18 [place] DEMAR). dRCA 90%. RPLB subtotal.  RPDA 100%.  Cardiac echocardiogram 01/21/2014 EF 55-60%. Mod LVH. Gr 1 DDfx. Mild AS (mean grad 13). Mild AI. Unchanged from study of 11/30/11.  Cardiac nuclear imaging test, mod risk 01/22/2016 Intermediate risk. Medium-sized inferior, inferoseptal infarction. No ischemia. EF 54%. Nondiagnostic EKG on pharm stress test.  
 Carotid duplex 03/02/2016 Mod 50-69% bilateral ICA stenosis. Probable significant RECA stenosis. >50% stenosis of left subclavian. No significant change from study of 1/8/15.  Coronary artery disease Status post PCI with drug-eluting stent, [place] 3 x 18 mm in the proximal circumflex.  Hx of carotid artery disease ([facility]  Hypercholesterolemia  Hypertension  Prostate cancer ([facility] Labs:   
Lab Results Component Value Date/Time Sodium 138 04/03/2019 06:34 AM  
 Potassium 3.6 04/03/2019 06:34 AM  
 Chloride 99 (L) 04/03/2019 06:34 AM  
 CO2 32 04/03/2019 06:34 AM  
 Anion gap 7 04/03/2019 06:34 AM  
 Glucose 98 04/03/2019 06:34 AM  
 BUN 33 (H) 04/03/2019 06:34 AM  
 Creatinine 1.37 (H) 04/03/2019 06:34 AM  
 Calcium 8.4 (L) 04/03/2019 06:34 AM  
 Magnesium 2.9 (H) 03/20/2019 12:37 PM  
 Phosphorus 2.9 03/17/2019 05:17 AM  
 Albumin 3.5 03/13/2019 10:25 AM  
 
Anthropometrics: BMI (calculated): 21.9 Last 3 Recorded Weights in this Encounter 04/02/19 1740 Weight: 63.5 kg (140 lb) Ht Readings from Last 1 Encounters:  
04/02/19 5' 7\" (1.702 m) Weight Metrics 4/2/2019 4/2/2019 3/12/2019 3/7/2019 3/1/2019 2/23/2019 2/12/2019 Weight 140 lb 147 lb 4.3 oz 149 lb 0.5 oz - 161 lb 163 lb 2.3 oz 152 lb BMI 21.93 kg/m2 23.07 kg/m2 - 23.34 kg/m2 25.22 kg/m2 25.55 kg/m2 23.11 kg/m2 No data found. IBW: 148 lb %IBW: 85% UBW: 160-165 lb x 9 months ago [x] Weight Loss [] Weight Gain [] Weight Stable Estimated Nutrition Needs: [x] MSJ  [] Other: 
Calories: 4600-8389 kcal Based on:   [x] Actual BW   
Protein:   76-83 g Based on:   [x] Actual BW Fluid:       3357-1396 ml Based on:   [x] Actual BW  
 
 [x] No Cultural, Scientology or ethnic dietary need identified.   
 [] Cultural, Scientology and ethnic food preferences identified and addressed Wt Status:  [x] Normal (18.6 - 24.9) [] Underweight (<18.5) [] Overweight (25 - 29.9) [] Mild Obesity (30 - 34.9)  [] Moderate Obesity (35 - 39.9) [] Morbid Obesity (40+) Nutrition Problems Identified:  
[x] Suboptimal PO intake  
[] Food Allergies [x] Difficulty chewing/swallowing/poor dentition ??? 
[] Constipation/Diarrhea  
[] Nausea/Vomiting  
[] None 
[] Other:  
 
Plan:  
[x] Therapeutic Diet (liberalized for now) []  Obtained/adjusted food preferences/tolerances and/or snacks options [x]  Supplements added  
[] Occupational therapy following for feeding techniques []  HS snack added  
[]  Modify diet texture  
[]  Modify diet for food allergies []  Educate patient  
[]  Assist with menu selection  
[x]  Monitor PO intake on meal rounds  
[x]  Continue inpatient monitoring and intervention  
[]  Participated in discharge planning/Interdisciplinary rounds/Team meetings  
[]  Other:  
 
Education Needs: 
 [] Not appropriate for teaching at this time due to: 
 [x] Identified and addressed Nutrition Monitoring and Evaluation: 
[x] Continue ongoing monitoring and intervention 
[] Other [facility]

## 2019-04-03 NOTE — PROGRESS NOTES
?  Physical Therapy Functional goals: ?   Maintain current functional status ? Improvement Functional interventions: 
  
        Frequency:   
 
 x? Occupational Therapy Functional goals: ?  Maintain current functional status ?x  ]   Improvement Functional interventions:        Functional interventions: Improve bathing, dressing, self feeding, toileting and functional transfers during self care tasks. Frequency: 1-2x/ day for  3 to 6 times per week ? Speech Therapy Functional goals: 
           ?  ] Maintain current functional status ? Improvement Functional interventions: 
  
        Frequency:

## 2019-04-03 NOTE — PROGRESS NOTES
I have reviewed this patient's current medication list and recent laboratory results. At this time, I do not suggest any drug therapy adjustments or additional laboratory monitoring. Thank you, 
Latanya HUSTON Ph. M. S. 
4/3/2019

## 2019-04-03 NOTE — PROGRESS NOTES
Dietary Orders: X    Regular ? Diabetic: ?    Cardiac: 
   X    Ensure Enlive TID (chocolate) ? Tube feeding ? IV fluids for hydration ? Fluid restrictions:  
 
Residents dietary preferences: 
Fruit, pudding, and breakfast foods Reason for modified diet: 
 
 Dietary Risks X     Weight loss X     Swallowing problems? ? 
    ? Chewing problems ? Missing teeth/poor dentition ? Edentulous ? Mouth pain/discomfort    
    ? Other Residents dietary goal: ? Maintain current weight X      Prevent weight loss 
   ? Other Dietary interventions ? Eats in dining room ? Eats in room ? Dentures/partials ? Specialty utensils or devices: 
   ?     Other

## 2019-04-03 NOTE — ROUTINE PROCESS
Bedside and Verbal shift change report given to rc hobson (oncoming nurse) by Pedro Delatorre (offgoing nurse). Report included the following information SBAR, Intake/Output and Recent Results.

## 2019-04-03 NOTE — PROGRESS NOTES
conducted an initial consultation and Spiritual Assessment for Xavier Pisano, who is a 80 y. o.,male. Patients Primary Language is: [place]. According to the patients EMR Latter-day Affiliation is: Amauri Zabala.  
 
The reason the Patient came to the hospital is:  
Patient Active Problem List  
 Diagnosis Date Noted  Congestive heart disease ([facility] 03/13/2019  
 NSTEMI (non-ST elevated myocardial infarction) [facility] 03/07/2019  Anemia 02/21/2019  Aortic valve stenosis 11/02/2017  Carotid artery disease ([facility] 06/03/2013  Left bundle branch block 05/20/2011  Dyslipidemia 05/20/2011  Coronary artery disease  Hypertension  Angina, class I ([facility] 04/25/2011  Asthma  Prostate cancer ([facility]  Hypercholesterolemia The  provided the following Interventions: 
Initiated a relationship of care and support. Explored issues of sneha, spirituality and/or Evangelical needs while hospitalized. Listened empathically. Provided chaplaincy education. Provided information about Spiritual Care Services. Offered prayer and assurance of continued prayers on patient's behalf. Chart reviewed. The following outcomes were achieved: 
Patient shared some information about their medical narrative and spiritual journey/beliefs. Patient processed feeling about current hospitalization. Patient expressed gratitude for the 's visit. Assessment: 
Patient did not indicate any spiritual or Evangelical issues which require Spiritual Care Services interventions at this time. Patient does not have any Evangelical/cultural needs that will affect patients preferences in health care. Plan: 
Chaplains will continue to follow and will provide pastoral care on an as needed or requested basis.  recommends bedside caregivers page  on duty if patient shows signs of acute spiritual or emotional distress. [address] [facility] Staff  Spiritual Care [phone]

## 2019-04-03 NOTE — PROGRESS NOTES
[x ]  Physical Therapy Functional goals: 
           [ ] Maintain current functional status [ x]   Improvement Functional interventions: gait training, transfer training, therapeutic exercise, therapeutic activity, w/c mobility, and patient/family training Frequency:  5-7 days/week x4 weeks [ ]   Occupational Therapy Functional goals: 
           [ ] Maintain current functional status [ ]   Improvement Functional interventions: 
  
        Frequency:   
 
 
  []   Speech Therapy Functional goals: 
           [ ] Maintain current functional status 
           []   Improvement

## 2019-04-04 NOTE — ROUTINE PROCESS
Bedside and Verbal shift change report given to rc hobson (oncoming nurse) by Veena Lorenzana (offgoing nurse). Report included the following information SBAR, Intake/Output and Recent Results.

## 2019-04-04 NOTE — PROGRESS NOTES
Problem: Self Care Deficits Care Plan (Adult) Goal: *Acute Goals and Plan of Care (Insert Text) Description OCCUPATIONAL THERAPY SHORT TERM GOALS Initiated 4/3/2019 and to be accomplished within 2 [facility]) 1. Patient will perform Upper body ADL's without adaptive equipment with contact guard assist. 
2.  Patient will perform Lower body ADL's without adaptive equipment with moderate assistance  . 3. Patient will perform toileting task with contact guard assist with Good safety to reduce falls risk. 4.  Patient will perform toilet transfers with Betito Jolly and standby assist. 
5.  Patient will perform standing static/dynamic balance activities for improved ADL/IADL function with standby assist and Fair+ balance and safety awareness. 6.  Patient will improve Barthel index scores to atleast 65/100 to improve functional mobility. 7.  Patient will utilize energy conservation techniques during functional activities with minimal verbal cues. OCCUPATIONAL THERAPY LONG TERM GOALS Initiated 4/3/2019 and to be accomplished within 4 Week(s) 1. Patient will perform ADL's & IADLs with adaptive equipment as needed with modified independence. 2.  Patient will perform toileting task with modified independence with Good safety to reduce falls risk. 3.  Patient will perform all functional transfers utilizing least restrictive device and durable medical equipment as needed with modified independence and Good balance and safety awareness. 4.  Patient will perform standing static/dynamic activity for improved ADL/IADL function with modified independence and Good balance and safety awareness. 5.  Patient will improve Barthel index score to 95/100 to improve independence with mobility. 6. Patient will perform home making tasks and simple meal prep Mod I utilizing energy conservation techniques and good safety awareness.  
7. Patient will perform UE HEP with Modified [place] to increase BUE strength for continued participation in ADLs. Therapist: Roxanna Blackwell    4/3/2019 Outcome: Progressing Towards Goal 
Note:  
[facility]  
OCCUPATIONAL THERAPY DAILY TREATMENT NOTE Patient: Alistair Carson (50 y.o. male)       Date: 4/4/2019 Attending Physician: Jyoti Huitron MD 
Primary Diagnosis: sob Treatment Diagnosis Treatment Diagnosis: need for assist with self care Treatment Diagnosis 2: muscle weakness Precautions : Precautions at Admission: Fall(Cardiac EF 26-30%) Vital Signs: 
Vital Signs Temp: 97.5 °F (36.4 °C) Temp Source: Oral 
Pulse (Heart Rate): 88 Heart Rate Source: Monitor Resp Rate: 17 
O2 Sat (%): 94 % Level of Consciousness: Alert BP: 104/53 MAP (Calculated): 70 BP 1 Method: Automatic 
BP 1 Location: Right arm BP Patient Position: At rest 
  
Cognitive Status: 
Mental Status Neurologic State: Alert Orientation Level: Oriented to person;Oriented to place;Oriented to situation Cognition: Follows commands Transfers: 
Functional Transfers Sit to Stand: Stand-by assistance Stand to Sit: Stand-by assistance Balance: 
Balance Sitting: Intact Sitting - Static: Good (unsupported) Sitting - Dynamic: Fair (occasional) Standing: With support Standing - Static: Fair Standing - Dynamic : Fair Therapeutic Activities: Pt performed fnxl w/c in hallways with B UEs ~ 100 ft with 2 rest breaks to increase activity tolerance and endurance needed for ADLs. Pt stood ~ 3 mins x 2 trials SBA with 1/0 UE support to complete puzzle activity to increase standing tolerance and challenge balance for increased safety during all ADL tasks. Pt reported pain/discomfort behind L ear with any activity involving L UE,NSG aware. Vitals assessed BP 92/55 and HR 72. Therapeutic Exercises: BUE ex with arm bike x 5 mins w/ low resistance and 1# wrist weights to complete table top activity to increase strength and endurance for ADL carryover. Patient/Caregiver Education:   
Pt educated on fall prevention and safety to reduce fall risk. ASSESSMENT: 
Patient continues to demonstrate the need for skilled Occupational Therapy services to improve strength, balance, and endurance. Progression toward goals: 
?      Improving appropriately and progressing toward goals ? Improving slowly and progressing toward goals ? Not making progress toward goals and plan of care will be adjusted Treatment session:   46 minutes Therapist:    Virgel Hodgkin, COTA,  4/4/2019

## 2019-04-04 NOTE — PROGRESS NOTES
Attempted to screen patient and determine if speech intervention warranted at this time. Patient napping and POA served as informant 
regarding PLOF, recent hospitalization, hoarseness following procedure and diet preferences. Patient has glasses, is [place] (hearing aids at home) ,however if you face him and speak up he is reported to hear you. [place] sign posted to make staff aware. Patient is reported to be able to insert and remove upper/lower dentures as needed. Daughter reports pt is able to eat fruit and snacks without problem. Med nurse reports medication taken without difficulty.

## 2019-04-04 NOTE — PROGRESS NOTES
[address] [facility] PHYSICAL THERAPY DAILY TREATMENT NOTE Patient: Keshawn Smith (18 y.o. male)               Date: 4/4/2019 Physician: Ramila Mejia MD 
Primary Diagnosis: sob          Treatment Diagnosis Treatment Diagnosis: need for assist with self care Treatment Diagnosis 2: muscle weakness Precautions: Fall(Cardiac EF 26-30%) Vital Signs Vital Signs Temp: 97.5 °F (36.4 °C) Temp Source: Oral 
Pulse (Heart Rate): 88 Heart Rate Source: Monitor Resp Rate: 17 
O2 Sat (%): 94 % Level of Consciousness: Alert BP: 104/53 MAP (Calculated): 70 BP 1 Method: Automatic 
BP 1 Location: Right arm BP Patient Position: At rest 
Cognitive Status: 
Mental Status Neurologic State: Alert Orientation Level: Oriented to person Cognition: Follows commands Pain Bed Mobility Training Bed Mobility Training Supine to Sit: Supervision Balance Sitting: Intact Sitting - Static: Good (unsupported) Sitting - Dynamic: Fair (occasional) Standing: With support Standing - Static: Fair Standing - Dynamic : Fair Transfer Training Transfer Training Sit to Stand: Stand-by assistance Stand to Sit: Stand-by assistance Bed to Chair: Stand-by assistance Sit to Stand: Stand-by assistance Gait Training Gait Base of Support: Narrowed Speed/Janee: Slow Step Length: Right shortened;Left shortened Gait Abnormalities: Decreased step clearance Ambulation - Level of Assistance: Stand-by assistance Distance (ft): 55 Feet (ft) Assistive Device: Gait belt;Walker, rolling Gait Abnormalities: Decreased step clearance With 1 turns. Treatment: Session One: Pt presented supine in bed. Pt demonstrated bed mobility with (S) and ease. Unsupported seated balance at EOB with (S) and no noted LOB for ~5' to include reaching downward slightly out of ARLET. Sit<>stand with SBA throughout with verbal cues for hand placement. Pt ambulated ~20ft in room with RW and SBA.  Pt remained sitting up in w/c at bedside for breakfast, call bell at side. Session Two: Pt presented in bedside chair. Gait training rendered with two trails 1x55ft and 1x30ft with w/c follow for safety. Pt ambulated with SBA, but required cues for step clearance. Seated rest break between trails due to fatigue. SpO2 96% following ambulation, HR: 90 bpm. Following seated rest break, pt participated in standing balance activities. Pt stood with 1 UE support with reaching slightly out of ARLET in lateral and forward directions, no noted LOB. Seated B LE TE's rendered to promote strength and improve overall functional capacity: HR/TR 2x20, LAQ, hip flexion, ball squeezes, resisted hip abd (green band) x15. Patient/Caregiver Education:  
Pt /Caregiver Education on safety and fall prevention. Pt educated on calling for assistance and not walking unassisted in his room at this time to reduce risk of falls. ASSESSMENT: 
Patient continues to benefit from Skilled PT services to improve sit<>stand, quality of ambulation, strength and balance. Progression toward goals: 
?      Improving appropriately and progressing toward goals ? Improving slowly and progressing toward goals ? Not making progress toward goals and plan of care will be adjusted Treatment session: 45 minutes. Therapist:   Karon Pickett PTA,          4/4/2019

## 2019-04-04 NOTE — ROUTINE PROCESS
Noted excoriation on sacral area, mepilex applied, instructed patient to turn from side to side and as much as possible  avoid lying on his back when in bed. Patient stated, okay.

## 2019-04-05 NOTE — ROUTINE PROCESS
Bedside and Verbal shift change report given to odilon lockwood (oncoming nurse) by Sue Arellano (offgoing nurse). Report included the following information SBAR, Intake/Output and Recent Results.

## 2019-04-05 NOTE — PROGRESS NOTES
[address] [facility] PHYSICAL THERAPY DAILY TREATMENT NOTE Patient: Haseeb Saucedo (89 y.o. male)               Date: 4/5/2019 Physician: John De Leon MD 
Primary Diagnosis: sob          Treatment Diagnosis Treatment Diagnosis: need for assist with self care Treatment Diagnosis 2: muscle weakness Precautions: Fall(Cardiac EF 26-30%) Vital Signs Vital Signs Temp: 96.6 °F (35.9 °C) Temp Source: Oral 
Pulse (Heart Rate): 92 Resp Rate: 16 
O2 Sat (%): 94 % Level of Consciousness: Alert BP: 98/62 MAP (Calculated): (!) 63 BP 1 Method: Automatic 
BP 1 Location: Right arm BP Patient Position: At rest 
Cognitive Status: 
Mental Status Neurologic State: Alert Orientation Level: Oriented to person;Oriented to place;Oriented to situation Cognition: Follows commands Pain Pain Screen Pain Scale 1: Numeric (0 - 10) Pain Intensity 1: 0 Patient Stated Pain Goal: 0 Bed Mobility Training Bed Mobility Training Supine to Sit: Supervision Scooting: Stand-by assistance Balance Sitting: Intact Sitting - Static: Good (unsupported) Sitting - Dynamic: Fair (occasional) Standing: With support Standing - Static: Fair(((+))) Standing - Dynamic : Fair Transfer Training Transfer Training Sit to Stand: Stand-by assistance Stand to Sit: Stand-by assistance Sit to Stand: Stand-by assistance Gait Training Gait Base of Support: Narrowed; Shift to left Speed/Janee: Slow Step Length: Right shortened;Left shortened Gait Abnormalities: Decreased step clearance Ambulation - Level of Assistance: Stand-by assistance Distance (ft): 105 Feet (ft)(+100 ft and + 54ft) Assistive Device: Gait belt;Walker, rolling Gait Abnormalities: Decreased step clearance With 2 turns. Treatment: Co-treat with OT to maximize functional gains with standing balance and ambulatory activities for reduced risk of falls. Pt demonstrated good unsupported seated balance at EOB.  Sit<>stand throughout session with SBA. Gait training rendered with trails as noted above and w/c follow for safety. Pt required seated rest breaks between each trail due to c/o of fatigue. Pt provided with visual and verbal cues to promote step length for improved quality of ambulation. Pt demonstrated good carryover with intermittent cues throughout. Dynamic balance challenged with golf putting game. Pt stood with no AD and SBA while completing putting activity for ~3' before requesting to sit due to c/o of back discomfort. Pt reported being on disability for a back injury acquired while working years ago. Additional dynamic balance challenged with use of RW to complete obstacle negotiation to include weaving, narrow spaces and stepping over ~1\" items in pathway. Pt required CGA with verbal cues for safety and technique with obstacle negotiation and stepping over times. Patient/Caregiver Education:  
Pt /Caregiver Education on safety and fall prevention. Pt's progress discussed with pt's daughter (present during fist part of session). ASSESSMENT: 
Patient continues to benefit from Skilled PT services to improve sit<>stand, transfers, strength, balance, and quality of ambulation. Progression toward goals: 
?      Improving appropriately and progressing toward goals ? Improving slowly and progressing toward goals ? Not making progress toward goals and plan of care will be adjusted Treatment session: 47 minutes. Therapist:   Osei Jo, PTA,          4/5/2019

## 2019-04-05 NOTE — PROGRESS NOTES
Problem: Self Care Deficits Care Plan (Adult) Goal: *Acute Goals and Plan of Care (Insert Text) Description OCCUPATIONAL THERAPY SHORT TERM GOALS Initiated 4/3/2019 and to be accomplished within 2 [facility]) 1. Patient will perform Upper body ADL's without adaptive equipment with contact guard assist. 
2.  Patient will perform Lower body ADL's without adaptive equipment with moderate assistance  . 3. Patient will perform toileting task with contact guard assist with Good safety to reduce falls risk. 4.  Patient will perform toilet transfers with [place]ie and standby assist. 
5.  Patient will perform standing static/dynamic balance activities for improved ADL/IADL function with standby assist and Fair+ balance and safety awareness. 6.  Patient will improve Barthel index scores to atleast 65/100 to improve functional mobility. 7.  Patient will utilize energy conservation techniques during functional activities with minimal verbal cues. OCCUPATIONAL THERAPY LONG TERM GOALS Initiated 4/3/2019 and to be accomplished within 4 Week(s) 1. Patient will perform ADL's & IADLs with adaptive equipment as needed with modified independence. 2.  Patient will perform toileting task with modified independence with Good safety to reduce falls risk. 3.  Patient will perform all functional transfers utilizing least restrictive device and durable medical equipment as needed with modified independence and Good balance and safety awareness. 4.  Patient will perform standing static/dynamic activity for improved ADL/IADL function with modified independence and Good balance and safety awareness. 5.  Patient will improve Barthel index score to 95/100 to improve independence with mobility. 6. Patient will perform home making tasks and simple meal prep Mod I utilizing energy conservation techniques and good safety awareness.  
7. Patient will perform UE HEP with Modified [place] to increase BUE strength for continued participation in ADLs. Therapist: Ivette Farley    4/3/2019 Note:  
[facility]  
OCCUPATIONAL THERAPY DAILY TREATMENT NOTE Patient: Olegario Parr (59 y.o. male)       Date: 4/5/2019 Attending Physician: Peggy Aparicio MD 
Primary Diagnosis: sob Treatment Diagnosis Treatment Diagnosis: need for assist with self care Treatment Diagnosis 2: muscle weakness Precautions : Precautions at Admission: Fall(Cardiac EF 26-30%) Vital Signs: 
Vital Signs Temp: 96.6 °F (35.9 °C) Temp Source: Oral 
Pulse (Heart Rate): 92 Resp Rate: 16 
O2 Sat (%): 94 % Level of Consciousness: Alert BP: 98/62 MAP (Calculated): (!) 63 BP 1 Method: Automatic 
BP 1 Location: Right arm BP Patient Position: At rest 
  
Cognitive Status: 
Mental Status Neurologic State: Alert Orientation Level: Oriented to person;Oriented to place;Oriented to situation Cognition: Follows commands Pain: 
Pain Screen Pain Scale 1: Numeric (0 - 10) Pain Intensity 1: 0 Patient Stated Pain Goal: 0 Pain Scale 1: Numeric (0 - 10) Bed Mobility: 
Bed Mobility Supine to Sit: Supervision Scooting: Stand-by assistance Transfers: 
Functional Transfers Sit to Stand: Stand-by assistance Stand to Sit: Stand-by assistance Balance: 
Balance Sitting: Intact Sitting - Static: Good (unsupported) Sitting - Dynamic: Fair (occasional) Standing: With support Standing - Static: Fair(+) Standing - Dynamic : Fair Therapeutic Activities: Pt presented supine in bed upon therapist arrival with daughter by bedside. Pt performed fnxl bed mobility SBA supine-sit and scooting EOB. STS transfer SBA with RW requiring mod cueing for proper hand placement and pacing tech. Fnxl room and hallway mobility with RW SBA/CGA 2/2 slight L lateral lean~ 105 ft, 54 ft, and 100 ft to increase activity tolerance and (I) on unit.  Pt performed various dyn standing task CGA/SBA with 0 UE support reaching in mult planes with ball toss and golf putting activity to challenge balance and stability to increase safety and (I) during all ADL/IADL task. Pt performed obstacle course in hallway CGA with RW weaving in/out of cones requiring mod cueing for RW mgmt and proximity. Pt c/o back pain with notable labored breathing requiring seated rest break. Vitals taken O2 on RA reading 84% with ~ 3 second recovery period to 94% with PLB tech, /66 and . Patient/Caregiver Education:   
Pt educated on PLB tech for EC/WS, pt returned understanding through demo. ASSESSMENT: 
Patient continues to demonstrate the need for skilled Occupational Therapy services to improve strength, balance, and endurance. Progression toward goals: 
?      Improving appropriately and progressing toward goals ? Improving slowly and progressing toward goals ? Not making progress toward goals and plan of care will be adjusted Treatment session:   45 minutes, PT co tx to maximize safety and fnxl gains with dyn balance tasks. Therapist:    KATARINA Thomas,  4/5/2019

## 2019-04-05 NOTE — ROUTINE PROCESS
Marino Reyes NP made aware of patient low blood pressure after re check, and that lasix was held. She stated to hold lasix and recheck blood pressure in a few hours. Blood pressure rechecked at lunch time and still remains low, patient asymptomatic and was able to sit in wheelchair and participate with physical therapy. Physical therapy reported patients blood pressure did come up a little with physical activity.

## 2019-04-06 NOTE — H&P
[address] [place] [address] HISTORY AND PHYSICAL Name:  Casi Dent 
MR#:   [id number] :  [id number] ACCOUNT #:  [de-identified] ADMIT DATE:  2019 REASON FOR ADMISSION:  Respiratory failure, pleural effusion. HISTORY OF PRESENT ILLNESS:  The patient is a pleasant 80-year-old male with past medical history significant for asthma, recent non-ST-elevation MI status post stent placement to [facility] on 2019, anemia, hyperlipidemia, chronic kidney disease, who was admitted to the hospital secondary to lower extremity edema and increased shortness of breath. Patient was initially treated with BiPAP and started on diuretics. Patient was known to have bilateral pleural effusion and underwent thoracentesis with improvement of respiratory status. Patient was hypertensive, which make diuresis a little difficult. Patient was started on midodrine with some improvement. Patient had repeated thoracentesis due to reaccumulation. Patient had 2D echo, which showed EF of 26-30%. Patient had anemia requiring transfusion. Patient had CT of the neck due to hoarseness, but did not show any acute finding. There was influence of finding of chronic aortic dissection on CTA of the chest.  Per discharge summary, patient is to continue with aspirin and Plavix due to recent stent placement. At the time of my evaluation, patient is alert, awake, oriented. Denies any chest pain, shortness of breath, or palpitation. Per daughter at the bedside, patient has lost 10 to 15 pounds in the past couple of months. PAST MEDICAL HISTORY:  Coronary artery disease, hypertension, hyperlipidemia, chronic kidney disease, aortic valve stenosis, recent non-ST-elevation MI, congestive heart failure. PAST SURGICAL HISTORY:  Cardiac catheterization and stent placement. SOCIAL HISTORY:  Patient is . Has one child. He has a distant history of tobacco abuse, currently nonsmoker and nondrinker. FAMILY HISTORY:  Negative for coronary artery disease, diabetes, and hypertension. ALLERGIES:  NO KNOWN DRUG ALLERGIES. CURRENT MEDICATIONS:  List reviewed. REVIEW OF SYSTEMS:  No fever or chills. No memory loss or headache. No neck pain or sore throat. No chest pain or palpitations. No shortness of breath or cough. No nausea, vomiting, or diarrhea. No dysuria or polyuria. No back pain or leg pain. No heat or cold intolerance. No anxiety or depression. Positive for weight loss. PHYSICAL EXAMINATION: 
GENERAL:  This is a thin male, in no apparent distress. VITAL SIGNS:  Temperature is 97.2, heart rate 60, respiratory rate is 18, blood pressure is 98/54, satting 98%. HEENT:  Normocephalic, atraumatic. Sclerae anicteric. Oropharynx clear. Mucous membranes moist. 
NECK:  No JVD or thyromegaly. HEART:  S1, S2.  Regular rate and rhythm. LUNGS:  Decreased breath sounds, more prominent bibasilar, minimally expiratory wheezing. ABDOMEN:  Nontender, nondistended. No active bowel sounds. EXTREMITIES:  No edema or clubbing. NEUROLOGIC:  Motor strength is 5/5 in all 4 extremities. Cranial nerves are grossly intact. ASSESSMENT AND PLAN: 
1. Respiratory failure, improved. Continue Pulmicort, Brovana, and DuoNeb as needed. 2.  Recent non-ST-elevation myocardial infarction. Continue aspirin, Plavix, and potassium. Not a candidate for beta blocker due to borderline hypertension. 3.  Hypertension, continue with midodrine. 4.  Congestive heart failure with systolic dysfunction. Continue with Lasix and monitor volume status closely. 5.  Hyperlipidemia, on statin. 6.  Anemia, continue monitoring H and H. 
7.  Physical deconditioning, continue physical therapy and occupational therapy. 8.  Flu shot, received. 9.  Advance directive, completed. 10.  Screening for depression is negative.  
 
 
 
MD BERNARDINO Hamilton/[email or web address]/ 
D:  04/05/2019 19:13 
T:  04/05/2019 23:08 
 JOB #:  [id number]

## 2019-04-06 NOTE — ROUTINE PROCESS
Bedside and Verbal shift change report given to [id number] (oncoming nurse) by Krista Lujan RN (offgoing nurse). Report included the following information SBAR, Kardex and MAR.

## 2019-04-06 NOTE — PROGRESS NOTES
[address] [facility] PHYSICAL THERAPY DAILY TREATMENT NOTE Patient: Kateryna Tovar (78 y.o. male)               Date: 4/6/2019 Physician: Michael Ramírez MD 
Primary Diagnosis: sob          Treatment Diagnosis Treatment Diagnosis: need for assist with self care Treatment Diagnosis 2: muscle weakness Precautions: Fall(Cardiac EF 26-30%) Vital Signs Vital Signs Temp: 97.7 °F (36.5 °C) Temp Source: Oral 
Pulse (Heart Rate): 63 Resp Rate: 19 
O2 Sat (%): 97 % BP: 98/52 MAP (Calculated): (!) 60 Cognitive Status: 
Mental Status Neurologic State: Alert Orientation Level: Appropriate for age Cognition: Follows commands Pain Pain Screen Pain Scale 1: Numeric (0 - 10) Pain Intensity 1: 0 Patient Stated Pain Goal: 0 Bed Mobility Training Balance Transfer Training Gait Training See narrative below Treatment:  
Pt found sitting on toilet upon arrival and is agreeable to skilled PT services. Patient participated in general strengthening exercises to include standing heel raises, toe raises, hip flexion, hip abduction, HS curls and squats; seated LAQs, hip flexion routine and hip abd/add with LEs extended 1x15 ea-- to increase strength, ROM and tolerance to activity in prep for improved levels of functional mobility. Performed forward/retro walking routine within ll bars x 2 trials followed by x 2 trials for side stepping (education + demonstration provided on correct LE placement in order to promote engagement of hip abductors for enhanced pelvic stability in prep for ambulation with LRAD. Patient participated in gait training with use of FWW on level surfaces-- able to ambulate 100 ft with close S for safety-- deferred addtl ambulation due to reports of back discomfort, which subsides post activity.  Pt cuing to maintain upright head positioning in order to promote improved postural control-- demod good gait speed, normal ARLET and adequate heel strike/toeing off this visit; no signs of instability observed. Patient/Caregiver Education:  
Pt /Caregiver Education on safety and fall prevention was provided to  Increase functional mobility within facility while reducing risk for injury. ASSESSMENT: 
Patient continues to benefit from Skilled PT services to improve strength, gait, transfers and balance Progression toward goals: 
?      Improving appropriately and progressing toward goals ? Improving slowly and progressing toward goals ? Not making progress toward goals and plan of care will be adjusted Treatment session: 46 minutes. Therapist:   Fabi Samuel. Ancelmo Walls PTA,          4/6/2019

## 2019-04-06 NOTE — PROGRESS NOTES
Pt slept quietly through out the night, pt his oxygen at bedside which he uses as needed, HHN Tx given as ordered, no c/o SOB, call bell and personal items within reach

## 2019-04-07 NOTE — PROGRESS NOTES
PT c/o SOB, O2 sats WNL, pt alert and verbally responsive, sitting on the side of the bed, PRN O2 @ 2LPM via N/C applied, assisted patient to his chair, personal items and call bell within reach, pt instructed to call if his SOB worsens or does not improve, pt voiced understanding [phone]  Pt states that he feels much better, no further SOB noted

## 2019-04-07 NOTE — ROUTINE PROCESS
Patient sitting on bedside reports feeling short of breath. PRN DUO NEB administered. Patient reports feels better.

## 2019-04-07 NOTE — ROUTINE PROCESS
expalined to patient the need to drink milk of magnesia but Refused to it as of the moment, last bowel movement 4-3-2019

## 2019-04-07 NOTE — ADVANCED PRACTICE NURSE
Bedside and Verbal shift change report given to rc hobson (oncoming nurse) by Martha Fothergill (offgoing nurse). Report included the following information SBAR, Intake/Output and Recent Results.

## 2019-04-07 NOTE — PROGRESS NOTES
Problem: Mobility Impaired (Adult and Pediatric) Goal: *Acute Goals and Plan of Care (Insert Text) Description PHYSICAL THERAPY STG GOALS : 
Initiated 4/3/2019 and to be accomplished within 2 Weeks 1. Patient will move from supine to sit and sit to supine  in bed with modified independence. 2.  Patient will transfer from bed to chair and chair to bed with supervision/set-up using rolling walker. 3.  Patient will perform sit to stand with supervision/set-up with Good balance and safety awareness. 4.  Patient will ambulate with supervision/set-up for 200 feet with rolling walker on level surfaces with 2 turns. 5.  Patient will ascend/descend 4 stairs with one handrail withminimal assistance/contact guard assist to allow for safe home access/exit. PHYSICAL THERAPY LTG GOALS : 
Initiated 4/3/2019 and to be accomplished within 4 Weeks 1. Patient will move from supine to sit and sit to supine  in bed with independence. 2.  Patient will transfer from bed to chair and chair to bed with modified independence using RW. 3.  Patient will perform sit to stand with modified independence with Good balance and safety awareness. 4.  Patient will ambulate with modified independence for 300 feet with RW on level surfaces and be able to maneuver through narrow spaces and obstacles without loss of balance. 5.  Patient will ascend/descend 4 stairs with one handrail(s) with modified independence to allow for safe home access/exit. Physical Therapist:   Marcus Dickens PT  on 4/3/2019 Outcome: Progressing Towards Goal 
Note:  
[address] [facility] PHYSICAL THERAPY DAILY TREATMENT NOTE Patient: Ann Messina (78 y.o. male)               Date: 4/7/2019 Physician: Bhavna Landeros MD 
Primary Diagnosis: sob          Treatment Diagnosis Treatment Diagnosis: need for assist with self care Treatment Diagnosis 2: muscle weakness Precautions: Fall(Cardiac EF 26-30%) Vital Signs Vital Signs Pulse (Heart Rate): 76 BP: 104/62 Cognitive Status: 
Mental Status Neurologic State: Alert Orientation Level: Oriented X4 Cognition: Follows commands Pain Bed Mobility Training Balance Sitting: Intact Sitting - Static: Good (unsupported) Sitting - Dynamic: Fair (occasional) Standing: With support Standing - Static: Fair Standing - Dynamic : Fair Transfer Training Transfer Training Sit to Stand: Stand-by assistance Stand to Sit: Stand-by assistance Interventions: Safety awareness training Sit to Stand: Stand-by assistance Gait Training Gait Base of Support: Center of gravity altered;Narrowed Speed/Janee: Slow Step Length: Left shortened;Right shortened Gait Abnormalities: Decreased step clearance Ambulation - Level of Assistance: Stand-by assistance Distance (ft): 259 Feet (ft) Assistive Device: Gait belt;Walker, rolling Gait Abnormalities: Decreased step clearance With 4 turns. Treatment:  
Pt toileting upon arrival to room, agreed to participate following. Ambulates as noted above with 3 short, standing rest breaks and cuing for upright posture. TE for LE strengthening and mobility: seated LAQ, ball squeeze, hip abd with OTB 2x15 reps each; Standing marches x10. Side stepping in // bars x2 lengths, with BUE. Pt requires frequent rest breaks between activity per reports of muscle fatigue and moderate L/s pain. Returned to room, seated in bedside chair with call bell in reach. Patient/Caregiver Education:  
Pt /Caregiver Education on safety and fall prevention, reviewed HEP for LE strengthening and mobility. ASSESSMENT: 
Patient continues to benefit from Skilled PT services to improve overall strength and mobility, improve gait and balance, improve activity tolerance, improve functional mobility Progression toward goals: 
?      Improving appropriately and progressing toward goals ? Improving slowly and progressing toward goals ?      Not making progress toward goals and plan of care will be adjusted Treatment session: 47 minutes. Therapist:   Katrina Valdovinos PTA,          4/7/2019

## 2019-04-08 NOTE — ROUTINE PROCESS
Bedside and Verbal shift change report given to [facility] [id number] (oncoming nurse) by Krista Lujan RN (offgoing nurse). Report included the following information SBAR, Kardex and MAR.

## 2019-04-08 NOTE — PROGRESS NOTES
[facility] Patient: Ganesh Bhagat MRN: [id number]  CSN: [id number] YOB: 1926  Age: 80 y.o. Sex: male DOA: 4/2/2019 LOS:  LOS: 6 days Subjective:  
 
CC:  Skilled patient eval 
 
Mr PRESENCE [facility] OF [facility] is a 80 yr old male patient, patient was recently hospitalized b/w 3/13-4/2. Patient with pmhx of GI bleed, recent  stent placement to LAD lesion on 3/11/19 on DAPT. He was seen in the ER on acute onset of SOB and worsening lower extremities BL and poor po intake. In the ER he was noted to have acute  Hypoxia respiratory fx requiring BIPAP presumably HF and pleural effusion. He was seen by thoracentesis BL 3 occasional which improve resp. However he went back in having SOB, per discharge notation there is limited therapeutic options  Due to no cardiovascular reserve, unable to diurese effective due to hypotensive episodes. During his stay, he was being followed by Cardiologist. He was started on Midodrine with some improvement and was eventually d/c. His 2 Decho showed EF 26-30 %. They noted If his bp tolerates he will need medications such as beta blockers for mortality/morbidity benefit, those were not added on due to his low bp here. Patient improved and was sent to [facility] due debility. I examined him today he was sitting in w/c, NAD. Diminished lung sound, will continue pulm hygiene and monitor closely. patient at high risk for recurrent pleural effusion. Patient with hypotensive episode and started on Midodrine, bp has improved PAST MEDICAL HX:  Coronary Artery diease, HTN,  HLD, CKD, Aortic valve stenosis, recent non ST elevation, Congestion. PAST SURGICAL HX: Cardiac Cath and stent placement Social hx: lives with daughter, he is . Former smoker ALLERGIES: NKDA 
 
ROS: no fever or chill, no NVD, fever or chills. No CP or HA. No memory loss or or HA Objective:  
  
Visit Vitals /66 Pulse 61 Temp 96.1 °F (35.6 °C) Resp 18 Ht 5' 7\" (1.702 m) Wt 63.5 kg (140 lb) SpO2 97% BMI 21.93 kg/m² Physical Exam: 
General appearance: alert, cooperative, no distress, appears stated age HEENT: negative Neck: supple, symmetrical, trachea midline, no adenopathy, thyroid: not enlarged, symmetric, no tenderness/mass/nodules, no carotid bruit and no JVD Lungs: coarse lung sound. Heart: regular rate and rhythm, S1, S2 normal, no murmur, click, rub or gallop Abdomen: soft, non-tender. Bowel sounds normal. No masses,  no organomegaly Pulses: 2+ and symmetric Skin: Skin color, texture, turgor normal. No rashes or lesions Intake and Output: 
Current Shift:  No intake/output data recorded. Last three shifts:  No intake/output data recorded. Recent Results (from the past 24 hour(s)) CBC WITH AUTOMATED DIFF Collection Time: 04/08/19  4:39 AM  
Result Value Ref Range WBC 7.8 4.6 - 13.2 K/uL  
 RBC 3.41 (L) 4.70 - 5.50 M/uL HGB 9.2 (L) 13.0 - 16.0 g/dL HCT 30.0 (L) 36.0 - 48.0 % MCV 88.0 74.0 - 97.0 FL  
 MCH 27.0 24.0 - 34.0 PG  
 MCHC 30.7 (L) 31.0 - 37.0 g/dL  
 RDW 17.3 (H) 11.6 - 14.5 % PLATELET 070 422 - 020 K/uL MPV 10.3 9.2 - 11.8 FL  
 NEUTROPHILS 63 40 - 73 % LYMPHOCYTES 18 (L) 21 - 52 % MONOCYTES 17 (H) 3 - 10 % EOSINOPHILS 2 0 - 5 % BASOPHILS 0 0 - 2 %  
 ABS. NEUTROPHILS 5.0 1.8 - 8.0 K/UL  
 ABS. LYMPHOCYTES 1.4 0.9 - 3.6 K/UL  
 ABS. MONOCYTES 1.3 (H) 0.05 - 1.2 K/UL  
 ABS. EOSINOPHILS 0.2 0.0 - 0.4 K/UL  
 ABS. BASOPHILS 0.0 0.0 - 0.1 K/UL  
 DF AUTOMATED METABOLIC PANEL, BASIC Collection Time: 04/08/19  4:39 AM  
Result Value Ref Range Sodium 142 136 - 145 mmol/L Potassium 3.3 (L) 3.5 - 5.5 mmol/L Chloride 100 100 - 108 mmol/L  
 CO2 34 (H) 21 - 32 mmol/L Anion gap 8 3.0 - 18 mmol/L Glucose 106 (H) 74 - 99 mg/dL BUN 32 (H) 7.0 - 18 MG/DL Creatinine 1.30 0.6 - 1.3 MG/DL  
 BUN/Creatinine ratio 25 (H) 12 - 20 GFR est AA >60 >60 ml/min/1.73m2 GFR est non-AA 52 (L) >60 ml/min/1.73m2 Calcium 8.5 8.5 - 10.1 MG/DL  
NT-PRO BNP Collection Time: 04/08/19  4:39 AM  
Result Value Ref Range NT pro-BNP 14,178 (H) 0 - 1,800 PG/ML Current Facility-Administered Medications:   [facility] ANESTHESIA, , Other, PRN, Lissy Chen MD 
  [facility] EMERGENCY/STAT BOX, , Other, PRN, Lissy Chen MD 
  ipratropium (ATROVENT) 0.02 % nebulizer solution 0.5 mg, 0.5 mg, Nebulization, Q6H RT, Getachew Watson MD, 0.5 mg at 04/08/19 0392   aspirin chewable tablet 81 mg, 81 mg, Oral, DAILY, Getachew Watson MD, 81 mg at 04/08/19 9156   tamsulosin (FLOMAX) capsule 0.4 mg, 0.4 mg, Oral, DAILY, Getachew Watson MD, 0.4 mg at 04/08/19 8733   famotidine (PEPCID) tablet 20 mg, 20 mg, Oral, DAILY, Getachew Watson MD, 20 mg at 04/08/19 0911 
  midodrine (PROAMITINE) tablet 10 mg, 10 mg, Oral, TID WITH MEALS, Getachew Watson MD, 10 mg at 04/08/19 1222   therapeutic multivitamin (THERAGRAN) tablet 1 Tab, 1 Tab, Oral, DAILY, Lissy Chen MD, 1 Tab at 04/08/19 7981   furosemide (LASIX) tablet 20 mg, 20 mg, Oral, DAILY, Getachew Watson MD, 20 mg at 04/08/19 0911 
  atorvastatin (LIPITOR) tablet 40 mg, 40 mg, Oral, QHS, Getachew Watson MD, 40 mg at 04/07/19 2148   nitroglycerin (NITROSTAT) tablet 0.4 mg, 0.4 mg, SubLINGual, PRN, Getachew Watson MD 
  clopidogrel (PLAVIX) tablet 75 mg, 75 mg, Oral, DAILY, Getachew Watson MD, 75 mg at 04/08/19 2166   magnesium hydroxide (MILK OF MAGNESIA) 400 mg/5 mL oral suspension 30 mL, 30 mL, Oral, DAILY PRN, Getachew Watson MD 
  bisacodyl (DULCOLAX) suppository 10 mg, 10 mg, Rectal, DAILY PRN, Lissy Chen MD 
  sodium phosphate (FLEET'S) enema 1 Enema, 1 Enema, Rectal, DAILY PRN, Lissy Chen MD 
  albuterol-ipratropium (DUO-NEB) 2.5 MG-0.5 MG/3 ML, 3 mL, Nebulization, Q4H PRN, Lissy Chen MD, 3 mL at 04/07/19 6107   [DISCONTINUED] arformoterol (BROVANA) neb solution 15 mcg, 15 mcg, Nebulization, BID RT, 15 mcg at 04/02/19 2120 **AND** budesonide (PULMICORT) 500 mcg/2 ml nebulizer suspension, 500 mcg, Nebulization, BID RT, Getachew Watson MD, 500 mcg at 04/08/19 0802 
  arformoterol (BROVANA) neb solution 15 mcg, 15 mcg, Nebulization, BID RT, Argentina Matos MD, 15 mcg at 04/08/19 0107 Lab Results Component Value Date/Time Glucose 106 (H) 04/08/2019 04:39 AM  
 Glucose 98 04/03/2019 06:34 AM  
 Glucose 155 (H) 03/28/2019 11:04 AM  
 Glucose 110 (H) 03/24/2019 05:09 AM  
 Glucose 128 (H) 03/20/2019 12:37 PM  
  
 
Assessment/Plan Acute hypoxia resp fx reported probable from HF: resolved. Continue duo nebs. Pleural effusion s/p repeated Thoracentesis likes due to HF. Pulm f/u in 2 weeks Acute on Chronic Systolic CHF: continue lasix. Cardiologist cl in 7 days CAD recent NSTEMI: on ASA + Plavix HLD: continue statin Annual flu shot utd. Hypotension: continue Midodrine and monitor BP Sever Protein Calorie Malnutrition: dietitian following, will monitor Decon: continue PT/OT Catrachito Hughes NP 
4/8/2019, 12:42 PM

## 2019-04-08 NOTE — PROGRESS NOTES
Problem: Self Care Deficits Care Plan (Adult) Goal: *Acute Goals and Plan of Care (Insert Text) Description OCCUPATIONAL THERAPY SHORT TERM GOALS Initiated 4/3/2019 and to be accomplished within 2 [facility]) 1. Patient will perform Upper body ADL's without adaptive equipment with contact guard assist. 
2.  Patient will perform Lower body ADL's without adaptive equipment with moderate assistance  . 3. Patient will perform toileting task with contact guard assist with Good safety to reduce falls risk. 4.  Patient will perform toilet transfers with [employer] and standby assist. 
5.  Patient will perform standing static/dynamic balance activities for improved ADL/IADL function with standby assist and Fair+ balance and safety awareness. 6.  Patient will improve Barthel index scores to atleast 65/100 to improve functional mobility. 7.  Patient will utilize energy conservation techniques during functional activities with minimal verbal cues. OCCUPATIONAL THERAPY LONG TERM GOALS Initiated 4/3/2019 and to be accomplished within 4 Week(s) 1. Patient will perform ADL's & IADLs with adaptive equipment as needed with modified independence. 2.  Patient will perform toileting task with modified independence with Good safety to reduce falls risk. 3.  Patient will perform all functional transfers utilizing least restrictive device and durable medical equipment as needed with modified independence and Good balance and safety awareness. 4.  Patient will perform standing static/dynamic activity for improved ADL/IADL function with modified independence and Good balance and safety awareness. 5.  Patient will improve Barthel index score to 95/100 to improve independence with mobility. 6. Patient will perform home making tasks and simple meal prep Mod I utilizing energy conservation techniques and good safety awareness.  
7. Patient will perform UE HEP with Modified [place] to increase BUE strength for continued participation in ADLs. Therapist: Daryle Coop    4/3/2019 Note:  
[facility]  
OCCUPATIONAL THERAPY DAILY TREATMENT NOTE Patient: Kateryna Tovar (52 y.o. male)       Date: 4/8/2019 Attending Physician: Michael Ramírez MD 
Primary Diagnosis: sob Treatment Diagnosis Treatment Diagnosis: need for assist with self care Treatment Diagnosis 2: muscle weakness Precautions : Precautions at Admission: Fall(Cardiac EF 26-30%) Vital Signs: 
Vital Signs Temp: 96.1 °F (35.6 °C) Temp Source: Oral 
Pulse (Heart Rate): 61 Resp Rate: 18 
O2 Sat (%): 97 % BP: 91/52 MAP (Calculated): 65 
  
Cognitive Status: 
Mental Status Neurologic State: Alert Orientation Level: Appropriate for age Cognition: Follows commands Pain: 
Pain Screen Pain Scale 1: Numeric (0 - 10) Pain Intensity 1: 0 Patient Stated Pain Goal: 0 Pain Scale 1: Numeric (0 - 10) Gross Assessment: 
  
Coordination: 
  
Bed Mobility: 
Bed Mobility Supine to Sit: Supervision Scooting: Supervision Transfers: 
Functional Transfers Sit to Stand: Stand-by assistance Stand to Sit: Stand-by assistance Balance: 
Balance Sitting: Intact Sitting - Static: Good (unsupported) Sitting - Dynamic: Fair (occasional) Standing: With support Standing - Static: Fair Standing - Dynamic : Fair ADL Self Care: 
Basic ADL Oral Facial Hygiene/Grooming: Stand-by assistance(@ stand performing grooming and hand hygiene task) Type of Bath: Basin/Soap/Water Upper Body Dressing: Setup Lower Body Dressing: Minimum assistance(A with threading b/l feet) Toileting: Stand by assistance Therapeutic Activities: Pt presented sitting EOB upon therapist arrival and agreeable for tx at this time. Pt with notable soiled pants/briefs. STS transfer SBA/ Close S with RW. SBA fnxl room and bathroom mobility with RW ~ 20 ft for increased activity tolerance, pt required min cueing for proper pacing tech.  Pt performed toileting routine SBA utilizing grab bar requiring increased time 2/2 BM. LB dressing assessed sitting on toilet requiring MIN A donning brief and pants. Pt demo grooming task and hand hygiene @ stand with RW SBA. Pt's O2 stats taken on 2L 97% HR 73 bpm.  
Therapeutic Exercises: BUE ex with 2# dowel bar 3 x 20 chest press, rows, and curls and fnxl w/c mobility ~ 50 ft to increase strength and endurance for ADL [place]. Patient/Caregiver Education:   
Pt educated on PLB tech for EC/WS, pt returned understanding through demo. ASSESSMENT: 
Patient continues to demonstrate the need for skilled Occupational Therapy services to improve strength, balance, and endurance. Pt is cooperative and motivated to return home. Progression toward goals: 
?      Improving appropriately and progressing toward goals ? Improving slowly and progressing toward goals ? Not making progress toward goals and plan of care will be adjusted Treatment session:   46 minutes, partial PT co tx to maximize fnxl gains and safety during dyn balance activities. Therapist:    KATARINA Cortes,  4/8/2019

## 2019-04-08 NOTE — PROGRESS NOTES
I have reviewed this patient's current medication list and recent laboratory results. At this time, I do not suggest any drug therapy adjustments or additional laboratory monitoring. Thank you, 
Vanessa HUSTON Ph. M. S. 
4/8/2019

## 2019-04-08 NOTE — ROUTINE PROCESS
Patient in bed at pesent time . O2 at 2 l/min via nasal cannula . sats 96 % , patient denies shortness of breath at present time.

## 2019-04-08 NOTE — PROGRESS NOTES
[address] [facility] PHYSICAL THERAPY DAILY TREATMENT NOTE Patient: Sharona Alvarez (30 y.o. male)               Date: 4/8/2019 Physician: Tania Porter MD 
Primary Diagnosis: sob          Treatment Diagnosis Treatment Diagnosis: need for assist with self care Treatment Diagnosis 2: muscle weakness Precautions: Fall(Cardiac EF 26-30%) Vital Signs Vital Signs Temp: 96.1 °F (35.6 °C) Temp Source: Oral 
Pulse (Heart Rate): 61 Resp Rate: 18 
O2 Sat (%): 97 % BP: 91/52 MAP (Calculated): 65 
  
Cognitive Status: 
Mental Status Neurologic State: Alert Orientation Level: Appropriate for age Cognition: Follows commands Pain Pain Screen Pain Scale 1: Numeric (0 - 10) Pain Intensity 1: 0 Patient Stated Pain Goal: 0 Bed Mobility Training Bed Mobility Training Supine to Sit: Supervision Scooting: Supervision Balance Sitting: Intact Sitting - Static: Good (unsupported) Sitting - Dynamic: Fair (occasional) Standing: With support Standing - Static: Fair Standing - Dynamic : Fair Transfer Training Transfer Training Sit to Stand: (close (S) ) Stand to Sit: (close (S)) Interventions: Safety awareness training Sit to Stand: (close (S) ) Gait Training Gait Base of Support: Center of gravity altered;Narrowed Speed/Janee: Slow Step Length: Right shortened;Left shortened Gait Abnormalities: Decreased step clearance Ambulation - Level of Assistance: Stand-by assistance Distance (ft): 20 Feet (ft)(x2) 
Assistive Device: Gait belt;Walker, rolling Gait Abnormalities: Decreased step clearance With 1 turns. Treatment: Partial co-treat with OT to maximize functional gains with standing balance and ambulatory tasks. Pt completed supine>sit with (S). Pt utilizing 2L of supplemental O2 throughout session. Pt reported not feeling SOB at this time. Pt demonstrated good unsupported seated balance at EOB for ~5' with (S) and no noted LOB or sway.  Sit<>stand throughout session with close (S). Gait training in room with RW ~20ft x2 to include doorway negotiation and narrow spaces with 1 turn. Static standing balance with 1 to intermittent no UE support to allow for ADL tasks with OT with SBA, slight forward posture noted. SpO2 during session did not drop below 91% (with supplemental O2). Patient/Caregiver Education:  
Pt /Caregiver Education on safety and fall prevention. Pt educated on energy conservation. Pt utilized supplemental O2 (2L) throughout session, SpO2 97%, HR: 73bpm.  
 
ASSESSMENT: 
Patient continues to benefit from Skilled PT services to improve sit<>stand, tranfers, quality and safety with ambulation, dynamic balance and strength. Progression toward goals: 
?      Improving appropriately and progressing toward goals ? Improving slowly and progressing toward goals ? Not making progress toward goals and plan of care will be adjusted Treatment session: 48 minutes. Therapist:   Geraldine Spear PTA,          4/8/2019

## 2019-04-09 NOTE — ROUTINE PROCESS
Bedside and Verbal shift change report given to [address] Mary Lawson (oncoming nurse) by Trevor Aguilera (offgoing nurse). Report included the following information SBAR, Intake/Output and Recent Results.

## 2019-04-09 NOTE — ROUTINE PROCESS
Bedside and Verbal shift change report given to Allyssa Umanzor LPN (oncoming nurse) by Lu Bai LPN (offgoing nurse). Report included the following information SBAR, Kardex and MAR.

## 2019-04-09 NOTE — PROGRESS NOTES
I was not able to assess the patient at this time and will perform a follow up visit in a few days. Susannah Barnes M.Div. , [address] [address] [facility]: [phone]/SNN: [phone]

## 2019-04-09 NOTE — PROGRESS NOTES
[facility]  
OCCUPATIONAL THERAPY DAILY TREATMENT NOTE Patient: Olegario Parr (33 y.o. male)       Date: 4/9/2019 Attending Physician: Peggy Aparicio MD 
Primary Diagnosis: sob Treatment Diagnosis Treatment Diagnosis: need for assist with self care Treatment Diagnosis 2: muscle weakness Precautions : Precautions at Admission: Fall(Cardiac EF 26-30%) Vital Signs: 
Vital Signs Temp: 98.2 °F (36.8 °C) Temp Source: Oral 
Pulse (Heart Rate): (!) 110 Resp Rate: 18 
O2 Sat (%): 97 % BP: 93/53 MAP (Calculated): 66 
  
Cognitive Status: 
Mental Status Neurologic State: Alert; Appropriate for age Orientation Level: Oriented X4 Cognition: Follows commands Pain: 
Pain Screen Pain Scale 1: Numeric (0 - 10) Pain Intensity 1: 0 Patient Stated Pain Goal: 0 Pain Scale 1: Numeric (0 - 10) Gross Assessment: 
  
Coordination: 
  
Bed Mobility: 
Bed Mobility Supine to Sit: Supervision Scooting: Supervision Transfers: 
Functional Transfers Sit to Stand: Stand-by assistance Stand to Sit: Stand-by assistance Bed to Chair: Stand-by assistance Balance: 
Balance Sitting: Intact Sitting - Static: Good (unsupported) Sitting - Dynamic: Fair (occasional) Standing: With support Standing - Static: Fair Standing - Dynamic : Fair ADL Self Care: 
Basic ADL Type of Bath: Basin/Soap/Water Therapeutic Activities: 
Patient presents in room, completing bed mobility with daughter present. Patient's daughter informed KATARINA, she just spoke with DON regarding increasing patient's PT to more times a day for optimal strengthening. BRAY informed patient's daughter, he is currently receiving therapy twice a day however also stated she would like more. Attempted OOB activity however patient's HR was 108-124 at rest in bed. Patient informed KATARINA, he received a nebuilizer 10 mins prior to BRAY\"s arrival as well as finished bathroom mobility.  KATARINA educated patient and patient's daughter, OT will trial tomorrow due to patient's elevated HR. KATARINA Marte Dies regarding patient's current HR and information provided to daughter. Patient/Caregiver Education:   
Tiffany Wilkins Confer Education on see above. ASSESSMENT: 
Patient continues to demonstrate the need for skilled Occupational Therapy services to improve static standing balance needed for bathing Progression toward goals: 
?      Improving appropriately and progressing toward goals ? Improving slowly and progressing toward goals ? Not making progress toward goals and plan of care will be adjusted Treatment session:   15 minutes Therapist:    Abeba Lou Roles,  4/9/2019

## 2019-04-09 NOTE — PROGRESS NOTES
Problem: Mobility Impaired (Adult and Pediatric) Goal: *Acute Goals and Plan of Care (Insert Text) Description PHYSICAL THERAPY STG GOALS : 
Initiated 4/3/2019 and to be accomplished within 2 Weeks (Updated 4/9/19) 1. Patient will move from supine to sit and sit to supine  in bed with modified independence. (Progressing; (S)) 2.  Patient will transfer from bed to chair and chair to bed with supervision/set-up using rolling walker. (Progressing; SBA) 3. Patient will perform sit to stand with supervision/set-up with Good balance and safety awareness. (Progressing; SBA) 4. Patient will ambulate with supervision/set-up for 200 feet with rolling walker on level surfaces with 2 turns. (Progressing; 250ft with RW and SBA) 5. Patient will ascend/descend 4 stairs with one handrail withminimal assistance/contact guard assist to allow for safe home access/exit. (NT, due to safety concerns) PHYSICAL THERAPY LTG GOALS : 
Initiated 4/3/2019 and to be accomplished within 4 Weeks 1. Patient will move from supine to sit and sit to supine  in bed with independence. 2.  Patient will transfer from bed to chair and chair to bed with modified independence using RW. 3.  Patient will perform sit to stand with modified independence with Good balance and safety awareness. 4.  Patient will ambulate with modified independence for 300 feet with RW on level surfaces and be able to maneuver through narrow spaces and obstacles without loss of balance. 5.  Patient will ascend/descend 4 stairs with one handrail(s) with modified independence to allow for safe home access/exit. Physical Therapist:   Connie Diego PT  on 4/3/2019 Outcome: [id number] PHYSICAL THERAPY WEEKLY PROGRESS REPORT Reporting Period:  Date:  4/3/19  to 4/9/19 Patient: Alistair Carson (24 y.o. male)                         Date: 4/9/2019 Primary Diagnosis: sob Attending Physician: Atul Savage MD 
 Treatment Diagnosis Treatment Diagnosis: need for assist with self care Treatment Diagnosis 2: muscle weakness Precautions:  Fall(Cardiac EF 26-30%) Rehab Potential : Fair: 
 
Skill interventions and education provided with clinical rationale (include individualized treatment techniques and standardized tests):  
Skilled Physical Therapy services were provided with TA to promote Mod (I) with bed mobility and transfers TE to build strength and improve overall functional capacity for ease of mobility Gait training to address deficits and allow pt to return to PLOF Stair training to allow pt to safely return home upon DC (not tested due to safety concerns (elevated HR)) NMR of movement, coordination and balance for reduced risk of falls. Using a comparative statement, summarize significant progress toward goals as a result of skilled intervention provided: 
Patient has made Good progress towards their Physical Therapy goals in the areas of bed mobility, sit<>stand, transfers and gait. Identify remaining functional areas, impairments limiting progress and/or barriers to improvement: 
Patient would benefit from continues PT services to address the following functional deficits in stair training, strength, dynamic balance. OBJECTIVE DATA SUMMARY:  
 
INITIAL ASSESSMENT WEEKLY ASSESSMENT  
COGNITIVE STATUS COGNITIVE STATUS Neurologic State: Alert, Appropriate for age Orientation Level: Oriented to person, Oriented to place, Oriented to situation Cognition: Follows commands Perception: Appears intact Perseveration: No perseveration noted Safety/Judgement: Fall prevention Neurologic State: Alert, Appropriate for age Orientation Level: Oriented to person, Oriented to place, Oriented to situation, Oriented to time Cognition: Follows commands, Appropriate for age attention/concentration Perception: Appears intact Perseveration: No perseveration noted Safety/Judgement: Fall prevention PAIN PAIN Pain Scale 1: Numeric (0 - 10) Pain Intensity 1: 0 Patient Stated Pain Goal: 0 Pain Scale 1: Numeric (0 - 10) Pain Intensity 1: 0 Patient Stated Pain Goal: 0  
GROSS ASSESSMENT GROSS ASSESSMENT  
AROM: Within functional limits Strength: Generally decreased, functional(3+ to 4-/5 overall B LEs) Coordination: Within functional limits Tone: Normal(BUEs) Sensation: Intact AROM: Generally decreased, functional(BUEs) Strength: Generally decreased, functional(BUEs 3+/5) Coordination: Within functional limits(BUEs) Tone: Normal(BUEs) Sensation: Intact(BUEs) BED MOBILITY BED MOBILITY Supine to Sit: Supervision Scooting: Setup Supine to Sit: Supervision Scooting: Supervision GAIT GAIT Base of Support: Narrowed Speed/Janee: Slow Step Length: Right shortened, Left shortened Gait Abnormalities: Decreased step clearance Ambulation - Level of Assistance: Contact guard assistance, Stand-by assistance Distance (ft): 100 Feet (ft) Assistive Device: Walker, [employer] of Support: Center of gravity altered Speed/Janee: Slow Step Length: Right shortened, Left shortened Gait Abnormalities: Decreased step clearance Ambulation - Level of Assistance: Stand-by assistance Distance (ft): 22 Feet (ft)(+12ft) Assistive Device: Gait belt, Walker, rolling TRANSFERS TRANSFERS Sit to Stand: Stand-by assistance Stand to Sit: Stand-by assistance Bed to Chair: Contact guard assistance Sit to Stand: Stand-by assistance Stand to Sit: Stand-by assistance Bed to Chair: Stand-by assistance BALANCE BALANCE Sitting: Intact Sitting - Static: Good (unsupported) Sitting - Dynamic: Fair (occasional) Standing: With support Standing - Static: (fair+) Standing - Dynamic : Fair Sitting: Intact Sitting - Static: Good (unsupported) Sitting - Dynamic: Fair (occasional) Standing: With support Standing - Static: Fair Standing - Dynamic : Fair WHEELCHAIR MOBILITY/MGMT WHEELCHAIR MOBILITY/MGMT Activity Tolerance:  Fair Activity Tolerance: Fair Visual/Perceptual  
Corrective Lenses: Glasses Visual/Perceptual  
Vision Corrective Lenses: Glasses Auditory: Auditory Impairment: Hard of hearing, bilateral  
 Auditory: Auditory Auditory Impairment: None Steps: NT  
Clinical Decision making:   Clinical Decision making:    
 
Treatment:   Pt has made good progress towards all STG's at this time. Pt at (S) for bed mobility, SBA for sit<>stand and transfers. Stair training not rendered as planned today as pt presented with elevated HR following ambulation. Pt ambulated ~25ft with RW and SBA. Following ambulation pt's  bpm. Pt required prolonged seated rest break for HR to return to 94 bpm. Pt is currently on 2 L of supplemental O2, SpO2 98%. Patient's response to treatment rendered:  Claudia Ram Patient expected Discharge Location:  X Private Residence  ? [facility]  ? LTC  ? Other: 
 
Plan: Continue Skilled PT services as established by the Plan of Care for 3-6 times a week. PT and Assistant have had a weekly case conference regarding the above treatment:  X Yes     ? No    
 
Treatment session:  46 minutes. Therapist: Nyasia Reynolds, PTA       Date:4/9/2019 Forward to PT for co-signature when completed.

## 2019-04-09 NOTE — ROUTINE PROCESS
Pt went out for chest xray back at 1645, vital signs: 93/60, 97.3 (o2 @ 2 lpm), 18,100. MD called AMITA Gore) with xray results, orders given for antibiotics b.i.d. X 7 days. PA to put order in computer. Pt's daughter notified and given all information concerning xray, vs, and ABT meds to be given, staff asked if she had any questions, she didn't at the time. DON notified of outcome.

## 2019-04-09 NOTE — ROUTINE PROCESS
Pt sitting upright in wheelchair, denies any complaints at this time. Pt has no signs of obvious distress at this time.

## 2019-04-10 PROBLEM — I50.9 CHF (CONGESTIVE HEART FAILURE) (HCC): Status: ACTIVE | Noted: 2019-01-01

## 2019-04-10 PROBLEM — J18.9 HCAP (HEALTHCARE-ASSOCIATED PNEUMONIA): Status: ACTIVE | Noted: 2019-01-01

## 2019-04-10 PROBLEM — J90 BILATERAL PLEURAL EFFUSION: Status: ACTIVE | Noted: 2019-01-01

## 2019-04-10 PROBLEM — J90 CHRONIC BILATERAL PLEURAL EFFUSIONS: Status: ACTIVE | Noted: 2019-01-01

## 2019-04-10 NOTE — ASSESSMENT & PLAN NOTE
HCAP Abx coverage Monitor clinically SIRS: fever (-), tachycardia (+), tachypnea (+), WBC (-), lactic acid (-)  =  2 criteria 
qSOFA: AMS (-), hypotension (-), tachypnea(+)  =  1 criteria SIRS criteria could be explained by the restrictive pattern of his condition as compensatory mechanisms. Hypotension was not present on admission and he has a hx of chronic hypotension on midodrine. Nevertheless he remains a high risk for sepsis and will be monitored closely

## 2019-04-10 NOTE — PROGRESS NOTES
Reason for Renal Dosing:  Per Renal Dosing Policy Patient clinical status and labs ordered/reviewed. Pt Weight Weight: 59.9 kg (132 lb) Serum Creatinine Lab Results Component Value Date/Time Creatinine 1.30 04/08/2019 04:39 AM  
 
   
Creatinine Clearance Estimated Creatinine Clearance: 30.1 mL/min (based on SCr of 1.3 mg/dL). BUN Lab Results Component Value Date/Time BUN 32 (H) 04/08/2019 04:39 AM  
 
   
WBC Lab Results Component Value Date/Time WBC 7.8 04/08/2019 04:39 AM  
 
  
Temperature Temp: 98.2 °F (36.8 °C) HR Pulse (Heart Rate): (!) 120 BP BP: 100/66 Drug type: Antibiotic indicated for HAP Drug/dose: Levofloxacin 750 mg every 24 hours was adjusted to Levofloxacin 750 mg every 48 hours Continue to monitor. Signed Chito Monte, PHARMD 
Date 4/10/2019 Time 4:33 PM

## 2019-04-10 NOTE — ED TRIAGE NOTES
Pt arrived from [facility] with c/o possible sepsis due to new dx of pneumonia and no improvement since being in the hospital per daughter.

## 2019-04-10 NOTE — ROUTINE PROCESS
Patient in bed at present time. No adverse reactions noted to antibiotics without any adverse reactions. Patient denies any shortness of breath or pain.

## 2019-04-10 NOTE — PROGRESS NOTES
[facility]  
OCCUPATIONAL THERAPY DAILY TREATMENT NOTE Patient: Xavier Pisano (85 y.o. male)       Date: 4/10/2019 Attending Physician: Aicha Ruffin MD 
Primary Diagnosis: sob Treatment Diagnosis Treatment Diagnosis: need for assist with self care Treatment Diagnosis 2: muscle weakness Precautions : Precautions at Admission: Fall(Cardiac EF 26-30%) Vital Signs: 
Vital Signs Temp: 97 °F (36.1 °C) Temp Source: Oral 
Pulse (Heart Rate): 100 Resp Rate: 17 
O2 Sat (%): 96 % BP: 100/64 MAP (Calculated): 75 
  
Cognitive Status: 
  
Pain: 
  
  
Gross Assessment: 
  
Coordination: 
  
Bed Mobility: 
Bed Mobility Rolling: Stand-by assistance Supine to Sit: Minimum assistance Sit to Supine: Minimum assistance Transfers: 
Functional Transfers Sit to Stand: Contact guard assistance Balance: 
Balance Sitting: Intact Sitting - Static: Good (unsupported) Sitting - Dynamic: Fair (occasional) Standing: With support Standing - Static: Fair Standing - Dynamic : Fair ADL Self Care: ADL Intervention: Toileting Clothing Management: Maximum assistance Therapeutic Activities: 
Patient presents in bed resting however willing to participate in OT session. Patient's O2 stats were 95% and HR . Assisted patient with sitting EOB in order to assess safety and independence during routine. See above for levels of A needed. Once seated EOB, patient reports feeling weak and SOB. Vital signs were reassed and read 91% and -127. BRAY educated patient on sitting to rest to allow time for HR to come down. Patient reports he would like to complete functional mobility utilizing RW  to bathroom. BRAY educated patient on safety concerns with mobility with increased HR and recommend patient to utilize bedpan for optimal safety. Patient verbalized understanding and argeed.  Assisted patient with duffing jeans as well as positioning in bed for optimal performance with utilizing bedpan. BRAY educated patient's aide as well as written note on patient's board to assess HR prior to bathroom mobility for optimal safety. Patient/Caregiver Education:   
Pt. Becky Servin Education on see above. ASSESSMENT: 
Patient continues to demonstrate the need for skilled Occupational Therapy services to improve static standing balance needed for bathing Progression toward goals: 
?      Improving appropriately and progressing toward goals ? Improving slowly and progressing toward goals ? Not making progress toward goals and plan of care will be adjusted Treatment session:  45  minutes Therapist:    Jennifer Higgins,  4/10/2019

## 2019-04-10 NOTE — ASSESSMENT & PLAN NOTE
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

## 2019-04-10 NOTE — PROGRESS NOTES
RADIOLOGY PACS DOWNTIME PRELIMINARY REPORT Full report to follow once the system is back online. CT CHEST Since the CT chest of 3/13/19, the moderate pleural effusions are unchanged. The consolidative pneumonia in the left upper lobe has worsened/increased. There is low grade pulmonary edema.

## 2019-04-10 NOTE — PROGRESS NOTES
Pt slept quietly through out the night, HHN Tx given as ordered, B/P low, pt states he feels well, no dizziness noted, pt cont po ABT with no adverse reactions noted, no SOB noted

## 2019-04-10 NOTE — ED PROVIDER NOTES
EMERGENCY DEPARTMENT HISTORY AND PHYSICAL EXAM 
 
4:29 PM 
 
 
Date: 4/10/2019 Patient Name: Kateryna Tovar History of Presenting Illness Chief Complaint Patient presents with  
 Other  
  pt on TCC and not improving per Daugher/wants evaluated. History Provided By: Pt daughter and nursing staff Additional History (Context): Kateryna Tovar is a 80 y.o. male with recent admission for multifocal pneumonia and pleural effusion status post thoracentesis sent down from the [facility] who presents with respiratory distress. Patient's daughter states that for the past 4 days the patient seems to be declining, with an elevated respiratory rate, new oxygen requirement which continues to increase and intermittent confusion. She states she asked them to perform an x-ray with the patient was diagnosed with pneumonia and he was started on Augmentin but daughter states there is been no improvement. PCP: Skyler Macdonald MD 
 
Current Facility-Administered Medications Medication Dose Route Frequency Provider Last Rate Last Dose  sodium chloride (NS) flush 5-10 mL  5-10 mL IntraVENous PRN Joyce Shannon DO      
 levoFLOXacin (LEVAQUIN) 750 mg in D5W IVPB  750 mg IntraVENous Q48H Joyce Shannon  mL/hr at 04/10/19 1852 750 mg at 04/10/19 1852  
 vancomycin (VANCOCIN) 1500 mg in  ml infusion  1,500 mg IntraVENous ONCE Joyce Guillaume DO      
 VANCOMYCIN INFORMATION NOTE   Other Rx Dosing/Monitoring Joyce Shannon DO      
 piperacillin-tazobactam (ZOSYN) 4.5 g in 0.9% sodium chloride (MBP/ADV) 100 mL MBP  4.5 g IntraVENous Q8H Joyce Guillaume DO      
 furosemide (LASIX) injection 40 mg  40 mg IntraVENous NOW Johanny Moe DO      
 
Current Outpatient Medications Medication Sig Dispense Refill  furosemide (LASIX) 20 mg tablet Take 1 Tab by mouth daily. 30 Tab 1  
 furosemide (LASIX) 20 mg tablet Take 0.5 Tabs by mouth daily.  30 Tab 1  
  aspirin 81 mg chewable tablet Take 1 Tab by mouth daily. 30 Tab 1  
 budesonide-formoterol (SYMBICORT) 80-4.5 mcg/actuation HFAA Take 2 Puffs by inhalation two (2) times a day. 1 Inhaler 1  
 tamsulosin (FLOMAX) 0.4 mg capsule Take 1 Cap by mouth daily. 30 Cap 1  
 albuterol-ipratropium (DUO-NEB) 2.5 mg-0.5 mg/3 ml nebu 3 mL by Nebulization route every four (4) hours as needed (sob, wheezing). 30 Nebule 1  
 famotidine (PEPCID) 20 mg tablet Take 1 Tab by mouth daily. 30 Tab 1  
 midodrine (PROAMITINE) 10 mg tablet Take 1 Tab by mouth three (3) times daily (with meals) for 30 days. 90 Tab 0  
 therapeutic multivitamin (THERAGRAN) tablet Take 1 Tab by mouth daily. 30 Tab 1  
 atorvastatin (LIPITOR) 40 mg tablet Take 1 Tab by mouth nightly. 30 Tab 0  
 nitroglycerin (NITROSTAT) 0.4 mg SL tablet 1 Tab by SubLINGual route every five (5) minutes as needed for Chest Pain. 1 Bottle 0  
 budesonide-formoterol (SYMBICORT) 160-4.5 mcg/actuation HFAA Take 2 Puffs by inhalation two (2) times a day. 1 Inhaler 5  
 glycopyrrolate-formoterol (BEVESPI AEROSPHERE) 9-4.8 mcg HFAA Take 2 Inhalation by inhalation two (2) times a day. 1 Inhaler 1  
 clopidogrel (PLAVIX) 75 mg tab TAKE 1 TABLET EVERY DAY 90 Tab 3  
 albuterol (PROVENTIL HFA, VENTOLIN HFA, PROAIR HFA) 90 mcg/actuation inhaler Take 2 Puffs by inhalation every four (4) hours as needed for Wheezing or Shortness of Breath. 1 Inhaler 1  
 tamsulosin (FLOMAX) 0.4 mg capsule Take 1 Cap by mouth daily. 30 Cap 5  
 aspirin delayed-release 81 mg tablet Take 81 mg by mouth daily. Past History Past Medical History: 
Past Medical History:  
Diagnosis Date  Asthma  Cardiac cath 04/28/2011 oLM 30%. pLAD 30%. oD1 50%. CX 90% (3 x 18 [place] DEMAR). dRCA 90%. RPLB subtotal.  RPDA 100%.  Cardiac echocardiogram 01/21/2014 EF 55-60%. Mod LVH. Gr 1 DDfx. Mild AS (mean grad 13). Mild AI. Unchanged from study of 11/30/11.  Cardiac nuclear imaging test, mod risk 2016 Intermediate risk. Medium-sized inferior, inferoseptal infarction. No ischemia. EF 54%. Nondiagnostic EKG on pharm stress test.  
 Carotid duplex 2016 Mod 50-69% bilateral ICA stenosis. Probable significant RECA stenosis. >50% stenosis of left subclavian. No significant change from study of 1/8/15.  Coronary artery disease Status post PCI with drug-eluting stent, [place] 3 x 18 mm in the proximal circumflex.  Heart failure ([address]  Hx of carotid artery disease ([facility]  Hypercholesterolemia  Hypertension  Prostate cancer ([facility] [facility] Past Surgical History: 
Past Surgical History:  
Procedure Laterality Date  CARDIAC SURG PROCEDURE UNLIST  HX CORONARY STENT PLACEMENT  11 PCI with drug-eluting stent, [place] 3 x 18 mm in the proximal circumflex (post dialated with 3.25 mm noncompliant balloon at high pressure). Family History: 
History reviewed. No pertinent family history. Social History: 
Social History Tobacco Use  Smoking status: Former Smoker Packs/day: 1.00 Years: 15.00 Pack years: 15.00 Last attempt to quit: 1960 Years since quittin.9  Smokeless tobacco: Never Used Substance Use Topics  Alcohol use: No  
 Drug use: No  
 
 
Allergies: 
No Known Allergies Review of Systems Review of Systems Constitutional: Negative for chills and fever. HENT: Negative for ear pain and sore throat. Eyes: Negative for pain and visual disturbance. Respiratory: Positive for shortness of breath. Negative for cough. Cardiovascular: Negative for chest pain and palpitations. Gastrointestinal: Negative for abdominal pain, diarrhea, nausea and vomiting. Genitourinary: Negative for flank pain. Musculoskeletal: Negative for back pain and neck pain. Neurological: Negative for syncope and headaches. Psychiatric/Behavioral: Positive for confusion. Negative for agitation. The patient is not nervous/anxious. Physical Exam  
 
Visit Vitals /58 Pulse (!) 120 Temp 98.2 °F (36.8 °C) Resp 30 Ht 5' 6\" (1.676 m) Wt 59.9 kg (132 lb) SpO2 100% BMI 21.31 kg/m² Physical Exam  
Constitutional: He is oriented to person, place, and time. He appears well-developed and well-nourished. He appears distressed. HENT:  
Head: Normocephalic and atraumatic. Mouth/Throat: Oropharynx is clear and moist.  
Eyes: Pupils are equal, round, and reactive to light. No scleral icterus. Neck: Neck supple. No tracheal deviation present. Cardiovascular: Regular rhythm. No murmur heard. Pulmonary/Chest: Breath sounds normal. He is in respiratory distress. Tachypnea. Rales at the bilateral bases with decreased lung sounds at left lung base. Abdominal: Soft. Bowel sounds are normal. He exhibits no distension. There is no tenderness. There is no rebound and no guarding. Musculoskeletal: Normal range of motion. He exhibits no edema or deformity. Neurological: He is alert and oriented to person, place, and time. No gross neuro deficit Skin: Skin is warm and dry. No rash noted. He is not diaphoretic. Psychiatric: He has a normal mood and affect. Nursing note and vitals reviewed. Diagnostic Study Results Labs - Labs Reviewed METABOLIC PANEL, COMPREHENSIVE - Abnormal; Notable for the following components:  
    Result Value Chloride 95 (*)   
 CO2 34 (*) Glucose 152 (*) BUN 39 (*) Creatinine 1.58 (*)   
 BUN/Creatinine ratio 25 (*)   
 GFR est AA 50 (*)   
 GFR est non-AA 41 (*) Bilirubin, total 1.1 (*) ALT (SGPT) 15 (*) AST (SGOT) 14 (*) Albumin 3.1 (*) All other components within normal limits CBC WITH AUTOMATED DIFF - Abnormal; Notable for the following components: RBC 3.91 (*) HGB 10.2 (*) HCT 34.5 (*) MCHC 29.6 (*) RDW 17.4 (*) NEUTROPHILS 77 (*) LYMPHOCYTES 9 (*) MONOCYTES 14 (*)   
 ABS. NEUTROPHILS 8.2 (*)   
 ABS. MONOCYTES 1.5 (*) All other components within normal limits NT-PRO BNP - Abnormal; Notable for the following components:  
 NT pro-BNP 27,486 (*) All other components within normal limits D DIMER - Abnormal; Notable for the following components:  
 D DIMER 1.89 (*) All other components within normal limits CULTURE, BLOOD CULTURE, BLOOD URINALYSIS W/ RFLX MICROSCOPIC  
POC LACTIC ACID Radiologic Studies -  
XR CHEST PORT    (Results Pending) CT CHEST WO CONT    (Results Pending) Medical Decision Making I am the first provider for this patient. I reviewed the vital signs, available nursing notes, past medical history, past surgical history, family history and social history. Vital Signs-Reviewed the patient's vital signs. EKG: Interpreted by the EP. Time [id number] Sinus tachycardia, rate 120. Left axis deviation with left bundle branch block. Scarbosa criteria not met. Records Reviewed: Nursing Notes and Old Medical Records (Time of Review: 4:29 PM) MDM, Progress Notes, Reevaluation, and Consults: DDX: Effusion, infection, CHF exacerbation, NEL, electrolyte abnormality, metabolic disturbance Patient from the [facility] with tachypnea, mild respiratory distress and new oxygen requirement in the setting of a recent admission for multifocal pneumonia, sepsis and bilateral pleural effusions status post thoracentesis. On exam patient is tachypneic on supplemental oxygen with rales at the bilateral bases and decreased breath sounds primarily on the left side. The differential above was considered. ED Course as of Apr 10 1919 Wed Apr 10, 2019  
1609 Based on chart review patient was originally admitted to the hospital on 3/13 for multifocal pneumonia, sepsis, pulmonary effusion. [KG] 1815 CT is notable for bilateral pleural effusions and multifocal worsening consolidations primarily progressive on the left compared to prior CT.  
 [KG] 1818 I have a lower suspicion for pulmonary embolus, d-dimer previously was greater than 3 with a negative CTA for PE upon last admission, it is 1.89 today. Bilateral PVLs will be performed to evaluate for DVT and if positive will obtain CTA imaging. [KG] 1818 Patient has a normal lactic acid, and not concerned for sepsis at this time. [KG] 35 20 43 Patient will require admission for IV antibiotics and thoracentesis. [KG] ED Course User Index [KG] Farida Montilla DO On reevaluation patient is still mildly tachypneic but improved from presentation. He states he is a full code. The patient was accepted by the hospitalist for admission. The patient was given: 
Medications  
sodium chloride (NS) flush 5-10 mL (has no administration in time range) levoFLOXacin (LEVAQUIN) 750 mg in D5W IVPB (750 mg IntraVENous New Bag 4/10/19 1852) vancomycin (VANCOCIN) 1500 mg in  ml infusion (has no administration in time range) VANCOMYCIN INFORMATION NOTE (has no administration in time range)  
piperacillin-tazobactam (ZOSYN) 4.5 g in 0.9% sodium chloride (MBP/ADV) 100 mL MBP (has no administration in time range)  
furosemide (LASIX) injection 40 mg (has no administration in time range)  
piperacillin-tazobactam (ZOSYN) 4.5 g in 0.9% sodium chloride (MBP/ADV) 100 mL MBP (4.5 g IntraVENous New Bag 4/10/19 2287) Diagnosis Clinical Impression: 1. Bilateral pleural effusion 2. HAP (hospital-acquired pneumonia) 3. Acute on chronic congestive heart failure, unspecified heart failure type ([facility] [address] Disposition: Admit Follow-up Information None Patient's Medications Start Taking No medications on file Continue Taking  ALBUTEROL (PROVENTIL HFA, VENTOLIN HFA, PROAIR HFA) 90 MCG/ACTUATION INHALER    Take 2 Puffs by inhalation every four (4) hours as needed for Wheezing or Shortness of Breath. ALBUTEROL-IPRATROPIUM (DUO-NEB) 2.5 MG-0.5 MG/3 ML NEBU    3 mL by Nebulization route every four (4) hours as needed (sob, wheezing). ASPIRIN 81 MG CHEWABLE TABLET    Take 1 Tab by mouth daily. ASPIRIN DELAYED-RELEASE 81 MG TABLET    Take 81 mg by mouth daily. ATORVASTATIN (LIPITOR) 40 MG TABLET    Take 1 Tab by mouth nightly. BUDESONIDE-FORMOTEROL (SYMBICORT) 160-4.5 MCG/ACTUATION HFAA    Take 2 Puffs by inhalation two (2) times a day. BUDESONIDE-FORMOTEROL (SYMBICORT) 80-4.5 MCG/ACTUATION HFAA    Take 2 Puffs by inhalation two (2) times a day. CLOPIDOGREL (PLAVIX) 75 MG TAB    TAKE 1 TABLET EVERY DAY  
 FAMOTIDINE (PEPCID) 20 MG TABLET    Take 1 Tab by mouth daily. FUROSEMIDE (LASIX) 20 MG TABLET    Take 1 Tab by mouth daily. FUROSEMIDE (LASIX) 20 MG TABLET    Take 0.5 Tabs by mouth daily. GLYCOPYRROLATE-FORMOTEROL (BEVESPI AEROSPHERE) 9-4.8 MCG HFAA    Take 2 Inhalation by inhalation two (2) times a day. MIDODRINE (PROAMITINE) 10 MG TABLET    Take 1 Tab by mouth three (3) times daily (with meals) for 30 days. NITROGLYCERIN (NITROSTAT) 0.4 MG SL TABLET    1 Tab by SubLINGual route every five (5) minutes as needed for Chest Pain. TAMSULOSIN (FLOMAX) 0.4 MG CAPSULE    Take 1 Cap by mouth daily. TAMSULOSIN (FLOMAX) 0.4 MG CAPSULE    Take 1 Cap by mouth daily. THERAPEUTIC MULTIVITAMIN (THERAGRAN) TABLET    Take 1 Tab by mouth daily. These Medications have changed No medications on file Stop Taking No medications on file  
 
_______________________________

## 2019-04-10 NOTE — H&P
Internal Medicine History and Physical 
   
 
 
Subjective HPI: Patricia Lobo is a 80 y.o. male who presented to the ED with SOB that has been progressive for the last week. Has hx of pleural effusions that required thoracentesis in the recent past. Denies chest pain. No reported fever, no significant productive cough. Has hx of asthma but no increase in wheezing noted. ED evaluation revealed bilateral pleural effusions that seem worse than in the recent past. Bilateral infiltrates noted on CT. WBC normal, lactic acid normal. Has hx of PHTN and CHF with decreased EF. Also has hx of chronic hypotension on midodrine. Sepsis protocol started in ED to cover HCAP. His condition points more towards a restrictive etiology due to effusions. Will admit for further evaluation and treatment and pulmonary consult. PMHx: 
Past Medical History:  
Diagnosis Date  Asthma  Cardiac cath 04/28/2011 oLM 30%. pLAD 30%. oD1 50%. CX 90% (3 x 18 [place] DEMAR). dRCA 90%. RPLB subtotal.  RPDA 100%.  Cardiac echocardiogram 01/21/2014 EF 55-60%. Mod LVH. Gr 1 DDfx. Mild AS (mean grad 13). Mild AI. Unchanged from study of 11/30/11.  Cardiac nuclear imaging test, mod risk 01/22/2016 Intermediate risk. Medium-sized inferior, inferoseptal infarction. No ischemia. EF 54%. Nondiagnostic EKG on pharm stress test.  
 Carotid duplex 03/02/2016 Mod 50-69% bilateral ICA stenosis. Probable significant RECA stenosis. >50% stenosis of left subclavian. No significant change from study of 1/8/15.  Coronary artery disease Status post PCI with drug-eluting stent, [place] 3 x 18 mm in the proximal circumflex.  Heart failure ([address]  Hx of carotid artery disease ([address]  Hypercholesterolemia  Hypertension  Prostate cancer ([address] PSurgHx: 
Past Surgical History:  
Procedure Laterality Date  CARDIAC SURG PROCEDURE UNLIST  HX CORONARY STENT PLACEMENT  4/28/11 PCI with drug-eluting stent, [place] 3 x 18 mm in the proximal circumflex (post dialated with 3.25 mm noncompliant balloon at high pressure). SocialHx: 
Social History Socioeconomic History  Marital status:  Spouse name: Not on file  Number of children: Not on file  Years of education: Not on file  Highest education level: Not on file Occupational History  Not on file Social Needs  Financial resource strain: Not on file  Food insecurity:  
  Worry: Not on file Inability: Not on file  Transportation needs:  
  Medical: Not on file Non-medical: Not on file Tobacco Use  Smoking status: Former Smoker Packs/day: 1.00 Years: 15.00 Pack years: 15.00 Last attempt to quit: 1960 Years since quittin.9  Smokeless tobacco: Never Used Substance and Sexual Activity  Alcohol use: No  
 Drug use: No  
 Sexual activity: Not Currently Lifestyle  Physical activity:  
  Days per week: Not on file Minutes per session: Not on file  Stress: Not on file Relationships  Social connections:  
  Talks on phone: Not on file Gets together: Not on file Attends Buddhist service: Not on file Active member of club or organization: Not on file Attends meetings of clubs or organizations: Not on file Relationship status: Not on file  Intimate partner violence:  
  Fear of current or ex partner: Not on file Emotionally abused: Not on file Physically abused: Not on file Forced sexual activity: Not on file Other Topics Concern  Not on file Social History Narrative  Not on file Allergies: 
No Known Allergies FamilyHx: 
History reviewed. No pertinent family history. Prior to Admission Medications Prescriptions Last Dose Informant Patient Reported? Taking?   
albuterol (PROVENTIL HFA, VENTOLIN HFA, PROAIR HFA) 90 mcg/actuation inhaler   No No  
 Sig: Take 2 Puffs by inhalation every four (4) hours as needed for Wheezing or Shortness of Breath. albuterol-ipratropium (DUO-NEB) 2.5 mg-0.5 mg/3 ml nebu   No No  
Sig: 3 mL by Nebulization route every four (4) hours as needed (sob, wheezing). aspirin 81 mg chewable tablet   No No  
Sig: Take 1 Tab by mouth daily. aspirin delayed-release 81 mg tablet   Yes No  
Sig: Take 81 mg by mouth daily. atorvastatin (LIPITOR) 40 mg tablet   No No  
Sig: Take 1 Tab by mouth nightly. budesonide-formoterol (SYMBICORT) 160-4.5 mcg/actuation HFAA   No No  
Sig: Take 2 Puffs by inhalation two (2) times a day. budesonide-formoterol (SYMBICORT) 80-4.5 mcg/actuation HFAA   No No  
Sig: Take 2 Puffs by inhalation two (2) times a day. clopidogrel (PLAVIX) 75 mg tab   No No  
Sig: TAKE 1 TABLET EVERY DAY  
famotidine (PEPCID) 20 mg tablet   No No  
Sig: Take 1 Tab by mouth daily. furosemide (LASIX) 20 mg tablet   No No  
Sig: Take 1 Tab by mouth daily. furosemide (LASIX) 20 mg tablet   No No  
Sig: Take 0.5 Tabs by mouth daily. glycopyrrolate-formoterol (BEVESPI AEROSPHERE) 9-4.8 mcg HFAA   No No  
Sig: Take 2 Inhalation by inhalation two (2) times a day. midodrine (PROAMITINE) 10 mg tablet   No No  
Sig: Take 1 Tab by mouth three (3) times daily (with meals) for 30 days. nitroglycerin (NITROSTAT) 0.4 mg SL tablet   No No  
Si Tab by SubLINGual route every five (5) minutes as needed for Chest Pain.  
tamsulosin (FLOMAX) 0.4 mg capsule   No No  
Sig: Take 1 Cap by mouth daily. tamsulosin (FLOMAX) 0.4 mg capsule   No No  
Sig: Take 1 Cap by mouth daily. therapeutic multivitamin (THERAGRAN) tablet   No No  
Sig: Take 1 Tab by mouth daily. Facility-Administered Medications: None Review of Systems: 
CONST: fever(-),   chills(-),   Fatigue(+),   malaise(-) SKIN: itching(-),   rash(-) EYES: vision - no change from baseline ENT: ear pain(-),   nasal discharge(-),   sore throat(-),   voice change(-) RESP: SOB(+),   cough(-),   hemoptysis(-) CV: chest pain(-),   PND(-),   orthopnea(-),   exertional dyspnea(-),   palpitations(-), syncope(-) 
GI: abd pain(-),   nausea(-),   vomiting(-),   diarrhea(-),   melena(-),   hematemesis(-), hematochesia(-) 
: dysuria(-),   frequency(-),   urgency(-),   hematuria(-) 
MS: arthralgias(-),   myalgias(-) NEURO: speech w/o change,   tremors(-),   seizures(-),   numbness(-),   dizziness(-) Objective Visit Vitals /58 Pulse (!) 110 Temp 98.2 °F (36.8 °C) Resp (!) 38 Ht 5' 6\" (1.676 m) Wt 59.9 kg (132 lb) SpO2 99% BMI 21.31 kg/m² Physical Exam: 
CONST: NAD,   VSS reviewed SKIN: rashes(-),   ulcers(-) EYES: PERRL,   EOMI,   sclerae w/o jaundice HENT: HEENT,   normal appearing nose and ears,   normal nasal mucosa and turbinates NECK: supple,   no LA,   trachea is midline,   thyroid w/o goiter or palpable nodules RESP: increased respiratory effort,   wheezes(-),   rales(-),   rhochi(-),   consolidation on percussion both bases, decreased air exchange at bases CHEST: normal axillae,   retractions(-),   use of accessory muscles(-) CV: JVD(-),   carotid bruits(-),   Sinus tachycardia,   gallops(-),   murmurs(-),   edema LE(-),   abd bruits(-),   peripheral pulses normal 
ABD: soft, tender(-),   distended(-),   HSM(-),   BS(+) in all quadrants,   peritoneal signs(-) 
MS: NROM in all extremities,  clubbing(-),   peripheral cyanosis(-) NEURO: speech normal, tremors(-),   CN II-XII(-),   lateralizing signs(-) PSYCH: AOC x 3,   appropriate affect,   non-suicidal 
 
 
Laboratory Studies: 
CMP:  
Lab Results Component Value Date/Time   04/10/2019 05:15 PM  
 K 4.6 04/10/2019 05:15 PM  
 CL 95 (L) 04/10/2019 05:15 PM  
 CO2 34 (H) 04/10/2019 05:15 PM  
 AGAP 8 04/10/2019 05:15 PM  
  (H) 04/10/2019 05:15 PM  
 BUN 39 (H) 04/10/2019 05:15 PM  
 CREA 1.58 (H) 04/10/2019 05:15 PM  
 GFRAA 50 (L) 04/10/2019 05:15 PM  
 GFRNA 41 (L) 04/10/2019 05:15 PM  
 CA 8.9 04/10/2019 05:15 PM  
 ALB 3.1 (L) 04/10/2019 05:15 PM  
 TP 6.9 04/10/2019 05:15 PM  
 GLOB 3.8 04/10/2019 05:15 PM  
 AGRAT 0.8 04/10/2019 05:15 PM  
 SGOT 14 (L) 04/10/2019 05:15 PM  
 ALT 15 (L) 04/10/2019 05:15 PM  
 
CBC:  
Lab Results Component Value Date/Time WBC 10.7 04/10/2019 05:15 PM  
 HGB 10.2 (L) 04/10/2019 05:15 PM  
 HCT 34.5 (L) 04/10/2019 05:15 PM  
  04/10/2019 05:15 PM  
 
Liver Panel:  
Lab Results Component Value Date/Time ALB 3.1 (L) 04/10/2019 05:15 PM  
 TP 6.9 04/10/2019 05:15 PM  
 GLOB 3.8 04/10/2019 05:15 PM  
 AGRAT 0.8 04/10/2019 05:15 PM  
 SGOT 14 (L) 04/10/2019 05:15 PM  
 ALT 15 (L) 04/10/2019 05:15 PM  
 AP 87 04/10/2019 05:15 PM  
 
 
Imaging Reviewed: 
No results found. Assessment/Plan Active Hospital Problems Diagnosis Date Noted  [facility]F (congestive [facility] failure) ([facility] 04/10/2019  HCAP (healthcare-associated pneumonia) 04/10/2019  Chronic bilateral pleural effusions 04/10/2019 Chronic bilateral pleural effusions Worsening Pulmonary consult Might need repeat thoracentesis - therapeutic HCAP (healthcare-associated pneumonia) HCAP Abx coverage Monitor clinically SIRS: fever (-), tachycardia (+), tachypnea (+), WBC (-), lactic acid (-)  =  2 criteria 
qSOFA: AMS (-), hypotension (-), tachypnea(+)  =  1 criteria SIRS criteria could be explained by the restrictive pattern of his condition as compensatory mechanisms. Hypotension was not present on admission and he has a hx of chronic hypotension on midodrine. Nevertheless he remains a high risk for sepsis and will be monitored closely CHF (congestive heart failure) ([facility] Decreased LV EF on previous echo Hx of PHTN with likely biventricular failure Monitor and repeat echo if he is getting worse. - Cont acceptable home medications for chronic conditions  
- DVT protocol and GI prophylaxis I have personally reviewed all pertinent labs, films and EKGs that have officially resulted. I reviewed available electronic documentation outlining the initial presentation as well as the emergency room physician's encounter. Total time spent with patient and chart review, orders and documentation - 45min out of which more than 50% spent face-to-face with patient. Red Sharma MD 
4/10/2019  
7:44 PM 
 
 
[id number] [facility]

## 2019-04-10 NOTE — PROGRESS NOTES
Attempted OT treatment session this morning. Patient presents in bed, alert and willing to participate. Vital signs were assessed, BP [address]858 and -118 at rest. Patient informed BRAY he just received nebulizer 5 mins prior to arrival. Will check back later for OT treatment session.

## 2019-04-10 NOTE — ED NOTES
Pt medical floor rm assigned. Pt MEWS score 6 with recent vital signs. Notified Dr. Juan June. Per Dr. Juan June, will change rm to step down.

## 2019-04-10 NOTE — ROUTINE PROCESS
Patient started to complain of shortness of breath  ,oxygen saturation 96-97 % . Patient daughter at bedside and told her that a physician came into his room and told him the he had measles . Patient told that a physician did not come into room today. Patient appears confused. Call placed to nurse practitioner and orders given to send patient to emergency room for evaluation. Patient daughter at bedside and is aware on new orders. Patient taken to emergency room and report given.

## 2019-04-10 NOTE — Clinical Note
During official time-out, it was discovered that PT recently took Plavix and aspirin. Case was then cancelled.

## 2019-04-10 NOTE — PROGRESS NOTES
[facility] Daily progress Note Patient: Dorothea Duarte MRN: [id number]  CSN: [id number] YOB: 1926  Age: 80 y.o. Sex: male DOA: 4/2/2019 LOS:  LOS: 8 days Subjective:  
 
CC: follow cxr Seen at bedside, NAD, he is up in w/c, he is verbal, alert and oriented. cxr done 4/8 which showed Increasing posterior left lower lobe consolidation, atelectasis or pneumonia with associated small left pleural effusion. Suggestion of developing opacity in the left upper lobe may represent second focus of consolidation. Increasing posterior left lower lobe consolidation, atelectasis or pneumonia with associated small left pleural effusion. Suggestion of developing opacity in the left upper lobe may represent second focus of consolidation. Patient with elevated BNP, his lasix was adjusted. Patient was started on Augmentin times 7 day for PNA. Patient with recent hospitalization and was followed by Pulm and Cardiologist with aggressive pul hygiene , multiple thoracentesis. Noted throughout previous hospital stay, poor prognosis long term. Today he was not in any distress, calm, verbal and answered question approp, will closely monitor patient. PAST MEDICAL HX:  Coronary Artery diease, HTN,  HLD, CKD, Aortic valve stenosis, recent non ST elevation, Congestion.  
  
PAST SURGICAL HX: Cardiac Cath and stent placement 
  
Social hx: lives with daughter, he is . Former smoker 
  
ALLERGIES: NKDA 
  
ROS: no fever or chill, no NVD, fever or chills. No CP or HA. No memory loss or or HA, increase SOB with exertion Objective:  
  
Visit Vitals BP 93/66 Pulse 100 Temp 97 °F (36.1 °C) Resp 17 Ht 5' 7\" (1.702 m) Wt 64.1 kg (141 lb 6.4 oz) SpO2 96% BMI 22.15 kg/m² Physical Exam: 
General appearance: alert, cooperative, no distress, appears stated age HEENT: negative Neck: supple, symmetrical, trachea midline, no adenopathy, thyroid: not enlarged, symmetric, no tenderness/mass/nodules, no carotid bruit and no JVD Lungs: coarse lung sounds throughout Heart: regular rate and rhythm, S1, S2 normal, no murmur, click, rub or gallop Abdomen: soft, non-tender. Bowel sounds normal. No masses,  no organomegaly Pulses: 2+ and symmetric Skin: Skin color, texture, turgor normal. No rashes or lesions Intake and Output: 
Current Shift:  No intake/output data recorded. Last three shifts:  No intake/output data recorded. No results found for this or any previous visit (from the past 24 hour(s)). Current Facility-Administered Medications:  
  potassium chloride SR (KLOR-CON 10) tablet 20 mEq, 20 mEq, Oral, DAILY, Lucía Ruiz NP 
  furosemide (LASIX) tablet 20 mg, 20 mg, Oral, BID, Vladislav COREAS NP 
  [START ON 4/13/2019] furosemide (LASIX) tablet 20 mg, 20 mg, Oral, DAILY, Lucía Ruiz NP 
  pneumococcal 23-valent (PNEUMOVAX 23) injection 0.5 mL, 0.5 mL, IntraMUSCular, PRIOR TO DISCHARGE, Getachew Watson MD 
  bisacodyl (DULCOLAX) suppository 10 mg, 10 mg, Rectal, DAILY PRN, Lissy Chen MD 
  magnesium hydroxide (MILK OF MAGNESIA) 400 mg/5 mL oral suspension 30 mL, 30 mL, Oral, DAILY PRN, Lissy Chen MD 
  sodium phosphate (FLEET'S) enema 1 Enema, 1 Enema, Rectal, DAILY PRN, Lissy Chen MD 
  amoxicillin-clavulanate (AUGMENTIN) 875-125 mg per tablet 1 Tab, 1 Tab, Oral, Q12H, Vladislav COREAS NP, 1 Tab at 04/10/19 8579   [facility] ANESTHESIA, , Other, PRN, Lissy Chen MD 
  [address] [facility] EMERGENCY/STAT BOX, , Other, PRN, Lissy Chen MD 
  ipratropium (ATROVENT) 0.02 % nebulizer solution 0.5 mg, 0.5 mg, Nebulization, Q6H RT, Getachew Watson MD, 0.5 mg at 04/10/19 1954   aspirin chewable tablet 81 mg, 81 mg, Oral, DAILY, Getachew Watson MD, 81 mg at 04/10/19 8983   tamsulosin (FLOMAX) capsule 0.4 mg, 0.4 mg, Oral, DAILY, Getachew Watson MD, 0.4 mg at 04/10/19 7931   famotidine (PEPCID) tablet 20 mg, 20 mg, Oral, DAILY, Getachew Watson MD, 20 mg at 04/10/19 9278   midodrine (PROAMITINE) tablet 10 mg, 10 mg, Oral, TID WITH MEALS, Getachew Watson MD, 10 mg at 04/10/19 2787   therapeutic multivitamin (THERAGRAN) tablet 1 Tab, 1 Tab, Oral, DAILY, Diane Cerrato MD, 1 Tab at 04/10/19 5014   atorvastatin (LIPITOR) tablet 40 mg, 40 mg, Oral, QHS, Getachew Watson MD, 40 mg at 04/09/19 2100 
  nitroglycerin (NITROSTAT) tablet 0.4 mg, 0.4 mg, SubLINGual, PRN, Diane Cerrato MD 
  clopidogrel (PLAVIX) tablet 75 mg, 75 mg, Oral, DAILY, Diane Cerrato MD, 75 mg at 04/10/19 5541   albuterol-ipratropium (DUO-NEB) 2.5 MG-0.5 MG/3 ML, 3 mL, Nebulization, Q4H PRN, Diane Cerrato MD, 3 mL at 04/07/19 1417   [DISCONTINUED] arformoterol (BROVANA) neb solution 15 mcg, 15 mcg, Nebulization, BID RT, 15 mcg at 04/02/19 2120 **AND** budesonide (PULMICORT) 500 mcg/2 ml nebulizer suspension, 500 mcg, Nebulization, BID RT, Diane Cerrato MD, 500 mcg at 04/10/19 8387   arformoterol (BROVANA) neb solution 15 mcg, 15 mcg, Nebulization, BID RT, Diane Cerrato MD, 15 mcg at 04/10/19 0909 Lab Results Component Value Date/Time Glucose 106 (H) 04/08/2019 04:39 AM  
 Glucose 98 04/03/2019 06:34 AM  
 Glucose 155 (H) 03/28/2019 11:04 AM  
 Glucose 110 (H) 03/24/2019 05:09 AM  
 Glucose 128 (H) 03/20/2019 12:37 PM  
  
 
Assessment/Plan Acute hypoxia resp fx reported probable from HF: resolved. Continue duo nebs + ics  
  
Pleural effusion s/p repeated Thoracentesis likes due to HF. CXR reviewed, will tx for ? pna with Augmentin and monitor 
 
  
Acute on Chronic Systolic CHF: continue lasix monitor VSS  
  
CAD recent NSTEMI: on ASA + Plavix 
  
HLD: continue statin 
  
Hypotension: continue Midodrine Decon: pt/ot , hopefully he can tolerate He is a high risk for re- admission to hospital, due to re occurring pleural effusion and chf 
 
 Chela Cerrato NP 
4/10/2019, 10:16 AM

## 2019-04-10 NOTE — PROGRESS NOTES
Nutrition follow-up/Plan of care tcc RECOMMENDATIONS:  
1. Regular Diet (liberalized to promote intake) 2. Ensure Enlive TID (chocolate) 3. Monitor labs, weight and PO intake 4. RD to follow GOALS:  
1. Ongoing: PO intake meets >75% of protein/calorie needs by 4/17 
2. Ongoing: Weight Maintenance/gradual gain (1-2 lb/week) by 4/17 ASSESSMENT: 
Wt status is classified as normal per Body mass index is 22.15 kg/m². However, Pt at nutrition risk d/t BMI <23 and age >65 years. Recent weight loss confirmed. (Estimated to be 10-12 lb or 8% x 1-2 months PTA) Poor PO intake x 1 month PTA but seems to be improved slightly. Ensure Enlive TID to supplement energy needs. Labs noted. Pt w/hypokalemia, elevated BNP (14,178) and elevated BUN/Cr; GFR (52). Nutrition recommendations listed. RD to follow. Nutrition Diagnoses:  
Inadequate energy intake related to decreased appetite/early satiety as evidenced by diet recall with minimal to fair PO intake at best for past month. Noted per Previous RD note on (3/26): Patient meets criteria for Severe Protein Calorie Malnutrition as evidenced by:  
ASPEN Malnutrition Criteria Acute Illness, Chronic Illness, or Social/Enviornmental: Acute illness Energy Intake: Less than/equal to 50% est energy req for greater than/equal to 5 days Body Fat: Moderate(orbital, thoracic, upper arm regions) Muscle Mass: Moderate(temple, clavicle, patellar body regions) ASPEN Malnutrition Score - Acute Illness: 18 
Acute Illness - Malnutrition Diagnosis: Severe malnutrition. Nutrition Risk:  [] High  [x] Moderate []  Low SUBJECTIVE/OBJECTIVE:  
(4/10): Pt w/ slightly improved appetite but still not adequate. Weight has been stable since admission. Pt seen in room with daughter who had just arrived and was requesting to see someone. Informed Heather Celaya, and [facility] Director, Katia Gamez, that she would like to speak with someone.  They addressed the situation. Pt is being transferred to the ER per family request so will follow up at a more appropriate time. ((4/3): Pt transferred form [facility] to [facility] on 4/2/2019. Pt seen in room with daughter at bedside to assist with Hx. Pt reports was previously consuming 2 meals and 1 Boost per day prior to hospitalization. Denies having any food allergies or problems chewing/swallowing and stable weight prior to recent hospitalization. Spoke with SLP , [facility], and will do evaluation as was previously on modified diet PTA. Pt stated he is feeling hungry in the mornings, but that he now tends to have early satiety. Discussed trying to make breakfast his largest meal and then doing smaller meals/nutrient dense snacks throughout the rest of the day. Will reorder Ensure Enlive TID that he was previously receiving and will possibly add in magic cups but want to promote solid food intake first. Encouraged trying to consume what he can tolerate of solid foods first and then sipping on supplements after or between meals to increase kcal/protein. Also provided menu and encouraged him to implement preferences with dietary. We had the discussion that SLP would be evaluating most likely so things may have to be altered depending on results. Weight loss confirmed but unable to quantify exactly d/t recent fluid fluctuations. (Estimate ~10-12 lb or 8% loss x 1-2 months PTA)  Encourage intake and will monitor. Information Obtained from:  
 [x] Chart Review [x] Patient [x] Family/Caregiver [x] Nurse/Physician 
 [] Interdisciplinary Meeting/Rounds Diet: Regular Diet Medications: [x] Reviewed Allergies: [x] Reviewed Patient Active Problem List  
Diagnosis Code  Asthma J45.909  Prostate cancer ([facility] [address] C61  Hypercholesterolemia E78.00  Angina, class I ([facility] [address] I20.9  Left bundle branch block I44.7  Coronary artery disease I25.10  Hypertension I11.9  Dyslipidemia E78.5  Carotid artery disease ([facility]) I77.9  Aortic valve stenosis I35.0  Anemia D64.9  
 NSTEMI (non-ST elevated myocardial infarction) ([facility]) I21.4  Congestive heart disease ([facility]) I50.9 Past Medical History:  
Diagnosis Date  Asthma  Cardiac cath 04/28/2011 oLM 30%. pLAD 30%. oD1 50%. CX 90% (3 x 18 [facility] DEMAR). dRCA 90%. RPLB subtotal.  RPDA 100%.  Cardiac echocardiogram 01/21/2014 EF 55-60%. Mod LVH. Gr 1 DDfx. Mild AS (mean grad 13). Mild AI. Unchanged from study of 11/30/11.  Cardiac nuclear imaging test, mod risk 01/22/2016 Intermediate risk. Medium-sized inferior, inferoseptal infarction. No ischemia. EF 54%. Nondiagnostic EKG on pharm stress test.  
 Carotid duplex 03/02/2016 Mod 50-69% bilateral ICA stenosis. Probable significant RECA stenosis. >50% stenosis of left subclavian. No significant change from study of 1/8/15.  Coronary artery disease Status post PCI with drug-eluting stent, Stanford 3 x 18 mm in the proximal circumflex.  Hx of carotid artery disease ([facility] [address]  Hypercholesterolemia  Hypertension  Prostate cancer ([facility] [address] Labs:   
Lab Results Component Value Date/Time Sodium 142 04/08/2019 04:39 AM  
 Potassium 3.3 (L) 04/08/2019 04:39 AM  
 Chloride 100 04/08/2019 04:39 AM  
 CO2 34 (H) 04/08/2019 04:39 AM  
 Anion gap 8 04/08/2019 04:39 AM  
 Glucose 106 (H) 04/08/2019 04:39 AM  
 BUN 32 (H) 04/08/2019 04:39 AM  
 Creatinine 1.30 04/08/2019 04:39 AM  
 Calcium 8.5 04/08/2019 04:39 AM  
 Magnesium 2.9 (H) 03/20/2019 12:37 PM  
 Phosphorus 2.9 03/17/2019 05:17 AM  
 Albumin 3.5 03/13/2019 10:25 AM  
 
Anthropometrics: BMI (calculated): 22.1 Last 3 Recorded Weights in this Encounter 04/02/19 1740 04/09/19 1709 Weight: 63.5 kg (140 lb) 64.1 kg (141 lb 6.4 oz) Ht Readings from Last 1 Encounters:  
04/02/19 5' 7\" (1.702 m) Weight Metrics 4/9/2019 4/2/2019 3/12/2019 3/7/2019 3/1/2019 2/23/2019 2/12/2019 Weight 141 lb 6.4 oz 147 lb 4.3 oz 149 lb 0.5 oz - 161 lb 163 lb 2.3 oz 152 lb BMI 22.15 kg/m2 23.07 kg/m2 - 23.34 kg/m2 25.22 kg/m2 25.55 kg/m2 23.11 kg/m2 No data found. IBW: 148 lb %IBW: 85% UBW: 160-165 lb x 9 months ago [x] Weight Loss PTA [] Weight Gain 
[] Weight Stable Estimated Nutrition Needs: [x] MSJ  [] Other: 
Calories: 3530-4694 kcal Based on:   [x] Actual BW   
Protein:   76-83 g Based on:   [x] Actual BW Fluid:       8991-6075 ml Based on:   [x] Actual BW  
 
 [x] No Cultural, Hinduism or ethnic dietary need identified. [] Cultural, Hinduism and ethnic food preferences identified and addressed Wt Status:  [x] Normal (18.6 - 24.9) [] Underweight (<18.5) [] Overweight (25 - 29.9) [] Mild Obesity (30 - 34.9)  [] Moderate Obesity (35 - 39.9) [] Morbid Obesity (40+) Nutrition Problems Identified:  
[x] Suboptimal PO intake (improving slightly) [] Food Allergies [] Difficulty chewing/swallowing/poor dentition  
[] Constipation/Diarrhea  
[] Nausea/Vomiting  
[] None 
[] Other:  
 
Plan:  
[x] Therapeutic Diet (liberalized for now) []  Obtained/adjusted food preferences/tolerances and/or snacks options [x]  Supplements added  
[] Occupational therapy following for feeding techniques []  HS snack added  
[]  Modify diet texture  
[]  Modify diet for food allergies []  Educate patient  
[]  Assist with menu selection  
[x]  Monitor PO intake on meal rounds  
[x]  Continue inpatient monitoring and intervention  
[]  Participated in discharge planning/Interdisciplinary rounds/Team meetings  
[]  Other:  
 
Education Needs: 
 [] Not appropriate for teaching at this time due to: 
 [x] Identified and addressed Nutrition Monitoring and Evaluation: 
[x] Continue ongoing monitoring and intervention 
[] Cassy tS

## 2019-04-11 NOTE — CONSULTS
[employer] [facility] Pulmonary, Critical Care, and Sleep Medicine Initial Patient Consult Name: Amadou Iverson MRN: [id number] : 1926 Hospital: [address] [place] [address] Date: 2019 IMPRESSION:  
· Acute hypoxic respiratory failure in a patient with HFrEF (EF 26%). · Recurrent transudative pleural effusion, interstitial marking concerning for pulmonary edema and areas of dense air space process in left upper (see below) and both lower lobes. The areas of air space process is concerning for pneumonia. · NSTEMi and S/P PCI to LAD on 3/11/19 · Ischemic cardiomyopathy and HFrEF · Anemia with hx of recent GI bleed. · Significant peripheral vascular disease CAD, ICA stenosis. · Hx of HTN and DLD · Hx of smoking - COPD from hx. ·   
  
RECOMMENDATIONS:  
· Continue supplemental oxygen to keep SpO2>90%. · Continue vancomycin, levaquin and zosyn. · I have discussed at length with the patient the risks and benefits of thoracentesis and placement of the pleurX catheter. Patient and family wants to get a thoracentesis and optimize medical management and continue antibiotics and then monitor for development of pleural effusions again. I discussed with them that the pleurX catheter is not typically used for this condition and this is an off-label use. Patient and daughter understand the reasoning and will think about getting this procedure. · Will appreciate cardiology assistance and defer the management of heart failure to the primary and the cardiology team.  
· Obtain sputum cultures. · Bronchial hygiene protocol · Bronchodilators PRN 
· Steroids - not indicated. · Aspiration precautions · Assess home Oxygen needs at discharge · OT, PT, OOB and ambulate · Healthy weight · Will Follow · DVT, PUD prophylaxis Subjective: This patient has been seen and evaluated at the request of Dr. Chris Reis for evaluation of pleural effusion. Patient is a 80 y.o. male with past medical history significant for asthma, ischemic cardiomyopathy (S/P PCI), GI bleed (recent) and hypertension who has presented with progressive SOB. Patient is known to have thoracentesis in the past and had repeated thoracentesis done previously with temporary relief of dyspnea. Patient had repeat imaging done in ED that showed bilateral pleural effusions and air space processes. Patient was admitted to the hospital and started on broad spectrum abx and pulmonary medicine was consulted.  
  
Patient states that he is an ex smoker. Smoked 1ppd but quit 50 years ago. Worked for [employer], But denies any history of asbestos exposure.  
  
 
 
 
 
Past Medical History:  
Diagnosis Date  Asthma  Cardiac cath 04/28/2011 oLM 30%. pLAD 30%. oD1 50%. CX 90% (3 x 18 [place] DEMAR). dRCA 90%. RPLB subtotal.  RPDA 100%.  Cardiac echocardiogram 01/21/2014 EF 55-60%. Mod LVH. Gr 1 DDfx. Mild AS (mean grad 13). Mild AI. Unchanged from study of 11/30/11.  Cardiac nuclear imaging test, mod risk 01/22/2016 Intermediate risk. Medium-sized inferior, inferoseptal infarction. No ischemia. EF 54%. Nondiagnostic EKG on pharm stress test.  
 Carotid duplex 03/02/2016 Mod 50-69% bilateral ICA stenosis. Probable significant RECA stenosis. >50% stenosis of left subclavian. No significant change from study of 1/8/15.  Coronary artery disease Status post PCI with drug-eluting stent, [place] 3 x 18 mm in the proximal circumflex.  Heart failure ([address]  Hx of carotid artery disease ([address]  Hypercholesterolemia  Hypertension  Prostate cancer ([address] Past Surgical History:  
Procedure Laterality Date  CARDIAC SURG PROCEDURE UNLIST  HX CORONARY STENT PLACEMENT  4/28/11  PCI with drug-eluting stent, [place] 3 x 18 mm in the proximal circumflex (post dialated with 3.25 mm noncompliant balloon at high pressure). Prior to Admission medications Medication Sig Start Date End Date Taking? Authorizing Provider  
furosemide (LASIX) 20 mg tablet Take 1 Tab by mouth daily. 4/2/19   Jaqui Siddiqui MD  
furosemide (LASIX) 20 mg tablet Take 0.5 Tabs by mouth daily. 4/2/19   Jaqui Siddiqui MD  
aspirin 81 mg chewable tablet Take 1 Tab by mouth daily. 4/3/19   Jaqui Siddiqui MD  
budesonide-formoterol (SYMBICORT) 80-4.5 mcg/actuation HFAA Take 2 Puffs by inhalation two (2) times a day. 4/2/19   Jaqui Siddiqui MD  
tamsulosin (FLOMAX) 0.4 mg capsule Take 1 Cap by mouth daily. 4/3/19   Jaqui Siddiqui MD  
albuterol-ipratropium (DUO-NEB) 2.5 mg-0.5 mg/3 ml nebu 3 mL by Nebulization route every four (4) hours as needed (sob, wheezing). 4/2/19   Jaqui Siddiqui MD  
famotidine (PEPCID) 20 mg tablet Take 1 Tab by mouth daily. 4/3/19   Jaqui Siddiqui MD  
midodrine (PROAMITINE) 10 mg tablet Take 1 Tab by mouth three (3) times daily (with meals) for 30 days. 4/2/19 5/2/19  Jaqui Siddiqui MD  
therapeutic multivitamin [facility]) tablet Take 1 Tab by mouth daily. 4/3/19   Jaqui Siddiqui MD  
atorvastatin (LIPITOR) 40 mg tablet Take 1 Tab by mouth nightly. 3/12/19   Asaf Apodaca MD  
nitroglycerin (NITROSTAT) 0.4 mg SL tablet 1 Tab by SubLINGual route every five (5) minutes as needed for Chest Pain. 3/12/19   Asaf Apodaca MD  
budesonide-formoterol (SYMBICORT) 160-4.5 mcg/actuation HFAA Take 2 Puffs by inhalation two (2) times a day. 3/1/19   Balaji Daniel MD  
glycopyrrolate-formoterol (BEVESPI AEROSPHERE) 9-4.8 mcg HFAA Take 2 Inhalation by inhalation two (2) times a day.  3/1/19   Jadon Chew MD  
clopidogrel (PLAVIX) 75 mg tab TAKE 1 TABLET EVERY DAY 2/26/19   Balaji Daniel MD  
albuterol (PROVENTIL HFA, VENTOLIN HFA, PROAIR HFA) 90 mcg/actuation inhaler Take 2 Puffs by inhalation every four (4) hours as needed for Wheezing or Shortness of Breath. 3/2/15   AMITA iVlla  
tamsulosin (FLOMAX) 0.4 mg capsule Take 1 Cap by mouth daily. 3/13/13   Dorinda Elizalde MD  
aspirin delayed-release 81 mg tablet Take 81 mg by mouth daily. Dorinda Elizalde MD  
 
No Known Allergies Social History Tobacco Use  Smoking status: Former Smoker Packs/day: 1.00 Years: 15.00 Pack years: 15.00 Last attempt to quit: 1960 Years since quittin.9  Smokeless tobacco: Never Used Substance Use Topics  Alcohol use: No  
  
History reviewed. No pertinent family history. @DBLINKMRCHARTING(EPT,6365)@ Immunization status: up to date and documented. Current Facility-Administered Medications Medication Dose Route Frequency  levoFLOXacin (LEVAQUIN) 750 mg in D5W IVPB  750 mg IntraVENous Q48H  VANCOMYCIN INFORMATION NOTE   Other Rx Dosing/Monitoring  piperacillin-tazobactam (ZOSYN) 4.5 g in 0.9% sodium chloride (MBP/ADV) 100 mL MBP  4.5 g IntraVENous Q8H  
 albuterol-ipratropium (DUO-NEB) 2.5 MG-0.5 MG/3 ML  3 mL Nebulization Q4H RT  
 aspirin chewable tablet 81 mg  81 mg Oral DAILY  atorvastatin (LIPITOR) tablet 40 mg  40 mg Oral QHS  clopidogrel (PLAVIX) tablet 75 mg  75 mg Oral DAILY  famotidine (PEPCID) tablet 20 mg  20 mg Oral DAILY  furosemide (LASIX) tablet 20 mg  20 mg Oral DAILY  midodrine (PROAMITINE) tablet 10 mg  10 mg Oral TID WITH MEALS  tamsulosin (FLOMAX) capsule 0.4 mg  0.4 mg Oral DAILY  therapeutic multivitamin (THERAGRAN) tablet 1 Tab  1 Tab Oral DAILY  vancomycin (VANCOCIN) 750 mg in 0.9% sodium chloride (MBP/ADV) 250 mL ADV  750 mg IntraVENous Q24H  
 [START ON 2019] VANCOMYCIN INFORMATION NOTE   Other ONCE  
 arformoterol (BROVANA) neb solution 15 mcg  15 mcg Nebulization BID RT  And  
 budesonide (PULMICORT) 500 mcg/2 ml nebulizer suspension  500 mcg Nebulization BID RT Review of Systems: 
Pertinent items are noted in HPI. Objective: 
Vital Signs:   
Visit Vitals BP 98/60 Pulse (!) 124 Temp 98.7 °F (37.1 °C) Resp 29 Ht 5' 6\" (1.676 m) Wt 59.9 kg (132 lb) SpO2 97% BMI 21.31 kg/m² O2 Device: Nasal cannula O2 Flow Rate (L/min): 4 l/min Temp (24hrs), Av.6 °F (37 °C), Min:98.2 °F (36.8 °C), Max:99.2 °F (37.3 °C) Intake/Output:  
Last shift:       07 - 1900 In: -  
Out: 100 [Urine:100] Last 3 shifts: 1901 -  07 In: 1000 [P.O.:150; I.V.:850] Out: - Intake/Output Summary (Last 24 hours) at 2019 1259 Last data filed at 2019 0830 Gross per 24 hour Intake 1000 ml Output 100 ml Net 900 ml Physical Exam:  
General:  Alert, cooperative, no distress, appears stated age. Head:  Normocephalic, without obvious abnormality, atraumatic. Eyes:  Conjunctivae/corneas clear. PERRL, EOMs intact. Nose: Nares normal. Septum midline. Mucosa normal. No drainage or sinus tenderness. Throat: Lips, mucosa, and tongue normal. Teeth and gums normal.  
Neck: Supple, symmetrical, trachea midline, no adenopathy, thyroid: no enlargment/tenderness/nodules, no carotid bruit and no JVD. Back:   Symmetric, no curvature. ROM normal.  
Lungs:   Clear to auscultation bilaterally. Chest wall:  No tenderness or deformity. Heart:  Regular rate and rhythm, S1, S2 normal, no murmur, click, rub or gallop. Abdomen:   Soft, non-tender. Bowel sounds normal. No masses,  No organomegaly. Extremities: Extremities normal, atraumatic, no cyanosis or edema. Pulses: 2+ and symmetric all extremities. Skin: Skin color, texture, turgor normal. No rashes or lesions Lymph nodes: Cervical, supraclavicular, and axillary nodes normal.  
Neurologic: Grossly nonfocal  
 
Data review:  
 
Recent Results (from the past 24 hour(s)) EKG, 12 LEAD, INITIAL  Collection Time: 04/10/19  4:17 PM  
 Result Value Ref Range Ventricular Rate 120 BPM  
 Atrial Rate 120 BPM  
 P-R Interval 124 ms QRS Duration 144 ms Q-T Interval 340 ms QTC Calculation (Bezet) 480 ms Calculated P Axis 57 degrees Calculated R Axis 8 degrees Calculated T Axis 150 degrees Diagnosis Sinus tachycardia Left bundle branch block Abnormal ECG When compared with ECG of 16-MAR-2019 03:56, 
fusion complexes are no longer present 
premature ventricular complexes are no longer present 
premature supraventricular complexes are no longer present URINALYSIS W/ RFLX MICROSCOPIC Collection Time: 04/10/19  5:00 PM  
Result Value Ref Range Color DARK YELLOW Appearance CLEAR Specific gravity 1.016 1.005 - 1.030    
 pH (UA) 5.0 5.0 - 8.0 Protein NEGATIVE  NEG mg/dL Glucose NEGATIVE  NEG mg/dL Ketone NEGATIVE  NEG mg/dL Bilirubin NEGATIVE  NEG Blood NEGATIVE  NEG Urobilinogen 0.2 0.2 - 1.0 EU/dL Nitrites NEGATIVE  NEG Leukocyte Esterase NEGATIVE  NEG    
CULTURE, BLOOD Collection Time: 04/10/19  5:15 PM  
Result Value Ref Range Special Requests: NO SPECIAL REQUESTS Culture result: NO GROWTH AFTER 14 HOURS METABOLIC PANEL, COMPREHENSIVE Collection Time: 04/10/19  5:15 PM  
Result Value Ref Range Sodium 137 136 - 145 mmol/L Potassium 4.6 3.5 - 5.5 mmol/L Chloride 95 (L) 100 - 108 mmol/L  
 CO2 34 (H) 21 - 32 mmol/L Anion gap 8 3.0 - 18 mmol/L Glucose 152 (H) 74 - 99 mg/dL BUN 39 (H) 7.0 - 18 MG/DL Creatinine 1.58 (H) 0.6 - 1.3 MG/DL  
 BUN/Creatinine ratio 25 (H) 12 - 20 GFR est AA 50 (L) >60 ml/min/1.73m2 GFR est non-AA 41 (L) >60 ml/min/1.73m2 Calcium 8.9 8.5 - 10.1 MG/DL Bilirubin, total 1.1 (H) 0.2 - 1.0 MG/DL  
 ALT (SGPT) 15 (L) 16 - 61 U/L  
 AST (SGOT) 14 (L) 15 - 37 U/L Alk. phosphatase 87 45 - 117 U/L Protein, total 6.9 6.4 - 8.2 g/dL Albumin 3.1 (L) 3.4 - 5.0 g/dL Globulin 3.8 2.0 - 4.0 g/dL A-G Ratio 0.8 0.8 - 1.7    
CBC WITH AUTOMATED DIFF Collection Time: 04/10/19  5:15 PM  
Result Value Ref Range WBC 10.7 4.6 - 13.2 K/uL  
 RBC 3.91 (L) 4.70 - 5.50 M/uL  
 HGB 10.2 (L) 13.0 - 16.0 g/dL HCT 34.5 (L) 36.0 - 48.0 % MCV 88.2 74.0 - 97.0 FL  
 MCH 26.1 24.0 - 34.0 PG  
 MCHC 29.6 (L) 31.0 - 37.0 g/dL  
 RDW 17.4 (H) 11.6 - 14.5 % PLATELET 015 925 - 308 K/uL MPV 10.1 9.2 - 11.8 FL  
 NEUTROPHILS 77 (H) 42 - 75 % LYMPHOCYTES 9 (L) 20 - 51 % MONOCYTES 14 (H) 2 - 9 % EOSINOPHILS 0 0 - 5 % BASOPHILS 0 0 - 3 %  
 ABS. NEUTROPHILS 8.2 (H) 1.8 - 8.0 K/UL  
 ABS. LYMPHOCYTES 1.0 0.8 - 3.5 K/UL  
 ABS. MONOCYTES 1.5 (H) 0 - 1.0 K/UL  
 ABS. EOSINOPHILS 0.0 0.0 - 0.4 K/UL  
 ABS. BASOPHILS 0.0 0.0 - 0.1 K/UL PLATELET COMMENTS ADEQUATE PLATELETS    
 RBC COMMENTS ANISOCYTOSIS 1+ 
    
 DF MANUAL    
NT-PRO BNP Collection Time: 04/10/19  5:15 PM  
Result Value Ref Range NT pro-BNP 27,486 (H) 0 - 1,800 PG/ML  
D DIMER Collection Time: 04/10/19  5:15 PM  
Result Value Ref Range D DIMER 1.89 (H) <0.46 ug/ml(FEU) CULTURE, BLOOD Collection Time: 04/10/19  5:30 PM  
Result Value Ref Range Special Requests: NO SPECIAL REQUESTS Culture result: NO GROWTH AFTER 14 HOURS    
POC LACTIC ACID Collection Time: 04/10/19  5:43 PM  
Result Value Ref Range Lactic Acid (POC) 1.81 0.40 - 2.00 mmol/L  
CBC W/O DIFF Collection Time: 04/11/19  3:45 AM  
Result Value Ref Range WBC 9.2 4.6 - 13.2 K/uL  
 RBC 3.39 (L) 4.70 - 5.50 M/uL HGB 9.0 (L) 13.0 - 16.0 g/dL HCT 29.9 (L) 36.0 - 48.0 % MCV 88.2 74.0 - 97.0 FL  
 MCH 26.5 24.0 - 34.0 PG  
 MCHC 30.1 (L) 31.0 - 37.0 g/dL  
 RDW 17.3 (H) 11.6 - 14.5 % PLATELET 082 498 - 553 K/uL MPV 9.6 9.2 - 98.0 FL  
METABOLIC PANEL, COMPREHENSIVE Collection Time: 04/11/19  3:45 AM  
Result Value Ref Range Sodium 142 136 - 145 mmol/L Potassium 3.9 3.5 - 5.5 mmol/L  Chloride 109 (H) 100 - 108 mmol/L  
 CO2 28 21 - 32 mmol/L Anion gap 5 3.0 - 18 mmol/L Glucose 91 74 - 99 mg/dL BUN 23 (H) 7.0 - 18 MG/DL Creatinine 0.76 0.6 - 1.3 MG/DL  
 BUN/Creatinine ratio 30 (H) 12 - 20 GFR est AA >60 >60 ml/min/1.73m2 GFR est non-AA >60 >60 ml/min/1.73m2 Calcium 7.4 (L) 8.5 - 10.1 MG/DL Bilirubin, total 1.3 (H) 0.2 - 1.0 MG/DL  
 ALT (SGPT) 13 (L) 16 - 61 U/L  
 AST (SGOT) 11 (L) 15 - 37 U/L Alk. phosphatase 71 45 - 117 U/L Protein, total 6.5 6.4 - 8.2 g/dL Albumin 2.5 (L) 3.4 - 5.0 g/dL Globulin 4.0 2.0 - 4.0 g/dL A-G Ratio 0.6 (L) 0.8 - 1.7 PROTHROMBIN TIME + INR Collection Time: 04/11/19 10:40 AM  
Result Value Ref Range Prothrombin time 15.6 (H) 11.5 - 15.2 sec INR 1.3 (H) 0.8 - 1.2 Imaging: 
I have personally reviewed the patients radiographs and have reviewed the reports: XR Results (most recent): 
Results from Hospital Encounter encounter on 04/10/19 XR CHEST PORT Narrative EXAM:  PORTABLE CHEST INDICATION:  Sepsis. TECHNIQUE:  Portable, erect AP view. COMPARISON: AP and lateral views 04/09/2019 
 
____________________ FINDINGS:   
 
SUPPORT DEVICES: None. HEART AND MEDIASTINUM: Cardiomediastinal silhouette appears within normal 
limits. Atherosclerotic thoracic aorta. LUNGS AND PLEURAL SPACES: Progression of multifocal opacities within most of the 
left lung as well as right base suspicious for multifocal pneumonia. Associated 
moderate-sized left pleural effusion, similar to progressed. No pneumothorax. BONY THORAX AND SOFT TISSUES: No acute osseous abnormality. Degenerative change 
around the visualized shoulders. ____________________ Impression IMPRESSION:  
 
1. Progression of multifocal opacities within both lungs as described above, 
suggesting progression of multifocal pneumonia 2. Associated moderate-sized left pleural effusion, stable to mildly 
progressed. Alonso Fiore CT Results (most recent): 
 Results from Hospital Encounter encounter on 04/10/19 CT CHEST WO CONT Narrative EXAM: CT chest  
 
CLINICAL INDICATION/HISTORY: SOB 
  > Additional: None. COMPARISON: CTA chest 03/13/2019 
  > Reference Exam: None. TECHNIQUE: Axial CT imaging from the thoracic inlet through the diaphragm 
without intravenous contrast. Multiplanar reformats were generated. One or more 
dose reduction techniques were used on this CT: automated exposure control, 
adjustment of the mAs and/or kVp according to patient size, and iterative 
reconstruction techniques. The specific techniques used on this CT exam have 
been documented in the patient's electronic medical record. Digital Imaging and 
Communications in Medicine (DICOM) format image data are available to 
nonaffiliated external healthcare facilities or entities on a secure, media 
free, reciprocally searchable basis with patient authorization for at least a 
12-month period after this study. _______________ FINDINGS: 
 
LUNGS: Multifocal consolidation within the left upper and bilateral lower lobes, 
similar to prior CT. Bandlike opacity within the right upper lobe, atelectasis 
or scarring. PLEURA: Moderate to large bilateral pleural effusions, similar to prior study. AIRWAY: Normal. 
 
MEDIASTINUM: Normal heart size. No pericardial effusion. Moderate 
atherosclerosis involving the aorta, great vessels and coronary arteries. LYMPH NODES: No enlarged lymph nodes. UPPER ABDOMEN: Cholelithiasis without gallbladder distention. OTHER: No acute or aggressive osseous abnormalities identified. _______________ Impression IMPRESSION: 
 
1. Multifocal consolidation within the left upper and bilateral lower lobes, 
similar to prior CT 03/13/2019. Findings likely represent recurrent multifocal 
pneumonia and/or atelectasis. 2. Bilateral moderate to large pleural effusions, similar to prior CT 3/13/2019 3. Cholelithiasis without gallbladder distention. 03/13/19 ECHO ADULT FOLLOW-UP OR LIMITED 03/15/2019 3/15/2019 Narrative · Left ventricular severely decreased systolic function. Estimated left  
ventricular ejection fraction is 26 - 30%. Visually measured ejection  
fraction. Left ventricular mild concentric hypertrophy. Abnormal left  
ventricular wall motion as described on the wall scoring diagram below. · Severe tricuspid valve regurgitation is present. Moderate to severe  
pulmonary hypertension is present. · Moderate mitral valve regurgitation.  
   
  Signed by: MD Tonya Orozco MD

## 2019-04-11 NOTE — ED NOTES
TRANSFER - ED to INPATIENT REPORT: 
 
SBAR report made available to receiving floor on this patient being transferred to [facility] ICU [id number])  for routine progression of care Admitting diagnosis Chronic bilateral pleural effusions [J90] HCAP (healthcare-associated pneumonia) [J18.9] CHF (congestive heart failure) ([facility] [I50.9] Bilateral pleural effusion [J90] HCAP (healthcare-associated pneumonia) [J18.9] CHF (congestive heart failure) ([facility] [I50.9] Information from the following report(s) SBAR, ED Summary, MAR, Recent Results and Cardiac Rhythm S.tach w/ PVC was made available to receiving floor. Lines:  
Peripheral IV 04/10/19 Left Forearm (Active) Site Assessment Clean, dry, & intact 4/10/2019  6:23 PM  
Phlebitis Assessment 0 4/10/2019  6:23 PM  
Infiltration Assessment 0 4/10/2019  6:23 PM  
Dressing Status Clean, dry, & intact 4/10/2019  6:23 PM  
Dressing Type Transparent 4/10/2019  6:23 PM  
   
Peripheral IV 04/10/19 Right Forearm (Active) Site Assessment Clean, dry, & intact 4/10/2019  8:54 PM  
Phlebitis Assessment 0 4/10/2019  8:54 PM  
Infiltration Assessment 0 4/10/2019  8:54 PM  
Dressing Status Clean, dry, & intact 4/10/2019  8:54 PM  
Dressing Type Transparent 4/10/2019  8:54 PM  
Hub Color/Line Status Flushed;Patent 4/10/2019  8:54 PM  
Alcohol Cap Used No 4/10/2019  8:54 PM  
  
 
Medication list updated today Opportunity for questions and clarification was provided. Patient is oriented to time, place, person and situationwith intermitten confusion at night Patient is  incontinent and non-ambulatory Valuables transported with patient Patient transported with: 
 Monitor O2 @ 3.5 liters Registered Nurse MAP (Monitor): 69 =Monitored (most recent) Vitals w/ MEWS Score (last day) Date/Time MEWS Score Pulse Resp Temp BP Level of Consciousness SpO2  
 04/11/19 0700  6  119  (Abnormal)   32  (Abnormal)   98.4 °F (36.9 °C)  100/62  Alert  99 % 04/11/19 0645    117  (Abnormal)   27    97/61    99 % 04/11/19 0630    115  (Abnormal)   26    100/62    99 % 04/11/19 0615    122  (Abnormal)   32  (Abnormal)     102/60    97 % 04/11/19 0600    119  (Abnormal)   33  (Abnormal)     98/59    99 % 04/11/19 0545    113  (Abnormal)   29    106/57    99 % 04/11/19 0515    113  (Abnormal)   33  (Abnormal)     97/56    98 % 04/11/19 0500    125  (Abnormal)   26    104/49    98 % 04/11/19 0451    117  (Abnormal)   34  (Abnormal)     100/55    97 % 04/11/19 0430    124  (Abnormal)   30    112/62    98 % 04/11/19 0415    116  (Abnormal)   33  (Abnormal)     102/55    98 % 04/11/19 0400    114  (Abnormal)   31  (Abnormal)     96/58    97 % 04/11/19 0345    117  (Abnormal)   29    101/48    98 % 04/11/19 0315    116  (Abnormal)   34  (Abnormal)     92/54    98 % 04/11/19 0245    111  (Abnormal)   28    99/59    98 % 04/11/19 0230    114  (Abnormal)   32  (Abnormal)     97/58    98 % 04/11/19 0145    109  (Abnormal)   29    99/56    98 % 04/11/19 0130    117  (Abnormal)   27    98/52    98 % 04/11/19 0121    116  (Abnormal)   25    89/56  (Abnormal)     98 % 04/11/19 0017    111  (Abnormal)   33  (Abnormal)     97/63    98 % 04/11/19 0000    110  (Abnormal)   33  (Abnormal)     100/57    98 % 04/10/19 2345    113  (Abnormal)   29    91/63    98 % 04/10/19 2330    110  (Abnormal)   29    91/60    99 % 04/10/19 2315    115  (Abnormal)   24    83/52  (Abnormal)     97 % 04/10/19 2300    115  (Abnormal)   33  (Abnormal)     110/61    96 % 04/10/19 2257    112  (Abnormal)       96/55      
 04/10/19 2245    114  (Abnormal)   34  (Abnormal)     96/55    96 % 04/10/19 2230    112  (Abnormal)   40  (Abnormal)     96/51    96 % 04/10/19 2215    113  (Abnormal)   37  (Abnormal)     93/56    97 % 04/10/19 2200    113  (Abnormal)   42  (Abnormal)     101/61    97 % 04/10/19 2145    111  (Abnormal)   26    89/56  (Abnormal)     98 % 04/10/19 2130    114  (Abnormal)   27    105/58    98 % 04/10/19 2115    114  (Abnormal)   29    98/60    99 % 04/10/19 2100    112  (Abnormal)   28    95/56    99 % 04/10/19 2045    114  (Abnormal)   20    96/64    100 % 04/10/19 2015    108  (Abnormal)   26    102/58    99 % 04/10/19 2000    108  (Abnormal)   34  (Abnormal)     100/57    100 % 04/10/19 1945    108  (Abnormal)   32  (Abnormal)     97/58    100 % 04/10/19 1930  6  113  (Abnormal)   32  (Abnormal)   99.2 °F (37.3 °C)  97/58  Alert  100 % 04/10/19 1923    110  (Abnormal)       103/58      
 04/10/19 1915    111  (Abnormal)   38  (Abnormal)     103/58    99 % 04/10/19 1911    110  (Abnormal)   32  (Abnormal)     100/55    99 % 04/10/19 1800          101/58    100 % 04/10/19 1745          107/63    99 % 04/10/19 1730          106/61    99 % 04/10/19 1715          98/68    100 % 04/10/19 1609  6  120  (Abnormal)   30  98.2 °F (36.8 °C)  100/66  Alert  98 % Septic Patients:  
 
Lactic Acid Lab Results Component Value Date [facility]  
 (Most recent on top) Repeat drawn: NO Time: n/a ALL LACTIC ACIDS GREATER THAN 2 MUST BE REPEATED POC WITHIN 4 HOURS OR PRIOR TO ADMISSION Total Fluid Bolus initiated and documented on MAR: NO pt CHF, no bolus ordered All ordered antibiotics initiated within first 3 hours of TIME ZERO?   no

## 2019-04-11 NOTE — PROGRESS NOTES
[facility] PHYSICAL THERAPY DISCHARGE NOTE Patient: Erasto Willard (18 y.o. male)       Date: 2019 Primary Diagnosis: sob Treatment Diagnosis Treatment Diagnosis: need for assist with self care Treatment Diagnosis 2: muscle weakness Precautions: Fall(Cardiac EF 26-30%) Physician: No att. providers found Erasto Willard was discharged from the facility on 4/10/19 and was unable to receive a final Skilled Physical Therapy treatment/assesment due to the following reason: ? Medical Emergency or Procedure requiring patient to be hospitalized ? Family Request 
 ?  ? Patient has refused Therapy services despite recommendations to continue Please see [facility] Physical Therapy Treatment Note dated : 4/10/19  for the patients level of progress/independence prior to discharge. Therapist :   Laura Pace PTA,      2019 Please forward to PT for Co-signature.

## 2019-04-11 NOTE — DIABETES MGMT
NUTRITIONAL ASSESSMENT GLYCEMIC CONTROL/ PLAN OF CARE Pepe Aguilar           80 y.o.           4/10/2019 1. Bilateral pleural effusion 2. HAP (hospital-acquired pneumonia) 3. Acute on chronic congestive heart failure, unspecified heart failure type ([address] INTERVENTIONS/PLAN:  
1. Nectar thick po supplements TID. 2.  Liberalize diet to 2 gram Na (soft solids) to optimize intake of nutrient dense foods 3. Monitor po intake, labs and weights. ASSESSMENT:  
Pt is 93% ideal wt; BMI (calculated): 21.3 kg/m2 (normal weight BMI classification but pt at nutrition risk d/t BMI <23 and age >65 years). Pt with weight loss as documented below under nutrition diagnosis with weight loss calculations based on weight from 4/9/19. Noted 9 lb weight loss from 4/9/19 to 4/10/19 - question accuracy of last documented weight from 4/10/19. Nutrition Diagnoses:  
Unintentional weight loss due to inadequate energy intake as evidenced by 22 lb weight loss within past 2 months (13% weight loss) based on 4/9/19 weight of 141 lbs. Difficulty swallowing due to dysphagia as evidenced by last SLP diet recommendations for soft solids, nectar thick liquids (3/28/19). Meets Criteria for Acute Malnutrition  
[x] Severe Malnutrition, as evidenced by: 
 [x] Moderate muscle wasting, loss of subcutaneous fat 
 [x] Nutritional intake of <50% of recommended intake for >5 days [x] Weight loss of >1-2% in 1 week, >5% in 1 month, >7.5% in 3 months, or >10% in 6 months 
 [] Moderate-severe edema SUBJECTIVE/OBJECTIVE: Information obtained from: chart review, pt, pt's dtr;  Noted pt is [place]. Pt from [facility] and admitted to [address] [place] [address],[address] on 4/2/19 where he was last receiving regular diet and Ensure Enlive supplements TID.    
4/11/19:  Pt stating preferences. Diet: orders written for cardiac diet, soft solids, nectar liquids. Po intake at breakfast - 120 calories. No data found. Medications: [x]                Reviewed Most Recent POC Glucose:  
Recent Labs 04/11/19 
0345 04/10/19 
1715 GLU 91 152* Labs:  
No results found for: HBA1C, HGBE8, ZUV4PDAX Lab Results Component Value Date/Time Sodium 142 04/11/2019 03:45 AM  
 Potassium 3.9 04/11/2019 03:45 AM  
 Chloride 109 (H) 04/11/2019 03:45 AM  
 CO2 28 04/11/2019 03:45 AM  
 Anion gap 5 04/11/2019 03:45 AM  
 Glucose 91 04/11/2019 03:45 AM  
 BUN 23 (H) 04/11/2019 03:45 AM  
 Creatinine 0.76 04/11/2019 03:45 AM  
 Calcium 7.4 (L) 04/11/2019 03:45 AM  
 Magnesium 2.9 (H) 03/20/2019 12:37 PM  
 Phosphorus 2.9 03/17/2019 05:17 AM  
 Albumin 2.5 (L) 04/11/2019 03:45 AM  
 
 
Anthropometrics: IBW : 64.4 kg (142 lb), % IBW (Calculated): 92.96 %, BMI (calculated): 21.3 Wt Readings from Last 1 Encounters:  
04/10/19 59.9 kg (132 lb) Ht Readings from Last 1 Encounters:  
04/10/19 5' 6\" (1.676 m) Last Weight Metrics: 
Weight Loss Metrics 4/10/2019 4/9/2019 4/2/2019 3/12/2019 3/7/2019 3/1/2019 2/23/2019 Today's Wt 132 lb 141 lb 6.4 oz 147 lb 4.3 oz 149 lb 0.5 oz - 161 lb 163 lb 2.3 oz  
BMI 21.31 kg/m2 22.15 kg/m2 23.07 kg/m2 - 23.34 kg/m2 25.22 kg/m2 25.55 kg/m2 Estimated Nutrition Needs:  1821 Kcals/day, Protein (g): 89 g Fluid (ml): 1800 ml Based on:   [x]          Actual BW    []          ABW   []            Adjusted BW   
 
Nutrition Diagnoses: see above Nutrition Interventions: 
No Added Sugar Mellen Instant Breakfast (saqib) mixed with nectar thick milk TID Change diet from cardiac to 2 gram Na (soft solids) to optimize intake of nutrient dense foods Goal: Pt will consume > 75% meals by 4/16/19. Weight maintenance (+/- 1-2 kg) or weight gain of 1-2 lbs/week by 4/21/19. Nutrition Monitoring and Evaluation   
 
[x]     Monitor po intake on meal rounds 
[x]     Continue inpatient monitoring and intervention 
[]     Other: Nutrition Risk:  []   High     [x]  Moderate    []  Minimal/Uncompromised Delilah Alcantar RD, CDE Office:  [phone] Long Range Pager:  [phone]

## 2019-04-11 NOTE — PROGRESS NOTES
Progress Note Patient: Ganesh Bhagat               Sex: male          DOA: 4/10/2019 YOB: 1926      Age:  80 y.o.        LOS:  LOS: 1 day CHIEF COMPLAINT:  Other (pt on TCC and not improving per Daugher/wants evaluated.) Subjective:  
 
Pt seen and examined. He currently denies complaints. Denies SOB. Objective:  
 
Visit Vitals /62 Pulse (!) 119 Temp 98.4 °F (36.9 °C) Resp (!) 32 Ht 5' 6\" (1.676 m) Wt 59.9 kg (132 lb) SpO2 99% BMI 21.31 kg/m² Physical Exam: 
Ears: hearing is intact Eyes: Icterus is not present Lungs: clear to auscultation bilaterally Heart: tachycardic Gastrointestinal: soft, non-tender. Bowel sounds normal. No masses,  no organomegaly Neurological:  New Focal Deficits are not present Psychiatric:  Mood is stable Lab/Data Reviewed: 
CMP:  
Lab Results Component Value Date/Time  04/11/2019 03:45 AM  
 K 3.9 04/11/2019 03:45 AM  
  (H) 04/11/2019 03:45 AM  
 CO2 28 04/11/2019 03:45 AM  
 AGAP 5 04/11/2019 03:45 AM  
 GLU 91 04/11/2019 03:45 AM  
 BUN 23 (H) 04/11/2019 03:45 AM  
 CREA 0.76 04/11/2019 03:45 AM  
 GFRAA >60 04/11/2019 03:45 AM  
 GFRNA >60 04/11/2019 03:45 AM  
 CA 7.4 (L) 04/11/2019 03:45 AM  
 ALB 2.5 (L) 04/11/2019 03:45 AM  
 TP 6.5 04/11/2019 03:45 AM  
 GLOB 4.0 04/11/2019 03:45 AM  
 AGRAT 0.6 (L) 04/11/2019 03:45 AM  
 SGOT 11 (L) 04/11/2019 03:45 AM  
 ALT 13 (L) 04/11/2019 03:45 AM  
 
CBC:  
Lab Results Component Value Date/Time WBC 9.2 04/11/2019 03:45 AM  
 HGB 9.0 (L) 04/11/2019 03:45 AM  
 HCT 29.9 (L) 04/11/2019 03:45 AM  
  04/11/2019 03:45 AM  
 
 
Imaging Reviewed: 
Ct Chest Wo Cont Result Date: 4/11/2019 EXAM: CT chest CLINICAL INDICATION/HISTORY: SOB   > Additional: None. COMPARISON: CTA chest 03/13/2019   > Reference Exam: None.  TECHNIQUE: Axial CT imaging from the thoracic inlet through the diaphragm without intravenous contrast. Multiplanar reformats were generated. One or more dose reduction techniques were used on this CT: automated exposure control, adjustment of the mAs and/or kVp according to patient size, and iterative reconstruction techniques. The specific techniques used on this CT exam have been documented in the patient's electronic medical record. Digital Imaging and Communications in Medicine (DICOM) format image data are available to nonaffiliated external healthcare facilities or entities on a secure, media free, reciprocally searchable basis with patient authorization for at least a 12-month period after this study. _______________ FINDINGS: LUNGS: Multifocal consolidation within the left upper and bilateral lower lobes, similar to prior CT. Bandlike opacity within the right upper lobe, atelectasis or scarring. PLEURA: Moderate to large bilateral pleural effusions, similar to prior study. AIRWAY: Normal. MEDIASTINUM: Normal heart size. No pericardial effusion. Moderate atherosclerosis involving the aorta, great vessels and coronary arteries. LYMPH NODES: No enlarged lymph nodes. UPPER ABDOMEN: Cholelithiasis without gallbladder distention. OTHER: No acute or aggressive osseous abnormalities identified. _______________ IMPRESSION: 1. Multifocal consolidation within the left upper and bilateral lower lobes, similar to prior CT 03/13/2019. Findings likely represent recurrent multifocal pneumonia and/or atelectasis. 2. Bilateral moderate to large pleural effusions, similar to prior CT 3/13/2019 3. Cholelithiasis without gallbladder distention. Xr Chest [facility] Result Date: 4/11/2019 EXAM:  PORTABLE CHEST INDICATION:  Sepsis. TECHNIQUE:  Portable, erect AP view. COMPARISON: AP and lateral views 04/09/2019 ____________________ FINDINGS:  SUPPORT DEVICES: None. HEART AND MEDIASTINUM: Cardiomediastinal silhouette appears within normal limits. Atherosclerotic thoracic aorta. LUNGS AND PLEURAL SPACES: Progression of multifocal opacities within most of the left lung as well as right base suspicious for multifocal pneumonia. Associated moderate-sized left pleural effusion, similar to progressed. No pneumothorax. BONY THORAX AND SOFT TISSUES: No acute osseous abnormality. Degenerative change around the visualized shoulders. ____________________ IMPRESSION: 1. Progression of multifocal opacities within both lungs as described above, suggesting progression of multifocal pneumonia 2. Associated moderate-sized left pleural effusion, stable to mildly progressed. Alonso Reasoner Assessment:  
 
Principal Problem: 
  Chronic bilateral pleural effusions (4/10/2019) Active Problems: 
  CHF (congestive heart failure) ([facility] (4/10/2019) HCAP (healthcare-associated pneumonia) (4/10/2019) PLAN: 
· Pleural effusions: Appreciate PCCM evaluation for possible thoracentesis · HCAP: Continue broad spectrum IV ab. Sputum cultures and urine antigens ordered · SLP was consulted previously, ordered diet recommended by SLP (soft, nectar thick liquids) · Cont acceptable home medications for chronic conditions · DVT protocol I have personally reviewed all pertinent labs and films that have officially resulted over the last 24 hours. I have personally checked for all pending labs that are awaiting final results. Signed By: Faisal Kramer MD   
 April 11, 2019

## 2019-04-11 NOTE — CDMP QUERY
Pt admitted with bilateral pleural effusions--worsening. Pt noted to have elevated BUN and creatinine. . If possible, please document in the progress notes and d/c summary if you are evaluating and / or treating any of the following: ? Acute kidney failure, now improved ? Acute kidney injury ? Other type of acute kidney failure  
? Other cause (please specify) ? Unable to determine ? Unknown The medical record reflects the following: 
  Risk Factors: Elderly patient recently hospitalized with Pleural effusion requiring thoracentesis Clinical Indicators: 4/10-BUN=39, Cr=1.58 
                                      4/11-BUN-23, Cr=0.76 Treatment: monitor labs Please clarify and document your clinical opinion in the progress notes and discharge summary including the definitive and/or presumptive diagnosis, (suspected or probable), related to the above clinical findings. Please include clinical findings supporting your diagnosis. If you DECLINE this query or would like to communicate with [facility], please utilize the \"[facility] message box\" at the TOP of the Progress Note on the right. Thank you. Brian Dennis RN [place] [phone]

## 2019-04-11 NOTE — PROGRESS NOTES
conducted an initial consultation and Spiritual Assessment for Pepe Aguilar, who is a 80 y. o.,male. Patients Primary Language is: [place]. According to the patients EMR Shinto Affiliation is: [facility].  
 
The reason the Patient came to the hospital is:  
Patient Active Problem List  
 Diagnosis Date Noted  CHF (congestive heart failure) ([facility] 04/10/2019  HCAP (healthcare-associated pneumonia) 04/10/2019  Chronic bilateral pleural effusions 04/10/2019  Bilateral pleural effusion 04/10/2019  Congestive heart disease ([address] 03/13/2019  
 NSTEMI (non-ST elevated myocardial infarction) ([address] 03/07/2019  Anemia 02/21/2019  Aortic valve stenosis 11/02/2017  Carotid artery disease ([facility] 06/03/2013  Left bundle branch block 05/20/2011  Dyslipidemia 05/20/2011  Coronary artery disease  Hypertension  Angina, class I ([facility] 04/25/2011  Asthma  Prostate cancer ([facility]  Hypercholesterolemia The  provided the following Interventions: 
Initiated a relationship of care and support with patient and family member in room 2601 today. Patient says he is doing the best he can with the situation given him. Susu Wallace Family member talks of a different picture, patient it seems while up in [employer] may  Not have been  treated appropriately and Chest X-rays were not done. Daughter says that she had conversations with nurse and TCC  but did not feel any satisfaction or support. Patient is a full code according to his wishes per daughter. Recommended that she speak with Nursing supervisor or Hospital . Provided information about Spiritual Care Services. Offered prayer and assurance of continued prayers on patients behalf. . 
 
The following outcomes were achieved: 
Patient shared limited information about his medical narrative. Patient processed feeling about current hospitalization. Patient expressed gratitude for pastoral care visit. Assessment: 
Patient does not have any Yarsani/cultural needs that will affect patients preferences in health care. There are no further spiritual or Yarsani issues which require Spiritual Care Services interventions at this time. Plan: 
Chaplains will continue to follow and will provide pastoral care on an as needed/requested basis Karyn Ramirez 3 Board Certified [employer] Spiritual Care  
[phone]

## 2019-04-11 NOTE — MANAGEMENT PLAN
Discharge/Transition Planning Interviewed patient and pt gave permission for daughter to answer most questions as he is SOB. Verified demographics listed on face sheet; all information correct. Pt with [employer] Medicare. PCP is Dr Wilbert Pascual and last appt after his first admission. Patient lives alone in 1 story house with 3-4 steps to enter. Daughter states she is 2 miles away. Patient's NOK is daughter. Patient independent with ADLs prior to admission. Daughter states was golfing, doing yard work, driving in Feb. Pt with 3 or 4 admissions since Feb. At [facility] from 4/2-4/10. No use of DME prior to admission. Will likely need rolling walker. Daughter states pt was ambulating well with therapy at [facility]. Pt has Advance Directive but not on File. Pt states he wants everything done even if on ventilator with trach and feeding tube. Discharge plan is HH vs TCC. Pt would have to get reauth for TCC Patient has designated _daughter_______________________ to participate in his/her discharge plan and to receive any needed information. Name: Graig Mcardle Phone number:[phone] Care Management Interventions PCP Verified by CM: Yes Mode of Transport at Discharge: Other (see comment) Transition of Care Consult (CM Consult): Discharge Planning Current Support Network: Own Home, Lives Alone, Family Lives [place] Confirm Follow Up Transport: Family Plan discussed with Pt/Family/Caregiver: Yes Discharge Location Discharge Placement: Skilled nursing facility Reason for Admission: RRAT Score:     24 Resources/supports as identified by patient/family:    
             
Top Challenges facing patient (as identified by patient/family and CM): Stayed in room when Dr Shannon Shearer explained and discussed goals of care and that pt pleural effusions will most likely continue to come back over and over.  Pt alert and oriented and seems competent , but seems to lack understanding that fluid pills will not cure this Finances/Medication cost?  Some concern with any expensive drugs Transportation? Was driving Support system or lack thereof? Daughter Living arrangements? Lives alone Self-care/ADLs/Cognition? Alert, oriented, independent Current Advanced Directive/Advance Care Plan:  Wants to be Full Code Plan for utilizing home health:    Uses [facility] Likelihood of readmission: Very High 
              
Transition of Care Plan:     SNF vs [facility] Marty Vegas RN BSN Outcomes Manager Pager # [phone]

## 2019-04-11 NOTE — PROGRESS NOTES
TRANSFER - IN REPORT: 
 
Verbal report received from [address] [place] [address] 22, RN(name) on Mariposa Newman  being received from ER(unit) for routine progression of care Report consisted of patients Situation, Background, Assessment and  
Recommendations(SBAR). Information from the following report(s) SBAR, Kardex, ED Summary and Quality Measures was reviewed with the receiving nurse. Opportunity for questions and clarification was provided. Assessment completed upon patients arrival to unit and care assumed. 1200  Plan for thoracentesis today. INR checked, in process. Pt tachypneic, tachycardiac, denies distress. 1600  Thoracentesis delayed. Family updated. 1915  Bedside and Verbal shift change report given to KATYA Hernández (oncoming nurse) by Jennifer Ward RN (offgoing nurse). Report included the following information SBAR, Kardex, ED Summary, Intake/Output, MAR, Accordion, Recent Results, Cardiac Rhythm SR/Afib with PVCs, PACs, BBB and Quality Measures.

## 2019-04-11 NOTE — PROGRESS NOTES
Pharmacy Dosing Services: Vancomycin Indication: HAP Day of therapy: 0 Other Antimicrobials (Include dose, start day & day of therapy): 
Piperacillin-Tazobactam 4.5 grams every 8 hours BAKARI, Levofloxacin 750 mg every 48 hours Loading dose (date given): 1,500 mg 
Current Maintenance dose: New start Goal Vancomycin Level: 15-20 mcg/mL (Trough 15-20 for most infections, 20 for meningitis/osteomyelitis, pre-HD level ~25) Vancomycin Level (if drawn): New start Significant Cultures: New start Renal function stable? (unstable defined as SCr increase of 0.5 mg/dL or > 50% increase from baseline, whichever is greater) (Y/N): Y  
 
CAPD, Hemodialysis or Renal Replacement Therapy (Y/N): N Recent Labs 04/10/19 
1715 19 
7384 CREA 1.58* 1.30 BUN 39* 32* WBC 10.7 7.8 Temp (24hrs), Av.7 °F (36.5 °C), Min:97 °F (36.1 °C), Max:99.2 °F (37.3 °C) Creatinine Clearance (Creatinine Clearance (ml/min)): Estimated Creatinine Clearance: 24.7 mL/min (A) (based on SCr of 1.58 mg/dL (H)). Regimen assessment: New start Maintenance dose: 750 mg every 24 hours Next scheduled level: 2019 at 1830 Pharmacy will follow daily and adjust medications as appropriate for renal function and/or serum levels. Thank you, SIGRID Morillo

## 2019-04-11 NOTE — ED NOTES
Pt MAP below 65 multiple time. Notified provider. Per MD, pt has chronic hypotension and on midodrine and need to take midodrine.

## 2019-04-12 NOTE — PROGRESS NOTES
Physical Exam  
Constitutional: He is oriented to person, place, and time. He appears well-developed. No distress. Abdominal: Soft. Neurological: He is alert and oriented to person, place, and time. Skin: Skin is warm and dry. He is not diaphoretic.

## 2019-04-12 NOTE — PROCEDURES
[facility] Pulmonary, Critical Care, and Sleep Medicine Name: Corey Avalos MRN: [id number] : 1926 Hospital: [facility] Date: 2019 Thoracentesis Procedure Note PROCEDURE: 
DIAGNOSTIC/THERAPEUTIC THORACENTESIS 
CPT code: without Ultrasound- 78682 INDICATION: 
PLEURAL EFFUSION 
 
ANESTHESIA: 
LOCAL ANESTHESIA WITH 1% LIDOCAINE 
 
 
CHEST ULTRASOUND FINDINGS: 
Ultrasound was used to image the chest and localize the moderate right pleural effusion. DESCRIPTION OF PROCEDURE: 
After obtaining informed consent and localizing the safest location for thoracentesis, the  right intercostal space was marked with a blunt, plastic needle cap in the mid scapular line. A thoracentesis kit was used to perform the procedure. The skin was prepped and draped in the usual sterile fashion. Using the previously marked location as a giude, 1% lidocaine was injected into the skin and subcutaneous tissue, as well as onto the underlying rib and inter-costal muscles, pleural fluid was aspirated to assure proper location, prior to removing the anesthesia needle. The needle and thoracentesis catheter were then introduced into the chest at the localized site over the lower rib the rib localized with the needle, and the catheter then marched over the rib into the pleural space. After aspirating fluid, the thoracentesis catheter was then placed into the chest using the needle itself as a trocar. The needle was then removed and the catheter was attached to the supplied tubing without complication. A total of 1300 cc of pleural fluid, was aspirated and sent for analysis. The patient tolerated the procedure well without complications. Post procedure CXR is pending. Estimated blood loss Minimalml Procedure was supervised by Dr. Shantelle Cortez, NP

## 2019-04-12 NOTE — PROGRESS NOTES
Patient received on 4 lpm NC O2. , SpO2 98, RR 25, BS coarse and diminished. No resp distress noted at his time. Scheduled HHN DuoNeb held, as PRN was given at 0615. 
 
1620 Titrated down to 2 lpm NC O2. , SpO2 100, RR 29. No resp distress noted. Patient states he is breathing better.

## 2019-04-12 NOTE — ROUTINE PROCESS
Bedside and Verbal shift change report given to Ankit (oncoming nurse) by Tre Farris (offgoing nurse). Report included the following information SBAR, Kardex, ED Summary, Procedure Summary, Intake/Output, MAR, Accordion, Recent Results, Med Rec Status, Cardiac Rhythm ST, Alarm Parameters  and Quality Measures.

## 2019-04-12 NOTE — CONSULTS
[facility] - Consult Note Date of  Admission: 4/10/2019  3:53 PM  
Primary Care Physician:  Keerthi Toledo MD 
Patient seen and examined independently. Patient has some irregular narrow complex tachycardia on the monitor as well as 12-lead electrocardiogram today consistent with probable paroxysmal atrial fibrillation. Systolic BP marginal.  Will give 2 doses of intra-venous digoxin. Thoracentesis planned for today. Ultimately will need to discuss anticoagulation risks. Agree with assessment and plan as noted below. Johnna Amador MD 
 Assessment: - Acute hypoxic respiratory failure - HCAP: Multifocal PNA on CXR 04/10/19, multifocal consolidation within the left upper and bilateral lower lobes, likely recurrently multifocal PNA 
- Atrial fibrillation with RVR: CHADS2-Vasc score 5 (CHF, HTN, age, Vascular disease) - Bilateral moderate to large pleural effusions CT chest 04/10 
-CAD s/p LAD PCI with DEMAR 3/11/2019 with previous PCI to LCx 2011. Cath 3/11/2019 (Occluded mid RCA which appears to be chronic. There is faint collateral from the left coronary system, Left main artery with ostial 30-40%, LAD mid focal severely calcified tortuous 99% stenosis with CARLENE II flow, Circumflex coronary artery with diffuse mild 20-30% stenosis throughout). Discharged on ASA, plavix, statin, BB. 
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   
- Echo 03/14/19 with EF 26-30%  
-NEL, Creatinine 1.62 04/12 
-Peripheral vascular disease.  
-Hypertension, but currently hypotensive 
-Dyslipidemia 
-Asthma, former smoker. -Advanced age 
  
 Plan:  
 
- Plans for thoracentesis and possible placement of pleurX catheter per pulmonary team 
- Will give IV Digoxin for afib RVR 
- Will need discussion regarding anticoagulation risks and benefits throughout hospital stay. This is not an ideal time to start with plans for possible thoracentesis.  He does have a history of UGIB and may need input from GI regarding anticoagulation risk profile with hx of AVM. - Consider IV diuretics, but hypotensive, will discuss further with attending 
- Continue with rest of cardiac Rx, hypotension limiting the use of beta blockers History of Present Illness: This is a 80 y.o. male admitted for Chronic bilateral pleural effusions [J90] HCAP (healthcare-associated pneumonia) [J18.9] CHF (congestive heart failure) ([facility] [address] [I50.9] Bilateral pleural effusion [J90] HCAP (healthcare-associated pneumonia) [J18.9] CHF (congestive heart failure) ([facility] [address] [I50.9]. Patient complains of:  Patient admitted for HCAP This is a 80year old male with a past medical history as outlined above that cardiology is seeing in consult due to CHF. The daughter is at bedside and explains that he was at [facility] after a hospitalization at [facility]. He had shortness of breath while in the rehab facility and was sent to the [facility] emergency room. Evaluation revealed bilateral pleural effusions and multifocal PNA. His previous stay at [facility] it outlined above in the assessment, but of note he did have cardiac cath and PCI placement March 2019. Cardiac risk factors: smoking/ tobacco exposure, dyslipidemia, male gender, hypertension Review of Symptoms:  Except as stated above include: 
Constitutional:  negative Respiratory:  +dyspnea Cardiovascular:  Per HPI Gastrointestinal: negative Genitourinary:  negative Musculoskeletal:  Negative Neurological:  Negative Dermatological:  Negative Endocrinological: Negative Psychological:  Negative A comprehensive review of systems was negative except for that written in the HPI. Past Medical History:  
 
Past Medical History:  
Diagnosis Date  Asthma  Cardiac cath 04/28/2011 oLM 30%. pLAD 30%. oD1 50%. CX 90% (3 x 18 Brumley DEMAR). dRCA 90%. RPLB subtotal.  RPDA 100%.  Cardiac echocardiogram 01/21/2014 EF 55-60%. Mod LVH. Gr 1 DDfx. Mild AS (mean grad 13). Mild AI. Unchanged from study of 11.  Cardiac nuclear imaging test, mod risk 2016 Intermediate risk. Medium-sized inferior, inferoseptal infarction. No ischemia. EF 54%. Nondiagnostic EKG on pharm stress test.  
 Carotid duplex 2016 Mod 50-69% bilateral ICA stenosis. Probable significant RECA stenosis. >50% stenosis of left subclavian. No significant change from study of 1/8/15.  Coronary artery disease Status post PCI with drug-eluting stent, Bulger 3 x 18 mm in the proximal circumflex.  Heart failure ([facility] [address]  Hx of carotid artery disease ([facility] [address]  Hypercholesterolemia  Hypertension  Prostate cancer ([facility] [address] Social History:  
 
Social History Socioeconomic History  Marital status:  Spouse name: Not on file  Number of children: Not on file  Years of education: Not on file  Highest education level: Not on file Tobacco Use  Smoking status: Former Smoker Packs/day: 1.00 Years: 15.00 Pack years: 15.00 Last attempt to quit: 1960 Years since quittin.9  Smokeless tobacco: Never Used Substance and Sexual Activity  Alcohol use: No  
 Drug use: No  
 Sexual activity: Not Currently Family History:  
History reviewed. No pertinent family history. Medications:  
No Known Allergies Current Facility-Administered Medications Medication Dose Route Frequency  bisacodyl (DULCOLAX) suppository 10 mg  10 mg Rectal DAILY PRN  
 VANCOMYCIN INFORMATION NOTE   Other ONCE  
 fentaNYL citrate (PF) injection 25 mcg  25 mcg IntraVENous ONCE  
 sodium chloride (NS) flush 5-10 mL  5-10 mL IntraVENous PRN  
 levoFLOXacin (LEVAQUIN) 750 mg in D5W IVPB  750 mg IntraVENous Q48H  VANCOMYCIN INFORMATION NOTE   Other Rx Dosing/Monitoring  piperacillin-tazobactam (ZOSYN) 4.5 g in 0.9% sodium chloride (MBP/ADV) 100 mL MBP  4.5 g IntraVENous Q8H  
 albuterol-ipratropium (DUO-NEB) 2.5 MG-0.5 MG/3 ML  3 mL Nebulization Q4H RT  
 aspirin chewable tablet 81 mg  81 mg Oral DAILY  atorvastatin (LIPITOR) tablet 40 mg  40 mg Oral QHS  clopidogrel (PLAVIX) tablet 75 mg  75 mg Oral DAILY  famotidine (PEPCID) tablet 20 mg  20 mg Oral DAILY  furosemide (LASIX) tablet 20 mg  20 mg Oral DAILY  midodrine (PROAMITINE) tablet 10 mg  10 mg Oral TID WITH MEALS  nitroglycerin (NITROSTAT) tablet 0.4 mg  0.4 mg SubLINGual Q5MIN PRN  
 tamsulosin (FLOMAX) capsule 0.4 mg  0.4 mg Oral DAILY  therapeutic multivitamin (THERAGRAN) tablet 1 Tab  1 Tab Oral DAILY  acetaminophen (TYLENOL) tablet 650 mg  650 mg Oral Q4H PRN  
 ketorolac (TORADOL) injection 15 mg  15 mg IntraVENous Q6H PRN  
 ondansetron (ZOFRAN) injection 4 mg  4 mg IntraVENous Q4H PRN  
 albuterol-ipratropium (DUO-NEB) 2.5 MG-0.5 MG/3 ML  3 mL Nebulization Q4H PRN  
 vancomycin (VANCOCIN) 750 mg in 0.9% sodium chloride (MBP/ADV) 250 mL ADV  750 mg IntraVENous Q24H  
 arformoterol (BROVANA) neb solution 15 mcg  15 mcg Nebulization BID RT And  
 budesonide (PULMICORT) 500 mcg/2 ml nebulizer suspension  500 mcg Nebulization BID RT Physical Exam:  
 
Visit Vitals BP 96/68 Pulse (!) 124 Temp 97.3 °F (36.3 °C) Resp (!) 36 Ht 5' 6\" (1.676 m) Wt 132 lb (59.9 kg) SpO2 98% BMI 21.31 kg/m² BP Readings from Last 3 Encounters:  
04/12/19 96/68  
04/10/19 100/64  
04/02/19 100/58 Pulse Readings from Last 3 Encounters:  
04/12/19 (!) 124  
04/10/19 100  
04/02/19 82 Wt Readings from Last 3 Encounters:  
04/10/19 132 lb (59.9 kg) 04/09/19 141 lb 6.4 oz (64.1 kg) 04/02/19 147 lb 4.3 oz (66.8 kg) General:  cooperative, no distress Neck:  nontender, no JVD Lungs:  clear to auscultation bilaterally Heart:  irregularly irregular rhythm Abdomen:  abdomen is soft without significant tenderness, masses, organomegaly or guarding Extremities:  extremities normal, atraumatic, no cyanosis or edema Skin: Warm and dry. no hyperpigmentation, vitiligo, or suspicious lesions Neuro: alert, oriented x3, affect appropriate Psych: non focal 
 
 Data Review:  
 
Recent Labs 04/12/19 
0230 04/11/19 
0345 04/10/19 
1715 WBC 7.8 9.2 10.7 HGB 8.3* 9.0* 10.2* HCT 28.3* 29.9* 34.5*  
 235 265 Recent Labs 04/12/19 
0210 04/11/19 
1040 04/11/19 
0345 04/10/19 
1715   --  142 137  
K 3.4*  --  3.9 4.6   --  109* 95* CO2 30  --  28 34* *  --  91 152* BUN 39*  --  23* 39* CREA 1.62*  --  0.76 1.58* CA 8.4*  --  7.4* 8.9 ALB  --   --  2.5* 3.1*  
SGOT  --   --  11* 14* ALT  --   --  13* 15* INR  --  1.3*  --   -- Results for orders placed or performed during the hospital encounter of 04/10/19 EKG, 12 LEAD, INITIAL Result Value Ref Range Ventricular Rate 120 BPM  
 Atrial Rate 120 BPM  
 P-R Interval 124 ms QRS Duration 144 ms Q-T Interval 340 ms QTC Calculation (Bezet) 480 ms Calculated P Axis 57 degrees Calculated R Axis 8 degrees Calculated T Axis 150 degrees Diagnosis Sinus tachycardia Left bundle branch block Abnormal ECG When compared with ECG of 16-MAR-2019 03:56, 
fusion complexes are no longer present 
premature ventricular complexes are no longer present 
premature supraventricular complexes are no longer present Confirmed by Yeison Cheek ([id number]) on 4/11/2019 2:32:55 PM 
  
Results for orders placed or performed in visit on 01/16/19 AMB POC EKG ROUTINE W/ 12 LEADS, INTER & REP Impression See progress note. All Cardiac Markers in the last 24 hours:  No results found for: CPK, CK, CKMMB, CKMB, RCK3, CKMBT, CKNDX, CKND1, CHRISTINE, TROPT, TROIQ, NGHIA, TROPT, TNIPOC, BNP, BNPP Last Lipid:   
Lab Results Component Value Date/Time  Cholesterol, total 87 03/08/2019 05:28 AM  
 HDL Cholesterol 44 03/08/2019 05:28 AM  
 LDL, calculated 30 03/08/2019 05:28 AM  
 Triglyceride 65 03/08/2019 05:28 AM  
 CHOL/HDL Ratio 2.0 03/08/2019 05:28 AM  
 
 
Signed By: Jaron Philip. Arturo Pérez PA-C April 12, 2019

## 2019-04-12 NOTE — PROGRESS NOTES
Patient received on Nasal Cannula. He has tachypnea with exertion, but is otherwise comfortable and without respiratory distress. Breath sounds are coarse throughout. Breathing treatments given. [place] in [address] [address] therapy. He was productive of a small amount of thick, yellow secretions. VibraPEP device left at bedside for patient use. Will continue to monitor. 0020: Duoneb treatment held due to tachycardia. No respiratory distress.

## 2019-04-12 NOTE — PROGRESS NOTES
Progress Note Patient: Sharona Alvarez               Sex: male          DOA: 4/10/2019 YOB: 1926      Age:  80 y.o.        LOS:  LOS: 2 days CHIEF COMPLAINT:  Other (pt on TCC and not improving per Daugher/wants evaluated.) Subjective:  
 
Pt seen and examined. He complains of SOB. Objective:  
 
Visit Vitals /70 Pulse (!) 119 Temp 98.2 °F (36.8 °C) Resp 21 Ht 5' 6\" (1.676 m) Wt 59.9 kg (132 lb) SpO2 100% BMI 21.31 kg/m² Physical Exam: 
Ears: hearing is intact Eyes: Icterus is not present Lungs: diminished Heart: tachycardic Gastrointestinal: soft, non-tender. Bowel sounds normal. No masses,  no organomegaly Neurological:  New Focal Deficits are not present Psychiatric:  Mood is stable Lab/Data Reviewed: 
CMP:  
Lab Results Component Value Date/Time  04/12/2019 02:10 AM  
 K 3.4 (L) 04/12/2019 02:10 AM  
  04/12/2019 02:10 AM  
 CO2 30 04/12/2019 02:10 AM  
 AGAP 11 04/12/2019 02:10 AM  
  (H) 04/12/2019 02:10 AM  
 BUN 39 (H) 04/12/2019 02:10 AM  
 CREA 1.62 (H) 04/12/2019 02:10 AM  
 GFRAA 48 (L) 04/12/2019 02:10 AM  
 GFRNA 40 (L) 04/12/2019 02:10 AM  
 CA 8.4 (L) 04/12/2019 02:10 AM  
 
CBC:  
Lab Results Component Value Date/Time WBC 7.8 04/12/2019 02:30 AM  
 HGB 8.3 (L) 04/12/2019 02:30 AM  
 HCT 28.3 (L) 04/12/2019 02:30 AM  
  04/12/2019 02:30 AM  
 
 
Imaging Reviewed: 
No results found. Assessment:  
 
Principal Problem: 
  Chronic bilateral pleural effusions (4/10/2019) Active Problems: 
  CHF (congestive heart failure) ([address] (4/10/2019) HCAP (healthcare-associated pneumonia) (4/10/2019) PLAN: 
· [facility] eff[facility], planned for today · HCAP: Continue broad spectrum IV ab. Sputum cultures and urine antigens ordered · SLP was consulted previously, ordered diet recommended by SLP (soft, nectar thick liquids) · Cont acceptable home medications for chronic conditions · DVT protocol I have personally reviewed all pertinent labs and films that have officially resulted over the last 24 hours. I have personally checked for all pending labs that are awaiting final results. Signed By: Irving Sosa MD   
 April 12, 2019

## 2019-04-12 NOTE — PROGRESS NOTES
Physical Exam  
Skin: Skin is warm and dry. 2-person skin assessment completed by Brittney Rehman RN.

## 2019-04-12 NOTE — MANAGEMENT PLAN
Discharge/Transition Planning Spoke with pt and daughter at length. Pt was very independent and active and wants to return to this as much as possible. Open to SNF rehab if needed or HH. Provided SNF list and [facility] and [facility] and they will review for more choices. Pt wants as much therapy as possible and be up out of bed as much as possible. Pt gardened, golfed, went bowling prior to 2 months ago. Daughter lives 2 miles away and is looking into Med Alert monitoring for father at his home as well. Jose Villatoro RN BSN Outcomes Manager Pager # [phone]

## 2019-04-12 NOTE — PROGRESS NOTES
[facility] Pulmonary, Critical Care, and Sleep Medicine New patient consult Name: Breanne Thomas MRN: [id number] : 1926 Hospital: [facility] Date: 2019 IMPRESSION:  
· Acute hypoxic respiratory failure in a patient with HFrEF (EF 26%). · Evidence of bilateral recurrent transudative pleural effusions, bilateral prominent interstitial markings concerning for pulmonary edema and additionally there are areas of dense air space process in left upper (see below) and both lower lobes. The areas of air space process is concerning for pneumonia. · NSTEMi and S/P PCI to LAD on 3/11/19 · Atrial fibrillation with RVR - CHADS2 VASC score of 5.  
· Ischemic cardiomyopathy and HFrEF · Anemia with hx of recent GI bleed. · Significant peripheral vascular disease CAD, ICA stenosis. · Hx of HTN and DLD · Hx of smoking - COPD from hx. ·   
  
RECOMMENDATIONS:  
· Continue supplemental oxygen to keep SpO2>90%. · Continue vancomycin, levaquin and zosyn. · I discussed previously with patient the risks and benefits of thoracentesis and placement of the pleurX catheter. Patient and family wants to get a thoracentesis and optimize medical management and continue antibiotics and then monitor for development of pleural effusions again. I discussed with them that the pleurX catheter is not typically used for this condition and this is an off-label use. Patient and daughter understand the reasoning and will think about getting this procedure. · Right sided thoracentesis was performed today under US guidance. If no complications can consider doing left sided thoracentesis tomorrow. · Will appreciate cardiology assistance and defer the management of heart failure to the primary and the cardiology team.  
· Obtain sputum cultures. · Bronchial hygiene protocol · Bronchodilators PRN 
· Steroids - not indicated. · Aspiration precautions · Assess home Oxygen needs at discharge · OT, PT, OOB and ambulate · Healthy weight · Will Follow · DVT, PUD prophylaxis Subjective: This patient has been seen and evaluated at the request of Dr. Laz Alexandre for evaluation of pleural effusion. Patient is a 80 y.o. male with past medical history significant for asthma, ischemic cardiomyopathy (S/P PCI), GI bleed (recent) and hypertension who has presented with progressive SOB. Patient is known to have thoracentesis in the past and had repeated thoracentesis done previously with temporary relief of dyspnea. Patient had repeat imaging done in ED that showed bilateral pleural effusions and air space processes. Patient was admitted to the hospital and started on broad spectrum abx and pulmonary medicine was consulted.  
  
Patient states that he is an ex smoker. Smoked 1ppd but quit 50 years ago. Worked for [employer], But denies any history of asbestos exposure.  
  
4/12/19: 
Patient appears well. No major issues. No pains. Complains of SOB that has not worsened since his admission. Discussed the case with the daughter and reviewed CT scans with her. Past Medical History:  
Diagnosis Date  Asthma  Cardiac cath 04/28/2011 oLM 30%. pLAD 30%. oD1 50%. CX 90% (3 x 18 [place] DEMAR). dRCA 90%. RPLB subtotal.  RPDA 100%.  Cardiac echocardiogram 01/21/2014 EF 55-60%. Mod LVH. Gr 1 DDfx. Mild AS (mean grad 13). Mild AI. Unchanged from study of 11/30/11.  Cardiac nuclear imaging test, mod risk 01/22/2016 Intermediate risk. Medium-sized inferior, inferoseptal infarction. No ischemia. EF 54%. Nondiagnostic EKG on pharm stress test.  
 Carotid duplex 03/02/2016 Mod 50-69% bilateral ICA stenosis. Probable significant RECA stenosis. >50% stenosis of left subclavian. No significant change from study of 1/8/15.  Coronary artery disease  Status post PCI with drug-eluting stent, [place] 3 x 18 mm in the proximal circumflex.  Heart failure ([facility] [address]  Hx of carotid artery disease ([facility] [address]  Hypercholesterolemia  Hypertension  Prostate cancer ([facility] [address] Past Surgical History:  
Procedure Laterality Date  CARDIAC SURG PROCEDURE UNLIST  HX CORONARY STENT PLACEMENT  4/28/11 PCI with drug-eluting stent, [place] 3 x 18 mm in the proximal circumflex (post dialated with 3.25 mm noncompliant balloon at high pressure). Prior to Admission medications Medication Sig Start Date End Date Taking? Authorizing Provider  
furosemide (LASIX) 20 mg tablet Take 1 Tab by mouth daily. 4/2/19   Rogelio Stokes MD  
furosemide (LASIX) 20 mg tablet Take 0.5 Tabs by mouth daily. 4/2/19   Rogelio Stokes MD  
aspirin 81 mg chewable tablet Take 1 Tab by mouth daily. 4/3/19   Rogelio Stokes MD  
budesonide-formoterol (SYMBICORT) 80-4.5 mcg/actuation HFAA Take 2 Puffs by inhalation two (2) times a day. 4/2/19   Rogelio Stokes MD  
tamsulosin (FLOMAX) 0.4 mg capsule Take 1 Cap by mouth daily. 4/3/19   Rogelio Stokes MD  
albuterol-ipratropium (DUO-NEB) 2.5 mg-0.5 mg/3 ml nebu 3 mL by Nebulization route every four (4) hours as needed (sob, wheezing). 4/2/19   Rogelio Stokes MD  
famotidine (PEPCID) 20 mg tablet Take 1 Tab by mouth daily. 4/3/19   Rogelio Stokes MD  
midodrine (PROAMITINE) 10 mg tablet Take 1 Tab by mouth three (3) times daily (with meals) for 30 days. 4/2/19 5/2/19  Rogelio Stokes MD  
therapeutic multivitamin [facility]) tablet Take 1 Tab by mouth daily. 4/3/19   Rogelio Stokes MD  
atorvastatin (LIPITOR) 40 mg tablet Take 1 Tab by mouth nightly. 3/12/19   Tomas Swan MD  
nitroglycerin (NITROSTAT) 0.4 mg SL tablet 1 Tab by SubLINGual route every five (5) minutes as needed for Chest Pain.  3/12/19   Tomas Swan MD  
budesonide-formoterol (SYMBICORT) 160-4.5 mcg/actuation HFAA Take 2 Puffs by inhalation two (2) times a day. 3/1/19   Jade Daniel MD  
glycopyrrolate-formoterol (BEVESPI AEROSPHERE) 9-4.8 mcg HFAA Take 2 Inhalation by inhalation two (2) times a day. 3/1/19   Carly Gaona MD  
clopidogrel (PLAVIX) 75 mg tab TAKE 1 TABLET EVERY DAY 19   Jade Daniel MD  
albuterol (PROVENTIL HFA, VENTOLIN HFA, PROAIR HFA) 90 mcg/actuation inhaler Take 2 Puffs by inhalation every four (4) hours as needed for Wheezing or Shortness of Breath. 3/2/15   AMITA Randall  
tamsulosin (FLOMAX) 0.4 mg capsule Take 1 Cap by mouth daily. 3/13/13   Carly Gaona MD  
aspirin delayed-release 81 mg tablet Take 81 mg by mouth daily. Carly Gaona MD  
 
No Known Allergies Social History Tobacco Use  Smoking status: Former Smoker Packs/day: 1.00 Years: 15.00 Pack years: 15.00 Last attempt to quit: 1960 Years since quittin.9  Smokeless tobacco: Never Used Substance Use Topics  Alcohol use: No  
  
History reviewed. No pertinent family history. @DBLINKMRCHARTING(EPT,6719)@ Immunization status: up to date and documented. Current Facility-Administered Medications Medication Dose Route Frequency  VANCOMYCIN INFORMATION NOTE   Other ONCE  
 [START ON 2019] digoxin (LANOXIN) injection 250 mcg  250 mcg IntraVENous ONCE  
 levoFLOXacin (LEVAQUIN) 750 mg in D5W IVPB  750 mg IntraVENous Q48H  VANCOMYCIN INFORMATION NOTE   Other Rx Dosing/Monitoring  piperacillin-tazobactam (ZOSYN) 4.5 g in 0.9% sodium chloride (MBP/ADV) 100 mL MBP  4.5 g IntraVENous Q8H  
 albuterol-ipratropium (DUO-NEB) 2.5 MG-0.5 MG/3 ML  3 mL Nebulization Q4H RT  
 aspirin chewable tablet 81 mg  81 mg Oral DAILY  atorvastatin (LIPITOR) tablet 40 mg  40 mg Oral QHS  clopidogrel (PLAVIX) tablet 75 mg  75 mg Oral DAILY  famotidine (PEPCID) tablet 20 mg  20 mg Oral DAILY  furosemide (LASIX) tablet 20 mg  20 mg Oral DAILY  midodrine (PROAMITINE) tablet 10 mg  10 mg Oral TID WITH MEALS  tamsulosin (FLOMAX) capsule 0.4 mg  0.4 mg Oral DAILY  therapeutic multivitamin (THERAGRAN) tablet 1 Tab  1 Tab Oral DAILY  vancomycin (VANCOCIN) 750 mg in 0.9% sodium chloride (MBP/ADV) 250 mL ADV  750 mg IntraVENous Q24H  
 arformoterol (BROVANA) neb solution 15 mcg  15 mcg Nebulization BID RT And  
 budesonide (PULMICORT) 500 mcg/2 ml nebulizer suspension  500 mcg Nebulization BID RT Review of Systems: 
Pertinent items are noted in HPI. Objective: 
Vital Signs:   
Visit Vitals /65 Pulse (!) 121 Temp 97.6 °F (36.4 °C) Resp (!) 31 Ht 5' 6\" (1.676 m) Wt 59.9 kg (132 lb) SpO2 100% BMI 21.31 kg/m² O2 Device: Nasal cannula O2 Flow Rate (L/min): 2 l/min(titrated down from 3) Temp (24hrs), Av.7 °F (36.5 °C), Min:97.3 °F (36.3 °C), Max:98.2 °F (36.8 °C) Intake/Output:  
Last shift:       0701 -  1900 In: 540 [P.O.:540] Out: 2000 [Urine:500] Last 3 shifts: 04/10 1901 -  0700 In: 1860 [P.O.:460; I.V.:1400] Out: 750 [Urine:750] Intake/Output Summary (Last 24 hours) at 2019 1659 Last data filed at 2019 1630 Gross per 24 hour Intake 1050 ml Output 2450 ml Net -1400 ml Physical Exam:  
General:  Alert, cooperative, no distress, appears stated age. Head:  Normocephalic, without obvious abnormality, atraumatic. Eyes:  Conjunctivae/corneas clear. PERRL, EOMs intact. Nose: Nares normal. Septum midline. Mucosa normal. No drainage or sinus tenderness. Throat: Lips, mucosa, and tongue normal. Teeth and gums normal.  
Neck: Supple, symmetrical, trachea midline, no adenopathy, thyroid: no enlargment/tenderness/nodules, no carotid bruit and no JVD. Back:   Symmetric, no curvature. ROM normal.  
Lungs:   Bilateral reduced air entry at the lower lung zones. Chest wall:  No tenderness or deformity. Heart:  Irregular rate and rhythm, S1, S2 normal, no murmur, click, rub or gallop. Abdomen:   Soft, non-tender. Bowel sounds normal. No masses,  No organomegaly. Extremities: Extremities normal, atraumatic, no cyanosis or edema. Pulses: 2+ and symmetric all extremities. Skin: Skin color, texture, turgor normal. No rashes or lesions Lymph nodes: Cervical, supraclavicular, and axillary nodes normal.  
Neurologic: Grossly nonfocal  
 
Data review:  
 
Recent Results (from the past 24 hour(s)) METABOLIC PANEL, BASIC Collection Time: 04/12/19  2:10 AM  
Result Value Ref Range Sodium 142 136 - 145 mmol/L Potassium 3.4 (L) 3.5 - 5.5 mmol/L Chloride 101 100 - 108 mmol/L  
 CO2 30 21 - 32 mmol/L Anion gap 11 3.0 - 18 mmol/L Glucose 126 (H) 74 - 99 mg/dL BUN 39 (H) 7.0 - 18 MG/DL Creatinine 1.62 (H) 0.6 - 1.3 MG/DL  
 BUN/Creatinine ratio 24 (H) 12 - 20 GFR est AA 48 (L) >60 ml/min/1.73m2 GFR est non-AA 40 (L) >60 ml/min/1.73m2 Calcium 8.4 (L) 8.5 - 10.1 MG/DL  
CBC WITH AUTOMATED DIFF Collection Time: 04/12/19  2:30 AM  
Result Value Ref Range WBC 7.8 4.6 - 13.2 K/uL  
 RBC 3.17 (L) 4.70 - 5.50 M/uL HGB 8.3 (L) 13.0 - 16.0 g/dL HCT 28.3 (L) 36.0 - 48.0 % MCV 89.3 74.0 - 97.0 FL  
 MCH 26.2 24.0 - 34.0 PG  
 MCHC 29.3 (L) 31.0 - 37.0 g/dL  
 RDW 17.5 (H) 11.6 - 14.5 % PLATELET 300 685 - 019 K/uL MPV 9.8 9.2 - 11.8 FL  
 NEUTROPHILS 80 (H) 42 - 75 % LYMPHOCYTES 15 (L) 20 - 51 % MONOCYTES 5 2 - 9 % EOSINOPHILS 0 0 - 5 % BASOPHILS 0 0 - 3 %  
 ABS. NEUTROPHILS 6.2 1.8 - 8.0 K/UL  
 ABS. LYMPHOCYTES 1.2 0.8 - 3.5 K/UL  
 ABS. MONOCYTES 0.4 0 - 1.0 K/UL  
 ABS. EOSINOPHILS 0.0 0.0 - 0.4 K/UL  
 ABS. BASOPHILS 0.0 0.0 - 0.1 K/UL PLATELET COMMENTS ADEQUATE PLATELETS    
 RBC COMMENTS NORMOCYTIC, NORMOCHROMIC    
 DF MANUAL    
EKG, 12 LEAD, SUBSEQUENT Collection Time: 04/12/19  1:27 PM  
Result Value Ref Range  Ventricular Rate 129 BPM  
 Atrial Rate 129 BPM  
 QRS Duration 148 ms Q-T Interval 366 ms  
 QTC Calculation (Bezet) 536 ms Calculated R Axis -12 degrees Calculated T Axis 163 degrees Diagnosis Atrial fibrillation with rapid ventricular response with premature  
ventricular or aberrantly conducted complexes Left bundle branch block Abnormal ECG When compared with ECG of 10-APR-2019 16:17, Atrial fibrillation has replaced Sinus rhythm Imaging: 
I have personally reviewed the patients radiographs and have reviewed the reports: XR Results (most recent): 
Results from Hospital Encounter encounter on 04/10/19 XR CHEST PORT Narrative EXAM: XR CHEST PORT 
 
CLINICAL INDICATION/HISTORY: s/p thoracentesis -Additional: Right-sided thoracentesis by nurse practitioner note COMPARISON: Chest x-ray from 4/10/2019 TECHNIQUE: Frontal view of the chest 
 
_______________ FINDINGS: 
 
HEART AND MEDIASTINUM: Midline cardiac silhouette, stable in size and 
appearance. Stable mediastinal contours LUNGS AND PLEURAL SPACES: No post procedure pneumothorax. No significant 
radiographic evidence of a residual effusion. Persistent left hemithoracic hazy 
opacity with diaphragmatic confluence with no change in pleural effusion. BONY THORAX AND SOFT TISSUES: Stable intact 
 
_______________ Impression IMPRESSION: 
 
1. No postprocedural pneumothorax. 2. Unchanged left hemithoracic opacity/atelectasis and pleural effusion. CT Results (most recent): 
Results from Hospital Encounter encounter on 04/10/19 CT CHEST WO CONT Narrative EXAM: CT chest  
 
CLINICAL INDICATION/HISTORY: SOB 
  > Additional: None. COMPARISON: CTA chest 03/13/2019 
  > Reference Exam: None. TECHNIQUE: Axial CT imaging from the thoracic inlet through the diaphragm 
without intravenous contrast. Multiplanar reformats were generated.  One or more 
dose reduction techniques were used on this CT: automated exposure control, 
 adjustment of the mAs and/or kVp according to patient size, and iterative 
reconstruction techniques. The specific techniques used on this CT exam have 
been documented in the patient's electronic medical record. Digital Imaging and 
Communications in Medicine (DICOM) format image data are available to 
nonaffiliated external healthcare facilities or entities on a secure, media 
free, reciprocally searchable basis with patient authorization for at least a 
12-month period after this study. _______________ FINDINGS: 
 
LUNGS: Multifocal consolidation within the left upper and bilateral lower lobes, 
similar to prior CT. Bandlike opacity within the right upper lobe, atelectasis 
or scarring. PLEURA: Moderate to large bilateral pleural effusions, similar to prior study. AIRWAY: Normal. 
 
MEDIASTINUM: Normal heart size. No pericardial effusion. Moderate 
atherosclerosis involving the aorta, great vessels and coronary arteries. LYMPH NODES: No enlarged lymph nodes. UPPER ABDOMEN: Cholelithiasis without gallbladder distention. OTHER: No acute or aggressive osseous abnormalities identified. _______________ Impression IMPRESSION: 
 
1. Multifocal consolidation within the left upper and bilateral lower lobes, 
similar to prior CT 03/13/2019. Findings likely represent recurrent multifocal 
pneumonia and/or atelectasis. 2. Bilateral moderate to large pleural effusions, similar to prior CT 3/13/2019  
 
3. Cholelithiasis without gallbladder distention. 03/13/19 ECHO ADULT FOLLOW-UP OR LIMITED 03/15/2019 3/15/2019 Narrative · Left ventricular severely decreased systolic function. Estimated left  
ventricular ejection fraction is 26 - 30%. Visually measured ejection  
fraction. Left ventricular mild concentric hypertrophy. Abnormal left  
ventricular wall motion as described on the wall scoring diagram below. · Severe tricuspid valve regurgitation is present. Moderate to severe  
pulmonary hypertension is present. · Moderate mitral valve regurgitation.  
   
  Signed by: MD Joanna Nicole MD

## 2019-04-13 PROBLEM — I48.91 ATRIAL FIBRILLATION WITH RAPID VENTRICULAR RESPONSE (HCC): Status: ACTIVE | Noted: 2019-01-01

## 2019-04-13 NOTE — PROGRESS NOTES
[facility]  -  Progress Note Patient: Corey Avalos MRN: [id number]  SSN: [id number] YOB: 1926  Age: 80 y.o. Sex: male Admit Date: 4/10/2019 Assessment: - Acute hypoxic respiratory failure - HCAP: Multifocal PNA on CXR 04/10/19, multifocal consolidation within the left upper and bilateral lower lobes, likely recurrently multifocal PNA 
- Atrial fibrillation with RVR: CHADS2-Vasc score 5 (CHF, HTN, age, Vascular disease) - Bilateral moderate to large pleural effusions CT chest 04/10 
-CAD s/p LAD PCI with DEMAR 3/11/2019 with previous PCI to LCx 2011. Cath 3/11/2019 (Occluded mid RCA which appears to be chronic. There is faint collateral from the left coronary system, Left main artery with ostial 30-40%, LAD mid focal severely calcified tortuous 99% stenosis with CARLENE II flow, Circumflex coronary artery with diffuse mild 20-30% stenosis throughout). Discharged on ASA, plavix, statin, BB. 
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   
- Echo 03/14/19 with EF 26-30%  
-NEL, Creatinine 1.62 04/12 
-Peripheral vascular disease.  
-Hypertension, but currently hypotensive 
-Dyslipidemia 
-Asthma, former smoker. -Advanced age 
  
Plan: 1. Await thoracentesis today on left, but clinically already improved. 2. Consider another dose of digoxin IV later today which should complete digitalization. 3. Once planned thoracentesis are concluded will need to discuss pros and cons of long term anticoagulation. Subjective: No new complaints. He had 1300 cc of serous fluid reportedly from right thoracentesis completed yesterday with plans on a left thoracentesis later today. He has received 0.25 mg of digoxin IV times 3 in past 18 hours with heart rate in atrial fibrillation still very variable in 120 range. Objective:  
  
Patient Vitals for the past 8 hrs: 
 Temp Pulse Resp BP SpO2  
04/13/19 1000  (!) 114 21 103/64 96 % 04/13/19 0900  (!) 121 20 100/82 98 % 04/13/19 0808    95/69 100 % 04/13/19 0800 97.5 °F (36.4 °C) (!) 119 24 (!) 89/64 99 % 04/13/19 0757     100 % 04/13/19 0743  (!) 123 26 106/77 98 % 04/13/19 0700  (!) 117 18 (!) 89/71 98 % 04/13/19 0600  (!) 131 26 95/57 99 % 04/13/19 0500  (!) 128 27 109/67 98 % 04/13/19 0400 97.9 °F (36.6 °C) (!) 131 28 103/64 98 % 04/13/19 0300  (!) 127 23 104/64 99 % Patient Vitals for the past 96 hrs: 
 Weight 04/10/19 1609 59.9 kg (132 lb) Intake/Output Summary (Last 24 hours) at 4/13/2019 1031 Last data filed at 4/13/2019 3545 Gross per 24 hour Intake 2000 ml Output 2550 ml Net -550 ml Physical Exam: 
General:  alert, cooperative, no distress, appears stated age Neck:  no JVD Lungs:  Fairly clear to auscultation on the right with decrease breath sounds in left base. Heart:  Irregularly irregular rate and rhythm, S1, S2 normal, no murmur, click, rub or gallop Abdomen:  abdomen is soft without significant tenderness, masses, organomegaly or guarding Extremities:  extremities normal, atraumatic, no cyanosis or edema Data Review:  
 
Labs: Results:  
   
Chemistry Recent Labs 04/13/19 
0230 04/12/19 
1834 04/12/19 
0210 04/11/19 
0345 04/10/19 
1715 *  --  126* 91 152* *  --  142 142 137  
K 3.7 3.2* 3.4* 3.9 4.6   --  101 109* 95* CO2 29  --  30 28 34* BUN 44*  --  39* 23* 39* CREA 1.63*  --  1.62* 0.76 1.58* CA 8.6  --  8.4* 7.4* 8.9 MG 2.1  --   --   --   --   
PHOS 3.1  --   --   --   --   
AGAP 13  --  11 5 8 BUCR 27*  --  24* 30* 25* AP  --   --   --  71 87 TP  --   --   --  6.5 6.9 ALB  --   --   --  2.5* 3.1*  
GLOB  --   --   --  4.0 3.8 AGRAT  --   --   --  0.6* 0.8 CBC w/Diff Recent Labs 04/13/19 
0230 04/12/19 
0230 04/11/19 
0345 04/10/19 
1715 WBC 7.7 7.8 9.2 10.7 RBC 3.38* 3.17* 3.39* 3.91* HGB 8.7* 8.3* 9.0* 10.2*  
 HCT 30.0* 28.3* 29.9* 34.5*  
 251 235 265 GRANS 73 80*  --  77* LYMPH 9* 15*  --  9*  
EOS 2 0  --  0 Cardiac Enzymes No results found for: CPK, CK, CKMMB, CKMB, RCK3, CKMBT, CKNDX, CKND1, CHRISTINE, TROPT, TROIQ, NGHIA, TROPT, TNIPOC, BNP, BNPP Coagulation Recent Labs 04/11/19 
1040 PTP 15.6* INR 1.3* Lipid Panel Lab Results Component Value Date/Time Cholesterol, total 87 03/08/2019 05:28 AM  
 HDL Cholesterol 44 03/08/2019 05:28 AM  
 LDL, calculated 30 03/08/2019 05:28 AM  
 VLDL, calculated 13 03/08/2019 05:28 AM  
 Triglyceride 65 03/08/2019 05:28 AM  
 CHOL/HDL Ratio 2.0 03/08/2019 05:28 AM  
  
BNP No results found for: BNP, BNPP, XBNPT Liver Enzymes Recent Labs 04/11/19 
0345 TP 6.5 ALB 2.5* AP 71 SGOT 11* Digoxin Thyroid Studies Lab Results Component Value Date/Time TSH 1.22 03/07/2019 05:29 AM  
    
 
Jocelyne Conklin DO April 13, 2019 CP - [phone]

## 2019-04-13 NOTE — PROGRESS NOTES
1500 received pt from Andre Holliday, resting in bed, no acute distress, call bell within reach. [phone] called dr Chris Bset, daughter at bedside expressing concern about pleurex catheter b/c dr Fabián Nelson said that is preferred treatment for cancer not heart patients, and that he would tap pt as many times as he (pt) wants him to. Dr Chris Best talked w pt's daughter on phone to explain risks of continuously tapping pt, daughter was unable to fully understand dr Chris Best (language barrier per daughter), but agreed to cancel order for pleurex for now. Order cancelled. 1913 Bedside and Verbal shift change report given to [address] (oncoming nurse) by Yumiko Laboy RN 
 (offgoing nurse). Report included the following information Kardex, MAR and Recent Results.

## 2019-04-13 NOTE — PROGRESS NOTES
Patient denies pain. Skin excoriated on bottom States he has not fallen down in the last year but documented as he has by previous nurse Was 98% on 2L NC patient placed on Roomair. Spo2 maintained >95%. 1hr later patient stated he needed to go back on his O2. Spo2 still 95-96%. Placed on 1L NC for comfort.

## 2019-04-13 NOTE — PROGRESS NOTES
Pharmacy Dosing Services: Vancomycin Indication: HAP Day of therapy: 2 Other Antimicrobials (Include dose, start day & day of therapy): 
Piperacillin-Tazobactam 4.5 grams every 8 hours BAKARI Levofloxacin 750 mg every 48 hours Loading dose (date given): 1,500 mg 
Current Maintenance dose: 750 mg IV daily Goal Vancomycin Level: 15-20 mcg/mL (Trough 15-20 for most infections, 20 for meningitis/osteomyelitis, pre-HD level ~25) Vancomycin Level (if drawn):  
 - 14.6 (23 hours post dose) Significant Cultures:  
4/10 - blood culture - NGTD 
 - strep pneumo and legionella urine test- negative  - pleural fluid culture - pending Renal function stable? (unstable defined as SCr increase of 0.5 mg/dL or > 50% increase from baseline, whichever is greater) (Y/N): Y  
 
CAPD, Hemodialysis or Renal Replacement Therapy (Y/N): N Recent Labs 19 
0230 19 
0210 19 
0345 04/10/19 
1715 CREA  --  1.62* 0.76 1.58* BUN  --  39* 23* 39* WBC 7.8  --  9.2 10.7 Temp (24hrs), Av.6 °F (36.4 °C), Min:97.3 °F (36.3 °C), Max:98.2 °F (36.8 °C) Creatinine Clearance (Creatinine Clearance (ml/min)): Estimated Creatinine Clearance: 24.1 mL/min (A) (based on SCr of 1.62 mg/dL (H)). Regimen assessment:  
- Repeat BMP shows scr back in typical range at 1.62, previous scr of 0.76 was false result - Patient is clearing vancomycin well with a trough of 14.6 following loading + 1 maintenance dose - Will keep on same regimen given early trough Maintenance dose: 750 mg every 24 hours Next scheduled level: 4/15 at 1830 Pharmacy will follow daily and adjust medications as appropriate for renal function and/or serum levels. Thank you, Analy Hull

## 2019-04-13 NOTE — ROUTINE PROCESS
Rec'd pt resting in bed with family at bedside. Pt follows simple commands, denies pain. Reminded pt to call for assistance, and not to be out of bed without assist.  Pt verbalized understanding. Pt has condom catheter on and in place. 2200  Attempted to replace K+via IV due to level of 3.2.-- 
 
2330  Pt c/o discomfort at site of IV where potassium is running. IV site intact, no reddness observed, pt refuse at this time 0400 Pt Heartrate intermittently as high as 140 for a few seconds before droping back  To 110-115 range/ and remains in afib. No changes observed from 0000 dose of dig 250 mcs given. - call out to LUANA Ashley to inform of pt status. 0300 Recheck potassium 3.7. Doses ordered for replacement per protocol. Pt bathed, all linen changed. Has reddend sacrum, blanchable. Replaced existing mepilex. 0600  Rec'd return call from Maria Del Carmen Sharmama, re: pts continued elevated HR/ afib. New orders rec'd, including dig 250 mcq's. Bria Gearing

## 2019-04-13 NOTE — PROGRESS NOTES
0700 Bedside and Verbal shift change report given to [employer] (oncoming nurse) by Felicity Funes RN (offgoing nurse). Report included the following information SBAR, Kardex, ED Summary, Intake/Output, MAR, Recent Results and Cardiac Rhythm AFib RVR BBB. 5846 Patient currently on 2L NC. SPO2 98%. Does not use O2 at home. Placed on Roomair SPO2 maintained 95-98% on RA. 
 
8114 Patient placed on 1L NC for comfort  
 
Kleber QUICK ( Cardiology) at bedside assessing patient. May place to give more digoxin later today for controlling HR. Patient has had a total of 750mcg Digoxin since 1600 yesterday. 1111 [facility] MD at bedside assessing patient. Plan is not to do another thoracentesis at this time as patient has had 5 since 3/17/19. Plan is to have PleurX cath placed and have a pleuradesis. 221 [facility] with Scotty Heller MD and made aware of patients pleurX and pleuradeis plans. MD ordered digoxin 0.125mg but alert came up for risk of dig toxicity d/t to concurrent use with IV calcium gluconate. Digoxin IV discontinued and digoxin level ordered for in a.m. 
 
1420 assisted patient to stand and pivot to bedside commode. Patient had small brown formed stool. When assisting patient to stand and pivot back to bed patient HR increased to 160s-180's (AFib). Then recovered quickly back to 120's. 1428 HR low 100's at this time 1510 Bedside and Verbal shift change report given to Hannah CRANE. Denzel Ordaz Dr. (oncoming nurse) by Gemma Jorgensen RN (offgoing nurse). Report included the following information SBAR, Kardex, ED Summary, Intake/Output, MAR, Recent Results and Cardiac Rhythm AFib BBB.

## 2019-04-13 NOTE — PROGRESS NOTES
Progress Note Patient: Radha Brody               Sex: male          DOA: 4/10/2019 YOB: 1926      Age:  80 y.o.        LOS:  LOS: 3 days CHIEF COMPLAINT:  Other (pt on TCC and not improving per Daugher/wants evaluated.) Subjective:  
 
Pt seen and examined. He had 1.3L removed from right thoracentesis yesterday. Today he said he feels much better, breathing improved. Plan for left thoracentesis today. Objective:  
 
Visit Vitals /64 Pulse (!) 114 Temp 97.5 °F (36.4 °C) Resp 21 Ht 5' 6\" (1.676 m) Wt 59.9 kg (132 lb) SpO2 96% BMI 21.31 kg/m² Physical Exam: 
Ears: hearing is intact Eyes: Icterus is not present Lungs: diminished Heart: tachycardic Gastrointestinal: soft, non-tender. Bowel sounds normal. No masses,  no organomegaly Neurological:  New Focal Deficits are not present Psychiatric:  Mood is stable Lab/Data Reviewed: 
CMP:  
Lab Results Component Value Date/Time  (H) 04/13/2019 02:30 AM  
 K 3.7 04/13/2019 02:30 AM  
  04/13/2019 02:30 AM  
 CO2 29 04/13/2019 02:30 AM  
 AGAP 13 04/13/2019 02:30 AM  
  (H) 04/13/2019 02:30 AM  
 BUN 44 (H) 04/13/2019 02:30 AM  
 CREA 1.63 (H) 04/13/2019 02:30 AM  
 GFRAA 48 (L) 04/13/2019 02:30 AM  
 GFRNA 40 (L) 04/13/2019 02:30 AM  
 CA 8.6 04/13/2019 02:30 AM  
 MG 2.1 04/13/2019 02:30 AM  
 PHOS 3.1 04/13/2019 02:30 AM  
 
CBC:  
Lab Results Component Value Date/Time WBC 7.7 04/13/2019 02:30 AM  
 HGB 8.7 (L) 04/13/2019 02:30 AM  
 HCT 30.0 (L) 04/13/2019 02:30 AM  
  04/13/2019 02:30 AM  
 
 
Imaging Reviewed: 
[facility] Result Date: 4/12/2019 EXAM: XR CHEST PORT CLINICAL INDICATION/HISTORY: s/p thoracentesis -Additional: Right-sided thoracentesis by nurse practitioner note COMPARISON: Chest x-ray from 4/10/2019 TECHNIQUE: Frontal view of the chest _______________ FINDINGS: HEART AND MEDIASTINUM: Midline cardiac silhouette, stable in size and appearance. Stable mediastinal contours LUNGS AND PLEURAL SPACES: No post procedure pneumothorax. No significant radiographic evidence of a residual effusion. Persistent left hemithoracic hazy opacity with diaphragmatic confluence with no change in pleural effusion. BONY THORAX AND SOFT TISSUES: Stable intact _______________ IMPRESSION: 1. No postprocedural pneumothorax. 2. Unchanged left hemithoracic opacity/atelectasis and pleural effusion. Assessment:  
 
Principal Problem: 
  Chronic bilateral pleural effusions (4/10/2019) Active Problems: 
  CHF (congestive heart failure) ([facility] (4/10/2019) HCAP (healthcare-associated pneumonia) (4/10/2019) Atrial fibrillation with rapid ventricular response ([facility] (4/13/2019) PLAN: 
· Pleural effusions: Appreciate PCCM evaluation for thoracentesis, planned for repeat today of left lung · AF RVR: appreciate Cardiology assistance, continue digoxin. After thoracentesis concluded will need to discuss pros and cons of [facility] with patient and family · HCAP: Continue broad spectrum IV ab. Sputum cultures and urine antigens ordered · SLP was consulted previously, ordered diet recommended by SLP (soft, nectar thick liquids) · Cont acceptable home medications for chronic conditions · DVT protocol I have personally reviewed all pertinent labs and films that have officially resulted over the last 24 hours. I have personally checked for all pending labs that are awaiting final results. Signed By: Lupe Gordon MD   
 April 13, 2019

## 2019-04-13 NOTE — PROGRESS NOTES
Recd pt on 2 lpm NC - administer neb 12:13-Pt remains on NC now at 1lpm - administer neb 1550-Pt on 1 lpm NC 
--administer neb -no complications --encouraged use of PEP device - pt used well while I was in room- placed within reach & encouraged use

## 2019-04-13 NOTE — PROGRESS NOTES
Physical Exam  
Skin: Skin is warm and dry. Bruising noted. Primary Nurse Sunitha Blackburn, KATYA and Hernán Mar RN performed a dual skin assessment on this patient Impairment noted- see wound doc flow sheet William score is 18

## 2019-04-14 NOTE — PROGRESS NOTES
0730:  Report received from Rai Mason, [facility]. Pt is alert and oriented to person, place, events. Pt is resting in bed on 2 L NC. Pt is in a-fib, controlled, VSS. 0900: Pt is sitting upright eating breakfast, pt states he's \"not a breakfast person\". 1000: Prevalon boots placed bilaterally 1100: Incontinence care performed,  Pt had large bowel movements. Condom catheter replaced. 1300: Pt sitting upright, only ate chocolate milk from tray. 1600: Incontinence care performed. Pt tolerated well, VSS.  
 
1700: Pt refusing prevalon boots, states they make his feet too hot. He agreed to wear them overnight. 1800: Pt sitting upright in bed eating dinner, appetite is poor. 1830: Family in room. 1900: Bedside and Verbal shift change report given to Radha Caputo RN (oncoming nurse) by Fanta Machuca 
 (offgoing nurse). Report included the following information SBAR, Kardex, Intake/Output, MAR and Cardiac Rhythm A-fib, BBB.

## 2019-04-14 NOTE — PROGRESS NOTES
[facility]  -  Progress Note Patient: Keshawn Smith MRN: [id number]  SSN: [id number] YOB: 1926  Age: 80 y.o. Sex: male Admit Date: 4/10/2019 Assessment: - Acute hypoxic respiratory failure - HCAP: Multifocal PNA on CXR 04/10/19, multifocal consolidation within the left upper and bilateral lower lobes, likely recurrently multifocal PNA 
- Atrial fibrillation with RVR: CHADS2-Vasc score 5 (CHF, HTN, age, Vascular disease) - Bilateral moderate to large pleural effusions CT chest 04/10 
-CAD s/p LAD PCI with DEMAR 3/11/2019 with previous PCI to LCx 2011. Cath 3/11/2019 (Occluded mid RCA which appears to be chronic. There is faint collateral from the left coronary system, Left main artery with ostial 30-40%, LAD mid focal severely calcified tortuous 99% stenosis with CARLENE II flow, Circumflex coronary artery with diffuse mild 20-30% stenosis throughout). Discharged on ASA, plavix, statin, BB. 
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   
- Echo 03/14/19 with EF 26-30%  
-NEL, Creatinine 1.62 04/12 
-Peripheral vascular disease.  
-Hypertension, but currently hypotensive 
-Dyslipidemia 
-Asthma, former smoker. -Advanced age Plan: 1. Hold digoxin this AM and start at 0.125 mg orally in the AM. 2. If no plans on another thoracentesis then will need to decide on whether or not to start long term anticoagulation with Coumadin or one of the new NOAC's. Will plan to discuss with patient and family further in AM. 
 
Subjective: No new complaints. Rate in atrial fibrillation better controlled today in the  range. Digoxin level high normal at 2.0 this AM. Objective:  
  
Patient Vitals for the past 8 hrs: 
 Temp Pulse Resp BP SpO2  
04/14/19 0832     98 % 04/14/19 0800 97.8 °F (36.6 °C)      
04/14/19 0700  91 22 98/66 99 % 04/14/19 0600  78 22 95/63 100 % 04/14/19 0500  93 27 108/58 100 % 04/14/19 0419     100 % 04/14/19 0400 98.1 °F (36.7 °C) 86 26 (!) 82/62 100 % 04/14/19 0300  (!) 120 24 107/77 100 % 04/14/19 0200  (!) 113 21 105/64 100 % Patient Vitals for the past 96 hrs: 
 Weight 04/13/19 2002 59.9 kg (132 lb 0.9 oz) 04/10/19 1609 59.9 kg (132 lb) Intake/Output Summary (Last 24 hours) at 4/14/2019 6463 Last data filed at 4/14/2019 0700 Gross per 24 hour Intake 465 ml Output 907 ml Net -442 ml Physical Exam: 
General:  alert, cooperative, no distress, appears stated age Neck:  no JVD Lungs:  clear to auscultation bilaterally with some decrease breath sounds in the left base. Heart:  Irregularly irregular rate and rhythm, S1, S2 normal, no murmur, click, rub or gallop Abdomen:  abdomen is soft without significant tenderness, masses, organomegaly or guarding Extremities:  extremities normal, atraumatic, no cyanosis or edema Data Review:  
 
Labs: Results:  
   
Chemistry Recent Labs 04/14/19 
0400 04/13/19 
1614 04/13/19 
0230  04/12/19 
0210 *  --  120*  --  126* *  --  146*  --  142  
K 3.9 3.8 3.7   < > 3.4*  
  --  104  --  101 CO2 29  --  29  --  30  
BUN 45*  --  44*  --  39* CREA 1.61*  --  1.63*  --  1.62* CA 9.3  --  8.6  --  8.4* MG 2.4  --  2.1  --   --   
PHOS 4.4  --  3.1  --   --   
AGAP 11  --  13  --  11  
BUCR 28*  --  27*  --  24*  
 < > = values in this interval not displayed. CBC w/Diff Recent Labs 04/14/19 
0400 04/13/19 
0230 04/12/19 
0230 WBC 8.5 7.7 7.8  
RBC 3.48* 3.38* 3.17* HGB 9.1* 8.7* 8.3* HCT 31.1* 30.0* 28.3*  
 264 251 GRANS 72 73 80* LYMPH 9* 9* 15* EOS 2 2 0 Cardiac Enzymes No results found for: CPK, CK, CKMMB, CKMB, RCK3, CKMBT, CKNDX, CKND1, CHRISTINE, TROPT, TROIQ, NGHIA, TROPT, TNIPOC, BNP, BNPP Coagulation Recent Labs 04/11/19 
1040 PTP 15.6* INR 1.3* Lipid Panel Lab Results Component Value Date/Time  Cholesterol, total 87 03/08/2019 05:28 AM  
 HDL Cholesterol 44 03/08/2019 05:28 AM  
 LDL, calculated 30 03/08/2019 05:28 AM  
 VLDL, calculated 13 03/08/2019 05:28 AM  
 Triglyceride 65 03/08/2019 05:28 AM  
 CHOL/HDL Ratio 2.0 03/08/2019 05:28 AM  
  
BNP No results found for: BNP, BNPP, XBNPT Liver Enzymes No results for input(s): TP, ALB, TBIL, AP, SGOT, GPT in the last 72 hours. No lab exists for component: DBIL Digoxin Thyroid Studies Lab Results Component Value Date/Time TSH 1.22 03/07/2019 05:29 AM  
    
  
Abbie Perales DO April 14, 2019

## 2019-04-14 NOTE — PROGRESS NOTES
Rec'd pt resting in bed family at bedside. Follows simple commands, mostly oriented x4 though very hard of hearing, and forgetful. Remains in a-fib with rate anywhere from  range  Dayshift nurse Mariam Aldrich reports MD aware no additional orders rec'd, units 2200  Updated, notified Kianna Mcclelland NP of pts bursts of uncontrolled afib with rates 150 range at times-lasts approx 1 min or less. --New orders rec'd.  
 
4649  Pt bath completed, incont of large brown stool. Replaced mepilex to sacrum-buttocks remain darkened pink with center peeling after cleansing site. Wound consult placed. 0500  Incont of large soft brown stool. Mepilex replaced, linen replaced/bathed. Remains cooperative, oriented x 3-4.  
 
0530  HR appears more regular and pt is continuing to maintain a sustained HR < 120 since rec'g lopressor.

## 2019-04-14 NOTE — PROGRESS NOTES
Recd pt on 3 lpm NC - pt awake & alert no resp distress reported 
--administer neb 1210-Titrate to 2lpm NC 
-administer neb 1550-Pt remains on NC- administer neb

## 2019-04-14 NOTE — PROGRESS NOTES
Problem: General Medical Care Plan Goal: *Vital signs within specified parameters Outcome: Progressing Towards Goal 
Goal: *Labs within defined limits Outcome: Progressing Towards Goal 
Goal: *Absence of infection signs and symptoms Outcome: Progressing Towards Goal 
Goal: *Optimal pain control at patient's stated goal 
Outcome: Progressing Towards Goal 
Goal: *Skin integrity maintained Outcome: Progressing Towards Goal 
Goal: *Fluid volume balance Outcome: Progressing Towards Goal 
Goal: *Optimize nutritional status Outcome: Progressing Towards Goal 
Goal: *Anxiety reduced or absent Outcome: Progressing Towards Goal 
Goal: *Progressive mobility and function (eg: ADL's) Outcome: Progressing Towards Goal 
  
Problem: Impaired Skin Integrity/Pressure Injury Treatment Goal: *Improvement of Existing Pressure Injury Outcome: Progressing Towards Goal 
Goal: *Prevention of pressure injury Description Document William Scale and appropriate interventions in the flowsheet. Outcome: Progressing Towards Goal 
  
Problem: Gas Exchange - Impaired Goal: *Absence of hypoxia Outcome: Progressing Towards Goal 
  
Problem: Patient Education: Go to Patient Education Activity Goal: Patient/Family Education Outcome: Progressing Towards Goal 
  
Problem: Pain Goal: *Control of Pain Outcome: Progressing Towards Goal 
Goal: *PALLIATIVE CARE:  Alleviation of Pain Outcome: Progressing Towards Goal 
  
Problem: Nutrition Deficit Goal: *Optimize nutritional status Outcome: Progressing Towards Goal 
  
Problem: Falls - Risk of 
Goal: *Absence of Falls Description Document Easter Getting Fall Risk and appropriate interventions in the flowsheet. Outcome: Progressing Towards Goal 
  
Problem: Patient Education: Go to Patient Education Activity Goal: Patient/Family Education Outcome: Progressing Towards Goal 
  
Problem: Pressure Injury - Risk of 
Goal: *Prevention of pressure injury Description Document William Scale and appropriate interventions in the flowsheet. Outcome: Progressing Towards Goal 
  
Problem: Patient Education: Go to Patient Education Activity Goal: Patient/Family Education Outcome: Progressing Towards Goal

## 2019-04-14 NOTE — CONSULTS
[employer] [facility] Name: Pepe Aguilar : 1926 MRN: [id number] Date: 2019   
[]I have reviewed the flowsheet and previous days notes. []The patient is unable to give any meaningful history or review of systems because the patient is: 
[]Intubated []Sedated  
[]Unresponsive []The patient is critically ill on     
[]Mechanical ventilation []Pressors []BiPAP []  
S: Feels better, no CP, no fever, chills or hemoptysis. ROS:A comprehensive review of systems was negative except for that written in the HPI. Events and notes from last 24 hours reviewed. Care plan discussed on multidisciplinary rounds. Vital Signs:   
Visit Vitals BP 98/66 Pulse 91 Temp 97.8 °F (36.6 °C) Resp 22 Ht 5' 6\" (1.676 m) Wt 59.9 kg (132 lb 0.9 oz) SpO2 98% BMI 21.31 kg/m² O2 Device: Nasal cannula O2 Flow Rate (L/min): 3 l/min Temp (24hrs), Av.8 °F (36.6 °C), Min:97.6 °F (36.4 °C), Max:98.1 °F (36.7 °C) Intake/Output:  
Last shift:      No intake/output data recorded. Last 3 shifts:  1901 -  0700 In: 8636 [P.O.:875; I.V.:1000] Out: 4703 [PMMKR:[id number]] Intake/Output Summary (Last 24 hours) at 2019 0505 Last data filed at 2019 0700 Gross per 24 hour Intake 465 ml Output 907 ml Net -442 ml Hemodynamics:  
   
:   
 
Ventilator Settings: 
  
  
  
  
 
Physical Exam:  
 General: in no apparent distress, non-toxic, in no respiratory distress and acyanotic, alert and oriented times 3 HEENT: Normal 
 Neck: No abnormally enlarged lymph nodes. Chest: increased AP diameter Lungs: decreased air exchange bilaterally, distant lung sounds and prolonged expiration  no dullness/ tenderness/ rash Heart: Regular rate and rhythm or S1S2 present Abdomen: non distended, bowel sounds normoactive, tympanic, abdomen is soft without significant tenderness, masses, organomegaly or guarding Extremity: 1+ edema Neuro: alert Skin: Skin color, texture, turgor normal. No rashes or lesions DATA:  
Current Facility-Administered Medications Medication Dose Route Frequency  metoprolol (LOPRESSOR) injection 2.5 mg  2.5 mg IntraVENous Q4H PRN  
 bisacodyl (DULCOLAX) suppository 10 mg  10 mg Rectal DAILY PRN  
 [START ON 4/15/2019] VANCOMYCIN INFORMATION NOTE   Other ONCE  
 sodium chloride (NS) flush 5-10 mL  5-10 mL IntraVENous PRN  
 levoFLOXacin (LEVAQUIN) 750 mg in D5W IVPB  750 mg IntraVENous Q48H  VANCOMYCIN INFORMATION NOTE   Other Rx Dosing/Monitoring  piperacillin-tazobactam (ZOSYN) 4.5 g in 0.9% sodium chloride (MBP/ADV) 100 mL MBP  4.5 g IntraVENous Q8H  
 albuterol-ipratropium (DUO-NEB) 2.5 MG-0.5 MG/3 ML  3 mL Nebulization Q4H RT  
 aspirin chewable tablet 81 mg  81 mg Oral DAILY  atorvastatin (LIPITOR) tablet 40 mg  40 mg Oral QHS  clopidogrel (PLAVIX) tablet 75 mg  75 mg Oral DAILY  famotidine (PEPCID) tablet 20 mg  20 mg Oral DAILY  furosemide (LASIX) tablet 20 mg  20 mg Oral DAILY  midodrine (PROAMITINE) tablet 10 mg  10 mg Oral TID WITH MEALS  nitroglycerin (NITROSTAT) tablet 0.4 mg  0.4 mg SubLINGual Q5MIN PRN  
 tamsulosin (FLOMAX) capsule 0.4 mg  0.4 mg Oral DAILY  therapeutic multivitamin (THERAGRAN) tablet 1 Tab  1 Tab Oral DAILY  acetaminophen (TYLENOL) tablet 650 mg  650 mg Oral Q4H PRN  
 ketorolac (TORADOL) injection 15 mg  15 mg IntraVENous Q6H PRN  
 ondansetron (ZOFRAN) injection 4 mg  4 mg IntraVENous Q4H PRN  
 albuterol-ipratropium (DUO-NEB) 2.5 MG-0.5 MG/3 ML  3 mL Nebulization Q4H PRN  
 vancomycin (VANCOCIN) 750 mg in 0.9% sodium chloride (MBP/ADV) 250 mL ADV  750 mg IntraVENous Q24H  
 arformoterol (BROVANA) neb solution 15 mcg  15 mcg Nebulization BID RT And  
 budesonide (PULMICORT) 500 mcg/2 ml nebulizer suspension  500 mcg Nebulization BID RT Telemetry: []Sinus []A-flutter []Paced []A-fib []Multiple PVCs Labs: Recent Labs 04/14/19 
0400 04/13/19 
0230 04/12/19 
0230 WBC 8.5 7.7 7.8 HGB 9.1* 8.7* 8.3* HCT 31.1* 30.0* 28.3*  
 264 251 Recent Labs 04/14/19 
0400 04/13/19 
1614 04/13/19 
0230  04/12/19 
0210 04/11/19 
1040 *  --  146*  --  142  --   
K 3.9 3.8 3.7   < > 3.4*  --   
  --  104  --  101  --   
CO2 29  --  29  --  30  --   
*  --  120*  --  126*  --   
BUN 45*  --  44*  --  39*  --   
CREA 1.61*  --  1.63*  --  1.62*  --   
CA 9.3  --  8.6  --  8.4*  --   
MG 2.4  --  2.1  --   --   --   
PHOS 4.4  --  3.1  --   --   --   
INR  --   --   --   --   --  1.3*  
 < > = values in this interval not displayed. No results for input(s): PH, PCO2, PO2, HCO3, FIO2 in the last 72 hours. Imaging: 
[]I have personally reviewed the patients radiographs []Radiographs reviewed with radiologist 
 [] No CXR study available for review today []No change from prior, tubes and lines in adequate position []Improved   []Worsening IMPRESSION:  
Patient Active Problem List  
Diagnosis Code  Asthma J45.909  Prostate cancer ([facility] C61  Hypercholesterolemia E78.00  Angina, class I ([facility] I20.9  Left bundle branch block I44.7  Coronary artery disease I25.10  Hypertension I11.9  Dyslipidemia E78.5  Carotid artery disease ([facility]) I77.9  Aortic valve stenosis I35.0  Anemia D64.9  
 NSTEMI (non-ST elevated myocardial infarction) ([facility]) I21.4  Congestive heart disease ([facility]) I50.9  CHF (congestive heart failure) ([facility]) I50.9  HCAP (healthcare-associated pneumonia) J18.9  Chronic bilateral pleural effusions J90  
 Bilateral pleural effusion J90  
 Atrial fibrillation with rapid ventricular response ([facility]) I48.91 · Chronic, recurrent pleural effusions due to CHF taped x 5 already: he meets criteria for a more advanced/permanent solution such as a pleura; catheter with pleurodesis: he gave consent yesterday while he is alert and oriented but his daughter does NOT want this done: she stated she prefers repeat thoracentesis, (\"because catheters are only for patients with cancer\") when needed: I stated the possible complications : 
· 1) Bleeding · 2) Empyema · 3) Pneumothorax · 4) re expansion pulmonary edema, etc 
· See: Uptodate: Management of Non Malignant Pleural Effusion · PLAN:  
· Continue Medical therapy as he is improving · Palliative thoracentesis (for symptoms relief) · PCXR 
· CBC · BMP The patient is: [] acutely ill Risk of deterioration: [] moderate [x] critically ill  [x] high  
 
[x]See my orders for details My assessment, plan of care, findings, medications, side effects etc were discussed with: 
[x]nursing []PT/OT   
[]respiratory therapy [x] [x]family daughter []  
 
[x]Total critical care time exclusive of procedures 30 minutes Corry Yañez MD

## 2019-04-14 NOTE — PROGRESS NOTES
Progress Note Patient: Carmelo Guaman               Sex: male          DOA: 4/10/2019 YOB: 1926      Age:  80 y.o.        LOS:  LOS: 4 days CHIEF COMPLAINT:  Other (pt on TCC and not improving per Daugher/wants evaluated.) Subjective:  
 
Pt seen and examined. Pt states his breathing is improved. Denies SOB. Objective:  
 
Visit Vitals /58 Pulse 99 Temp 97.8 °F (36.6 °C) Resp 20 Ht 5' 6\" (1.676 m) Wt 59.9 kg (132 lb 0.9 oz) SpO2 100% BMI 21.31 kg/m² Physical Exam: 
Ears: hearing is intact Eyes: Icterus is not present Lungs: diminished Heart: tachycardic Gastrointestinal: soft, non-tender. Bowel sounds normal. No masses,  no organomegaly Neurological:  New Focal Deficits are not present Psychiatric:  Mood is stable Lab/Data Reviewed: 
CMP:  
Lab Results Component Value Date/Time  (H) 04/14/2019 04:00 AM  
 K 3.9 04/14/2019 04:00 AM  
  04/14/2019 04:00 AM  
 CO2 29 04/14/2019 04:00 AM  
 AGAP 11 04/14/2019 04:00 AM  
  (H) 04/14/2019 04:00 AM  
 BUN 45 (H) 04/14/2019 04:00 AM  
 CREA 1.61 (H) 04/14/2019 04:00 AM  
 GFRAA 49 (L) 04/14/2019 04:00 AM  
 GFRNA 40 (L) 04/14/2019 04:00 AM  
 CA 9.3 04/14/2019 04:00 AM  
 MG 2.4 04/14/2019 04:00 AM  
 PHOS 4.4 04/14/2019 04:00 AM  
 
CBC:  
Lab Results Component Value Date/Time WBC 8.5 04/14/2019 04:00 AM  
 HGB 9.1 (L) 04/14/2019 04:00 AM  
 HCT 31.1 (L) 04/14/2019 04:00 AM  
  04/14/2019 04:00 AM  
 
 
Imaging Reviewed: 
No results found. Assessment:  
 
Principal Problem: 
  Chronic bilateral pleural effusions (4/10/2019) Active Problems: 
  CHF (congestive heart failure) ([address] (4/10/2019) HCAP (healthcare-associated pneumonia) (4/10/2019) Atrial fibrillation with rapid ventricular response ([facility] (4/13/2019) PLAN: 
· Pleural effusions: Appreciate PCCM assistance · AF RVR: appreciate Cardiology assistance, continue digoxin.  After procedures concluded will need to discuss pros and cons of [facility] with patient and family · HCAP: Continue broad spectrum IV ab. Sputum cultures and urine antigens ordered · SLP was consulted previously, ordered diet recommended by SLP (soft, nectar thick liquids) · Cont acceptable home medications for chronic conditions · DVT protocol I have personally reviewed all pertinent labs and films that have officially resulted over the last 24 hours. I have personally checked for all pending labs that are awaiting final results. Signed By: Georgia House MD   
 April 14, 2019

## 2019-04-15 NOTE — PROGRESS NOTES
Recd pt on 2 lpm NC- pt awake & alert - administer neb 
 
1158--Pt sitting upright in chair - pt eating 1230-Q4 nebs updated to Q4 PRN

## 2019-04-15 NOTE — PROGRESS NOTES
[facility] ICU Progress Note Name: Dustin Mckeon : 1926 MRN: [id number] Date: 4/15/2019 6:03 AM 
  
[x]I have reviewed the flowsheet and previous days notes. Events overnight reviewed and discussed with nursing staff. Vital signs and records reviewed. Subjective: 
04/15/19: 
 
- No new events overnight. -CT 4/10 indicates bilateral moderate to large pleural effusions  
-Plan for Left sided Thoracentesis today, 6th Thoracentesis total. 
-Consider re-encouraging family to  
-PRN Loperessor given twice over night for a-fib rate control 
-Urine output 25cc/hr measured with Condom cath ROS:Pertinent items are noted in HPI. Medication Review: · Pressors - None · Sedation - None · Antibiotics - Zosyn & Vanc · Pain - Tylenol · GI/ DVT - Pepcid/ Plavix · Others (other gtts) Vital Signs:   
Visit Vitals /68 (BP 1 Location: Left arm, BP Patient Position: At rest) Pulse (!) 104 Temp 98.3 °F (36.8 °C) Resp 24 Ht 5' 6\" (1.676 m) Wt 59.9 kg (132 lb 0.9 oz) SpO2 98% BMI 21.31 kg/m² O2 Device: Nasal cannula O2 Flow Rate (L/min): 2 l/min Temp (24hrs), Av.9 °F (36.6 °C), Min:97.4 °F (36.3 °C), Max:98.4 °F (36.9 °C) Intake/Output:  
Last shift:      1901 - 04/15 0700 In: 900 [P.O.:300; I.V.:600] Out: 350 [Urine:350] Last 3 shifts:  07 -  190 In: 1800 [P.O.:750; I.V.:1050] Out: 8315 [KTOUV:0570] Intake/Output Summary (Last 24 hours) at 4/15/2019 5731 Last data filed at 4/15/2019 5361 Gross per 24 hour Intake 1425 ml Output 510 ml Net 915 ml Ventilator Settings: 
  
  
  
  
 
Physical Exam: 
 
General: in no apparent distress, non-toxic, in no respiratory distress and acyanotic, alert and oriented times 3 HEENT: Normal 
           Neck: No abnormally enlarged lymph nodes. Chest: increased AP diameter Lungs: decreased air exchange bilaterally, distant lung sounds and prolonged expiration  no dullness/ tenderness/ rash Heart: Regular rate and rhythm or S1S2 present Abdomen: non distended, bowel sounds normoactive, tympanic, abdomen is soft without significant tenderness, masses, organomegaly or guarding Extremity: 1+ edema Neuro: alert Skin: Skin color, texture, turgor normal. No rashes or lesions DATA:  
 
Current Facility-Administered Medications Medication Dose Route Frequency  metoprolol (LOPRESSOR) injection 2.5 mg  2.5 mg IntraVENous Q4H PRN  
 digoxin (LANOXIN) tablet 0.125 mg  0.125 mg Oral DAILY  bisacodyl (DULCOLAX) suppository 10 mg  10 mg Rectal DAILY PRN  
 sodium chloride (NS) flush 5-10 mL  5-10 mL IntraVENous PRN  
 levoFLOXacin (LEVAQUIN) 750 mg in D5W IVPB  750 mg IntraVENous Q48H  VANCOMYCIN INFORMATION NOTE   Other Rx Dosing/Monitoring  piperacillin-tazobactam (ZOSYN) 4.5 g in 0.9% sodium chloride (MBP/ADV) 100 mL MBP  4.5 g IntraVENous Q8H  
 albuterol-ipratropium (DUO-NEB) 2.5 MG-0.5 MG/3 ML  3 mL Nebulization Q4H RT  
 aspirin chewable tablet 81 mg  81 mg Oral DAILY  atorvastatin (LIPITOR) tablet 40 mg  40 mg Oral QHS  clopidogrel (PLAVIX) tablet 75 mg  75 mg Oral DAILY  famotidine (PEPCID) tablet 20 mg  20 mg Oral DAILY  furosemide (LASIX) tablet 20 mg  20 mg Oral DAILY  midodrine (PROAMITINE) tablet 10 mg  10 mg Oral TID WITH MEALS  nitroglycerin (NITROSTAT) tablet 0.4 mg  0.4 mg SubLINGual Q5MIN PRN  
 tamsulosin (FLOMAX) capsule 0.4 mg  0.4 mg Oral DAILY  therapeutic multivitamin (THERAGRAN) tablet 1 Tab  1 Tab Oral DAILY  acetaminophen (TYLENOL) tablet 650 mg  650 mg Oral Q4H PRN  
 ketorolac (TORADOL) injection 15 mg  15 mg IntraVENous Q6H PRN  
 ondansetron (ZOFRAN) injection 4 mg  4 mg IntraVENous Q4H PRN  
  albuterol-ipratropium (DUO-NEB) 2.5 MG-0.5 MG/3 ML  3 mL Nebulization Q4H PRN  
 vancomycin (VANCOCIN) 750 mg in 0.9% sodium chloride (MBP/ADV) 250 mL ADV  750 mg IntraVENous Q24H  
 arformoterol (BROVANA) neb solution 15 mcg  15 mcg Nebulization BID RT And  
 budesonide (PULMICORT) 500 mcg/2 ml nebulizer suspension  500 mcg Nebulization BID RT Labs: Results:  
   
Chemistry Recent Labs 04/14/19 
0400 04/13/19 
1614 04/13/19 
0230 *  --  120* *  --  146*  
K 3.9 3.8 3.7   --  104 CO2 29  --  29 BUN 45*  --  44* CREA 1.61*  --  1.63* CA 9.3  --  8.6 AGAP 11  --  13  
BUCR 28*  --  27* CBC w/Diff Recent Labs 04/14/19 
0400 04/13/19 
0230 WBC 8.5 7.7  
RBC 3.48* 3.38* HGB 9.1* 8.7* HCT 31.1* 30.0*  
 264 GRANS 72 73 LYMPH 9* 9*  
EOS 2 2 Coagulation No results for input(s): PTP, INR, APTT in the last 72 hours. No lab exists for component: INREXT Liver Enzymes No results for input(s): TP, ALB, TBIL, AP, SGOT, GPT in the last 72 hours. No lab exists for component: DBIL  
ABG No results found for: PH, PHI, PCO2, PCO2I, PO2, PO2I, HCO3, HCO3I, FIO2, FIO2I Microbiology Recent Labs 04/12/19 [address] CULT NO GROWTH 2 DAYS Telemetry: [x]Sinus []A-flutter []Paced []A-fib []Multiple PVCs Imaging: CXR [date]: CXR Results  (Last 48 hours) None CT HEAD/CHEST/ABD/PELVIS [date]: 
[]I have personally reviewed the patients radiographs []Radiographs reviewed with radiologist 
 []No change from prior, tubes and lines in adequate position []Improved   []Worsening IMPRESSION: 
  
1. Multifocal consolidation within the left upper and bilateral lower lobes, 
similar to prior CT 03/13/2019.  Findings likely represent recurrent multifocal 
pneumonia and/or atelectasis. 
  
2. Bilateral moderate to large pleural effusions, similar to prior CT 3/13/2019  
  
 3. Cholelithiasis without gallbladder distention. IMPRESSION:  
· Chronic, recurrent pleural effusions due to CHF taped x 5 already: he meets criteria for a more advanced/permanent solution such as a pleura; catheter with pleurodesis: he gave consent yesterday while he is alert and oriented but his daughter does NOT want this done: she stated she prefers repeat thoracentesis, (\"because catheters are only for patients with cancer\") when needed: I stated the possible complications : 
· 1) Bleeding · 2) Empyema · 3) Pneumothorax · 4) re expansion pulmonary edema, etc 
· See: Uptodate: Management of Non Malignant Pleural Effusion Patient Active Problem List  
Diagnosis Code  Asthma J45.909  Prostate cancer ([facility] C61  Hypercholesterolemia E78.00  Angina, class I ([facility] I20.9  Left bundle branch block I44.7  Coronary artery disease I25.10  Hypertension I11.9  Dyslipidemia E78.5  Carotid artery disease ([facility]) I77.9  Aortic valve stenosis I35.0  Anemia D64.9  
 NSTEMI (non-ST elevated myocardial infarction) ([facility]) I21.4  Congestive heart disease ([facility]) I50.9  CHF (congestive heart failure) ([facility]) I50.9  HCAP (healthcare-associated pneumonia) J18.9  Chronic bilateral pleural effusions J90  
 Bilateral pleural effusion J90  
 Atrial fibrillation with rapid ventricular response ([facility]) I48.91  
 
  
RECOMMENDATIONS:  
· Resp:  
 -Plan for L sided Thoracentesis today 4/15 
-Continue Pulmonary Hygiene nebulizers + Duoneb 
-Daily CXR 
· I/D:  
 -Afebrile; aleukocytosis;  
 -ABX : Zosyn, Vanc, & Levaquin · Hem/Onc:  
 -Daily CBC; H/H, and plts are stable · CVS:  
 -Monitor Hemodynamics 
 -Continue Midodrine and Digoxin · Metabolic:  
 -Daily BMP;  
-monitor e-lytes; replace PRN 
· Renal:  
 -Trend Renal indices;  
-Robertson catheter  
-Continue Flomax · Endocrine:  
-POC Glucose q6 
· GI:  
-SUP Pepcid, Zofran PRN for N/V  
· Musc/Skin:  
-No acute issues, wound care · Neuro:  
-PRN pain medications · Code Status: Full Code Best Practices/ Safety Bundles: 
· Sepsis Bundle per Hospital Protocol · CAUTI Bundle · Electrolyte Replacement Bundle · Glycemic control goal <180; avoid Hypoglycemia · IHI ICU Bundles: ·  Salgado Bundle Followed · Mech Vent patients: · VAP bundle, Aim to keep peak plateau pressure 20-96SI H2O 
· Aspiration Precautions - HOB >30' · Daily sedation holiday as indicated · SBT as tolerated/appropriate · Titrate FiO2 for SpO2 >94% · Aggressive Pulmonary Hygeiene · Stress ulcer prophylaxis. Pepcid · DVT prophylaxis. Plavix · Need for Lines, salgado assessed. · Restraints need. · Palliative care evaluation. Yes The patient is: [x] acutely ill Risk of deterioration: [] moderate  
 [] critically ill  [x] high  
 
[x]See my orders for details My assessment/plan was discussed with: 
[x]Nursing []PT/OT [x]Respiratory therapy [x]Dr. Antoni Austin [x]Family [] Critical Care Time: 30 mins Chitra Boykin NP-BC Pulmonary, Critical Care Medicine [facility]

## 2019-04-15 NOTE — PROGRESS NOTES
0730:  Patient received from [facility]. Pt is on breathing treatment currently, alert and sitting upright in bed. VSS. 0900: Pt sitting upright, poor appetite, drank only some of his chocolate milk. VSS. 0930: Pt incontinent of stool, incontinence care performed, some dyspnea upon exertion. 1030: Rounded on pt with care team. New orders received. 1300: Family at bedside, updated by intensivist.  Family agrees to no more thoracenteses and to manage his care with diuretics. 1530: TRANSFER - OUT REPORT: 
 
Verbal report given to KATYA Barger(name) on Elex Gess  being transferred to [facility](unit) for routine progression of care Report consisted of patients Situation, Background, Assessment and  
Recommendations(SBAR). Information from the following report(s) SBAR, Kardex, Procedure Summary, Intake/Output, MAR and Cardiac Rhythm A-fib, BBB was reviewed with the receiving nurse. Lines:  
Peripheral IV 04/10/19 Right Forearm (Active) Site Assessment Clean, dry, & intact 4/15/2019 12:28 PM  
Phlebitis Assessment 0 4/15/2019 12:28 PM  
Infiltration Assessment 0 4/15/2019 12:28 PM  
Dressing Status Clean, dry, & intact 4/15/2019 12:28 PM  
Dressing Type Transparent;Tape 4/15/2019 12:28 PM  
Hub Color/Line Status Blue;Flushed;Capped 4/15/2019 12:28 PM  
Action Taken Open ports on tubing capped 4/15/2019 12:28 PM  
Alcohol Cap Used Yes 4/15/2019 12:28 PM  
   
Peripheral IV 04/13/19 Anterior;Proximal;Right Forearm (Active) Site Assessment Clean, dry, & intact 4/15/2019 12:28 PM  
Phlebitis Assessment 0 4/15/2019 12:28 PM  
Infiltration Assessment 0 4/15/2019 12:28 PM  
Dressing Status Clean, dry, & intact 4/15/2019 12:28 PM  
Dressing Type Transparent;Tape 4/15/2019 12:28 PM  
Hub Color/Line Status Pink; Infusing 4/15/2019 12:28 PM  
Action Taken Open ports on tubing capped 4/15/2019 12:28 PM  
Alcohol Cap Used Yes 4/15/2019 12:28 PM  
  
 
 Opportunity for questions and clarification was provided. Patient transported with: 
 Monitor O2 @ 2 liters Registered Nurse

## 2019-04-15 NOTE — PROGRESS NOTES
[facility] - Progress Note Admit Date: 4/10/2019 Patient seen and examined independently. Note plans for  thoracentesis later today. Atrial fibrillation ventricular response generally better. If blood pressure trends upward, might consider addition of low-dose beta-blockers. Anticoagulants difficult issue with him in light of his recent upper GI bleeding. Would favor reevaluation in the outpatient setting. Agree with assessment and plan as noted below. Ruddy Bernheim MD 
Assessment:  
 
Hospital Problems  Date Reviewed: 3/1/2019 Codes Class Noted POA Atrial fibrillation with rapid ventricular response (HCC) ICD-10-CM: I48.91 
ICD-9-CM: 427.31  4/13/2019 Unknown CHF (congestive heart failure) (HCC) ICD-10-CM: I50.9 ICD-9-CM: 428.0  4/10/2019 Yes HCAP (healthcare-associated pneumonia) ICD-10-CM: J18.9 ICD-9-CM: 505  4/10/2019 Yes * (Principal) Chronic bilateral pleural effusions ICD-10-CM: J90 ICD-9-CM: 511.9  4/10/2019 Yes - Acute hypoxic respiratory failure - HCAP: Multifocal PNA on CXR 04/10/19, multifocal consolidation within the left upper and bilateral lower lobes, likely recurrently multifocal PNA 
- Atrial fibrillation with RVR: CHADS2-Vasc score 5 (CHF, HTN, age, Vascular disease) - Bilateral moderate to large pleural effusions CT chest 04/10 
-CAD s/p LAD PCI with DEMAR 3/11/2019 with previous PCI to LCx 2011. Cath 3/11/2019 (Occluded mid RCA which appears to be chronic. There is faint collateral from the left coronary system, Left main artery with ostial 30-40%, LAD mid focal severely calcified tortuous 99% stenosis with CARLENE II flow, Circumflex coronary artery with diffuse mild 20-30% stenosis throughout). Discharged on ASA, plavix, statin, BB. 
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   
- Echo 03/14/19 with EF 26-30%  
-NEL, Creatinine 1.62 04/12 
-Peripheral vascular disease.  
-Hypertension, but currently hypotensive -Dyslipidemia 
-Asthma, former smoker. -Advanced age Plan:  
 
- PO Digoxin started this AM 
- Plans for left sided thoracentesis today per pulmonary notes - Will need further discussion regarding long term anticoagulation after procedures are completed. Would recommend GI input with hx of recent GI bleed and AVM Subjective: No new complaints. Objective:  
  
Patient Vitals for the past 8 hrs: 
 Temp Pulse Resp BP SpO2  
04/15/19 1100  (!) 104 27 99/57 99 % 04/15/19 1000  97 25 106/59 100 % 04/15/19 0932  (!) 124 25  90 % 04/15/19 0900  (!) 117 22 107/86 97 % 04/15/19 0800 97.6 °F (36.4 °C) (!) 107 25 112/76 99 % 04/15/19 0711     98 % 04/15/19 0700  (!) 114 29 133/61 97 % 04/15/19 0600  (!) 106 24 121/64 100 % 04/15/19 0500  (!) 111 27 157/67 (!) 88 % 04/15/19 0415  (!) 104 24  98 % 04/15/19 0400 98.3 °F (36.8 °C) (!) 104 26 103/68 98 % Patient Vitals for the past 96 hrs: 
 Weight 04/13/19 2002 132 lb 0.9 oz (59.9 kg) Intake/Output Summary (Last 24 hours) at 4/15/2019 1156 Last data filed at 4/15/2019 0800 Gross per 24 hour Intake 1310 ml Output 585 ml Net 725 ml Physical Exam: 
General:  alert, cooperative, no distress Neck:  nontender, no JVD Lungs:  clear to auscultation bilaterally Heart:  Irregularly irregular Abdomen:  abdomen is soft without significant tenderness, masses, organomegaly or guarding Extremities:  extremities normal, atraumatic, no cyanosis or edema Data Review:  
 
Labs: Results:  
   
Chemistry Recent Labs 04/15/19 
0510 04/14/19 
0400 04/13/19 
1614 04/13/19 
0230 * 138*  --  120* * 147*  --  146*  
K 3.4* 3.9 3.8 3.7 * 107  --  104 CO2 30 29  --  29 BUN 45* 45*  --  44* CREA 1.67* 1.61*  --  1.63* CA 9.6 9.3  --  8.6 MG 2.5 2.4  --  2.1 PHOS 4.0 4.4  --  3.1 AGAP 11 11  --  13  
BUCR 27* 28*  --  27* CBC w/Diff Recent Labs 04/15/19 0510 04/14/19 
0400 04/13/19 
0230 WBC 10.2 8.5 7.7  
RBC 3.50* 3.48* 3.38* HGB 9.1* 9.1* 8.7* HCT 31.9* 31.1* 30.0*  
 304 264 GRANS 73 72 73 LYMPH 17* 9* 9* EOS 3 2 2 Cardiac Enzymes No results found for: CPK, CK, CKMMB, CKMB, RCK3, CKMBT, CKNDX, CKND1, CHRISTINE, TROPT, TROIQ, NGHIA, TROPT, TNIPOC, BNP, BNPP Coagulation No results for input(s): PTP, INR, APTT in the last 72 hours. No lab exists for component: INREXT Lipid Panel Lab Results Component Value Date/Time Cholesterol, total 87 03/08/2019 05:28 AM  
 HDL Cholesterol 44 03/08/2019 05:28 AM  
 LDL, calculated 30 03/08/2019 05:28 AM  
 VLDL, calculated 13 03/08/2019 05:28 AM  
 Triglyceride 65 03/08/2019 05:28 AM  
 CHOL/HDL Ratio 2.0 03/08/2019 05:28 AM  
  
BNP No results found for: BNP, BNPP, XBNPT Liver Enzymes No results for input(s): TP, ALB, TBIL, AP, SGOT, GPT in the last 72 hours. No lab exists for component: DBIL Digoxin Thyroid Studies Lab Results Component Value Date/Time TSH 1.22 03/07/2019 05:29 AM  
    
 
Signed By: Henry Weiner. Soren Carrizales PA-C April 15, 2019

## 2019-04-15 NOTE — PROGRESS NOTES
1525 -- Report received from Lottie Villegas (ICU), given to [facility], RN. 
 
7664 -- Patient on the unit 
   
Bedside shift change report given to [facility], RN (oncoming nurse) by [facility], RN (offgoing nurse). Report included the following information SBAR, Kardex, Intake/Output, MAR and Recent Results.

## 2019-04-15 NOTE — PROGRESS NOTES
OT order received, chart reviewed. 2 attempts for OT evaluation. 1130: pt working with PT; pt up in chair 1330: pt just got back to bed, will allow pt to rest at this time. Will follow up as schedule permits. Satira Saint, MS OTR/L Office Ext: [phone] Pager: [phone]

## 2019-04-15 NOTE — PROGRESS NOTES
19:35- Bedside report received from previous nurse, Jamie Olguin. 20:00- Assessment completed- see flow sheet. Patient has no c/o pain/discomfort. Continue to monitor- call light in reach. 22:13- Lopressor 2.5 mg IV given for HR >120,  
00:05- NO change in condition. Continue to monitor. Call light in reach. Hourly rounding. 05:00- Patient bathed at this time- Complete care. 06:25- Reported urine output 25-30 ml/hour to [facility] NO 5 NP (patient has a condom catheter) Patient has no c/o pain/discomfort. Call light in reach. 07:25- Report given to oncoming nurse.

## 2019-04-15 NOTE — PROGRESS NOTES
PHYSICAL THERAPY: INITIAL ASSESSMENT INPATIENT: Medicare: Hospital Day: 6 Patient: Carmelo Guaman (5 y.o. male)    Date: 4/15/2019 Primary Diagnosis: Chronic bilateral pleural effusions [J90] HCAP (healthcare-associated pneumonia) [J18.9] [facility] [I50.9] Bilateral pleural effusion [J90] HCAP (healthcare-associated pneumonia) [J18.9] CHF (congestive heart failure) ([facility] [I50.9]  
 ,    
Precautions:   
  
PLOF: Independent ASSESSMENT : 
Patient supine in bed, agreeable to participation with PT. Patient reports that he lives alone in a single story home with 3 GRETA; ambulated with a RW PTA. Patient reports that he was very active and played golf. /60. Patient desats to 81% while drinking ensure; patient instructed in deep breathing and pacing when drinking. SPO2 returns to 95%. SBA for supine > sit at EOB. Good static/fair seated standing balance. MIN A for sit > stand and stand pivot transfer to recliner without AD. Fair standing balance. Patient HR max 116 bpm during mobility. Patient positioned for comfort in recliner. Left sitting in chair with all needs within reach. No c/o pain with activity. KATYA Tafoya Breeding aware of session. Recommend d/c to SNF. Patient presents with deficits in: 
Bed Mobility, Transfers, Gait and Stairs Patient will benefit from skilled intervention to address the above impairments. Patient?s rehabilitation potential is considered to be Good Factors which may influence rehabilitation potential include:  
? None noted ? Mental ability/status ? Medical condition ? Home/family situation and support systems ? Safety awareness 
? Pain tolerance/management 
? Other: PLAN :  
Recommendations and Planned Interventions: 
?           Bed Mobility Training             ? Neuromuscular Re-Education ? Transfer Training                   ?     Orthotic/Prosthetic Training 
? Gait Training                          ? Modalities ? Therapeutic Exercises          ? Edema Management/Control ? Therapeutic Activities            ? Patient and Family Training/Education ? Other (comment): EDUCATION:  
Education:  Patient was educated on the following topics: Purpose of PT,  PT POC, bed mobility, transfers, ambulation Barriers to Learning/Limitations: yes;  sensory deficits-vision/hearing/speech Compensate with: visual, verbal, tactile, kinesthetic cues/model Recommendations for the next treatment session: Gait Frequency/Duration: Patient will be followed by physical therapy 3 - 5  times a week to address goals. Discharge Recommendations: Aric Kim Further Equipment Recommendations for Discharge: rolling walker Factors which may impact discharge planning: N/A  
 
SUBJECTIVE:  
Patient stated ? I need a power scooter. ? OBJECTIVE DATA SUMMARY:  
 
Past Medical History:  
Diagnosis Date Asthma Cardiac cath 04/28/2011 oLM 30%. pLAD 30%. oD1 50%. CX 90% (3 x 18 [place] DEMAR). dRCA 90%. RPLB subtotal.  RPDA 100%. Cardiac echocardiogram 01/21/2014 EF 55-60%. Mod LVH. Gr 1 DDfx. Mild AS (mean grad 13). Mild AI. Unchanged from study of 11/30/11. Cardiac nuclear imaging test, mod risk 01/22/2016 Intermediate risk. Medium-sized inferior, inferoseptal infarction. No ischemia. EF 54%. Nondiagnostic EKG on pharm stress test.  
 Carotid duplex 03/02/2016 Mod 50-69% bilateral ICA stenosis. Probable significant RECA stenosis. >50% stenosis of left subclavian. No significant change from study of 1/8/15. Coronary artery disease Status post PCI with drug-eluting stent, [place]wall 3 x 18 mm in the proximal circumflex. Heart failure ([address] Hx of carotid artery disease ([address] Hypercholesterolemia Hypertension Prostate cancer ([address] Past Surgical History: Procedure Laterality Date CARDIAC SURG PROCEDURE UNLIST    
 HX CORONARY STENT PLACEMENT  4/28/11 PCI with drug-eluting stent, [place] 3 x 18 mm in the proximal circumflex (post dialated with 3.25 mm noncompliant balloon at high pressure). Eval Complexity: History: MEDIUM  Complexity : 1-2 comorbidities / personal factors will impact the outcome/ POC Exam:MEDIUM Complexity : 3 Standardized tests and measures addressing body structure, function, activity limitation and / or participation in recreation  Presentation: MEDIUM Complexity : Evolving with changing characteristics  Clinical Decision Making:Medium Complexity MIN A - CGA for bed mobility  Overall Complexity:MEDIUM Prior Level of Function/Home Situation:  
Home Situation Home Environment: Long term care # Steps to Enter: 0 One/Two Story Residence: One story Living Alone: No 
Support Systems: Child(marisela) Patient Expects to be Discharged to[de-identified] Rehabilitation facility Current DME Used/Available at Home: [employer] Critical Behavior: 
Neurologic State: Alert Orientation Level: Oriented to person;Oriented to place Cognition: Follows commands Psychosocial 
Patient Behaviors: Calm; Cooperative Family  Behaviors: Appropriate for situation Needs Expressed: Educational 
Purposeful Interaction: Yes Tone : Normal 
Sensation: NT 
Range Of Motion: B LE AROM [facility] Functional Mobility: 
 
 
Functional Status Indep (I) Mod I Super-vision Min A Mod A Max A Total A Assist x2 Verbal cues Additional time Not tested Comments Rolling ?  ?  ? ?    ?    ?  ?  ? ? ? ? Supine to sit ?  ?  ? 
SBA ?  ?  ?  ?  ? ? ? ? Sit to supine ? ?  ? ?  ?  ?  ?  ? ? ? ? Sit to stand ?  ?  ? ?  ?  ?  ?  ? ? ? ? Stand to sit ?  ?  ? ?  ?  ?  ?  ? ? ? ? Bed to chair transfers ? ?  ? ?  ?  ?  ?  ? ? ? ? Balance Good Napolean Mirian Poor Unable Not tested Comments Sitting static ?  ?  ?  ?  ?   
 Sitting dynamic ?  ?  ?  ?  ? Standing static ?  ?  ?  ?  ? Standing dynamic ?  ?  ?  ?  ? Pain:None Pre treatment pain level: 0 Post treatment pain level: 0 Vital Signs Temp: 97.6 °F (36.4 °C) Pulse (Heart Rate): (!) 106 BP: 127/88 Resp Rate: 28    
O2 Sat (%): 99 % Activity Tolerance:  
Fair Please refer to the flowsheet for vital signs taken during this treatment. After treatment:  
?         Patient left in no apparent distress sitting up in chair ? Patient left in no apparent distress in bed 
? Call bell left within reach ? Nursing notified ? Caregiver present ? Bed alarm activated COMMUNICATION/EDUCATION:  
?         Fall prevention education was provided and the patient/caregiver indicated understanding. ? Patient/family have participated as able in goal setting and plan of care. ?         Patient/family agree to work toward stated goals and plan of care. ?         Patient understands intent and goals of therapy, but is neutral about his/her participation. ? Patient is unable to participate in goal setting and plan of care. Recommendations for nursing: 
Written on communication board: up with assist x1 Thank you for this referral. 
Fiorella Betts Time Calculation: 23 mins

## 2019-04-15 NOTE — PROGRESS NOTES
Problem: General Medical Care Plan Goal: *Vital signs within specified parameters Outcome: Progressing Towards Goal 
Goal: *Labs within defined limits Outcome: Progressing Towards Goal 
Goal: *Absence of infection signs and symptoms Outcome: Progressing Towards Goal 
Goal: *Optimal pain control at patient's stated goal 
Outcome: Progressing Towards Goal 
Goal: *Skin integrity maintained Outcome: Progressing Towards Goal 
Goal: *Fluid volume balance Outcome: Progressing Towards Goal 
Goal: *Optimize nutritional status Outcome: Progressing Towards Goal 
Goal: *Anxiety reduced or absent Outcome: Progressing Towards Goal 
Goal: *Progressive mobility and function (eg: ADL's) Outcome: Progressing Towards Goal 
  
Problem: Impaired Skin Integrity/Pressure Injury Treatment Goal: *Improvement of Existing Pressure Injury Outcome: Progressing Towards Goal 
Goal: *Prevention of pressure injury Description Document William Scale and appropriate interventions in the flowsheet. Outcome: Progressing Towards Goal 
  
Problem: Gas Exchange - Impaired Goal: *Absence of hypoxia Outcome: Progressing Towards Goal 
  
Problem: Patient Education: Go to Patient Education Activity Goal: Patient/Family Education Outcome: Progressing Towards Goal 
  
Problem: Pain Goal: *Control of Pain Outcome: Progressing Towards Goal 
Goal: *PALLIATIVE CARE:  Alleviation of Pain Outcome: Progressing Towards Goal 
  
Problem: Nutrition Deficit Goal: *Optimize nutritional status Outcome: Progressing Towards Goal 
  
Problem: Falls - Risk of 
Goal: *Absence of Falls Description Document Evin Sadler Fall Risk and appropriate interventions in the flowsheet. Outcome: Progressing Towards Goal 
  
Problem: Patient Education: Go to Patient Education Activity Goal: Patient/Family Education Outcome: Progressing Towards Goal 
  
Problem: Pressure Injury - Risk of 
Goal: *Prevention of pressure injury Description Document William Scale and appropriate interventions in the flowsheet. Outcome: Progressing Towards Goal 
  
Problem: Patient Education: Go to Patient Education Activity Goal: Patient/Family Education Outcome: Progressing Towards Goal

## 2019-04-15 NOTE — PROGRESS NOTES
Problem: General Medical Care Plan Goal: *Vital signs within specified parameters Outcome: Progressing Towards Goal 
Goal: *Labs within defined limits Outcome: Progressing Towards Goal 
Goal: *Absence of infection signs and symptoms Outcome: Progressing Towards Goal 
Goal: *Optimal pain control at patient's stated goal 
Outcome: Progressing Towards Goal 
Goal: *Skin integrity maintained Outcome: Progressing Towards Goal 
Goal: *Fluid volume balance Outcome: Progressing Towards Goal 
Goal: *Optimize nutritional status Outcome: Progressing Towards Goal 
Goal: *Anxiety reduced or absent Outcome: Progressing Towards Goal 
Goal: *Progressive mobility and function (eg: ADL's) Outcome: Progressing Towards Goal 
  
Problem: Impaired Skin Integrity/Pressure Injury Treatment Goal: *Improvement of Existing Pressure Injury Outcome: Progressing Towards Goal 
Goal: *Prevention of pressure injury Description Document William Scale and appropriate interventions in the flowsheet. Outcome: Progressing Towards Goal 
  
Problem: Gas Exchange - Impaired Goal: *Absence of hypoxia Outcome: Progressing Towards Goal 
  
Problem: Patient Education: Go to Patient Education Activity Goal: Patient/Family Education Outcome: Progressing Towards Goal 
  
Problem: Pain Goal: *Control of Pain Outcome: Progressing Towards Goal 
Goal: *PALLIATIVE CARE:  Alleviation of Pain Outcome: Progressing Towards Goal 
  
Problem: Nutrition Deficit Goal: *Optimize nutritional status Outcome: Progressing Towards Goal 
  
Problem: Falls - Risk of 
Goal: *Absence of Falls Description Document Roman Suraj Fall Risk and appropriate interventions in the flowsheet. Outcome: Progressing Towards Goal 
  
Problem: Patient Education: Go to Patient Education Activity Goal: Patient/Family Education Outcome: Progressing Towards Goal 
  
Problem: Pressure Injury - Risk of 
Goal: *Prevention of pressure injury Description Document William Scale and appropriate interventions in the flowsheet. Outcome: Progressing Towards Goal 
  
Problem: Patient Education: Go to Patient Education Activity Goal: Patient/Family Education Outcome: Progressing Towards Goal 
  
Problem: Patient Education: Go to Patient Education Activity Goal: Patient/Family Education Outcome: Progressing Towards Goal

## 2019-04-15 NOTE — MANAGEMENT PLAN
Discharge/Transition Planning Plan is SNF at discharge. They chose [address] [place] [address] and [facility]. May need more choices. Updates sent to facility Bernardino Ta RN BSN Outcomes Manager Pager # [phone]

## 2019-04-15 NOTE — PROGRESS NOTES
Internal Medicine Progress Note NAME: Kennedy Rose :  1926 MRM:  [id number] Date/Time: 4/15/2019 Principal Problem: 
  Chronic bilateral pleural effusions (4/10/2019) Active Problems: 
  CHF (congestive heart failure) ([facility] (4/10/2019) HCAP (healthcare-associated pneumonia) (4/10/2019) Atrial fibrillation with rapid ventricular response ([facility] (2019) ASSESSMENT/PLAN: 
 
# HCAP: Continue broad spectrum IV ab. Sputum cultures and urine antigens ordered. CT 4/10/19 : Multifocal consolidation within the left upper and bilateral lower lobes, similar to prior CT 2019. Findings likely represent recurrent multifocal  
# [facility] # Hypernatremia. Urine studies. IVF D5. 
# HYpokalemia. Replete and trend. AF RVR: appreciate Cardiology assistance, started on  digoxin. After procedures concluded will need to discuss pros and cons of [facility] with patient and family per cardiology. # SLP was consulted previously, ordered diet recommended by SLP (soft, nectar thick liquids) # Cont acceptable home medications for chronic conditions # DVT protocol 
   
-Code status : full Lab Review:  
 
Recent Labs 04/15/19 
0510 19 
0400 19 
0230 WBC 10.2 8.5 7.7 HGB 9.1* 9.1* 8.7* HCT 31.9* 31.1* 30.0*  
 304 264 Recent Labs 04/15/19 
0510 19 
0400 19 
1614 19 
0230 * 147*  --  146*  
K 3.4* 3.9 3.8 3.7 * 107  --  104 CO2 30 29  --  29 * 138*  --  120* BUN 45* 45*  --  44* CREA 1.67* 1.61*  --  1.63* CA 9.6 9.3  --  8.6 MG 2.5 2.4  --  2.1 PHOS 4.0 4.4  --  3.1 Lab Results Component Value Date/Time Glucose (POC) 157 (H) 2019 09:12 PM  
 Glucose (POC) 128 (H) 2019 05:01 PM  
 Glucose (POC) 126 (H) 2019 07:17 PM  
 
No results for input(s): PH, PCO2, PO2, HCO3, FIO2 in the last 72 hours. No results for input(s): INR in the last 72 hours. No lab exists for component: INREXT No results found for: SDES Lab Results Component Value Date/Time Culture result: NO GROWTH 3 DAYS 04/12/2019 03:10 PM  
 Culture result: NO GROWTH 5 DAYS 04/10/2019 05:30 PM  
 Culture result: NO GROWTH 5 DAYS 04/10/2019 05:15 PM  
 
 
All Cardiac Markers in the last 24 hours: No results found for: CPK, CK, CKMMB, CKMB, RCK3, CKMBT, CKNDX, CKND1, CHRISTINE, TROPT, TROIQ, NGHIA, TROPT, TNIPOC, BNP, BNPP Liver Panel: No results found for: ALB, CBIL, TBIL, TP, GLOB, AGRAT, SGOT, ASTPOC, ALTPOC, ALT, GPT, AP Subjective: Chief Complaint:     
BOTELLO. Tolerating supplements. ROS: 
(bold if positive,otherwise negative) Fever/chills ,  Dysuria Cough , Sputum , SOB/BOTELLO , Chest Pain Diarrhea ,Nausea/Vomit , Abd Pain , Constipation Tolerating Diet Objective:  
 
Vitals: 
Last 24hrs VS reviewed since prior progress note. Most recent are: 
 
Visit Vitals /88 Pulse (!) 106 Temp 97.6 °F (36.4 °C) Resp 28 Ht 5' 6\" (1.676 m) Wt 59.9 kg (132 lb 0.9 oz) SpO2 99% BMI 21.31 kg/m² SpO2 Readings from Last 6 Encounters:  
04/15/19 99% 04/10/19 96% 04/02/19 98% 03/12/19 97% 03/01/19 95% 02/23/19 97% O2 Flow Rate (L/min): 2 l/min Intake/Output Summary (Last 24 hours) at 4/15/2019 1259 Last data filed at 4/15/2019 1228 Gross per 24 hour Intake 1321.25 ml Output 595 ml Net 726.25 ml Physical Exam:  
Ears: hearing is intact Eyes: Icterus is not present Lungs: diminished on bases. L>R Heart: tachycardic Gastrointestinal: soft, non-tender. Bowel sounds normal. No masses,  no organomegaly Neurological:  New Focal Deficits are not present Psychiatric:  Mood is stable Telemetry reviewed:   AFIB Medications Reviewed: (see below) Lab Data Reviewed: (see below) 
 
______________________________________________________________________ Medications:  
 
Current Facility-Administered Medications Medication Dose Route Frequency  [START ON 4/16/2019] levoFLOXacin (LEVAQUIN) tablet 750 mg  750 mg Oral Q48H  
 albuterol-ipratropium (DUO-NEB) 2.5 MG-0.5 MG/3 ML  3 mL Nebulization Q4H PRN  
 metoprolol (LOPRESSOR) injection 2.5 mg  2.5 mg IntraVENous Q4H PRN  
 digoxin (LANOXIN) tablet 0.125 mg  0.125 mg Oral DAILY  bisacodyl (DULCOLAX) suppository 10 mg  10 mg Rectal DAILY PRN  
 sodium chloride (NS) flush 5-10 mL  5-10 mL IntraVENous PRN  
 VANCOMYCIN INFORMATION NOTE   Other Rx Dosing/Monitoring  piperacillin-tazobactam (ZOSYN) 4.5 g in 0.9% sodium chloride (MBP/ADV) 100 mL MBP  4.5 g IntraVENous Q8H  
 aspirin chewable tablet 81 mg  81 mg Oral DAILY  atorvastatin (LIPITOR) tablet 40 mg  40 mg Oral QHS  clopidogrel (PLAVIX) tablet 75 mg  75 mg Oral DAILY  famotidine (PEPCID) tablet 20 mg  20 mg Oral DAILY  furosemide (LASIX) tablet 20 mg  20 mg Oral DAILY  midodrine (PROAMITINE) tablet 10 mg  10 mg Oral TID WITH MEALS  nitroglycerin (NITROSTAT) tablet 0.4 mg  0.4 mg SubLINGual Q5MIN PRN  
 tamsulosin (FLOMAX) capsule 0.4 mg  0.4 mg Oral DAILY  therapeutic multivitamin (THERAGRAN) tablet 1 Tab  1 Tab Oral DAILY  acetaminophen (TYLENOL) tablet 650 mg  650 mg Oral Q4H PRN  
 ketorolac (TORADOL) injection 15 mg  15 mg IntraVENous Q6H PRN  
 ondansetron (ZOFRAN) injection 4 mg  4 mg IntraVENous Q4H PRN  
 vancomycin (VANCOCIN) 750 mg in 0.9% sodium chloride (MBP/ADV) 250 mL ADV  750 mg IntraVENous Q24H Total time spent with patient: 35 minutes Care Plan discussed with: Patient, Family, Nursing Staff and Consultant/Specialist 
 
Discussed:  Care Plan Prophylaxis:  Hep SQ Disposition:  Home w/Family Attending Physician: Karina Cole MD

## 2019-04-15 NOTE — PROGRESS NOTES
PT screen received and chart reviewed. Per chart review, admitting diagnosis and relevant prior level of function indicate patient appropriate for skilled PT/OT evaluation. Please order PT/OT evaluation as appropriate to better determine benefit from skilled PT/OT treatment and formal discharge recommendations. PT/OT evaluation is indicated. Thank you, Bartolo Armas, PT, DPT Office Extension: [phone] Pager: [phone]

## 2019-04-16 NOTE — ANCILLARY DISCHARGE INSTRUCTIONS
Patient and/or next of kin has been given the [facility] Important Message From Medicare About Your Rights\" letter and all questions were answered. Paper copy signed.

## 2019-04-16 NOTE — PROGRESS NOTES
Day 2 of attempting OT evaluation. 2 attempts today; -150 bpm at rest. Not appropriate for mobilization at this time. Will follow up on 4/17/19. Sulma Granados MS OTR/L Office Ext: [phone] Pager: [phone]

## 2019-04-16 NOTE — PROGRESS NOTES
Patient will need auth from [facility] once and accepting facility has been found as at this time SNF is the plan. Spoke with daughter she gave additiional choices. [facility], [facility] and rehab and [facility]. Patient's daughter stated that she does not want patient to go back to [facility].

## 2019-04-16 NOTE — PROGRESS NOTES
Nutrition follow-up/Plan of care RECOMMENDATIONS:  
1. 2g Na Soft Solid Diet w/ NTL (2) (liberalized to promote intake) 2. Cold Ensure Enlive TID (Only vanilla) 3. Monitor labs, weight and PO intake 4. RD to follow GOALS:  
1. Ongoing: PO intake meets >75% of protein/calorie needs by 4/21 
2. Ongoing: Weight Maintenance/gradual gain (1-2 lb/week) by 4/23 ASSESSMENT: 
Wt status is classified as normal per Body mass index is 21.31 kg/m². However, Pt at nutrition risk d/t BMI <23 and age >65 years. Variable weights recorded, but per documented weight records Pt was 158 lb on (1/16/2019) equating to a recent loss of 17 lb or 11% x 3 months PTA. Noted substantial fluid fluctuation is a factor as well. Poor PO intake. Ensure Enlive TID to supplement energy needs. Labs noted. Pt w/ hypernatremia, hypoalbuminemia and elevated BUN/Cr; GFR (47). Nutrition recommendations listed. RD to follow. Meets Criteria for Acute Malnutrition  
[x] Severe Malnutrition, as evidenced by: 
            [x] Moderate muscle wasting, loss of subcutaneous fat 
            [x] Nutritional intake of <50% of recommended intake for >5 days [x] Weight loss of >1-2% in 1 week, >5% in 1 month, >7.5% in 3 months, or >10% in 6 months 
            [] Moderate-severe edema  
  
 
Nutrition Risk:  [] High  [x] Moderate []  Low SUBJECTIVE/OBJECTIVE:  
(4/16): Pt transferred from [facility] and admitted to hospital on (4/10) for HCAP, chronic pleural effusions and CHF. Diet orders carried over from SLP recomendations from previous admission to [facility] so would recommend a repeat evaluation. PO intake has been poor per vitals. Variability in recent weights recorded, but per Hx from patient/daughter definitely with weight loss starting ~9 months ago (Total of 22 lb or 13% from ~ lb x 9 months ago and per weight at 158 lb on (1/16/2019) 17 lb or 11% over the past 3 months PTA) Noted fluid fluctuation also a factor.  Plan is for thoracentesis tomorrow. Pt seen in room this evening with daughter at bedside. Observed 100% of supplement and applesauce consumed. Obtained some preferences and also had d/w daughter to fill out the menu to implement his preferences and dietary will pick it up. (Also informed dietary). Explained process and that there is a refrigerator on unit for things she may want to bring in as well. Noted orders for SLP to reevaluate with MBS placed this afternoon as well. Continue to encourage intake and will follow. (4/10): Pt w/ slightly improved appetite but still not adequate. Weight has been stable since admission. Pt seen in room with daughter who had just arrived and was requesting to see someone. Informed Teresa Stiles, and TCC Director, Rosie Matos, that she would like to speak with someone. They addressed the situation. Pt is being transferred to the ER per family request so will follow up at a more appropriate time.  
 
(4/3): Pt transferred form [facility] to [address] [place] [address],[address] on 4/2/2019. Pt seen in room with daughter at bedside to assist with Hx. Pt reports was previously consuming 2 meals and 1 Boost per day prior to hospitalization. Denies having any food allergies or problems chewing/swallowing and stable weight prior to recent hospitalization. Spoke with SLP , Janis Baires, and will do evaluation as was previously on modified diet PTA. Pt stated he is feeling hungry in the mornings, but that he now tends to have early satiety. Discussed trying to make breakfast his largest meal and then doing smaller meals/nutrient dense snacks throughout the rest of the day. Will reorder Ensure Enlive TID that he was previously receiving and will possibly add in magic cups but want to promote solid food intake first. Encouraged trying to consume what he can tolerate of solid foods first and then sipping on supplements after or between meals to increase kcal/protein.  Also provided menu and encouraged him to implement preferences with dietary. We had the discussion that SLP would be evaluating most likely so things may have to be altered depending on results. Weight loss confirmed but unable to quantify exactly d/t recent fluid fluctuations. (Estimate ~10-12 lb or 8% loss x 1-2 months PTA) Encourage intake and will monitor. Information Obtained from:  
 [x] Chart Review [x] Patient [x] Family/Caregiver [x] Nurse/Physician 
 [] Interdisciplinary Meeting/Rounds Diet: Dental Soft 2g Na w/ NTL (2) Medications: [x] Reviewed IV: D5 @100 mL/hr (408 kcal/day) Allergies: [x] Reviewed Patient Active Problem List  
Diagnosis Code  Asthma J45.909  Prostate cancer ([facility] C61  Hypercholesterolemia E78.00  Angina, class I ([facility] I20.9  Left bundle branch block I44.7  Coronary artery disease I25.10  Hypertension I11.9  Dyslipidemia E78.5  Carotid artery disease (HCC) I77.9  Aortic valve stenosis I35.0  Anemia D64.9  
 NSTEMI (non-ST elevated myocardial infarction) ([facility]) I21.4  Congestive heart disease ([facility]) I50.9  CHF (congestive heart failure) ([facility]) I50.9  HCAP (healthcare-associated pneumonia) J18.9  Chronic bilateral pleural effusions J90  
 Bilateral pleural effusion J90  
 Atrial fibrillation with rapid ventricular response ([facility]) I48.91 Past Medical History:  
Diagnosis Date  Asthma  Cardiac cath 04/28/2011 oLM 30%. pLAD 30%. oD1 50%. CX 90% (3 x 18 [place] DEMAR). dRCA 90%. RPLB subtotal.  RPDA 100%.  Cardiac echocardiogram 01/21/2014 EF 55-60%. Mod LVH. Gr 1 DDfx. Mild AS (mean grad 13). Mild AI. Unchanged from study of 11/30/11.  Cardiac nuclear imaging test, mod risk 01/22/2016 Intermediate risk. Medium-sized inferior, inferoseptal infarction. No ischemia. EF 54%. Nondiagnostic EKG on pharm stress test.  
 Carotid duplex 03/02/2016 Mod 50-69% bilateral ICA stenosis. Probable significant RECA stenosis. >50% stenosis of left subclavian. No significant change from study of 1/8/15.  Coronary artery disease Status post PCI with drug-eluting stent, Hostetter 3 x 18 mm in the proximal circumflex.  Heart failure ([facility]  Hx of carotid artery disease ([facility]  Hypercholesterolemia  Hypertension  Prostate cancer ([facility] Labs:   
Lab Results Component Value Date/Time Sodium 153 (H) 04/16/2019 06:00 AM  
 Potassium 3.7 04/16/2019 06:00 AM  
 Chloride 113 (H) 04/16/2019 06:00 AM  
 CO2 33 (H) 04/16/2019 06:00 AM  
 Anion gap 7 04/16/2019 06:00 AM  
 Glucose 122 (H) 04/16/2019 06:00 AM  
 BUN 41 (H) 04/16/2019 06:00 AM  
 Creatinine 1.41 (H) 04/16/2019 06:00 AM  
 Calcium 9.1 04/16/2019 06:00 AM  
 Magnesium 2.3 04/16/2019 06:00 AM  
 Phosphorus 3.5 04/16/2019 06:00 AM  
 Albumin 2.3 (L) 04/16/2019 06:00 AM  
 
Anthropometrics: BMI (calculated): 21.3 Last 3 Recorded Weights in this Encounter 04/10/19 1609 04/13/19 2002 Weight: 59.9 kg (132 lb) 59.9 kg (132 lb 0.9 oz) Ht Readings from Last 1 Encounters:  
04/10/19 5' 6\" (1.676 m) Weight Metrics 4/13/2019 4/9/2019 4/2/2019 3/12/2019 3/7/2019 3/1/2019 2/23/2019 Weight 132 lb 0.9 oz 141 lb 6.4 oz 147 lb 4.3 oz 149 lb 0.5 oz - 161 lb 163 lb 2.3 oz  
BMI 21.31 kg/m2 22.15 kg/m2 23.07 kg/m2 - 23.34 kg/m2 25.22 kg/m2 25.55 kg/m2 Patient Vitals for the past 100 hrs: 
 % Diet Eaten 04/15/19 0900 15 % 04/14/19 2000 20 % 04/14/19 0900 25 % 04/13/19 1900 15 % 04/13/19 1317 10 % 04/13/19 0933 10 % 04/12/19 1200 20 % 04/12/19 0900 0 % UBW: 160-165 lb x 9 months ago per patient and daughter 
[x] Weight Loss PTA [] Weight Gain 
[] Weight Stable Estimated Nutrition Needs: [x] MSJ  [] Other: 
Calories: 0131-3426 kcal Based on:   [x] Actual BW   
Protein:   77-90 g Based on:   [x] Actual BW Fluid:       6378-7726 ml Based on:   [x] Actual BW  
 
 [x] No Cultural, Orthodoxy or ethnic dietary need identified. [] Cultural, Protestant and ethnic food preferences identified and addressed Wt Status:  [x] Normal (18.6 - 24.9) [] Underweight (<18.5) [] Overweight (25 - 29.9) [] Mild Obesity (30 - 34.9)  [] Moderate Obesity (35 - 39.9) [] Morbid Obesity (40+) Nutrition Problems Identified:  
[x] Suboptimal PO intake  
[] Food Allergies [x] Difficulty chewing/swallowing/poor dentition  
[] Constipation/Diarrhea  
[] Nausea/Vomiting  
[] None 
[] Other:  
 
Plan:  
[x] Therapeutic Diet (liberalized for now) [x]  Obtained/adjusted food preferences/tolerances and/or snacks options [x]  Continue supplements added  
[x] SLP will be following for feeding techniques []  HS snack added  
[x]  Modify diet texture  
[]  Modify diet for food allergies []  Educate patient  
[]  Assist with menu selection  
[x]  Monitor PO intake on meal rounds  
[x]  Continue inpatient monitoring and intervention  
[]  Participated in discharge planning/Interdisciplinary rounds/Team meetings  
[]  Other:  
 
Education Needs: 
 [] Not appropriate for teaching at this time due to: 
 [x] Identified and addressed Nutrition Monitoring and Evaluation: 
[x] Continue ongoing monitoring and intervention 
[] Cassy Bowden

## 2019-04-16 NOTE — ROUTINE PROCESS
Per Dr. Rylan Walker (IR) Pt is has US thoracentesis ordered, will postpone until tomorrow 4/17/19. Currently his INR is 1.3, PT 15.6. Pt had SQ heparin, Asa and plavix. Will need to hold ASA, Plavix and SQ heparin until after procedure. Repeat Coags in AM. Discussed with pt's nurse Nidhi Barnes RN

## 2019-04-16 NOTE — CONSULTS
[facility] Name: Marcy Goldberg : 1926 MRN: [id number] Date: 2019   
[]I have reviewed the flowsheet and previous days notes. []The patient is unable to give any meaningful history or review of systems because the patient is: 
[]Intubated []Sedated  
[]Unresponsive []The patient is critically ill on     
[]Mechanical ventilation []Pressors []BiPAP []  
S:  Patient appeared weak, however was aware and cooperative. The patient indicates that he does feel some improvement in his status overall, however he indicates that his breathing is not improving. ROS:Review of systems not obtained due to patient factors. Events and notes from last 24 hours reviewed. Care plan discussed on multidisciplinary rounds. Vital Signs:   
Visit Vitals BP 97/58 (BP 1 Location: Right arm, BP Patient Position: At rest) Pulse 76 Temp 97.3 °F (36.3 °C) Resp 23 Ht 5' 6\" (1.676 m) Wt 59.9 kg (132 lb 0.9 oz) SpO2 99% BMI 21.31 kg/m² O2 Device: Nasal cannula O2 Flow Rate (L/min): 2 l/min Temp (24hrs), Av.7 °F (36.5 °C), Min:97.3 °F (36.3 °C), Max:98 °F (36.7 °C) Intake/Output:  
Last shift:      No intake/output data recorded. Last 3 shifts:  1901 -  0700 In: 1131.3 [P.O.:420; I.V.:711.3] Out: 1496 [[id number]] Intake/Output Summary (Last 24 hours) at 2019 [facility] Last data filed at 2019 8937 Gross per 24 hour Intake 71.25 ml Output 1001 ml Net -929.75 ml Hemodynamics:  
   
:   
 
Ventilator Settings: 
  
  
  
  
 
Physical Exam:  
 General: oriented x 3, alert, cooperative, fatigued, cachexic HEENT: Normal 
 Neck: No abnormally enlarged lymph nodes. Chest: normal 
 Lungs: rales bilaterally  no dullness/ tenderness/ rash Heart: Rate and rhythm were irregularly irregular, heart sounds were faint  Abdomen: non distended, bowel sounds normoactive, tympanic, abdomen is soft without significant tenderness, masses, organomegaly or guarding Extremity: 1+ and non-pitting edema Neuro: alert Skin: Skin consisted of scattered, diffuse purpura, and abrasions, turgor was decreased DATA:  
Current Facility-Administered Medications Medication Dose Route Frequency  VANCOMYCIN INFORMATION NOTE   Other ONCE  
 levoFLOXacin (LEVAQUIN) tablet 750 mg  750 mg Oral Q48H  
 albuterol-ipratropium (DUO-NEB) 2.5 MG-0.5 MG/3 ML  3 mL Nebulization Q4H PRN  
 heparin (porcine) injection 5,000 Units  5,000 Units SubCUTAneous Q8H  
 metoprolol (LOPRESSOR) injection 2.5 mg  2.5 mg IntraVENous Q4H PRN  
 digoxin (LANOXIN) tablet 0.125 mg  0.125 mg Oral DAILY  bisacodyl (DULCOLAX) suppository 10 mg  10 mg Rectal DAILY PRN  
 sodium chloride (NS) flush 5-10 mL  5-10 mL IntraVENous PRN  
 VANCOMYCIN INFORMATION NOTE   Other Rx Dosing/Monitoring  piperacillin-tazobactam (ZOSYN) 4.5 g in 0.9% sodium chloride (MBP/ADV) 100 mL MBP  4.5 g IntraVENous Q8H  
 aspirin chewable tablet 81 mg  81 mg Oral DAILY  atorvastatin (LIPITOR) tablet 40 mg  40 mg Oral QHS  clopidogrel (PLAVIX) tablet 75 mg  75 mg Oral DAILY  famotidine (PEPCID) tablet 20 mg  20 mg Oral DAILY  furosemide (LASIX) tablet 20 mg  20 mg Oral DAILY  midodrine (PROAMITINE) tablet 10 mg  10 mg Oral TID WITH MEALS  nitroglycerin (NITROSTAT) tablet 0.4 mg  0.4 mg SubLINGual Q5MIN PRN  
 tamsulosin (FLOMAX) capsule 0.4 mg  0.4 mg Oral DAILY  therapeutic multivitamin (THERAGRAN) tablet 1 Tab  1 Tab Oral DAILY  acetaminophen (TYLENOL) tablet 650 mg  650 mg Oral Q4H PRN  
 ondansetron (ZOFRAN) injection 4 mg  4 mg IntraVENous Q4H PRN  
 vancomycin (VANCOCIN) 750 mg in 0.9% sodium chloride (MBP/ADV) 250 mL ADV  750 mg IntraVENous Q24H Telemetry: []Sinus []A-flutter []Paced []A-fib []Multiple PVCs Labs: 
Recent Labs 04/16/19 
0600 04/15/19 
0510 04/14/19 
0400 WBC 9.5 10.2 8.5 HGB 9.1* 9.1* 9.1*  
HCT 31.9* 31.9* 31.1*  
 284 304 Recent Labs 04/16/19 
0600 04/15/19 
1506 04/15/19 
0510 04/14/19 
0400 *  --  153* 147* K 3.7 3.3* 3.4* 3.9 *  --  112* 107 CO2 33*  --  30 29 *  --  135* 138* BUN 41*  --  45* 45* CREA 1.41*  --  1.67* 1.61* CA 9.1  --  9.6 9.3 MG 2.3  --  2.5 2.4 PHOS 3.5  --  4.0 4.4 ALB 2.3*  --   --   --   
SGOT 14*  --   --   --   
ALT 15*  --   --   -- No results for input(s): PH, PCO2, PO2, HCO3, FIO2 in the last 72 hours. Imaging: 
[]I have personally reviewed the patients radiographs []Radiographs reviewed with radiologist 
 [] No CXR study available for review today []No change from prior, tubes and lines in adequate position []Improved   []Worsening IMPRESSION:  
Patient Active Problem List  
Diagnosis Code  Asthma J45.909  Prostate cancer ([facility] C61  Hypercholesterolemia E78.00  Angina, class I ([facility] I20.9  Left bundle branch block I44.7  Coronary artery disease I25.10  Hypertension I11.9  Dyslipidemia E78.5  Carotid artery disease ([facility]) I77.9  Aortic valve stenosis I35.0  Anemia D64.9  
 NSTEMI (non-ST elevated myocardial infarction) ([facility]) I21.4  Congestive heart disease ([facility]) I50.9  CHF (congestive heart failure) ([facility]) I50.9  HCAP (healthcare-associated pneumonia) J18.9  Chronic bilateral pleural effusions J90  
 Bilateral pleural effusion J90  
 Atrial fibrillation with rapid ventricular response ([facility]) I48.91 · Chronic Recurrent, bilateral Pleural effusions from CHF · PLAN:  
· IR to perform thoracentesis under US guidance · Not a complication free approach: d/w him earlier: · 1) Bleeding · 2) Infection: Empyema · 3) Ptx · 4) Re expansion Pulmonary Edema · 5) Etc · He agreed to thoracentesis again. The patient is: [x] acutely ill Risk of deterioration: [] moderate  
 [] critically ill  [x] high [x]See my orders for details My assessment, plan of care, findings, medications, side effects etc were discussed with: 
[x]nursing []PT/OT   
[]respiratory therapy []Dr. Owen Panda [] [x]Total critical care time exclusive of procedures 15 minutes Makenzie Fraser MD

## 2019-04-16 NOTE — PROGRESS NOTES
Problem: General Medical Care Plan Goal: *Vital signs within specified parameters Outcome: Progressing Towards Goal 
Goal: *Labs within defined limits Outcome: Progressing Towards Goal 
Goal: *Absence of infection signs and symptoms Outcome: Progressing Towards Goal 
Goal: *Optimal pain control at patient's stated goal 
Outcome: Progressing Towards Goal 
Goal: *Skin integrity maintained Outcome: Progressing Towards Goal 
Goal: *Fluid volume balance Outcome: Progressing Towards Goal 
Goal: *Optimize nutritional status Outcome: Progressing Towards Goal 
Goal: *Anxiety reduced or absent Outcome: Progressing Towards Goal 
Goal: *Progressive mobility and function (eg: ADL's) Outcome: Progressing Towards Goal 
  
Problem: Impaired Skin Integrity/Pressure Injury Treatment Goal: *Improvement of Existing Pressure Injury Outcome: Progressing Towards Goal 
Goal: *Prevention of pressure injury Description Document William Scale and appropriate interventions in the flowsheet. Outcome: Progressing Towards Goal 
  
Problem: Gas Exchange - Impaired Goal: *Absence of hypoxia Outcome: Progressing Towards Goal 
  
Problem: Patient Education: Go to Patient Education Activity Goal: Patient/Family Education Outcome: Progressing Towards Goal 
  
Problem: Pain Goal: *Control of Pain Outcome: Progressing Towards Goal 
Goal: *PALLIATIVE CARE:  Alleviation of Pain Outcome: Progressing Towards Goal 
  
Problem: Nutrition Deficit Goal: *Optimize nutritional status Outcome: Progressing Towards Goal 
  
Problem: Falls - Risk of 
Goal: *Absence of Falls Description Document Gillian Brew Fall Risk and appropriate interventions in the flowsheet. Outcome: Progressing Towards Goal 
  
Problem: Patient Education: Go to Patient Education Activity Goal: Patient/Family Education Outcome: Progressing Towards Goal 
  
Problem: Pressure Injury - Risk of 
Goal: *Prevention of pressure injury Description Document William Scale and appropriate interventions in the flowsheet. Outcome: Progressing Towards Goal 
  
Problem: Patient Education: Go to Patient Education Activity Goal: Patient/Family Education Outcome: Progressing Towards Goal 
  
Problem: Patient Education: Go to Patient Education Activity Goal: Patient/Family Education Outcome: Progressing Towards Goal

## 2019-04-16 NOTE — PROGRESS NOTES
Bedside shift change report given to KATYA Barger (oncoming nurse) by Griselda Balk, RN (offgoing nurse). Report included the following information SBAR, Kardex, Intake/Output, MAR and Recent Results. A/O x3, no pain noted,  BBB PAC PVC per tele-monitor, IV flushed and infusing, call bell and personal belongings, will continue to monitor. 
   
[phone] -- AM medications given, well tolerated, will continue to monitor. 1038 -- Patients' daughter Sheila Mendosa) called to obtain over the phone consent for thoracentesis. 1101 -- STAT PT/INR and PTT labs ordered for Thoracentesis.    
1104 -- Reassessment completed, no change in patient condition, will continue to monitor. 1150 -- 5 Beats of V-Tach per tele-monitor, nurse present changing patient, will continue to monitor. 1212 -- Metoprolol given, for . 
 
1408 -- Patient off the unit to [employer]. 1430 -- Spoke with Maximino Essex (RAD) 1500 -- Patient back on the unit from [employer], thoracentesis will be done tomorrow. 
   
1530 -- Reassessment completed, no change in patient condition, will continue to monitor. 1736 -- PRN breathing treatment given. 
   
Bedside shift change report given to Britta Reyes RN (oncoming nurse) by Kelly Peterson RN (offgoing nurse). Report included the following information SBAR, Kardex, Intake/Output, MAR and Recent Results.

## 2019-04-16 NOTE — PROGRESS NOTES
Patient has an accept from [facility]. will need update OT/PT notes to submit to [facility] for [address].

## 2019-04-16 NOTE — ROUTINE PROCESS
Bedside and Verbal shift change report given to KATYA Barger (oncoming nurse) by Favio Marroquin (offgoing nurse). Report included the following information SBAR, Kardex, MAR and Recent Results.

## 2019-04-16 NOTE — PROGRESS NOTES
Cardiovascular Specialists - Progress Note Admit Date: 4/10/2019 Patient seen and examined independently. No specific complaints today. He is to have an additional thoracentesis done later today. He has no chest pain. He appears quite frail. His rhythm now is primarily atrial fib and occasional atrial tachycardia. We will continue digoxin for now as the blood pressure remains somewhat marginal.  Hopefully rhythm will improve with improving respiratory status. Agree with assessment and plan as noted below. Imelda Sepulveda MD 
Assessment: - Acute hypoxic respiratory failure - HCAP: Multifocal PNA on CXR 04/10/19, multifocal consolidation within the left upper and bilateral lower lobes, likely recurrently multifocal PNA 
- Atrial fibrillation with RVR: CHADS2-Vasc score 5 (CHF, HTN, age, Vascular disease) - Bilateral moderate to large pleural effusions CT chest 04/10 
-CAD s/p LAD PCI with DEMAR 3/11/2019 with previous PCI to LCx 2011. Cath 3/11/2019 (Occluded mid RCA which appears to be chronic. There is faint collateral from the left coronary system, Left main artery with ostial 30-40%, LAD mid focal severely calcified tortuous 99% stenosis with CARLENE II flow, Circumflex coronary artery with diffuse mild 20-30% stenosis throughout). Discharged on ASA, plavix, statin, BB. 
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   
- Echo 03/14/19 with EF 26-30%  
-NEL, Creatinine 1.62 04/12 
-Peripheral vascular disease.  
-Hypertension, but currently hypotensive 
-Dyslipidemia 
-Asthma, former smoker. -Advanced age 
  
 
Plan: - Atrial tachycardia on telemetry noted, will continue to follow rates. Continued on Digoxin - Plans for thoracentesis per pulmonary team, Dr. Rosamaria Oglesby following Subjective: No new complaints. Objective:  
  
Patient Vitals for the past 8 hrs: 
 Temp Pulse Resp BP SpO2  
04/16/19 1211  (!) 154  108/65   
04/16/19 1112 97.6 °F (36.4 °C) 91 18 106/62 98 % Patient Vitals for the past 96 hrs: 
 Weight 04/13/19 2002 132 lb 0.9 oz (59.9 kg) Intake/Output Summary (Last 24 hours) at 4/16/2019 1548 Last data filed at 4/16/2019 1343 Gross per 24 hour Intake 260 ml Output 801 ml Net -541 ml Physical Exam: 
General:  alert, cooperative, no distress Neck:  nontender, no JVD Lungs:  clear to auscultation bilaterally Heart:  regular rate and rhythm, S1, S2 normal, no murmur, click, rub or gallop Abdomen:  abdomen is soft without significant tenderness, masses, organomegaly or guarding Extremities:  extremities normal, atraumatic, no cyanosis or edema Data Review:  
 
Labs: Results:  
   
Chemistry Recent Labs 04/16/19 
0600 04/15/19 
1506 04/15/19 
0510 04/14/19 
0400 *  --  135* 138* *  --  153* 147* K 3.7 3.3* 3.4* 3.9 *  --  112* 107 CO2 33*  --  30 29 BUN 41*  --  45* 45* CREA 1.41*  --  1.67* 1.61* CA 9.1  --  9.6 9.3 MG 2.3  --  2.5 2.4 PHOS 3.5  --  4.0 4.4 AGAP 7  --  11 11 BUCR 29*  --  27* 28* AP 61  --   --   --   
TP 5.8*  --   --   --   
ALB 2.3*  --   --   --   
GLOB 3.5  --   --   --   
AGRAT 0.7*  --   --   --   
  
CBC w/Diff Recent Labs 04/16/19 
0600 04/15/19 
0510 04/14/19 
0400 WBC 9.5 10.2 8.5  
RBC 3.52* 3.50* 3.48* HGB 9.1* 9.1* 9.1*  
HCT 31.9* 31.9* 31.1*  
 284 304 GRANS 71 73 72 LYMPH 20* 17* 9* EOS 3 3 2 Cardiac Enzymes No results found for: CPK, CK, CKMMB, CKMB, RCK3, CKMBT, CKNDX, CKND1, CHRISTINE, TROPT, TROIQ, NGHIA, TROPT, TNIPOC, BNP, BNPP Coagulation Recent Labs 04/16/19 
1125 PTP 15.6* INR 1.3* APTT 29.5 Lipid Panel Lab Results Component Value Date/Time  Cholesterol, total 87 03/08/2019 05:28 AM  
 HDL Cholesterol 44 03/08/2019 05:28 AM  
 LDL, calculated 30 03/08/2019 05:28 AM  
 VLDL, calculated 13 03/08/2019 05:28 AM  
 Triglyceride 65 03/08/2019 05:28 AM  
 CHOL/HDL Ratio 2.0 03/08/2019 05:28 AM  
  
 BNP No results found for: BNP, BNPP, XBNPT Liver Enzymes Recent Labs 04/16/19 
0600 TP 5.8* ALB 2.3* AP 61 SGOT 14* Digoxin Thyroid Studies Lab Results Component Value Date/Time TSH 1.22 03/07/2019 05:29 AM  
    
 
Signed By: Hamilton Chopra. Hakan Ge PA-C April 16, 2019

## 2019-04-16 NOTE — PROGRESS NOTES
Internal Medicine Progress Note NAME: Marcy Goldberg :  1926 MRM:  [id number] Date/Time: 2019 Principal Problem: 
  Chronic bilateral pleural effusions (4/10/2019) Active Problems: 
  CHF (congestive heart failure) ([facility] (4/10/2019) HCAP (healthcare-associated pneumonia) (4/10/2019) Atrial fibrillation with rapid ventricular response ([facility] (2019) ASSESSMENT/PLAN: 
 
# HCAP: Continue broad spectrum IV ab. Sputum cultures and urine antigens ordered. CT 4/10/19 : Multifocal consolidation within the left upper and bilateral lower lobes, similar to prior CT 2019. Findings likely represent recurrent multifocal  
# [facility] Plan for repeat tapping 19. # Hypernatremia. Urine studies. IVF D5.do not use NS, or any Na in diluents please as hypernatremia # Hypokalemia. Replete and trend. AF RVR: appreciate Cardiology assistance, started on  digoxin. After procedures concluded will need to discuss pros and cons of [facility] with patient and family per cardiology. # SLP was consulted previously, ordered diet recommended by SLP (soft, nectar thick liquids) # Cont acceptable home medications for chronic conditions # DVT protocol 
   
-Code status : full Lab Review:  
 
Recent Labs 19 
0600 04/15/19 
0510 19 
0400 WBC 9.5 10.2 8.5 HGB 9.1* 9.1* 9.1*  
HCT 31.9* 31.9* 31.1*  
 284 304 Recent Labs 19 
0600 04/15/19 
1506 04/15/19 
0510 19 
0400 *  --  153* 147* K 3.7 3.3* 3.4* 3.9 *  --  112* 107 CO2 33*  --  30 29 *  --  135* 138* BUN 41*  --  45* 45* CREA 1.41*  --  1.67* 1.61* CA 9.1  --  9.6 9.3 MG 2.3  --  2.5 2.4 PHOS 3.5  --  4.0 4.4 ALB 2.3*  --   --   --   
TBILI 0.6  --   --   --   
SGOT 14*  --   --   --   
ALT 15*  --   --   --   
 
Lab Results Component Value Date/Time  Glucose (POC) 157 (H) 2019 09:12 PM  
 Glucose (POC) 128 (H) 03/21/2019 05:01 PM  
 Glucose (POC) 126 (H) 02/21/2019 07:17 PM  
 
No results for input(s): PH, PCO2, PO2, HCO3, FIO2 in the last 72 hours. No results for input(s): INR in the last 72 hours. No lab exists for component: INREXT, INREXT No results found for: SDES Lab Results Component Value Date/Time Culture result: NO GROWTH 4 DAYS 04/12/2019 03:10 PM  
 Culture result: NO GROWTH 6 DAYS 04/10/2019 05:30 PM  
 Culture result: NO GROWTH 6 DAYS 04/10/2019 05:15 PM  
 
 
All Cardiac Markers in the last 24 hours: No results found for: CPK, CK, CKMMB, CKMB, RCK3, CKMBT, CKNDX, CKND1, CHRISTINE, TROPT, TROIQ, NGHIA, TROPT, TNIPOC, BNP, BNPP Liver Panel:  
Lab Results Component Value Date/Time ALB 2.3 (L) 04/16/2019 06:00 AM  
 TP 5.8 (L) 04/16/2019 06:00 AM  
 GLOB 3.5 04/16/2019 06:00 AM  
 AGRAT 0.7 (L) 04/16/2019 06:00 AM  
 SGOT 14 (L) 04/16/2019 06:00 AM  
 ALT 15 (L) 04/16/2019 06:00 AM  
 AP 61 04/16/2019 06:00 AM  
 
 
 
  
Subjective: Chief Complaint:     
BOTELLO. Agree on taping Tolerating supplements. ROS: 
(bold if positive,otherwise negative) Fever/chills ,  Dysuria Cough , Sputum , SOB/BOTELLO , Chest Pain Diarrhea ,Nausea/Vomit , Abd Pain , Constipation Tolerating Diet Objective:  
 
Vitals: 
Last 24hrs VS reviewed since prior progress note. Most recent are: 
 
Visit Vitals BP 97/58 (BP 1 Location: Right arm, BP Patient Position: At rest) Pulse 76 Temp 97.3 °F (36.3 °C) Resp 23 Ht 5' 6\" (1.676 m) Wt 59.9 kg (132 lb 0.9 oz) SpO2 99% BMI 21.31 kg/m² SpO2 Readings from Last 6 Encounters:  
04/16/19 99% 04/10/19 96% 04/02/19 98% 03/12/19 97% 03/01/19 95% 02/23/19 97% O2 Flow Rate (L/min): 2 l/min Intake/Output Summary (Last 24 hours) at 4/16/2019 0932 Last data filed at 4/16/2019 0693 Gross per 24 hour Intake 71.25 ml Output 1001 ml Net -929.75 ml Physical Exam:  
Ears: hearing is intact Eyes: Icterus is not present Lungs: diminished on bases. L>R Heart: tachycardic Gastrointestinal: soft, non-tender. Bowel sounds normal. No masses,  no organomegaly Neurological:  New Focal Deficits are not present Psychiatric:  Mood is stable Telemetry reviewed:   AFIB Medications Reviewed: (see below) Lab Data Reviewed: (see below) 
 
______________________________________________________________________ Medications:  
 
Current Facility-Administered Medications Medication Dose Route Frequency  VANCOMYCIN INFORMATION NOTE   Other ONCE  
 levoFLOXacin (LEVAQUIN) tablet 750 mg  750 mg Oral Q48H  
 albuterol-ipratropium (DUO-NEB) 2.5 MG-0.5 MG/3 ML  3 mL Nebulization Q4H PRN  
 heparin (porcine) injection 5,000 Units  5,000 Units SubCUTAneous Q8H  
 metoprolol (LOPRESSOR) injection 2.5 mg  2.5 mg IntraVENous Q4H PRN  
 digoxin (LANOXIN) tablet 0.125 mg  0.125 mg Oral DAILY  bisacodyl (DULCOLAX) suppository 10 mg  10 mg Rectal DAILY PRN  
 sodium chloride (NS) flush 5-10 mL  5-10 mL IntraVENous PRN  
 VANCOMYCIN INFORMATION NOTE   Other Rx Dosing/Monitoring  piperacillin-tazobactam (ZOSYN) 4.5 g in 0.9% sodium chloride (MBP/ADV) 100 mL MBP  4.5 g IntraVENous Q8H  
 aspirin chewable tablet 81 mg  81 mg Oral DAILY  atorvastatin (LIPITOR) tablet 40 mg  40 mg Oral QHS  clopidogrel (PLAVIX) tablet 75 mg  75 mg Oral DAILY  famotidine (PEPCID) tablet 20 mg  20 mg Oral DAILY  furosemide (LASIX) tablet 20 mg  20 mg Oral DAILY  midodrine (PROAMITINE) tablet 10 mg  10 mg Oral TID WITH MEALS  nitroglycerin (NITROSTAT) tablet 0.4 mg  0.4 mg SubLINGual Q5MIN PRN  
 tamsulosin (FLOMAX) capsule 0.4 mg  0.4 mg Oral DAILY  therapeutic multivitamin (THERAGRAN) tablet 1 Tab  1 Tab Oral DAILY  acetaminophen (TYLENOL) tablet 650 mg  650 mg Oral Q4H PRN  
 ondansetron (ZOFRAN) injection 4 mg  4 mg IntraVENous Q4H PRN  
  vancomycin (VANCOCIN) 750 mg in 0.9% sodium chloride (MBP/ADV) 250 mL ADV  750 mg IntraVENous Q24H Total time spent with patient: 35 minutes Care Plan discussed with: Patient, Family, Nursing Staff and Consultant/Specialist 
 
Discussed:  Care Plan Prophylaxis:  Hep SQ Disposition:  Home w/Family Attending Physician: Casi Guerrero MD

## 2019-04-16 NOTE — PROGRESS NOTES
2031: Received patient in bed awake,alert and oreinted x2. No signs of distress,family at the bedside, Bed low  and locked . Call bell within reach . Will continue monitoring. 9716: In bed sleeping ,no other concern at this time,call bell within reach. Will continue monitoring. Patient Vitals for the past 12 hrs: 
 Temp Pulse Resp BP SpO2  
04/16/19 0343 98 °F (36.7 °C) 98 23 111/58 97 % 04/16/19 0030 98 °F (36.7 °C) 90 23 104/78 96 % 04/15/19 2143 97.8 °F (36.6 °C) 98 23 108/74 96 %

## 2019-04-16 NOTE — PROGRESS NOTES
Speech Pathology Note 1518: Noted new order for MBS. Unable to complete this afternoon, will complete next business date. Thank you for this referral, 
Gera Jorge MS, CCC/SLP Speech- Language Pathologist

## 2019-04-16 NOTE — PROGRESS NOTES
1133: PT tx session held this AM. Patient currently in A-fib; HR elevated into 110s - 130s. Will f/u with patient this PM.  
1380: HR remains elevated. Not appropriate for mobility at this time. Will f/u with patient. Nicci Dupree PT, DPT Office extension: [phone] Pager #: [phone]

## 2019-04-17 NOTE — PROGRESS NOTES
Bedside report given by [facility] LIZZ MCCLOUD rn. Pt in bed awake resting no distress noted. Pt denies pain. Daughter at bedside plan of care for the shift explained pt verbalized understanding. HOB elevated, bed low and in locked position call light within reach. Will continue  With the care. 2100- Informed by the 9+ tech that pt's heart rate getting high. Checked on pt to see what was going on. Pt in bed awake resting comfortably. daughter at bedside said that the father ( pt ) was upset because her x boyfriend was at bedside. At this time the he had left. Gave this information to the [employer] tech. Will continue to monitor. 2300 Pt noted to be having SOB , pulled PRN duo neb to administer but  Respiratory therapist notified said will give the other scheduled med. Duo neb returned to \[facility]". 0200- Pt sleeping. 0535- Pt awake resting in bed no concerns voiced. 0630 Pt incontinent of stool care done pt given a bed bath made comfortable in bed. New condom cath applied , Mouth care done. Pt grateful no concerns voiced. Pt has 2 small open areas size of a dime to the sacral area. protective cream and mepilex applied. [place] Prevalon boots applied to bilateral feet.

## 2019-04-17 NOTE — WOUND CARE
Patient off floor for testing. Per discussion with nursing, patient bilateral buttock are intact however, he has a small opening within the gluteal cleft. Nursing has been applying Mepilex and turning patient. Wound care recommends nursing continue Mepilex and every two hour turning.

## 2019-04-17 NOTE — PROGRESS NOTES
Problem: Mobility Impaired (Adult and Pediatric) Goal: *Acute Goals and Plan of Care (Insert Text) Description Physical Therapy Goals Initiated 4/15/2019 and to be accomplished within 7 days. 1.  Patient will complete all bed mobility with supervision/set-up in order to prepare for EOB/OOB activity. 2.  Patient will perform sit <> stand with supervision/set-up in order to prepare for OOB/gait activity. 3.  Patient will perform bed to chair transfers with supervision/set-up in order to promote mobility and encourage seated activity to progress towards their prior level of function. 4.  Patient will ambulate 100 feet with minimal assistance/contact guard assist using LRAD in order to prepare for safe negotiation of their environment. Outcome: Progressing Towards Goal 
  
PHYSICAL THERAPY: DAILY TREATMENT NOTE  
INPATIENT: Medicare: Hospital Day: 8 Patient: Corey Avalos (73 y.o. male)    Date: 4/17/2019 Primary Diagnosis: Chronic bilateral pleural effusions [J90] HCAP (healthcare-associated pneumonia) [J18.9] CHF (congestive heart failure) ([facility] [address] [I50.9] Bilateral pleural effusion [J90] HCAP (healthcare-associated pneumonia) [J18.9] CHF (congestive heart failure) ([facility]) [I50.9]  
 ,  ,  
Precautions:   
 
Chart, physical therapy assessment, plan of care and goals were reviewed. PLOF:Independent ASSESSMENT: 
Pt very deconditioned, SOB with minimal activity. Pt min a sup>sit, scooting to EOB. Min X2 for sit<>stand. In stand pt began to have BM, min a X2 for line management and safety to transfer to [facility]. Pt stood approx. 1 min, then became very fatigued, demonstrated decreased safety and environmental awareness during transfer. Pt then stood X1 min for hygiene care then min a for return to bed, with assist with walker and verbal and tactile cues for safety. Progression toward goals: 
    ?  Improving appropriately and progressing toward goals ?  Improving slowly and progressing toward goals ? Not making progress toward goals and plan of care will be adjusted PLAN: 
Patient continues to benefit from skilled intervention to address the above impairments. Continue treatment per established plan of care. EDUCATION:  
Education:  Patient was educated on the following topics: safety during transfers Barriers to Learning/Limitations: None Compensate with: visual, verbal, tactile, kinesthetic cues/model Discharge Recommendations:  Aric Kim Further Equipment Recommendations for Discharge:  N/A Factors which may impact discharge planning: none SUBJECTIVE:  
Patient stated ? I feel weak. ? OBJECTIVE DATA SUMMARY:  
Critical Behavior: 
Neurologic State: Alert Orientation Level: Disoriented to person, Disoriented to place Cognition: Follows commands Safety/Judgement: Fall prevention Functional Mobility: 
 
 
Functional Status Indep (I) Mod I Super-vision Min A Mod A Max A Total A Assist x2 Verbal cues Additional time Not tested Comments Rolling ?  ?  ? ?    ?    ?  ?  ? ? ? ? Supine to sit ?  ?  ? ?  ?  ?  ?  ? ? ? ? Sit to supine ? ?  ? ?  ?  ?  ?  ? ? ? ? Sit to stand ?  ?  ? ?  ?  ?  ?  ? ? ? ? Stand to sit ?  ?  ? ?  ?  ?  ?  ? ? ? ? Bed to chair transfers ? ?  ? ?  ?  ?  ?  ? ? ? ? Balance Good Mariusz Anthony Poor Unable Not tested Comments Sitting static ?  ?  ?  ?  ? Sitting dynamic ?  ?  ?  ?  ? Standing static ?  ?  ?  ?  ? Standing dynamic ?  ?  ?  ?  ? Vital Signs Temp: 97.6 °F (36.4 °C) Pulse (Heart Rate): (!) 144 BP: 113/70 Resp Rate: 22    
O2 Sat (%): 97 % Pain: 
Pre treatment pain level:0 Post treatment pain level:10 Pain in back; nurse notified Activity Tolerance:  
Poor After treatment:  
?  Patient left in no apparent distress sitting up in chair ? Patient left in no apparent distress in bed ?  Call bell left within reach ? Nursing notified ? Caregiver present ? Bed alarm activated Lisa Potter PTA Time Calculation: 42 mins

## 2019-04-17 NOTE — PROGRESS NOTES
1925 - Assumed pt care from Jorden Moseley RN. Pt in bed, awake. Not in any form of distress. Frequent use items and call bell within reach. Bed locked in lowest position. Bed alarm on.  
 
0050 - Pt had 21 runs of SVTs. Notified Dr. Marisol Contreras through [facility]. Instructions received to continue to monitor patient. 0441 - Pt's lungs sound congested and wet. Pt displays shortness of breath that was not relieved by PRN nebulizer treatment. Notified Dr. Marisol Contreras. Order received to decrease D5 W to 50 mL/hr and administer Lasix 20 mg IV once. 5396 - Bedside and Verbal shift change report given to KATYA Barger (oncoming nurse) by Elsa Ndiaye RN (offgoing nurse). Report included the following information SBAR, Kardex, Intake/Output, MAR and Recent Results.

## 2019-04-17 NOTE — PROGRESS NOTES
Bedside shift change report given to KATYA Barger (oncoming nurse) by Halle Pearson RN (offgoing nurse). Report included the following information SBAR, Kardex, Intake/Output, MAR and Recent Results. A/O x2, no pain noted, SR BBB PAC PVC per tele-monitor, IV flushed and infusing, call bell and personal belongings in reach. 3850 -- Patient off unit to Barium swallow. 1022 -- Patient back on the unit. 
   
1029 -- AM medications given, well tolerated, will continue to monitor. 
   
1100 -- Reassessment completed, no change in patient condition, will continue to monitor. 1151 -- Given Tylenol for pain after patient worked with physical therapy. 1237 -- Pain reassessed, patient is sleeping easily arouse-able. 1333 -- Patient off the unit to Thoracentesis. 1505 -- Patient back on the unit. Reassessment completed, no change in patient condition, will continue to monitor. 
   
Bedside shift change report given to Sister JENNIFER RN (oncoming nurse) by Everton Cheney RN (offgoing nurse). Report included the following information SBAR, Kardex, Intake/Output, MAR and Recent Results.

## 2019-04-17 NOTE — PROGRESS NOTES
Internal Medicine Progress Note NAME: Joe Steen :  1926 MRM:  [id number] Date/Time: 2019 Principal Problem: 
  Chronic bilateral pleural effusions (4/10/2019) Active Problems: 
  CHF (congestive heart failure) ([facility] [address] (4/10/2019) HCAP (healthcare-associated pneumonia) (4/10/2019) Atrial fibrillation with rapid ventricular response ([facility] [address] (2019) ASSESSMENT/PLAN: FOR US guided thoracentesis to be done today with pleural fluid analysis # HCAP: treated with triple broad spectrum IV ab. Sputum cultures ordered but not sent. Negative  urine antigens  . CT 4/10/19 : Multifocal consolidation within the left upper and bilateral lower lobes, similar to prior CT 2019. Findings likely represent recurrent multifocal.  PCCM consulted and following. # Bilateral Pleural effusions:   guided thoracentesis  with pleural fluid analysis . # Hypernatremia. Urine studies. IVF D5.do not use NS, or any Na in diluents please as hypernatremia # Hypokalemia. Replete and trend. # AF RVR: appreciate Cardiology assistance, started on  digoxin. After procedures concluded , cardiology  to discuss pros and cons of [facility] with patient and family per cardiology. # SLP was consulted previously, ordered diet recommended by SLP (soft, nectar thick liquids). MBS with silent aspiration. # DVT protocol 
   
-Code status : full Lab Review:  
 
Recent Labs 19 
0450 19 
0600 04/15/19 
0510 WBC 13.6* 9.5 10.2 HGB 9.5* 9.1* 9.1*  
HCT 33.0* 31.9* 31.9*  
 322 284 Recent Labs 19 
0450 19 
1125 19 
0600 04/15/19 
1506 04/15/19 
0510 *  --  153*  --  153* K 3.5  --  3.7 3.3* 3.4*  
  --  113*  --  112* CO2 31  --  33*  --  30  
*  --  122*  --  135* BUN 41*  --  41*  --  45* CREA 1.34*  --  1.41*  --  1.67* CA 8.7  --  9.1  --  9.6 MG 2.1  --  2.3  --  2.5 PHOS 3.1  --  3.5  --  4.0 ALB  --   --  2.3*  --   --   
TBILI  --   --  0.6  --   --   
SGOT  --   --  14*  --   --   
ALT  --   --  15*  --   --   
INR 1.2 1.3*  --   --   --   
 
Lab Results Component Value Date/Time Glucose (POC) 157 (H) 04/13/2019 09:12 PM  
 Glucose (POC) 128 (H) 03/21/2019 05:01 PM  
 Glucose (POC) 126 (H) 02/21/2019 07:17 PM  
 
No results for input(s): PH, PCO2, PO2, HCO3, FIO2 in the last 72 hours. Recent Labs 04/17/19 
0450 04/16/19 
1125 INR 1.2 1.3* No results found for: SDES Lab Results Component Value Date/Time Culture result: NO GROWTH 5 DAYS 04/12/2019 03:10 PM  
 Culture result: NO GROWTH 6 DAYS 04/10/2019 05:30 PM  
 Culture result: NO GROWTH 6 DAYS 04/10/2019 05:15 PM  
 
 
All Cardiac Markers in the last 24 hours: No results found for: CPK, CK, CKMMB, CKMB, RCK3, CKMBT, CKNDX, CKND1, CHRISTINE, TROPT, TROIQ, NGHIA, TROPT, TNIPOC, BNP, BNPP Liver Panel: No results found for: ALB, CBIL, TBIL, TP, GLOB, AGRAT, SGOT, ASTPOC, ALTPOC, ALT, GPT, AP Subjective: Chief Complaint:     
Stable, no complain. ROS: 
(bold if positive,otherwise negative) Fever/chills ,  Dysuria Cough , Sputum , SOB/BOTELLO , Chest Pain Diarrhea ,Nausea/Vomit , Abd Pain , Constipation Tolerating Diet Objective:  
 
Vitals: 
Last 24hrs VS reviewed since prior progress note. Most recent are: 
 
Visit Vitals /71 (BP 1 Location: Right arm, BP Patient Position: During activity) Pulse (!) 109 Temp 97.6 °F (36.4 °C) Resp 22 Ht 5' 6\" (1.676 m) Wt 59.9 kg (132 lb 0.9 oz) SpO2 97% BMI 21.31 kg/m² SpO2 Readings from Last 6 Encounters:  
04/17/19 97% 04/10/19 96% 04/02/19 98% 03/12/19 97% 03/01/19 95% 02/23/19 97% O2 Flow Rate (L/min): 2 l/min Intake/Output Summary (Last 24 hours) at 4/17/2019 1033 Last data filed at 4/17/2019 3661 Gross per 24 hour Intake 560 ml Output 500 ml Net 60 ml Physical Exam:  
Ears: hearing is intact Eyes: Icterus is not present Lungs: diminished on bases. L>R Heart: tachycardic Gastrointestinal: soft, non-tender. Bowel sounds normal. No masses,  no organomegaly Neurological:  New Focal Deficits are not present Psychiatric:  Mood is stable Telemetry reviewed:   AFIB Medications Reviewed: (see below) Lab Data Reviewed: (see below) 
 
______________________________________________________________________ Medications:  
 
Current Facility-Administered Medications Medication Dose Route Frequency  dextrose 5% infusion  50 mL/hr IntraVENous CONTINUOUS  
 vancomycin (VANCOCIN) 750 mg in D5W 250 mL infusion  750 mg IntraVENous Q24H  piperacillin-tazobactam (ZOSYN) 3.375 g in D5W 100 ml premix/cmpd  3.375 g IntraVENous Q8H  
 [START ON 4/19/2019] VANCOMYCIN INFORMATION NOTE   Other ONCE  
 levoFLOXacin (LEVAQUIN) tablet 750 mg  750 mg Oral Q48H  
 albuterol-ipratropium (DUO-NEB) 2.5 MG-0.5 MG/3 ML  3 mL Nebulization Q4H PRN  
 heparin (porcine) injection 5,000 Units  5,000 Units SubCUTAneous Q8H  
 metoprolol (LOPRESSOR) injection 2.5 mg  2.5 mg IntraVENous Q4H PRN  
 digoxin (LANOXIN) tablet 0.125 mg  0.125 mg Oral DAILY  bisacodyl (DULCOLAX) suppository 10 mg  10 mg Rectal DAILY PRN  
 sodium chloride (NS) flush 5-10 mL  5-10 mL IntraVENous PRN  
 VANCOMYCIN INFORMATION NOTE   Other Rx Dosing/Monitoring  aspirin chewable tablet 81 mg  81 mg Oral DAILY  atorvastatin (LIPITOR) tablet 40 mg  40 mg Oral QHS  clopidogrel (PLAVIX) tablet 75 mg  75 mg Oral DAILY  famotidine (PEPCID) tablet 20 mg  20 mg Oral DAILY  furosemide (LASIX) tablet 20 mg  20 mg Oral DAILY  midodrine (PROAMITINE) tablet 10 mg  10 mg Oral TID WITH MEALS  nitroglycerin (NITROSTAT) tablet 0.4 mg  0.4 mg SubLINGual Q5MIN PRN  
 tamsulosin (FLOMAX) capsule 0.4 mg  0.4 mg Oral DAILY  therapeutic multivitamin (THERAGRAN) tablet 1 Tab  1 Tab Oral DAILY  acetaminophen (TYLENOL) tablet 650 mg  650 mg Oral Q4H PRN  
 ondansetron (ZOFRAN) injection 4 mg  4 mg IntraVENous Q4H PRN Total time spent with patient: 25 minutes Care Plan discussed with: Patient, Family, Nursing Staff and Consultant/Specialist 
 
Discussed:  Care Plan Prophylaxis:  Hep SQ Disposition:  Home w/Family Attending Physician: Miranda Barrios MD

## 2019-04-17 NOTE — PROGRESS NOTES
Pharmacy Dosing Services: Vancomycin Indication: HAP Day of therapy: 6 Other Antimicrobials (Include dose, start day & day of therapy): 
Piperacillin-Tazobactam 3.375 grams every 8 hours BAKARI Levofloxacin 750 mg every 48 hours Loading dose (date given): 1,500 mg 
Current Maintenance dose: 750 mg IV daily Goal Vancomycin Level: 15-20 mcg/mL (Trough 15-20 for most infections, 20 for meningitis/osteomyelitis, pre-HD level ~25) Vancomycin Level (if drawn):  
 - 14.6 (23 hours post dose)  - 18.3 (25.3 hours post dose; 19 extrapolated to 24hrs) Significant Cultures:  
4/10 - blood culture - NGTD 
 - strep pneumo and legionella urine test- negative  - pleural fluid culture - NGTD Renal function stable? (unstable defined as SCr increase of 0.5 mg/dL or > 50% increase from baseline, whichever is greater) (Y/N): Y  
 
CAPD, Hemodialysis or Renal Replacement Therapy (Y/N): N Recent Labs 19 
0600 04/15/19 
0510 19 
0400 CREA 1.41* 1.67* 1.61* BUN 41* 45* 45* WBC 9.5 10.2 8.5 Temp (24hrs), Av.8 °F (36.6 °C), Min:97.3 °F (36.3 °C), Max:98.1 °F (36.7 °C) Creatinine Clearance (Creatinine Clearance (ml/min)): Estimated Creatinine Clearance: 27.7 mL/min (A) (based on SCr of 1.41 mg/dL (H)). Regimen assessment: - Vancomycin trough therapeutic, continue current regimen Maintenance dose: 750 mg every 24 hours Next scheduled level:  at 1830 Pharmacy will follow daily and adjust medications as appropriate for renal function and/or serum levels. Thank you, Analy Hill

## 2019-04-17 NOTE — PROGRESS NOTES
Attempted PT, pt getting meds for increased HR. Will reattempt as time allows.  Jacobo Mueller, PTA

## 2019-04-17 NOTE — PROGRESS NOTES
[facility] - Progress Note Admit Date: 4/10/2019 Patient seen and examined independently. Plan for repeat thoracentesis today. Patient's rhythm is primarily sinus tachycardia with frequent PACs. We will add back heart failure Rx as blood pressure permits. Agree with assessment and plan as noted below. Madhu Jacobo MD 
Assessment:  
 
Hospital Problems  Date Reviewed: 3/1/2019 Codes Class Noted POA Atrial fibrillation with rapid ventricular response (HCC) ICD-10-CM: I48.91 
ICD-9-CM: 427.31  4/13/2019 Unknown CHF (congestive heart failure) (HCC) ICD-10-CM: I50.9 ICD-9-CM: 428.0  4/10/2019 Yes HCAP (healthcare-associated pneumonia) ICD-10-CM: J18.9 ICD-9-CM: 999  4/10/2019 Yes * (Principal) Chronic bilateral pleural effusions ICD-10-CM: J90 ICD-9-CM: 511.9  4/10/2019 Yes - Acute hypoxic respiratory failure - HCAP: Multifocal PNA on CXR 04/10/19, multifocal consolidation within the left upper and bilateral lower lobes, likely recurrently multifocal PNA 
- Atrial fibrillation with RVR: CHADS2-Vasc score 5 (CHF, HTN, age, Vascular disease) - Bilateral moderate to large pleural effusions CT chest 04/10 
-CAD s/p LAD PCI with DEMAR 3/11/2019 with previous PCI to LCx 2011. Cath 3/11/2019 (Occluded mid RCA which appears to be chronic. There is faint collateral from the left coronary system, Left main artery with ostial 30-40%, LAD mid focal severely calcified tortuous 99% stenosis with CARLENE II flow, Circumflex coronary artery with diffuse mild 20-30% stenosis throughout). Discharged on ASA, plavix, statin, BB. 
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   
- Echo 03/14/19 with EF 26-30%  
-NEL, Creatinine 1.62 04/12 
-Peripheral vascular disease.  
-Hypertension, but currently hypotensive 
-Dyslipidemia 
-Asthma, former smoker. -Advanced age Plan: - IR guided thoracentesis done today, 1750 cc of matty colored fluid removed - Afib rates vs. Sinus tach with PAC's, MAT observed on telemetry. Currently on PO Digoxin . 125 mg daily. Soft BP is limiting beta blocker use - Continue with rest of cardiac regimen Subjective: No new complaints. Objective:  
  
Patient Vitals for the past 8 hrs: 
 Temp Pulse Resp BP SpO2  
04/17/19 1208 97.7 °F (36.5 °C) 70 20 120/62 98 % 04/17/19 1048  (!) 144  113/70   
04/17/19 0740 97.6 °F (36.4 °C) (!) 109 22 115/71 97 % Patient Vitals for the past 96 hrs: 
 Weight 04/13/19 2002 132 lb 0.9 oz (59.9 kg) Intake/Output Summary (Last 24 hours) at 4/17/2019 1356 Last data filed at 4/17/2019 1302 Gross per 24 hour Intake 720 ml Output 300 ml Net 420 ml Physical Exam: 
General:  alert, cooperative, no distress Neck:  nontender, no JVD Lungs:  clear to auscultation bilaterally Heart:  irregularly irregular rhythm Abdomen:  abdomen is soft without significant tenderness, masses, organomegaly or guarding Extremities:  extremities normal, atraumatic, no cyanosis or edema Data Review:  
 
Labs: Results:  
   
Chemistry Recent Labs 04/17/19 
0450 04/16/19 
0600 04/15/19 
1506 04/15/19 
0510 * 122*  --  135* * 153*  --  153* K 3.5 3.7 3.3* 3.4*  
 113*  --  112* CO2 31 33*  --  30  
BUN 41* 41*  --  45* CREA 1.34* 1.41*  --  1.67* CA 8.7 9.1  --  9.6 MG 2.1 2.3  --  2.5 PHOS 3.1 3.5  --  4.0 AGAP 9 7  --  11  
BUCR 31* 29*  --  27* AP  --  61  --   --   
TP  --  5.8*  --   --   
ALB  --  2.3*  --   --   
GLOB  --  3.5  --   --   
AGRAT  --  0.7*  --   --   
  
CBC w/Diff Recent Labs 04/17/19 
0450 04/16/19 
0600 04/15/19 
0510 WBC 13.6* 9.5 10.2 RBC 3.63* 3.52* 3.50* HGB 9.5* 9.1* 9.1*  
HCT 33.0* 31.9* 31.9*  
 322 284 GRANS 74* 71 73 LYMPH 10* 20* 17* EOS 2 3 3 Cardiac Enzymes No results found for: CPK, CK, CKMMB, CKMB, RCK3, CKMBT, CKNDX, CKND1, CHRISTINE, TROPT, TROIQ, NGHIA, TROPT, TNIPOC, BNP, BNPP Coagulation Recent Labs 04/17/19 
0450 04/16/19 
1125 PTP 14.8 15.6* INR 1.2 1.3* APTT 30.3 29.5 Lipid Panel Lab Results Component Value Date/Time Cholesterol, total 87 03/08/2019 05:28 AM  
 HDL Cholesterol 44 03/08/2019 05:28 AM  
 LDL, calculated 30 03/08/2019 05:28 AM  
 VLDL, calculated 13 03/08/2019 05:28 AM  
 Triglyceride 65 03/08/2019 05:28 AM  
 CHOL/HDL Ratio 2.0 03/08/2019 05:28 AM  
  
BNP No results found for: BNP, BNPP, XBNPT Liver Enzymes Recent Labs 04/16/19 
0600 TP 5.8* ALB 2.3* AP 61 SGOT 14* Digoxin Thyroid Studies Lab Results Component Value Date/Time TSH 1.22 03/07/2019 05:29 AM  
    
 
Signed By: Janet Coleman. Michelle Locke PA-C April 17, 2019

## 2019-04-17 NOTE — PROGRESS NOTES
Problem: General Medical Care Plan Goal: *Vital signs within specified parameters Outcome: Progressing Towards Goal 
Goal: *Labs within defined limits Outcome: Progressing Towards Goal 
Goal: *Absence of infection signs and symptoms Outcome: Progressing Towards Goal 
Goal: *Optimal pain control at patient's stated goal 
Outcome: Progressing Towards Goal 
Goal: *Skin integrity maintained Outcome: Progressing Towards Goal 
Goal: *Fluid volume balance Outcome: Progressing Towards Goal 
Goal: *Optimize nutritional status Outcome: Progressing Towards Goal 
Goal: *Anxiety reduced or absent Outcome: Progressing Towards Goal 
Goal: *Progressive mobility and function (eg: ADL's) Outcome: Progressing Towards Goal 
  
Problem: Impaired Skin Integrity/Pressure Injury Treatment Goal: *Improvement of Existing Pressure Injury Outcome: Progressing Towards Goal 
Goal: *Prevention of pressure injury Description Document William Scale and appropriate interventions in the flowsheet. Outcome: Progressing Towards Goal 
  
Problem: Gas Exchange - Impaired Goal: *Absence of hypoxia Outcome: Progressing Towards Goal 
  
Problem: Patient Education: Go to Patient Education Activity Goal: Patient/Family Education Outcome: Progressing Towards Goal 
  
Problem: Pain Goal: *Control of Pain Outcome: Progressing Towards Goal 
Goal: *PALLIATIVE CARE:  Alleviation of Pain Outcome: Progressing Towards Goal 
  
Problem: Nutrition Deficit Goal: *Optimize nutritional status Outcome: Progressing Towards Goal 
  
Problem: Falls - Risk of 
Goal: *Absence of Falls Description Document Judy Keith Fall Risk and appropriate interventions in the flowsheet. Outcome: Progressing Towards Goal 
  
Problem: Patient Education: Go to Patient Education Activity Goal: Patient/Family Education Outcome: Progressing Towards Goal 
  
Problem: Pressure Injury - Risk of 
Goal: *Prevention of pressure injury Description Document William Scale and appropriate interventions in the flowsheet. Outcome: Progressing Towards Goal 
  
Problem: Patient Education: Go to Patient Education Activity Goal: Patient/Family Education Outcome: Progressing Towards Goal

## 2019-04-17 NOTE — PROGRESS NOTES
Nutrition follow-up/ 
Plan of care RECOMMENDATIONS:  
 
1. Soft Solid Diet w/ HTL (liberalized to promote intake) 2. SF CIB TID with honey thick milk 3. Monitor labs, weight and PO intake 4. RD to follow GOALS:  
 
1. Ongoing: PO intake meets >75% of protein/calorie needs by 4/23 2. Ongoing: Weight Maintenance/gradual gain (1-2 lb/week) by 4/25 ASSESSMENT: 
 
Wt status is classified as normal per BMI of 23. Variable weights recorded, but per documented weight records Pt was 158 lb on (1/16/2019) equating to a recent loss of 17 lb or 11% x 3 months PTA. Noted substantial fluid fluctuation is a factor as well. Minimal PO intake. SF CIB TID to supplement energy needs. Labs noted. Pt w/ hypokalemia, hypoalbuminemia and elevated BUN level GFR (52). Elevated BNP level of 33,202. Nutrition recommendations listed. RD to follow. Meets Criteria for Acute Malnutrition  
[x] Severe Malnutrition, as evidenced by: 
            [x] Moderate muscle wasting, loss of subcutaneous fat 
            [x] Nutritional intake of <50% of recommended intake for >5 days [x] Weight loss of >1-2% in 1 week, >5% in 1 month, >7.5% in 3 months, or >10% in 6 months 
            [] Moderate-severe edema  
  
Nutrition Risk:  [] High  [x] Moderate []  Low SUBJECTIVE/OBJECTIVE:  
 
(4/18 afternoon): Noted SLP saw patient earlier today but patient refused participation stating he was too tired. Observed minimal intake of lunch tray (<5%). Patient reports appetite is so-so but is drinking CIB supplements. However, appears to only have had a few sips. Noted Boost supplement at bedside ( not on formulary). Discussed with patient and RN that SLP evaluated patient and recommended HTL so patient can only have CIB supplements at this time to be in compliance with HTL. Discussed and implemented food preferences. Will continue to closely monitor.  
 
(4/18 AM): S/P MBS 4/17 [facility] Patient with SF CIB supplements ordered on 4/17 due to HTL diet order. Noted diet changed to regular diet and thin liquids at 4/17/19 on 21:59 and Ensure Enlive supplements reordered (not honey-thick). Communicated with SLP who reported patient should still be on mech soft diet with HTL. No documentation available on why diet was changed so spoke with RN Judith Lucero) inquiring why diet was changed. RN unaware why diet was changed. Communicated this writer would change diet back to mech soft diet and HTL per SLP recommendations and discontinue Ensure Enlive supplements as not compliant with diet order. SLP will be in this afternoon to re-assess. Will monitor. (4/17) Plan for U/S guided thoracentesis today. S/P MBS today with SLP recommendations for mech soft solids and HTL. Changed supplements from Ensure Enlive to SF CIB TID due to meet new diet order parameters. (4/16): Pt transferred from [facility] and admitted to hospital on (4/10) for HCAP, chronic pleural effusions and CHF. Diet orders carried over from SLP recomendations from previous admission to [facility] so would recommend a repeat evaluation. PO intake has been poor per vitals. Variability in recent weights recorded, but per Hx from patient/daughter definitely with weight loss starting ~9 months ago (Total of 22 lb or 13% from ~ lb x 9 months ago and per weight at 158 lb on (1/16/2019) 17 lb or 11% over the past 3 months PTA) Noted fluid fluctuation also a factor. Plan is for thoracentesis tomorrow. Pt seen in room this evening with daughter at bedside. Observed 100% of supplement and applesauce consumed. Obtained some preferences and also had d/w daughter to fill out the menu to implement his preferences and dietary will pick it up. (Also informed dietary). Explained process and that there is a refrigerator on unit for things she may want to bring in as well.  Noted orders for SLP to reevaluate with MBS placed this afternoon as well. Continue to encourage intake and will follow. (4/10): Pt w/ slightly improved appetite but still not adequate. Weight has been stable since admission. Pt seen in room with daughter who had just arrived and was requesting to see someone. Informed Andrew Baires, and TCC Director, Rambo De, that she would like to speak with someone. They addressed the situation. Pt is being transferred to the ER per family request so will follow up at a more appropriate time.  
 
(4/3): Pt transferred form [facility] to [address] [place] [address],[address] on 4/2/2019. Pt seen in room with daughter at bedside to assist with Hx. Pt reports was previously consuming 2 meals and 1 Boost per day prior to hospitalization. Denies having any food allergies or problems chewing/swallowing and stable weight prior to recent hospitalization. Spoke with SLP , Lesia Disla, and will do evaluation as was previously on modified diet PTA. Pt stated he is feeling hungry in the mornings, but that he now tends to have early satiety. Discussed trying to make breakfast his largest meal and then doing smaller meals/nutrient dense snacks throughout the rest of the day. Will reorder Ensure Enlive TID that he was previously receiving and will possibly add in magic cups but want to promote solid food intake first. Encouraged trying to consume what he can tolerate of solid foods first and then sipping on supplements after or between meals to increase kcal/protein. Also provided menu and encouraged him to implement preferences with dietary. We had the discussion that SLP would be evaluating most likely so things may have to be altered depending on results. Weight loss confirmed but unable to quantify exactly d/t recent fluid fluctuations. (Estimate ~10-12 lb or 8% loss x 1-2 months PTA) Encourage intake and will monitor. Information Obtained from:  
 [x] Chart Review [x] Patient 
 [] Family/Caregiver [x] Nurse/Physician [] Interdisciplinary Meeting/Rounds Diet: Dental Soft w/ HTL Medications: [x] Reviewed (Lipitor, Lasix, MVI) Allergies: [x] Reviewed Patient Active Problem List  
Diagnosis Code  Asthma J45.909  Prostate cancer ([facility] C61  Hypercholesterolemia E78.00  Angina, class I ([facility] I20.9  Left bundle branch block I44.7  Coronary artery disease I25.10  Hypertension I11.9  Dyslipidemia E78.5  Carotid artery disease (HCC) I77.9  Aortic valve stenosis I35.0  Anemia D64.9  
 NSTEMI (non-ST elevated myocardial infarction) ([facility]) I21.4  Congestive heart disease ([facility]) I50.9  CHF (congestive heart failure) ([facility]) I50.9  HCAP (healthcare-associated pneumonia) J18.9  Chronic bilateral pleural effusions J90  
 Bilateral pleural effusion J90  
 Atrial fibrillation with rapid ventricular response ([facility]) I48.91 Past Medical History:  
Diagnosis Date  Asthma  Cardiac cath 04/28/2011 oLM 30%. pLAD 30%. oD1 50%. CX 90% (3 x 18 [place] DEMAR). dRCA 90%. RPLB subtotal.  RPDA 100%.  Cardiac echocardiogram 01/21/2014 EF 55-60%. Mod LVH. Gr 1 DDfx. Mild AS (mean grad 13). Mild AI. Unchanged from study of 11/30/11.  Cardiac nuclear imaging test, mod risk 01/22/2016 Intermediate risk. Medium-sized inferior, inferoseptal infarction. No ischemia. EF 54%. Nondiagnostic EKG on pharm stress test.  
 Carotid duplex 03/02/2016 Mod 50-69% bilateral ICA stenosis. Probable significant RECA stenosis. >50% stenosis of left subclavian. No significant change from study of 1/8/15.  Coronary artery disease Status post PCI with drug-eluting stent, [place] 3 x 18 mm in the proximal circumflex.  Heart failure ([facility]  Hx of carotid artery disease ([facility]  Hypercholesterolemia  Hypertension  Prostate cancer ([facility] Labs:   
Lab Results Component Value Date/Time  Sodium 143 04/18/2019 05:48 AM  
 Potassium 3.1 (L) 04/18/2019 05:48 AM  
 Chloride 102 04/18/2019 05:48 AM  
 CO2 32 04/18/2019 05:48 AM  
 Anion gap 9 04/18/2019 05:48 AM  
 Glucose 165 (H) 04/18/2019 05:48 AM  
 BUN 39 (H) 04/18/2019 05:48 AM  
 Creatinine 1.28 04/18/2019 05:48 AM  
 Calcium 8.9 04/18/2019 05:48 AM  
 Magnesium 2.0 04/18/2019 05:48 AM  
 Phosphorus 3.2 04/18/2019 05:48 AM  
 Albumin 2.3 (L) 04/18/2019 05:48 AM  
 
Anthropometrics: BMI (calculated): 23 Last 3 Recorded Weights in this Encounter 04/10/19 1609 04/13/19 2002 04/18/19 0950 Weight: 59.9 kg (132 lb) 59.9 kg (132 lb 0.9 oz) 64.7 kg (142 lb 9.6 oz) Ht Readings from Last 1 Encounters:  
04/10/19 5' 6\" (1.676 m) Weight Metrics 4/18/2019 4/9/2019 4/2/2019 3/12/2019 3/7/2019 3/1/2019 2/23/2019 Weight 142 lb 9.6 oz 141 lb 6.4 oz 147 lb 4.3 oz 149 lb 0.5 oz - 161 lb 163 lb 2.3 oz  
BMI 23.02 kg/m2 22.15 kg/m2 23.07 kg/m2 - 23.34 kg/m2 25.22 kg/m2 25.55 kg/m2 Patient Vitals for the past 100 hrs: 
 % Diet Eaten 04/18/19 0943 75 % 04/17/19 1302 0 % 04/17/19 0900 0 % 04/16/19 1717 10 % 04/16/19 1343 0 % 04/16/19 0929 0 % 04/15/19 0900 15 % 04/14/19 2000 20 % UBW: 160-165 lb x 9 months ago per patient and daughter 
 
[x] Weight Loss PTA [] Weight Gain 
[] Weight Stable Estimated Nutrition Needs: [x] MSJ  [] Other: 
Calories: 6290-3494 kcal Based on:   [x] Actual BW   
Protein:   77-90 g Based on:   [x] Actual BW Fluid:       3078-0170 ml Based on:   [x] Actual BW  
 
 [x] No Cultural, Mu-ism or ethnic dietary need identified. [] Cultural, Mu-ism and ethnic food preferences identified and addressed Wt Status:  [x] Normal (18.6 - 24.9) [] Underweight (<18.5) [] Overweight (25 - 29.9) [] Mild Obesity (30 - 34.9)  [] Moderate Obesity (35 - 39.9) [] Morbid Obesity (40+) Nutrition Problems Identified:  
[x] Suboptimal PO intake  
[] Food Allergies [x] Difficulty chewing/swallowing/poor dentition  
[] Constipation/Diarrhea [] Nausea/Vomiting  
[] None 
[] Other:  
 
Plan:  
[x] Therapeutic Diet (liberalized for now) [x]  Obtained/adjusted food preferences/tolerances and/or snacks options [x]  Continue supplements added  
[x] SLP will be following for feeding techniques []  HS snack added  
[x]  Modify diet texture  
[]  Modify diet for food allergies []  Assist with menu selection  
[x]  Monitor PO intake on meal rounds  
[x]  Continue inpatient monitoring and intervention  
[]  Participated in discharge planning/Interdisciplinary rounds/Team meetings  
[]  Other:  
 
Education Needs: 
 [] Not appropriate for teaching at this time due to: 
 [x] Identified and addressed Nutrition Monitoring and Evaluation: 
[x] Continue ongoing monitoring and intervention 
[] Other [address]

## 2019-04-17 NOTE — CONSULTS
[facility] Name: Giovanny An : 1926 MRN: [id number] Date: 2019   
[]I have reviewed the flowsheet and previous days notes. []The patient is unable to give any meaningful history or review of systems because the patient is: 
[]Intubated []Sedated  
[]Unresponsive []The patient is critically ill on     
[]Mechanical ventilation []Pressors []BiPAP [] S:Patient indicates that his status has not had much change. Patient is cooperative and aware and was inquiring regarding any following procedures to be done for his care. ROS:Review of systems not obtained due to patient factors. Events and notes from last 24 hours reviewed. Care plan discussed on multidisciplinary rounds. Vital Signs:   
Visit Vitals /71 (BP 1 Location: Right arm, BP Patient Position: During activity) Pulse (!) 109 Temp 97.6 °F (36.4 °C) Resp 22 Ht 5' 6\" (1.676 m) Wt 59.9 kg (132 lb 0.9 oz) SpO2 97% BMI 21.31 kg/m² O2 Device: Nasal cannula O2 Flow Rate (L/min): 2 l/min Temp (24hrs), Av.7 °F (36.5 °C), Min:97.2 °F (36.2 °C), Max:98.1 °F (36.7 °C) Intake/Output:  
Last shift:      No intake/output data recorded. Last 3 shifts: 04/15 1901 -  0700 In: 620 [P.O.:620] Out: 901 [Urine:900] Intake/Output Summary (Last 24 hours) at 2019 6259 Last data filed at 2019 0537 Gross per 24 hour Intake 560 ml Output 500 ml Net 60 ml Hemodynamics:  
   
:   
 
Ventilator Settings: 
  
  
  
  
 
Physical Exam:  
 General: in no apparent distress, alert, oriented times 3 and cooperative HEENT: Normal 
 Neck: No abnormally enlarged lymph nodes. Chest: normal 
 Lungs: rales bilaterally  no dullness/ tenderness/ rash Heart: Heart sounds were noted to be irregularly irregular upon auscultation, the heart sounds were rather faint to discernment  Abdomen: non distended, bowel sounds normoactive, tympanic, abdomen is soft without significant tenderness, masses, organomegaly or guarding Extremity: 1+ edema Neuro: alert Skin: Scattered purpura were noted diffusely DATA:  
Current Facility-Administered Medications Medication Dose Route Frequency  dextrose 5% infusion  50 mL/hr IntraVENous CONTINUOUS  
 vancomycin (VANCOCIN) 750 mg in D5W 250 mL infusion  750 mg IntraVENous Q24H  piperacillin-tazobactam (ZOSYN) 3.375 g in D5W 100 ml premix/cmpd  3.375 g IntraVENous Q8H  
 [START ON 4/19/2019] VANCOMYCIN INFORMATION NOTE   Other ONCE  
 levoFLOXacin (LEVAQUIN) tablet 750 mg  750 mg Oral Q48H  
 albuterol-ipratropium (DUO-NEB) 2.5 MG-0.5 MG/3 ML  3 mL Nebulization Q4H PRN  
 heparin (porcine) injection 5,000 Units  5,000 Units SubCUTAneous Q8H  
 metoprolol (LOPRESSOR) injection 2.5 mg  2.5 mg IntraVENous Q4H PRN  
 digoxin (LANOXIN) tablet 0.125 mg  0.125 mg Oral DAILY  bisacodyl (DULCOLAX) suppository 10 mg  10 mg Rectal DAILY PRN  
 sodium chloride (NS) flush 5-10 mL  5-10 mL IntraVENous PRN  
 VANCOMYCIN INFORMATION NOTE   Other Rx Dosing/Monitoring  aspirin chewable tablet 81 mg  81 mg Oral DAILY  atorvastatin (LIPITOR) tablet 40 mg  40 mg Oral QHS  clopidogrel (PLAVIX) tablet 75 mg  75 mg Oral DAILY  famotidine (PEPCID) tablet 20 mg  20 mg Oral DAILY  furosemide (LASIX) tablet 20 mg  20 mg Oral DAILY  midodrine (PROAMITINE) tablet 10 mg  10 mg Oral TID WITH MEALS  nitroglycerin (NITROSTAT) tablet 0.4 mg  0.4 mg SubLINGual Q5MIN PRN  
 tamsulosin (FLOMAX) capsule 0.4 mg  0.4 mg Oral DAILY  therapeutic multivitamin (THERAGRAN) tablet 1 Tab  1 Tab Oral DAILY  acetaminophen (TYLENOL) tablet 650 mg  650 mg Oral Q4H PRN  
 ondansetron (ZOFRAN) injection 4 mg  4 mg IntraVENous Q4H PRN Telemetry: []Sinus []A-flutter []Paced []A-fib []Multiple PVCs Labs: 
Recent Labs 04/17/19 
0450 04/16/19 
0600 04/15/19 
0510 WBC 13.6* 9.5 10.2 HGB 9.5* 9.1* 9.1*  
HCT 33.0* 31.9* 31.9*  
 322 284 Recent Labs 04/17/19 
0450 04/16/19 
1125 04/16/19 
0600 04/15/19 
1506 04/15/19 
0510 *  --  153*  --  153* K 3.5  --  3.7 3.3* 3.4*  
  --  113*  --  112* CO2 31  --  33*  --  30  
*  --  122*  --  135* BUN 41*  --  41*  --  45* CREA 1.34*  --  1.41*  --  1.67* CA 8.7  --  9.1  --  9.6 MG 2.1  --  2.3  --  2.5 PHOS 3.1  --  3.5  --  4.0 ALB  --   --  2.3*  --   --   
SGOT  --   --  14*  --   --   
ALT  --   --  15*  --   --   
INR 1.2 1.3*  --   --   -- No results for input(s): PH, PCO2, PO2, HCO3, FIO2 in the last 72 hours. Imaging: 
[]I have personally reviewed the patients radiographs []Radiographs reviewed with radiologist 
 [] No CXR study available for review today []No change from prior, tubes and lines in adequate position []Improved   []Worsening IMPRESSION:  
Patient Active Problem List  
Diagnosis Code  Asthma J45.909  [facility] C61  Hypercholesterolemia E78.00  Angina, class I ([facility] I20.9  Left bundle branch block I44.7  Coronary artery disease I25.10  Hypertension I11.9  Dyslipidemia E78.5  Carotid artery disease (HCC) I77.9  Aortic valve stenosis I35.0  Anemia D64.9  
 NSTEMI (non-ST elevated myocardial infarction) ([facility]) I21.4  Congestive heart disease ([facility]) I50.9  CHF (congestive heart failure) ([facility]) I50.9  HCAP (healthcare-associated pneumonia) J18.9  Chronic bilateral pleural effusions J90  
 Bilateral pleural effusion J90  
 Atrial fibrillation with rapid ventricular response ([facility]) I48.91 ·  
 
· PLAN:  
· U/S guided thoracentesis to be done today with pleural fluid analysis The patient is: [x] chronically ill Risk of deterioration: [x] moderate  
 [] critically ill  [] high []See my orders for details My assessment, plan of care, findings, medications, side effects etc were discussed with: 
 []nursing []PT/OT   
[]respiratory therapy []Dr. Rivera Postal [] [x]Total critical care time exclusive of procedures 15      minutes Triston Lopez MD

## 2019-04-17 NOTE — PROGRESS NOTES
3 attempts for OT evaluation; pts HR 130s-140s first attempt, PT working with pt second attempt & pt refusing due to significant fatigue from PT on 3rd attempt. Will follow up. Brain MS Ananda OTR/L Office Ext: [phone] Pager: [phone]

## 2019-04-17 NOTE — PROGRESS NOTES
Vascular & Interventional Radiology Brief Procedure Note Interventional Radiologist: Eliceo Mae MD 
 
Assistants: None Pre-operative Diagnosis:  Left pleural effusion Post-operative Diagnosis: Same as pre-op dx Procedure(s) Performed:  Ultrasound guided thoracentesis Anesthesia:  Local  
 
Findings:  1750 cc matty colored fluid Complications: chest xray pending Estimated Blood Loss:  minimal 
 
Tubes and Drains: None Specimens: taken to lab Condition: Good Disposition:  Stat chest xray Plan: return to floor Eliceo Mae MD 
[facility] Vascular & Interventional Radiology 4/17/2019

## 2019-04-17 NOTE — PROGRESS NOTES
Problem: General Medical Care Plan Goal: *Vital signs within specified parameters Outcome: Progressing Towards Goal 
Goal: *Labs within defined limits Outcome: Progressing Towards Goal 
Goal: *Absence of infection signs and symptoms Outcome: Progressing Towards Goal 
Goal: *Optimal pain control at patient's stated goal 
Outcome: Progressing Towards Goal 
Goal: *Skin integrity maintained Outcome: Progressing Towards Goal 
Goal: *Fluid volume balance Outcome: Progressing Towards Goal 
Goal: *Optimize nutritional status Outcome: Progressing Towards Goal 
Goal: *Anxiety reduced or absent Outcome: Progressing Towards Goal 
Goal: *Progressive mobility and function (eg: ADL's) Outcome: Progressing Towards Goal 
  
Problem: Impaired Skin Integrity/Pressure Injury Treatment Goal: *Improvement of Existing Pressure Injury Outcome: Progressing Towards Goal 
Goal: *Prevention of pressure injury Description Document William Scale and appropriate interventions in the flowsheet. Outcome: Progressing Towards Goal 
  
Problem: Gas Exchange - Impaired Goal: *Absence of hypoxia Outcome: Progressing Towards Goal 
  
Problem: Patient Education: Go to Patient Education Activity Goal: Patient/Family Education Outcome: Progressing Towards Goal 
  
Problem: Pain Goal: *Control of Pain Outcome: Progressing Towards Goal 
Goal: *PALLIATIVE CARE:  Alleviation of Pain Outcome: Progressing Towards Goal 
  
Problem: Nutrition Deficit Goal: *Optimize nutritional status Outcome: Progressing Towards Goal 
  
Problem: Falls - Risk of 
Goal: *Absence of Falls Description Document Ed Savage Fall Risk and appropriate interventions in the flowsheet. Outcome: Progressing Towards Goal 
  
Problem: Patient Education: Go to Patient Education Activity Goal: Patient/Family Education Outcome: Progressing Towards Goal 
  
Problem: Pressure Injury - Risk of 
Goal: *Prevention of pressure injury Description Document William Scale and appropriate interventions in the flowsheet. Outcome: Progressing Towards Goal 
  
Problem: Patient Education: Go to Patient Education Activity Goal: Patient/Family Education Outcome: Progressing Towards Goal 
  
Problem: Patient Education: Go to Patient Education Activity Goal: Patient/Family Education Outcome: Progressing Towards Goal 
  
Problem: Patient Education: Go to Patient Education Activity Goal: Patient/Family Education Outcome: Progressing Towards Goal

## 2019-04-17 NOTE — PROGRESS NOTES
Problem: Dysphagia (Adult) Goal: *Acute Goals and Plan of Care (Insert Text) Description Recommendations: 
Diet: mechanical-soft/honey-thick liquids (NO STRAWS) Meds: crushed in applesauce/pudding Aspiration Precautions Oral Care TID Other: SILENT ASPIRATION Goals:  Patient will: 1. Tolerate PO trials with 0 s/s overt distress in 4/5 trials 2. Utilize compensatory swallow strategies/maneuvers (decrease bite/sip, size/rate, alt. liq/sol) with min cues in 4/5 trials 3. Perform oral-motor/laryngeal exercises to increase oropharyngeal swallow function with min cues 4. Complete an objective swallow study (i.e., MBSS) to assess swallow integrity, r/o aspiration, and determine of safest LRD, min A - goal met 4/17/19 
 - Outcome: Progressing Towards Goal 
 
SPEECH PATHOLOGY MODIFIED BARIUM SWALLOW STUDY & TREATMENT Patient: Kennedy Rose (4 y.o. male) Date: 4/17/2019 Primary Diagnosis: Chronic bilateral pleural effusions [J90] HCAP (healthcare-associated pneumonia) [J18.9] CHF (congestive heart failure) ([facility] [address] [I50.9] Bilateral pleural effusion [J90] HCAP (healthcare-associated pneumonia) [J18.9] CHF (congestive heart failure) ([facility] [address] [I50.9] Precautions: aspiration; falls PLOF: LTC; unable to determine diet ASSESSMENT : 
MBS completed with SILENT aspiration on thin and nectar-thick liquids via cup and straw sips. Attempted chin tuck for airway protection; however, unsuccessful in protecting airway secondary to absent epiglottic inversion. Honey-thick liquids accepted via cup sips with moderate penetration; pt requires cues for frequent throat clears to clear penetrate. Pudding and cracker accepted without aspiration. Honey-thick liquid wash utilized for cracker in attempt to clear residue; ~50% of residue cleared without aspiration. Deficits exhibited with mildly labored oral bolus prep and transit with cracker only.  Swallow timely; however, severely decreased hyolaryngeal excursion. Decreased VF adduction, absent epiglottic inversion and decreased UES opening. Thus resulting in large volume residuals in valleculae and pyriforms. Pt presents with mild oral/ severe pharyngeal dysphagia, as evidenced above, which places pt at risk for aspiration. At this time, safest for mechanical soft solid, honey-thick liquid diet (NO STRAWS); meds should be crushed in applesauce/pudding. SLP utilized video of study for visual feedback, education, and recommendations for pt; requires reinforcement. Treatment: 
Dysphagia tx completed after completion of MBS with verbal cueing of frequent throat clears to clear laryngeal residue; to be completed even at rest. Further, educated pt on aspiration precautions and importance of compensatory swallow techniques to decrease aspiration risk (decrease rate of intake & sip/bite size, upright @HOB for all po intake and ~30 minutes after po); verbalized comprehension. Patient will benefit from skilled intervention to address the above impairments. Patient's rehabilitation potential is considered to be Fair Factors which may influence rehabilitation potential include:  
? None noted ? Mental ability/status ? Medical condition ? Home/family situation and support systems ? Safety awareness ? Pain tolerance/management ? Other: PLAN : 
Recommendations and Planned Interventions: 
mechanical soft solid, honey-thick liquid diet (NO STRAWS); meds should be crushed Frequency/Duration: Patient will be followed by speech-language pathology 1-2 times per day/4-7 days per week to address goals. Discharge Recommendations: Aric Kim SUBJECTIVE:  
Patient stated ? can I have some water now? ?. 
 
OBJECTIVE:  
 
Past Medical History:  
Diagnosis Date Asthma Cardiac cath 04/28/2011 oLM 30%. pLAD 30%. oD1 50%. CX 90% (3 x 18 [place] DEMAR). dRCA 90%. RPLB subtotal.  RPDA 100%. Cardiac echocardiogram 01/21/2014 EF 55-60%. Mod LVH. Gr 1 DDfx. Mild AS (mean grad 13). Mild AI. Unchanged from study of 11/30/11. Cardiac nuclear imaging test, mod risk 01/22/2016 Intermediate risk. Medium-sized inferior, inferoseptal infarction. No ischemia. EF 54%. Nondiagnostic EKG on pharm stress test.  
 Carotid duplex 03/02/2016 Mod 50-69% bilateral ICA stenosis. Probable significant RECA stenosis. >50% stenosis of left subclavian. No significant change from study of 1/8/15. Coronary artery disease Status post PCI with drug-eluting stent, [place] 3 x 18 mm in the proximal circumflex. Heart failure ([address] Hx of carotid artery disease ([facility] [address] Hypercholesterolemia Hypertension Prostate cancer ([facility] [address] Past Surgical History:  
Procedure Laterality Date CARDIAC SURG PROCEDURE UNLIST    
 HX CORONARY STENT PLACEMENT  4/28/11 PCI with drug-eluting stent, [place] 3 x 18 mm in the proximal circumflex (post dialated with 3.25 mm noncompliant balloon at high pressure). Home Situation:  
Home Situation Home Environment: Long term care # Steps to Enter: 0 One/Two Story Residence: One story Living Alone: No 
Support Systems: Child(marisela) Patient Expects to be Discharged to[de-identified] Rehabilitation facility Current DME Used/Available at Home: [address], [place] Diet prior to admission: unable to determine Current Diet:  mechanical soft solid, honey-thick liquid diet (NO STRAWS); meds should be crushed Radiologist: [facility] Film Views: Lateral 
Patient Position: 90 
 
8-point Penetration-Aspiration Scale: Score 8 PAIN: 
Pain level pre-treatment: 1/10 Pain level post-treatment: 1/10 COMMUNICATION/EDUCATION:  
?  Patient educated regarding MBS results and diet recommendations. ?  Patient/family have participated as able in goal setting and plan of care. ?  Patient/family agree to work toward stated goals and plan of care. ?  Patient understands intent and goals of therapy, but is neutral about his/her participation. ? Patient is unable to participate in goal setting and plan of care. Thank you for this referral. 
Nunu Hanson, SLP 
MBS Time: 18 minutes Treatment Time: 12 minutes

## 2019-04-18 NOTE — PROGRESS NOTES
Has an accepting bed at [facility]. at this time, however patient will need auth prior to discharge.  Patient will need OT notes to be submitted to [facility] for insurance approval.

## 2019-04-18 NOTE — PROGRESS NOTES
Problem: Self Care Deficits Care Plan (Adult) Goal: *Acute Goals and Plan of Care (Insert Text) Description Occupational Therapy Goals Initiated 4/18/2019 within 7 day(s). 1.  Patient will perform grooming tasks at EOB with supervision/set-up. 2.  Patient will perform upper/lower body dressing with minimal assistance. 3.  Patient will perform functional task in standing for 8 minutes with supervision and < 3 rest breaks to increase activity tolerance for ADLs. 4.  Patient will perform toilet transfers with supervision/set-up. 5.  Patient will perform all aspects of toileting with supervision/set-up. 6.  Patient will participate in upper extremity therapeutic exercise/activities for 8 minutes to increase BUE strength for functional transfers and ADLs. 7.  Patient will utilize energy conservation techniques during functional activities with minimal verbal cues. Outcome: Progressing Towards Goal 
  
[facility] 
OCCUPATIONAL THERAPY: INITIAL ASSESSMENT INPATIENT: Medicare: Hospital Day: 9 Patient: Erasto Willard (63 y.o. male)    Date: 4/18/2019 Primary Diagnosis: Chronic bilateral pleural effusions [J90] HCAP (healthcare-associated pneumonia) [J18.9] CHF (congestive heart failure) ([facility] [address] [I50.9] Bilateral pleural effusion [J90] HCAP (healthcare-associated pneumonia) [J18.9] CHF (congestive heart failure) ([facility] [address] [I50.9]  
 ,  ,  
Precautions: Falls PLOF: Pt was independent with ADLs & functional mobility. ASSESSMENT:  
Based on the objective data described below, the patient presents with impairments with regard to bed mobility, activity tolerance and independence in ADLs secondary to CHF, bilateral pleural effusions and HCAP. Pt supine on arrival, agreeable to therapy, dtr at bedside. Titus Palacios RN present; reporting pt's HR 87 at rest which is dramatic improvement from previously elevated HR. Min A for supine-->sit.  Pt c/o 10/10 back pain secondary to thoracentesis. 3 attempts to  prep for [facility] transfer; pt leaning posteriorly requiring mod A x1 during first 2 attempts; min A x2 to stand on 3rd attempt due to bowel hygiene. Max/total A for bowel hygiene in standing with RW. Rolling x4 trials with min A and min vc's for BUE postioning. Pt left with HOB elevated; needs within reach. Pt was independent PTA; recommend SNF upon d/c as pt requires assistance for all mobility & ADLs as he is deconditioned. Recommendations for the next treatment session: ADLs at EOB/in standing Mr. Alfonso Hull will benefit from skilled intervention to address the above impairments. His rehabilitation potential is considered to be Fair. EDUCATION Education:  Patient was educated on the following topics: role of OT and POC; importance of mobility Barriers to Learning/Limitations: None Compensate with: visual, verbal, tactile, kinesthetic cues/model PLAN OF CARE:  
Problems:  Decreased ROM, Decreased strength affecting function, Decreased ADL/functioning of activities, Decreased transfer abilities and Decreased activity tolerance Recommendations and Planned Interventions: 
?                  Self Care Training                   ? Therapeutic Activities ? Functional Mobility Training    ? Cognitive Retraining 
? Therapeutic Exercises            ? Endurance Activities ? Balance Training                     ? Neuromuscular Re-ed ? Visual/Perceptual Training      ? Home Safety Training 
? Patient Education                    ? Family Training/Education ? Other (comment): Frequency/Duration: Patient will be followed by occupational therapy 3-5 times a week to address goals. Discharge Recommendations: Aric Kim Further Equipment Recommendations for Discharge: shower chair SUBJECTIVE:  
Patient stated: \"10/10 pain. \" OBJECTIVE/TREATMENT:  
 
Past Medical History:  
Diagnosis Date Asthma Cardiac cath 04/28/2011 oLM 30%. pLAD 30%. oD1 50%. CX 90% (3 x 18 [place] DEMAR). dRCA 90%. RPLB subtotal.  RPDA 100%. Cardiac echocardiogram 01/21/2014 EF 55-60%. Mod LVH. Gr 1 DDfx. Mild AS (mean grad 13). Mild AI. Unchanged from study of 11/30/11. Cardiac nuclear imaging test, mod risk 01/22/2016 Intermediate risk. Medium-sized inferior, inferoseptal infarction. No ischemia. EF 54%. Nondiagnostic EKG on pharm stress test.  
 Carotid duplex 03/02/2016 Mod 50-69% bilateral ICA stenosis. Probable significant RECA stenosis. >50% stenosis of left subclavian. No significant change from study of 1/8/15. Coronary artery disease Status post PCI with drug-eluting stent, [place] 3 x 18 mm in the proximal circumflex. Heart failure ([address] Hx of carotid artery disease ([address] Hypercholesterolemia Hypertension Prostate cancer ([address] Past Surgical History:  
Procedure Laterality Date CARDIAC SURG PROCEDURE UNLIST    
 HX CORONARY STENT PLACEMENT  4/28/11 PCI with drug-eluting stent, [place] 3 x 18 mm in the proximal circumflex (post dialated with 3.25 mm noncompliant balloon at high pressure). Eval Complexity: History: MEDIUM Complexity : Expanded review of history including physical, cognitive and psychosocial  history ; Examination: MEDIUM Complexity : 3-5 performance deficits relating to physical, cognitive , or psychosocial skils that result in activity limitations and / or participation restrictions; Decision Making:MEDIUM Complexity : Patient may present with comorbidities that affect occupational performnce. Miniml to moderate modification of tasks or assistance (eg, physical or verbal ) with assesment(s) is necessary to enable patient to complete evaluation Prior Level of Function/Home Situation: Fully active; able to carry on all performance without restriction Restricted in physically strenuous activity but ambulatory and able to carry out work of a light or sedentary nature (e.g., light house work, office work). Lives alone 
none reported Cognitive/Behavioral Status:  
Neurologic State: alert Orientation: oriented to time, place, person and situation Cognition:   appropriate decision making, appropriate for age attention/concentration and following commands  follows multi-step simple commands/direction Safety/Judgement: Awareness of environment, Fall prevention and Insight into deficits ROM: minimally limited (BUEs 3/4 shoulder flex) MMT: 3+/5 (BUEs) Coordination: BUEs generally decreased Hand dominance:Right Skin: Intact (BUEs) Edema: None noted (BUEs) Sensation: Intact (BUEs) Vision/Perceptual: normal 
 
 
Functional Status Indep Mod I  
Sup. / 
Set- Up SBA CGA Min Assist  
Mod Assist  
Max assist  
Total Assist  
Assist x2 Additional Time NT Comments Rolling ?  ?  ?  ?  ?    ?    ?    ?  ?  ?  ?  ? Supine to sit ?  ?  ?  ?  ?  ?  ?  ?  ?  ?  ?  ? Sit to supine ? ?  ?  ?  ?  ?  ?  ?  ?  ?  ?  ? Sit to stand ?  ?  ?  ?  ?  ?  ?  ?  ?  ?  ?  ? Toilet Transfer ? ?  ?  ?  ?  ?  ?  ?  ?  ?  ?  ? Feeding ? ?  ?  ?  ?  ?  ?  ?  ?  ?  ?  ? Grooming ?  ?  ?  ?  ?  ?  ?  ?  ?  ?  ?  ? Bathing  [phone]  ?    
LB Dressing  ?  ?  ?  ?  ?  ?  ?  ?  ?  ?  ?  ? Toileting ?  ?  ?  ?  ?  ?  ?  ?  ?  ?  ?  ? Balance Good Payal End Poor Unable Comments Sitting static ?  ?  ?  ? Sitting dynamic ?  ?  ?  ? Standing static ?  ?  ?  ? Standing dynamic ?  ?  ?  ? Fair- Therapeutic Activity:  
3 attempts to  prep for [facility] transfer; pt leaning posteriorly requiring mod A x1 during first 2 attempts; min A x2 to stand on 3rd attempt due to bowel hygiene. Max/total A for bowel hygiene in standing with RW. Rolling x4 trials with min A and min vc's for BUE postioning Pain:  
Pre treatment:  10/10 (back pain 2/2 thoracentesis; Miguel Tolbert, RN aware) Post treatment: 10/10 Scale: numeric Activity tolerance:  fair COMMUNICATION/EDUCATION: Pt/dtr educated on role of OT and POC; they verbalized understanding. ?         Fall prevention education was provided and the patient/caregiver indicated understanding. ? Patient/family have participated as able in goal setting and plan of care. ?         Patient/family agree to work toward stated goals and plan of care. ?         Patient understands intent and goals of therapy, but is neutral about his/her participation The patient?s plan of care was also discussed with: Physical Therapist, Certified Occupational Therapy Assistant and Registered Nurse. After treatment position/precautions:  
Supine in bed Bed in low position Call light within reach RN notified Recommendations for nursing: up with assist x1-2 Thank you for this referral. 
Ovi Fernando MS OTR/L Time Calculation: 27 mins

## 2019-04-18 NOTE — ROUTINE PROCESS
Bedside and Verbal shift change report given to  [address] (oncoming nurse) by Akhil Abernathy RN (offgoing nurse). Report included the following information SBAR, Kardex, Intake/Output, MAR and Recent Results.

## 2019-04-18 NOTE — PROGRESS NOTES
Day 4 of attempting OT evaluation. Pt has been inappropriate for mobilization due to uncontrolled HR.  
 
0930: pt's HR fluctuating from 120-140 at rest. Will follow up. Loren Kowalski MS OTR/L Office Ext: [phone] Pager: [phone]

## 2019-04-18 NOTE — PROGRESS NOTES
Pharmacy Recommendation: Antibiotic Streamlining Day #7 of ABX Therapy Indication:  HAP Current regimen: - Levofloxacin 750 mg PO every 48 hours - Pip/tazo 3.375 gm IV every 8 hours extended infusion 
- Vancomycin pharmacy dosing Recent Labs 19 
3887 19 
2245 19 
0450 WBC 11.7 12.6 13.6*  
CREA 1.28 1.32* 1.34* BUN 39* 41* 41* Est CrCl: ~35 ml/min Temp (24hrs), Av.5 °F (36.4 °C), Min:97 °F (36.1 °C), Max:97.9 °F (36.6 °C) Cultures:  
4/10 Blood - NG 
 Strep - NG 
 Pleural fluid - NG 
 Pleural fluid - PENDING Recommendation: Consider streamlining antibiotic therapy if patient is clinically stable Please call pharmacy for questions. Thanks.  
Rodney Grace, EMILYD

## 2019-04-18 NOTE — PROGRESS NOTES
Reviewed chart for MEWS surveillance. Elevated HR, pt is not symptomatic per Primary Nurse's notes. Will continue to monitor.

## 2019-04-18 NOTE — PROGRESS NOTES
[facility] - Progress Note Admit Date: 4/10/2019 Assessment:  
 
Hospital Problems  Date Reviewed: 3/1/2019 Codes Class Noted POA Atrial fibrillation with rapid ventricular response ([facility] [address] rhythm is sinus with frequent PACs and atrial fibrillation. ICD-10-CM: I48.91 
ICD-9-CM: 427.31  4/13/2019 Unknown CHF (congestive heart failure) ([facility]) ICD-10-CM: I50.9 ICD-9-CM: 428.0  4/10/2019 Yes HCAP (healthcare-associated pneumonia) ICD-10-CM: J18.9 ICD-9-CM: 209  4/10/2019 Yes * (Principal) Chronic bilateral pleural effusions, serum Albumin is 2.3. Will ask Nutritionist to see patient ICD-10-CM: J90 ICD-9-CM: 511.9  4/10/2019 Yes Hypokalemia , will order BID replacement. Patient on digoxin. Will change to 3X weekly therapy - Acute hypoxic respiratory failure - HCAP: Multifocal PNA on CXR 04/10/19, multifocal consolidation within the left upper and bilateral lower lobes, likely recurrently multifocal PNA 
- Atrial fibrillation with RVR: CHADS2-Vasc score 5 (CHF, HTN, age, Vascular disease) and sinus tachycardia - Bilateral moderate to large pleural effusions CT chest 04/10 
-CAD s/p LAD PCI with DEMAR 3/11/2019 with previous PCI to LCx 2011. Cath 3/11/2019 (Occluded mid RCA which appears to be chronic. There is faint collateral from the left coronary system, Left main artery with ostial 30-40%, LAD mid focal severely calcified tortuous 99% stenosis with CARLENE II flow, Circumflex coronary artery with diffuse mild 20-30% stenosis throughout). Discharged on ASA, plavix, statin, BB. 
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   
- Echo 03/14/19 with EF 26-30%  
-NEL, Creatinine 1.62 04/12 
-Peripheral vascular disease.  
-Hypertension, but currently hypotensive 
-Dyslipidemia 
-Asthma, former smoker. -Advanced age Plan: - IR guided thoracentesis done yesterday. Fluid is transudative - Afib rates vs.sinus tach with PAC's, MAT observed on telemetry. Currently on PO Digoxin . 125 mg daily. Will give digoxin 3 times weekly. Subjective:  
 
Feels better today. Objective:  
  
Patient Vitals for the past 8 hrs: 
 Temp Pulse Resp BP SpO2  
04/18/19 0943 97.5 °F (36.4 °C) 74 20 110/68 98 % 04/18/19 0825 97 °F (36.1 °C) 87 20 109/65 97 % 04/18/19 0739     97 % 04/18/19 0530 97.6 °F (36.4 °C) 87 22 111/77 94 % Patient Vitals for the past 96 hrs: 
 Weight 04/18/19 0950 142 lb 9.6 oz (64.7 kg) Intake/Output Summary (Last 24 hours) at 4/18/2019 1159 Last data filed at 4/18/2019 0630 Gross per 24 hour Intake 3845.83 ml Output 800 ml Net 3045.83 ml Physical Exam: 
General:  alert, cooperative, no distress Neck:  nontender, no JVD Lungs:  clear to auscultation bilaterally Heart:  irregularly irregular rhythm Abdomen:  abdomen is soft without significant tenderness, masses, organomegaly or guarding Extremities:  extremities normal, atraumatic, no cyanosis or edema Data Review:  
 
Labs: Results:  
   
Chemistry Recent Labs 04/18/19 0548 04/17/19 2245 04/17/19 
0450 04/16/19 
0600 * 140* 158* 122*  144 147* 153* K 3.1* 3.3* 3.5 3.7  104 107 113* CO2 32 32 31 33* BUN 39* 41* 41* 41* CREA 1.28 1.32* 1.34* 1.41* CA 8.9 8.9 8.7 9.1 MG 2.0  --  2.1 2.3 PHOS 3.2  --  3.1 3.5 AGAP 9 8 9 7 BUCR 30* 31* 31* 29* AP 55  --   --  61  
TP 5.7*  --   --  5.8* ALB 2.3*  --   --  2.3*  
GLOB 3.4  --   --  3.5 AGRAT 0.7*  --   --  0.7* CBC w/Diff Recent Labs 04/18/19 
0548 04/17/19 2245 04/17/19 
0450 04/16/19 
0600 WBC 11.7 12.6 13.6* 9.5  
RBC 3.54* 3.47* 3.63* 3.52* HGB 9.1* 8.9* 9.5* 9.1*  
HCT 31.5* 30.8* 33.0* 31.9*  
 295 357 322 GRANS  --  70 74* 71  
LYMPH  --  10* 10* 20* EOS  --  3 2 3 Cardiac Enzymes No results found for: CPK, CK, CKMMB, CKMB, RCK3, CKMBT, CKNDX, CKND1, CHRISTINE, TROPT, TROIQ, NGHIA, TROPT, TNIPOC, BNP, BNPP  
 Coagulation Recent Labs 04/17/19 
0450 04/16/19 
1125 PTP 14.8 15.6* INR 1.2 1.3* APTT 30.3 29.5 Lipid Panel Lab Results Component Value Date/Time Cholesterol, total 87 03/08/2019 05:28 AM  
 HDL Cholesterol 44 03/08/2019 05:28 AM  
 LDL, calculated 30 03/08/2019 05:28 AM  
 VLDL, calculated 13 03/08/2019 05:28 AM  
 Triglyceride 65 03/08/2019 05:28 AM  
 CHOL/HDL Ratio 2.0 03/08/2019 05:28 AM  
  
BNP No results found for: BNP, BNPP, XBNPT Liver Enzymes Recent Labs 04/18/19 
4806 TP 5.7* ALB 2.3* AP 55 SGOT 14* Digoxin Thyroid Studies Lab Results Component Value Date/Time TSH 1.22 03/07/2019 05:29 AM  
    
 
Signed By: Yariel Silva MD   
 April 18, 2019

## 2019-04-18 NOTE — PROGRESS NOTES
Nutrition: S/P MBS 4/17 with SLP recommendations for mec soft solids and HTL. Patient with SF CIB supplements ordered on 4/17 due to HTL diet order. Noted diet changed to regular diet and thin liquids at 4/17/19 on 21:59 and Ensure Enlive supplements reordered (not honey-thick). Communicated with SLP who reported patient should still be on mech soft diet with HTL. No documentation available on why diet was changed so spoke with RN Rayshawn Ardon) inquiring why diet was changed. RN unaware why diet was changed. Communicated this writer would change diet back to mech soft diet and HTL per SLP recommendations and discontinue Ensure Enlive supplements as not compliant with diet order. SLP will be in this afternoon to re-assess. Will monitor. Rosa Blake RD Pager: [phone]

## 2019-04-18 NOTE — PROGRESS NOTES
Speech Therapy Note: Follow up attempted. Could not progress with ST intervention because patient:   
 
[]  Lethargic, unable to be alerted enough for safe PO intake [x]  Refused participation, states \"I'm so tired\" []  Off the unit []  NPO for procedure 
[]  Other: SLP will re-attempt treatment as schedule permits. Veleria Opitz, M.S., CCC-SLP Speech Language Pathologist

## 2019-04-18 NOTE — PROGRESS NOTES
Attempt PT X2 today, first attempt pt HR too high, second attempt pt with OT. Will continue to follow.   Turner Room, PTA

## 2019-04-18 NOTE — PROGRESS NOTES
OT evaluation completed. Recommend SNF upon d/c. Formal documentation to follow. Carmelo Perez MS OTR/L Office Ext: [phone] Pager: [phone]

## 2019-04-18 NOTE — PROGRESS NOTES
Received patient from Jorge Alberto Fournier RN (off-going nurse). Pt awake and in bed, AxOX3. Pt Denies pain or shortness of breathe but has audible rales on respiration. Bed locked in lowest position and call light within reach. Galilea Torre at bedside. Noemy Prather, RN preceptor assisting with pt care. 0920: Received call from telemetry monitoring nurse Reina. She said the Pt was going in and out of A-fib. Will notify physician. 4891: Talked to Dr. Shreya Wan on the unit about the Pt's heart rhythm as reported by telemetry nurse. Dr Shreya Wan redirected this nurse to talk to the cardiologist taking care of the Pt. 
 
Alexei Krishna: Notified cardiologist Vinny Hernandez. about Pt's A-fib as verified via remote telemetry. Asked Dr Jeffery Caldera for EKG. He verbalized that he will assess the Pt. First before ordering EKG if needed. 0940: Dr. Shreya Wan at bedside, I expressed concern about giving the pt lasix with potassium lab value of 3.1. Pt is going to receive 20 MEQ of PO potassium this AM. Dr. Shreya Wan verbalized that he has no concerns about this.

## 2019-04-18 NOTE — PROGRESS NOTES
Problem: General Medical Care Plan Goal: *Vital signs within specified parameters Outcome: Progressing Towards Goal 
Goal: *Labs within defined limits Outcome: Progressing Towards Goal 
Goal: *Absence of infection signs and symptoms Outcome: Progressing Towards Goal 
Goal: *Optimal pain control at patient's stated goal 
Outcome: Progressing Towards Goal 
Goal: *Skin integrity maintained Outcome: Progressing Towards Goal 
Goal: *Fluid volume balance Outcome: Progressing Towards Goal 
Goal: *Optimize nutritional status Outcome: Progressing Towards Goal 
Goal: *Anxiety reduced or absent Outcome: Progressing Towards Goal 
Goal: *Progressive mobility and function (eg: ADL's) Outcome: Progressing Towards Goal 
  
Problem: Impaired Skin Integrity/Pressure Injury Treatment Goal: *Improvement of Existing Pressure Injury Outcome: Progressing Towards Goal 
Goal: *Prevention of pressure injury Description Document William Scale and appropriate interventions in the flowsheet. Outcome: Progressing Towards Goal 
  
Problem: Gas Exchange - Impaired Goal: *Absence of hypoxia Outcome: Progressing Towards Goal 
  
Problem: Patient Education: Go to Patient Education Activity Goal: Patient/Family Education Outcome: Progressing Towards Goal 
  
Problem: Pain Goal: *Control of Pain Outcome: Progressing Towards Goal 
Goal: *PALLIATIVE CARE:  Alleviation of Pain Outcome: Progressing Towards Goal 
  
Problem: Nutrition Deficit Goal: *Optimize nutritional status Outcome: Progressing Towards Goal 
  
Problem: Falls - Risk of 
Goal: *Absence of Falls Description Document Wattsangelica Sherman Fall Risk and appropriate interventions in the flowsheet. Outcome: Progressing Towards Goal 
  
Problem: Patient Education: Go to Patient Education Activity Goal: Patient/Family Education Outcome: Progressing Towards Goal 
  
Problem: Pressure Injury - Risk of 
Goal: *Prevention of pressure injury Description Document William Scale and appropriate interventions in the flowsheet. Outcome: Progressing Towards Goal 
  
Problem: Patient Education: Go to Patient Education Activity Goal: Patient/Family Education Outcome: Progressing Towards Goal 
  
Problem: Patient Education: Go to Patient Education Activity Goal: Patient/Family Education Outcome: Progressing Towards Goal 
  
Problem: Patient Education: Go to Patient Education Activity Goal: Patient/Family Education Outcome: Progressing Towards Goal

## 2019-04-18 NOTE — PROGRESS NOTES
Internal Medicine Progress Note NAME: Giovanny An :  1926 MRM:  [id number] Date/Time: 2019 ASSESSMENT/PLAN: 
 
# HCAP: treated with  broad spectrum IV ab. Sputum cultures and urine antigens ordered. CT 4/10/19 : Multifocal consolidation within the left upper and bilateral lower lobes, similar to prior CT 2019. Findings likely represent recurrent multifocal. 
  
# [facility] effusions: [facility] tapping 19. Ultrasound guided thoracentesis 1750 cc. transudate. # Hypernatremia. Urine studies. IVF D5.do not use NS, or any Na in diluents please as hypernatremia # Hypokalemia. Replete and trend. # AF RVR: appreciate Cardiology assistance, started on  digoxin. AC per cardiology. # SLP was consulted previously, ordered diet recommended by SLP (soft, nectar thick liquids) # Cont acceptable home medications for chronic conditions # DVT protocol 
   
-Code status : full Lab Review:  
 
Recent Labs 19 
3599 19 
2245 19 
0450 WBC 11.7 12.6 13.6* HGB 9.1* 8.9* 9.5* HCT 31.5* 30.8* 33.0*  
 295 357 Recent Labs 19 
0548 19 
2245 19 
0450 19 
1125 19 
0600  144 147*  --  153* K 3.1* 3.3* 3.5  --  3.7  104 107  --  113* CO2 32 32 31  --  33* * 140* 158*  --  122* BUN 39* 41* 41*  --  41* CREA 1.28 1.32* 1.34*  --  1.41* CA 8.9 8.9 8.7  --  9.1 MG 2.0  --  2.1  --  2.3 PHOS 3.2  --  3.1  --  3.5 ALB 2.3*  --   --   --  2.3* TBILI 0.7  --   --   --  0.6 SGOT 14*  --   --   --  14* ALT 14*  --   --   --  15* INR  --   --  1.2 1.3*  --   
 
Lab Results Component Value Date/Time Glucose (POC) 157 (H) 2019 09:12 PM  
 Glucose (POC) 128 (H) 2019 05:01 PM  
 Glucose (POC) 126 (H) 2019 07:17 PM  
 
No results for input(s): PH, PCO2, PO2, HCO3, FIO2 in the last 72 hours. Recent Labs 19 6979 04/16/19 
1125 INR 1.2 1.3* No results found for: SDES Lab Results Component Value Date/Time Culture result: PENDING 04/16/2019 02:25 PM  
 Culture result: NO GROWTH 5 DAYS 04/12/2019 03:10 PM  
 Culture result: NO GROWTH 6 DAYS 04/10/2019 05:30 PM  
 
 
All Cardiac Markers in the last 24 hours: No results found for: CPK, CK, CKMMB, CKMB, RCK3, CKMBT, CKNDX, CKND1, CHRISTINE, TROPT, TROIQ, NGHIA, TROPT, TNIPOC, BNP, BNPP Liver Panel:  
Lab Results Component Value Date/Time ALB 2.3 (L) 04/18/2019 05:48 AM  
 TP 5.7 (L) 04/18/2019 05:48 AM  
 GLOB 3.4 04/18/2019 05:48 AM  
 AGRAT 0.7 (L) 04/18/2019 05:48 AM  
 SGOT 14 (L) 04/18/2019 05:48 AM  
 ALT 14 (L) 04/18/2019 05:48 AM  
 AP 55 04/18/2019 05:48 AM  
  
  
Subjective: Chief Complaint:     
Feel better breathing after taping ROS: 
(bold if positive,otherwise negative) Fever/chills ,  Dysuria Cough , Sputum , SOB/BOTELLO , Chest Pain Diarrhea ,Nausea/Vomit , Abd Pain , Constipation Tolerating Diet Objective:  
 
Vitals: 
Last 24hrs VS reviewed since prior progress note. Most recent are: 
 
Visit Vitals /68 (BP 1 Location: Left arm, BP Patient Position: At rest) Pulse 74 Temp 97.5 °F (36.4 °C) Resp 20 Ht 5' 6\" (1.676 m) Wt 64.7 kg (142 lb 9.6 oz) SpO2 98% BMI 23.02 kg/m² SpO2 Readings from Last 6 Encounters:  
04/18/19 98% 04/10/19 96% 04/02/19 98% 03/12/19 97% 03/01/19 95% 02/23/19 97% O2 Flow Rate (L/min): 2 l/min Intake/Output Summary (Last 24 hours) at 4/18/2019 1106 Last data filed at 4/18/2019 0630 Gross per 24 hour Intake 3845.83 ml Output 800 ml Net 3045.83 ml Physical Exam:  
Ears: hearing is intact Eyes: Icterus is not present Lungs: Better AE after tapping, no ronchi , fine end insp. crackles. Heart: tachycardic Gastrointestinal: soft, non-tender. Bowel sounds normal. No masses,  no organomegaly Neurological:  New Focal Deficits are not present Psychiatric:  Mood is stable Medications Reviewed: (see below) Lab Data Reviewed: (see below) 
 
______________________________________________________________________ Medications:  
 
Current Facility-Administered Medications Medication Dose Route Frequency  amoxicillin-clavulanate (AUGMENTIN) 875-125 mg per tablet 1 Tab  1 Tab Oral Q12H  
 arformoterol (BROVANA) neb solution 15 mcg  15 mcg Nebulization BID RT  
 budesonide (PULMICORT) 500 mcg/2 ml nebulizer suspension  500 mcg Nebulization BID RT  
 furosemide (LASIX) tablet 20 mg  20 mg Oral DAILY  ipratropium (ATROVENT) 0.02 % nebulizer solution 0.5 mg  0.5 mg Nebulization Q6H RT  
 Lactobacillus Acidoph & Bulgar [facility]) tablet 2 Tab  2 Tab Oral BID  magnesium hydroxide (MILK OF MAGNESIA) 400 mg/5 mL oral suspension 30 mL  30 mL Oral DAILY PRN  pneumococcal 23-valent (PNEUMOVAX 23) injection 0.5 mL  0.5 mL IntraMUSCular PRIOR TO DISCHARGE  potassium chloride SR (KLOR-CON 10) tablet 20 mEq  20 mEq Oral DAILY  dextrose 5% infusion  50 mL/hr IntraVENous CONTINUOUS  
 vancomycin (VANCOCIN) 750 mg in D5W 250 mL infusion  750 mg IntraVENous Q24H  piperacillin-tazobactam (ZOSYN) 3.375 g in D5W 100 ml premix/cmpd  3.375 g IntraVENous Q8H  
 [START ON 4/19/2019] VANCOMYCIN INFORMATION NOTE   Other ONCE  
 levoFLOXacin (LEVAQUIN) tablet 750 mg  750 mg Oral Q48H  
 albuterol-ipratropium (DUO-NEB) 2.5 MG-0.5 MG/3 ML  3 mL Nebulization Q4H PRN  
 heparin (porcine) injection 5,000 Units  5,000 Units SubCUTAneous Q8H  
 metoprolol (LOPRESSOR) injection 2.5 mg  2.5 mg IntraVENous Q4H PRN  
 digoxin (LANOXIN) tablet 0.125 mg  0.125 mg Oral DAILY  bisacodyl (DULCOLAX) suppository 10 mg  10 mg Rectal DAILY PRN  
 sodium chloride (NS) flush 5-10 mL  5-10 mL IntraVENous PRN  
 VANCOMYCIN INFORMATION NOTE   Other Rx Dosing/Monitoring  aspirin chewable tablet 81 mg  81 mg Oral DAILY  atorvastatin (LIPITOR) tablet 40 mg  40 mg Oral QHS  clopidogrel (PLAVIX) tablet 75 mg  75 mg Oral DAILY  famotidine (PEPCID) tablet 20 mg  20 mg Oral DAILY  midodrine (PROAMITINE) tablet 10 mg  10 mg Oral TID WITH MEALS  nitroglycerin (NITROSTAT) tablet 0.4 mg  0.4 mg SubLINGual Q5MIN PRN  
 tamsulosin (FLOMAX) capsule 0.4 mg  0.4 mg Oral DAILY  therapeutic multivitamin (THERAGRAN) tablet 1 Tab  1 Tab Oral DAILY  acetaminophen (TYLENOL) tablet 650 mg  650 mg Oral Q4H PRN  
 ondansetron (ZOFRAN) injection 4 mg  4 mg IntraVENous Q4H PRN Total time spent with patient: 35 minutes Care Plan discussed with: Patient, Family, Nursing Staff and Consultant/Specialist 
 
Discussed:  Care Plan Prophylaxis:  Hep SQ Disposition:  Home w/Family Attending Physician: Stoney Strong MD

## 2019-04-18 NOTE — PROGRESS NOTES
Reina Lazaro (Telemetry monitoring nurse) called and informed writer that Patient's 's - 160's and sustaining 120's. Patient awake; denies having CP. VS taken T 97.0, HR 87, R 20, /65, sat's 97 2LNC. Patient incontinent of stool; see hygiene. Call bell w/in reach. This nurse Percepting and assisting Jonna Bustos RN ([place]) with Patient care. [phone]- Dr. Dayna Goldstein to floor in MD Conference room made aware of Patient's HR and Cardiac rhythm as documented above. Dr. Dayan Goldstein asked underlining rhythm. This nurse called Mckenna Evans (Telemetry monitoring nurse) said ST with BBB; Dr. Dayan Goldstein made aware; voiced understanding.

## 2019-04-18 NOTE — PROGRESS NOTES
conducted a Follow up consultation and Spiritual Assessment for Mariposa Newman, who is a 80 y. o.,male. The  provided the following Interventions: 
Continued the relationship of care and support. Listened empathically. Offered prayer and assurance of continued prayer on patients behalf. Chart reviewed. The following outcomes were achieved: 
Patient expressed gratitude for pastoral care visit. Assessment: 
There are no further spiritual or Alevism issues which require Spiritual Care Services interventions at this time. Plan: 
Chaplains will continue to follow and will provide pastoral care on an as needed/requested basis.  recommends bedside caregivers page  on duty if patient shows signs of acute spiritual or emotional distress. [address] [facility] Staff  Spiritual Care  
[phone]

## 2019-04-19 NOTE — CONSULTS
[employer] [facility] Name: Corey Avalos : 1926 MRN: [id number] Date: 2019   
[]I have reviewed the flowsheet and previous days notes. []The patient is unable to give any meaningful history or review of systems because the patient is: 
[]Intubated []Sedated  
[]Unresponsive []The patient is critically ill on     
[]Mechanical ventilation []Pressors []BiPAP []  
S: Patient was positive in affect, he indicates that he feels a slight improvement, he also stated that he is trying to increase his eating through out his hospital stay. He is awaiting information with regards to cardiology workup. ROS:Review of systems not obtained due to patient factors. Events and notes from last 24 hours reviewed. Care plan discussed on multidisciplinary rounds. Vital Signs:   
Visit Vitals /42 (BP 1 Location: Left arm, BP Patient Position: At rest) Pulse (!) 130 Temp 98.2 °F (36.8 °C) Resp 20 Ht 5' 6\" (1.676 m) Wt 64.7 kg (142 lb 9.6 oz) SpO2 92% BMI 23.02 kg/m² O2 Device: Nasal cannula O2 Flow Rate (L/min): 2 l/min Temp (24hrs), Av.6 °F (36.4 °C), Min:97 °F (36.1 °C), Max:98.2 °F (36.8 °C) Intake/Output:  
Last shift:      No intake/output data recorded. Last 3 shifts:  1901 -  0700 In: 5275.8 [P.O.:240; I.V.:5035.8] Out: 1150 [[id number]] Intake/Output Summary (Last 24 hours) at 2019 0759 Last data filed at 2019 7047 Gross per 24 hour Intake 1790 ml Output 950 ml Net 840 ml Hemodynamics:  
   
:   
 
Ventilator Settings: 
  
  
  
  
 
Physical Exam:  
 General: oriented x 3, cooperative, cachexic, communicative HEENT: Normal 
 Neck: No abnormally enlarged lymph nodes. Chest: normal 
 Lungs: rales bilaterally and rhonchi  no dullness/ tenderness/ rash Heart: Rate and rhythm irregularly, irregular, heart sounds were faint  Abdomen: non distended, bowel sounds normoactive, tympanic, abdomen is soft without significant tenderness, masses, organomegaly or guarding Extremity: negative Neuro: alert Skin: Skin revealed scattered and diffuse ecchymosis DATA:  
Current Facility-Administered Medications Medication Dose Route Frequency  potassium chloride (KLOR-CON) tablet 10 mEq  10 mEq Oral BID  digoxin (LANOXIN) tablet 0.125 mg  0.125 mg Oral Q MON, WED & FRI  arformoterol (BROVANA) neb solution 15 mcg  15 mcg Nebulization BID RT  
 budesonide (PULMICORT) 500 mcg/2 ml nebulizer suspension  500 mcg Nebulization BID RT  
 furosemide (LASIX) tablet 20 mg  20 mg Oral DAILY  ipratropium (ATROVENT) 0.02 % nebulizer solution 0.5 mg  0.5 mg Nebulization Q6H RT  
 Lactobacillus Acidoph & Bulgar [facility]) tablet 2 Tab  2 Tab Oral BID  magnesium hydroxide (MILK OF MAGNESIA) 400 mg/5 mL oral suspension 30 mL  30 mL Oral DAILY PRN  pneumococcal 23-valent (PNEUMOVAX 23) injection 0.5 mL  0.5 mL IntraMUSCular PRIOR TO DISCHARGE  vancomycin (VANCOCIN) 750 mg in D5W 250 mL infusion  750 mg IntraVENous Q24H  piperacillin-tazobactam (ZOSYN) 3.375 g in D5W 100 ml premix/cmpd  3.375 g IntraVENous Q8H  
 VANCOMYCIN INFORMATION NOTE   Other ONCE  
 levoFLOXacin (LEVAQUIN) tablet 750 mg  750 mg Oral Q48H  
 albuterol-ipratropium (DUO-NEB) 2.5 MG-0.5 MG/3 ML  3 mL Nebulization Q4H PRN  
 heparin (porcine) injection 5,000 Units  5,000 Units SubCUTAneous Q8H  
 metoprolol (LOPRESSOR) injection 2.5 mg  2.5 mg IntraVENous Q4H PRN  
 bisacodyl (DULCOLAX) suppository 10 mg  10 mg Rectal DAILY PRN  
 sodium chloride (NS) flush 5-10 mL  5-10 mL IntraVENous PRN  
 VANCOMYCIN INFORMATION NOTE   Other Rx Dosing/Monitoring  aspirin chewable tablet 81 mg  81 mg Oral DAILY  atorvastatin (LIPITOR) tablet 40 mg  40 mg Oral QHS  clopidogrel (PLAVIX) tablet 75 mg  75 mg Oral DAILY  famotidine (PEPCID) tablet 20 mg  20 mg Oral DAILY  midodrine (PROAMITINE) tablet 10 mg  10 mg Oral TID WITH MEALS  nitroglycerin (NITROSTAT) tablet 0.4 mg  0.4 mg SubLINGual Q5MIN PRN  
 tamsulosin (FLOMAX) capsule 0.4 mg  0.4 mg Oral DAILY  therapeutic multivitamin (THERAGRAN) tablet 1 Tab  1 Tab Oral DAILY  acetaminophen (TYLENOL) tablet 650 mg  650 mg Oral Q4H PRN  
 ondansetron (ZOFRAN) injection 4 mg  4 mg IntraVENous Q4H PRN Telemetry: []Sinus []A-flutter []Paced []A-fib []Multiple PVCs Labs: 
Recent Labs 04/18/19 
0439 04/17/19 2245 04/17/19 0450 WBC 11.7 12.6 13.6* HGB 9.1* 8.9* 9.5* HCT 31.5* 30.8* 33.0*  
 295 357 Recent Labs 04/19/19 0450 04/18/19 
2433 04/17/19 2245 04/17/19 0450 04/16/19 
1125  143 144 147*   < >  --   
K 3.6 3.1* 3.3* 3.5   < >  --   
 102 104 107   < >  --   
CO2 31 32 32 31   < >  --   
* 165* 140* 158*   < >  --   
BUN 38* 39* 41* 41*   < >  --   
CREA 1.28 1.28 1.32* 1.34*   < >  --   
CA 8.8 8.9 8.9 8.7   < >  --   
MG  --  2.0  --  2.1  --   --   
PHOS  --  3.2  --  3.1  --   --   
ALB 2.3* 2.3*  --   --   --   --   
SGOT 17 14*  --   --   --   --   
ALT 14* 14*  --   --   --   --   
INR  --   --   --  1.2  --  1.3*  
 < > = values in this interval not displayed. No results for input(s): PH, PCO2, PO2, HCO3, FIO2 in the last 72 hours. Imaging: 
[]I have personally reviewed the patients radiographs []Radiographs reviewed with radiologist 
 [] No CXR study available for review today []No change from prior, tubes and lines in adequate position []Improved   []Worsening IMPRESSION:  
Patient Active Problem List  
Diagnosis Code  Asthma J45.909  Prostate cancer ([facility] [address] C61  Hypercholesterolemia E78.00  [facility] I20.9  Left bundle branch block I44.7  Coronary artery disease I25.10  Hypertension I11.9  Dyslipidemia E78.5  Carotid artery disease (HCC) I77.9  Aortic valve stenosis I35.0  Anemia D64.9  
 NSTEMI (non-ST elevated myocardial infarction) ([facility]) I21.4  Congestive heart disease ([facility]) I50.9  CHF (congestive heart failure) ([facility]) I50.9  HCAP (healthcare-associated pneumonia) J18.9  Chronic bilateral pleural effusions J90  
 Bilateral pleural effusion J90  
 Atrial fibrillation with rapid ventricular response ([facility]) I48.91 ·  
 
· PLAN:  
· Had Thoracentesis Number 6;  Transudate by Light's Criteria The patient is: [x] Chronically ill Risk of deterioration: [x] moderate  
 [] critically ill  [] high []See my orders for details My assessment, plan of care, findings, medications, side effects etc were discussed with: 
[]nursing []PT/OT   
[]respiratory therapy [x]  
[]family [] [x]Total critical care time exclusive of procedures 15 minutes Lexii Blackwell MD

## 2019-04-19 NOTE — PROGRESS NOTES
Problem: Dysphagia (Adult) Goal: *Acute Goals and Plan of Care (Insert Text) Description Recommendations: 
Diet: mechanical-soft/honey-thick liquids (NO STRAWS) Meds: crushed in applesauce/pudding Aspiration Precautions Oral Care TID Other: SILENT ASPIRATION Goals:  Patient will: 1. Tolerate PO trials with 0 s/s overt distress in 4/5 trials 2. Utilize compensatory swallow strategies/maneuvers (decrease bite/sip, size/rate, alt. liq/sol) with min cues in 4/5 trials 3. Perform oral-motor/laryngeal exercises to increase oropharyngeal swallow function with min cues 4. Complete an objective swallow study (i.e., MBSS) to assess swallow integrity, r/o aspiration, and determine of safest LRD, min A - goal met 4/17/19 
 - Outcome: Progressing Towards Goal 
  
SPEECH LANGUAGE PATHOLOGY DYSPHAGIA TREATMENT Patient: Mariposa Newman (88 y.o. male) Date: 4/19/2019 Diagnosis: Chronic bilateral pleural effusions [J90] HCAP (healthcare-associated pneumonia) [J18.9] CHF (congestive heart failure) ([facility] [address] [I50.9] Bilateral pleural effusion [J90] HCAP (healthcare-associated pneumonia) [J18.9] CHF (congestive heart failure) ([facility]) [I50.9] Chronic bilateral pleural effusions Precautions: aspiration ASSESSMENT: 
Patient seen for swallowing therapy. Patient aphonic with intermittent phonation. Patient completed laryngeal strengthening exercises with maxA. Patient completed x5 effortful swallows, x5 florin, x15 vocal cord adduction. Of note, patient with improved phonation with vocal cord adduction task. Provided handout to say at bedside for patient to complete 3-5x/day. Progression toward goals: 
?         Improving appropriately and progressing toward goals ? Improving slowly and progressing toward goals ? Not making progress toward goals and plan of care will be adjusted PLAN: 
Recommendations and Planned Interventions: 
Soft, honey Patient continues to benefit from skilled intervention to address the above impairments. Continue treatment per established plan of care. Discharge Recommendations:  Aric Kim SUBJECTIVE:  
Patient stated ? Im okay? . 
 
OBJECTIVE:  
Cognitive and Communication Status: 
Neurologic State: Alert, Confused Orientation Level: Oriented X4 Cognition: Follows commands Perception: Appears intact Perseveration: No perseveration noted Safety/Judgement: Fall prevention Dysphagia Treatment: 
Oral Assessment: 
Oral Assessment Labial: No impairment Dentition: Intact Oral Hygiene: good Lingual: No impairment Velum: No impairment Mandible: No impairment P.O. Trials: 
  
 Vocal quality prior to P.O.:   
   
   
  
Exercises: 
Laryngeal Exercises: 
Sustained \"ah\": No 
  
  
Mendelsohn Maneuver: No 
  
  
Effortful Swallow: Yes Sets : 1 Reps : 5 
Supraglottic Swallow: No 
  
  
Super-Supraglottic Swallow: No 
  
  
Incentive Spirometer: No 
  
  
  
Maria Isabel: Yes Sets : 1 Reps : 5 Sing \"EEE\": Yes Sets : 1 Reps : 15 Shaker: No 
  
  
Look Up at Ceiling/Gargle: No 
  
  
Open Mouth Wide/Yawn: No 
  
  
Tongue Back & Hold: No 
  
  
Effortful Breath Hold: No 
  
  
Hard Glottal Attack: No 
  
  
PAIN: 
Start of Tx: 0 End of Tx: 0 The severity rating is based on the following outcomes: RONNIE Noms Swallow Clinical Judgement After treatment:  
?              Patient left in no apparent distress sitting up in chair ? Patient left in no apparent distress in bed 
? Call bell left within reach ? Nursing notified ? Family present ? Caregiver present ? Bed alarm activated COMMUNICATION/EDUCATION:  
? Aspiration precautions; swallow safety; compensatory techniques ? Patient unable to participate in education; education ongoing with staff ? Posted safety precautions in patient's room. ? Oral-motor/laryngeal strengthening exercises Marii Jonas MS [facility] SLP Time Calculation: 20 mins

## 2019-04-19 NOTE — PROGRESS NOTES
Attempted PT X2 today; first attempt pt with respiratory receiving breathing treatment. Second attempt pt declined, stating that he is having heart surgery (?) and does not wish to participate at this time. Will continue to follow.  Kimani Jeffries, PTA

## 2019-04-19 NOTE — PROGRESS NOTES
STAT HHN DuoNeb given, SpO2 95, HR 75, RR 28, BS coarse wheezing and rhonchi, short of breath, with congested, non-productive cough. Patient states that the neb tx helped a little. Post SpO2 98, HR 81, RR 28, BS coarse wheezing and rhonchi.

## 2019-04-19 NOTE — PROGRESS NOTES
[facility] - Progress Note Admit Date: 4/10/2019 Assessment:  
 
Hospital Problems  Date Reviewed: 3/1/2019 Codes Class Noted POA Atrial fibrillation with rapid ventricular response ([address] rhythm is sinus with frequent PACs and atrial fibrillation. Patient has had marginal systolic BPs despite the use of midodrine. Heart rate increased today. Digoxin is on order for today. He is presently on a 3 times weekly regimen. He has not been felt to be a candidate for  anticoagulation secondary to his advanced age and multitude of medical problems including upper GI bleeding ICD-10-CM: I48.91 
ICD-9-CM: 427.31  4/13/2019 Unknown CHF (congestive heart failure) (HCC) ICD-10-CM: I50.9 ICD-9-CM: 428.0  4/10/2019 Yes HCAP (healthcare-associated pneumonia) ICD-10-CM: J18.9 ICD-9-CM: 440  4/10/2019 Yes * (Principal) Chronic bilateral pleural effusions, serum Albumin is 2.3. Will ask Nutritionist to see patient. Status post numerous thoracenteses. ICD-10-CM: Nava Clements ICD-9-CM: 511.9  4/10/2019 Yes Hypokalemia ,  BID replacement ordered. Patient on digoxin. Will change to 3X weekly therapy. Potassium is 3.6 today. We will continue replacement. Patient is due for a dose of digoxin this morning.  
  
 
- Acute hypoxic respiratory failure - HCAP: Multifocal PNA on CXR 04/10/19, multifocal consolidation within the left upper and bilateral lower lobes, likely recurrently multifocal PNA 
- Atrial fibrillation with RVR: CHADS2-Vasc score 5 (CHF, HTN, age, Vascular disease) and sinus tachycardia - Bilateral moderate to large pleural effusions CT chest 04/10 
-CAD s/p LAD PCI with DEMAR 3/11/2019 with previous PCI to LCx 2011. Cath 3/11/2019 (Occluded mid RCA which appears to be chronic.  There is faint collateral from the left coronary system, Left main artery with ostial 30-40%, LAD mid focal severely calcified tortuous 99% stenosis with CARLENE II flow, Circumflex coronary artery with diffuse mild 20-30% stenosis throughout). Discharged on ASA, plavix, statin, BB. 
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   
- Echo 03/14/19 with EF 26-30%  
-NEL, Creatinine 1.62 04/12 
-Peripheral vascular disease.  
-Hypertension, but currently hypotensive 
-Dyslipidemia 
-Asthma, former smoker. -Advanced age Plan:  
 
-Status post IR guided thoracentesis. Fluid is transudative - Afib rates vs.sinus tach with PAC's, MAT observed on telemetry. Currently on PO Digoxin . 125 mg 3 times weekly secondary to advanced age and CKD. He is also been hypokalemic increasing his risk for digitalis toxicity. Subjective:  
 
Feels better today. Objective:  
  
Patient Vitals for the past 8 hrs: 
 Temp Pulse Resp BP SpO2  
04/19/19 0953     95 % 04/19/19 0849 97.5 °F (36.4 °C) 74 20 112/59 98 % 04/19/19 0823 97.3 °F (36.3 °C) 77 18 106/59 96 % 04/19/19 0756     98 % Patient Vitals for the past 96 hrs: 
 Weight 04/19/19 1116 140 lb 9.6 oz (63.8 kg) 04/18/19 0950 142 lb 9.6 oz (64.7 kg) Intake/Output Summary (Last 24 hours) at 4/19/2019 1130 Last data filed at 4/19/2019 7532 Gross per 24 hour Intake 1710 ml Output 950 ml Net 760 ml Physical Exam: 
General:  alert, cooperative, no distress Neck:  nontender, no JVD Lungs:  clear to auscultation bilaterally Heart:  irregularly irregular rhythm Abdomen:  abdomen is soft without significant tenderness, masses, organomegaly or guarding Extremities:  extremities normal, atraumatic, no cyanosis or edema Data Review:  
 
Labs: Results:  
   
Chemistry Recent Labs 04/19/19 
2161 04/18/19 
3635 04/17/19 
2245 04/17/19 
5823 * 165* 140* 158*  143 144 147* K 3.6 3.1* 3.3* 3.5  102 104 107 CO2 31 32 32 31 BUN 38* 39* 41* 41* CREA 1.28 1.28 1.32* 1.34* CA 8.8 8.9 8.9 8.7 MG  --  2.0  --  2.1 PHOS  --  3.2  --  3.1 AGAP 9 9 8 9 BUCR 30* 30* 31* 31* AP 56 55  --   --   
TP 5.7* 5.7*  --   --   
ALB 2.3* 2.3*  --   --   
GLOB 3.4 3.4  --   --   
AGRAT 0.7* 0.7*  --   --   
  
CBC w/Diff Recent Labs 04/18/19 
4869 04/17/19 
2245 04/17/19 
0450 WBC 11.7 12.6 13.6*  
RBC 3.54* 3.47* 3.63* HGB 9.1* 8.9* 9.5* HCT 31.5* 30.8* 33.0*  
 295 357 GRANS  --  70 74* LYMPH  --  10* 10* EOS  --  3 2 Cardiac Enzymes No results found for: CPK, CK, CKMMB, CKMB, RCK3, CKMBT, CKNDX, CKND1, CHRISTINE, TROPT, TROIQ, NGHIA, TROPT, TNIPOC, BNP, BNPP Coagulation Recent Labs 04/17/19 
0450 PTP 14.8 INR 1.2 APTT 30.3 Lipid Panel Lab Results Component Value Date/Time Cholesterol, total 87 03/08/2019 05:28 AM  
 HDL Cholesterol 44 03/08/2019 05:28 AM  
 LDL, calculated 30 03/08/2019 05:28 AM  
 VLDL, calculated 13 03/08/2019 05:28 AM  
 Triglyceride 65 03/08/2019 05:28 AM  
 CHOL/HDL Ratio 2.0 03/08/2019 05:28 AM  
  
BNP No results found for: BNP, BNPP, XBNPT Liver Enzymes Recent Labs 04/19/19 
0450 TP 5.7* ALB 2.3* AP 56 SGOT 17  
  
Digoxin Thyroid Studies Lab Results Component Value Date/Time TSH 1.22 03/07/2019 05:29 AM  
    
 
Signed By: Blair Darby MD   
 April 19, 2019

## 2019-04-19 NOTE — ROUTINE PROCESS
Bedside shift change report given to Eduardo Fernandez RN (oncoming nurse) by Elicia Biggs RN (offgoing nurse). Report included the following information SBAR, Intake/Output, MAR and Cardiac Rhythm and events of the day.

## 2019-04-19 NOTE — PROGRESS NOTES
Internal Medicine Progress Note NAME: Kelli Record :  1926 MRM:  [id number] Date/Time: 2019 ASSESSMENT/PLAN:  
# HCAP: treated with  broad spectrum IV ab. Sputum cultures and urine antigens ordered. CT 4/10/19 : Multifocal consolidation within the left upper and bilateral lower lobes, similar to prior CT 2019. Findings likely represent recurrent multifocal. 
  : patient in sever SOB , will repeat CXR. D/W  PCCM he is rec pleurodesis but daughter declined. Stat nebs as in SOB/wide spread ronchi. Try small dose steroid # Pleural effusions: Appreciate PCCM assistance. repeat tapping 19. Ultrasound guided thoracentesis 1750 cc. transudate. # Hypernatremia. Urine studies. IVF D5.do not use NS, or any Na in diluents please as hypernatremia . improved # Hypokalemia. Replete and trend. # AF RVR: appreciate Cardiology assistance, started on  digoxin. AC per cardiology. # SLP was consulted previously, ordered diet recommended by SLP (soft, nectar thick liquids) # Cont acceptable home medications for chronic conditions # DVT protocol 
   
-Code status : full Lab Review:  
 
Recent Labs 19 
5134 19 
2245 19 
0450 WBC 11.7 12.6 13.6* HGB 9.1* 8.9* 9.5* HCT 31.5* 30.8* 33.0*  
 295 357 Recent Labs 19 
0450 19 
5626 19 
2245 19 
0450  19 
1125  143 144 147*   < >  --   
K 3.6 3.1* 3.3* 3.5   < >  --   
 102 104 107   < >  --   
CO2 31 32 32 31   < >  --   
* 165* 140* 158*   < >  --   
BUN 38* 39* 41* 41*   < >  --   
CREA 1.28 1.28 1.32* 1.34*   < >  --   
CA 8.8 8.9 8.9 8.7   < >  --   
MG  --  2.0  --  2.1  --   --   
PHOS  --  3.2  --  3.1  --   --   
ALB 2.3* 2.3*  --   --   --   --   
TBILI 0.5 0.7  --   --   --   --   
SGOT 17 14*  --   --   --   --   
ALT 14* 14*  --   --   --   --   
INR  --   --   --  1.2  --  1.3*  
 < > = values in this interval not displayed. Lab Results Component Value Date/Time Glucose (POC) 157 (H) 04/13/2019 09:12 PM  
 Glucose (POC) 128 (H) 03/21/2019 05:01 PM  
 Glucose (POC) 126 (H) 02/21/2019 07:17 PM  
 
No results for input(s): PH, PCO2, PO2, HCO3, FIO2 in the last 72 hours. Recent Labs 04/17/19 
0450 04/16/19 
1125 INR 1.2 1.3* No results found for: SDES Lab Results Component Value Date/Time Culture result: NO GROWTH AFTER 16 HOURS 04/16/2019 02:25 PM  
 Culture result: NO GROWTH 5 DAYS 04/12/2019 03:10 PM  
 Culture result: NO GROWTH 6 DAYS 04/10/2019 05:30 PM  
 
 
All Cardiac Markers in the last 24 hours: No results found for: CPK, CK, CKMMB, CKMB, RCK3, CKMBT, CKNDX, CKND1, CHRISTINE, TROPT, TROIQ, NGHIA, TROPT, TNIPOC, BNP, BNPP Liver Panel:  
Lab Results Component Value Date/Time ALB 2.3 (L) 04/19/2019 04:50 AM  
 TP 5.7 (L) 04/19/2019 04:50 AM  
 GLOB 3.4 04/19/2019 04:50 AM  
 AGRAT 0.7 (L) 04/19/2019 04:50 AM  
 SGOT 17 04/19/2019 04:50 AM  
 ALT 14 (L) 04/19/2019 04:50 AM  
 AP 56 04/19/2019 04:50 AM  
  
  
Subjective: Chief Complaint:     
Again sever SOB and wheezes ROS: 
(bold if positive,otherwise negative) Fever/chills ,  Dysuria Cough , Sputum , SOB/BOTELLO , Chest Pain Diarrhea ,Nausea/Vomit , Abd Pain , Constipation Tolerating Diet Objective:  
 
Vitals: 
Last 24hrs VS reviewed since prior progress note. Most recent are: 
 
Visit Vitals /59 (BP 1 Location: Right arm, BP Patient Position: Head of bed elevated (Comment degrees)) Pulse 74 Temp 97.5 °F (36.4 °C) Resp 20 Ht 5' 6\" (1.676 m) Wt 64.7 kg (142 lb 9.6 oz) SpO2 98% BMI 23.02 kg/m² SpO2 Readings from Last 6 Encounters:  
04/19/19 98% 04/10/19 96% 04/02/19 98% 03/12/19 97% 03/01/19 95% 02/23/19 97% O2 Flow Rate (L/min): 2 l/min Intake/Output Summary (Last 24 hours) at 4/19/2019 0961 Last data filed at 4/19/2019 7542 Gross per 24 hour Intake 1710 ml Output 950 ml Net 760 ml Physical Exam:  
Ears: hearing is intact . IN ACUTE DISTRESS Eyes: Icterus is not present Lungs: BILATERAL RONCHI Heart: tachycardic Gastrointestinal: soft, non-tender. Bowel sounds normal. No masses,  no organomegaly Neurological:  New Focal Deficits are not present Psychiatric:  Mood is stable Medications Reviewed: (see below) Lab Data Reviewed: (see below) 
 
______________________________________________________________________ Medications:  
 
Current Facility-Administered Medications Medication Dose Route Frequency  albuterol-ipratropium (DUO-NEB) 2.5 MG-0.5 MG/3 ML  3 mL Nebulization NOW  albuterol-ipratropium (DUO-NEB) 2.5 MG-0.5 MG/3 ML  3 mL Nebulization Q2H PRN  potassium chloride (KLOR-CON) tablet 10 mEq  10 mEq Oral BID  digoxin (LANOXIN) tablet 0.125 mg  0.125 mg Oral Q MON, WED & FRI  arformoterol (BROVANA) neb solution 15 mcg  15 mcg Nebulization BID RT  
 budesonide (PULMICORT) 500 mcg/2 ml nebulizer suspension  500 mcg Nebulization BID RT  
 furosemide (LASIX) tablet 20 mg  20 mg Oral DAILY  ipratropium (ATROVENT) 0.02 % nebulizer solution 0.5 mg  0.5 mg Nebulization Q6H RT  
 Lactobacillus Acidoph & Bulgar [facility]) tablet 2 Tab  2 Tab Oral BID  magnesium hydroxide (MILK OF MAGNESIA) 400 mg/5 mL oral suspension 30 mL  30 mL Oral DAILY PRN  pneumococcal 23-valent (PNEUMOVAX 23) injection 0.5 mL  0.5 mL IntraMUSCular PRIOR TO DISCHARGE  vancomycin (VANCOCIN) 750 mg in D5W 250 mL infusion  750 mg IntraVENous Q24H  piperacillin-tazobactam (ZOSYN) 3.375 g in D5W 100 ml premix/cmpd  3.375 g IntraVENous Q8H  
 VANCOMYCIN INFORMATION NOTE   Other ONCE  
 levoFLOXacin (LEVAQUIN) tablet 750 mg  750 mg Oral Q48H  
 heparin (porcine) injection 5,000 Units  5,000 Units SubCUTAneous Q8H  
 metoprolol (LOPRESSOR) injection 2.5 mg  2.5 mg IntraVENous Q4H PRN  
  bisacodyl (DULCOLAX) suppository 10 mg  10 mg Rectal DAILY PRN  
 sodium chloride (NS) flush 5-10 mL  5-10 mL IntraVENous PRN  
 VANCOMYCIN INFORMATION NOTE   Other Rx Dosing/Monitoring  aspirin chewable tablet 81 mg  81 mg Oral DAILY  atorvastatin (LIPITOR) tablet 40 mg  40 mg Oral QHS  clopidogrel (PLAVIX) tablet 75 mg  75 mg Oral DAILY  famotidine (PEPCID) tablet 20 mg  20 mg Oral DAILY  midodrine (PROAMITINE) tablet 10 mg  10 mg Oral TID WITH MEALS  nitroglycerin (NITROSTAT) tablet 0.4 mg  0.4 mg SubLINGual Q5MIN PRN  
 tamsulosin (FLOMAX) capsule 0.4 mg  0.4 mg Oral DAILY  therapeutic multivitamin (THERAGRAN) tablet 1 Tab  1 Tab Oral DAILY  acetaminophen (TYLENOL) tablet 650 mg  650 mg Oral Q4H PRN  
 ondansetron (ZOFRAN) injection 4 mg  4 mg IntraVENous Q4H PRN Total time spent with patient: 35 minutes Care Plan discussed with: Patient, Family, Nursing Staff and Consultant/Specialist D/W Dr Amilcar Keys , call his daughter. Discussed:  Care Plan Prophylaxis:  Hep SQ Disposition:  Home w/Family Attending Physician: Spring Richmond MD

## 2019-04-19 NOTE — ROUTINE PROCESS
Bedside shift change report given to Charity Travis RN (oncoming nurse) by Dominga Matta RN (offgoing nurse). Report included the following information SBAR, Intake/Output, MAR and Recent Results and events of the day. Pt alert and awake at time of report. No family at bedside. Opportunitiy for questions and clarification was provided.

## 2019-04-19 NOTE — PROGRESS NOTES
Problem: Self Care Deficits Care Plan (Adult) Goal: *Acute Goals and Plan of Care (Insert Text) Description Occupational Therapy Goals Initiated 4/18/2019 within 7 day(s). 1.  Patient will perform grooming tasks at EOB with supervision/set-up. 2.  Patient will perform upper/lower body dressing with minimal assistance. 3.  Patient will perform functional task in standing for 8 minutes with supervision and < 3 rest breaks to increase activity tolerance for ADLs. 4.  Patient will perform toilet transfers with supervision/set-up. 5.  Patient will perform all aspects of toileting with supervision/set-up. 6.  Patient will participate in upper extremity therapeutic exercise/activities for 8 minutes to increase BUE strength for functional transfers and ADLs. 7.  Patient will utilize energy conservation techniques during functional activities with minimal verbal cues. Outcome: Progressing Towards Goal 
 OCCUPATIONAL THERAPY TREATMENT Patient: Zi Lange (71 y.o. male) Date: 4/19/2019 Diagnosis: Chronic bilateral pleural effusions [J90] HCAP (healthcare-associated pneumonia) [J18.9] CHF (congestive heart failure) ([facility] [address] [I50.9] Bilateral pleural effusion [J90] HCAP (healthcare-associated pneumonia) [J18.9] CHF (congestive heart failure) ([facility]) [I50.9] Chronic bilateral pleural effusions Precautions:   
Chart, occupational therapy assessment, plan of care, and goals were reviewed. ASSESSMENT: 
Pt presented in bed, Pt agreeable to bed level therapy session after refusal to sit EOB. Pt pleasantly confused during session, stating he was having heart surgery today. Checked notes and with nursing and no evidence of having heart surgery today. Pt required Max encouragement to participate in therapy session. Pt performs grooming tasks with setup, long sitting in bed. Pt perseverates on heart surgery topic throughout session, requiring redirection to tasks.  Pt reported 5/10 shoulder pain and requested to rest, nurse Cheryle Baron) notified. Pt educated on OOB and role of OT. Progression toward goals: 
?          Improving appropriately and progressing toward goals ? Improving slowly and progressing toward goals ? Not making progress toward goals and plan of care will be adjusted PLAN: 
Patient continues to benefit from skilled intervention to address the above impairments. Continue treatment per established plan of care. Discharge Recommendations:  Aric Kim Further Equipment Recommendations for Discharge:  shower chair SUBJECTIVE:  
Patient stated ?i'm having heart surgery today. ? OBJECTIVE DATA SUMMARY:  
Cognitive/Behavioral Status: 
Neurologic State: Alert, Confused Orientation Level: Oriented X4 Cognition: Follows commands Safety/Judgement: Fall prevention Balance: 
Sitting: Intact; With support Sitting - Static: Fair (occasional) Sitting - Dynamic: Fair (occasional) ADL Intervention: 
Grooming Washing Face: Supervision/set-up Washing Hands: Supervision/set-up Brushing Teeth: Supervision/set-up(denture management) Pain: 
Pt reports 0/10 pain or discomfort prior to treatment. Pt reports 5/10 pain or discomfort post treatment shoulder. Activity Tolerance:   
Poor Please refer to the flowsheet for vital signs taken during this treatment. After treatment:  
?  Patient left in no apparent distress sitting up in chair ? Patient left in no apparent distress in bed 
? Call bell left within reach ? Nursing notified ? Caregiver present ? Bed alarm activated COMMUNICATION/EDUCATION:  
? Home safety education was provided and the patient/caregiver indicated understanding. ? Patient/family have participated as able in goal setting and plan of care. ? Patient/family agree to work toward stated goals and plan of care. ? Patient understands intent and goals of therapy, but is neutral about his/her participation. ? Patient is unable to participate in goal setting and plan of care. Germaine Etienne Time Calculation: 15 mins

## 2019-04-19 NOTE — PROGRESS NOTES
Problem: General Medical Care Plan Goal: *Vital signs within specified parameters Outcome: Progressing Towards Goal 
Goal: *Optimal pain control at patient's stated goal 
Outcome: Progressing Towards Goal 
Goal: *Anxiety reduced or absent Outcome: Progressing Towards Goal 
  
Problem: Gas Exchange - Impaired Goal: *Absence of hypoxia Outcome: Progressing Towards Goal 
  
Problem: Pain Goal: *Control of Pain Outcome: Progressing Towards Goal 
  
Problem: Pressure Injury - Risk of 
Goal: *Prevention of pressure injury Description Document William Scale and appropriate interventions in the flowsheet.  
Outcome: Progressing Towards Goal

## 2019-04-19 NOTE — PROGRESS NOTES
4/18/2019 20:00- Assessment completed- see charting. Patient has no c/o pain/discomfort at this time- continue to monitor. Call light in reach. Heart rhythm A-fib with rate varying from 90\"s to 130 unsustained > 120. Very loose, non productive, moist cough. HOB elevated at all times. 02:20 lopressor 2.5 mg IV given for heart rate >120 . COntinues in A-fib.  0245- Improved rate control 80's-90's.  
0400- No change in condition. Resting quietly with eyes closed. 07:15- Report given to oncoming nurse, Hank Stone RN.

## 2019-04-19 NOTE — PROGRESS NOTES
Patient has auth from [facility] for [address] # [id number] for 3 days. Spoke with Joaquín Fajardo from [facility], [facility]. patient may discharge to [facility]. tomorrow if Medically ready Kim/ monica will be available tomorrow at ext. [address] [place] [address]

## 2019-04-19 NOTE — PROGRESS NOTES
[facility] [address] [place] [address] (off going nurse). Pt asleep in bed. Appears comfortable and pain free. Bed locked in lowest position and call light within reach. No family at bedside. Pt. on 2 liters O2. Zahira Chiang RN preceptor assisting with pt care

## 2019-04-20 NOTE — PROGRESS NOTES
[facility]  -  Progress Note Patient: Olegario Parr MRN: [id number]  SSN: [id number] YOB: 1926  Age: 80 y.o. Sex: male Admit Date: 4/10/2019 Assessment:  
 
Hospital Problems  Date Reviewed: 3/1/2019 Codes Class Noted POA Atrial fibrillation with rapid ventricular response (HCC) ICD-10-CM: I48.91 
ICD-9-CM: 427.31  4/13/2019 Unknown CHF (congestive heart failure) (HCC) ICD-10-CM: I50.9 ICD-9-CM: 428.0  4/10/2019 Yes HCAP (healthcare-associated pneumonia) ICD-10-CM: J18.9 ICD-9-CM: 442  4/10/2019 Yes * (Principal) Chronic bilateral pleural effusions ICD-10-CM: J90 ICD-9-CM: 511.9  4/10/2019 Yes - Acute hypoxic respiratory failure - HCAP: Multifocal PNA on CXR 04/10/19, multifocal consolidation within the left upper and bilateral lower lobes, likely recurrently multifocal PNA 
- Atrial fibrillation with RVR: CHADS2-Vasc score 5 (CHF, HTN, age, Vascular disease) and sinus tachycardia - Bilateral moderate to large pleural effusions CT chest 04/10 
-CAD s/p LAD PCI with DEMAR 3/11/2019 with previous PCI to LCx 2011. Cath 3/11/2019 (Occluded mid RCA which appears to be chronic. There is faint collateral from the left coronary system, Left main artery with ostial 30-40%, LAD mid focal severely calcified tortuous 99% stenosis with CARLENE II flow, Circumflex coronary artery with diffuse mild 20-30% stenosis throughout). Discharged on ASA, plavix, statin, BB. 
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   
- Echo 03/14/19 with EF 26-30%  
-NEL, Creatinine 1.62 04/12 
-Peripheral vascular disease.  
-Hypertension, but currently hypotensive 
-Dyslipidemia 
-Asthma, former smoker. -Advanced age Plan:  
 
Feels baseline. I have encouraged increased oral intake for better nutrition. Would increase activity as tolerated. Would replace potassium to maintain level greater than 4.0. No new cardiac recommendations at this time. Subjective: No new complaints. Objective:  
  
Patient Vitals for the past 8 hrs: 
 Temp Pulse Resp BP SpO2  
04/20/19 0934 97.6 °F (36.4 °C) (!) 124 20 109/69 92 % 04/20/19 0853     94 % 04/20/19 0758 97.5 °F (36.4 °C) (!) 112 20 109/76 94 % 04/20/19 0425 97.5 °F (36.4 °C) 75 19 98/63 95 % Patient Vitals for the past 96 hrs: 
 Weight 04/19/19 1116 63.8 kg (140 lb 9.6 oz) 04/18/19 0950 64.7 kg (142 lb 9.6 oz) Intake/Output Summary (Last 24 hours) at 4/20/2019 1056 Last data filed at 4/20/2019 0301 Gross per 24 hour Intake 60 ml Output 450 ml Net -390 ml Physical Exam: 
General:  alert, cooperative, no distress, appears stated age Neck:  no JVD Lungs:  clear to auscultation bilaterally Heart:  irregularly irregular rhythm Abdomen:  no guarding or rigidity Extremities:  no edema Data Review:  
 
Labs: Results:  
   
Chemistry Recent Labs 04/19/19 
0450 04/18/19 
0548 04/17/19 
2245 * 165* 140*  143 144  
K 3.6 3.1* 3.3*  
 102 104 CO2 31 32 32 BUN 38* 39* 41* CREA 1.28 1.28 1.32* CA 8.8 8.9 8.9 MG  --  2.0  --   
PHOS  --  3.2  --   
AGAP 9 9 8 BUCR 30* 30* 31* AP 56 55  --   
TP 5.7* 5.7*  --   
ALB 2.3* 2.3*  --   
GLOB 3.4 3.4  --   
AGRAT 0.7* 0.7*  --   
  
CBC w/Diff Recent Labs 04/18/19 
0548 04/17/19 
2245 WBC 11.7 12.6 RBC 3.54* 3.47* HGB 9.1* 8.9* HCT 31.5* 30.8*  295 GRANS  --  70  
LYMPH  --  10* EOS  --  3 Cardiac Enzymes No results found for: CPK, CK, CKMMB, CKMB, RCK3, CKMBT, CKNDX, CKND1, CHRISTINE, TROPT, TROIQ, NGHIA, TROPT, TNIPOC, BNP, BNPP Coagulation No results for input(s): PTP, INR, APTT in the last 72 hours. No lab exists for component: INREXT Lipid Panel Lab Results Component Value Date/Time  Cholesterol, total 87 03/08/2019 05:28 AM  
 HDL Cholesterol 44 03/08/2019 05:28 AM  
 LDL, calculated 30 03/08/2019 05:28 AM  
 VLDL, calculated 13 03/08/2019 05:28 AM  
 Triglyceride 65 03/08/2019 05:28 AM  
 CHOL/HDL Ratio 2.0 03/08/2019 05:28 AM  
  
BNP No results found for: BNP, BNPP, XBNPT Liver Enzymes Recent Labs 04/19/19 
0450 TP 5.7* ALB 2.3* AP 56 SGOT 17  
  
Digoxin Thyroid Studies Lab Results Component Value Date/Time  TSH 1.22 03/07/2019 05:29 AM

## 2019-04-20 NOTE — PROGRESS NOTES
Pt on 2lpm NC spo2 94% pre tx. Breath sounds are rhonchi/coarse. Encouraged pt to cough to help clear airways, cough is congested. Post breathing tx spo2 99%. pts voice is hoarse. Suction is set up for pt at bedside and pt was able to cough up thick pale yellow secretions post tx.

## 2019-04-20 NOTE — PROGRESS NOTES
Problem: Pain Goal: *Control of Pain Outcome: Progressing Towards Goal 
  
Problem: Falls - Risk of 
Goal: *Absence of Falls Description Document Bishop Sherman Fall Risk and appropriate interventions in the flowsheet. Outcome: Progressing Towards Goal 
  
Problem: Pressure Injury - Risk of 
Goal: *Prevention of pressure injury Description Document William Scale and appropriate interventions in the flowsheet.  
Outcome: Progressing Towards Goal

## 2019-04-20 NOTE — PROGRESS NOTES
Internal Medicine Progress Note NAME: Breanne Thomas :  1926 MRM:  [id number] Date/Time: 2019 ASSESSMENT/PLAN: patient continue to have SOB, tachycardia and wheezes. Repeat CXR with edema , additional lasix one dose iv trial, has audible wet ronchi. ECHO with Grade Diastolic dysfunction. Maintain K above 4. Wbc normalized. Continue triple Abx # HCAP: treated with  broad spectrum IV ab. Sputum cultures and urine antigens ordered. CT 4/10/19 : Multifocal consolidation within the left upper and bilateral lower lobes, similar to prior CT 2019. Findings likely represent recurrent multifocal. 
  : patient in sever SOB ,  repeat CXR. D/W  PCCM he is rec pleurodesis but daughter declined. Stat nebs as in SOB/wide spread ronchi. Try small dose steroid # Pleural effusions: Appreciate PCCM assistance. repeat tapping 19. Ultrasound guided thoracentesis 1750 cc. transudate. # Hypernatremia. Urine studies. IVF D5.do not use NS, or any Na in diluents please as hypernatremia . improved # Hypokalemia. Replete and trend. # AF RVR: appreciate Cardiology assistance, started on  digoxin. AC per cardiology. # SLP was consulted previously, ordered diet recommended by SLP (soft, nectar thick liquids) # Cont acceptable home medications for chronic conditions # DVT protocol 
   
-Code status : full Lab Review:  
 
Recent Labs 19 
0548 19 
2245 WBC 11.7 12.6 HGB 9.1* 8.9* HCT 31.5* 30.8*  295 Recent Labs 19 
0450 19 
0548 19 
2245  143 144  
K 3.6 3.1* 3.3*  
 102 104 CO2 31 32 32 * 165* 140* BUN 38* 39* 41* CREA 1.28 1.28 1.32* CA 8.8 8.9 8.9 MG  --  2.0  --   
PHOS  --  3.2  --   
ALB 2.3* 2.3*  --   
TBILI 0.5 0.7  --   
SGOT 17 14*  --   
ALT 14* 14*  --   
 
Lab Results Component Value Date/Time  Glucose (POC) 157 (H) 2019 09:12 PM  
 Glucose (POC) 128 (H) 03/21/2019 05:01 PM  
 Glucose (POC) 126 (H) 02/21/2019 07:17 PM  
 
No results for input(s): PH, PCO2, PO2, HCO3, FIO2 in the last 72 hours. No results for input(s): INR in the last 72 hours. No lab exists for component: INREXT, INREXT No results found for: SDES Lab Results Component Value Date/Time Culture result: NO GROWTH 3 DAYS 04/16/2019 02:25 PM  
 Culture result: NO GROWTH 5 DAYS 04/12/2019 03:10 PM  
 Culture result: NO GROWTH 6 DAYS 04/10/2019 05:30 PM  
 
 
All Cardiac Markers in the last 24 hours: No results found for: CPK, CK, CKMMB, CKMB, RCK3, CKMBT, CKNDX, CKND1, CHRISTINE, TROPT, TROIQ, NGHIA, TROPT, TNIPOC, BNP, BNPP Liver Panel: No results found for: ALB, CBIL, TBIL, TP, GLOB, AGRAT, SGOT, ASTPOC, ALTPOC, ALT, GPT, AP Subjective: Chief Complaint:     
Still  SOB and wheezes ROS: 
(bold if positive,otherwise negative) Fever/chills ,  Dysuria Cough , Sputum , SOB/BOTELLO , Chest Pain Diarrhea ,Nausea/Vomit , Abd Pain , Constipation Tolerating Diet Objective:  
 
Vitals: 
Last 24hrs VS reviewed since prior progress note. Most recent are: 
 
Visit Vitals /69 (BP 1 Location: Left arm) Pulse (!) 124 Temp 97.6 °F (36.4 °C) Resp 20 Ht 5' 6\" (1.676 m) Wt 63.8 kg (140 lb 9.6 oz) SpO2 92% BMI 22.69 kg/m² SpO2 Readings from Last 6 Encounters:  
04/20/19 92% 04/10/19 96% 04/02/19 98% 03/12/19 97% 03/01/19 95% 02/23/19 97% O2 Flow Rate (L/min): 2 l/min Intake/Output Summary (Last 24 hours) at 4/20/2019 1109 Last data filed at 4/20/2019 0301 Gross per 24 hour Intake 60 ml Output 450 ml Net -390 ml Physical Exam:  
Ears: hearing is intact . IN ACUTE DISTRESS Eyes: Icterus is not present Lungs: Audible wet BILATERAL RONCHI Heart: tachycardic Gastrointestinal: soft, non-tender. Bowel sounds normal. No masses,  no organomegaly Neurological:  New Focal Deficits are not present Psychiatric:  Mood is stable Medications Reviewed: (see below) Lab Data Reviewed: (see below) 
 
______________________________________________________________________ Medications:  
 
Current Facility-Administered Medications Medication Dose Route Frequency  [START ON 4/22/2019] VANCOMYCIN INFORMATION NOTE   Other ONCE  
 albuterol-ipratropium (DUO-NEB) 2.5 MG-0.5 MG/3 ML  3 mL Nebulization Q2H PRN  
 methylPREDNISolone (PF) (SOLU-MEDROL) injection 40 mg  40 mg IntraVENous Q8H  potassium chloride (KLOR-CON) tablet 10 mEq  10 mEq Oral BID  digoxin (LANOXIN) tablet 0.125 mg  0.125 mg Oral Q MON, WED & FRI  arformoterol (BROVANA) neb solution 15 mcg  15 mcg Nebulization BID RT  
 budesonide (PULMICORT) 500 mcg/2 ml nebulizer suspension  500 mcg Nebulization BID RT  
 furosemide (LASIX) tablet 20 mg  20 mg Oral DAILY  ipratropium (ATROVENT) 0.02 % nebulizer solution 0.5 mg  0.5 mg Nebulization Q6H RT  
 Lactobacillus Acidoph & Bulgar [facility]) tablet 2 Tab  2 Tab Oral BID  magnesium hydroxide (MILK OF MAGNESIA) 400 mg/5 mL oral suspension 30 mL  30 mL Oral DAILY PRN  pneumococcal 23-valent (PNEUMOVAX 23) injection 0.5 mL  0.5 mL IntraMUSCular PRIOR TO DISCHARGE  vancomycin (VANCOCIN) 750 mg in D5W 250 mL infusion  750 mg IntraVENous Q24H  piperacillin-tazobactam (ZOSYN) 3.375 g in D5W 100 ml premix/cmpd  3.375 g IntraVENous Q8H  
 levoFLOXacin (LEVAQUIN) tablet 750 mg  750 mg Oral Q48H  
 heparin (porcine) injection 5,000 Units  5,000 Units SubCUTAneous Q8H  
 metoprolol (LOPRESSOR) injection 2.5 mg  2.5 mg IntraVENous Q4H PRN  
 bisacodyl (DULCOLAX) suppository 10 mg  10 mg Rectal DAILY PRN  
 sodium chloride (NS) flush 5-10 mL  5-10 mL IntraVENous PRN  
 VANCOMYCIN INFORMATION NOTE   Other Rx Dosing/Monitoring  aspirin chewable tablet 81 mg  81 mg Oral DAILY  atorvastatin (LIPITOR) tablet 40 mg  40 mg Oral QHS  clopidogrel (PLAVIX) tablet 75 mg  75 mg Oral DAILY  famotidine (PEPCID) tablet 20 mg  20 mg Oral DAILY  midodrine (PROAMITINE) tablet 10 mg  10 mg Oral TID WITH MEALS  nitroglycerin (NITROSTAT) tablet 0.4 mg  0.4 mg SubLINGual Q5MIN PRN  
 tamsulosin (FLOMAX) capsule 0.4 mg  0.4 mg Oral DAILY  therapeutic multivitamin (THERAGRAN) tablet 1 Tab  1 Tab Oral DAILY  acetaminophen (TYLENOL) tablet 650 mg  650 mg Oral Q4H PRN  
 ondansetron (ZOFRAN) injection 4 mg  4 mg IntraVENous Q4H PRN Total time spent with patient: 25 minutes Care Plan discussed with: Patient, Family, Nursing Staff and Consultant/Specialist   
 
Discussed:  Care Plan Prophylaxis:  Hep SQ Disposition:  Home w/Family Attending Physician: Ila Winston MD

## 2019-04-20 NOTE — PROGRESS NOTES
Patient received in bed awake. Patient alert and oriented X4, denies pain and discomfort. Patient resting quietly. Frequent use items within reach. Bed locked in low position. Call bell within reach and patient verbalized understanding of use for assistance and needs. Dual skin assessment completed with [facility] RN. Patient has small wound on sacrum. Mepilex in place. 0830: Informed MD that the patient had a MEW score of 3, and BPA for sepsis. Continue to monitor. Not necessary to notify MD of MEW due to HR. 
 
1556:  Patient resting in bed, in stable condition, no complaints of pain or discomfort. 1608:  MD stated earlier not necessary to notify him of MEW score due to HR which will trigger a higher MEW score.

## 2019-04-21 NOTE — PROGRESS NOTES
Physical Exam  
Skin:  
 
  
Dual skin assessment conducted with  [facility] RN

## 2019-04-21 NOTE — PROGRESS NOTES
Patient received in bed awake. Patient alert and oriented X4, denies pain and discomfort. Patient resting quietly. Frequent use items within reach. Bed locked in low position. Call bell within reach and patient verbalized understanding of use for assistance and needs. Dual skin assessment completed with John Tran RN. See skin man note from yesterday. No change in skin assessment. 1025:  Notified MD that the Sepsis BPA popped up Patients HR is 111. Continue to monitor.

## 2019-04-21 NOTE — PROGRESS NOTES
1947: Dr. Maynor Hathaway paged, uncontrolled atrial fib w/ 's-130's. Order for Cardizem 10 mg once, VRB. 2025: UA collected, sent to lab, pt's HR now 104.  
 
2200: HR improved. 2333: Dr. Maynor Hathaway paged regarding HR, now 110-120's atrial fib w/ BBB.  
 
2340: PO Cardizem Q6H, beginning now. 2304: Bedside shift change report given to Simin Hoffman RN (oncoming nurse) by Divya Mendoza RN (offgoing nurse). Report included the following information SBAR and Kardex.

## 2019-04-21 NOTE — PROGRESS NOTES
Problem: Pain Goal: *Control of Pain Outcome: Progressing Towards Goal 
  
Problem: Falls - Risk of 
Goal: *Absence of Falls Description Document Roman Ruelas Fall Risk and appropriate interventions in the flowsheet.  
Outcome: Progressing Towards Goal

## 2019-04-21 NOTE — CONSULTS
Consult Note Consult requested by: Katalina Angel MD 
 
ADMIT DATE: 4/10/2019  CONSULT DATE: April 21, 2019 Admission diagnosis: Chronic bilateral pleural effusions Reason for Nephrology Consultation: NEL Assessment:  
 
1 acute renal failure on chronic kidney disease(baseline creatinine of 1.4-1.5) etiology prerenal azotemia versus ATN versus acute interstitial nephritis. Creatinine slowly trending up to 1.9, patient antibiotics Vanco and Zosyn for H CAP. Patient does have peripheral eosinophilia. Patient is nonoliguric. Electrolytes and acid-base otherwise acceptable. Will send urine studies, consider switching Zosyn to alternative antibiotic if creat continues to trend tomorrow . Check Vanco levels closely. 2 hyper natremia, improved 3 hypokalemia, resolved monitor 4 pleural effusions, status post thoracentesis 5 HCAP On antibiotics per primary 6 anemia, stable continue to monitor transfuse if less than 7 
7 hypoalbuminemia, improved protein in diet 8 chr systolic CHF , ef 28% , severe TR 
 
HPI:  
Patient is a 25-year-old male who presented to the emergency room with shortness of breath progressive cough. Patient was noted to have effusions recently and required thoracentesis. Chest x-ray and CT chest showed bilateral infiltrates and pleural effusions. Patient was started on antibiotic for hospital acquired pneumonia, and effusions were tapped. It was noted that patient's creatinine which was at baseline 1.4-1.5% to 1.9 progressively. Patient has been making some urine, which seems to have decreased. Respiratory status has been stable. Past Medical History:  
Diagnosis Date  Asthma  Cardiac cath 04/28/2011 oLM 30%. pLAD 30%. oD1 50%. CX 90% (3 x 18 [place] DEMAR). dRCA 90%. RPLB subtotal.  RPDA 100%.  Cardiac echocardiogram 01/21/2014 EF 55-60%. Mod LVH. Gr 1 DDfx. Mild AS (mean grad 13). Mild AI. Unchanged from study of 11.  Cardiac nuclear imaging test, mod risk 2016 Intermediate risk. Medium-sized inferior, inferoseptal infarction. No ischemia. EF 54%. Nondiagnostic EKG on pharm stress test.  
 Carotid duplex 2016 Mod 50-69% bilateral ICA stenosis. Probable significant RECA stenosis. >50% stenosis of left subclavian. No significant change from study of 1/8/15.  Coronary artery disease Status post PCI with drug-eluting stent, [place] 3 x 18 mm in the proximal circumflex.  Heart failure ([facility] [address]  Hx of carotid artery disease ([facility] [address]  Hypercholesterolemia  Hypertension  Prostate cancer ([facility] [address] Past Surgical History:  
Procedure Laterality Date  CARDIAC SURG PROCEDURE UNLIST  HX CORONARY STENT PLACEMENT  11 PCI with drug-eluting stent, [place] 3 x 18 mm in the proximal circumflex (post dialated with 3.25 mm noncompliant balloon at high pressure). Social History Socioeconomic History  Marital status:  Spouse name: Not on file  Number of children: Not on file  Years of education: Not on file  Highest education level: Not on file Occupational History  Not on file Social Needs  Financial resource strain: Not on file  Food insecurity:  
  Worry: Not on file Inability: Not on file  Transportation needs:  
  Medical: Not on file Non-medical: Not on file Tobacco Use  Smoking status: Former Smoker Packs/day: 1.00 Years: 15.00 Pack years: 15.00 Last attempt to quit: 1960 Years since quittin.9  Smokeless tobacco: Never Used Substance and Sexual Activity  Alcohol use: No  
 Drug use: No  
 Sexual activity: Not Currently Lifestyle  Physical activity:  
  Days per week: Not on file Minutes per session: Not on file  Stress: Not on file Relationships  Social connections:  
  Talks on phone: Not on file Gets together: Not on file Attends Cheondoism service: Not on file Active member of club or organization: Not on file Attends meetings of clubs or organizations: Not on file Relationship status: Not on file  Intimate partner violence:  
  Fear of current or ex partner: Not on file Emotionally abused: Not on file Physically abused: Not on file Forced sexual activity: Not on file Other Topics Concern  Not on file Social History Narrative  Not on file History reviewed. No pertinent family history. No Known Allergies Home Medications:  
 
Medications Prior to Admission Medication Sig  furosemide (LASIX) 20 mg tablet Take 1 Tab by mouth daily.  furosemide (LASIX) 20 mg tablet Take 0.5 Tabs by mouth daily.  aspirin 81 mg chewable tablet Take 1 Tab by mouth daily.  budesonide-formoterol (SYMBICORT) 80-4.5 mcg/actuation HFAA Take 2 Puffs by inhalation two (2) times a day.  tamsulosin (FLOMAX) 0.4 mg capsule Take 1 Cap by mouth daily.  albuterol-ipratropium (DUO-NEB) 2.5 mg-0.5 mg/3 ml nebu 3 mL by Nebulization route every four (4) hours as needed (sob, wheezing).  famotidine (PEPCID) 20 mg tablet Take 1 Tab by mouth daily.  midodrine (PROAMITINE) 10 mg tablet Take 1 Tab by mouth three (3) times daily (with meals) for 30 days.  therapeutic multivitamin (THERAGRAN) tablet Take 1 Tab by mouth daily.  atorvastatin (LIPITOR) 40 mg tablet Take 1 Tab by mouth nightly.  nitroglycerin (NITROSTAT) 0.4 mg SL tablet 1 Tab by SubLINGual route every five (5) minutes as needed for Chest Pain.  budesonide-formoterol (SYMBICORT) 160-4.5 mcg/actuation HFAA Take 2 Puffs by inhalation two (2) times a day.  glycopyrrolate-formoterol (BEVESPI AEROSPHERE) 9-4.8 mcg HFAA Take 2 Inhalation by inhalation two (2) times a day.  clopidogrel (PLAVIX) 75 mg tab TAKE 1 TABLET EVERY DAY  albuterol (PROVENTIL HFA, VENTOLIN HFA, PROAIR HFA) 90 mcg/actuation inhaler Take 2 Puffs by inhalation every four (4) hours as needed for Wheezing or Shortness of Breath.  tamsulosin (FLOMAX) 0.4 mg capsule Take 1 Cap by mouth daily.  aspirin delayed-release 81 mg tablet Take 81 mg by mouth daily. Current Inpatient Medications:  
 
Current Facility-Administered Medications Medication Dose Route Frequency  ALPRAZolam (XANAX) tablet 0.5 mg  0.5 mg Oral BID PRN  
 [START ON 4/22/2019] VANCOMYCIN INFORMATION NOTE   Other ONCE  
 albuterol-ipratropium (DUO-NEB) 2.5 MG-0.5 MG/3 ML  3 mL Nebulization Q2H PRN  
 methylPREDNISolone (PF) (SOLU-MEDROL) injection 40 mg  40 mg IntraVENous Q8H  potassium chloride (KLOR-CON) tablet 10 mEq  10 mEq Oral BID  digoxin (LANOXIN) tablet 0.125 mg  0.125 mg Oral Q MON, WED & FRI  arformoterol (BROVANA) neb solution 15 mcg  15 mcg Nebulization BID RT  
 budesonide (PULMICORT) 500 mcg/2 ml nebulizer suspension  500 mcg Nebulization BID RT  
 furosemide (LASIX) tablet 20 mg  20 mg Oral DAILY  ipratropium (ATROVENT) 0.02 % nebulizer solution 0.5 mg  0.5 mg Nebulization Q6H RT  
 Lactobacillus Acidoph & Bulgar [facility]) tablet 2 Tab  2 Tab Oral BID  magnesium hydroxide (MILK OF MAGNESIA) 400 mg/5 mL oral suspension 30 mL  30 mL Oral DAILY PRN  pneumococcal 23-valent (PNEUMOVAX 23) injection 0.5 mL  0.5 mL IntraMUSCular PRIOR TO DISCHARGE  vancomycin (VANCOCIN) 750 mg in D5W 250 mL infusion  750 mg IntraVENous Q24H  piperacillin-tazobactam (ZOSYN) 3.375 g in D5W 100 ml premix/cmpd  3.375 g IntraVENous Q8H  
 levoFLOXacin (LEVAQUIN) tablet 750 mg  750 mg Oral Q48H  
 heparin (porcine) injection 5,000 Units  5,000 Units SubCUTAneous Q8H  
 metoprolol (LOPRESSOR) injection 2.5 mg  2.5 mg IntraVENous Q4H PRN  
 bisacodyl (DULCOLAX) suppository 10 mg  10 mg Rectal DAILY PRN  
 sodium chloride (NS) flush 5-10 mL  5-10 mL IntraVENous PRN  
 VANCOMYCIN INFORMATION NOTE   Other Rx Dosing/Monitoring  aspirin chewable tablet 81 mg  81 mg Oral DAILY  atorvastatin (LIPITOR) tablet 40 mg  40 mg Oral QHS  clopidogrel (PLAVIX) tablet 75 mg  75 mg Oral DAILY  famotidine (PEPCID) tablet 20 mg  20 mg Oral DAILY  midodrine (PROAMITINE) tablet 10 mg  10 mg Oral TID WITH MEALS  nitroglycerin (NITROSTAT) tablet 0.4 mg  0.4 mg SubLINGual Q5MIN PRN  
 tamsulosin (FLOMAX) capsule 0.4 mg  0.4 mg Oral DAILY  therapeutic multivitamin (THERAGRAN) tablet 1 Tab  1 Tab Oral DAILY  acetaminophen (TYLENOL) tablet 650 mg  650 mg Oral Q4H PRN  
 ondansetron (ZOFRAN) injection 4 mg  4 mg IntraVENous Q4H PRN Review of Systems: No fever or chills. No sore throat. No cough or hemoptysis. No shortness of breath or chest pain. No orthopnea or paroxysmal nocturnal dyspnea. No constipation or diarrhea. No dysuria, no gross hematuria of voiding difficulties. No ankle swelling, no joint paints. No muscle aches. No skin changes. No dizziness or lightheadedness. No headaches. Physical Assessment:  
 
Vitals:  
 04/21/19 0822 04/21/19 1119 04/21/19 1534 04/21/19 1556 BP: 110/77 112/72  106/71 Pulse: (!) 111 (!) 101  99 Resp: 20 16  18 Temp: 97.6 °F (36.4 °C) 97.7 °F (36.5 °C)  98 °F (36.7 °C) SpO2: 98% 97% 97% 93% Weight:      
Height:      
 
Last 3 Recorded Weights in this Encounter 04/18/19 0950 04/19/19 1116 04/21/19 0606 Weight: 64.7 kg (142 lb 9.6 oz) 63.8 kg (140 lb 9.6 oz) 66.7 kg (147 lb) Admission weight: Weight: 59.9 kg (132 lb) (04/10/19 1609) Intake/Output Summary (Last 24 hours) at 4/21/2019 1824 Last data filed at 4/21/2019 1652 Gross per 24 hour Intake 300 ml Output 100 ml Net 200 ml Patient is in no apparent distress. HEENT: dry mucosa Neck: no cervical lymphadenopathy or thyromegaly. Lungs: good air entry, clear to auscultation bilaterally. Cardiovascular system: S1, S2, regular rate and rhythm. Abdomen: soft, non tender, non distended. Extremities: no edema Data Review: 
 
Labs: Results:  
   
Chemistry Recent Labs  
  04/21/19 
0443 04/20/19 
1157 04/19/19 
0450 * 182* 123*  142 142  
K 4.0 3.8 3.6  100 102 CO2 29 27 31 BUN 69* 52* 38* CREA 1.97* 1.79* 1.28  
CA 8.9 9.0 8.8 AGAP 13 15 9 BUCR 35* 29* 30* AP  --   --  56  
TP  --   --  5.7* ALB 2.5*  --  2.3*  
GLOB  --   --  3.4 AGRAT  --   --  0.7* PHOS 5.8*  --   --   
  
  
CBC w/Diff No results for input(s): WBC, RBC, HGB, HCT, PLT, GRANS, LYMPH, EOS, HGBEXT, HCTEXT, PLTEXT in the last 72 hours. Iron/Ferritin No results for input(s): IRON in the last 72 hours. No lab exists for component: TIBCCALC  
PTH/VIT D No results for input(s): PTH in the last 72 hours. No lab exists for component: VITD Ben Jennings MD 
4/21/2019 
6:24 PM 
 
 
April 21, 2019

## 2019-04-21 NOTE — PROGRESS NOTES
Internal Medicine Progress Note NAME: Sharona Alvarez :  1926 MRM:  [id number] Date/Time: 2019 ASSESSMENT/PLAN:  
 
 - Renal function worsening, on Vanco. Nephrology consulted. HR still high in 100-120, he is on digoxin and BP on low side with no room to increase his regimen now. Report anxiety, prn xanax. D.w Cardiologist Dr Frank Sofia with no new recommendations from him.  - patient continue to have SOB, tachycardia and wheezes. Repeat CXR with edema , additional lasix one dose iv trial, has audible wet ronchi. ECHO with Grade Diastolic dysfunction. Maintain K above 4. Wbc normalized. Continue triple Abx # HCAP: treated with  broad spectrum IV ab. Sputum cultures and urine antigens ordered. CT 4/10/19 : Multifocal consolidation within the left upper and bilateral lower lobes, similar to prior CT 2019. Findings likely represent recurrent multifocal. 
  : patient in sever SOB ,  repeat CXR. D/W  [facility] he is rec pleurodesis but daughter declined. Stat nebs as in SOB/wide spread ronchi. Try small dose steroid - per daughter he was in full power and very active till recent hospitalization and scoping of his GI then started detrioration rapidly. # Pleural effusions: Appreciate [facility] assistance. repeat tapping 19. Ultrasound guided thoracentesis 1750 cc. transudate. # Hypernatremia. Urine studies. IVF D5.do not use NS, or any Na in diluents please as hypernatremia . improved # Hypokalemia. Replete and trend. # AF RVR: appreciate Cardiology assistance, started on  digoxin. AC per cardiology. # SLP was consulted previously, ordered diet recommended by SLP (soft, nectar thick liquids) # Cont acceptable home medications for chronic conditions # DVT protocol 
   
-Code status : full Lab Review: No results for input(s): WBC, HGB, HCT, PLT, HGBEXT, HCTEXT, PLTEXT, HGBEXT, HCTEXT, PLTEXT in the last 72 hours. Recent Labs  
  19 3748 04/20/19 
[id number] 04/19/19 
0450  142 142  
K 4.0 3.8 3.6  100 102 CO2 29 27 31 * 182* 123* BUN 69* 52* 38* CREA 1.97* 1.79* 1.28  
CA 8.9 9.0 8.8 PHOS 5.8*  --   --   
ALB 2.5*  --  2.3* TBILI  --   --  0.5 SGOT  --   --  17 ALT  --   --  14* Lab Results Component Value Date/Time Glucose (POC) 157 (H) 04/13/2019 09:12 PM  
 Glucose (POC) 128 (H) 03/21/2019 05:01 PM  
 Glucose (POC) 126 (H) 02/21/2019 07:17 PM  
 
No results for input(s): PH, PCO2, PO2, HCO3, FIO2 in the last 72 hours. No results for input(s): INR in the last 72 hours. No lab exists for component: INREXT, INREXT No results found for: SDES Lab Results Component Value Date/Time Culture result: NO GROWTH 4 DAYS 04/16/2019 02:25 PM  
 Culture result: NO GROWTH 5 DAYS 04/12/2019 03:10 PM  
 Culture result: NO GROWTH 6 DAYS 04/10/2019 05:30 PM  
 
 
All Cardiac Markers in the last 24 hours: No results found for: CPK, CK, CKMMB, CKMB, RCK3, CKMBT, CKNDX, CKND1, CHRISTINE, TROPT, TROIQ, NGHIA, TROPT, TNIPOC, BNP, BNPP Liver Panel:  
Lab Results Component Value Date/Time ALB 2.5 (L) 04/21/2019 04:43 AM  
  
  
Subjective: Chief Complaint:     
Still  SOB and wheezes Unable bringing phlegm ROS: 
(bold if positive,otherwise negative) Fever/chills ,  Dysuria Cough , Sputum , SOB/BOTELLO , Chest Pain Diarrhea ,Nausea/Vomit , Abd Pain , Constipation Tolerating Diet Objective:  
 
Vitals: 
Last 24hrs VS reviewed since prior progress note. Most recent are: 
 
Visit Vitals /77 (BP 1 Location: Left arm, BP Patient Position: At rest) Pulse (!) 111 Temp 97.6 °F (36.4 °C) Resp 20 Ht 5' 6\" (1.676 m) Wt 66.7 kg (147 lb) SpO2 98% BMI 23.73 kg/m² SpO2 Readings from Last 6 Encounters:  
04/21/19 98% 04/10/19 96% 04/02/19 98% 03/12/19 97% 03/01/19 95% 02/23/19 97% O2 Flow Rate (L/min): 2 l/min Intake/Output Summary (Last 24 hours) at 4/21/2019 1042 Last data filed at 4/21/2019 1299 Gross per 24 hour Intake 160 ml Output  Net 160 ml Physical Exam:  
  
Gen: elderly frail man, Well-developed,  in slight  acute distress HEENT:  Paleconjunctivae,   hearing intact to voice,DRY  mucous membranes Neck: Supple, without masses, thyroid non-tender Resp: bilateral ronchi,  No accessory muscle use,  
Card:  , normal S1, S2 without thrills, bruits . + LL peripheral edema Abd:  Soft, non-tender, non-distended, normoactive bowel sounds are present, Musc: No cyanosis or clubbing Skin: No rashes or ulcers, skin turgor is good Neuro:  Cranial nerves are grossly intact, no clear area of focal motor weakness, follows commands appropriately Psych:  Good insight, oriented to person, place and time, alert Medications Reviewed: (see below) Lab Data Reviewed: (see below) 
 
______________________________________________________________________ Medications:  
 
Current Facility-Administered Medications Medication Dose Route Frequency  ALPRAZolam (XANAX XR) tablet 0.5 mg  0.5 mg Oral BID PRN  
 [START ON 4/22/2019] VANCOMYCIN INFORMATION NOTE   Other ONCE  
 albuterol-ipratropium (DUO-NEB) 2.5 MG-0.5 MG/3 ML  3 mL Nebulization Q2H PRN  
 methylPREDNISolone (PF) (SOLU-MEDROL) injection 40 mg  40 mg IntraVENous Q8H  potassium chloride (KLOR-CON) tablet 10 mEq  10 mEq Oral BID  digoxin (LANOXIN) tablet 0.125 mg  0.125 mg Oral Q MON, WED & FRI  arformoterol (BROVANA) neb solution 15 mcg  15 mcg Nebulization BID RT  
 budesonide (PULMICORT) 500 mcg/2 ml nebulizer suspension  500 mcg Nebulization BID RT  
 furosemide (LASIX) tablet 20 mg  20 mg Oral DAILY  ipratropium (ATROVENT) 0.02 % nebulizer solution 0.5 mg  0.5 mg Nebulization Q6H RT  
 Lactobacillus Acidoph & Bulgar [facility]) tablet 2 Tab  2 Tab Oral BID  
  magnesium hydroxide (MILK OF MAGNESIA) 400 mg/5 mL oral suspension 30 mL  30 mL Oral DAILY PRN  pneumococcal 23-valent (PNEUMOVAX 23) injection 0.5 mL  0.5 mL IntraMUSCular PRIOR TO DISCHARGE  vancomycin (VANCOCIN) 750 mg in D5W 250 mL infusion  750 mg IntraVENous Q24H  piperacillin-tazobactam (ZOSYN) 3.375 g in D5W 100 ml premix/cmpd  3.375 g IntraVENous Q8H  
 levoFLOXacin (LEVAQUIN) tablet 750 mg  750 mg Oral Q48H  
 heparin (porcine) injection 5,000 Units  5,000 Units SubCUTAneous Q8H  
 metoprolol (LOPRESSOR) injection 2.5 mg  2.5 mg IntraVENous Q4H PRN  
 bisacodyl (DULCOLAX) suppository 10 mg  10 mg Rectal DAILY PRN  
 sodium chloride (NS) flush 5-10 mL  5-10 mL IntraVENous PRN  
 VANCOMYCIN INFORMATION NOTE   Other Rx Dosing/Monitoring  aspirin chewable tablet 81 mg  81 mg Oral DAILY  atorvastatin (LIPITOR) tablet 40 mg  40 mg Oral QHS  clopidogrel (PLAVIX) tablet 75 mg  75 mg Oral DAILY  famotidine (PEPCID) tablet 20 mg  20 mg Oral DAILY  midodrine (PROAMITINE) tablet 10 mg  10 mg Oral TID WITH MEALS  nitroglycerin (NITROSTAT) tablet 0.4 mg  0.4 mg SubLINGual Q5MIN PRN  
 tamsulosin (FLOMAX) capsule 0.4 mg  0.4 mg Oral DAILY  therapeutic multivitamin (THERAGRAN) tablet 1 Tab  1 Tab Oral DAILY  acetaminophen (TYLENOL) tablet 650 mg  650 mg Oral Q4H PRN  
 ondansetron (ZOFRAN) injection 4 mg  4 mg IntraVENous Q4H PRN Total time spent with patient: 25 minutes Care Plan discussed with: Patient, Family, Nursing Staff and Consultant/Specialist   
 
Discussed:  Care Plan Prophylaxis:  Hep SQ Disposition:  Home w/Family Attending Physician: Kimberyl Garcia MD

## 2019-04-21 NOTE — PROGRESS NOTES
Received report from [facility]. Pt is having issues breathing due to pleural effusions. Took medication as prescribed. No other issues during this shift. Bedside and Verbal shift change report given to Harsha Carrasco (oncoming nurse) by Quincy Archer (offgoing nurse). Report included the following information SBAR, Kardex, MAR, Recent Results and Cardiac Rhythm afib.

## 2019-04-21 NOTE — ROUTINE PROCESS
Bedside and Verbal shift change report given to Itz Olivas (oncoming nurse) by Yuni Gordon RN 
 (offgoing nurse). Report included the following information SBAR, Kardex, MAR and Recent Results.

## 2019-04-21 NOTE — ROUTINE PROCESS
Bedside and Verbal shift change report given to Tracie Hill (oncoming nurse) by Fidencio De La O RN 
 (offgoing nurse). Report included the following information SBAR, Kardex, MAR and Recent Results.

## 2019-04-22 NOTE — PROGRESS NOTES
RENAL PROGRESS NOTE Eliza Mortensen Assessment/Plan: · NEL in a setting of HAP/a.fib with rvr/systolic chf. Scr is up, uo is marginal. Continue to monitor. Volume status is difficult to assess. Wouldn't attempt to diurese at this time given poor oral intake and borderline hypotension. D/c ivf. · Systolic chf.  
· A.fib with rvr. Better controlled, card on the case. Continue midodrin for bp support. · HAP. Finished abx, s/p bl thoracocentesis. · Debilitation/malnutrition. · D/w daughter. Subjective:Patient complaints off: Very weak, sob at rest.   
No CP/N/V. Appetite is poor. Patient Active Problem List  
Diagnosis Code  Asthma J45.909  Prostate cancer ([facility] C61  Hypercholesterolemia E78.00  Angina, class I ([facility] I20.9  Left bundle branch block I44.7  Coronary artery disease I25.10  Hypertension I11.9  Dyslipidemia E78.5  Carotid artery disease ([facility]) I77.9  Aortic valve stenosis I35.0  Anemia D64.9  
 NSTEMI (non-ST elevated myocardial infarction) ([facility]) I21.4  Congestive heart disease ([facility]) I50.9  CHF (congestive heart failure) ([facility]) I50.9  HCAP (healthcare-associated pneumonia) J18.9  Chronic bilateral pleural effusions J90  
 Bilateral pleural effusion J90  
 Atrial fibrillation with rapid ventricular response ([facility]) I48.91 Current Facility-Administered Medications Medication Dose Route Frequency Provider Last Rate Last Dose  ipratropium (ATROVENT) 0.02 % nebulizer solution 0.5 mg  0.5 mg Nebulization Q4H RT Sangita COREAS MD      
 methylPREDNISolone (PF) (SOLU-MEDROL) injection 20 mg  20 mg IntraVENous Q12H Sangita COREAS MD      
 guaiFENesin ER (MUCINEX) tablet 600 mg  600 mg Oral Q12H Sangita COREAS MD   600 mg at 04/22/19 7781  ALPRAZolam Matthew Gonzáles) tablet 0.5 mg  0.5 mg Oral BID PRN Hiro Carlson MD      
 0.9% sodium chloride infusion  50 mL/hr IntraVENous CONTINUOUS Ivana Smith MD 50 mL/hr at 04/21/19 2004 50 mL/hr at 04/21/19 2004  dilTIAZem (CARDIZEM) IR tablet 60 mg  60 mg Oral Q6H Radha Harrington MD   60 mg at 04/22/19 1145  albuterol-ipratropium (DUO-NEB) 2.5 MG-0.5 MG/3 ML  3 mL Nebulization Q2H PRN Hiro Carlson MD   3 mL at 04/21/19 4265  potassium chloride (KLOR-CON) tablet 10 mEq  10 mEq Oral BID Jaleel Greer MD   10 mEq at 04/22/19 5758  digoxin (LANOXIN) tablet 0.125 mg  0.125 mg Oral Q MON, WED & Collin Rosen MD   0.125 mg at 04/19/19 2033  arformoterol (BROVANA) neb solution 15 mcg  15 mcg Nebulization BID RT Deborah Lang MD   15 mcg at 04/22/19 7401  budesonide (PULMICORT) 500 mcg/2 ml nebulizer suspension  500 mcg Nebulization BID RT Deborah Lagn MD   500 mcg at 04/22/19 9469  Lactobacillus Acidoph & Bulgar [facility]) tablet 2 Tab  2 Tab Oral BID Deborah Lang MD   2 Tab at 04/22/19 6223  
 magnesium hydroxide (MILK OF MAGNESIA) 400 mg/5 mL oral suspension 30 mL  30 mL Oral DAILY PRN Deborah Lang MD      
 pneumococcal 23-valent (PNEUMOVAX 23) injection 0.5 mL  0.5 mL IntraMUSCular PRIOR TO DISCHARGE Getachew Watson MD      
 heparin (porcine) injection 5,000 Units  5,000 Units SubCUTAneous Q8H Hiro Carlson MD   5,000 Units at 04/22/19 0529  
 metoprolol (LOPRESSOR) injection 2.5 mg  2.5 mg IntraVENous Q4H PRN Fabi Kahn PA   2.5 mg at 04/19/19 0216  bisacodyl (DULCOLAX) suppository 10 mg  10 mg Rectal DAILY PRN Gregory Mathew MD   10 mg at 04/12/19 6798  sodium chloride (NS) flush 5-10 mL  5-10 mL IntraVENous PRN Tanner Young MD      
 aspirin chewable tablet 81 mg  81 mg Oral DAILY Tanner Young MD   81 mg at 04/22/19 0924  
 atorvastatin (LIPITOR) tablet 40 mg  40 mg Oral QHS Angelita Vargas MD 40 mg at 04/21/19 2106  clopidogrel (PLAVIX) tablet 75 mg  75 mg Oral DAILY Tanner Young MD   75 mg at 04/22/19 5730  famotidine (PEPCID) tablet 20 mg  20 mg Oral DAILY Tanner Young MD   20 mg at 04/22/19 0924  
 midodrine (PROAMITINE) tablet 10 mg  10 mg Oral TID WITH MEALS Tanner Young MD   10 mg at 04/22/19 1145  
 nitroglycerin (NITROSTAT) tablet 0.4 mg  0.4 mg SubLINGual Q5MIN PRN Tanner Young MD      
 tamsulosin (FLOMAX) capsule 0.4 mg  0.4 mg Oral DAILY Tanner Young MD   0.4 mg at 04/22/19 9572  therapeutic multivitamin (THERAGRAN) tablet 1 Tab  1 Tab Oral DAILY Tanner Young MD   1 Tab at 04/22/19 6707  acetaminophen (TYLENOL) tablet 650 mg  650 mg Oral Q4H PRN Tanner Young MD   650 mg at 04/19/19 1118  ondansetron (ZOFRAN) injection 4 mg  4 mg IntraVENous Q4H PRN Tanner Young MD   4 mg at 04/12/19 8625 Objective Vitals:  
 04/22/19 0612 04/22/19 0730 04/22/19 1105 04/22/19 1130 BP: 113/72 117/65  120/73 Pulse: (!) 108 80  60 Resp: 19 20  20 Temp: 97.5 °F (36.4 °C) 97.7 °F (36.5 °C)  97.7 °F (36.5 °C) SpO2: 96% 95% 90% 93% Weight: 66 kg (145 lb 6.4 oz) Height:      
 
 
 
Intake/Output Summary (Last 24 hours) at 4/22/2019 1211 Last data filed at 4/22/2019 0410 Gross per 24 hour Intake 1265 ml Output 550 ml Net 715 ml Admission weight: Weight: 59.9 kg (132 lb) (04/10/19 1609) Last Weight Metrics: 
Weight Loss Metrics 4/22/2019 4/9/2019 4/2/2019 3/12/2019 3/7/2019 3/1/2019 2/23/2019 Today's Wt 145 lb 6.4 oz 141 lb 6.4 oz 147 lb 4.3 oz 149 lb 0.5 oz - 161 lb 163 lb 2.3 oz  
BMI 23.47 kg/m2 22.15 kg/m2 23.07 kg/m2 - 23.34 kg/m2 25.22 kg/m2 25.55 kg/m2 Physical Assessment:  
 
General: NAD, alert and oriented. Frail, cachectic. Neck: No jvd. LUNGS: diminished air entry at the bases, bl exp wheezes. CVS EXM: S1, S2, irregular. Abdomen: soft, non tender. Lower Extremities:  1+ bl thigh edema. Lab CBC w/Diff No results for input(s): WBC, RBC, HGB, HCT, PLT, GRANS, LYMPH, EOS, HGBEXT, HCTEXT, PLTEXT in the last 72 hours. Chemistry Recent Labs  
  04/22/19 
0443 04/21/19 
0443 04/20/19 
1157 * 147* 182*  143 142  
K 3.9 4.0 3.8  101 100 CO2 29 29 27 BUN 84* 69* 52* CREA 2.23* 1.97* 1.79* CA 8.8 8.9 9.0 AGAP 12 13 15 BUCR 38* 35* 29* ALB  --  2.5*  --   
PHOS  --  5.8*  -- No results found for: IRON, FE, TIBC, IBCT, PSAT, FERR Lab Results Component Value Date/Time Calcium 8.8 04/22/2019 04:43 AM  
 Phosphorus 5.8 (H) 04/21/2019 04:43 AM  
  
 
Maynor Kunz M.D. [facility] Phone [phone] Pager [phone]

## 2019-04-22 NOTE — PROGRESS NOTES
[facility]  -  Progress Note Patient: Kristin Hameed MRN: [id number]  SSN: [id number] YOB: 1926  Age: 80 y.o. Sex: male Admit Date: 4/10/2019 I saw, evaluated, interviewed and examined the patient personally. I agree with the findings and plan of care as documented below with PA-C note HR acceptable Pulmonary status still tenous. Defer to primary and pulmonary team 
No significant rales , rhonchi + On ASA, CCB, statin Masoud Ramos MD 
 
Assessment: - Acute hypoxic respiratory failure - HCAP: Multifocal PNA on CXR 04/10/19, multifocal consolidation within the left upper and bilateral lower lobes, likely recurrently multifocal PNA 
- Atrial fibrillation with RVR: CHADS2-Vasc score 5 (CHF, HTN, age, Vascular disease) and sinus tachycardia - Bilateral moderate to large pleural effusions CT chest 04/10 
-CAD s/p LAD PCI with DEMAR 3/11/2019 with previous PCI to LCx 2011. Cath 3/11/2019 (Occluded mid RCA which appears to be chronic. There is faint collateral from the left coronary system, Left main artery with ostial 30-40%, LAD mid focal severely calcified tortuous 99% stenosis with CARLENE II flow, Circumflex coronary artery with diffuse mild 20-30% stenosis throughout). Discharged on ASA, plavix, statin, BB. 
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   
-Echo 03/14/19 with EF 26-30%  
-NEL, Creatinine 1.62 04/12 
-Peripheral vascular disease.  
-Hx Hypertension 
-Dyslipidemia 
-Asthma, former smoker. -Advanced age Plan:  
 
-Continued on ASA, Plavix, Lipitor. 
-Currently on Midodrine for BP support, borderline hypotension limiting addition of BB.   
-No ACEi/ARB/ARNI given worsening renal function and borderline hypotension.  
-Suboptimal candidate for OAC given comorbidities including Hx GI Bleed associated with duodenal AVM. -Continue pulmonary treatment and support per pulmonary team, appreciate assistance. -Fluid management per nephrology team, appreciate assistance. Subjective:  
 
States breathing is unchanged. Objective:  
  
Patient Vitals for the past 8 hrs: 
 Temp Pulse Resp BP SpO2  
04/22/19 1130 97.7 °F (36.5 °C) 60 20 120/73 93 % 04/22/19 1105     90 % 04/22/19 0730 97.7 °F (36.5 °C) 80 20 117/65 95 % Patient Vitals for the past 96 hrs: 
 Weight 04/22/19 0612 145 lb 6.4 oz (66 kg) 04/21/19 0606 147 lb (66.7 kg) 04/19/19 1116 140 lb 9.6 oz (63.8 kg) Intake/Output Summary (Last 24 hours) at 4/22/2019 1525 Last data filed at 4/22/2019 1455 Gross per 24 hour Intake 1265 ml Output 550 ml Net 715 ml Physical Exam: 
General:  alert, cooperative, appears stated age Neck:  No JVD Lungs:  Coarse breath sounds Heart:  Irregularly irregular rhythm Abdomen:  abdomen is soft without significant tenderness, masses, organomegaly or guarding Extremities:  Atraumatic, no significant edema Data Review:  
 
Labs: Results:  
   
Chemistry Recent Labs  
  04/22/19 
0443 04/21/19 
0443 04/20/19 
1157 * 147* 182*  143 142  
K 3.9 4.0 3.8  101 100 CO2 29 29 27 BUN 84* 69* 52* CREA 2.23* 1.97* 1.79* CA 8.8 8.9 9.0 PHOS  --  5.8*  --   
AGAP 12 13 15 BUCR 38* 35* 29* ALB  --  2.5*  --   
  
Lipid Panel Lab Results Component Value Date/Time Cholesterol, total 87 03/08/2019 05:28 AM  
 HDL Cholesterol 44 03/08/2019 05:28 AM  
 LDL, calculated 30 03/08/2019 05:28 AM  
 VLDL, calculated 13 03/08/2019 05:28 AM  
 Triglyceride 65 03/08/2019 05:28 AM  
 CHOL/HDL Ratio 2.0 03/08/2019 05:28 AM  
  
Liver Enzymes Recent Labs  
  04/21/19 
0443 ALB 2.5* Thyroid Studies Lab Results Component Value Date/Time  TSH 1.22 03/07/2019 05:29 AM

## 2019-04-22 NOTE — PROGRESS NOTES
I was not able to assess the patient at this time and will perform a follow up visit in a few days. Susannah Barnes M.Div. , [address] [facility] AT [address]: [phone]/SBF: [phone]

## 2019-04-22 NOTE — PROGRESS NOTES
1030-Called to bedside per Dr. Bart Swanson to encourage bronchial hygiene - pt does have gurgling/wet sound of secretions - used PEP device with pt at bedside - encouraged coughing - pt exhibited weak cough but we were able to mobilize & suction a moderate amount of secretions while at bedside which cleared temporarily but pt again sounded gurgly/wet within few minutes 1230--Pt remains wet/coarse BS on 4lpm NC Encouraged cough which is weak/suction orally thick brown secretions--Pt just using PEP device 
--assessed for cpap use at night as discussed with Dr. Katerine Doyle at bedside as well--due to pt weakness & wet/coarse BS he does not appear strong enough to be able to remove  Mask without continuous monitoring - did discuss bipap use with Dr. Bart Swanson earlier when called to room - she did not wish to move pt to Step-down where continuous monitoring possible-

## 2019-04-22 NOTE — PROGRESS NOTES
1114: PT re-evaluation deferred this AM d/t dyspnea at rest and elevated HR. Will f/u with patient. Samantha Chin PT, DPT Office extension: [phone] Pager #: [phone]

## 2019-04-22 NOTE — PROGRESS NOTES
Progress Note Patient: Zi Lange               Sex: male          DOA: 4/10/2019 YOB: 1926      Age:  80 y.o.        LOS:  LOS: 12 days CHIEF COMPLAINT:  Other (pt on TCC and not improving per Daugher/wants evaluated.) Subjective:  
 
Pt c/o increased SOB, w/ increased WOB. D/w daughter at bedside. Objective:  
 
Visit Vitals /73 (BP 1 Location: Left arm, BP Patient Position: At rest) Pulse 60 Temp 97.7 °F (36.5 °C) Resp 20 Ht 5' 6\" (1.676 m) Wt 66 kg (145 lb 6.4 oz) SpO2 93% BMI 23.47 kg/m² Physical Exam: 
Ears: hearing is intact Eyes: Icterus is not present Lungs: clear to auscultation bilaterally, diminished breath sounds bilaterally Heart: regular rate and rhythm, S1, S2 normal, no murmur, click, rub or gallop Gastrointestinal: soft, non-tender. Bowel sounds normal. No masses,  no organomegaly Neurological:  New Focal Deficits are not present Psychiatric:  Mood is stable Lab/Data Reviewed: 
CMP:  
Lab Results Component Value Date/Time  04/22/2019 04:43 AM  
 K 3.9 04/22/2019 04:43 AM  
  04/22/2019 04:43 AM  
 CO2 29 04/22/2019 04:43 AM  
 AGAP 12 04/22/2019 04:43 AM  
  (H) 04/22/2019 04:43 AM  
 BUN 84 (H) 04/22/2019 04:43 AM  
 CREA 2.23 (H) 04/22/2019 04:43 AM  
 GFRAA 34 (L) 04/22/2019 04:43 AM  
 GFRNA 28 (L) 04/22/2019 04:43 AM  
 CA 8.8 04/22/2019 04:43 AM  
 
CBC: No results found for: WBC, HGB, HGBEXT, HCT, HCTEXT, PLT, PLTEXT, HGBEXT, HCTEXT, PLTEXT Imaging Reviewed: 
No results found. Assessment:  
 
Principal Problem: 
  Chronic bilateral pleural effusions (4/10/2019) Active Problems: 
  CHF (congestive heart failure) ([address] (4/10/2019) HCAP (healthcare-associated pneumonia) (4/10/2019) Atrial fibrillation with rapid ventricular response ([facility] (4/13/2019) PLAN: 
· B/l recurrent pleural effusions - 2/2 CHF. Thoracentesis x2, 2/2 CHF. Appreciate [facility] assistance. D/w Dr. Suzanne Whitfield today. Plan for airway clearance measures, nocturnal CPAP. Recommend [address] discussion d/t end stage refractory cardiac issues. · HCAP - completed 12 day course of treatment, d/c atbx · NEL - worsening, likely multifactorial 2/2 atbx, diuretics. Appreciate Nephrology eval 
· AF RVR - per Cardiology · CHF/CAD - per cardiology · Cont acceptable home medications for chronic conditions · DVT protocol I have personally reviewed all pertinent labs and films that have officially resulted over the last 24 hours. I have personally checked for all pending labs that are awaiting final results. Signed By: Greer Burk MD   
 April 22, 2019

## 2019-04-22 NOTE — PROGRESS NOTES
[employer] [facility] Pulmonary, Critical Care, and Sleep Medicine Name: Carmelo Guaman MRN: [id number] : 1926 Hospital: [address] [place] [address] Date: 2019 IMPRESSION:  
· Acute on chronic respiratory failure- Hypoxic secondary to bilateral interstitial edema and DANA pneumonia- HAP on presentation. Has difficulty clearing airway. · Bilateral moderate to large Pleural effusions secondary to chronic systolic heart failure · Cardiomyopathy- ischemic, atrial fibrillation,valvular-  with EF 26% · Pulmonary HTN 
· NEL superimposed on CKI · Cachexia- cardiac Patient Active Problem List  
Diagnosis Code  Asthma J45.909  Prostate cancer ([facility] C61  Hypercholesterolemia E78.00  Angina, class I ([facility] I20.9  Left bundle branch block I44.7  Coronary artery disease I25.10  Hypertension I11.9  Dyslipidemia E78.5  Carotid artery disease ([facility]) I77.9  Aortic valve stenosis I35.0  Anemia D64.9  
 NSTEMI (non-ST elevated myocardial infarction) ([facility]) I21.4  Congestive heart disease ([facility]) I50.9  CHF (congestive heart failure) ([facility]) I50.9  HCAP (healthcare-associated pneumonia) J18.9  Chronic bilateral pleural effusions J90  
 Bilateral pleural effusion J90  
 Atrial fibrillation with rapid ventricular response ([facility]) I48.91  
  
PLAN:  
· Will titrate Oxygen to maintain SaO2 > 92% · Will benefit from stepped up airway clearance measures and addition of PAP support- CPAP, NIV 
· No role for repeated thoracocentesis or pleurodesis. · Can consider Pleurx as palliation for management of symptoms of SOB. ( last measure). · Discontinue antibiotics · Will optimize bronchodilators · Needs continued efforts at optimizing cardiac function- difficult balance between diuresis, renal function, inotropic support defer to cardiology · Strongly recommend discussion for goals of care- end stage refractory cardiac issues Subjective/Interval History: 19 Continues to struggle with significant SOB- at rest 
Sounds congested- coughs but not able to clear No hemoptysis Denies chest pain Had thoracocentesis with removal of 1750 ml transudate fluid on . reviwed imaging, labs- increasing BUN/Cr noted. Vanco discontinued. Still on Levaquin and Zosyn ( day #12) ROS:Pertinent items are noted in HPI. Objective: 
Vital Signs:   
Visit Vitals /65 (BP 1 Location: Left arm, BP Patient Position: At rest) Pulse 80 Temp 97.7 °F (36.5 °C) Resp 20 Ht 5' 6\" (1.676 m) Wt 66 kg (145 lb 6.4 oz) SpO2 95% BMI 23.47 kg/m² O2 Device: Nasal cannula O2 Flow Rate (L/min): 2 l/min Temp (24hrs), Av.6 °F (36.4 °C), Min:97.4 °F (36.3 °C), Max:98 °F (36.7 °C) Intake/Output:  
Last shift:      No intake/output data recorded. Last 3 shifts:  1901 -  0700 In: 6251 [P.O.:660; I.V.:665] Out: 650 [Urine:650] Intake/Output Summary (Last 24 hours) at 2019 1037 Last data filed at 2019 0410 Gross per 24 hour Intake 1265 ml Output 650 ml Net 615 ml Physical Exam:  
 General: moderately ill and cachectic HEENT: using accessory muscles intermittently Neck: No abnormally enlarged lymph nodes. Chest: poor excursion Lungs: crackles, decreased air exchange bibasilar and rhonchi Heart: irregular, systolic murmur + Abdomen: abdomen is soft without significant tenderness, masses, organomegaly or guarding Extremity: Trace edema Neuro: alert Skin: Skin color, texture, turgor normal. No rashes or lesions DATA: 
Labs: 
No results for input(s): WBC, HGB, HCT, PLT, HGBEXT, HCTEXT, PLTEXT in the last 72 hours. Recent Labs  
  19 
0443 19 
0443 19 
1157  143 142  
K 3.9 4.0 3.8  101 100 CO2 29 29 27 * 147* 182* BUN 84* 69* 52* CREA 2.23* 1.97* 1.79* CA 8.8 8.9 9.0 PHOS  --  5.8*  --   
ALB  --  2.5*  --   
 
 No results for input(s): PH, PCO2, PO2, HCO3, FIO2 in the last 72 hours. Echo: 
03/13/19 ECHO ADULT FOLLOW-UP OR LIMITED 03/15/2019 3/15/2019 Narrative · Left ventricular severely decreased systolic function. Estimated left  
ventricular ejection fraction is 26 - 30%. Visually measured ejection  
fraction. Left ventricular mild concentric hypertrophy. Abnormal left  
ventricular wall motion as described on the wall scoring diagram below. · Severe tricuspid valve regurgitation is present. Moderate to severe  
pulmonary hypertension is present. · Moderate mitral valve regurgitation. Signed by: Wu Mars MD  
 
 
Imaging: 
[x]I have personally reviewed the patients radiographs XR Results (most recent): 
Results from Hospital Encounter encounter on 04/10/19 XR CHEST PORT Narrative EXAM: XR CHEST PORT 
 
CLINICAL INDICATION/HISTORY: Shortness of breath 
-Additional: COMPARISON: Several prior exams, most recently 4/17/2019 TECHNIQUE: Frontal view of the chest 
 
_______________ FINDINGS: 
 
HEART AND MEDIASTINUM: Stable cardiac size and mediastinal contours, with 
cardiac size upper limits of normal.. LUNGS AND PLEURAL SPACES: Bilateral pleural effusions are demonstrated, similar 
in appearance to prior exam. Patchy lower lung and upper lung predominant 
alveolar opacities are similar to prior exam. No evidence of pneumothorax. BONY THORAX AND SOFT TISSUES: No acute osseous abnormality. _______________ Impression IMPRESSION: 
 
 
1. Bilateral pleural effusions similar to prior with patchy bilateral alveolar 
opacities in keeping with underlying pulmonary edema and/or pneumonia. High complexity decision making was performed during the evaluation of this patient at high risk for decompensation with multiple organ involvement 
 Discussed care plan with Primary MD and RT, family at bedside Above mentioned total time spent on reviewing the case/medical record/data/notes/EMR/patient examination/documentation/coordinating care with nurse/consultants, exclusive of procedures with complex decision making performed and > 50% time spent in face to face evaluation.  
 
Charlie Quiñones MD

## 2019-04-23 NOTE — PROGRESS NOTES
-1215-Pt. Transferred from [facility] and placed on Bipap 10/6, FIO2-50%. [id number]% HR-96, RR-17.-[facility] 
 
-1350-Pt. To Cath lab via NRB for procedure. -[facility]

## 2019-04-23 NOTE — PROGRESS NOTES
Problem: Mobility Impaired (Adult and Pediatric) Goal: *Acute Goals and Plan of Care (Insert Text) Description Physical Therapy Goals Initiated 4/23/2019 and to be accomplished within 7 days. 1.  Patient will complete all bed mobility with minimal assistance/contact guard assist in order to prepare for EOB/OOB activity. 2.  Patient will perform sit <> stand with minimal assistance/contact guard assist in order to prepare for OOB/gait activity. 3.  Patient will perform bed to chair transfers with minimal assistance/contact guard assist in order to promote mobility and encourage seated activity to progress towards their prior level of function. 4.  Patient will ambulate 25 feet with minimal assistance/contact guard assist using LRAD in order to prepare for safe negotiation of their environment. Outcome: Progressing Towards Goal 
 
PHYSICAL THERAPY: RE-EVALUATION 
INPATIENT: Medicare: Hospital Day: 14 Patient: Keshawn Smith (9 y.o. male)    Date: 4/23/2019 Primary Diagnosis: Chronic bilateral pleural effusions [J90] HCAP (healthcare-associated pneumonia) [J18.9] CHF (congestive heart failure) ([facility] [I50.9] Bilateral pleural effusion [J90] HCAP (healthcare-associated pneumonia) [J18.9] CHF (congestive heart failure) ([facility] [I50.9]  
 ,  ,  
Precautions:   
  
PLOF:Independent ASSESSMENT: 
Late entry from 4/22/19: 
 
Patient supine in bed, agreeable to participation with PT. Attempted to supine > sit with MOD A. Unsuccessful with attempt to sit EOB as patient has c/o of stomach pain. Patient declining EOB and returned to bed with MOD A. Patient SPO2 86% on 4L, RN [employer]. Patient left supine in bed with bed in chair position and all needs within reach. EDUCATION:  
Education:  Patient was educated on the following topics: bed mobility. Verbalizes understanding . Barriers to Learning/Limitations: yes;  physical 
Compensate with: visual, verbal, tactile, kinesthetic cues/model Patient's progression toward goals since last assessment: Patient has made minimal progress with PT 2/2 to medical condition, elevated HR, and dyspnea with exertion. Goals updated. Patient will continue to benefit from skilled PT to maximize mobility and encourage OOB activity. PLAN: 
Goals have been updated based on progression since last assessment. Patient continues to benefit from skilled intervention to address the above impairments. Continue to follow the patient 3 - 5 times a week to address goals. Planned Interventions: 
?     Bed Mobility Training          ? Neuromuscular Re-Education ? Transfer Training                ? Orthotic/Prosthetic Training 
? Gait Training                       ? Modalities ? Therapeutic Exercises       ? Edema Management/Control ? Therapeutic Activities         ? Patient and Family Training/Education ? Other (comment): 
Discharge Recommendations: Aric Kim Further Equipment Recommendations for Discharge: N/A  
 
SUBJECTIVE:  
Patient stated Thank you.  OBJECTIVE DATA SUMMARY:  
 
Critical Behavior: 
Neurologic State: Alert Orientation Level: Oriented X4 Cognition: Follows commands Safety/Judgement: Fall prevention Manual Muscle Testing (LE) Unable to assess Tone : Normal 
Sensation: NT 
Range Of Motion: B LE [facility] Functional Mobility: 
 
 
Functional Status Indep (I) Mod I Super-vision Min A Mod A Max A Total A Assist x2 Verbal cues Additional time Not tested Comments Rolling ?  ?  ? ?    ?    ?  ?  ? ? ? ? Supine to sit ?  ?  ? ?  ?  ?  ?  ? ? ? ? Sit to supine ? ?  ? ?  ?  ?  ?  ? ? ? ? Sit to stand ?  ?  ? ?  ?  ?  ?  ? ? ? ? Stand to sit ?  ?  ? ?  ?  ?  ?  ? ? ? ? Bed to chair transfers ? ?  ? ?  ?  ?  ?  ? ? ? ? Balance Good Ela Medici Poor Unable Not tested Comments Sitting static ?  ?  ?  ?  ?    
Sitting dynamic ?  ?  ?  ?  ?   
 Standing static ?  ?  ?  ?  ? Standing dynamic ?  ?  ?  ?  ? Pain: None Pre treatment pain:  0 Post treatment pain:  0 Vital Signs Temp: 97.5 °F (36.4 °C) Pulse (Heart Rate): 75 BP: 102/65 Resp Rate: 18    
O2 Sat (%): 92 % Activity Tolerance:  
Fair Please refer to the flowsheet for vital signs taken during this treatment. After treatment:  
?  Patient left in no apparent distress sitting up in chair ? Patient left in no apparent distress in bed 
? Call bell left within reach ? Nursing notified ? Caregiver present ? Bed alarm activated [place] Time Calculation: 42 mins

## 2019-04-23 NOTE — PROGRESS NOTES
Discussed with Dr. Domenica Martinez about possible next steps to manage patient's continued symptoms of air hunger and work of breathing. He did report improvement with CPAP. Daughter is not amenable to hospice at present. Would recommend-BiPAP support in addition to continued efforts that airway clearance and CHF management. Consult IR for possible option of Pleurx placement-palliative measure to alleviate symptoms of dyspnea.  
 
Thang Restrepo MD

## 2019-04-23 NOTE — PROGRESS NOTES
-1215-Pt. Transferred from [facility] and placed on Bipap 10/6, FIO2-50%. [id number]% HR-96, RR-17.-[facility] 
 
-1350-Pt. To Cath lab via NRB for procedure. -[address] 
 
-1515-Pt. Returned from cath Laband placed back on bipap with above settings.

## 2019-04-23 NOTE — PROGRESS NOTES
RENAL PROGRESS NOTE Dennise Porter Assessment/Plan: · NEL in a setting of HAP/a.fib with rvr/systolic chf. Scr is up slightly, uo is marginal. Continue to monitor. Volume status is difficult to assess. Wouldn't attempt to diurese at this time given poor oral intake and borderline hypotension. · Systolic chf.  
· A.fib with rvr. Better controlled, card on the case. Continue midodrin for bp support. · HAP. Finished abx, s/p bl thoracocentesis. · Debilitation/malnutrition. Subjective:Patient complaints off: Very weak, denies sob at rest.   
No CP/N/V. Appetite is better per patient. Patient Active Problem List  
Diagnosis Code  Asthma J45.909  Prostate cancer ([facility] C61  Hypercholesterolemia E78.00  Angina, class I ([facility] I20.9  Left bundle branch block I44.7  Coronary artery disease I25.10  Hypertension I11.9  Dyslipidemia E78.5  Carotid artery disease ([facility]) I77.9  Aortic valve stenosis I35.0  Anemia D64.9  
 NSTEMI (non-ST elevated myocardial infarction) ([facility]) I21.4  Congestive heart disease ([facility]) I50.9  CHF (congestive heart failure) ([facility]) I50.9  HCAP (healthcare-associated pneumonia) J18.9  Chronic bilateral pleural effusions J90  
 Bilateral pleural effusion J90  
 Atrial fibrillation with rapid ventricular response ([facility]) I48.91 Current Facility-Administered Medications Medication Dose Route Frequency Provider Last Rate Last Dose  ipratropium (ATROVENT) 0.02 % nebulizer solution 0.5 mg  0.5 mg Nebulization Q4H RT Aicha COREAS MD   0.5 mg at 04/23/19 0847  
 methylPREDNISolone (PF) (SOLU-MEDROL) injection 20 mg  20 mg IntraVENous Q12H Aicha COREAS MD   20 mg at 04/22/19 2221  
 guaiFENesin ER (MUCINEX) tablet 600 mg  600 mg Oral Q12H Aicha COREAS MD   600 mg at 04/22/19 3238  ALPRAZolam (XANAX) tablet 0.5 mg  0.5 mg Oral BID PRN Paul Mg MD      
 dilTIAZem (CARDIZEM) IR tablet 60 mg  60 mg Oral Q6H Jeanine Vogel MD   60 mg at 04/23/19 0701  
 albuterol-ipratropium (DUO-NEB) 2.5 MG-0.5 MG/3 ML  3 mL Nebulization Q2H PRN Paul Mg MD   3 mL at 04/21/19 9720  potassium chloride (KLOR-CON) tablet 10 mEq  10 mEq Oral BID Sunitha Christiansen MD   10 mEq at 04/22/19 1722  digoxin (LANOXIN) tablet 0.125 mg  0.125 mg Oral Q MON, WED & Desmond Gilbert MD   0.125 mg at 04/22/19 2219  
 arformoterol (BROVANA) neb solution 15 mcg  15 mcg Nebulization BID RT Peggy Aparicio MD   15 mcg at 04/23/19 0847  
 budesonide (PULMICORT) 500 mcg/2 ml nebulizer suspension  500 mcg Nebulization BID RT Peggy Aparicio MD   500 mcg at 04/23/19 3436  Lactobacillus Acidoph & Bulgar CRESTWOOD [facility]) tablet 2 Tab  2 Tab Oral BID Peggy Aparicio MD   2 Tab at 04/22/19 1722  
 magnesium hydroxide (MILK OF MAGNESIA) 400 mg/5 mL oral suspension 30 mL  30 mL Oral DAILY PRN Peggy Aparicio MD      
 pneumococcal 23-valent (PNEUMOVAX 23) injection 0.5 mL  0.5 mL IntraMUSCular PRIOR TO DISCHARGE Peggy Aparicio MD      
 heparin (porcine) injection 5,000 Units  5,000 Units SubCUTAneous Q8H Paul Mg MD   5,000 Units at 04/23/19 1833  metoprolol (LOPRESSOR) injection 2.5 mg  2.5 mg IntraVENous Q4H PRN Fabi Kahn PA   2.5 mg at 04/19/19 0216  bisacodyl (DULCOLAX) suppository 10 mg  10 mg Rectal DAILY PRN Stiven Cameron MD   10 mg at 04/12/19 2043  sodium chloride (NS) flush 5-10 mL  5-10 mL IntraVENous PRN Tanner Young MD      
 aspirin chewable tablet 81 mg  81 mg Oral DAILY Tanner Young MD   81 mg at 04/22/19 0924  
 atorvastatin (LIPITOR) tablet 40 mg  40 mg Oral QHS Tanner Young MD   40 mg at 04/22/19 2219  clopidogrel (PLAVIX) tablet 75 mg  75 mg Oral DAILY Tanner Young MD   75 mg at 04/22/19 0332  famotidine (PEPCID) tablet 20 mg  20 mg Oral DAILY Tanner Young MD   20 mg at 04/22/19 0924  
 midodrine (PROAMITINE) tablet 10 mg  10 mg Oral TID WITH MEALS Tanner Young MD   10 mg at 04/22/19 1722  
 nitroglycerin (NITROSTAT) tablet 0.4 mg  0.4 mg SubLINGual Q5MIN PRN Tanner Young MD      
 tamsulosin (FLOMAX) capsule 0.4 mg  0.4 mg Oral DAILY Tanner Young MD   0.4 mg at 04/22/19 8473  therapeutic multivitamin (THERAGRAN) tablet 1 Tab  1 Tab Oral DAILY Tanner Young MD   1 Tab at 04/22/19 1168  acetaminophen (TYLENOL) tablet 650 mg  650 mg Oral Q4H PRN Tanner Young MD   650 mg at 04/19/19 1118  ondansetron (ZOFRAN) injection 4 mg  4 mg IntraVENous Q4H PRN Tanner Young MD   4 mg at 04/12/19 3839 Objective Vitals:  
 04/23/19 0430 04/23/19 5083 04/23/19 7157 04/23/19 9875 BP:   147/65 113/66 Pulse:   97 67 Resp:      
Temp:   96.6 °F (35.9 °C) 99.2 °F (37.3 °C) SpO2: 92% 97% 97% 96% Weight:      
Height:      
 
 
 
Intake/Output Summary (Last 24 hours) at 4/23/2019 0589 Last data filed at 4/23/2019 1574 Gross per 24 hour Intake 360 ml Output  Net 360 ml Admission weight: Weight: 59.9 kg (132 lb) (04/10/19 1609) Last Weight Metrics: 
Weight Loss Metrics 4/23/2019 4/9/2019 4/2/2019 3/12/2019 3/7/2019 3/1/2019 2/23/2019 Today's Wt 148 lb 8 oz 141 lb 6.4 oz 147 lb 4.3 oz 149 lb 0.5 oz - 161 lb 163 lb 2.3 oz  
BMI 23.97 kg/m2 22.15 kg/m2 23.07 kg/m2 - 23.34 kg/m2 25.22 kg/m2 25.55 kg/m2 Physical Assessment:  
 
General: NAD, alert and oriented. Frail, cachectic. Neck: No jvd. LUNGS: diminished air entry at the bases, bl exp wheezes. CVS EXM: S1, S2, irregular. Abdomen: soft, non tender. Lower Extremities:  1+ bl thigh edema. Lab CBC w/Diff No results for input(s): WBC, RBC, HGB, HCT, PLT, GRANS, LYMPH, EOS, HGBEXT, HCTEXT, PLTEXT, HGBEXT, HCTEXT, PLTEXT in the last 72 hours. Chemistry Recent Labs  
  04/23/19 3178 04/22/19 
1523 04/21/19 
6045 * 168* 147*  142 143  
K 4.0 3.9 4.0  
 101 101 CO2 27 29 29 BUN 93* 84* 69* CREA 2.27* 2.23* 1.97* CA 9.2 8.8 8.9 AGAP 13 12 13 BUCR 41* 38* 35* ALB 2.8*  --  2.5* PHOS 5.9*  --  5.8* No results found for: IRON, FE, TIBC, IBCT, PSAT, FERR Lab Results Component Value Date/Time Calcium 9.2 04/23/2019 05:55 AM  
 Phosphorus 5.9 (H) 04/23/2019 05:55 AM  
  
 
Agatha Hanks M.D. [facility] Phone [phone] Pager [phone]

## 2019-04-23 NOTE — PROGRESS NOTES
[facility] - Progress Note Admit Date: 4/10/2019 Patient seen and examined independently. Transferred to ICU earlier this morning with increasing  respiratory distress. The patient is in sinus rhythm with frequent PACs. He has very little cardiac reserve with borderline blood pressures. Discussed present cardiac Rx with patient's daughter at length. Prognosis seems poor. Continue present cardiac Rx. Agree with assessment and plan as noted below. Quan Gaines MD 
Assessment: - Acute hypoxic respiratory failure - HCAP: Multifocal PNA on CXR 04/10/19, multifocal consolidation within the left upper and bilateral lower lobes, likely recurrently multifocal PNA 
- Atrial fibrillation with RVR: CHADS2-Vasc score 5 (CHF, HTN, age, Vascular disease) and sinus tachycardia - Bilateral moderate to large pleural effusions CT chest 04/10 
-CAD s/p LAD PCI with DEMAR 3/11/2019 with previous PCI to LCx 2011. Cath 3/11/2019 (Occluded mid RCA which appears to be chronic. There is faint collateral from the left coronary system, Left main artery with ostial 30-40%, LAD mid focal severely calcified tortuous 99% stenosis with CARLENE II flow, Circumflex coronary artery with diffuse mild 20-30% stenosis throughout). Discharged on ASA, plavix, statin, BB. 
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   
-Echo 03/14/19 with EF 26-30%  
-NEL, Creatinine 1.62 04/12 
-Peripheral vascular disease.  
-Hx Hypertension 
-Dyslipidemia 
-Asthma, former smoker. -Advanced age Plan:  
 
- Patient transferred to ICU today for [facility]. IR consulted for possible PleurX catheter - Afib rates are controlled - Continued on ASA, Plavix, Lipitor. 
-Currently on Midodrine for BP support, borderline hypotension limiting addition of BB.   
-No ACEi/ARB/ARNI given worsening renal function and borderline hypotension. Subjective: On Bipap. Objective:  
  
Patient Vitals for the past 8 hrs: 
 Temp Pulse BP SpO2 04/23/19 1230    100 % 04/23/19 1217    100 % 04/23/19 0906 99.2 °F (37.3 °C) 67 113/66 96 % 04/23/19 0850 96.6 °F (35.9 °C) 97 147/65 97 % 04/23/19 0847    97 % Patient Vitals for the past 96 hrs: 
 Weight 04/23/19 0004 148 lb 8 oz (67.4 kg) 04/22/19 0612 145 lb 6.4 oz (66 kg) 04/21/19 0606 147 lb (66.7 kg) Intake/Output Summary (Last 24 hours) at 4/23/2019 1459 Last data filed at 4/23/2019 5764 Gross per 24 hour Intake 240 ml Output  Net 240 ml Physical Exam: 
General:  cooperative, no distress, Bipap in place Neck:  nontender, no JVD Lungs:  +rhonchi Heart:  irregularly irregular rhythm Abdomen:  abdomen is soft without significant tenderness, masses, organomegaly or guarding Extremities:  extremities normal, atraumatic, no cyanosis or edema Data Review:  
 
Labs: Results:  
   
Chemistry Recent Labs  
  04/23/19 
1000 04/23/19 
0555 04/22/19 
0443 04/21/19 
6203 * 185* 168* 147*  144 142 143  
K 4.1 4.0 3.9 4.0  
 104 101 101 CO2 27 27 29 29 BUN 97* 93* 84* 69* CREA 2.34* 2.27* 2.23* 1.97* CA 9.1 9.2 8.8 8.9 MG 3.0*  --   --   --   
PHOS  --  5.9*  --  5.8* AGAP 12 13 12 13 BUCR 41* 41* 38* 35* ALB  --  2.8*  --  2.5* CBC w/Diff No results for input(s): WBC, RBC, HGB, HCT, PLT, GRANS, LYMPH, EOS, HGBEXT, HCTEXT, PLTEXT in the last 72 hours. Cardiac Enzymes No results found for: CPK, CK, CKMMB, CKMB, RCK3, CKMBT, CKNDX, CKND1, CHRISTINE, TROPT, TROIQ, NGHIA, TROPT, TNIPOC, BNP, BNPP Coagulation No results for input(s): PTP, INR, APTT in the last 72 hours. No lab exists for component: INREXT Lipid Panel Lab Results Component Value Date/Time  Cholesterol, total 87 03/08/2019 05:28 AM  
 HDL Cholesterol 44 03/08/2019 05:28 AM  
 LDL, calculated 30 03/08/2019 05:28 AM  
 VLDL, calculated 13 03/08/2019 05:28 AM  
 Triglyceride 65 03/08/2019 05:28 AM  
 CHOL/HDL Ratio 2.0 03/08/2019 05:28 AM  
  
 BNP No results found for: BNP, BNPP, XBNPT Liver Enzymes Recent Labs  
  04/23/19 
0555 ALB 2.8* Digoxin Thyroid Studies Lab Results Component Value Date/Time TSH 1.22 03/07/2019 05:29 AM  
    
 
Signed By: Gill Heart PA-C April 23, 2019

## 2019-04-23 NOTE — PROGRESS NOTES
Problem: Dysphagia (Adult) Goal: *Acute Goals and Plan of Care (Insert Text) Description Recommendations: 
Diet: mechanical-soft/honey-thick liquids (NO STRAWS) Meds: crushed in applesauce/pudding Aspiration Precautions Oral Care TID Other: SILENT ASPIRATION Goals:  Patient will: 1. Tolerate PO trials with 0 s/s overt distress in 4/5 trials 2. Utilize compensatory swallow strategies/maneuvers (decrease bite/sip, size/rate, alt. liq/sol) with min cues in 4/5 trials 3. Perform oral-motor/laryngeal exercises to increase oropharyngeal swallow function with min cues 4. Complete an objective swallow study (i.e., MBSS) to assess swallow integrity, r/o aspiration, and determine of safest LRD, min A - goal met 4/17/19 
 - Outcome: Not Progressing Towards Goal 
 
SPEECH LANGUAGE PATHOLOGY DYSPHAGIA TREATMENT Patient: Eliza Mortensen (89 y.o. male) Date: 4/23/2019 Diagnosis: Chronic bilateral pleural effusions [J90] HCAP (healthcare-associated pneumonia) [J18.9] CHF (congestive heart failure) ([facility] [address] [I50.9] Bilateral pleural effusion [J90] HCAP (healthcare-associated pneumonia) [J18.9] CHF (congestive heart failure) ([facility]) [I50.9] Chronic bilateral pleural effusions Precautions: Aspiration; falls ASSESSMENT: 
Follow up this am with patient alert and attentive. Wet vocal quality at rest. SLP encouraged frequent effortful throat clears. Reviewed and completed laryngeal strengthening exercises with maxA: effortful swallows; florin; vocal cord adduction. Patient with improved phonation with vocal cord adduction task. Intermittent throat clears and wet vocal quality with po intake of mech-soft/honey-thick liquids. Pt continues high aspiration risk. SLP will continue to follow as indicated. Progression toward goals: 
?         Improving appropriately and progressing toward goals ? Improving slowly and progressing toward goals ?         Not making progress toward goals and plan of care will be adjusted PLAN: 
Recommendations and Planned Interventions: 
Patient continues to benefit from skilled intervention to address the above impairments. Continue treatment per established plan of care. Discharge Recommendations:  Aric Kim SUBJECTIVE:  
Patient stated ? thank you? . 
 
OBJECTIVE:  
Cognitive and Communication Status: 
Neurologic State: Alert Orientation Level: Oriented to place, Oriented to person, Oriented to situation Cognition: Appropriate for age attention/concentration Perception: Appears intact Perseveration: No perseveration noted Safety/Judgement: Awareness of environment, Fall prevention Dysphagia Treatment: 
Oral Assessment: 
Oral Assessment Labial: No impairment Dentition: Edentulous Oral Hygiene: [address] Lingual: Decreased rate, Decreased strength Velum: No impairment Mandible: No impairment P.O. Trials: 
Patient Position: ZOO 64 Vocal quality prior to P.O.: Wet Consistency Presented: Pudding, Honey thick liquid, Mechanical soft How Presented: SLP-fed/presented, Self-fed/presented Bolus Acceptance: No impairment Bolus Formation/Control: Impaired Type of Impairment: Delayed, Mastication Propulsion: Delayed (# of seconds) Oral Residue: Less than 10% of bolus, Lingual 
 Initiation of Swallow: Delayed (# of seconds) Laryngeal Elevation: Decreased Aspiration Signs/Symptoms: Weak cough, Clear throat, Change vocal quality Pharyngeal Phase Characteristics: Altered vocal quality, Poor endurance, Suspected pharyngeal residue Effective Modifications: None Cues for Modifications: Minimal 
   
 
 Oral Phase Severity: Mild-moderate Pharyngeal Phase Severity : Severe Exercises: 
Laryngeal Exercises: 
Sustained \"ah\": No 
  
  
Mendelsohn Maneuver: No 
  
  
Effortful Swallow: Yes Sets : 1 Reps : 5 
Supraglottic Swallow: No 
  
  
Super-Supraglottic Swallow: No 
  
 Incentive Spirometer: No 
  
  
  
Maria Isabel: Yes Sets : 1 Reps : 5 Sing \"EEE\": Yes Sets : 1 Reps : 15 Shaker: No 
  
  
Look Up at Ceiling/Gargle: No 
  
  
Open Mouth Wide/Yawn: No 
  
  
Tongue Back & Hold: No 
  
  
Effortful Breath Hold: No 
  
  
Hard Glottal Attack: No 
  
  
PAIN: 
Pain level pre-treatment: 0/10 Pain level post-treatment: 0/10 After treatment:  
?              Patient left in no apparent distress sitting up in chair ? Patient left in no apparent distress in bed 
? Call bell left within reach ? Nursing notified ? Family present ? Caregiver present ? Bed alarm activated COMMUNICATION/EDUCATION:  
? Aspiration precautions; swallow safety; compensatory techniques ? Patient unable to participate in education; education ongoing with staff ? Posted safety precautions in patient's room. ? Oral-motor/laryngeal strengthening exercises Marden Expose, SLP Time Calculation: 19 mins

## 2019-04-23 NOTE — MANAGEMENT PLAN
Discharge/Transition Planning Recommend medical team discussing Goals of Care again with pt and daughter. Recommend Hospice care if pt/daughter can become agreeable. Has been transferred to ICU and on BiPap. Remains listed as Full Code. Pt admitted now almost 13 days and multiple thoracentesis with re-accumulation in fluid as pt becomes weaker. Pt with poor oral intake Micheline Murphy RN BSN Outcomes Manager Pager # [phone]

## 2019-04-23 NOTE — PROGRESS NOTES
Progress Note Patient: Corey Avalos               Sex: male          DOA: 4/10/2019 YOB: 1926      Age:  80 y.o.        LOS:  LOS: 13 days CHIEF COMPLAINT:  Other (pt on TCC and not improving per Daugher/wants evaluated.) Subjective:  
 
Pt said CPAP helped overnight. Still with significant increased WOB. D/w daughter at bedside. Objective:  
 
Visit Vitals /66 (BP 1 Location: Left arm, BP Patient Position: Head of bed elevated (Comment degrees)) Pulse 67 Temp 99.2 °F (37.3 °C) Resp 18 Ht 5' 6\" (1.676 m) Wt 67.4 kg (148 lb 8 oz) SpO2 96% BMI 23.97 kg/m² Physical Exam: 
Ears: hearing is intact Eyes: Icterus is not present Lungs:  diminished breath sounds bilaterally, upper airway crackles Heart: regular rate and rhythm, S1, S2 normal, no murmur, click, rub or gallop Gastrointestinal: soft, non-tender. Bowel sounds normal. No masses,  no organomegaly Neurological:  New Focal Deficits are not present Psychiatric:  Mood is stable Lab/Data Reviewed: 
CMP:  
Lab Results Component Value Date/Time  04/23/2019 10:00 AM  
 K 4.1 04/23/2019 10:00 AM  
  04/23/2019 10:00 AM  
 CO2 27 04/23/2019 10:00 AM  
 AGAP 12 04/23/2019 10:00 AM  
  (H) 04/23/2019 10:00 AM  
 BUN 97 (H) 04/23/2019 10:00 AM  
 CREA 2.34 (H) 04/23/2019 10:00 AM  
 GFRAA 32 (L) 04/23/2019 10:00 AM  
 GFRNA 26 (L) 04/23/2019 10:00 AM  
 CA 9.1 04/23/2019 10:00 AM  
 MG 3.0 (H) 04/23/2019 10:00 AM  
 PHOS 5.9 (H) 04/23/2019 05:55 AM  
 ALB 2.8 (L) 04/23/2019 05:55 AM  
 
CBC: No results found for: WBC, HGB, HGBEXT, HCT, HCTEXT, PLT, PLTEXT, HGBEXT, HCTEXT, PLTEXT Imaging Reviewed: 
No results found. Assessment:  
 
Principal Problem: 
  Chronic bilateral pleural effusions (4/10/2019) Active Problems: 
  CHF (congestive heart failure) ([facility] [address] (4/10/2019) HCAP (healthcare-associated pneumonia) (4/10/2019) Atrial fibrillation with rapid ventricular response ([facility] [address] (4/13/2019) PLAN: 
· B/l recurrent pleural effusions - 2/2 CHF. Thoracentesis x2. Appreciate [facility] assistance. D/w Dr. Karen Sneed today, will move to step down and start continuous BIPAP and Consult IR for  PleurX catheter. · [address] - d/w daughter about palliative care measures, she declines adamantly. · HCAP - completed 12 day course of treatment, d/c atbx · NEL - stable, likely multifactorial 2/2 atbx, diuretics. Appreciate Nephrology eval 
· AF RVR - per Cardiology · CHF/CAD - per cardiology · Cont acceptable home medications for chronic conditions · DVT protocol I have personally reviewed all pertinent labs and films that have officially resulted over the last 24 hours. I have personally checked for all pending labs that are awaiting final results. Signed By: Ginger Figueroa MD   
 April 23, 2019

## 2019-04-23 NOTE — PROGRESS NOTES
Nutrition follow-up/ 
Plan of care RECOMMENDATIONS:  
 
1. Soft Solid Diet w/ HTL (liberalized to promote intake) 2. SF CIB TID with honey thick milk 3. Monitor labs, weight and PO intake 4. RD to follow GOALS:  
 
1. Ongoing: PO intake meets >75% of protein/calorie needs by 4/28 2. Ongoing: Weight Maintenance/gradual gain (1-2 lb/week) by 4/28 ASSESSMENT: 
 
Wt status is classified as normal per BMI of 24. Variable weights recorded, but per documented weight records Pt was 158 lb on (1/16/2019) equating to a recent loss of 10 lb or 6% x 3 months PTA. Noted substantial fluid fluctuation is a factor as well. Poor PO intake. Continue SF CIB TID to supplement energy needs. Labs noted. Pt with hypoalbuminemia, hypermagnesemia, elevated BUN/Creatinine levels GFR (26). Elevated BNP level of 33,202. Nutrition recommendations listed. RD to follow. Meets Criteria for Acute Malnutrition  
[x] Severe Malnutrition, as evidenced by: 
            [x] Moderate muscle wasting, loss of subcutaneous fat 
            [x] Nutritional intake of <50% of recommended intake for >5 days [x] Weight loss of >1-2% in 1 week, >5% in 1 month, >7.5% in 3 months, or >10% in 6 months 
            [] Moderate-severe edema  
  
Nutrition Risk:  [] High  [x] Moderate []  Low SUBJECTIVE/OBJECTIVE:  
 
(4/23): Reviewed SLP note from today (4/23) with recommendations to continue mech soft diet with HTL. Patient now transferred to ICU to receive BiPAP.  
 
(4/18 afternoon): Noted SLP saw patient earlier today but patient refused participation stating he was too tired. Observed minimal intake of lunch tray (<5%). Patient reports appetite is so-so but is drinking CIB supplements. However, appears to only have had a few sips. Noted Boost supplement at bedside ( not on formulary).  Discussed with patient and RN that SLP evaluated patient and recommended HTL so patient can only have CIB supplements at this time to be in compliance with HTL. Discussed and implemented food preferences. Will continue to closely monitor. (4/18 AM): S/P MBS 4/17 with SLP recommendations for mech soft solids and HTL. Patient with SF CIB supplements ordered on 4/17 due to HTL diet order. Noted diet changed to regular diet and thin liquids at 4/17/19 on 21:59 and Ensure Enlive supplements reordered (not honey-thick). Communicated with SLP who reported patient should still be on mech soft diet with HTL. No documentation available on why diet was changed so spoke with RN Vilma Petersen) inquiring why diet was changed. RN unaware why diet was changed. Communicated this writer would change diet back to mech soft diet and HTL per SLP recommendations and discontinue Ensure Enlive supplements as not compliant with diet order. SLP will be in this afternoon to re-assess. Will monitor. (4/17) Plan for U/S guided thoracentesis today. S/P MBS today with SLP recommendations for mech soft solids and HTL. Changed supplements from Ensure Enlive to SF CIB TID due to meet new diet order parameters. (4/16): Pt transferred from [facility] and admitted to hospital on (4/10) for HCAP, chronic pleural effusions and CHF. Diet orders carried over from SLP recomendations from previous admission to [facility] so would recommend a repeat evaluation. PO intake has been poor per vitals. Variability in recent weights recorded, but per Hx from patient/daughter definitely with weight loss starting ~9 months ago (Total of 22 lb or 13% from ~ lb x 9 months ago and per weight at 158 lb on (1/16/2019) 17 lb or 11% over the past 3 months PTA) Noted fluid fluctuation also a factor. Plan is for thoracentesis tomorrow. Pt seen in room this evening with daughter at bedside. Observed 100% of supplement and applesauce consumed.  Obtained some preferences and also had d/w daughter to fill out the menu to implement his preferences and dietary will pick it up. (Also informed dietary). Explained process and that there is a refrigerator on unit for things she may want to bring in as well. Noted orders for SLP to reevaluate with MBS placed this afternoon as well. Continue to encourage intake and will follow. (4/10): Pt w/ slightly improved appetite but still not adequate. Weight has been stable since admission. Pt seen in room with daughter who had just arrived and was requesting to see someone. Informed Heather Celaya, and TCC Director, Katia Gamez, that she would like to speak with someone. They addressed the situation. Pt is being transferred to the ER per family request so will follow up at a more appropriate time.  
 
(4/3): Pt transferred form [facility] to [facility] on 4/2/2019. Pt seen in room with daughter at bedside to assist with Hx. Pt reports was previously consuming 2 meals and 1 Boost per day prior to hospitalization. Denies having any food allergies or problems chewing/swallowing and stable weight prior to recent hospitalization. Spoke with SLP , Emerita Wilkinson, and will do evaluation as was previously on modified diet PTA. Pt stated he is feeling hungry in the mornings, but that he now tends to have early satiety. Discussed trying to make breakfast his largest meal and then doing smaller meals/nutrient dense snacks throughout the rest of the day. Will reorder Ensure Enlive TID that he was previously receiving and will possibly add in magic cups but want to promote solid food intake first. Encouraged trying to consume what he can tolerate of solid foods first and then sipping on supplements after or between meals to increase kcal/protein. Also provided menu and encouraged him to implement preferences with dietary. We had the discussion that SLP would be evaluating most likely so things may have to be altered depending on results. Weight loss confirmed but unable to quantify exactly d/t recent fluid fluctuations.  (Estimate ~10-12 lb or 8% loss x 1-2 months PTA) Encourage intake and will monitor. Information Obtained from:  
 [x] Chart Review 
 [] Patient 
 [] Family/Caregiver [x] Nurse/Physician 
 [] Interdisciplinary Meeting/Rounds Diet: Dental Soft w/ HTL Medications: [x] Reviewed Allergies: [x] Reviewed Patient Active Problem List  
Diagnosis Code  Asthma J45.909  Prostate cancer ([facility] [address] C61  Hypercholesterolemia E78.00  Angina, class I ([facility] [address] I20.9  Left bundle branch block I44.7  Coronary artery disease I25.10  Hypertension I11.9  Dyslipidemia E78.5  Carotid artery disease ([facility]) I77.9  Aortic valve stenosis I35.0  Anemia D64.9  
 NSTEMI (non-ST elevated myocardial infarction) ([facility]) I21.4  Congestive heart disease ([facility]) I50.9  CHF (congestive heart failure) ([facility]) I50.9  HCAP (healthcare-associated pneumonia) J18.9  Chronic bilateral pleural effusions J90  
 Bilateral pleural effusion J90  
 Atrial fibrillation with rapid ventricular response ([facility]) I48.91 Past Medical History:  
Diagnosis Date  Asthma  Cardiac cath 04/28/2011 oLM 30%. pLAD 30%. oD1 50%. CX 90% (3 x 18 [place] DEMAR). dRCA 90%. RPLB subtotal.  RPDA 100%.  Cardiac echocardiogram 01/21/2014 EF 55-60%. Mod LVH. Gr 1 DDfx. Mild AS (mean grad 13). Mild AI. Unchanged from study of 11/30/11.  Cardiac nuclear imaging test, mod risk 01/22/2016 Intermediate risk. Medium-sized inferior, inferoseptal infarction. No ischemia. EF 54%. Nondiagnostic EKG on pharm stress test.  
 Carotid duplex 03/02/2016 Mod 50-69% bilateral ICA stenosis. Probable significant RECA stenosis. >50% stenosis of left subclavian. No significant change from study of 1/8/15.  Coronary artery disease Status post PCI with drug-eluting stent, [place] 3 x 18 mm in the proximal circumflex.  Heart failure ([facility] [address]  Hx of carotid artery disease ([facility] [address]  Hypercholesterolemia  Hypertension  Prostate cancer ([facility] [address] Labs:   
Lab Results Component Value Date/Time Sodium 144 04/23/2019 05:55 AM  
 Potassium 4.0 04/23/2019 05:55 AM  
 Chloride 104 04/23/2019 05:55 AM  
 CO2 27 04/23/2019 05:55 AM  
 Anion gap 13 04/23/2019 05:55 AM  
 Glucose 185 (H) 04/23/2019 05:55 AM  
 BUN 93 (H) 04/23/2019 05:55 AM  
 Creatinine 2.27 (H) 04/23/2019 05:55 AM  
 Calcium 9.2 04/23/2019 05:55 AM  
 Magnesium 2.0 04/18/2019 05:48 AM  
 Phosphorus 5.9 (H) 04/23/2019 05:55 AM  
 Albumin 2.8 (L) 04/23/2019 05:55 AM  
 
Anthropometrics: BMI (calculated): 24 Last 3 Recorded Weights in this Encounter 04/21/19 1830 04/22/19 8153 04/23/19 0004 Weight: 66.7 kg (147 lb) 66 kg (145 lb 6.4 oz) 67.4 kg (148 lb 8 oz) Ht Readings from Last 1 Encounters:  
04/10/19 5' 6\" (1.676 m) Weight Metrics 4/23/2019 4/9/2019 4/2/2019 3/12/2019 3/7/2019 3/1/2019 2/23/2019 Weight 148 lb 8 oz 141 lb 6.4 oz 147 lb 4.3 oz 149 lb 0.5 oz - 161 lb 163 lb 2.3 oz  
BMI 23.97 kg/m2 22.15 kg/m2 23.07 kg/m2 - 23.34 kg/m2 25.22 kg/m2 25.55 kg/m2 Patient Vitals for the past 100 hrs: 
 % Diet Eaten 04/23/19 0852 50 % 04/22/19 1840 50 % 04/22/19 1455 25 % 04/21/19 1652 50 % 04/21/19 1253 0 % 04/21/19 0840 10 % 04/20/19 1241 0 % 04/19/19 0851 5 % UBW: 160-165 lb x 9 months ago per patient and daughter 
 
[x] Weight Loss PTA [] Weight Gain 
[] Weight Stable Estimated Nutrition Needs: [x] MSJ  [] Other: 
Calories: 0990-8299 kcal Based on:   [x] Actual BW   
Protein:   77-90 g Based on:   [x] Actual BW Fluid:       1280-9496 ml Based on:   [x] Actual BW  
 
 [x] No Cultural, Jain or ethnic dietary need identified. [] Cultural, Jain and ethnic food preferences identified and addressed Wt Status:  [x] Normal (18.6 - 24.9) [] Underweight (<18.5) [] Overweight (25 - 29.9) [] Mild Obesity (30 - 34.9)  [] Moderate Obesity (35 - 39.9) [] Morbid Obesity (40+) Nutrition Problems Identified:  
[x] Suboptimal PO intake  
[] Food Allergies [x] Difficulty chewing/swallowing/poor dentition  
[] Constipation/Diarrhea  
[] Nausea/Vomiting  
[] None 
[] Other:  
 
Plan:  
[x] Therapeutic Diet (liberalized for now) []  Obtained/adjusted food preferences/tolerances and/or snacks options [x]  Continue supplements added  
[x] SLP will be following for feeding techniques []  HS snack added  
[x]  Modify diet texture  
[]  Modify diet for food allergies []  Assist with menu selection  
[x]  Monitor PO intake on meal rounds  
[x]  Continue inpatient monitoring and intervention  
[]  Participated in discharge planning/Interdisciplinary rounds/Team meetings  
[]  Other:  
 
Education Needs: 
 [x] Not appropriate for teaching at this time  
 [] Identified and addressed Nutrition Monitoring and Evaluation: 
[x] Continue ongoing monitoring and intervention 
[] Other Anibal Longo

## 2019-04-23 NOTE — PROCEDURES
Vascular & Interventional Radiology Brief Procedure Note Interventional Radiologist: Jose Manuel Marion MD 
Pre-operative Diagnosis:  Recurrent bilateral pleural effusions Post-operative Diagnosis: Same as pre-op dx Procedure(s) Performed:  Left PleurX tunneled pleural drain Assistant:  none Anesthesia:  Local   
 
Findings:  Removed 1500cc fluid Complications: None Estimated Blood Loss:  minimal 
 
Tubes and Drains: PleurX Specimens: None Condition: Good Plan: recommend daily drainage for 5 days to keep track dry for better healing, then may decrease frequency Plan Right drain tomorrow Jose Manuel Marion MD, MD 
[facility] Vascular & Interventional Radiology 4/23/2019

## 2019-04-24 NOTE — PROGRESS NOTES
Respiratory Therapy Assessment Care Plan Patient: 
Dustin Mckeon 80 y.o. male 4/24/2019 5:03 PM 
 
Chronic bilateral pleural effusions [J90] HCAP (healthcare-associated pneumonia) [J18.9] CHF (congestive heart failure) ([address] [I50.9] Bilateral pleural effusion [J90] HCAP (healthcare-associated pneumonia) [J18.9] CHF (congestive heart failure) ([address] [I50.9] Chest X-RAY:  
Results from Hospital Encounter encounter on 04/10/19 XR CHEST PORT Impression IMPRESSION: 
 
Improved appearance of the left base status post placement of a pleural drain. Other findings are stable allowing for differences in technique XR CHEST PORT Impression IMPRESSION: 
 
There appear to be small bilateral pleural effusions. Size may be underestimated 
by portable technique of the patient is not upright. Effusions appear no larger 
than on the prior study Persistent scattered bilateral airspace densities which somewhat appear 
improved from the prior study XR CHEST PORT Impression IMPRESSION: 
 
 
1. Bilateral pleural effusions similar to prior with patchy bilateral alveolar 
opacities in keeping with underlying pulmonary edema and/or pneumonia. Vital Signs:   Patient Vitals for the past 12 hrs: 
 Temp Pulse Resp BP SpO2  
04/24/19 0600  81 15 113/54 100 % 04/24/19 0700  74 17 99/59 98 % 04/24/19 0800 98 °F (36.7 °C) 83 15 123/50 98 % 04/24/19 0911     99 % 04/24/19 1000  84 18 101/45 100 % 04/24/19 1100  78 18 100/56 100 % 04/24/19 1200 98 °F (36.7 °C) 71 15 106/43 100 % 04/24/19 1224     100 % 04/24/19 1300  72 15 90/48 100 % 04/24/19 1600 97.5 °F (36.4 °C)      
04/24/19 1624     100 % Indications for treatment: Hypoxia, increased WOB and SOB Plan of care: O2 therapy, VibraPEP to stimulate cough and help mobilize secretions Goal: Wean O2 down, clear lungs

## 2019-04-24 NOTE — PROGRESS NOTES
0700:  Report received from Paulina Fonseca, [facility]. Pt is resting in bed on BIPAP, pt incontinent of stool, dual skin check done. 0800: Pt taken to IR. Pt placed on NC at 6L.  
 
0900: Pt returned from IR, procedure not done d/t pt taking plavix/aspirin. Pt VSS on 4 L NC.  
 
1000: Pt working with PT.  
 
1100: Discussed with cardiology,  They want pt to continue taking plavix and aspirin due to his recent stent placement. 1300: Pt ate vanilla pudding, did not want anything else. VSS.  
 
1600: Pt incontinent of stool, incontinence care performed, pt tolerated well, VSS.  
 
1800:  Pt ate vanilla pudding for dinner, did not want anything else. VSS.   
 
1900: Bedside and Verbal shift change report given to Paulina Fonseca RN (oncoming nurse) by Dixie Reed 
 (offgoing nurse). Report included the following information SBAR, Kardex, ED Summary, MAR and Cardiac Rhythm A-fib .

## 2019-04-24 NOTE — PROGRESS NOTES
RENAL PROGRESS NOTE Keshawn Smith Assessment/Plan: · NEL in a setting of HAP/a.fib with rvr/systolic chf. Scr is down slightly, uo is marginal. Continue to monitor. Volume status is difficult to assess. Wouldn't attempt to diurese at this time given suboptimal oral intake and borderline hypotension. · Hypernatremia. Encourage oral intake of free water. · Systolic chf.  
· A.fib with rvr. Better controlled, card on the case. Continue midodrin for bp support. · HAP. Finished abx, s/p lt  Thoracocentesis/plerual catheter. · Debilitation/malnutrition. Subjective:Patient complaints off: Was transferred to icu yesterday with worsening resp status. Feels a little better today. No sob at rest, no cp/n/v. Appetite is better, thirsty. Patient Active Problem List  
Diagnosis Code  Asthma J45.909  Prostate cancer ([facility] [address] C61  Hypercholesterolemia E78.00  Angina, class I ([facility] [address] I20.9  Left bundle branch block I44.7  Coronary artery disease I25.10  Hypertension I11.9  Dyslipidemia E78.5  Carotid artery disease ([facility]) I77.9  Aortic valve stenosis I35.0  Anemia D64.9  
 NSTEMI (non-ST elevated myocardial infarction) ([facility]) I21.4  Congestive heart disease ([facility]) I50.9  CHF (congestive heart failure) ([facility]) I50.9  HCAP (healthcare-associated pneumonia) J18.9  Chronic bilateral pleural effusions J90  
 Bilateral pleural effusion J90  
 Atrial fibrillation with rapid ventricular response ([facility]) I48.91 Current Facility-Administered Medications Medication Dose Route Frequency Provider Last Rate Last Dose  insulin lispro (HUMALOG) injection   SubCUTAneous Q6H Dasia Pierce MD   4 Units at 04/24/19 1705  
 glucose chewable tablet 16 g  4 Tab Oral PRN Dasia Pierce MD      
  glucagon (GLUCAGEN) injection 1 mg  1 mg IntraMUSCular PRN Medina Gordon MD      
 dextrose (D50) infusion 12.5-25 g  25-50 mL IntraVENous PRN Medina Gordon MD      
 ipratropium (ATROVENT) 0.02 % nebulizer solution 0.5 mg  0.5 mg Nebulization Q6H PRN Dasia Wallace NP      
 methylPREDNISolone (PF) (SOLU-MEDROL) injection 20 mg  20 mg IntraVENous Q12H Maricarmen COREAS MD   20 mg at 04/24/19 0920  
 guaiFENesin ER (MUCINEX) tablet 600 mg  600 mg Oral Q12H Maricarmen COREAS MD   600 mg at 04/24/19 5672  ALPRAZolam (XANAX) tablet 0.5 mg  0.5 mg Oral BID PRN Alesha Sandoval MD      
 dilTIAZem (CARDIZEM) IR tablet 60 mg  60 mg Oral Q6H Felix Alcantar MD   60 mg at 04/24/19 1705  albuterol-ipratropium (DUO-NEB) 2.5 MG-0.5 MG/3 ML  3 mL Nebulization Q2H PRN Alesha Sandoval MD   3 mL at 04/23/19 1951  potassium chloride SR (KLOR-CON 10) tablet 10 mEq  10 mEq Oral BID Jyoti Pastrana MD   10 mEq at 04/24/19 1705  digoxin (LANOXIN) tablet 0.125 mg  0.125 mg Oral Q MON, WED & Janna Rodriguez MD   0.125 mg at 04/22/19 2219  
 arformoterol (BROVANA) neb solution 15 mcg  15 mcg Nebulization BID RT Viridiana Miranda MD   15 mcg at 04/24/19 2285  budesonide (PULMICORT) 500 mcg/2 ml nebulizer suspension  500 mcg Nebulization BID RT Viridiana Miranda MD   500 mcg at 04/24/19 7375  Lactobacillus Acidoph & Bulgar [facility]) tablet 2 Tab  2 Tab Oral BID Viridiana Miranda MD   2 Tab at 04/24/19 1705  magnesium hydroxide (MILK OF MAGNESIA) 400 mg/5 mL oral suspension 30 mL  30 mL Oral DAILY PRN Viridiana Miranda MD      
 pneumococcal 23-valent (PNEUMOVAX 23) injection 0.5 mL  0.5 mL IntraMUSCular PRIOR TO DISCHARGE Viridiana Miranda MD      
 heparin (porcine) injection 5,000 Units  5,000 Units SubCUTAneous Q8H Alesha Sandoval MD   5,000 Units at 04/24/19 1625  
 metoprolol (LOPRESSOR) injection 2.5 mg  2.5 mg IntraVENous Q4H PRN Fabi Kahn PA   2.5 mg at 04/19/19 0216  bisacodyl (DULCOLAX) suppository 10 mg  10 mg Rectal DAILY PRN Jose Holguin MD   10 mg at 04/12/19 0870  sodium chloride (NS) flush 5-10 mL  5-10 mL IntraVENous PRN Tanner Young MD   10 mL at 04/23/19 2132  aspirin chewable tablet 81 mg  81 mg Oral DAILY Tanner Young MD   81 mg at 04/24/19 1214  
 atorvastatin (LIPITOR) tablet 40 mg  40 mg Oral QHS Tanner Young MD   40 mg at 04/23/19 2125  clopidogrel (PLAVIX) tablet 75 mg  75 mg Oral DAILY Tanner Young MD   75 mg at 04/24/19 1214  famotidine (PEPCID) tablet 20 mg  20 mg Oral DAILY Tanner Young MD   20 mg at 04/24/19 0920  
 midodrine (PROAMITINE) tablet 10 mg  10 mg Oral TID WITH MEALS Tanner Young MD   10 mg at 04/24/19 1705  nitroglycerin (NITROSTAT) tablet 0.4 mg  0.4 mg SubLINGual Q5MIN PRN Tanner Young MD      
 tamsulosin (FLOMAX) capsule 0.4 mg  0.4 mg Oral DAILY Tanner Young MD   0.4 mg at 04/24/19 7912  therapeutic multivitamin (THERAGRAN) tablet 1 Tab  1 Tab Oral DAILY Tanner Young MD   1 Tab at 04/24/19 2559  acetaminophen (TYLENOL) tablet 650 mg  650 mg Oral Q4H PRN Tanner Young MD   650 mg at 04/19/19 1118  ondansetron (ZOFRAN) injection 4 mg  4 mg IntraVENous Q4H PRN Tanner Young MD   4 mg at 04/12/19 4302 Objective Vitals:  
 04/24/19 1224 04/24/19 1300 04/24/19 1600 04/24/19 1624 BP:  90/48 Pulse:  72 Resp:  15 Temp:   97.5 °F (36.4 °C) SpO2: 100% 100%  100% Weight:      
Height:      
 
 
 
Intake/Output Summary (Last 24 hours) at 4/24/2019 1843 Last data filed at 4/24/2019 0600 Gross per 24 hour Intake 240 ml Output 775 ml Net -535 ml Admission weight: Weight: 59.9 kg (132 lb) (04/10/19 1609) Last Weight Metrics: 
Weight Loss Metrics 4/24/2019 4/9/2019 4/2/2019 3/12/2019 3/7/2019 3/1/2019 2/23/2019 Today's Wt 140 lb 4.8 oz 141 lb 6.4 oz 147 lb 4.3 oz 149 lb 0.5 oz - 161 lb 163 lb 2.3 oz  
 BMI 22.65 kg/m2 22.15 kg/m2 23.07 kg/m2 - 23.34 kg/m2 25.22 kg/m2 25.55 kg/m2 Physical Assessment:  
 
General: NAD, alert and oriented. Frail, cachectic. Neck: No jvd. LUNGS: diminished air entry at the bases, bl exp wheezes. CVS EXM: S1, S2, irregular. Abdomen: soft, non tender. Lower Extremities:  1+ bl thigh edema. Lab CBC w/Diff Recent Labs  
  04/24/19 
0405 04/23/19 
1620 WBC 10.8 12.8 RBC 3.31* 3.47* HGB 8.5* 8.7* HCT 28.8* 30.3*  275 GRANS 86* 86* LYMPH 12* 9* EOS 0 0 Chemistry Recent Labs  
  04/24/19 
0405 04/23/19 
1000 04/23/19 
0555 * 231* 185* * 144 144  
K 3.6 4.1 4.0  
 105 104 CO2 30 27 27 BUN 94* 97* 93* CREA 2.12* 2.34* 2.27* CA 8.7 9.1 9.2 AGAP 10 12 13 BUCR 44* 41* 41* ALB 2.6*  --  2.8* PHOS 5.7*  --  5.9* No results found for: IRON, FE, TIBC, IBCT, PSAT, FERR Lab Results Component Value Date/Time Calcium 8.7 04/24/2019 04:05 AM  
 Phosphorus 5.7 (H) 04/24/2019 04:05 AM  
  
 
Iwona Aleman M.D. [facility] Phone [phone] Pager [phone]

## 2019-04-24 NOTE — PROGRESS NOTES
Progress Note Patient: Zi Lange               Sex: male          DOA: 4/10/2019 YOB: 1926      Age:  80 y.o.        LOS:  LOS: 14 days CHIEF COMPLAINT:  Other (pt on TCC and not improving per Daugher/wants evaluated.) Subjective:  
 
Pt had left PleurX catheter placed with 1.5L removed. Today he said he feels better, feels breathing improved. Objective:  
 
Visit Vitals /56 Pulse 78 Temp 98 °F (36.7 °C) Resp 18 Ht 5' 6\" (1.676 m) Wt 63.6 kg (140 lb 4.8 oz) SpO2 100% BMI 22.65 kg/m² Physical Exam: 
Ears: hearing is intact Eyes: Icterus is not present Lungs:  diminished breath sounds bilaterally, upper airway crackles Heart: regular rate and rhythm, S1, S2 normal, no murmur, click, rub or gallop Gastrointestinal: soft, non-tender. Bowel sounds normal. No masses,  no organomegaly Neurological:  New Focal Deficits are not present Psychiatric:  Mood is stable Lab/Data Reviewed: 
CMP:  
Lab Results Component Value Date/Time  (H) 04/24/2019 04:05 AM  
 K 3.6 04/24/2019 04:05 AM  
  04/24/2019 04:05 AM  
 CO2 30 04/24/2019 04:05 AM  
 AGAP 10 04/24/2019 04:05 AM  
  (H) 04/24/2019 04:05 AM  
 BUN 94 (H) 04/24/2019 04:05 AM  
 CREA 2.12 (H) 04/24/2019 04:05 AM  
 GFRAA 36 (L) 04/24/2019 04:05 AM  
 GFRNA 29 (L) 04/24/2019 04:05 AM  
 CA 8.7 04/24/2019 04:05 AM  
 MG 3.2 (H) 04/24/2019 04:05 AM  
 PHOS 5.7 (H) 04/24/2019 04:05 AM  
 ALB 2.6 (L) 04/24/2019 04:05 AM  
 
CBC:  
Lab Results Component Value Date/Time WBC 10.8 04/24/2019 04:05 AM  
 HGB 8.5 (L) 04/24/2019 04:05 AM  
 HCT 28.8 (L) 04/24/2019 04:05 AM  
  04/24/2019 04:05 AM  
 
 
Imaging Reviewed: 
Ir Insert Cath Pleural Indwell Result Date: 4/23/2019 Clinical: Bilateral pleural effusions Procedure: Procedure and risks discussed with the patient's daughter. Informed consent obtained. Maximal sterile barrier technique (cap, mask, sterile gown, sterile gloves, a large sterile sheet, hand hygiene and cutaneous antisepsis with 2% chlorhexidine) was followed. Sterile ultrasound technique was used with sterile gel and sterile probe cover. Preporcedure Antibiotic: 2 g Ancef IV A puncture site in the left lower chest was selected with ultrasound. Field prepped and draped. Lidocaine was used for local anesthesia. A small skin nick was made. Using direct ultrasound guidance an 18-gauge needle was placed through the skin nick and advanced into the pleural space using an oblique angle. Pleural fluid was aspirated. Guidewire was inserted and needle removed. A 2nd skin nick was made approximately 6-8 cm caudal and medial to the access site, the exit site. The Pleurx catheter was tunneled from the exit site to the access site with the cuff being placed in the midportion of the tunnel. Following serial over the wire dilatation of the access track, a peel-away catheter was placed over the wire. Dilator was removed and the Pleurx catheter placed through the peel-away into the pleural space. The peel-away catheter was removed. The drain appeared looped back on itself in the lung base possibly due to partial loculation of the pleural fluid. A stiff Glidewire was used to in an attempt to reposition the Pleurx tip into a more posterior position using fluoroscopic guidance. This was unsuccessful. The catheter was connected to suction and the pleural fluid was drained as much as patient could tolerate. A nylon suture was placed at the exit site and tied around the catheter. The access site was closed with buried Vicryl suture and Dermabond. 1500cc  fluid drained. GUIDANCE: ultrasound /fluoroscopy guidance was used to position (and confirm the position of) the needle / catheter. Image(s) saved in PACS: Ultrasound and fluoroscopy Fluoroscopy dose 14 mGy air kerma. Time out 2517 IMPRESSION: Ultrasound and fluoroscopic-guided placement of a tunneled  left pleural drainage catheter. 1500 cc of fluid removed. Plan for placement of right pleural drain tomorrow Xr Chest [facility] Result Date: 4/23/2019 CLINICAL: Pleural effusions status post placement of pleural drain. COMPARISON: April 23, 2019. A portable view of the chest from 1646 hours: Interval placement of a left pleural drain. Resolution of effusion on the left. There is still a right pleural effusion. Persistent bilateral airspace densities. No pneumothorax. IMPRESSION: Improved appearance of the left base status post placement of a pleural drain. Other findings are stable allowing for differences in technique Xr Chest [facility] Result Date: 4/23/2019 CLINICAL: Pleural effusions. COMPARISON: April 19, 2019. A portable view of the chest from 1206 hours: There is blunting of the costophrenic angles bilaterally. No pneumothorax. Persistent but improved patchy bilateral airspace densities and groundglass densities. Mediastinal silhouette stable. IMPRESSION: There appear to be small bilateral pleural effusions. Size may be underestimated by portable technique of the patient is not upright. Effusions appear no larger than on the prior study  Persistent scattered bilateral airspace densities which somewhat appear improved from the prior study [facility] Portable Result Date: 4/23/2019 Clinical: Bilateral pleural effusions. Assess size Portable ultrasound evaluation of the chest There are bilateral pleural effusions that appear large. Left it appears larger than right IMPRESSION: Large bilateral pleural effusions left greater than right Assessment:  
 
Principal Problem: 
  Chronic bilateral pleural effusions (4/10/2019) Active Problems: 
  CHF (congestive heart failure) ([address] (4/10/2019) HCAP (healthcare-associated pneumonia) (4/10/2019) Atrial fibrillation with rapid ventricular response ([facility] [address] (4/13/2019) PLAN: 
· B/l recurrent pleural effusions - 2/2 CHF. Thoracentesis x2. Appreciate [facility] assistance. · Left PleurX catheter placed on 4/23 with significant improvement in resp status · Right PleurX catheter planned for today but canceled due to plavix. Will d/w Cardiology if plavix could be held for PleurX. · [address] - d/w daughter about palliative care measures, she declines adamantly. · HCAP - completed 12 day course of treatment, d/c atbx · NEL - stable, likely multifactorial 2/2 atbx, diuretics. Appreciate Nephrology eval. Improving. · AF RVR - per Cardiology · CHF/CAD - per cardiology · Cont acceptable home medications for chronic conditions · DVT protocol I have personally reviewed all pertinent labs and films that have officially resulted over the last 24 hours. I have personally checked for all pending labs that are awaiting final results. Signed By: Sal Morin MD   
 April 24, 2019

## 2019-04-24 NOTE — MANAGEMENT PLAN
Discharge/Transition Planning Care Management following. Reviewed chart. Spoke with pt today and he is asking when he can go home. Pt had Pleurx placed on Left yesterday. Right deferred today due to Plavix. Pt extremely weak and was max assist x2 with OT today. Pt unable to work with PT today due to hypotension. Daughter had declined any talk of Palliative or Hospice care when speaking with physicians. Pleurx is a Palliative treatment for his recurrent pleural fluid accumulation. Pt with 26-30%EF. Plan has been SNF and pt did have auth with [facility] for [facility] This would need to be redone. Very concerned that pt not able to physically do enough PT/OT for his [employer] Medicare to [facility] a SNF rehab. Pt really appears to be on the Palliative and Hospice route. Charlie Ramirez RN BSN Outcomes Manager Pager # [phone]

## 2019-04-24 NOTE — PROGRESS NOTES
Problem: General Medical Care Plan Goal: *Vital signs within specified parameters Outcome: Progressing Towards Goal 
  
Problem: General Medical Care Plan Goal: *Absence of infection signs and symptoms Outcome: Progressing Towards Goal 
  
Problem: General Medical Care Plan Goal: *Optimal pain control at patient's stated goal 
Outcome: Progressing Towards Goal 
  
Problem: [address] Goal: *Skin integrity maintained Outcome: Progressing Towards Goal 
  
Problem: Impaired Skin Integrity/Pressure Injury Treatment Goal: *Improvement of Existing Pressure Injury Outcome: Progressing Towards Goal 
  
Problem: Nutrition Deficit Goal: *Optimize nutritional status Outcome: Progressing Towards Goal 
  
Problem: Falls - Risk of 
Goal: *Absence of Falls Description Document Elieser Byrd Fall Risk and appropriate interventions in the flowsheet.  
Outcome: Progressing Towards Goal

## 2019-04-24 NOTE — PROGRESS NOTES
Problem: Self Care Deficits Care Plan (Adult) Goal: *Acute Goals and Plan of Care (Insert Text) Description Occupational Therapy Goals Initiated 4/18/2019 within 7 day(s). Pt re-evaluated on 4/24/19 following transfer to ICU. Updated #4 & 5; decreased therapy frequency to 3x/week. 1.  Patient will perform grooming tasks at EOB with supervision/set-up. 2.  Patient will perform upper/lower body dressing with minimal assistance. 3.  Patient will perform functional task in standing for 8 minutes with supervision and < 3 rest breaks to increase activity tolerance for ADLs. 4.  Patient will perform toilet transfers with supervision/set-up. 5.  Patient will perform all aspects of toileting with supervision/set-up. 6.  Patient will participate in upper extremity therapeutic exercise/activities for 8 minutes to increase BUE strength for functional transfers and ADLs. 7.  Patient will utilize energy conservation techniques during functional activities with minimal verbal cues. UPDATED GOALSRuther Du MS OTR/L (4/24/19) 1. Patient will perform grooming tasks at EOB with supervision/set-up. 2.  Patient will perform upper/lower body dressing with minimal assistance. 3.  Patient will perform functional task in standing for 8 minutes with supervision and < 3 rest breaks to increase activity tolerance for ADLs. 4.  Patient will perform toilet transfers with minimal assistance. 5.  Patient will perform all aspects of toileting with minimal assistance. 6.  Patient will participate in upper extremity therapeutic exercise/activities for 8 minutes to increase BUE strength for functional transfers and ADLs. 7.  Patient will utilize energy conservation techniques during functional activities with minimal verbal cues. 4/24/2019 1151 by Regina French OT Outcome: Progressing Towards Goal 
 
OCCUPATIONAL THERAPY RE-EVALUATION Patient: Carmelo Guaman (24 y.o. male) Date: 4/24/2019 Primary Diagnosis: Chronic bilateral pleural effusions [J90] HCAP (healthcare-associated pneumonia) [J18.9] CHF (congestive heart failure) ([facility] [I50.9] Bilateral pleural effusion [J90] HCAP (healthcare-associated pneumonia) [J18.9] CHF (congestive heart failure) ([facility] [I50.9] Precautions:  Fall, Skin PLOF: Pt reports independence with ADLs. ASSESSMENT : 
Based on the objective data described below, the patient presents with impairments with regard to bed mobility, activity tolerance, BUE strength and independence in ADLs secondary to CHF and pneumonia. Pt supine on arrival; HR 80s-90s. Pt re-evaluated due to recent transfer to ICU. Adjusted functional goals & frequency of therapy as pt is significantly deconditioned. Min A & additional time for supine-->sit. Decreased BUE AROM/strength. Total A to don bilateral socks. Pt with fair-/poor activity tolerance at EOB; unable to re-assess functional sit to stand.  at EOB. Mod A to return supine (BLE management); max A x2 to scoot towards HOB. /45 at start of session; 92/60 at EOB; 94/42 supine; pt denies dizziness t/o session. Recommend continued therapy. Nursing aware of session. Patient will benefit from skilled intervention to address the above impairments. Patient's rehabilitation potential is considered to be Fair Factors which may influence rehabilitation potential include: ? None noted ? Mental ability/status ? Medical condition ? Home/family situation and support systems ? Safety awareness ? Pain tolerance/management ? Other: PLAN : 
Recommendations and Planned Interventions:  
?               Self Care Training                  ? Therapeutic Activities ? Functional Mobility Training   ? Cognitive Retraining 
? Therapeutic Exercises           ? Endurance Activities ?               Balance Training                    ? Neuromuscular Re-Education ? Visual/Perceptual Training     ? Home Safety Training 
? Patient Education                   ? Family Training/Education ? Other (comment): Frequency/Duration: Patient will be followed by occupational therapy 3 times a week to address goals. Discharge Recommendations: Aric Kim Further Equipment Recommendations for Discharge: bedside commode and N/A  
 
SUBJECTIVE:  
Patient stated I have pain all over.  OBJECTIVE DATA SUMMARY:  
Hospital course since last seen and reason for reevaluation: Pt is making very slow progress towards functional goals; limited by HR and O2 requirements. Pt re-evaluated due to recent transfer to ICU. Past Medical History:  
Diagnosis Date  Asthma  Cardiac cath 04/28/2011 oLM 30%. pLAD 30%. oD1 50%. CX 90% (3 x 18 [place] DEMAR). dRCA 90%. RPLB subtotal.  RPDA 100%.  Cardiac echocardiogram 01/21/2014 EF 55-60%. Mod LVH. Gr 1 DDfx. Mild AS (mean grad 13). Mild AI. Unchanged from study of 11/30/11.  Cardiac nuclear imaging test, mod risk 01/22/2016 Intermediate risk. Medium-sized inferior, inferoseptal infarction. No ischemia. EF 54%. Nondiagnostic EKG on pharm stress test.  
 Carotid duplex 03/02/2016 Mod 50-69% bilateral ICA stenosis. Probable significant RECA stenosis. >50% stenosis of left subclavian. No significant change from study of 1/8/15.  Coronary artery disease Status post PCI with drug-eluting stent, [place] 3 x 18 mm in the proximal circumflex.  Heart failure ([address]  Hx of carotid artery disease ([address]  Hypercholesterolemia  Hypertension  Prostate cancer ([address] Past Surgical History:  
Procedure Laterality Date  CARDIAC SURG PROCEDURE UNLIST  HX CORONARY STENT PLACEMENT  4/28/11 PCI with drug-eluting stent, [place] 3 x 18 mm in the proximal circumflex (post dialated with 3.25 mm noncompliant balloon at high pressure).  IR INSERT CATH PLEURAL INDWELL  4/23/2019 Barriers to Learning/Limitations: yes;  sensory deficits-vision/hearing/speech Compensate with: visual, verbal, tactile, kinesthetic cues/model Home Situation:  
Home Situation Home Environment: Long term care # Steps to Enter: 0 One/Two Story Residence: One story Living Alone: No 
Support Systems: Child(marisela) Patient Expects to be Discharged to[de-identified] Rehabilitation facility Current DME Used/Available at Home: [employer], [employer] ? Right hand dominant   ? Left hand dominant Cognitive/Behavioral Status: 
Neurologic State: Alert Orientation Level: Oriented to person;Oriented to situation;Disoriented to situation;Disoriented to time Cognition: Follows commands Safety/Judgement: Awareness of environment; Fall prevention; Insight into deficits Skin: Bruising noted to BUEs Edema: none noted (BUEs) Vision/Perceptual:   
Acuity: Impaired far vision Coordination: BUE Coordination: Generally decreased, functional(BUEs) Fine Motor Skills-Upper: Right Intact; Left Intact Gross Motor Skills-Upper: Right Intact; Left Intact Balance: 
Sitting: Impaired Sitting - Static: Good (unsupported); Fair (occasional) Sitting - Dynamic: Fair (occasional) Standing: (n/t secondary to low BP & increased HR) Strength: BUE Strength: Generally decreased, functional(3+/5) Tone & Sensation: BUE Tone: (n/t) Sensation: (n/t) Range of Motion: BUE 
AROM: Generally decreased, functional(BUE ~3/4 shoulder flex) Functional Mobility and Transfers for ADLs: 
Bed Mobility: 
Rolling: Minimum assistance; Additional time Supine to Sit: Minimum assistance; Additional time Sit to Supine: Moderate assistance(BLE management) Scooting: Minimum assistance Transfers: 
Sit to Stand: (n/t) ADL Assessment: Feeding: Setup;Stand-by assistance Oral Facial Hygiene/Grooming: Setup;Minimum assistance Bathing: Maximum assistance Upper Body Dressing: Minimum assistance Lower Body Dressing: Maximum assistance Toileting: Total assistance(condom catheter) Cognitive Retraining Safety/Judgement: Awareness of environment; Fall prevention; Insight into deficits Therapeutic Activity: 
Functional reaching at EOB with fair- balance and min A due to weakness in prep for item retrieval for ADLs. Scooting towards [facility] in [facility] for [facility] transfer. Pain: 
Pain level pre-treatment: 0/10 Pain level post-treatment: 0/10 **Pt reporting \"pain all over;\" but reports 0/10; Fox, RN aware** Activity Tolerance: Fair-/Poor+ Please refer to the flowsheet for vital signs taken during this treatment. After treatment:  
? Patient left in no apparent distress sitting up in chair ? Patient left in no apparent distress in bed 
? Call bell left within reach ? Nursing notified ? Caregiver present ? Bed alarm activated COMMUNICATION/EDUCATION:  
? Role of Occupational Therapy in the acute care setting 
? Home safety education was provided and the patient/caregiver indicated understanding. ? Patient/family have participated as able in goal setting and plan of care. ? Patient/family agree to work toward stated goals and plan of care. ? Patient understands intent and goals of therapy, but is neutral about his/her participation. ? Patient is unable to participate in goal setting and plan of care. Thank you for this referral. 
Hilda Infante MS OTR/L Time Calculation: 20 mins

## 2019-04-24 NOTE — PROGRESS NOTES
VASCULAR & INTERVENTIONAL RADIOLOGY PROGRESS NOTE Patient presents for right PleurX drain catheter, had left PleurX yesterday. I was made aware during proprocedure timeout that patient is on Plavix and ASA, last dose yesterday. Dr. Chelsy Welch was not aware of patient being on Plavix during yesterdays procedure. Discussed with Dr. Giovana Nielsen, critical care. Patient doing clinically better. He agrees with the plan to hold right PleurX and checking with medical service if Plavix can be held for Pleurx in near future. Breana Pinto MD, MD 
Vascular & Interventional Radiology [facility] 4/24/2019

## 2019-04-24 NOTE — PROGRESS NOTES
Patient Summary Received pt from 3S - Positioned comfortably -Pts lungs very wet & suctioned some tan secretions orally - Placed on BIPAP - 1400 pt to cath lab for insertion of pleural tube of rt side with 1500cc drainage obtained - pleural tube remains in place with dressing D&I pt sounds much better now & is breathing easier- BIPAP remains on

## 2019-04-24 NOTE — PROGRESS NOTES
1452: PT tx session held. Patient BP low (91/54). Will f/u with patient when medically appropriate. Luma Clancy PT, DPT Office extension: [phone] Pager #: [phone]

## 2019-04-24 NOTE — PROGRESS NOTES
Assumed care from [facility]. Patient is awake and alert, denies pain or discomfort. Patient is currently on BIPAP, tolerating well. 
0000> No changes noted. Will continue to monitor. 0524> Dr. Zay Pritchett informed of plan for patient to have R tunneled pleural drain insertion in AM. MD agreed to hold 6 am Heparin. Bedside and Verbal shift change report given to Keven Long RN and [facility], RN (oncoming nurse) by Jessica Nathan RN (offgoing nurse). Report included the following information SBAR, Kardex, Intake/Output, MAR, Recent Results and Cardiac Rhythm AFIB.

## 2019-04-24 NOTE — PROGRESS NOTES
[employer] [facility] Pulmonary, Critical Care, and Sleep Medicine Name: Patricia Lobo MRN: [id number] : 1926 Hospital: [address] [place] [address] Date: 2019 IMPRESSION:  
· Acute on chronic respiratory failure- Hypoxic secondary to bilateral interstitial edema and DANA pneumonia- HAP on presentation. Has difficulty clearing airway. · Bilateral moderate to large Pleural effusions secondary to chronic systolic heart failure. S/p pleurx on left · Cardiomyopathy- ischemic, atrial fibrillation,valvular-  with EF 26% · Pulmonary HTN 
· NEL superimposed on CKI · Cachexia- cardiac Patient Active Problem List  
Diagnosis Code  Asthma J45.909  Prostate cancer ([facility] C61  Hypercholesterolemia E78.00  Angina, class I ([facility] I20.9  Left bundle branch block I44.7  Coronary artery disease I25.10  Hypertension I11.9  Dyslipidemia E78.5  Carotid artery disease ([facility]) I77.9  Aortic valve stenosis I35.0  Anemia D64.9  
 NSTEMI (non-ST elevated myocardial infarction) ([facility]) I21.4  Congestive heart disease ([facility]) I50.9  CHF (congestive heart failure) ([facility]) I50.9  HCAP (healthcare-associated pneumonia) J18.9  Chronic bilateral pleural effusions J90  
 Bilateral pleural effusion J90  
 Atrial fibrillation with rapid ventricular response ([facility]) I48.91  
  
PLAN:  
· Will titrate Oxygen to maintain SaO2 > 92% · Continue  stepped up airway clearance measures and addition of PAP support- BiPAP, NIV 
· No role for repeated thoracocentesis or pleurodesis. · Pleurx as palliation for management of symptoms of SOB. ( last measure). Has left side placed. Plan for right side today. Daily drainage next few days to assess rate of reaccumalation then recommend  pleurx care instructions · Will continue to optimize bronchodilators · Needs continued efforts at optimizing cardiac function- difficult balance between diuresis, renal function, inotropic support defer to cardiology · Strongly recommend continued  discussion for goals of care- end stage refractory cardiac issues Subjective/Interval History:  
 
19 Had Pleurx placed  on left side with drainage of 1500 ml fluid Transient hypotension yestrerday On BiPAP overnight Tolerated and this am more comfortable Sounds congested- coughs but not able to clear No hemoptysis Denies chest pain ROS:Pertinent items are noted in HPI. Objective: 
Vital Signs:   
Visit Vitals BP 99/59 Pulse 74 Temp 98.7 °F (37.1 °C) Resp 17 Ht 5' 6\" (1.676 m) Wt 63.6 kg (140 lb 4.8 oz) SpO2 98% BMI 22.65 kg/m² O2 Device: BIPAP  
O2 Flow Rate (L/min): 5 l/min Temp (24hrs), Av.1 °F (36.7 °C), Min:96.6 °F (35.9 °C), Max:99.2 °F (37.3 °C) Intake/Output:  
Last shift:      No intake/output data recorded. Last 3 shifts:  1901 -  0700 In: 360 [P.O.:360] Out: 2275 [Urine:775] Intake/Output Summary (Last 24 hours) at 2019 0757 Last data filed at 2019 0600 Gross per 24 hour Intake 360 ml Output 2275 ml Net -1915 ml Physical Exam:  
 General: moderately ill and cachectic HEENT: using accessory muscles intermittently Neck: No abnormally enlarged lymph nodes. Chest: poor excursion, left chest wall catheter Lungs: crackles, decreased air exchange bibasilar and rhonchi Heart: irregular, systolic murmur + Abdomen: abdomen is soft without significant tenderness, masses, organomegaly or guarding Extremity: Trace edema Neuro: alert Skin: Skin color, texture, turgor normal. No rashes or lesions DATA: 
Labs: 
Recent Labs  
  19 
0405 19 
1620 WBC 10.8 12.8 HGB 8.5* 8.7* HCT 28.8* 30.3*  275 Recent Labs  
  19 
0405 19 
1000 19 
0555 * 144 144  
K 3.6 4.1 4.0  
 105 104 CO2 30 27 27 * 231* 185* BUN 94* 97* 93* CREA 2.12* 2.34* 2.27* CA 8.7 9.1 9.2 MG 3.2* 3.0*  --   
 PHOS 5.7*  --  5.9* ALB 2.6*  --  2.8* No results for input(s): PH, PCO2, PO2, HCO3, FIO2 in the last 72 hours. Echo: 
03/13/19 ECHO ADULT FOLLOW-UP OR LIMITED 03/15/2019 3/15/2019 Narrative · Left ventricular severely decreased systolic function. Estimated left  
ventricular ejection fraction is 26 - 30%. Visually measured ejection  
fraction. Left ventricular mild concentric hypertrophy. Abnormal left  
ventricular wall motion as described on the wall scoring diagram below. · Severe tricuspid valve regurgitation is present. Moderate to severe  
pulmonary hypertension is present. · Moderate mitral valve regurgitation. Signed by: Inge Choudhury MD  
 
 
Imaging: 
[x]I have personally reviewed the patients radiographs XR Results (most recent): 
Results from Hospital Encounter encounter on 04/10/19 XR CHEST PORT Narrative CLINICAL: Pleural effusions status post placement of pleural drain. COMPARISON: April 23, 2019. A portable view of the chest from 1646 hours: 
 
Interval placement of a left pleural drain. Resolution of effusion on the left. There is still a right pleural effusion. Persistent bilateral airspace 
densities. No pneumothorax. Impression IMPRESSION: 
 
Improved appearance of the left base status post placement of a pleural drain. Other findings are stable allowing for differences in technique High complexity decision making was performed during the evaluation of this patient at high risk for decompensation with multiple organ involvement 
  
Above mentioned total time spent on reviewing the case/medical record/data/notes/EMR/patient examination/documentation/coordinating care with nurse/consultants, exclusive of procedures with complex decision making performed and > 50% time spent in face to face evaluation.  
 
Uziel Vitale MD

## 2019-04-24 NOTE — PROGRESS NOTES
[facility] - Progress Note Admit Date: 4/10/2019 I saw, evaluated, interviewed and examined the patient personally. I agree with the findings and plan of care as documented below with PAVICKY note Shortness of breath is slightly better. Plan was to insert catheter in pleural space however this was not done because of patient being on Plavix. Patient had similar catheter yesterday on the left side without any problem. Patient had drug-eluting stent for heavily calcified LAD last month which will require Plavix based on guideline and recommendation at least for a year unless patient has to undergo emergent surgery or procedure or active bleeding. This was come indicated with pulmonary physician Dr. Oliverio Tolliver who agree that holding Plavix is not recommended at this time. Continue rest of the cardiac medication for now Lara Cain MD 
 
 
 
Assessment: - Acute hypoxic respiratory failure - HCAP: Multifocal PNA on CXR 04/10/19, multifocal consolidation within the left upper and bilateral lower lobes, likely recurrently multifocal PNA 
- Atrial fibrillation with RVR: CHADS2-Vasc score 5 (CHF, HTN, age, Vascular disease) and sinus tachycardia - Bilateral moderate to large pleural effusions CT chest 04/10-- s/p multiple thoracenteses, PleurX catheter (left) 04/23 
-CAD s/p LAD PCI with DEMAR 3/11/2019 with previous PCI to LCx 2011. Cath 3/11/2019 (Occluded mid RCA which appears to be chronic. There is faint collateral from the left coronary system, Left main artery with ostial 30-40%, LAD mid focal severely calcified tortuous 99% stenosis with CARLENE II flow, Circumflex coronary artery with diffuse mild 20-30% stenosis throughout). Discharged on ASA, plavix, statin, BB. 
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   
-Echo 03/14/19 with EF 26-30%  
-NEL, Creatinine 1.62 04/12 
-Peripheral vascular disease. -Hx Hypertension 
-Dyslipidemia 
-Asthma, former smoker. -Advanced age Plan: - Plans for upcoming PleurX catheter noted. Cardiology has been asked if it is ok to hold Plavix, unfortunately patient recently had a drug eluting stent placed March 11th, and Plavix is recommended to be continued for at least one year to prevent stent thrombosis. - No ACEi/ARB/ARNI given renal function and borderline hypotension Subjective: No new complaints. Objective:  
  
Patient Vitals for the past 8 hrs: 
 Temp Pulse Resp BP SpO2  
04/24/19 1224     100 % 04/24/19 1200 98 °F (36.7 °C)      
04/24/19 1100  78 18 100/56 100 % 04/24/19 1000  84 18 101/45 100 % 04/24/19 0911     99 % 04/24/19 0800 98 °F (36.7 °C) 83 15 123/50 98 % 04/24/19 0700  74 17 99/59 98 % 04/24/19 0600  81 15 113/54 100 % Patient Vitals for the past 96 hrs: 
 Weight 04/24/19 0145 140 lb 4.8 oz (63.6 kg) 04/23/19 0004 148 lb 8 oz (67.4 kg) 04/22/19 0612 145 lb 6.4 oz (66 kg) 04/21/19 0606 147 lb (66.7 kg) Intake/Output Summary (Last 24 hours) at 4/24/2019 1328 Last data filed at 4/24/2019 0600 Gross per 24 hour Intake 240 ml Output 2275 ml Net -2035 ml Physical Exam: 
General:  alert, cooperative, no distress Neck:  nontender, no JVD Lungs:  +rhonchi throughout Heart:  irregularly irregular rhythm Abdomen:  abdomen is soft without significant tenderness, masses, organomegaly or guarding Extremities:  extremities normal, atraumatic, no cyanosis or edema Data Review:  
 
Labs: Results:  
   
Chemistry Recent Labs  
  04/24/19 
0405 04/23/19 
1000 04/23/19 
0555 * 231* 185* * 144 144  
K 3.6 4.1 4.0  
 105 104 CO2 30 27 27 BUN 94* 97* 93* CREA 2.12* 2.34* 2.27* CA 8.7 9.1 9.2 MG 3.2* 3.0*  --   
PHOS 5.7*  --  5.9* AGAP 10 12 13 BUCR 44* 41* 41* ALB 2.6*  --  2.8* CBC w/Diff Recent Labs  
  04/24/19 
0405 04/23/19 
1620 WBC 10.8 12.8 RBC 3.31* 3.47* HGB 8.5* 8.7* HCT 28.8* 30.3*  275 GRANS 86* 86* LYMPH 12* 9* EOS 0 0 Cardiac Enzymes No results found for: CPK, CK, CKMMB, CKMB, RCK3, CKMBT, CKNDX, CKND1, CHRISTINE, TROPT, TROIQ, NGHIA, TROPT, TNIPOC, BNP, BNPP Coagulation No results for input(s): PTP, INR, APTT in the last 72 hours. No lab exists for component: INREXT Lipid Panel Lab Results Component Value Date/Time Cholesterol, total 87 03/08/2019 05:28 AM  
 HDL Cholesterol 44 03/08/2019 05:28 AM  
 LDL, calculated 30 03/08/2019 05:28 AM  
 VLDL, calculated 13 03/08/2019 05:28 AM  
 Triglyceride 65 03/08/2019 05:28 AM  
 CHOL/HDL Ratio 2.0 03/08/2019 05:28 AM  
  
BNP No results found for: BNP, BNPP, XBNPT Liver Enzymes Recent Labs  
  04/24/19 
0405 ALB 2.6* Digoxin Thyroid Studies Lab Results Component Value Date/Time TSH 1.22 03/07/2019 05:29 AM  
    
 
Signed By: Naina Strange PA-C April 24, 2019

## 2019-04-24 NOTE — PROGRESS NOTES
2010,received pt on BIPAP 10/6 50% resting with eyes open watching TV,no distress noted at this time O2 sats 100,inline resp neb given will monitor though the shift.

## 2019-04-24 NOTE — DISCHARGE SUMMARY
[facility] 
  
Daily progress Note 
  
Patient: Eliza Mortensen MRN: [id number]  CSN: [id number] YOB: 1926  Age: 80 y.o. Sex: male DOA: 4/2/2019 LOS:  LOS: 8 days      
             
Subjective:  
  
CC: follow cxr Seen at bedside, NAD, he is up in w/c, he is verbal, alert and oriented. cxr done 4/8 which showed Increasing posterior left lower lobe consolidation, atelectasis or pneumonia with associated small left pleural effusion. Suggestion of developing opacity in the left upper lobe may represent second focus of consolidation. Increasing posterior left lower lobe consolidation, atelectasis or pneumonia with associated small left pleural effusion. Suggestion of developing opacity in the left upper lobe may represent second focus of consolidation. Patient with elevated BNP, his lasix was adjusted. Patient was started on Augmentin times 7 day for PNA. Patient with recent hospitalization and was followed by Pulm and Cardiologist with aggressive pul hygiene , multiple thoracentesis. Noted throughout previous hospital stay, poor prognosis long term. Today he was not in any distress, calm, verbal and answered question approp, will closely monitor patient.  
  
PAST MEDICAL HX:  Coronary Artery diease, HTN,  HLD, CKD, Aortic valve stenosis, recent non ST elevation, Congestion.  
  
PAST SURGICAL HX: Cardiac Cath and stent placement 
  
Social hx: lives with daughter, he is . Former smoker 
  
ALLERGIES: NKDA 
  
ROS: no fever or chill, no NVD, fever or chills. No CP or HA.  No memory loss or or HA, increase SOB with exertion 
  
  
  
  
Objective:  
  
Visit Vitals BP 93/66 Pulse 100 Temp 97 °F (36.1 °C) Resp 17 Ht 5' 7\" (1.702 m) Wt 64.1 kg (141 lb 6.4 oz) SpO2 96% BMI 22.15 kg/m²  
  
  
Physical Exam: 
General appearance: alert, cooperative, no distress, appears stated age HEENT: negative Neck: supple, symmetrical, trachea midline, no adenopathy, thyroid: not enlarged, symmetric, no tenderness/mass/nodules, no carotid bruit and no JVD Lungs: coarse lung sounds throughout Heart: regular rate and rhythm, S1, S2 normal, no murmur, click, rub or gallop Abdomen: soft, non-tender. Bowel sounds normal. No masses,  no organomegaly Pulses: 2+ and symmetric Skin: Skin color, texture, turgor normal. No rashes or lesions 
  
Intake and Output: 
Current Shift:  No intake/output data recorded. Last three shifts:  No intake/output data recorded. 
  
Recent Results No results found for this or any previous visit (from the past 24 hour(s)).   
  
Current Facility-Administered Medications:  
  potassium chloride SR (KLOR-CON 10) tablet 20 mEq, 20 mEq, Oral, DAILY, Lucía Kraus NP 
  furosemide (LASIX) tablet 20 mg, 20 mg, Oral, BID, Surya COREAS NP 
  [START ON 4/13/2019] furosemide (LASIX) tablet 20 mg, 20 mg, Oral, DAILY, Lucía Kraus NP 
  pneumococcal 23-valent (PNEUMOVAX 23) injection 0.5 mL, 0.5 mL, IntraMUSCular, PRIOR TO DISCHARGE, Getachew Watson MD 
  bisacodyl (DULCOLAX) suppository 10 mg, 10 mg, Rectal, DAILY PRN, Kiera Velez MD 
  magnesium hydroxide (MILK OF MAGNESIA) 400 mg/5 mL oral suspension 30 mL, 30 mL, Oral, DAILY PRN, Kiera Velez MD 
  sodium phosphate (FLEET'S) enema 1 Enema, 1 Enema, Rectal, DAILY PRN, Kiera Velez MD 
  amoxicillin-clavulanate (AUGMENTIN) 875-125 mg per tablet 1 Tab, 1 Tab, Oral, Q12H, Surya COREAS NP, 1 Tab at 04/10/19 0524   [facility] ANESTHESIA, , Other, PRN, Kiera Velez MD 
  [facility] EMERGENCY/STAT BOX, , Other, PRN, Kiera Velez MD 
  ipratropium (ATROVENT) 0.02 % nebulizer solution 0.5 mg, 0.5 mg, Nebulization, Q6H RT, Getachew Watson MD, 0.5 mg at 04/10/19 7211   aspirin chewable tablet 81 mg, 81 mg, Oral, DAILY, Getachew Watson MD, 81 mg at 04/10/19 9377   tamsulosin (FLOMAX) capsule 0.4 mg, 0.4 mg, Oral, DAILY, Getachew Watson MD, 0.4 mg at 04/10/19 0752   famotidine (PEPCID) tablet 20 mg, 20 mg, Oral, DAILY, Getachew Watson MD, 20 mg at 04/10/19 5996   midodrine (PROAMITINE) tablet 10 mg, 10 mg, Oral, TID WITH MEALS, Getachew Watson MD, 10 mg at 04/10/19 3886   therapeutic multivitamin (THERAGRAN) tablet 1 Tab, 1 Tab, Oral, DAILY, Romario Yeboah MD, 1 Tab at 04/10/19 5750   atorvastatin (LIPITOR) tablet 40 mg, 40 mg, Oral, QHS, Getachew Watson MD, 40 mg at 04/09/19 2100 
  nitroglycerin (NITROSTAT) tablet 0.4 mg, 0.4 mg, SubLINGual, PRN, Romario Yeboah MD 
  clopidogrel (PLAVIX) tablet 75 mg, 75 mg, Oral, DAILY, Romario Yeboah MD, 75 mg at 04/10/19 2282   albuterol-ipratropium (DUO-NEB) 2.5 MG-0.5 MG/3 ML, 3 mL, Nebulization, Q4H PRN, Romario Yeboah MD, 3 mL at 04/07/19 1417   [DISCONTINUED] arformoterol (BROVANA) neb solution 15 mcg, 15 mcg, Nebulization, BID RT, 15 mcg at 04/02/19 2120 **AND** budesonide (PULMICORT) 500 mcg/2 ml nebulizer suspension, 500 mcg, Nebulization, BID RT, Romario Yeboah MD, 500 mcg at 04/10/19 5829   arformoterol (BROVANA) neb solution 15 mcg, 15 mcg, Nebulization, BID RT, Romario Yeboah MD, 15 mcg at 04/10/19 0908 
  
     
Lab Results Component Value Date/Time  
  Glucose 106 (H) 04/08/2019 04:39 AM  
  Glucose 98 04/03/2019 06:34 AM  
  Glucose 155 (H) 03/28/2019 11:04 AM  
  Glucose 110 (H) 03/24/2019 05:09 AM  
  Glucose 128 (H) 03/20/2019 12:37 PM  
  
  
Assessment/Plan  
  
Acute hypoxia resp fx reported probable from HF: resolved.  Continue duo nebs + ics  
Patient was sent to ER for eval of worsening respiratory distress 
  
Thom Hong NP 
4/10/2019, 10:16 AM

## 2019-04-25 NOTE — PROGRESS NOTES
Assumed care from [facility]. Patient is awake and alert, denies any pain. Pt on 3 L 02 via NC, tolerating well.  
0000> Pt remains stable and is kept on 3 L O2 via NC 
0400> No changes 0730> Bedside and Verbal shift change report given to Lizette Velasquez RN and Dennys Medrano RN (oncoming nurse) by Omid Keith RN (offgoing nurse). Report included the following information SBAR, Kardex, Intake/Output, MAR, Recent Results and Alarm Parameters .

## 2019-04-25 NOTE — PROGRESS NOTES
RENAL PROGRESS NOTE [facility] Assessment/Plan: · NEL in a setting of HAP/a.fib with rvr/systolic chf. Scr is down slightly, uo is marginal. Continue to monitor. Volume status is difficult to assess. Wouldn't attempt to diurese at this time given suboptimal oral intake and borderline hypotension. · Hypernatremia. Encourage oral intake of free water, add gentle D5W. · Systolic chf.  
· A.fib with rvr. Better controlled, card on the case. Continue midodrin for bp support. · HAP. Finished abx, s/p lt  Thoracocentesis/plerual catheter. · Debilitation/malnutrition. Subjective:Patient complaints off: Feels a little better today. No sob at rest, no cp/n/v. Appetite is a little better. Thirsty but doesn't like thickened water. Patient Active Problem List  
Diagnosis Code  Asthma J45.909  Prostate cancer ([facility] C61  Hypercholesterolemia E78.00  Angina, class I ([facility] I20.9  Left bundle branch block I44.7  Coronary artery disease I25.10  Hypertension I11.9  Dyslipidemia E78.5  Carotid artery disease ([facility]) I77.9  Aortic valve stenosis I35.0  Anemia D64.9  
 NSTEMI (non-ST elevated myocardial infarction) ([facility]) I21.4  Congestive heart disease ([facility]) I50.9  CHF (congestive heart failure) ([facility]) I50.9  HCAP (healthcare-associated pneumonia) J18.9  Chronic bilateral pleural effusions J90  
 Bilateral pleural effusion J90  
 Atrial fibrillation with rapid ventricular response ([facility]) I48.91 Current Facility-Administered Medications Medication Dose Route Frequency Provider Last Rate Last Dose  
 guaiFENesin (ROBITUSSIN) 100 mg/5 mL oral liquid 400 mg  400 mg Oral TID Juan Alberto Aly MD      
 glucose chewable tablet 16 g  4 Tab Oral PRN Gregory Mathew MD      
  glucagon (GLUCAGEN) injection 1 mg  1 mg IntraMUSCular PRN Ml Lacy MD      
 dextrose (D50) infusion 12.5-25 g  25-50 mL IntraVENous PRN Ml Lacy MD      
 insulin lispro (HUMALOG) injection   SubCUTAneous AC&HS Ml Lacy MD   3 Units at 04/25/19 1016  ipratropium (ATROVENT) 0.02 % nebulizer solution 0.5 mg  0.5 mg Nebulization Q6H PRN Oliver COREAS NP      
 methylPREDNISolone (PF) (SOLU-MEDROL) injection 20 mg  20 mg IntraVENous Q12H Katherin COREAS MD   20 mg at 04/25/19 3512  ALPRAZolam (XANAX) tablet 0.5 mg  0.5 mg Oral BID PRN Brianna Lozano MD      
 dilTIAZem (CARDIZEM) IR tablet 60 mg  60 mg Oral Q6H Maurilio Dalton MD   60 mg at 04/25/19 1838  albuterol-ipratropium (DUO-NEB) 2.5 MG-0.5 MG/3 ML  3 mL Nebulization Q2H PRN Brianna Lozano MD   3 mL at 04/23/19 1951  potassium chloride SR (KLOR-CON 10) tablet 10 mEq  10 mEq Oral BID Sinai Monteiro MD   10 mEq at 04/25/19 1205  digoxin (LANOXIN) tablet 0.125 mg  0.125 mg Oral Q MON, WED & Rodri Allen MD   0.125 mg at 04/24/19 2135  arformoterol (BROVANA) neb solution 15 mcg  15 mcg Nebulization BID RT Flory David MD   15 mcg at 04/25/19 9423  budesonide (PULMICORT) 500 mcg/2 ml nebulizer suspension  500 mcg Nebulization BID RT Flory David MD   500 mcg at 04/25/19 1091  Lactobacillus Acidoph & Bulgar CRESTWOOD [facility]) tablet 2 Tab  2 Tab Oral BID Flory David MD   2 Tab at 04/25/19 6754  magnesium hydroxide (MILK OF MAGNESIA) 400 mg/5 mL oral suspension 30 mL  30 mL Oral DAILY PRN Flory David MD      
 pneumococcal 23-valent (PNEUMOVAX 23) injection 0.5 mL  0.5 mL IntraMUSCular PRIOR TO DISCHARGE Getachew Watson MD      
 heparin (porcine) injection 5,000 Units  5,000 Units SubCUTAneous Q8H Brianna Lozano MD   5,000 Units at 04/25/19 7628  metoprolol (LOPRESSOR) injection 2.5 mg  2.5 mg IntraVENous Q4H PRN Fabi Kahn PA   2.5 mg at 04/19/19 0216  bisacodyl (DULCOLAX) suppository 10 mg  10 mg Rectal DAILY PRN Medina Gordon MD   10 mg at 04/12/19 4495  sodium chloride (NS) flush 5-10 mL  5-10 mL IntraVENous PRN Tanner Young MD   10 mL at 04/23/19 2132  aspirin chewable tablet 81 mg  81 mg Oral DAILY Tanner Young MD   81 mg at 04/25/19 0718  atorvastatin (LIPITOR) tablet 40 mg  40 mg Oral QHS Tanner Young MD   40 mg at 04/24/19 2135  clopidogrel (PLAVIX) tablet 75 mg  75 mg Oral DAILY Tanner Young MD   75 mg at 04/25/19 9218  famotidine (PEPCID) tablet 20 mg  20 mg Oral DAILY Tanner Young MD   20 mg at 04/25/19 7438  midodrine (PROAMITINE) tablet 10 mg  10 mg Oral TID WITH MEALS Tanner Young MD   10 mg at 04/25/19 7688  nitroglycerin (NITROSTAT) tablet 0.4 mg  0.4 mg SubLINGual Q5MIN PRN Tanner Young MD      
 tamsulosin (FLOMAX) capsule 0.4 mg  0.4 mg Oral DAILY Tanner Young MD   0.4 mg at 04/25/19 6246  therapeutic multivitamin (THERAGRAN) tablet 1 Tab  1 Tab Oral DAILY Tanner Young MD   1 Tab at 04/25/19 7634  acetaminophen (TYLENOL) tablet 650 mg  650 mg Oral Q4H PRN Tanner Young MD   650 mg at 04/19/19 1118  ondansetron (ZOFRAN) injection 4 mg  4 mg IntraVENous Q4H PRN Tanner Young MD   4 mg at 04/12/19 9104 Objective Vitals:  
 04/25/19 0600 04/25/19 0700 04/25/19 0800 04/25/19 5598 BP: 109/44 (!) 104/39 Pulse: 64 67 Resp: 18 18 Temp:   97.5 °F (36.4 °C) SpO2: 97% 99%  99% Weight:      
Height:      
 
 
 
Intake/Output Summary (Last 24 hours) at 4/25/2019 1036 Last data filed at 4/25/2019 0400 Gross per 24 hour Intake  Output 725 ml Net -725 ml Admission weight: Weight: 59.9 kg (132 lb) (04/10/19 1609) Last Weight Metrics: 
Weight Loss Metrics 4/25/2019 4/9/2019 4/2/2019 3/12/2019 3/7/2019 3/1/2019 2/23/2019 Today's Wt 138 lb 3.7 oz 141 lb 6.4 oz 147 lb 4.3 oz 149 lb 0.5 oz - 161 lb 163 lb 2.3 oz  
 BMI 22.31 kg/m2 22.15 kg/m2 23.07 kg/m2 - 23.34 kg/m2 25.22 kg/m2 25.55 kg/m2 Physical Assessment:  
 
General: NAD, alert and oriented. Frail, cachectic. Neck: No jvd. LUNGS: diminished air entry at the bases, bl exp wheezes. CVS EXM: S1, S2, irregular. Abdomen: soft, non tender. Lower Extremities:  1+ bl thigh edema. Lab CBC w/Diff Recent Labs  
  04/25/19 0215 04/24/19 
0405 04/23/19 
1620 WBC 12.1 10.8 12.8 RBC 3.39* 3.31* 3.47* HGB 8.5* 8.5* 8.7* HCT 29.6* 28.8* 30.3*  263 275 GRANS 90* 86* 86* LYMPH 9* 12* 9* EOS 0 0 0 Chemistry Recent Labs  
  04/25/19 
0215 04/24/19 
0405 04/23/19 
1000 04/23/19 
0555 GLU 96 168* 231* 185* * 148* 144 144  
K 4.0 3.6 4.1 4.0  
* 108 105 104 CO2 31 30 27 27 BUN 99* 94* 97* 93* CREA 2.09* 2.12* 2.34* 2.27* CA 8.9 8.7 9.1 9.2 AGAP 8 10 12 13 BUCR 47* 44* 41* 41* ALB 2.7* 2.6*  --  2.8* PHOS 4.4 5.7*  --  5.9* No results found for: IRON, FE, TIBC, IBCT, PSAT, FERR Lab Results Component Value Date/Time Calcium 8.9 04/25/2019 02:15 AM  
 Phosphorus 4.4 04/25/2019 02:15 AM  
  
 
Cedrick Walker M.D. [facility] Phone [phone] Pager [phone]

## 2019-04-25 NOTE — DIABETES MGMT
Glycemic Control: 
Recommend adding Lantus 5 units q 24 hrs with 5 BG>200. Pt has been resisting honey thickened liquids.  
 
Michelle CHAMBERS

## 2019-04-25 NOTE — PROGRESS NOTES
Problem: General Medical Care Plan Goal: *Vital signs within specified parameters Outcome: Progressing Towards Goal 
Goal: *Labs within defined limits Outcome: Progressing Towards Goal 
Goal: *Absence of infection signs and symptoms Outcome: Progressing Towards Goal 
Goal: *Optimal pain control at patient's stated goal 
Outcome: Progressing Towards Goal 
Goal: *Skin integrity maintained Outcome: Progressing Towards Goal 
Goal: *Fluid volume balance Outcome: Progressing Towards Goal 
Goal: *Optimize nutritional status Outcome: Progressing Towards Goal 
Goal: *Anxiety reduced or absent Outcome: Progressing Towards Goal 
Goal: *Progressive mobility and function (eg: ADL's) Outcome: Progressing Towards Goal 
  
Problem: Impaired Skin Integrity/Pressure Injury Treatment Goal: *Improvement of Existing Pressure Injury Outcome: Progressing Towards Goal 
Goal: *Prevention of pressure injury Description Document William Scale and appropriate interventions in the flowsheet. Outcome: Progressing Towards Goal 
  
Problem: Gas Exchange - Impaired Goal: *Absence of hypoxia Outcome: Progressing Towards Goal 
  
Problem: Patient Education: Go to Patient Education Activity Goal: Patient/Family Education Outcome: Progressing Towards Goal 
  
Problem: Pain Goal: *Control of Pain Outcome: Progressing Towards Goal 
Goal: *PALLIATIVE CARE:  Alleviation of Pain Outcome: Progressing Towards Goal 
  
Problem: Nutrition Deficit Goal: *Optimize nutritional status Outcome: Progressing Towards Goal 
  
Problem: Falls - Risk of 
Goal: *Absence of Falls Description Document Juan Milleria Fall Risk and appropriate interventions in the flowsheet. Outcome: Progressing Towards Goal 
  
Problem: Patient Education: Go to Patient Education Activity Goal: Patient/Family Education Outcome: Progressing Towards Goal 
  
Problem: Pressure Injury - Risk of 
Goal: *Prevention of pressure injury Description Document William Scale and appropriate interventions in the flowsheet. Outcome: Progressing Towards Goal 
  
Problem: Patient Education: Go to Patient Education Activity Goal: Patient/Family Education Outcome: Progressing Towards Goal 
  
Problem: Patient Education: Go to Patient Education Activity Goal: Patient/Family Education Outcome: Progressing Towards Goal 
  
Problem: Patient Education: Go to Patient Education Activity Goal: Patient/Family Education Outcome: Progressing Towards Goal 
  
Problem: Patient Education: Go to Patient Education Activity Goal: Patient/Family Education Outcome: Progressing Towards Goal 
  
Problem: Breathing Pattern - Ineffective Goal: *Absence of hypoxia Outcome: Progressing Towards Goal 
Goal: *Use of effective breathing techniques Outcome: Progressing Towards Goal 
Goal: *PALLIATIVE CARE:  Alleviation of Dyspnea Outcome: Progressing Towards Goal 
  
Problem: Patient Education: Go to Patient Education Activity Goal: Patient/Family Education Outcome: Progressing Towards Goal

## 2019-04-25 NOTE — PROGRESS NOTES
Pulmonary progress note History 51-year-old male with a history of ischemic cardiomyopathy with LVEF of 26 to 30%. He has had multiple admissions for shortness of breath related to pleural effusions and pulmonary edema. He underwent left Pleurx catheter placement on 4/23. The planned right Pleurx catheter placement on 4/24 was aborted due to use of Plavix and aspirin for coronary artery stent placement 3/11/2019. Echocardiogram from 3/15/2019 in addition to the decreased LVEF shows mild concentric hypertrophy, severe tricuspid valve regurgitation and moderate to severe pulmonary hypertension. 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 Minimal smoking history quitting in 1960 Airway secretion clearance remains poor because of an anemic cough. Voice projects poorly. Mild intermittent left chest pain. The pain does not impede his coughing. Afebrile. Pulse rate in 60-80s. Blood pressure about 100/45. Saturating in the upper 90s on 4 L/min nasal cannula No appreciable gallop. No wheezing or rhonchi. Diminished breath sounds at the right base. Sclera anicteric Gaze is conjugate No cyanosis or clubbing WBC 12.1, hemoglobin stable at 8.5 and platelets 801. Sodium has slowly been increasing and is now 151. HCO3 is also slowly climbed to 31. Creatinine remains minimally changed to slightly improved to 2.09. Albumin this morning was 2.7. Portable chest x-ray is pending Assessment Acute hypoxic respiratory failure secondary to pulmonary edema and pleural effusions Coronary artery disease with stent placement roughly 1 month ago on Plavix and aspirin Ineffective cough Contraction alkalosis Hypernatremia secondary to diuresis Plan Repeat chest x-ray Pleural drainage as necessary. Place right Pleurx catheter when possible Increase activity

## 2019-04-25 NOTE — PROGRESS NOTES
0700 Bedside and Verbal shift change report given to Demond Mathis (oncoming nurse) by Jay Epperson. Report included the following information SBAR, Kardex, ED Summary, Procedure Summary, Intake/Output, MAR, Recent Results and Med Rec Status. Pt was calm in bed, alert and oriented to self and to situation, no distress noted. Pt. Denied any pain at the time. Regular respiration  Noted. Pt. . On oxygen at 3 l/min,  Pleurex Catheter in n place with dressing CDI. Bed in low position, wheels locked and side rails x 3 up. Call bell within reach. Will continue to monitor. 1000 IDR done and plan is to monitor the pt. For any respiratory compromise and follow up with CXR results. Due to elevated Na of 151, pt will be started on IVF . 1128 Dextrose 5% IVF started at 48 mls/hr 1200 Reassessment done and no changes noted. 1330 Pt. Out of bed with PT. tolerated the activity well. 1900 Bedside and Verbal shift change report given to  Marcelle Newton and Celia Jesus  (oncoming nurse) by Demond Mathis (offgoing nurse). Report included the following information SBAR, Kardex, ED Summary, Procedure Summary, Intake/Output, MAR, Recent Results and Med Rec Status.

## 2019-04-25 NOTE — PROGRESS NOTES
Problem: General Medical Care Plan Goal: *Vital signs within specified parameters Outcome: Progressing Towards Goal 
Goal: *Labs within defined limits Outcome: Progressing Towards Goal 
Goal: *Absence of infection signs and symptoms Outcome: Progressing Towards Goal 
Goal: *Optimal pain control at patient's stated goal 
Outcome: Progressing Towards Goal 
Goal: *Skin integrity maintained Outcome: Progressing Towards Goal 
Goal: *Fluid volume balance Outcome: Progressing Towards Goal 
Goal: *Optimize nutritional status Outcome: Progressing Towards Goal 
Goal: *Anxiety reduced or absent Outcome: Progressing Towards Goal 
Goal: *Progressive mobility and function (eg: ADL's) Outcome: Progressing Towards Goal 
  
Problem: Impaired Skin Integrity/Pressure Injury Treatment Goal: *Improvement of Existing Pressure Injury Outcome: Progressing Towards Goal 
Goal: *Prevention of pressure injury Description Document William Scale and appropriate interventions in the flowsheet. Outcome: Progressing Towards Goal 
  
Problem: Gas Exchange - Impaired Goal: *Absence of hypoxia Outcome: Progressing Towards Goal 
  
Problem: Patient Education: Go to Patient Education Activity Goal: Patient/Family Education Outcome: Progressing Towards Goal 
  
Problem: Pain Goal: *Control of Pain Outcome: Progressing Towards Goal 
Goal: *PALLIATIVE CARE:  Alleviation of Pain Outcome: Progressing Towards Goal 
  
Problem: Nutrition Deficit Goal: *Optimize nutritional status Outcome: Progressing Towards Goal 
  
Problem: Falls - Risk of 
Goal: *Absence of Falls Description Document Tia Manus Fall Risk and appropriate interventions in the flowsheet. Outcome: Progressing Towards Goal 
  
Problem: Patient Education: Go to Patient Education Activity Goal: Patient/Family Education Outcome: Progressing Towards Goal 
  
Problem: Pressure Injury - Risk of 
Goal: *Prevention of pressure injury Description Document William Scale and appropriate interventions in the flowsheet. Outcome: Progressing Towards Goal 
  
Problem: Patient Education: Go to Patient Education Activity Goal: Patient/Family Education Outcome: Progressing Towards Goal 
  
Problem: Patient Education: Go to Patient Education Activity Goal: Patient/Family Education Outcome: Progressing Towards Goal 
  
Problem: Patient Education: Go to Patient Education Activity Goal: Patient/Family Education Outcome: Progressing Towards Goal 
  
Problem: Patient Education: Go to Patient Education Activity Goal: Patient/Family Education Outcome: Progressing Towards Goal 
  
Problem: Breathing Pattern - Ineffective Goal: *Absence of hypoxia Outcome: Progressing Towards Goal 
Goal: *Use of effective breathing techniques Outcome: Progressing Towards Goal 
Goal: *PALLIATIVE CARE:  Alleviation of Dyspnea Outcome: Progressing Towards Goal 
  
Problem: Patient Education: Go to Patient Education Activity Goal: Patient/Family Education Outcome: Progressing Towards Goal

## 2019-04-25 NOTE — PROGRESS NOTES
Problem: Mobility Impaired (Adult and Pediatric) Goal: *Acute Goals and Plan of Care (Insert Text) Description Physical Therapy Goals Initiated 4/23/2019 and to be accomplished within 7 days. 1.  Patient will complete all bed mobility with minimal assistance/contact guard assist in order to prepare for EOB/OOB activity. 2.  Patient will perform sit <> stand with minimal assistance/contact guard assist in order to prepare for OOB/gait activity. 3.  Patient will perform bed to chair transfers with minimal assistance/contact guard assist in order to promote mobility and encourage seated activity to progress towards their prior level of function. 4.  Patient will ambulate 25 feet with minimal assistance/contact guard assist using LRAD in order to prepare for safe negotiation of their environment. Outcome: Not Progressing Towards Goal 
 PHYSICAL THERAPY TREATMENT Patient: Carlos Freeman (76 y.o. male) Date: 4/25/2019 Diagnosis: Chronic bilateral pleural effusions [J90] HCAP (healthcare-associated pneumonia) [J18.9] CHF (congestive heart failure) ([facility] [address] [I50.9] Bilateral pleural effusion [J90] HCAP (healthcare-associated pneumonia) [J18.9] CHF (congestive heart failure) ([facility]) [I50.9] Chronic bilateral pleural effusions Precautions: Fall, Skin ASSESSMENT: 
Pt very deconditioned, supervision for bed mobility, pt requires extended rest breaks with all mobility. O2 sats in high 90's throughout, although pt frequently stating that he feels SOB. Instructed pt in seated exercises for strength to improve activity tolerance. Max a X2 for SPT bed>chair, pt unable to achieve full upright stance. Progression toward goals:  
?      Improving appropriately and progressing toward goals ? Improving slowly and progressing toward goals ? Not making progress toward goals and plan PLAN: 
Patient continues to benefit from skilled intervention to address the above impairments. Continue treatment per established plan of care. Discharge Recommendations:  Aric Kim, To Be Determined and Long term care Further Equipment Recommendations for Discharge:  N/A  
 
SUBJECTIVE:  
Patient stated ? short of breath. ? OBJECTIVE DATA SUMMARY:  
Critical Behavior: 
Neurologic State: (P) Alert, Eyes open spontaneously Orientation Level: (P) Oriented to person, Oriented to place, Disoriented to situation, Disoriented to time Cognition: (P) Follows commands Safety/Judgement: Awareness of environment, Fall prevention, Insight into deficits Functional Mobility Training: 
Bed Mobility: 
Rolling: Stand-by assistance Supine to Sit: Stand-by assistance Scooting: Stand-by assistance Transfers: 
Sit to Stand: Maximum assistance;Assist x2 Stand to Sit: Maximum assistance;Assist x2 Stand Pivot Transfers: Maximum assistance;Assist x2 Balance: 
Sitting: Impaired Sitting - Static: Good (unsupported) Sitting - Dynamic: Fair (occasional) Standing: Impaired Standing - Static: Poor Therapeutic Exercises:  
 
 
 
EXERCISE Sets Reps Active Active Assist  
Passive Self ROM Comments Ankle Pumps 1 5  ? ? ? ?   
Quad Sets/Glut Sets    ? ? ? ? Hamstring Sets   ? ? ? ? Short Arc Quads   ? ? ? ? Heel Slides   ? ? ? ? Straight Leg Raises   ? ? ? ? Hip Add   ? ? ? ? Long Arc Quads 1 5 ? ? ? ? Seated Marching 1 10 ? ? ? ? Standing Marching   ? ? ? ? Shr rolls, scap squeeze 1 10 ? ? ? ? Pain: 
Pain level pre-treatment: 3/10 Pain level post-treatment: 3/10 Pain Intervention(s): nurse notified Activity Tolerance:  
Poor Please refer to the flowsheet for vital signs taken during this treatment. After treatment:  
? Patient left in no apparent distress sitting up in chair ? Patient left in no apparent distress in bed 
? Call bell left within reach ? Nursing notified ? Caregiver present ? Chair alarm activated ? SCDs applied COMMUNICATION/EDUCATION:  
?          
? Fall prevention education was provided and the patient/caregiver indicated understanding. ? Patient/family have participated as able in working toward goals and plan of care. ?         Patient/family agree to work toward stated goals and plan of care. ?         Patient understands intent and goals of therapy, but is neutral about his/her participation. ? Patient is unable to participate in stated goals/plan of care: ongoing with therapy staff. ?         Role of Physical Therapy in the acute care setting. Kingston Burkitt, PTA Time Calculation: 34 mins

## 2019-04-25 NOTE — PROGRESS NOTES
Respiratory Therapy Assessment Care Plan Patient: 
Giovanny An 80 y.o. male 4/25/2019 2:11 PM 
 
Chronic bilateral pleural effusions [J90] HCAP (healthcare-associated pneumonia) [J18.9] CHF (congestive heart failure) ([facility] [address] [I50.9] Bilateral pleural effusion [J90] HCAP (healthcare-associated pneumonia) [J18.9] CHF (congestive heart failure) ([place] [address] [I50.9] Chest X-RAY:  
Results from Hospital Encounter encounter on 04/10/19 XR CHEST PORT Impression IMPRESSION: 
 
Improved appearance of the left base status post placement of a pleural drain. Other findings are stable allowing for differences in technique XR CHEST PORT Impression IMPRESSION: 
 
There appear to be small bilateral pleural effusions. Size may be underestimated 
by portable technique of the patient is not upright. Effusions appear no larger 
than on the prior study Persistent scattered bilateral airspace densities which somewhat appear 
improved from the prior study XR CHEST PORT Impression IMPRESSION: 
 
 
1. Bilateral pleural effusions similar to prior with patchy bilateral alveolar 
opacities in keeping with underlying pulmonary edema and/or pneumonia. Vital Signs:   Patient Vitals for the past 12 hrs: 
 Temp Pulse Resp BP SpO2  
04/25/19 0300  71 22 95/47 98 % 04/25/19 0400 98.1 °F (36.7 °C) 65 16 98/48 99 % 04/25/19 0500  66 19 106/51 100 % 04/25/19 0600  64 18 109/44 97 % 04/25/19 0700  67 18 (!) 104/39 99 % 04/25/19 0800 97.5 °F (36.4 °C)      
04/25/19 0807     99 % 04/25/19 0830  66 17 104/44 92 % 04/25/19 0927  85 19 97/49 90 % 04/25/19 1004 97.5 °F (36.4 °C) 78 18 95/67 98 % 04/25/19 1030  77 16 99/48 100 % 04/25/19 1200 98.4 °F (36.9 °C) 85 20 104/46 98 % 04/25/19 1230  92 16 110/52 93 % 04/25/19 1407    101/46 Indications for treatment: Hypoxia, increased WOB and SOB Plan of care: O2 therapy, VibraPEP to stimulate cough and help mobilize secretions Goal: Wean O2 down, clear lungs

## 2019-04-25 NOTE — PROGRESS NOTES
Problem: Dysphagia (Adult) Goal: *Acute Goals and Plan of Care (Insert Text) Description Recommendations: 
Diet: mechanical-soft/honey-thick liquids (NO STRAWS) Meds: crushed in applesauce/pudding Aspiration Precautions Oral Care TID Other: SILENT ASPIRATION Goals:  Patient will: 1. Tolerate PO trials with 0 s/s overt distress in 4/5 trials 2. Utilize compensatory swallow strategies/maneuvers (decrease bite/sip, size/rate, alt. liq/sol) with min cues in 4/5 trials 3. Perform oral-motor/laryngeal exercises to increase oropharyngeal swallow function with min cues 4. Complete an objective swallow study (i.e., MBSS) to assess swallow integrity, r/o aspiration, and determine of safest LRD, min A - goal met 4/17/19 Outcome: Progressing Towards Goal 
SPEECH LANGUAGE PATHOLOGY DYSPHAGIA TREATMENT Patient: Juliana Rodriguez (17 y.o. male) Date: 4/25/2019 Diagnosis: Chronic bilateral pleural effusions [J90] HCAP (healthcare-associated pneumonia) [J18.9] CHF (congestive heart failure) ([facility] [address] [I50.9] Bilateral pleural effusion [J90] HCAP (healthcare-associated pneumonia) [J18.9] CHF (congestive heart failure) ([facility]) [I50.9] Chronic bilateral pleural effusions Precautions: Aspiration Fall, Skin PLOF: Independent ASSESSMENT: 
Followed up this day with dysphagia intervention. A&Ox2. MBS completed 4/17/19 with SILENT aspiration on thin and nectar-thick liquids via cup and straw sips. Pt able to recall participating but unable to recall results. Reviewed results and recommendations, requires reinforcement. Patient observed self-feeding mech-soft, HTL breakfast. Pt demo wet breath sounds prior to and following PO trials. Swallow appeared timely with 0 overt s/sx of aspiration. Rec: mechanical soft solids with HONEY thick liquids for quality of life. Pt at high risk of aspiration, strict aspiration precautions. D/w RN. SLP will continue to follow. Progression toward goals: ?         Improving appropriately and progressing toward goals ? Improving slowly and progressing toward goals ? Not making progress toward goals and plan of care will be adjusted PLAN: 
Recommendations and Planned Interventions: 
Mech-soft/thin Patient continues to benefit from skilled intervention to address the above impairments. Continue treatment per established plan of care. Discharge Recommendations:  Aric Kim SUBJECTIVE:  
Patient stated ? I want water? . 
 
OBJECTIVE:  
Cognitive and Communication Status: 
Neurologic State: Alert, Confused Orientation Level: Oriented to person, Oriented to place, Disoriented to situation, Disoriented to time Cognition: Follows commands Perception: Appears intact Perseveration: No perseveration noted Safety/Judgement: Awareness of environment, Fall prevention, Insight into deficits Dysphagia Treatment: 
Oral Assessment: 
Oral Assessment Labial: No impairment Dentition: Edentulous Oral Hygiene: good Lingual: No impairment Velum: No impairment Mandible: No impairment P.O. Trials: 
Patient Position: HOB 60* Vocal quality prior to P.O.: Breathy, Low volume, Phonation breaks Consistency Presented: Honey thick liquid, Mechanical soft How Presented: SLP-fed/presented, Cup/sip Bolus Acceptance: No impairment Bolus Formation/Control: Impaired Type of Impairment: Mastication, Delayed Propulsion: Delayed (# of seconds) Oral Residue: 10-50% of bolus, Lingual 
 Initiation of Swallow: No impairment Laryngeal Elevation: Decreased Aspiration Signs/Symptoms: Decrease in O2 saturations, Change vocal quality Pharyngeal Phase Characteristics: Audible swallow, Altered vocal quality, Multiple swallows, Suspected pharyngeal residue Effective Modifications: Alternate liquids/solids, Cup/sip, Small sips and bites, Supraglottic swallow Cues for Modifications: Moderate Oral Phase Severity: Mild Pharyngeal Phase Severity : Severe Exercises: 
Laryngeal Exercises: 
Sustained \"ah\": No 
  
  
Mendelsohn Maneuver: No 
  
  
Effortful Swallow: Yes Sets : 1 Reps : 5 
Supraglottic Swallow: No 
  
  
Super-Supraglottic Swallow: No 
  
  
Incentive Spirometer: No 
  
  
  
Maria Isabel: Yes Sets : 1 Reps : 5 Sing \"EEE\": Yes Sets : 1 Reps : 15 Shaker: No 
  
  
Look Up at Ceiling/Gargle: No 
  
  
Open Mouth Wide/Yawn: No 
  
  
Tongue Back & Hold: No 
  
  
Effortful Breath Hold: No 
  
  
Hard Glottal Attack: No 
  
  
PAIN: 
Pain level pre-treatment: 0/10 Pain level post-treatment: 0/10 Pain Intervention(s): Medication (see MAR); Rest, Ice, Repositioning Response to intervention: Nurse notified, See doc flow After treatment:  
?              Patient left in no apparent distress sitting up in chair ? Patient left in no apparent distress in bed 
? Call bell left within reach ? Nursing notified ? Family present ? Caregiver present ? Bed alarm activated COMMUNICATION/EDUCATION:  
? Aspiration precautions; swallow safety; compensatory techniques ? Patient unable to participate in education; education ongoing with staff ? Posted safety precautions in patient's room. ? Oral-motor/laryngeal strengthening exercises Alexandra Farley SLP Time Calculation: 12 mins

## 2019-04-25 NOTE — PROGRESS NOTES
Problem: Self Care Deficits Care Plan (Adult) Goal: *Acute Goals and Plan of Care (Insert Text) Description Occupational Therapy Goals Initiated 4/18/2019 within 7 day(s). Pt re-evaluated on 4/24/19 following transfer to ICU. Updated #4 & 5; decreased therapy frequency to 3x/week. 1.  Patient will perform grooming tasks at EOB with supervision/set-up. 2.  Patient will perform upper/lower body dressing with minimal assistance. 3.  Patient will perform functional task in standing for 8 minutes with supervision and < 3 rest breaks to increase activity tolerance for ADLs. 4.  Patient will perform toilet transfers with supervision/set-up. 5.  Patient will perform all aspects of toileting with supervision/set-up. 6.  Patient will participate in upper extremity therapeutic exercise/activities for 8 minutes to increase BUE strength for functional transfers and ADLs. 7.  Patient will utilize energy conservation techniques during functional activities with minimal verbal cues. UPDATED GOALS; Marianne Restrepo MS OTR/L 
1. Patient will perform grooming tasks at EOB with supervision/set-up. 2.  Patient will perform upper/lower body dressing with minimal assistance. 3.  Patient will perform functional task in standing for 8 minutes with supervision and < 3 rest breaks to increase activity tolerance for ADLs. 4.  Patient will perform toilet transfers with minimal assistance. 5.  Patient will perform all aspects of toileting with minimal assistance. 6.  Patient will participate in upper extremity therapeutic exercise/activities for 8 minutes to increase BUE strength for functional transfers and ADLs. 7.  Patient will utilize energy conservation techniques during functional activities with minimal verbal cues. Outcome: Progressing Towards Goal 
 OCCUPATIONAL THERAPY TREATMENT Patient: Xavier Pisano (39 y.o. male) Date: 4/25/2019 Diagnosis: Chronic bilateral pleural effusions [J90] HCAP (healthcare-associated pneumonia) [J18.9] CHF (congestive heart failure) ([facility] [address] [I50.9] Bilateral pleural effusion [J90] HCAP (healthcare-associated pneumonia) [J18.9] CHF (congestive heart failure) ([facility]) [I50.9] Chronic bilateral pleural effusions Precautions: Fall, Skin Chart, occupational therapy assessment, plan of care, and goals were reviewed. ASSESSMENT: 
Pt presented in bed with family present. Pt requires max encouragement to participate. Pt performs bed mobility to sit EOB with Mod A x2 2/2 fatigue and Pt's deconditioning. Pt performs grooming tasks EOB with setup. Pt reported back pain, support given at back to relieve back pain while seated EOB. Pt performs slow and controlled UE TherEx. Pt's HR increased to 120s but recovered with a seated rest break to 100s. Pt sat EOB for ~10 mins. Pt returned to supine with Mod A x2. Pt and family educated on role of OT, OOB, importance of UE TherEx and energy conservation. Progression toward goals: 
?          Improving appropriately and progressing toward goals ? Improving slowly and progressing toward goals ? Not making progress toward goals and plan of care will be adjusted PLAN: 
Patient continues to benefit from skilled intervention to address the above impairments. Continue treatment per established plan of care. Discharge Recommendations:  Aric Kim Further Equipment Recommendations for Discharge:  bedside commode and shower chair SUBJECTIVE:  
Patient stated ? So-so. ? OBJECTIVE DATA SUMMARY:  
Cognitive/Behavioral Status: 
Neurologic State: Alert Orientation Level: Oriented to person, Oriented to place Cognition: Follows commands Safety/Judgement: Awareness of environment, Fall prevention, Insight into deficits Functional Mobility and Transfers for ADLs: 
Bed Mobility: 
Rolling: Minimum assistance Supine to Sit: Moderate assistance;Assist x2 
 Sit to Supine: Moderate assistance;Assist x2 Scooting: Stand-by assistance Transfers: 
Sit to Stand: Maximum assistance;Assist x2 Stand Pivot Transfers: Maximum assistance;Assist x2 Balance: 
Sitting: Impaired; With support Sitting - Static: Good (unsupported) Sitting - Dynamic: Fair (occasional) Standing: Impaired Standing - Static: Poor ADL Intervention: 
Grooming Washing Face: Supervision/set-up Washing Hands: Supervision/set-up Therapeutic Exercises: AROM BUE for slowed and controlled: shoulder abduction, internal rotation, elbow flexion and wrist flexion Pain: 
Pt reports 0/10 pain or discomfort prior to treatment. Pt reports 5/10 pain or discomfort post treatment in lower back. Activity Tolerance:   
Fair Please refer to the flowsheet for vital signs taken during this treatment. After treatment:  
?  Patient left in no apparent distress sitting up in chair ? Patient left in no apparent distress in bed 
? Call bell left within reach ? Nursing notified ? Caregiver present ? Bed alarm activated COMMUNICATION/EDUCATION:  
? Home safety education was provided and the patient/caregiver indicated understanding. ? Patient/family have participated as able in goal setting and plan of care. ? Patient/family agree to work toward stated goals and plan of care. ? Patient understands intent and goals of therapy, but is neutral about his/her participation. ? Patient is unable to participate in goal setting and plan of care. Elmer Coleman Time Calculation: 23 mins

## 2019-04-25 NOTE — PROGRESS NOTES
Received patient on 3 lpm NC O2. HR 62, SpO2 100, RR 19, BS coarse rhonchi. HHN tx given and VibraPEP given with excellent effort. Cough on request was congested and non-productive. Patient has not required BIPAP. Which has been on standby at bedside. Will change order to PRN.

## 2019-04-25 NOTE — PROGRESS NOTES
[facility] - Progress Note Admit Date: 4/10/2019 I saw, evaluated, interviewed and examined the patient personally. I agree with the findings and plan of care as documented below with PAVICKY note Patient had drug-eluting stent for heavily calcified LAD last month which will require Plavix based on guideline and recommendation at least for a year unless patient has to undergo emergent surgery or procedure or active bleeding. High risk of stent thrombosis if plavix had to stop but if procedure is emergent and life saving, this can be considered. Continue rest of the cardiac medication for now Caitlin Oliver MD 
 
 
Assessment: - Acute hypoxic respiratory failure - HCAP: Multifocal PNA on CXR 04/10/19, multifocal consolidation within the left upper and bilateral lower lobes, likely recurrently multifocal PNA 
- Atrial fibrillation with RVR: CHADS2-Vasc score 5 (CHF, HTN, age, Vascular disease) and sinus tachycardia - Bilateral moderate to large pleural effusions CT chest 04/10-- s/p multiple thoracenteses, PleurX catheter (left) 04/23 
-CAD s/p LAD PCI with DEMAR 3/11/2019 with previous PCI to LCx 2011. Cath 3/11/2019 (Occluded mid RCA which appears to be chronic. There is faint collateral from the left coronary system, Left main artery with ostial 30-40%, LAD mid focal severely calcified tortuous 99% stenosis with CARLENE II flow, Circumflex coronary artery with diffuse mild 20-30% stenosis throughout). Discharged on ASA, plavix, statin, BB. 
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   
-Echo 03/14/19 with EF 26-30%  
-NEL, Creatinine 1.62 04/12 
-Peripheral vascular disease. -Hx Hypertension 
-Dyslipidemia 
-Asthma, former smoker. -Advanced age 
  
Plan:  
  
- As he had a recent cardiac stent March 11th, cardiology cannot clear patient to stop Plavix as he is at risk for stent thrombosis. - Continue with ASA and statin.  No ACEi/ARB/ARNI given renal function and borderline hypotension 
- Controlled afib on telemetry, short acting Cardizem ordered, changing to low dose Lopressor due to his hx of cardiomyopathy Subjective: No new complaints. Objective:  
  
Patient Vitals for the past 8 hrs: 
 Temp Pulse Resp BP SpO2  
04/25/19 1030  77 16 99/48 100 % 04/25/19 1004  78 18 95/67 98 % 04/25/19 0927  85 19 97/49 90 % 04/25/19 0830  66 17 104/44 92 % 04/25/19 0807     99 % 04/25/19 0800 97.5 °F (36.4 °C)      
04/25/19 0700  67 18 (!) 104/39 99 % 04/25/19 0600  64 18 109/44 97 % 04/25/19 0500  66 19 106/51 100 % 04/25/19 0400 98.1 °F (36.7 °C) 65 16 98/48 99 % Patient Vitals for the past 96 hrs: 
 Weight 04/25/19 0200 138 lb 3.7 oz (62.7 kg) 04/24/19 0145 140 lb 4.8 oz (63.6 kg) 04/23/19 0004 148 lb 8 oz (67.4 kg) 04/22/19 0612 145 lb 6.4 oz (66 kg) Intake/Output Summary (Last 24 hours) at 4/25/2019 1113 Last data filed at 4/25/2019 0400 Gross per 24 hour Intake  Output 725 ml Net -725 ml Physical Exam: 
General:  alert, cooperative, no distress Neck:  nontender, no JVD Lungs:  Rhonchi throughout Heart:  irregularly irregular rhythm Abdomen:  abdomen is soft without significant tenderness, masses, organomegaly or guarding Extremities:  extremities normal, atraumatic, no cyanosis or edema Data Review:  
 
Labs: Results:  
   
Chemistry Recent Labs  
  04/25/19 
0215 04/24/19 
0405 04/23/19 
1000 04/23/19 
0555 GLU 96 168* 231* 185* * 148* 144 144  
K 4.0 3.6 4.1 4.0  
* 108 105 104 CO2 31 30 27 27 BUN 99* 94* 97* 93* CREA 2.09* 2.12* 2.34* 2.27* CA 8.9 8.7 9.1 9.2 MG 3.5* 3.2* 3.0*  --   
PHOS 4.4 5.7*  --  5.9* AGAP 8 10 12 13 BUCR 47* 44* 41* 41* ALB 2.7* 2.6*  --  2.8* CBC w/Diff Recent Labs  
  04/25/19 
0215 04/24/19 
0405 04/23/19 
1620 WBC 12.1 10.8 12.8 RBC 3.39* 3.31* 3.47* HGB 8.5* 8.5* 8.7* HCT 29.6* 28.8* 30.3*  
  263 275 GRANS 90* 86* 86* LYMPH 9* 12* 9* EOS 0 0 0 Cardiac Enzymes No results found for: CPK, CK, CKMMB, CKMB, RCK3, CKMBT, CKNDX, CKND1, CHRISTINE, TROPT, TROIQ, NGHIA, TROPT, TNIPOC, BNP, BNPP Coagulation No results for input(s): PTP, INR, APTT in the last 72 hours. No lab exists for component: INREXT Lipid Panel Lab Results Component Value Date/Time Cholesterol, total 87 03/08/2019 05:28 AM  
 HDL Cholesterol 44 03/08/2019 05:28 AM  
 LDL, calculated 30 03/08/2019 05:28 AM  
 VLDL, calculated 13 03/08/2019 05:28 AM  
 Triglyceride 65 03/08/2019 05:28 AM  
 CHOL/HDL Ratio 2.0 03/08/2019 05:28 AM  
  
BNP No results found for: BNP, BNPP, XBNPT Liver Enzymes Recent Labs  
  04/25/19 
0215 ALB 2.7* Digoxin Thyroid Studies Lab Results Component Value Date/Time TSH 1.22 03/07/2019 05:29 AM  
    
 
Signed By: Jj Gonzalez. Carmelo Herndon PA-C April 25, 2019

## 2019-04-25 NOTE — PROGRESS NOTES
2030,received pt off BIPAP,on 3l NC O2 sats 97% pt resting no distress noted at this time,resp neb given via mask will monitor though the shift.

## 2019-04-25 NOTE — PROGRESS NOTES
Progress Note Patient: Alistair Carson               Sex: male          DOA: 4/10/2019 YOB: 1926      Age:  80 y.o.        LOS:  LOS: 15 days CHIEF COMPLAINT:  Other (pt on TCC and not improving per Daugher/wants evaluated.) Subjective:  
 
Pt said his breathing is good. Feels weak. Objective:  
 
Visit Vitals BP (!) 104/39 Pulse 67 Temp 97.5 °F (36.4 °C) Resp 18 Ht 5' 6\" (1.676 m) Wt 62.7 kg (138 lb 3.7 oz) SpO2 99% BMI 22.31 kg/m² Physical Exam: 
Ears: hearing is intact Eyes: Icterus is not present Lungs:  diminished breath sounds bilaterally, upper airway crackles Heart: regular rate and rhythm, S1, S2 normal, no murmur, click, rub or gallop Gastrointestinal: soft, non-tender. Bowel sounds normal. No masses,  no organomegaly Neurological:  New Focal Deficits are not present Psychiatric:  Mood is stable Lab/Data Reviewed: 
CMP:  
Lab Results Component Value Date/Time  (H) 04/25/2019 02:15 AM  
 K 4.0 04/25/2019 02:15 AM  
  (H) 04/25/2019 02:15 AM  
 CO2 31 04/25/2019 02:15 AM  
 AGAP 8 04/25/2019 02:15 AM  
 GLU 96 04/25/2019 02:15 AM  
 BUN 99 (H) 04/25/2019 02:15 AM  
 CREA 2.09 (H) 04/25/2019 02:15 AM  
 GFRAA 36 (L) 04/25/2019 02:15 AM  
 GFRNA 30 (L) 04/25/2019 02:15 AM  
 CA 8.9 04/25/2019 02:15 AM  
 MG 3.5 (H) 04/25/2019 02:15 AM  
 PHOS 4.4 04/25/2019 02:15 AM  
 ALB 2.7 (L) 04/25/2019 02:15 AM  
 
CBC:  
Lab Results Component Value Date/Time WBC 12.1 04/25/2019 02:15 AM  
 HGB 8.5 (L) 04/25/2019 02:15 AM  
 HCT 29.6 (L) 04/25/2019 02:15 AM  
  04/25/2019 02:15 AM  
 
 
Imaging Reviewed: 
No results found. Assessment:  
 
Principal Problem: 
  Chronic bilateral pleural effusions (4/10/2019) Active Problems: 
  CHF (congestive heart failure) ([facility] [address] (4/10/2019) HCAP (healthcare-associated pneumonia) (4/10/2019) Atrial fibrillation with rapid ventricular response ([facility] [address] (4/13/2019) PLAN: 
· B/l recurrent pleural effusions - 2/2 CHF. Thoracentesis x2. [facility] · Left PleurX catheter placed on 4/23 with significant improvement in resp status · Right PleurX catheter planned for 4/24 but canceled due to plavix. Discussed with Cardiology, cannot hold plavix due to recent stent last month. Will discuss with Pulm and IR about right PleurX. · [address] 64 - d/w daughter about palliative care measures, she declines adamantly. · HCAP - completed 12 day course of treatment, d/c atbx · NEL - stable, likely multifactorial 2/2 atbx, diuretics. Appreciate Nephrology eval. Improving. · AF RVR - per Cardiology · CHF/CAD - per cardiology · Cont acceptable home medications for chronic conditions · DVT protocol I have personally reviewed all pertinent labs and films that have officially resulted over the last 24 hours. I have personally checked for all pending labs that are awaiting final results. Signed By: Seth Krishna MD   
 April 25, 2019

## 2019-04-26 NOTE — MANAGEMENT PLAN
Discharge/Transition Planning Spoke with daughter in pt room. Pt sleeping. Plan is still SNF rehab and she would like [facility] to [place] [facility] [place]. Daughter had questions about the other Pleurx cath being placed and if fluid being drained from current one. Was passed along to Dr Ed Walker. CM will need notification when pt close to ready for d/c so Dominguez Hilts can be started if Melissa Godfrey still accepting Maddison Allen RN BSN Outcomes Manager Pager # [phone]

## 2019-04-26 NOTE — PROGRESS NOTES
Problem: General Medical Care Plan Goal: *Vital signs within specified parameters Outcome: Progressing Towards Goal 
Goal: *Labs within defined limits Outcome: Progressing Towards Goal 
Goal: *Absence of infection signs and symptoms Outcome: Progressing Towards Goal 
Goal: *Optimal pain control at patient's stated goal 
Outcome: Progressing Towards Goal 
Goal: *Skin integrity maintained Outcome: Progressing Towards Goal 
Goal: *Fluid volume balance Outcome: Progressing Towards Goal 
Goal: *Optimize nutritional status Outcome: Progressing Towards Goal 
Goal: *Anxiety reduced or absent Outcome: Progressing Towards Goal 
Goal: *Progressive mobility and function (eg: ADL's) Outcome: Progressing Towards Goal 
  
Problem: Impaired Skin Integrity/Pressure Injury Treatment Goal: *Improvement of Existing Pressure Injury Outcome: Progressing Towards Goal 
Goal: *Prevention of pressure injury Description Document William Scale and appropriate interventions in the flowsheet. Outcome: Progressing Towards Goal 
  
Problem: Gas Exchange - Impaired Goal: *Absence of hypoxia Outcome: Progressing Towards Goal 
  
Problem: Patient Education: Go to Patient Education Activity Goal: Patient/Family Education Outcome: Progressing Towards Goal 
  
Problem: Pain Goal: *Control of Pain Outcome: Progressing Towards Goal 
Goal: *PALLIATIVE CARE:  Alleviation of Pain Outcome: Progressing Towards Goal 
  
Problem: Nutrition Deficit Goal: *Optimize nutritional status Outcome: Progressing Towards Goal 
  
Problem: Falls - Risk of 
Goal: *Absence of Falls Description Document Tom Reed Fall Risk and appropriate interventions in the flowsheet. Outcome: Progressing Towards Goal 
  
Problem: Patient Education: Go to Patient Education Activity Goal: Patient/Family Education Outcome: Progressing Towards Goal 
  
Problem: Pressure Injury - Risk of 
Goal: *Prevention of pressure injury Description Document William Scale and appropriate interventions in the flowsheet. Outcome: Progressing Towards Goal 
  
Problem: Patient Education: Go to Patient Education Activity Goal: Patient/Family Education Outcome: Progressing Towards Goal 
  
Problem: Patient Education: Go to Patient Education Activity Goal: Patient/Family Education Outcome: Progressing Towards Goal 
  
Problem: Patient Education: Go to Patient Education Activity Goal: Patient/Family Education Outcome: Progressing Towards Goal 
  
Problem: Patient Education: Go to Patient Education Activity Goal: Patient/Family Education Outcome: Progressing Towards Goal 
  
Problem: Breathing Pattern - Ineffective Goal: *Absence of hypoxia Outcome: Progressing Towards Goal 
Goal: *Use of effective breathing techniques Outcome: Progressing Towards Goal 
Goal: *PALLIATIVE CARE:  Alleviation of Dyspnea Outcome: Progressing Towards Goal 
  
Problem: Patient Education: Go to Patient Education Activity Goal: Patient/Family Education Outcome: Progressing Towards Goal

## 2019-04-26 NOTE — PROGRESS NOTES
Progress Note Patient: Dennise Porter               Sex: male          DOA: 4/10/2019 YOB: 1926      Age:  80 y.o.        LOS:  LOS: 16 days CHIEF COMPLAINT:  Other (pt on TCC and not improving per Daugher/wants evaluated.) Subjective:  
 
Pt said his breathing today is \"so so\", asking about when the right pleurx will be placed. Breakfast tray was full, not eating much, said he does not like the food. Objective:  
 
Visit Vitals /47 Pulse 72 Temp 97.8 °F (36.6 °C) Resp 16 Ht 5' 6\" (1.676 m) Wt 64.3 kg (141 lb 11.2 oz) SpO2 95% BMI 22.87 kg/m² Physical Exam: 
Gen: cachectic Ears: hearing is intact Eyes: Icterus is not present Lungs:  diminished breath sounds bilaterally, upper airway crackles Heart: regular rate and rhythm, S1, S2 normal, no murmur, click, rub or gallop Gastrointestinal: soft, non-tender. Bowel sounds normal. No masses,  no organomegaly Neurological:  New Focal Deficits are not present Psychiatric:  Mood is stable Lab/Data Reviewed: 
CMP:  
Lab Results Component Value Date/Time  (H) 04/26/2019 02:00 AM  
 K 3.9 04/26/2019 02:00 AM  
  (H) 04/26/2019 02:00 AM  
 CO2 29 04/26/2019 02:00 AM  
 AGAP 9 04/26/2019 02:00 AM  
  (H) 04/26/2019 02:00 AM  
 BUN 90 (H) 04/26/2019 02:00 AM  
 CREA 1.96 (H) 04/26/2019 02:00 AM  
 GFRAA 39 (L) 04/26/2019 02:00 AM  
 GFRNA 32 (L) 04/26/2019 02:00 AM  
 CA 8.7 04/26/2019 02:00 AM  
 PHOS 4.4 04/26/2019 02:00 AM  
 ALB 2.7 (L) 04/26/2019 02:00 AM  
 
CBC:  
No results found for: WBC, HGB, HGBEXT, HCT, HCTEXT, PLT, PLTEXT, HGBEXT, HCTEXT, PLTEXT Imaging Reviewed: 
Xr Chest Pa Lat Result Date: 4/26/2019 EXAM: AP AND LATERAL CHEST INDICATION:  Status post tunneled left pleural catheter. Evaluate for pneumothorax. COMPARISON:  04/25/2019 at 1046 hours TECHNIQUE: AP and lateral views at 2059 hours. ________________________________ FINDINGS: HEART AND MEDIASTINUM: Cardiac mediastinal silhouette appears within normal limits. Atherosclerotic thoracic aorta. LUNGS AND PLEURAL SPACES: Lungs are well aerated. Multifocal opacities are present, significantly progressed in the right base. Suspect associated right pleural effusion. Stable position of tunneled left pleural catheter overlying the base. No discernible pneumothorax. BONY THORAX AND SOFT TISSUES: No acute osseous abnormality. ________________________________ IMPRESSION: 1. Stable position of left tunneled pleural catheter without evidence for pneumothorax. 2. Progressing right basilar opacity consistent with atelectasis or pneumonia with associated pleural effusion. 3. Persistent bilateral multifocal opacities, likely multifocal pneumonia/atelectasis versus edema. Assessment:  
 
Principal Problem: 
  Chronic bilateral pleural effusions (4/10/2019) Active Problems: 
  CHF (congestive heart failure) ([address] (4/10/2019) HCAP (healthcare-associated pneumonia) (4/10/2019) Atrial fibrillation with rapid ventricular response ([address] (4/13/2019) PLAN: 
· B/l recurrent pleural effusions - 2/2 CHF. Thoracentesis x2. Appreciate [facility] assistance. · Left PleurX catheter placed on 4/23 with significant improvement in resp status · Right PleurX catheter planned for 4/24 but canceled due to plavix. Discussed with Cardiology, cannot hold plavix due to recent stent last month. Discussed with Pulm, he said right catheter can be placed 90 days after stent, no need to place urgently. · [address] 64 - d/w daughter about palliative care measures, she declines adamantly. · HCAP - completed 12 day course of treatment, d/c atbx · NEL - stable, likely multifactorial 2/2 atbx, diuretics. Appreciate Nephrology eval. Improving. · AF RVR - per Cardiology · CHF/CAD - per cardiology · Cont acceptable home medications for chronic conditions · DVT protocol I have personally reviewed all pertinent labs and films that have officially resulted over the last 24 hours. I have personally checked for all pending labs that are awaiting final results. Signed By: Quiana Malin MD   
 April 26, 2019

## 2019-04-26 NOTE — PROGRESS NOTES
Received patient on 2 lpm NC O2. HR 81, SpO2 97, RR 20, BS coarse and diminished. VibraPEP therapy given to patient x 10 breaths with excellent effort, cough on request is weak, congested and non-productive. No c/o shortness of breath. Ambu bag/mask at bedside.

## 2019-04-26 NOTE — DIABETES MGMT
NUTRITIONAL RE-ASSESSMENT GLYCEMIC CONTROL/ PLAN OF CARE Corey Avalos           80 y.o.           4/10/2019 1. Bilateral pleural effusion 2. HAP (hospital-acquired pneumonia) 3. Acute on chronic congestive heart failure, unspecified heart failure type ([facility] [address] INTERVENTIONS/PLAN:  
Encouraged increased intake at meals to meet calorie and protein requirements. Family may bring favorite foods from home per MD. ASSESSMENT:  
Pt is 93% ideal wt; BMI : 21.3 kg/m2 (normal weight BMI classification but pt at nutrition risk d/t BMI <23 and age >65 years). Pt with weight loss as documented below under nutrition diagnosis with weight loss calculations based on weight from 4/9/19. Nutrition Diagnoses:  
Unintentional weight loss due to inadequate energy intake as evidenced by 22 lb weight loss within past 2 months (13% weight loss) based on 4/9/19 weight of 141 lbs. Difficulty swallowing due to dysphagia as evidenced by last SLP diet recommendations for mechanical soft  Diet honey thick liquids. Meets Criteria for Acute Malnutrition  
[x] Severe Malnutrition, as evidenced by: 
 [x] Moderate muscle wasting, loss of subcutaneous fat 
 [x] Nutritional intake of <50% of recommended intake for >5 days [x] Weight loss of >1-2% in 1 week, >5% in 1 month, >7.5% in 3 months, or >10% in 6 months 
 [] Moderate-severe edema SUBJECTIVE/OBJECTIVE: Information obtained from: pt 
 
Diet:  cardiac diet, mechanical soft ,honey thick  liquids. Observed intake at breakfast - 120 calories from liquids. Medications: [x]                Reviewed Most Recent POC Glucose:  
Recent Labs  
  04/26/19 
0200 04/25/19 
0215 04/24/19 
0405 * 96 168* Labs:  
Lab Results Component Value Date/Time Hemoglobin A1c 6.2 (H) 04/24/2019 04:05 AM  
 
Lab Results Component Value Date/Time  Sodium 147 (H) 04/26/2019 02:00 AM  
 Potassium 3.9 04/26/2019 02:00 AM  
 Chloride 109 (H) 04/26/2019 02:00 AM  
 CO2 29 04/26/2019 02:00 AM  
 Anion gap 9 04/26/2019 02:00 AM  
 Glucose 183 (H) 04/26/2019 02:00 AM  
 BUN 90 (H) 04/26/2019 02:00 AM  
 Creatinine 1.96 (H) 04/26/2019 02:00 AM  
 Calcium 8.7 04/26/2019 02:00 AM  
 Magnesium 3.5 (H) 04/25/2019 02:15 AM  
 Phosphorus 4.4 04/26/2019 02:00 AM  
 Albumin 2.7 (L) 04/26/2019 02:00 AM  
 
 
Anthropometrics: IBW : 64.4 kg (142 lb), % IBW (Calculated): 92.96 %, BMI (calculated): 22.9 Wt Readings from Last 1 Encounters:  
04/26/19 64.3 kg (141 lb 11.2 oz) Ht Readings from Last 1 Encounters:  
04/10/19 5' 6\" (1.676 m) Estimated Nutrition Needs:  4199 Kcals/day, Protein (g): 89 g Fluid (ml): 1800 ml Based on:   [x]          Actual BW    []          ABW   []            Adjusted BW   
 
Nutrition Diagnoses: see above Nutrition Interventions: 
Encourage increased intake at meals and of No Added Sugar [place] Instant Breakfast (saqib) mixed with honey thick milk. Goal: Pt will consume  75% meals by 5/1/19. Weight maintenance (+/- 1-2 kg) or weight gain of 1-2 lbs/week by 5/6/19. Nutrition Monitoring and Evaluation   
 
[x]     Monitor po intake on meal rounds 
[x]     Continue inpatient monitoring and intervention 
[]     Other: 
 
 
Nutrition Risk:  []   High     [x]  Moderate    []  Minimal/Uncompromised

## 2019-04-26 NOTE — PROGRESS NOTES
9089: PT tx session attempted, patient declining to participate. Reporting back pain; patient assisted with positioning for comfort. Will f/u with patient. Myron Mortimer PT, DPT Office extension: [phone] Pager #: [phone]

## 2019-04-26 NOTE — PROGRESS NOTES
completed follow up visit with  Patient in room [phone] and a Spiritual assessment of patient was done. Patient was having breakfast when I visited. Patient says that he is doing the est that he can with what he has left. Patient is weak but still talkative . Ariane Stallworth Chaplains will continue to follow and will provide pastoral care on an as needed/requested basis Ashley 3 Board Certified [employer] Spiritual Care  
[phone]

## 2019-04-26 NOTE — PROGRESS NOTES
5729 -- Transfer report received from [place], ICU given to KATYA Barger. 
 
1800 -- Patient on the unit, vital signs taken, cardiac monitoring applied, IV flushed and infusing, call bell and personal belongings in reach. Bedside shift change report given to KATYA Traore (oncoming nurse) by Eugenio Ma RN (offgoing nurse). Report included the following information SBAR, Kardex, Intake/Output, MAR and Recent Results.

## 2019-04-26 NOTE — PROGRESS NOTES
1900: Received pt from off going 1900 [facility], pt resting in bed quietly, VSS, side rails raised x3, bed in lowest position. 0000: VSS. No temperatures. Pt not in pain. 0700: Bedside and Verbal shift change report given to Doctor Herman Zayas (oncoming nurse) by Aisha Cotton 
 (offgoing nurse). Report included the following information SBAR, Kardex, ED Summary, Procedure Summary, Intake/Output, MAR, Accordion, Recent Results, Med Rec Status, Cardiac Rhythm AFIB and Alarm Parameters .

## 2019-04-26 NOTE — PROGRESS NOTES
Problem: Dysphagia (Adult) Goal: *Acute Goals and Plan of Care (Insert Text) Description Recommendations: 
Diet: mechanical-soft/honey-thick liquids (NO STRAWS) Meds: crushed in applesauce/pudding Aspiration Precautions Oral Care TID Other: SILENT ASPIRATION Goals:  Patient will: 1. Tolerate PO trials with 0 s/s overt distress in 4/5 trials 2. Utilize compensatory swallow strategies/maneuvers (decrease bite/sip, size/rate, alt. liq/sol) with min cues in 4/5 trials 3. Perform oral-motor/laryngeal exercises to increase oropharyngeal swallow function with min cues 4. Complete an objective swallow study (i.e., MBSS) to assess swallow integrity, r/o aspiration, and determine of safest LRD, min A - goal met 4/17/19 Outcome: Progressing Towards Goal 
  
SPEECH LANGUAGE PATHOLOGY DYSPHAGIA TREATMENT Patient: Marcy Goldberg (63 y.o. male) Date: 4/26/2019 Diagnosis: Chronic bilateral pleural effusions [J90] HCAP (healthcare-associated pneumonia) [J18.9] CHF (congestive heart failure) ([facility] [address] [I50.9] Bilateral pleural effusion [J90] HCAP (healthcare-associated pneumonia) [J18.9] CHF (congestive heart failure) ([facility]) [I50.9] Chronic bilateral pleural effusions Precautions: aspiration Fall, Skin ASSESSMENT: 
Followed up this day with dysphagia intervention. A&Ox3. MBS completed 4/17/19 with SILENT aspiration on thin and nectar-thick liquids via cup and straw sips. Pt able to recall participating but unable to recall results. Reviewed results again and recommendations, requires reinforcement. Patient observed self-feeding mech-soft, HTL breakfast. Pt demo wet breath sounds prior to and following PO trials. Swallow appeared timely with 0 overt s/sx of aspiration. Pt requesting regular/thin water.  SLP ensured oral cavity cleared and provided x 3 cup sips; immediate coughs after each sip;encouraged strong effortful coughs but pt with poor VF adduction. Reviewed stregnthening ex to improve vocal quality and swallow function. Rec: continue mechanical soft solids with HONEY thick liquids for quality of life. Pt at high risk of aspiration, strict aspiration precautions. D/w RN. SLP will continue to follow. Progression toward goals: 
?         Improving appropriately and progressing toward goals ? Improving slowly and progressing toward goals ? Not making progress toward goals and plan of care will be adjusted PLAN: 
Recommendations and Planned Interventions: 
Mech-soft/HTL Patient continues to benefit from skilled intervention to address the above impairments. Continue treatment per established plan of care. Discharge Recommendations:  Aric Kim SUBJECTIVE:  
Patient stated ? I just want water? . 
 
OBJECTIVE:  
Cognitive and Communication Status: 
Neurologic State: Alert Orientation Level: Oriented to person, Oriented to place, Disoriented to situation, Disoriented to time Cognition: Follows commands Perception: Appears intact Perseveration: No perseveration noted Safety/Judgement: Awareness of environment, Fall prevention, Insight into deficits Dysphagia Treatment: 
Oral Assessment: 
Oral Assessment Labial: No impairment Dentition: Edentulous Oral Hygiene: good Lingual: No impairment Velum: No impairment Mandible: No impairment P.O. Trials: 
Patient Position: [facility] 60* Vocal quality prior to P.O.: Breathy, Low volume, Phonation breaks Consistency Presented: Honey thick liquid, Mechanical soft How Presented: SLP-fed/presented, Cup/sip Bolus Acceptance: No impairment Bolus Formation/Control: Impaired Type of Impairment: Mastication, Delayed Propulsion: Delayed (# of seconds) Oral Residue: 10-50% of bolus, Lingual 
 Initiation of Swallow: No impairment Laryngeal Elevation: Decreased Aspiration Signs/Symptoms: Decrease in O2 saturations, Change vocal quality Pharyngeal Phase Characteristics: Audible swallow, Altered vocal quality, Multiple swallows, Suspected pharyngeal residue Effective Modifications: Alternate liquids/solids, Cup/sip, Small sips and bites, Supraglottic swallow Cues for Modifications: Moderate Oral Phase Severity: Mild Pharyngeal Phase Severity : Severe Exercises: 
Laryngeal Exercises: 
Sustained \"ah\": No 
  
  
Mendelsohn Maneuver: No 
  
  
Effortful Swallow: Yes Sets : 1 Reps : 5 
Supraglottic Swallow: No 
  
  
Super-Supraglottic Swallow: No 
  
  
Incentive Spirometer: No 
  
  
  
Maria Isabel: Yes Sets : 1 Reps : 5 Sing \"EEE\": Yes Sets : 1 Reps : 15 Shaker: No 
  
  
Look Up at Ceiling/Gargle: No 
  
  
Open Mouth Wide/Yawn: No 
  
  
Tongue Back & Hold: No 
  
  
Effortful Breath Hold: No 
  
  
Hard Glottal Attack: No 
  
  
PAIN: 
Pain level pre-treatment: 0/10 Pain level post-treatment: 0/10 After treatment:  
?              Patient left in no apparent distress sitting up in chair ? Patient left in no apparent distress in bed 
? Call bell left within reach ? Nursing notified ? Family present ? Caregiver present ? Bed alarm activated COMMUNICATION/EDUCATION:  
? Aspiration precautions; swallow safety; compensatory techniques ? Patient unable to participate in education; education ongoing with staff ? Posted safety precautions in patient's room. ? Oral-motor/laryngeal strengthening exercises DESTINEE Garcia Time Calculation: 16 mins

## 2019-04-26 NOTE — PROGRESS NOTES
RENAL PROGRESS NOTE Brandyn Tarci Assessment/Plan: · NEL in a setting of HAP/a.fib with rvr/systolic chf. Improving slowly, ontinue to monitor. Volume status is difficult to assess. Wouldn't attempt to diurese at this time given suboptimal oral intake and borderline hypotension. · Hypernatremia. Encourage oral intake of free water, contiue gentle D5W. · Systolic chf.  
· A.fib with rvr. Better controlled, card on the case. Try to wean off midodrin. · HAP. Finished abx, s/p lt  Thoracocentesis/plerual catheter. · Debilitation/malnutrition. Subjective:Patient complaints off: Feels a little better today. States \"breathing is not too bad\", no cp/n/v. Appetite is a little better. Thirsty but doesn't like thickened water. Patient Active Problem List  
Diagnosis Code  Asthma J45.909  Prostate cancer ([facility] C61  Hypercholesterolemia E78.00  Angina, class I ([facility] [address] I20.9  Left bundle branch block I44.7  Coronary artery disease I25.10  Hypertension I11.9  Dyslipidemia E78.5  Carotid artery disease ([facility]) I77.9  Aortic valve stenosis I35.0  Anemia D64.9  
 NSTEMI (non-ST elevated myocardial infarction) ([facility]) I21.4  Congestive heart disease ([facility]) I50.9  CHF (congestive heart failure) ([facility]) I50.9  HCAP (healthcare-associated pneumonia) J18.9  Chronic bilateral pleural effusions J90  
 Bilateral pleural effusion J90  
 Atrial fibrillation with rapid ventricular response ([facility]) I48.91 Current Facility-Administered Medications Medication Dose Route Frequency Provider Last Rate Last Dose  
 guaiFENesin (ROBITUSSIN) 100 mg/5 mL oral liquid 400 mg  400 mg Oral TID Joie Phoenix, MD   400 mg at 04/26/19 0837  
 dextrose 5% infusion  50 mL/hr IntraVENous CONTINUOUS Kelly Holley, MD 50 mL/hr at 04/26/19 0732 50 mL/hr at 04/26/19 0732  metoprolol tartrate (LOPRESSOR) tablet 12.5 mg  12.5 mg Oral BID Mable BELL PA-C   Stopped at 04/25/19 1500  
 insulin glargine (LANTUS) injection 5 Units  5 Units SubCUTAneous DAILY Lanie Fields MD   5 Units at 04/26/19 0836  
 glucose chewable tablet 16 g  4 Tab Oral PRN Nakul Tirado MD      
 glucagon (GLUCAGEN) injection 1 mg  1 mg IntraMUSCular PRN Nakul Tirado MD      
 dextrose (D50) infusion 12.5-25 g  25-50 mL IntraVENous PRN Nakul Tirado MD      
 insulin lispro (HUMALOG) injection   SubCUTAneous AC&HS Nakul Tirado MD   Stopped at 04/25/19 2200  ipratropium (ATROVENT) 0.02 % nebulizer solution 0.5 mg  0.5 mg Nebulization Q6H PRN Carmelzoie COREAS NP      
 methylPREDNISolone (PF) (SOLU-MEDROL) injection 20 mg  20 mg IntraVENous Q12H Herber COREAS MD   20 mg at 04/26/19 5813  ALPRAZolam (XANAX) tablet 0.5 mg  0.5 mg Oral BID PRN Joyce Cristina MD      
 albuterol-ipratropium (DUO-NEB) 2.5 MG-0.5 MG/3 ML  3 mL Nebulization Q2H PRN Joyce Cristina MD   3 mL at 04/23/19 1951  potassium chloride SR (KLOR-CON 10) tablet 10 mEq  10 mEq Oral BID Dwayne Fenton MD   10 mEq at 04/26/19 7132  digoxin (LANOXIN) tablet 0.125 mg  0.125 mg Oral Q MON, WED & Luis Gallardo MD   0.125 mg at 04/24/19 2135  arformoterol (BROVANA) neb solution 15 mcg  15 mcg Nebulization BID RT John De Leon MD   15 mcg at 04/25/19 7029  budesonide (PULMICORT) 500 mcg/2 ml nebulizer suspension  500 mcg Nebulization BID RT John De Leon MD   500 mcg at 04/25/19 7255  Lactobacillus Acidoph & Bulgar [facility]) tablet 2 Tab  2 Tab Oral BID John De Leon MD   2 Tab at 04/26/19 0836  
 magnesium hydroxide (MILK OF MAGNESIA) 400 mg/5 mL oral suspension 30 mL  30 mL Oral DAILY PRN John De Leon MD      
  pneumococcal 23-valent (PNEUMOVAX 23) injection 0.5 mL  0.5 mL IntraMUSCular PRIOR TO DISCHARGE Robin Mcdonald MD      
 heparin (porcine) injection 5,000 Units  5,000 Units SubCUTAneous Q8H Juarez Sauceda MD   5,000 Units at 04/26/19 0600  
 metoprolol (LOPRESSOR) injection 2.5 mg  2.5 mg IntraVENous Q4H PRN Fabi Kahn PA   2.5 mg at 04/19/19 0216  bisacodyl (DULCOLAX) suppository 10 mg  10 mg Rectal DAILY PRN Dae Pollock MD   10 mg at 04/12/19 3956  sodium chloride (NS) flush 5-10 mL  5-10 mL IntraVENous PRN Tanner Young MD   10 mL at 04/23/19 2132  aspirin chewable tablet 81 mg  81 mg Oral DAILY Tanner Young MD   81 mg at 04/26/19 0837  
 atorvastatin (LIPITOR) tablet 40 mg  40 mg Oral QHS Tanner Young MD   40 mg at 04/25/19 2219  clopidogrel (PLAVIX) tablet 75 mg  75 mg Oral DAILY Tanner Young MD   75 mg at 04/26/19 2728  famotidine (PEPCID) tablet 20 mg  20 mg Oral DAILY Tanner Young MD   20 mg at 04/26/19 0836  
 midodrine (PROAMITINE) tablet 10 mg  10 mg Oral TID WITH MEALS Tanner Young MD   10 mg at 04/26/19 0836  
 nitroglycerin (NITROSTAT) tablet 0.4 mg  0.4 mg SubLINGual Q5MIN PRN Tanner Young MD      
 tamsulosin (FLOMAX) capsule 0.4 mg  0.4 mg Oral DAILY Tanner Young MD   0.4 mg at 04/26/19 5642  therapeutic multivitamin (THERAGRAN) tablet 1 Tab  1 Tab Oral DAILY Tanner Young MD   1 Tab at 04/26/19 4655  acetaminophen (TYLENOL) tablet 650 mg  650 mg Oral Q4H PRN Tanner Young MD   650 mg at 04/19/19 1118  ondansetron (ZOFRAN) injection 4 mg  4 mg IntraVENous Q4H PRN Tanner Young MD   4 mg at 04/12/19 3840 Objective Vitals:  
 04/26/19 0738 04/26/19 0800 04/26/19 0900 04/26/19 1000 BP:  108/51 106/51 105/47 Pulse:  78 82 72 Resp:  15 19 16 Temp:  97.8 °F (36.6 °C) SpO2: 98% 99% 95% Weight:      
Height:      
 
 
 
Intake/Output Summary (Last 24 hours) at 4/26/2019 1139 Last data filed at 4/26/2019 1100 Gross per 24 hour Intake 1176.67 ml Output 650 ml Net 526.67 ml Admission weight: Weight: 59.9 kg (132 lb) (04/10/19 1609) Last Weight Metrics: 
Weight Loss Metrics 4/26/2019 4/9/2019 4/2/2019 3/12/2019 3/7/2019 3/1/2019 2/23/2019 Today's Wt 141 lb 11.2 oz 141 lb 6.4 oz 147 lb 4.3 oz 149 lb 0.5 oz - 161 lb 163 lb 2.3 oz  
BMI 22.87 kg/m2 22.15 kg/m2 23.07 kg/m2 - 23.34 kg/m2 25.22 kg/m2 25.55 kg/m2 Physical Assessment:  
 
General: NAD, alert and oriented. Frail, cachectic. Neck: No jvd. LUNGS: diminished air entry at the bases, bl exp wheezes. CVS EXM: S1, S2, irregular. Abdomen: soft, non tender. Lower Extremities:  1+ bl thigh edema. Lab CBC w/Diff Recent Labs  
  04/25/19 
0215 04/24/19 
0405 04/23/19 
1620 WBC 12.1 10.8 12.8 RBC 3.39* 3.31* 3.47* HGB 8.5* 8.5* 8.7* HCT 29.6* 28.8* 30.3*  263 275 GRANS 90* 86* 86* LYMPH 9* 12* 9* EOS 0 0 0 Chemistry Recent Labs  
  04/26/19 
0200 04/25/19 
0215 04/24/19 
0405 * 96 168* * 151* 148* K 3.9 4.0 3.6 * 112* 108 CO2 29 31 30 BUN 90* 99* 94* CREA 1.96* 2.09* 2.12* CA 8.7 8.9 8.7 AGAP 9 8 10 BUCR 46* 47* 44* ALB 2.7* 2.7* 2.6* PHOS 4.4 4.4 5.7* No results found for: IRON, FE, TIBC, IBCT, PSAT, FERR Lab Results Component Value Date/Time Calcium 8.7 04/26/2019 02:00 AM  
 Phosphorus 4.4 04/26/2019 02:00 AM  
  
 
Lin Long M.D. [facility] Phone [phone] Pager [phone]

## 2019-04-26 NOTE — PROGRESS NOTES
Problem: General Medical Care Plan Goal: *Vital signs within specified parameters Outcome: Progressing Towards Goal 
Goal: *Labs within defined limits Outcome: Progressing Towards Goal 
Goal: *Absence of infection signs and symptoms Outcome: Progressing Towards Goal 
Goal: *Optimal pain control at patient's stated goal 
Outcome: Progressing Towards Goal 
Goal: *Skin integrity maintained Outcome: Progressing Towards Goal 
Goal: *Fluid volume balance Outcome: Progressing Towards Goal 
Goal: *Optimize nutritional status Outcome: Progressing Towards Goal 
Goal: *Anxiety reduced or absent Outcome: Progressing Towards Goal 
Goal: *Progressive mobility and function (eg: ADL's) Outcome: Progressing Towards Goal 
  
Problem: Impaired Skin Integrity/Pressure Injury Treatment Goal: *Improvement of Existing Pressure Injury Outcome: Progressing Towards Goal 
Goal: *Prevention of pressure injury Description Document William Scale and appropriate interventions in the flowsheet. Outcome: Progressing Towards Goal 
  
Problem: Gas Exchange - Impaired Goal: *Absence of hypoxia Outcome: Progressing Towards Goal 
  
Problem: Patient Education: Go to Patient Education Activity Goal: Patient/Family Education Outcome: Progressing Towards Goal 
  
Problem: Pain Goal: *Control of Pain Outcome: Progressing Towards Goal 
Goal: *PALLIATIVE CARE:  Alleviation of Pain Outcome: Progressing Towards Goal 
  
Problem: Nutrition Deficit Goal: *Optimize nutritional status Outcome: Progressing Towards Goal 
  
Problem: Falls - Risk of 
Goal: *Absence of Falls Description Document Charolett President Fall Risk and appropriate interventions in the flowsheet. Outcome: Progressing Towards Goal 
  
Problem: Patient Education: Go to Patient Education Activity Goal: Patient/Family Education Outcome: Progressing Towards Goal 
  
Problem: Pressure Injury - Risk of 
Goal: *Prevention of pressure injury Description Document William Scale and appropriate interventions in the flowsheet. Outcome: Progressing Towards Goal 
  
Problem: Patient Education: Go to Patient Education Activity Goal: Patient/Family Education Outcome: Progressing Towards Goal 
  
Problem: Patient Education: Go to Patient Education Activity Goal: Patient/Family Education Outcome: Progressing Towards Goal 
  
Problem: Patient Education: Go to Patient Education Activity Goal: Patient/Family Education Outcome: Progressing Towards Goal 
  
Problem: Patient Education: Go to Patient Education Activity Goal: Patient/Family Education Outcome: Progressing Towards Goal 
  
Problem: Breathing Pattern - Ineffective Goal: *Absence of hypoxia Outcome: Progressing Towards Goal 
Goal: *Use of effective breathing techniques Outcome: Progressing Towards Goal 
Goal: *PALLIATIVE CARE:  Alleviation of Dyspnea Outcome: Progressing Towards Goal 
  
Problem: Patient Education: Go to Patient Education Activity Goal: Patient/Family Education Outcome: Progressing Towards Goal

## 2019-04-26 NOTE — PROGRESS NOTES
[facility] ICU Progress Note Name: Dustin Mckeon : 1926 MRN: [id number] Date: 2019 7:48 AM 
  
[x]I have reviewed the flowsheet and previous days notes. Events overnight reviewed and discussed with nursing staff. Vital signs and records reviewed. Subjective: 
19: 
 
D5W began yesterday for Hypernatremia Juan'd Lopressor PO BID added yesterday Robitussin PO TID added yesterday []The patient is unable to give any meaningful history or review of systems because the patient is: 
[]Intubated []Sedated  
[]Unresponsive []The patient is critically ill on     
[]Mechanical ventilation []Pressors []BiPAP [] ROS:A comprehensive review of systems was negative except for that written in the HPI. Medication Review: · Pressors -  None · Sedation - None · Antibiotics -  None · Pain - APAP, PRN 
· GI/ DVT - Pepcid PO / SQ Heparin Vital Signs:   
Visit Vitals /54 Pulse 74 Temp 96.9 °F (36.1 °C) Resp 15 Ht 5' 6\" (1.676 m) Wt 64.3 kg (141 lb 11.2 oz) SpO2 98% BMI 22.87 kg/m² O2 Device: Nasal cannula O2 Flow Rate (L/min): 2 l/min Temp (24hrs), Av.4 °F (36.3 °C), Min:96.6 °F (35.9 °C), Max:98.4 °F (36.9 °C) Intake/Output:  
Last shift:      No intake/output data recorded. Last 3 shifts: 1901 -  0700 In: 926.7 [I.V.:926.7] Out: 1025 [Urine:1025] Intake/Output Summary (Last 24 hours) at 2019 Last data filed at 2019 0600 Gross per 24 hour Intake 926.67 ml Output 750 ml Net 176.67 ml Ventilator Settings: 
  
  
Ventilator Volumes Vt Spont (ml): 450 ml Ve Observed (l/min): 6.4 l/min Physical Exam: 
 
General: Alert. NAD.  VSS HEENT:  Anicteric sclerae; pink palpebral conjunctivae; mucosa moist 
Resp:  Symmetrical chest rise, no accessory muscle use. BS = Clear with scattered crackles, decreased bases (> R).    no rales/ wheezing/ rhonchi noted CV: Irregular. no m/g/r 
GI:  Abdomen soft, non-tender; (+) active bowel sounds Extremities:  +2 pulses on all extremities; no edema/ cyanosis/ clubbing noted Skin:  Warm; no rashes/ lesions noted Neurologic:  Non-focal.  Alert. [place]. Follows commands. Weakly follows all commands Devices:  No NGT/OGT, Central line/ PICC, ETT/tracheostomy. + L Chest Pluer-X catheter DATA:  
 
Current Facility-Administered Medications Medication Dose Route Frequency  guaiFENesin (ROBITUSSIN) 100 mg/5 mL oral liquid 400 mg  400 mg Oral TID  dextrose 5% infusion  50 mL/hr IntraVENous CONTINUOUS  
 metoprolol tartrate (LOPRESSOR) tablet 12.5 mg  12.5 mg Oral BID  insulin glargine (LANTUS) injection 5 Units  5 Units SubCUTAneous DAILY  glucose chewable tablet 16 g  4 Tab Oral PRN  
 glucagon (GLUCAGEN) injection 1 mg  1 mg IntraMUSCular PRN  
 dextrose (D50) infusion 12.5-25 g  25-50 mL IntraVENous PRN  
 insulin lispro (HUMALOG) injection   SubCUTAneous AC&HS  ipratropium (ATROVENT) 0.02 % nebulizer solution 0.5 mg  0.5 mg Nebulization Q6H PRN  
 methylPREDNISolone (PF) (SOLU-MEDROL) injection 20 mg  20 mg IntraVENous Q12H  ALPRAZolam (XANAX) tablet 0.5 mg  0.5 mg Oral BID PRN  
 albuterol-ipratropium (DUO-NEB) 2.5 MG-0.5 MG/3 ML  3 mL Nebulization Q2H PRN  potassium chloride SR (KLOR-CON 10) tablet 10 mEq  10 mEq Oral BID  digoxin (LANOXIN) tablet 0.125 mg  0.125 mg Oral Q MON, WED & FRI  arformoterol (BROVANA) neb solution 15 mcg  15 mcg Nebulization BID RT  
 budesonide (PULMICORT) 500 mcg/2 ml nebulizer suspension  500 mcg Nebulization BID RT  
 Lactobacillus Acidoph & Bulgar (FLORANEX) tablet 2 Tab  2 Tab Oral BID  magnesium hydroxide (MILK OF MAGNESIA) 400 mg/5 mL oral suspension 30 mL  30 mL Oral DAILY PRN  pneumococcal 23-valent (PNEUMOVAX 23) injection 0.5 mL  0.5 mL IntraMUSCular PRIOR TO DISCHARGE  
  heparin (porcine) injection 5,000 Units  5,000 Units SubCUTAneous Q8H  
 metoprolol (LOPRESSOR) injection 2.5 mg  2.5 mg IntraVENous Q4H PRN  
 bisacodyl (DULCOLAX) suppository 10 mg  10 mg Rectal DAILY PRN  
 sodium chloride (NS) flush 5-10 mL  5-10 mL IntraVENous PRN  
 aspirin chewable tablet 81 mg  81 mg Oral DAILY  atorvastatin (LIPITOR) tablet 40 mg  40 mg Oral QHS  clopidogrel (PLAVIX) tablet 75 mg  75 mg Oral DAILY  famotidine (PEPCID) tablet 20 mg  20 mg Oral DAILY  midodrine (PROAMITINE) tablet 10 mg  10 mg Oral TID WITH MEALS  nitroglycerin (NITROSTAT) tablet 0.4 mg  0.4 mg SubLINGual Q5MIN PRN  
 tamsulosin (FLOMAX) capsule 0.4 mg  0.4 mg Oral DAILY  therapeutic multivitamin (THERAGRAN) tablet 1 Tab  1 Tab Oral DAILY  acetaminophen (TYLENOL) tablet 650 mg  650 mg Oral Q4H PRN  
 ondansetron (ZOFRAN) injection 4 mg  4 mg IntraVENous Q4H PRN Labs: Results:  
   
Chemistry Recent Labs  
  04/26/19 
0200 04/25/19 0215 04/24/19 
0405 * 96 168* * 151* 148* K 3.9 4.0 3.6 * 112* 108 CO2 29 31 30 BUN 90* 99* 94* CREA 1.96* 2.09* 2.12* CA 8.7 8.9 8.7 AGAP 9 8 10 BUCR 46* 47* 44* ALB 2.7* 2.7* 2.6* CBC w/Diff Recent Labs  
  04/25/19 0215 04/24/19 
0405 04/23/19 
1620 WBC 12.1 10.8 12.8 RBC 3.39* 3.31* 3.47* HGB 8.5* 8.5* 8.7* HCT 29.6* 28.8* 30.3*  263 275 GRANS 90* 86* 86* LYMPH 9* 12* 9* EOS 0 0 0 Coagulation No results for input(s): PTP, INR, APTT in the last 72 hours. No lab exists for component: INREXT Liver Enzymes Recent Labs  
  04/26/19 0200 ALB 2.7* ABG No results found for: PH, PHI, PCO2, PCO2I, PO2, PO2I, HCO3, HCO3I, FIO2, FIO2I Microbiology No results for input(s): CULT in the last 72 hours. Telemetry: [x]Sinus []A-flutter []Paced []A-fib []Multiple PVCs Imaging: 
None this AM 
 
 
 
IMPRESSION:  
· S/p L Pleur-X Catheter placement (4/23/2019) · HCAP - completed ABX course x 12 days · NEL · Afib with RVR 
· CHF · CAD, S/p PCI (3/11/2019) on Plavix & ASA · Hypernatremia · Acute Hypoxic Respiratory Failure secondary to Pulmonary Edema with Pleural Effusions RECOMMENDATIONS:  
· Resp: Kanika N/c ( 2 L) with 02 sat's high 90's. Encouraged pulmonary toilet as kanika. Continue Nebs (Brovana BID, Pulmicort BID, Duoneb PRN). Titrate FiO2/ supp O2 for SpO2 >90%. Imaging PRN 
· I/D: Afebrile. Non-toxic appearance. No recent ABX's. Observe w/o ABX · Hem/Onc: No signs of bleeding. No CBC this AM 
· CVS: Cardiology following for Rhythm & S/p PCI Mgmt. Stable. No gtt's. SBP > 90 = continue Midodrine 10 mg PO TID. Tele = ?2 degree HB. Continues on Digoxin, Lopressor, & Plavix. Dig level 1.9 this AM 
· Metabolic: Daily BMP; monitor e-lytes; replace PRN 
· Renal following: Cr trending down (1.96). I ~ O's. Monitor auto-diuresis. Trend Renal indices · Hypernatremia:  Na improved (147) with addition of D5W = continue. · Endocrine: POC Glucose q6 
· GI: Kanika PO with supplements. Pepcid PO. Zofran PRN for N/V  
· Musc/Skin: No acute issues, wound care · Neuro: · Fluids: D5W @ 50 cc / hr  
· PT/OT Following · Steroid Mgmt (20 mg IVP BID) per MD 
· L Pleur-X Catheter drain timing per MD 
· To be Discussed in Interdisciplinary Rounds ([id number] Christine Rojo to Transfer out of ICU) Best Practices/ Safety Bundles: 
· Sepsis Bundle per Hospital Protocol · CAUTI Bundle · Electrolyte Replacement Bundle · Glycemic control goal <180; avoid Hypoglycemia · IHI ICU Bundles: 
·  None · Mech Vent patients: · VAP bundle, Aim to keep peak plateau pressure 92-98WS H2O 
· Aspiration Precautions - HOB >30' · Daily sedation holiday as indicated · SBT as tolerated/appropriate · Titrate FiO2 for SpO2 >94% · Aggressive Pulmonary Hygeiene · Stress ulcer prophylaxis. Pepcid PO  
· DVT prophylaxis. SQ Heparin · Need for Lines, salgado assessed. · Restraints need. · Palliative care evaluation The patient is: [x] acutely ill Risk of deterioration: [] moderate  
 [] critically ill  [x] high  
 
[x]See my orders for details My assessment/plan was discussed with: 
[x]Nursing []PT/OT   
[]Respiratory therapy []  
[]Family [] Critical Care Time = 45 minutes Cite Archie Romero 
04/26/19 Pulmonary, Critical Care Medicine [facility]

## 2019-04-26 NOTE — PROGRESS NOTES
Respiratory Therapy Assessment Care Plan Patient: 
Dennise Porter 80 y.o. male 4/26/2019 8:12 AM 
 
Chronic bilateral pleural effusions [J90] HCAP (healthcare-associated pneumonia) [J18.9] CHF (congestive heart failure) ([address] [I50.9] Bilateral pleural effusion [J90] HCAP (healthcare-associated pneumonia) [J18.9] CHF (congestive heart failure) ([address] [I50.9] Chest X-RAY:  
Results from Hospital Encounter encounter on 04/10/19 XR CHEST PORT Impression IMPRESSION: 
 
Patchy bilateral airspace disease again noted. No discrete pleural effusion 
identified. Left pleural drainage catheter in place. Mliss Pozo XR CHEST PORT Impression IMPRESSION: 
 
Improved appearance of the left base status post placement of a pleural drain. Other findings are stable allowing for differences in technique XR CHEST PORT Impression IMPRESSION: 
 
There appear to be small bilateral pleural effusions. Size may be underestimated 
by portable technique of the patient is not upright. Effusions appear no larger 
than on the prior study Persistent scattered bilateral airspace densities which somewhat appear 
improved from the prior study Vital Signs:   Visit Vitals /51 Pulse 78 Temp 96.9 °F (36.1 °C) Resp 15 Ht 5' 6\" (1.676 m) Wt 64.3 kg (141 lb 11.2 oz) SpO2 99% BMI 22.87 kg/m² Indications for treatment: Hypoxia, increased WOB and SOB Plan of care: O2 therapy, VibraPEP to stimulate cough and help mobilize secretions Goal: Wean O2 down, clear lungs

## 2019-04-26 NOTE — PROGRESS NOTES
[facility]  -  Progress Note Patient: Mariposa Newman MRN: [id number]  SSN: [id number] YOB: 1926  Age: 80 y.o. Sex: male Admit Date: 4/10/2019 Patient seen and examined independently. Heart rate acceptable. Blood pressure still marginal for treatment of cardiomyopathy. Holding on diuretics. Nutrition still significant issue. Agree with assessment and plan as noted below. Leslie Estrada MD 
Assessment: - Acute hypoxic respiratory failure - HCAP: Multifocal PNA on CXR 04/10/19, multifocal consolidation within the left upper and bilateral lower lobes, likely recurrently multifocal PNA 
- Atrial fibrillation with RVR: CHADS2-Vasc score 5 (CHF, HTN, age, Vascular disease) and sinus tachycardia - Bilateral moderate to large pleural effusions CT chest 04/10-- s/p multiple thoracenteses, PleurX catheter (left) 04/23 
-CAD s/p LAD PCI with DEMAR 3/11/2019 with previous PCI to LCx 2011. Cath 3/11/2019 (Occluded mid RCA which appears to be chronic. There is faint collateral from the left coronary system, Left main artery with ostial 30-40%, LAD mid focal severely calcified tortuous 99% stenosis with CARLENE II flow, Circumflex coronary artery with diffuse mild 20-30% stenosis throughout). Discharged on ASA, plavix, statin, BB. 
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   
-Echo 03/14/19 with EF 26-30%  
-NEL, Creatinine 1.62 04/12 
-Peripheral vascular disease. -Hx Hypertension 
-Dyslipidemia 
-Asthma, former smoker. -Advanced age Plan:  
 
-Continue current cardiac medication regimen - ASA, Lipitor, Plavix. -Low-dose Metoprolol has been ordered but held both doses due to borderline BP. Midodrine being weaned by nephrology team, appreciate assistance. 
-No ACEi/ARB/ARNI due to borderline BP. 
-Encourage increased PO intake. 
-Will be available as needed over the weekend. Subjective: No new complaints. Objective:  
  
Patient Vitals for the past 8 hrs: Temp Pulse Resp BP SpO2  
04/26/19 1200  72 18 107/54 100 % 04/26/19 1146     100 % 04/26/19 1100  72 16 108/58   
04/26/19 1000  72 16 105/47   
04/26/19 0900  82 19 106/51 95 % 04/26/19 0800 97.8 °F (36.6 °C) 78 15 108/51 99 % 04/26/19 0738     98 % 04/26/19 0700  74 15 114/53 98 % Patient Vitals for the past 96 hrs: 
 Weight 04/26/19 0400 141 lb 11.2 oz (64.3 kg) 04/25/19 0200 138 lb 3.7 oz (62.7 kg) 04/24/19 0145 140 lb 4.8 oz (63.6 kg) 04/23/19 0004 148 lb 8 oz (67.4 kg) Intake/Output Summary (Last 24 hours) at 4/26/2019 1403 Last data filed at 4/26/2019 1100 Gross per 24 hour Intake 1050 ml Output 450 ml Net 600 ml Physical Exam: 
General:  alert, cooperative, no distress, appears stated age Neck:  No JVD Lungs:  clear to auscultation bilaterally; coarse upper airway sounds Heart:  Regular rate and rhythm Abdomen:  abdomen is soft without significant tenderness, masses, organomegaly or guarding Extremities:  Multiple areas of ecchymosis to upper extremities Data Review:  
 
Labs: Results:  
   
Chemistry Recent Labs  
  04/26/19 
0200 04/25/19 
0215 04/24/19 
0405 * 96 168* * 151* 148* K 3.9 4.0 3.6 * 112* 108 CO2 29 31 30 BUN 90* 99* 94* CREA 1.96* 2.09* 2.12* CA 8.7 8.9 8.7 MG  --  3.5* 3.2*  
PHOS 4.4 4.4 5.7* AGAP 9 8 10 BUCR 46* 47* 44* ALB 2.7* 2.7* 2.6* CBC w/Diff Recent Labs  
  04/25/19 
0215 04/24/19 
0405 04/23/19 
1620 WBC 12.1 10.8 12.8 RBC 3.39* 3.31* 3.47* HGB 8.5* 8.5* 8.7* HCT 29.6* 28.8* 30.3*  263 275 GRANS 90* 86* 86* LYMPH 9* 12* 9* EOS 0 0 0 Lipid Panel Lab Results Component Value Date/Time  Cholesterol, total 87 03/08/2019 05:28 AM  
 HDL Cholesterol 44 03/08/2019 05:28 AM  
 LDL, calculated 30 03/08/2019 05:28 AM  
 VLDL, calculated 13 03/08/2019 05:28 AM  
 Triglyceride 65 03/08/2019 05:28 AM  
 CHOL/HDL Ratio 2.0 03/08/2019 05:28 AM  
 Liver Enzymes Recent Labs  
  04/26/19 
0200 ALB 2.7* Thyroid Studies Lab Results Component Value Date/Time  TSH 1.22 03/07/2019 05:29 AM

## 2019-04-26 NOTE — PROGRESS NOTES
Problem: Mobility Impaired (Adult and Pediatric) Goal: *Acute Goals and Plan of Care (Insert Text) Description Physical Therapy Goals Initiated 4/23/2019 and to be accomplished within 7 days. 1.  Patient will complete all bed mobility with minimal assistance/contact guard assist in order to prepare for EOB/OOB activity. 2.  Patient will perform sit <> stand with minimal assistance/contact guard assist in order to prepare for OOB/gait activity. 3.  Patient will perform bed to chair transfers with minimal assistance/contact guard assist in order to promote mobility and encourage seated activity to progress towards their prior level of function. 4.  Patient will ambulate 25 feet with minimal assistance/contact guard assist using LRAD in order to prepare for safe negotiation of their environment. Outcome: Progressing Towards Goal 
 PHYSICAL THERAPY TREATMENT Patient: Brandyn Aviles (35 y.o. male) Date: 4/26/2019 Diagnosis: Chronic bilateral pleural effusions [J90] HCAP (healthcare-associated pneumonia) [J18.9] CHF (congestive heart failure) ([facility] [address] [I50.9] Bilateral pleural effusion [J90] HCAP (healthcare-associated pneumonia) [J18.9] CHF (congestive heart failure) ([facility]) [I50.9] Chronic bilateral pleural effusions Precautions: Fall, Skin PLOF:  
 
ASSESSMENT: 
Patient supine in bed, agreeable to participation with PT. DTR present. Patient initially agreeable to OOB > chair but declining once chair present and set - up. Patient agreeable to bed level therex. Patient instructed in LE therex x 10 minutes; requires rest breaks throughout repetitions. Patient left supine in bed with all needs within reach. No c/o pain with  activity. /54; HR 99 bpm; RR 19. Progression toward goals:  
?      Improving appropriately and progressing toward goals ? Improving slowly and progressing toward goals ? Not making progress toward goals and plan of care will be adjusted PLAN: 
 Patient continues to benefit from skilled intervention to address the above impairments. Continue treatment per established plan of care. Discharge Recommendations:  Aric Julio Further Equipment Recommendations for Discharge:  N/A  
 
SUBJECTIVE:  
Patient stated ? I'm not comfortable in that chair. ? OBJECTIVE DATA SUMMARY:  
Critical Behavior: 
Neurologic State: Alert Orientation Level: Oriented to person, Oriented to place Cognition: Follows commands Safety/Judgement: Awareness of environment, Fall prevention, Insight into deficits Functional Mobility Training: 
Declined OOB despite encouragement. Therapeutic Exercises:  
 
 
EXERCISE Sets Reps Active Active Assist  
Passive Self ROM Comments Ankle Pumps 1 10  ? ? ? ? bilat Quad Sets/Glut Sets    ? ? ? ? Hamstring Sets   ? ? ? ? Short Arc Quads   ? ? ? ? Heel Slides 2 10 ? ? ? ? bilat Straight Leg Raises 2 10 ? ? ? ? bilat Hip Add 2 10 ? ? ? ? bilat Long Arc Quads   ? ? ? ? Seated Marching   ? ? ? ? Standing Marching   ? ? ? ?   
   ? ? ? ? Pain:None Pain level pre-treatment: 0/10 Pain level post-treatment: 0/10 Activity Tolerance:  
Fair Please refer to the flowsheet for vital signs taken during this treatment. After treatment:  
? Patient left in no apparent distress sitting up in chair ? Patient left in no apparent distress in bed 
? Call bell left within reach ? Nursing notified ? Caregiver present - DTR 
? Bed alarm activated ? SCDs applied COMMUNICATION/EDUCATION:  
?         Role of Physical Therapy in the acute care setting. ?         Fall prevention education was provided and the patient/caregiver indicated understanding. ? Patient/family have participated as able in working toward goals and plan of care. ?         Patient/family agree to work toward stated goals and plan of care.  
?         Patient understands intent and goals of therapy, but is neutral about his/her participation. ? Patient is unable to participate in stated goals/plan of care: ongoing with therapy staff. ?         Other: 
 
   
Celena Jaimes Time Calculation: 30 mins

## 2019-04-27 NOTE — PROGRESS NOTES
0715-  Bedside and Verbal shift change report given to Tre Jerome RN (oncoming nurse) by Sophy Rivera RN (offgoing nurse). Report included the following information SBAR, Kardex, Intake/Output, MAR and Recent Results. 1915-  Bedside and Verbal shift change report given to Countrydebbi Paredes RN (oncoming nurse) by Tre Jerome RN (offgoing nurse). Report included the following information SBAR, Kardex, Intake/Output, MAR and Recent Results.

## 2019-04-27 NOTE — PROGRESS NOTES
Problem: Gas Exchange - Impaired Goal: *Absence of hypoxia Outcome: Progressing Towards Goal 
  
Problem: Patient Education: Go to Patient Education Activity Goal: Patient/Family Education Outcome: Progressing Towards Goal 
  
Problem: Breathing Pattern - Ineffective Goal: *Absence of hypoxia Outcome: Progressing Towards Goal

## 2019-04-27 NOTE — ROUTINE PROCESS
Assumed care of patient at 1950 report received from Bong reis RN with SBAR, Intake & Output. HOB elevated Patient alert & oriented to self & place. On O2 at 2L/NC, respiration with dyspnea at rest. Skin with ecchymosis, abdominal dressing intact. Bed locked in lowest position, call bell within reach. 2110 - Patient eating. Family at the bedside. 2315 -  Patient resting quietly. 4/27/19 
0720 -  Bedside and Verbal shift change report given to Brooks Philip (oncoming nurse) by Adis Aquino RN BSN ( off going Nurse ) inclusive of SBAR and plan of care, Intake & Output.

## 2019-04-27 NOTE — PROGRESS NOTES
Problem: General Medical Care Plan Goal: *Vital signs within specified parameters Outcome: Progressing Towards Goal 
Goal: *Labs within defined limits Outcome: Progressing Towards Goal 
Goal: *Absence of infection signs and symptoms Outcome: Progressing Towards Goal 
Goal: *Optimal pain control at patient's stated goal 
Outcome: Progressing Towards Goal 
Goal: *Skin integrity maintained Outcome: Progressing Towards Goal 
Goal: *Fluid volume balance Outcome: Progressing Towards Goal 
Goal: *Optimize nutritional status Outcome: Progressing Towards Goal 
Goal: *Anxiety reduced or absent Outcome: Progressing Towards Goal 
Goal: *Progressive mobility and function (eg: ADL's) Outcome: Progressing Towards Goal 
  
Problem: Impaired Skin Integrity/Pressure Injury Treatment Goal: *Improvement of Existing Pressure Injury Outcome: Progressing Towards Goal 
Goal: *Prevention of pressure injury Description Document William Scale and appropriate interventions in the flowsheet. Outcome: Progressing Towards Goal 
  
Problem: Gas Exchange - Impaired Goal: *Absence of hypoxia Outcome: Progressing Towards Goal 
  
Problem: Patient Education: Go to Patient Education Activity Goal: Patient/Family Education Outcome: Progressing Towards Goal 
  
Problem: Pain Goal: *Control of Pain Outcome: Progressing Towards Goal 
Goal: *PALLIATIVE CARE:  Alleviation of Pain Outcome: Progressing Towards Goal 
  
Problem: Nutrition Deficit Goal: *Optimize nutritional status Outcome: Progressing Towards Goal 
  
Problem: Falls - Risk of 
Goal: *Absence of Falls Description Document Adriana Del Rio Fall Risk and appropriate interventions in the flowsheet. Outcome: Progressing Towards Goal 
  
Problem: Patient Education: Go to Patient Education Activity Goal: Patient/Family Education Outcome: Progressing Towards Goal 
  
Problem: Pressure Injury - Risk of 
Goal: *Prevention of pressure injury Description Document William Scale and appropriate interventions in the flowsheet. Outcome: Progressing Towards Goal 
  
Problem: Patient Education: Go to Patient Education Activity Goal: Patient/Family Education Outcome: Progressing Towards Goal 
  
Problem: Patient Education: Go to Patient Education Activity Goal: Patient/Family Education Outcome: Progressing Towards Goal 
  
Problem: Patient Education: Go to Patient Education Activity Goal: Patient/Family Education Outcome: Progressing Towards Goal 
  
Problem: Patient Education: Go to Patient Education Activity Goal: Patient/Family Education Outcome: Progressing Towards Goal 
  
Problem: Breathing Pattern - Ineffective Goal: *Absence of hypoxia Outcome: Progressing Towards Goal 
Goal: *Use of effective breathing techniques Outcome: Progressing Towards Goal 
Goal: *PALLIATIVE CARE:  Alleviation of Dyspnea Outcome: Progressing Towards Goal 
  
Problem: Patient Education: Go to Patient Education Activity Goal: Patient/Family Education Outcome: Progressing Towards Goal

## 2019-04-27 NOTE — PROGRESS NOTES
Progress Note Patient: Xavier Pisano               Sex: male          DOA: 4/10/2019 YOB: 1926      Age:  80 y.o.        LOS:  LOS: 17 days CHIEF COMPLAINT:  Other (pt on TCC and not improving per Daugher/wants evaluated.) Subjective:  
 
Pt said he is doing well, he said his breathing is good. He is happy about possible discharge soon. Objective:  
 
Visit Vitals /72 (BP 1 Location: Left arm, BP Patient Position: Head of bed elevated (Comment degrees)) Pulse 77 Temp 97.5 °F (36.4 °C) Resp 18 Ht 5' 6\" (1.676 m) Wt 64.3 kg (141 lb 11.2 oz) SpO2 96% BMI 22.87 kg/m² Physical Exam: 
Gen: cachectic Ears: hearing is intact Eyes: Icterus is not present Lungs:  diminished breath sounds bilaterally, upper airway crackles Heart: regular rate and rhythm, S1, S2 normal, no murmur, click, rub or gallop Gastrointestinal: soft, non-tender. Bowel sounds normal. No masses,  no organomegaly Neurological:  New Focal Deficits are not present Psychiatric:  Mood is stable Lab/Data Reviewed: 
CMP:  
Lab Results Component Value Date/Time  04/27/2019 05:50 AM  
 K 4.2 04/27/2019 05:50 AM  
  04/27/2019 05:50 AM  
 CO2 29 04/27/2019 05:50 AM  
 AGAP 7 04/27/2019 05:50 AM  
  (H) 04/27/2019 05:50 AM  
 BUN 87 (H) 04/27/2019 05:50 AM  
 CREA 1.57 (H) 04/27/2019 05:50 AM  
 GFRAA 50 (L) 04/27/2019 05:50 AM  
 GFRNA 41 (L) 04/27/2019 05:50 AM  
 CA 8.7 04/27/2019 05:50 AM  
 PHOS 4.4 04/27/2019 05:50 AM  
 ALB 2.7 (L) 04/27/2019 05:50 AM  
 
CBC:  
No results found for: WBC, HGB, HGBEXT, HCT, HCTEXT, PLT, PLTEXT, HGBEXT, HCTEXT, PLTEXT Imaging Reviewed: 
No results found. Assessment:  
 
Principal Problem: 
  Chronic bilateral pleural effusions (4/10/2019) Active Problems: 
  CHF (congestive heart failure) ([facility] [address] (4/10/2019) HCAP (healthcare-associated pneumonia) (4/10/2019) Atrial fibrillation with rapid ventricular response ([facility] [address] (4/13/2019) PLAN: 
· B/l recurrent pleural effusions - 2/2 CHF. Thoracentesis x2. Appreciate [facility] assistance. · Left PleurX catheter placed on 4/23 with significant improvement in resp status · Right PleurX catheter planned for 4/24 but canceled due to plavix. Discussed with Cardiology, cannot hold plavix due to recent stent last month. Discussed with Pulm, Dr. Kathy Cooper said right catheter can be placed 90 days after stent placement with plavix held temporarily, no need to place urgently. · [address] - d/w daughter about palliative care measures, she declines adamantly. · HCAP - completed 12 day course of treatment, d/c atbx · NEL - improving, likely multifactorial 2/2 atbx, diuretics. Appreciate Nephrology eval.  
· AF RVR - per Cardiology · CHF/CAD - per cardiology · Cont acceptable home medications for chronic conditions · DVT protocol I have personally reviewed all pertinent labs and films that have officially resulted over the last 24 hours. I have personally checked for all pending labs that are awaiting final results. Signed By: Ap Slaughter MD   
 April 27, 2019

## 2019-04-27 NOTE — PROGRESS NOTES
Problem: General Medical Care Plan Goal: *Vital signs within specified parameters Outcome: Progressing Towards Goal 
  
Problem: General Medical Care Plan Goal: *Absence of infection signs and symptoms Outcome: Progressing Towards Goal 
  
Problem: General Medical Care Plan Goal: *Optimal pain control at patient's stated goal 
Outcome: Progressing Towards Goal 
  
Problem: [address] Goal: *Skin integrity maintained Outcome: Progressing Towards Goal 
  
Problem: General Medical Care Plan Goal: *Optimize nutritional status Outcome: Progressing Towards Goal 
  
Problem: Gas Exchange - Impaired Goal: *Absence of hypoxia Outcome: Progressing Towards Goal 
  
Problem: Patient Education: Go to Patient Education Activity Goal: Patient/Family Education Outcome: Progressing Towards Goal 
  
Problem: Pain Goal: *Control of Pain Outcome: Progressing Towards Goal 
  
Problem: Pain Goal: *PALLIATIVE CARE:  Alleviation of Pain Outcome: Progressing Towards Goal 
  
Problem: Nutrition Deficit Goal: *Optimize nutritional status Outcome: Progressing Towards Goal 
  
Problem: Falls - Risk of 
Goal: *Absence of Falls Description Document Roselie Queen Fall Risk and appropriate interventions in the flowsheet. Outcome: Progressing Towards Goal 
  
Problem: Patient Education: Go to Patient Education Activity Goal: Patient/Family Education Outcome: Progressing Towards Goal 
  
Problem: Pressure Injury - Risk of 
Goal: *Prevention of pressure injury Description Document William Scale and appropriate interventions in the flowsheet.  
Outcome: Progressing Towards Goal

## 2019-04-27 NOTE — PROGRESS NOTES
Progress Note Patient: Marcy Goldberg MRN: [id number]  SSN: [id number] YOB: 1926  Age: 80 y.o. Sex: male Admit Date: 4/10/2019 Follow up for ARF Denies pain, sob. Poor PO Subjective:  
 
 
 
Objective:  
 
Visit Vitals /59 (BP 1 Location: Left arm, BP Patient Position: Head of bed elevated (Comment degrees)) Pulse 68 Temp 98.3 °F (36.8 °C) Resp 18 Ht 5' 6\" (1.676 m) Wt 64.3 kg (141 lb 11.2 oz) SpO2 92% BMI 22.87 kg/m² Intake/Output Summary (Last 24 hours) at 4/27/2019 1555 Last data filed at 4/27/2019 1307 Gross per 24 hour Intake 935.84 ml Output 1300 ml Net -364.16 ml  
 
 
 weak, frail, no distress Lungs rhonchi Cor reg 
abd bs+soft No edema Impression:arf/ hyperNA Plan:  
Creat falling Sodium falling Encourage PO Current Facility-Administered Medications Medication Dose Route Frequency  midodrine (PROAMITINE) tablet 5 mg  5 mg Oral TID WITH MEALS  predniSONE (DELTASONE) tablet 20 mg  20 mg Oral DAILY WITH BREAKFAST  guaiFENesin (ROBITUSSIN) 100 mg/5 mL oral liquid 400 mg  400 mg Oral TID  dextrose 5% infusion  50 mL/hr IntraVENous CONTINUOUS  
 metoprolol tartrate (LOPRESSOR) tablet 12.5 mg  12.5 mg Oral BID  insulin glargine (LANTUS) injection 5 Units  5 Units SubCUTAneous DAILY  glucose chewable tablet 16 g  4 Tab Oral PRN  
 glucagon (GLUCAGEN) injection 1 mg  1 mg IntraMUSCular PRN  
 dextrose (D50) infusion 12.5-25 g  25-50 mL IntraVENous PRN  
 insulin lispro (HUMALOG) injection   SubCUTAneous AC&HS  ipratropium (ATROVENT) 0.02 % nebulizer solution 0.5 mg  0.5 mg Nebulization Q6H PRN  
 ALPRAZolam (XANAX) tablet 0.5 mg  0.5 mg Oral BID PRN  
 albuterol-ipratropium (DUO-NEB) 2.5 MG-0.5 MG/3 ML  3 mL Nebulization Q2H PRN  potassium chloride SR (KLOR-CON 10) tablet 10 mEq  10 mEq Oral BID  digoxin (LANOXIN) tablet 0.125 mg  0.125 mg Oral Q MON, WED & FRI  
  arformoterol (BROVANA) neb solution 15 mcg  15 mcg Nebulization BID RT  
 budesonide (PULMICORT) 500 mcg/2 ml nebulizer suspension  500 mcg Nebulization BID RT  
 Lactobacillus Acidoph & Bulgar (FLORANEX) tablet 2 Tab  2 Tab Oral BID  magnesium hydroxide (MILK OF MAGNESIA) 400 mg/5 mL oral suspension 30 mL  30 mL Oral DAILY PRN  pneumococcal 23-valent (PNEUMOVAX 23) injection 0.5 mL  0.5 mL IntraMUSCular PRIOR TO DISCHARGE  heparin (porcine) injection 5,000 Units  5,000 Units SubCUTAneous Q8H  
 metoprolol (LOPRESSOR) injection 2.5 mg  2.5 mg IntraVENous Q4H PRN  
 bisacodyl (DULCOLAX) suppository 10 mg  10 mg Rectal DAILY PRN  
 sodium chloride (NS) flush 5-10 mL  5-10 mL IntraVENous PRN  
 aspirin chewable tablet 81 mg  81 mg Oral DAILY  atorvastatin (LIPITOR) tablet 40 mg  40 mg Oral QHS  clopidogrel (PLAVIX) tablet 75 mg  75 mg Oral DAILY  famotidine (PEPCID) tablet 20 mg  20 mg Oral DAILY  nitroglycerin (NITROSTAT) tablet 0.4 mg  0.4 mg SubLINGual Q5MIN PRN  
 tamsulosin (FLOMAX) capsule 0.4 mg  0.4 mg Oral DAILY  therapeutic multivitamin (THERAGRAN) tablet 1 Tab  1 Tab Oral DAILY  acetaminophen (TYLENOL) tablet 650 mg  650 mg Oral Q4H PRN  
 ondansetron (ZOFRAN) injection 4 mg  4 mg IntraVENous Q4H PRN Data Review: 
 
Recent Labs  
  04/25/19 
0215 WBC 12.1 RBC 3.39* HCT 29.6* MCV 87.3 MCH 25.1 MCHC 28.7*  
RDW 18.8*  
 
@recentrenalfxn@ No components found for: CREAT] Lab Results Component Value Date/Time BUN 87 (H) 04/27/2019 05:50 AM  
 BUN 90 (H) 04/26/2019 02:00 AM  
 BUN 99 (H) 04/25/2019 02:15 AM  
] Wm Elder MD 
April 27, 2019 
3:55 PM

## 2019-04-28 NOTE — PROGRESS NOTES
Problem: Self Care Deficits Care Plan (Adult) Goal: *Acute Goals and Plan of Care (Insert Text) Description Occupational Therapy Goals Initiated 4/18/2019 within 7 day(s). Pt re-evaluated on 4/24/19 following transfer to ICU. Updated #4 & 5; decreased therapy frequency to 3x/week. 1.  Patient will perform grooming tasks at EOB with supervision/set-up. 2.  Patient will perform upper/lower body dressing with minimal assistance. 3.  Patient will perform functional task in standing for 8 minutes with supervision and < 3 rest breaks to increase activity tolerance for ADLs. 4.  Patient will perform toilet transfers with supervision/set-up. 5.  Patient will perform all aspects of toileting with supervision/set-up. 6.  Patient will participate in upper extremity therapeutic exercise/activities for 8 minutes to increase BUE strength for functional transfers and ADLs. 7.  Patient will utilize energy conservation techniques during functional activities with minimal verbal cues. UPDATED GOALS; Dionna Queta MS OTR/L 
1. Patient will perform grooming tasks at EOB with supervision/set-up. 2.  Patient will perform upper/lower body dressing with minimal assistance. 3.  Patient will perform functional task in standing for 8 minutes with supervision and < 3 rest breaks to increase activity tolerance for ADLs. 4.  Patient will perform toilet transfers with minimal assistance. 5.  Patient will perform all aspects of toileting with minimal assistance. 6.  Patient will participate in upper extremity therapeutic exercise/activities for 8 minutes to increase BUE strength for functional transfers and ADLs. 7.  Patient will utilize energy conservation techniques during functional activities with minimal verbal cues. Outcome: Progressing Towards Goal 
 OCCUPATIONAL THERAPY TREATMENT Patient: Alistair Carson (88 y.o. male) Date: 4/28/2019 Diagnosis: Chronic bilateral pleural effusions [J90] HCAP (healthcare-associated pneumonia) [J18.9] CHF (congestive heart failure) ([facility] [address] [I50.9] Bilateral pleural effusion [J90] HCAP (healthcare-associated pneumonia) [J18.9] CHF (congestive heart failure) ([facility]) [I50.9] Chronic bilateral pleural effusions Precautions: Fall, Skin Chart, occupational therapy assessment, plan of care, and goals were reviewed. ASSESSMENT: 
Pt presented in bed and agreeable to therapy session. Pt required assistance to answer phone for IADL task. Pt performs bed mobility with Mod A x2 to sit EOB 2/2 decreased dynamic sitting balance. Pt reports feeling weak 2/2 deconditioning. Pt performs grooming and UB dressing tasks, seated EOB, with SBA/CGA 2/2 decreased dynamic sitting balance. Pt declines sit to stand transfer, reports pain in R hip. Pt requires Mod A x2 for sit to supine, with assistance to manage BLE. Pt soiled bed requiring Mod A x2 for log roll 2/2 fatigue. Pt requires Max A for bowel hygiene. Pt reported no pain after repositioning supine in bed. Pt educated on OOB, energy conservation, role of OT, and reasoning for SLP's recommendation of honey thickened liquids. Progression toward goals: 
?          Improving appropriately and progressing toward goals ? Improving slowly and progressing toward goals ? Not making progress toward goals and plan of care will be adjusted PLAN: 
Patient continues to benefit from skilled intervention to address the above impairments. Continue treatment per established plan of care. Discharge Recommendations:  Aric Kim Further Equipment Recommendations for Discharge:  bedside commode and shower chair SUBJECTIVE:  
Patient stated [place] Captain me a harder high five than that.  OBJECTIVE DATA SUMMARY:  
Cognitive/Behavioral Status: 
Neurologic State: Alert Orientation Level: Oriented to person, Oriented to place Cognition: Follows commands Safety/Judgement: Awareness of environment, Fall prevention, Insight into deficits Functional Mobility and Transfers for ADLs: 
Bed Mobility: 
Rolling: Moderate assistance;Assist x2 Supine to Sit: Moderate assistance;Assist x2 Sit to Supine: Moderate assistance;Assist x2 Scooting: Moderate assistance;Assist x2 Balance: 
Sitting: Impaired Sitting - Static: Good (unsupported) Sitting - Dynamic: Fair (occasional) ADL Intervention: 
Grooming Washing Face: Stand-by assistance Washing Hands: Stand-by assistance Upper Body Dressing Assistance Hospital Gown: Contact guard assistance Toileting Toileting Assistance: Maximum assistance Bowel Hygiene: Maximum assistance Pain: 
Pt reports 5/10 pain or discomfort prior to treatment R hip. Pt reports 1/10 pain or discomfort post treatment in R hip. Activity Tolerance:   
Fair Please refer to the flowsheet for vital signs taken during this treatment. After treatment:  
?  Patient left in no apparent distress sitting up in chair ? Patient left in no apparent distress in bed 
? Call bell left within reach ? Nursing notified ? Caregiver present ? Bed alarm activated COMMUNICATION/EDUCATION:  
? Home safety education was provided and the patient/caregiver indicated understanding. ? Patient/family have participated as able in goal setting and plan of care. ? Patient/family agree to work toward stated goals and plan of care. ? Patient understands intent and goals of therapy, but is neutral about his/her participation. ? Patient is unable to participate in goal setting and plan of care. Allyssa Delcid Time Calculation: 33 mins

## 2019-04-28 NOTE — PROGRESS NOTES
Progress Note Patient: Radha Brody MRN: [id number]  SSN: [id number] YOB: 1926  Age: 80 y.o. Sex: male Admit Date: 4/10/2019 F/u arf Subjective:  
Patient alert. Not eating much Objective:  
 
Visit Vitals /65 Pulse 65 Temp 97.6 °F (36.4 °C) Resp 18 Ht 5' 6\" (1.676 m) Wt 66.3 kg (146 lb 3.2 oz) SpO2 96% BMI 23.60 kg/m² Intake/Output Summary (Last 24 hours) at 4/28/2019 1311 Last data filed at 4/27/2019 1719 Gross per 24 hour Intake 50 ml Output 500 ml Net -450 ml Alert Comfortable Cor reg 
abd bs+soft No edema, abd bs+soft Impression:arf/ hyperNA Plan:  
Encourage PO Creat little better Sodium rising Current Facility-Administered Medications Medication Dose Route Frequency  midodrine (PROAMITINE) tablet 5 mg  5 mg Oral TID WITH MEALS  predniSONE (DELTASONE) tablet 20 mg  20 mg Oral DAILY WITH BREAKFAST  guaiFENesin (ROBITUSSIN) 100 mg/5 mL oral liquid 400 mg  400 mg Oral TID  dextrose 5% infusion  50 mL/hr IntraVENous CONTINUOUS  
 metoprolol tartrate (LOPRESSOR) tablet 12.5 mg  12.5 mg Oral BID  insulin glargine (LANTUS) injection 5 Units  5 Units SubCUTAneous DAILY  glucose chewable tablet 16 g  4 Tab Oral PRN  
 glucagon (GLUCAGEN) injection 1 mg  1 mg IntraMUSCular PRN  
 dextrose (D50) infusion 12.5-25 g  25-50 mL IntraVENous PRN  
 insulin lispro (HUMALOG) injection   SubCUTAneous AC&HS  ipratropium (ATROVENT) 0.02 % nebulizer solution 0.5 mg  0.5 mg Nebulization Q6H PRN  
 ALPRAZolam (XANAX) tablet 0.5 mg  0.5 mg Oral BID PRN  
 albuterol-ipratropium (DUO-NEB) 2.5 MG-0.5 MG/3 ML  3 mL Nebulization Q2H PRN  potassium chloride SR (KLOR-CON 10) tablet 10 mEq  10 mEq Oral BID  digoxin (LANOXIN) tablet 0.125 mg  0.125 mg Oral Q MON, WED & FRI  arformoterol (BROVANA) neb solution 15 mcg  15 mcg Nebulization BID RT  
 budesonide (PULMICORT) 500 mcg/2 ml nebulizer suspension  500 mcg Nebulization BID RT  
 Lactobacillus Acidoph & [place] [facility]) tablet 2 Tab  2 Tab Oral BID  magnesium hydroxide (MILK OF MAGNESIA) 400 mg/5 mL oral suspension 30 mL  30 mL Oral DAILY PRN  pneumococcal 23-valent (PNEUMOVAX 23) injection 0.5 mL  0.5 mL IntraMUSCular PRIOR TO DISCHARGE  heparin (porcine) injection 5,000 Units  5,000 Units SubCUTAneous Q8H  
 metoprolol (LOPRESSOR) injection 2.5 mg  2.5 mg IntraVENous Q4H PRN  
 bisacodyl (DULCOLAX) suppository 10 mg  10 mg Rectal DAILY PRN  
 sodium chloride (NS) flush 5-10 mL  5-10 mL IntraVENous PRN  
 aspirin chewable tablet 81 mg  81 mg Oral DAILY  atorvastatin (LIPITOR) tablet 40 mg  40 mg Oral QHS  clopidogrel (PLAVIX) tablet 75 mg  75 mg Oral DAILY  famotidine (PEPCID) tablet 20 mg  20 mg Oral DAILY  nitroglycerin (NITROSTAT) tablet 0.4 mg  0.4 mg SubLINGual Q5MIN PRN  
 tamsulosin (FLOMAX) capsule 0.4 mg  0.4 mg Oral DAILY  therapeutic multivitamin (THERAGRAN) tablet 1 Tab  1 Tab Oral DAILY  acetaminophen (TYLENOL) tablet 650 mg  650 mg Oral Q4H PRN  
 ondansetron (ZOFRAN) injection 4 mg  4 mg IntraVENous Q4H PRN Data Review: No results for input(s): WBC, RBC, HCT, MCV, MCH, MCHC, RDW, HCTEXT in the last 72 hours. No lab exists for component: HEMOGLOBIN, PLATELET, MPV Lab Results Component Value Date/Time  HBA1C 6.2 (H) 04/24/2019 04:05 AM  
 K 4.0 04/28/2019 04:23 AM  
  04/28/2019 04:23 AM  
 CO2 31 04/28/2019 04:23 AM  
 AGAP 6 04/28/2019 04:23 AM  
 GLU 70 (L) 04/28/2019 04:23 AM  
 BUN 76 (H) 04/28/2019 04:23 AM  
 CREA 1.46 (H) 04/28/2019 04:23 AM  
 GFRAA 55 (L) 04/28/2019 04:23 AM  
 GFRNA 45 (L) 04/28/2019 04:23 AM  
 CA 8.3 (L) 04/28/2019 04:23 AM  
 MG 3.5 (H) 04/25/2019 02:15 AM  
 PHOS 4.0 04/28/2019 04:23 AM  
 ALB 2.6 (L) 04/28/2019 04:23 AM  
 TP 5.7 (L) 04/19/2019 04:50 AM  
 GLOB 3.4 04/19/2019 04:50 AM  
 AGRAT 0.7 (L) 04/19/2019 04:50 AM  
 SGOT 17 04/19/2019 04:50 AM  
 ALT 14 (L) 04/19/2019 04:50 AM  
 TSH 1.22 03/07/2019 05:29 AM  
  
 
 
Kiran Nathan MD 
April 28, 2019 
1:11 PM

## 2019-04-28 NOTE — PROGRESS NOTES
1907: Assumed pt care. Received pt resting in bed, pt is alert and oriented. Denies pain at this time. On 2 Lpm via NC. Dressing on abdomen is intact. Bed on lowest position, wheels locked, call bell within reach. 4-28-19 
 
4725: Bedside and Verbal shift change report given to [address] (oncoming nurse) by Damaris Hinson 
 (offgoing nurse). Report included the following information SBAR, Intake/Output, MAR and Recent Results.

## 2019-04-28 NOTE — PROGRESS NOTES
Progress Note Patient: Meche Israel               Sex: male          DOA: 4/10/2019 YOB: 1926      Age:  80 y.o.        LOS:  LOS: 18 days CHIEF COMPLAINT:  Other (pt on TCC and not improving per Daugher/wants evaluated.) Subjective:  
 
Pt said he is doing well, he said his breathing is good. He has poor PO intake but states he is trying to eat. He is happy about possible discharge soon. Objective:  
 
Visit Vitals /65 Pulse 65 Temp 97.6 °F (36.4 °C) Resp 18 Ht 5' 6\" (1.676 m) Wt 66.3 kg (146 lb 3.2 oz) SpO2 96% BMI 23.60 kg/m² Physical Exam: 
Gen: cachectic Ears: hearing is intact Eyes: Icterus is not present Lungs:  diminished breath sounds bilaterally, upper airway crackles Heart: regular rate and rhythm, S1, S2 normal, no murmur, click, rub or gallop Gastrointestinal: soft, non-tender. Bowel sounds normal. No masses,  no organomegaly Neurological:  New Focal Deficits are not present Psychiatric:  Mood is stable Lab/Data Reviewed: 
CMP:  
Lab Results Component Value Date/Time  04/28/2019 04:23 AM  
 K 4.0 04/28/2019 04:23 AM  
  04/28/2019 04:23 AM  
 CO2 31 04/28/2019 04:23 AM  
 AGAP 6 04/28/2019 04:23 AM  
 GLU 70 (L) 04/28/2019 04:23 AM  
 BUN 76 (H) 04/28/2019 04:23 AM  
 CREA 1.46 (H) 04/28/2019 04:23 AM  
 GFRAA 55 (L) 04/28/2019 04:23 AM  
 GFRNA 45 (L) 04/28/2019 04:23 AM  
 CA 8.3 (L) 04/28/2019 04:23 AM  
 PHOS 4.0 04/28/2019 04:23 AM  
 ALB 2.6 (L) 04/28/2019 04:23 AM  
 
CBC:  
No results found for: WBC, HGB, HGBEXT, HCT, HCTEXT, PLT, PLTEXT, HGBEXT, HCTEXT, PLTEXT Imaging Reviewed: 
No results found. Assessment:  
 
Principal Problem: 
  Chronic bilateral pleural effusions (4/10/2019) Active Problems: 
  CHF (congestive heart failure) ([facility] (4/10/2019) HCAP (healthcare-associated pneumonia) (4/10/2019) Atrial fibrillation with rapid ventricular response ([facility] (4/13/2019) PLAN: 
· B/l recurrent pleural effusions - 2/2 CHF. Thoracentesis x2. Appreciate [facility] assistance. · Left PleurX catheter placed on 4/23 with significant improvement in resp status · Right PleurX catheter planned for 4/24 but canceled due to plavix. Discussed with Cardiology, cannot hold plavix due to recent stent last month. Discussed with Pulm, Dr. Nicole Bower said right catheter can be placed 90 days after stent placement with plavix held temporarily, no need to place now. · [address] - d/w daughter about palliative care measures, she declines adamantly. · HCAP - completed 12 day course of treatment, d/c atbx · NEL - improving, likely multifactorial 2/2 atbx, diuretics. Appreciate Nephrology eval.  
· AF RVR - per Cardiology · CHF/CAD - per cardiology · Cont acceptable home medications for chronic conditions · DVT protocol Dispo: PT/OT rec SNF. Likely can dc/ Monday/Tuesday if can find rehab  place. Would recommend draining left chest via PleurX catheter prior to discharge and PleurX usage instructions taught to daughter prior to discharge. I have personally reviewed all pertinent labs and films that have officially resulted over the last 24 hours. I have personally checked for all pending labs that are awaiting final results. Signed By: Brent Nixon MD   
 April 28, 2019

## 2019-04-28 NOTE — PROGRESS NOTES
Problem: Breathing Pattern - Ineffective Goal: *Absence of hypoxia Outcome: Progressing Towards Goal

## 2019-04-29 NOTE — PROGRESS NOTES
Received call back from Zackery Woodruff in admissions @ [facility] states no male bed today or tomorrow. Called pt's daughter, Ms. Lucia Pelaez & made her aware of above, states would like [facility]. Noted that referral had already been sent to [facility] in cc link in epic. Called [facility] admissions, no answer, left VM for [facility] to review & call me back. Available as needed. Farhana CalabreseRN,ext [phone].

## 2019-04-29 NOTE — PROGRESS NOTES
Problem: Mobility Impaired (Adult and Pediatric) Goal: *Acute Goals and Plan of Care (Insert Text) Description Physical Therapy Goals Initiated 4/23/2019 and to be accomplished within 7 days.: GOALS UPDATED 4/29 1. Patient will complete all bed mobility with minimal assistance/contact guard assist in order to prepare for EOB/OOB activity. 2.  Patient will perform sit <> stand with moderate assistance in order to prepare for OOB/gait activity. 3.  Patient will perform bed to chair transfers with moderate assistance in order to promote mobility and encourage seated activity to progress towards their prior level of function. 4.  Patient will participate in 15 minutes of B LE exercise/AROM with modified independence. Outcome: Not Progressing Towards Goal 
 PHYSICAL THERAPY RE-EVALUATION Patient: Haseeb Saucedo (13 y.o. male) Date: 4/29/2019 Primary Diagnosis: Chronic bilateral pleural effusions [J90] HCAP (healthcare-associated pneumonia) [J18.9] CHF (congestive heart failure) ([facility] [I50.9] Bilateral pleural effusion [J90] HCAP (healthcare-associated pneumonia) [J18.9] CHF (congestive heart failure) ([facility] [I50.9] Precautions:  Fall, Skin PLOF: Independent PTA ASSESSMENT : 
Patient supine in bed, agreeable to participation with PT with encouragement. DTR present. MAX A for supine > sit; good static/fair dynamic seated balance. Attempt to stand x 1 with MAX A 2 from elevated surface with RW. Patient providing little to no effort to stand; declining to attempt transfer again. Tolerates sitting EOB x 5 minutes before requesting to return to bed. Patient with BM in bed, MOD A to roll to L/R for clean up. Patient left supine in bed with all needs within reach. KATYA Mckee notified of session. Patient has c/o back and neck pain, no rating provided. Patient will benefit from skilled intervention to address the above impairments. Patient's rehabilitation potential is considered to be Fair Factors which may influence rehabilitation potential include:  
? None noted ? Mental ability/status ? Medical condition ? Home/family situation and support systems ? Safety awareness 
? Pain tolerance/management 
? Other: PLAN : 
Recommendations and Planned Interventions:  
?           Bed Mobility Training             ? Neuromuscular Re-Education ? Transfer Training                   ? Orthotic/Prosthetic Training 
? Gait Training                          ? Modalities ? Therapeutic Exercises           ? Edema Management/Control ? Therapeutic Activities            ? Family Training/Education ? Patient Education ? Other (comment): Frequency/Duration: Patient will be followed by physical therapy 3 - 5 times a week to address goals. Discharge Recommendations: Aric Kim Further Equipment Recommendations for Discharge: N/A  
 
SUBJECTIVE:  
Patient stated ? I cant' do it; my L arm feels weak. ? OBJECTIVE DATA SUMMARY:  
Hospital course since last seen and reason for re-evaluation:  
Patient's progression with PT limited d/t complicated respiratory status 2/2 bilateral chronic pleural effusion. With progression of disease patient has been less motivated to participate in mobility. Has made limited progress and has a decline in his functional ability. He will benefit from skilled PT to promote mobility and prevent further physical deconditioning. Past Medical History:  
Diagnosis Date Asthma Cardiac cath 04/28/2011 oLM 30%. pLAD 30%. oD1 50%. CX 90% (3 x 18 [place] DEMAR). dRCA 90%. RPLB subtotal.  RPDA 100%. Cardiac echocardiogram 01/21/2014 EF 55-60%. Mod LVH. Gr 1 DDfx. Mild AS (mean grad 13). Mild AI. Unchanged from study of 11/30/11. Cardiac nuclear imaging test, mod risk 01/22/2016 Intermediate risk. Medium-sized inferior, inferoseptal infarction. No ischemia. EF 54%. Nondiagnostic EKG on pharm stress test.  
 Carotid duplex 03/02/2016 Mod 50-69% bilateral ICA stenosis. Probable significant RECA stenosis. >50% stenosis of left subclavian. No significant change from study of 1/8/15. Coronary artery disease Status post PCI with drug-eluting stent, [place] 3 x 18 mm in the proximal circumflex. Heart failure ([address] Hx of carotid artery disease ([facility] Hypercholesterolemia Hypertension Prostate cancer ([facility] [address] Past Surgical History:  
Procedure Laterality Date CARDIAC SURG PROCEDURE UNLIST    
 HX CORONARY STENT PLACEMENT  4/28/11 PCI with drug-eluting stent, [place] 3 x 18 mm in the proximal circumflex (post dialated with 3.25 mm noncompliant balloon at high pressure). IR INSERT CATH PLEURAL INDWELL  4/23/2019 Barriers to Learning/Limitations: yes;  sensory deficits-vision/hearing/speech Compensate with: Visual Cues and Tactile Cues Home Situation:  
Home Situation Home Environment: Long term care # Steps to Enter: 0 One/Two Story Residence: One story Living Alone: No 
Support Systems: Child(marisela) Patient Expects to be Discharged to[de-identified] Rehabilitation facility Current DME Used/Available at Home: [employer] Aguirre Critical Behavior: 
Neurologic State: Alert Orientation Level: Oriented X4 Cognition: Follows commands Safety/Judgement: Fall prevention;Decreased insight into deficits Psychosocial 
Patient Behaviors: Cooperative Purposeful Interaction: Yes 
 
Strength:   
Strength: Grossly decreased, non-functional 
 
Tone & Sensation:  
 
Range Of Motion: 
AROM: Generally decreased, functional 
  
Functional Mobility: 
Bed Mobility: 
Rolling: Moderate assistance Supine to Sit: Maximum assistance Sit to Supine: Maximum assistance Scooting: Minimum assistance Transfers: Sit to Stand: Maximum assistance;Assist x2(Unabl to complete) Balance:  
Sitting: Intact; With support Sitting - Static: Good (unsupported) Sitting - Dynamic: Fair (occasional) Ambulation/Gait Training: 
Unable Pain:  
Neck/back pain no rating provided. Patient positioned for comfort to alleviate some discomfort. Activity Tolerance:  
Poor Please refer to the flowsheet for vital signs taken during this treatment. After treatment:  
?         Patient left in no apparent distress sitting up in chair ? Patient left in no apparent distress in bed 
? Call bell left within reach ? Nursing notified ? Caregiver present ? Bed alarm activated ? SCDs applied COMMUNICATION/EDUCATION:  
?         Role of Physical Therapy in the acute care setting. ?         Fall prevention education was provided and the patient/caregiver indicated understanding. ? Patient/family have participated as able in goal setting and plan of care. ?         Patient/family agree to work toward stated goals and plan of care. ?         Patient understands intent and goals of therapy, but is neutral about his/her participation. ? Patient is unable to participate in goal setting/plan of care: ongoing with therapy staff. ?         Other: Thank you for this referral. 
David Weiner Time Calculation: 27 mins

## 2019-04-29 NOTE — PROGRESS NOTES
Call from daughter re father requesting call back re discharge plan. # attempts to call only # available states voice mail is full. Plan discharge tomorrow. Message left with Aric Moralez clinical coordinator Queenie.

## 2019-04-29 NOTE — PROGRESS NOTES
1934: Assumed pt care. Received pt resting in bed, alert and oriented, denies any pain at this time. On 2 Lpm via NC. Call bell within reach. 2100: patient had a large soft formed bowel movement, incontinence care provided. 4-29-19 
 
0114: pt had another large soft formed bowel movement, incontinence care done. 1580: Bedside and Verbal shift change report given to Rylee Alfaro RN (oncoming nurse) by Cristiano Merino 
 (offgoing nurse). Report included the following information SBAR, Intake/Output, MAR and Recent Results.

## 2019-04-29 NOTE — PROGRESS NOTES
Problem: Dysphagia (Adult) Goal: *Acute Goals and Plan of Care (Insert Text) Description Recommendations: 
Diet: mechanical-soft/honey-thick liquids (NO STRAWS) Meds: crushed in applesauce/pudding Aspiration Precautions Oral Care TID Other: SILENT ASPIRATION Goals:  Patient will: 1. Tolerate PO trials with 0 s/s overt distress in 4/5 trials 2. Utilize compensatory swallow strategies/maneuvers (decrease bite/sip, size/rate, alt. liq/sol) with min cues in 4/5 trials 3. Perform oral-motor/laryngeal exercises to increase oropharyngeal swallow function with min cues 4. Complete an objective swallow study (i.e., MBSS) to assess swallow integrity, r/o aspiration, and determine of safest LRD, min A - goal met 4/17/19 Outcome: Not Progressing Towards Goal 
 
SPEECH LANGUAGE PATHOLOGY DYSPHAGIA TREATMENT Patient: Juice Vergara (44 y.o. male) Date: 4/29/2019 Diagnosis: Chronic bilateral pleural effusions [J90] HCAP (healthcare-associated pneumonia) [J18.9] CHF (congestive heart failure) ([facility] [I50.9] Bilateral pleural effusion [J90] HCAP (healthcare-associated pneumonia) [J18.9] CHF (congestive heart failure) ([facility] [I50.9] Chronic bilateral pleural effusions Precautions: aspiration Fall, Skin PLOF:as per H&P  
 
ASSESSMENT: 
Pt seen b/s for dysphagia tx. Pt awake, requesting ice water. Reviewed results of MBS with aspiration of thin and nectar, risks of aspiration with honey and pudding due to poor movement of epiglottis. Pt with poor recall as previous mentioned to pt x4 throughout session. Pt accepted honey via cup with delayed gurgly vocal quality and delayed weak cough. Attempted effortful swallow without success. Pt without awareness of difficulties.   Spoke with pt about his wishes and he stated he wants to eat whatever foods he wants and to drink ice cold water, does not want to be intubated if breathing difficulties arise but stated he wants to do what his daughter wants if he were to need CPR. Encouraged pt to speak with his daughter about his wishes. Pt verbalized understanding, ?level carry over/ recall? Rec: continue safest po feeds of honey thick liquids and mechanical soft solids (pt still at great risk of aspiration with diet), strict aspiration precautions. Safe swallow strategies written on wipe board in pt's room. Pt and daughter may benefit from Palliative care consult. SLP will continue to follow. Progression toward goals: 
?         Improving appropriately and progressing toward goals ? Improving slowly and progressing toward goals ? Not making progress toward goals and plan of care will be adjusted PLAN: 
Recommendations and Planned Interventions: 
continue safest po feeds of honey thick liquids and mechanical soft solids (pt still at great risk of aspiration with diet), strict aspiration precautions. Patient continues to benefit from skilled intervention to address the above impairments. Continue treatment per established plan of care. Discharge Recommendations:  pt daughter wants pt to go to [facility] SUBJECTIVE:  
Patient stated ? Alright, I'm ready to be discharged? . 
 
OBJECTIVE:  
Cognitive and Communication Status: 
Neurologic State: Alert Orientation Level: Oriented to person, Oriented to place, Oriented to situation, Disoriented to time Cognition: Follows commands Perception: Appears intact Perseveration: No perseveration noted Safety/Judgement: Fall prevention, Decreased insight into deficits Dysphagia Treatment: 
Oral Assessment: 
Oral Assessment Labial: No impairment Dentition: Upper & lower dentures Oral Hygiene: fair Lingual: No impairment Velum: No impairment Mandible: No impairment P.O. Trials: 
Patient Position: [facility]* Vocal quality prior to P.O.: Hoarse Consistency Presented: Honey thick liquid How Presented: Self-fed/presented, Cup/sip Bolus Acceptance: No impairment Bolus Formation/Control: Impaired Type of Impairment: Delayed Propulsion: Delayed (# of seconds) Oral Residue: None Initiation of Swallow: No impairment Laryngeal Elevation: Weak Aspiration Signs/Symptoms: Delayed cough/throat clear, Weak cough, Infiltrate on chest xray Pharyngeal Phase Characteristics: Altered vocal quality, Suspected pharyngeal residue Effective Modifications: None Cues for Modifications: Moderate Oral Phase Severity: Mild Pharyngeal Phase Severity : Severe Exercises:  
Effortful Swallow: Yes Sets : 1 Reps : 5 PAIN: 
Pain level pre-treatment: 0/10 Pain level post-treatment: 0/10 Pain Intervention(s): Medication (see MAR); Rest, Ice, Repositioning Response to intervention: Nurse notified, See doc flow After treatment:  
?              Patient left in no apparent distress sitting up in chair ? Patient left in no apparent distress in bed 
? Call bell left within reach ? Nursing notified ? Family present ? Caregiver present ? Bed alarm activated COMMUNICATION/EDUCATION:  
? Aspiration precautions; swallow safety; compensatory techniques ? Patient unable to participate in education; education ongoing with staff ? Posted safety precautions in patient's room. ? Oral-motor/laryngeal strengthening exercises Gerarda Kayser, MS, CCC/SLP Time Calculation: 52 mins

## 2019-04-29 NOTE — PROGRESS NOTES
Nutrition follow-up/ 
Plan of care RECOMMENDATIONS:  
 
1. Cardiac; Mech Solid Diet w/ HTL  
2. SF CIB TID with honey thick milk 3. Monitor labs, weight and PO intake 4. RD to follow GOALS:  
 
1. Ongoing: PO intake meets >75% of protein/calorie needs by 5/4 
2. Ongoing: Weight Maintenance/gradual gain (1-2 lb/week) by 5/4 ASSESSMENT: 
 
Wt status is classified as normal per BMI of 23.6. Variable weights recorded. Poor PO intake. Continue SF CIB TID to supplement energy needs. Labs noted. Pt with hypoalbuminemia, hypermagnesemia, elevated BUN/Creatinine levels GFR (45). Nutrition recommendations listed. RD to follow. Meets Criteria for Acute Malnutrition  
[x] Severe Malnutrition, as evidenced by: 
            [x] Moderate muscle wasting, loss of subcutaneous fat 
            [x] Nutritional intake of <50% of recommended intake for >5 days [x] Weight loss of >1-2% in 1 week, >5% in 1 month, >7.5% in 3 months, or >10% in 6 months 
            [] Moderate-severe edema  
  
Nutrition Risk:  [] High  [x] Moderate []  Low SUBJECTIVE/OBJECTIVE:  
 
(4/29): Reviewed SLP recommendations from today (4/29) [facility]. Visited patient during lunch meal. Patient eating fruit during visit with rest of tray untouched and 25% intake of CIB supplement. Patient reports not having appetite but family brings in food sometimes. Encouraged adequate intake of meals and supplements to meet nutrition needs. Awaiting placement. Will monitor. 
 
(4/23): Reviewed SLP note from today (4/23) with recommendations [facility] with [facility]. Patient now transferred to ICU to receive BiPAP.  
 
(4/18 afternoon): Noted SLP saw patient earlier today but patient refused participation stating he was too tired. Observed minimal intake of lunch tray (<5%). Patient reports appetite is so-so but is drinking CIB supplements. However, appears to only have had a few sips.  Noted Boost supplement at bedside ( not on formulary). Discussed with patient and RN that SLP evaluated patient and recommended HTL so patient can only have CIB supplements at this time to be in compliance with HTL. Discussed and implemented food preferences. Will continue to closely monitor. (4/18 AM): S/P MBS 4/17 with SLP recommendations for mech soft solids and HTL. Patient with SF CIB supplements ordered on 4/17 due to HTL diet order. Noted diet changed to regular diet and thin liquids at 4/17/19 on 21:59 and Ensure Enlive supplements reordered (not honey-thick). Communicated with SLP who reported patient should still be on mech soft diet with HTL. No documentation available on why diet was changed so spoke with RN Ana Perry) inquiring why diet was changed. RN unaware why diet was changed. Communicated this writer would change diet back to mech soft diet and HTL per SLP recommendations and discontinue Ensure Enlive supplements as not compliant with diet order. SLP will be in this afternoon to re-assess. Will monitor. (4/17) Plan for U/S guided thoracentesis today. S/P MBS today with SLP recommendations for mech soft solids and HTL. Changed supplements from Ensure Enlive to SF CIB TID due to meet new diet order parameters. (4/16): Pt transferred from [facility] and admitted to hospital on (4/10) for HCAP, chronic pleural effusions and CHF. Diet orders carried over from SLP recomendations from previous admission to [facility] so would recommend a repeat evaluation. PO intake has been poor per vitals. Variability in recent weights recorded, but per Hx from patient/daughter definitely with weight loss starting ~9 months ago (Total of 22 lb or 13% from ~ lb x 9 months ago and per weight at 158 lb on (1/16/2019) 17 lb or 11% over the past 3 months PTA) Noted fluid fluctuation also a factor. Plan is for thoracentesis tomorrow.  Pt seen in room this evening with daughter at bedside. Observed 100% of supplement and applesauce consumed. Obtained some preferences and also had d/w daughter to fill out the menu to implement his preferences and dietary will pick it up. (Also informed dietary). Explained process and that there is a refrigerator on unit for things she may want to bring in as well. Noted orders for SLP to reevaluate with MBS placed this afternoon as well. Continue to encourage intake and will follow. (4/10): Pt w/ slightly improved appetite but still not adequate. Weight has been stable since admission. Pt seen in room with daughter who had just arrived and was requesting to see someone. Informed Alda Munguia, and TCC Director, Alexa Hartmann, that she would like to speak with someone. They addressed the situation. Pt is being transferred to the ER per family request so will follow up at a more appropriate time.  
 
(4/3): Pt transferred form [facility] to [address] [place] [address],[address] on 4/2/2019. Pt seen in room with daughter at bedside to assist with Hx. Pt reports was previously consuming 2 meals and 1 Boost per day prior to hospitalization. Denies having any food allergies or problems chewing/swallowing and stable weight prior to recent hospitalization. Spoke with SLP , Marlin Favor, and will do evaluation as was previously on modified diet PTA. Pt stated he is feeling hungry in the mornings, but that he now tends to have early satiety. Discussed trying to make breakfast his largest meal and then doing smaller meals/nutrient dense snacks throughout the rest of the day. Will reorder Ensure Enlive TID that he was previously receiving and will possibly add in magic cups but want to promote solid food intake first. Encouraged trying to consume what he can tolerate of solid foods first and then sipping on supplements after or between meals to increase kcal/protein. Also provided menu and encouraged him to implement preferences with dietary.  We had the discussion that SLP would be evaluating most likely so things may have to be altered depending on results. Weight loss confirmed but unable to quantify exactly d/t recent fluid fluctuations. (Estimate ~10-12 lb or 8% loss x 1-2 months PTA) Encourage intake and will monitor. Information Obtained from:  
 [x] Chart Review [x] Patient 
 [] Family/Caregiver 
 [] Nurse/Physician 
 [] Interdisciplinary Meeting/Rounds Diet: Cardiac;[facility] Soft w/ HTL Medications: [x] Reviewed (IVF: D5 at 50 ml/hr) Allergies: [x] Reviewed Patient Active Problem List  
Diagnosis Code  Asthma J45.909  Prostate cancer ([facility] [address] C61  Hypercholesterolemia E78.00  Angina, class I ([facility] [facility] I20.9  Left bundle branch block I44.7  Coronary artery disease I25.10  Hypertension I11.9  Dyslipidemia E78.5  Carotid artery disease ([facility]) I77.9  Aortic valve stenosis I35.0  Anemia D64.9  
 NSTEMI (non-ST elevated myocardial infarction) ([facility]) I21.4  Congestive heart disease ([facility]) I50.9  CHF (congestive heart failure) ([facility]) I50.9  HCAP (healthcare-associated pneumonia) J18.9  Chronic bilateral pleural effusions J90  
 Bilateral pleural effusion J90  
 Atrial fibrillation with rapid ventricular response ([facility]) I48.91 Past Medical History:  
Diagnosis Date  Asthma  Cardiac cath 04/28/2011 oLM 30%. pLAD 30%. oD1 50%. CX 90% (3 x 18 [place] DEMAR). dRCA 90%. RPLB subtotal.  RPDA 100%.  Cardiac echocardiogram 01/21/2014 EF 55-60%. Mod LVH. Gr 1 DDfx. Mild AS (mean grad 13). Mild AI. Unchanged from study of 11/30/11.  Cardiac nuclear imaging test, mod risk 01/22/2016 Intermediate risk. Medium-sized inferior, inferoseptal infarction. No ischemia. EF 54%. Nondiagnostic EKG on pharm stress test.  
 Carotid duplex 03/02/2016 Mod 50-69% bilateral ICA stenosis. Probable significant RECA stenosis. >50% stenosis of left subclavian. No significant change from study of 1/8/15.  Coronary artery disease Status post PCI with drug-eluting stent, [place] 3 x 18 mm in the proximal circumflex.  Heart failure ([facility] [address]  Hx of carotid artery disease ([facility] [address]  Hypercholesterolemia  Hypertension  Prostate cancer ([facility] [address] Labs:   
Lab Results Component Value Date/Time Sodium 144 04/28/2019 04:23 AM  
 Potassium 4.0 04/28/2019 04:23 AM  
 Chloride 107 04/28/2019 04:23 AM  
 CO2 31 04/28/2019 04:23 AM  
 Anion gap 6 04/28/2019 04:23 AM  
 Glucose 70 (L) 04/28/2019 04:23 AM  
 BUN 76 (H) 04/28/2019 04:23 AM  
 Creatinine 1.46 (H) 04/28/2019 04:23 AM  
 Calcium 8.3 (L) 04/28/2019 04:23 AM  
 Magnesium 3.5 (H) 04/25/2019 02:15 AM  
 Phosphorus 4.0 04/28/2019 04:23 AM  
 Albumin 2.6 (L) 04/28/2019 04:23 AM  
 
Anthropometrics: BMI (calculated): 23.6 Last 3 Recorded Weights in this Encounter 04/25/19 0200 04/26/19 0400 04/28/19 0011 Weight: 62.7 kg (138 lb 3.7 oz) 64.3 kg (141 lb 11.2 oz) 66.3 kg (146 lb 3.2 oz) Ht Readings from Last 1 Encounters:  
04/10/19 5' 6\" (1.676 m) Weight Metrics 4/28/2019 4/9/2019 4/2/2019 3/12/2019 3/7/2019 3/1/2019 2/23/2019 Weight 146 lb 3.2 oz 141 lb 6.4 oz 147 lb 4.3 oz 149 lb 0.5 oz - 161 lb 163 lb 2.3 oz  
BMI 23.6 kg/m2 22.15 kg/m2 23.07 kg/m2 - 23.34 kg/m2 25.22 kg/m2 25.55 kg/m2 Patient Vitals for the past 100 hrs: 
 % Diet Eaten 04/28/19 1730 50 % 04/27/19 1719 50 % 04/27/19 1307 25 % 04/27/19 0957 0 % UBW: 160-165 lb x 9 months ago per patient and daughter 
 
[x] Weight Loss PTA [] Weight Gain 
[] Weight Stable Estimated Nutrition Needs: [x] MSJ Calories: 1063-2526 kcal Based on:   [x] Actual BW   
Protein:   77-90 g Based on:   [x] Actual BW Fluid:       2398-6776 ml Based on:   [x] Actual BW  
 
 [x] No Cultural, Tenriism or ethnic dietary need identified. [] Cultural, Tenriism and ethnic food preferences identified and addressed Wt Status:  [x] Normal (18.6 - 24.9) [] Underweight (<18.5) [] Overweight (25 - 29.9) [] Mild Obesity (30 - 34.9)  [] Moderate Obesity (35 - 39.9) [] Morbid Obesity (40+) Nutrition Problems Identified:  
[x] Suboptimal PO intake  
[] Food Allergies [x] Difficulty chewing/swallowing/poor dentition  
[] Constipation/Diarrhea  
[] Nausea/Vomiting  
[] None 
[] Other:  
 
Plan:  
[x] Therapeutic Diet  
[]  Obtained/adjusted food preferences/tolerances and/or snacks options [x]  Continue supplements added  
[x] SLP will be following for feeding techniques []  HS snack added  
[x]  Modify diet texture  
[]  Modify diet for food allergies []  Assist with menu selection  
[x]  Monitor PO intake on meal rounds  
[x]  Continue inpatient monitoring and intervention  
[]  Participated in discharge planning/Interdisciplinary rounds/Team meetings  
[]  Other:  
 
Education Needs: 
 [] Not appropriate for teaching at this time  
 [x] Identified and addressed Nutrition Monitoring and Evaluation: 
[x] Continue ongoing monitoring and intervention 
[] Cassy Curry 29

## 2019-04-29 NOTE — PROGRESS NOTES
Progress Note Patient: Radha Brody               Sex: male          DOA: 4/10/2019 YOB: 1926      Age:  80 y.o.        LOS:  LOS: 19 days CHIEF COMPLAINT:  Other (pt on TCC and not improving per Daugher/wants evaluated.) Subjective:  
 
  doing well, he said his breathing is good. Offer no acute complain. Report mobilization and nursing declined that, not active Objective:  
 
Visit Vitals /52 (BP 1 Location: Left arm, BP Patient Position: At rest) Pulse 69 Temp 97.9 °F (36.6 °C) Resp 18 Ht 5' 6\" (1.676 m) Wt 66.3 kg (146 lb 3.2 oz) SpO2 98% BMI 23.60 kg/m² Physical Exam: 
Gen: cachectic Ears: hearing is intact Eyes: Icterus is not present Lungs:  diminished breath sounds bilaterally Heart: regular rate and rhythm, S1, S2 normal, no murmur, click, rub or gallop Gastrointestinal: soft, non-tender. Bowel sounds normal. No masses,  no organomegaly Neurological:  New Focal Deficits are not present Psychiatric:  Mood is stable Lab/Data Reviewed: 
CMP:  
No results found for: NA, K, CL, CO2, AGAP, GLU, BUN, CREA, GFRAA, GFRNA, CA, MG, PHOS, ALB, TBIL, TP, ALB, GLOB, AGRAT, SGOT, ALT, GPT 
CBC:  
No results found for: WBC, HGB, HGBEXT, HCT, HCTEXT, PLT, PLTEXT, HGBEXT, HCTEXT, PLTEXT Imaging Reviewed: 
No results found. Assessment / Plan # HCAP: treated with  broad spectrum IV ab. Sputum cultures and urine antigens ordered. CT 4/10/19 : Multifocal consolidation within the left upper and bilateral lower lobes, similar to prior CT 03/13/2019. Findings likely represent recurrent multifocal. 
 4/19 : patient in sever SOB ,  repeat CXR. D/W  PCCM he is rec pleurodesis but daughter declined. Stat nebs as in SOB/wide spread ronchi. Try small dose steroid .  per daughter he was in full power and very active till recent hospitalization and scoping of his GI then started detrioration rapidly. 
  
  # B/l recurrent pleural effusions - 2/2 CHF. Thoracentesis x2. Appreciate [facility] assistance. Had  repeat tapping 4/17/19. Ultrasound guided thoracentesis 1750 cc. Transudate. had Left PleurX catheter placed on 4/23 with significant improvement in resp status. repeat CXR with little L pleural effusion 
-Right PleurX catheter planned for 4/24 but canceled due to plavix. Discussed with Cardiology, cannot hold plavix due to recent stent last month. Discussed with Pulm, Dr. Jaguar Wright said right catheter can be placed 90 days after stent placement with plavix held temporarily, no need to place now. # Hypernatremia. Resolved. Urine studies done . Used IVF D5. Ordered do not use NS, or any Na in diluents please as hypernatremia .   
  
# Hypokalemia. Replete and trend.  
  
# AF RVR: appreciate Cardiology assistance, started on  digoxin. AC per cardiology. 
  
# SLP was consulted previously, ordered diet recommended by SLP (soft, nectar thick liquids) 
  
# GOC - d/w daughter about palliative care measures, she declines adamantly. # NEL - improving, likely multifactorial 2/2 atbx, diuretics. Appreciate Nephrology eval.  
 
# CHF/CAD - per cardiology # DVT protocol Dispo: PT/OT rec SNF. Likely can dc/ Monday/Tuesday if can find rehab  place. Would recommend draining left chest via PleurX catheter prior to discharge and PleurX usage instructions taught to daughter prior to discharge. I have personally reviewed all pertinent labs and films that have officially resulted over the last 24 hours. I have personally checked for all pending labs that are awaiting final results. Signed By: Caye Oppenheim, MD   
 April 29, 2019

## 2019-04-29 NOTE — PROGRESS NOTES
RENAL PROGRESS NOTE Giovanny An Assessment/Plan: · NEL in a setting of HAP/a.fib with rvr/systolic chf. Improving slowly, ontinue to monitor. · Hypernatremia. dRepeat labs in am, continue to encourage oral intake of free water, hopefully can soon stop D5W. · Systolic chf.  
· A.fib with rvr. Better controlled, card on the case. Try to sopt midodrin. · HAP. Finished abx, s/p lt  Thoracocentesis/plerual catheter. · Debilitation/malnutrition. Subjective:Patient complaints off: Feels a little better  No sob at rest, no cp/n/v. Appetite is a little better. Thirsty but doesn't like thickened water. Patient Active Problem List  
Diagnosis Code  Asthma J45.909  Prostate cancer ([facility] [address] C61  Hypercholesterolemia E78.00  Angina, class I ([facility] [address] I20.9  Left bundle branch block I44.7  Coronary artery disease I25.10  Hypertension I11.9  Dyslipidemia E78.5  Carotid artery disease ([facility]) I77.9  Aortic valve stenosis I35.0  Anemia D64.9  
 NSTEMI (non-ST elevated myocardial infarction) ([facility]) I21.4  Congestive heart disease ([facility]) I50.9  CHF (congestive heart failure) ([facility]) I50.9  HCAP (healthcare-associated pneumonia) J18.9  Chronic bilateral pleural effusions J90  
 Bilateral pleural effusion J90  
 Atrial fibrillation with rapid ventricular response ([facility]) I48.91 Current Facility-Administered Medications Medication Dose Route Frequency Provider Last Rate Last Dose  midodrine (PROAMITINE) tablet 5 mg  5 mg Oral TID WITH MEALS Brody Holley MD   5 mg at 04/29/19 1823  predniSONE (DELTASONE) tablet 20 mg  20 mg Oral DAILY WITH BREAKFAST Nancy Cooper MD   20 mg at 04/29/19 0848  
 guaiFENesin (ROBITUSSIN) 100 mg/5 mL oral liquid 400 mg  400 mg Oral TID Nancy Cooper MD   400 mg at 04/28/19 9511  dextrose 5% infusion  50 mL/hr IntraVENous CONTINUOUS Russ Gaona MD 50 mL/hr at 04/27/19 2300 50 mL/hr at 04/27/19 2300  
 metoprolol tartrate (LOPRESSOR) tablet 12.5 mg  12.5 mg Oral BID Saintclair Bright M., PA-C   12.5 mg at 04/29/19 0055  insulin glargine (LANTUS) injection 5 Units  5 Units SubCUTAneous DAILY Joie Phoenix, MD   5 Units at 04/29/19 0848  
 glucose chewable tablet 16 g  4 Tab Oral PRN Claudean Roam, MD      
 glucagon (GLUCAGEN) injection 1 mg  1 mg IntraMUSCular PRN Claudean Roam, MD      
 dextrose (D50) infusion 12.5-25 g  25-50 mL IntraVENous PRN Claudean Roam, MD      
 insulin lispro (HUMALOG) injection   SubCUTAneous AC&HS Claudean Roam, MD   Stopped at 04/29/19 0730  ipratropium (ATROVENT) 0.02 % nebulizer solution 0.5 mg  0.5 mg Nebulization Q6H PRN Dasia So NP      
 ALPRAZolam Tyler Null) tablet 0.5 mg  0.5 mg Oral BID PRN Lawanda De La Rosa MD      
 albuterol-ipratropium (DUO-NEB) 2.5 MG-0.5 MG/3 ML  3 mL Nebulization Q2H PRN Lawanda De La Rosa MD   3 mL at 04/23/19 1951  potassium chloride SR (KLOR-CON 10) tablet 10 mEq  10 mEq Oral BID Robert Soliz MD   10 mEq at 04/29/19 4503  digoxin (LANOXIN) tablet 0.125 mg  0.125 mg Oral Q MON, WED & Alla Thomas MD   0.125 mg at 04/26/19 2049  
 arformoterol (BROVANA) neb solution 15 mcg  15 mcg Nebulization BID RT Yadira Nathan MD   15 mcg at 04/29/19 8703  budesonide (PULMICORT) 500 mcg/2 ml nebulizer suspension  500 mcg Nebulization BID RT Yadira Nathan MD   500 mcg at 04/29/19 4284  Lactobacillus Acidoph & Bulgar CRESTWOOD [facility]) tablet 2 Tab  2 Tab Oral BID Yadira Nathan MD   2 Tab at 04/29/19 0847  
 magnesium hydroxide (MILK OF MAGNESIA) 400 mg/5 mL oral suspension 30 mL  30 mL Oral DAILY PRN Yadira Nathan MD      
 pneumococcal 23-valent (PNEUMOVAX 23) injection 0.5 mL  0.5 mL IntraMUSCular PRIOR TO DISCHARGE Chloe Rodríguez MD      
 heparin (porcine) injection 5,000 Units  5,000 Units SubCUTAneous Q8H Willis Alcantara MD   Stopped at 04/28/19 1400  
 metoprolol (LOPRESSOR) injection 2.5 mg  2.5 mg IntraVENous Q4H PRN Fabi Kahn PA   2.5 mg at 04/19/19 0216  bisacodyl (DULCOLAX) suppository 10 mg  10 mg Rectal DAILY PRN Glenna Hernandez MD   10 mg at 04/12/19 7973  sodium chloride (NS) flush 5-10 mL  5-10 mL IntraVENous PRN Tanner Young MD   10 mL at 04/28/19 1407  aspirin chewable tablet 81 mg  81 mg Oral DAILY Tanner Young MD   81 mg at 04/29/19 0848  atorvastatin (LIPITOR) tablet 40 mg  40 mg Oral QHS Tanner Young MD   40 mg at 04/28/19 2301  clopidogrel (PLAVIX) tablet 75 mg  75 mg Oral DAILY Tanner Young MD   75 mg at 04/29/19 0848  famotidine (PEPCID) tablet 20 mg  20 mg Oral DAILY Tanner Young MD   20 mg at 04/29/19 0848  
 nitroglycerin (NITROSTAT) tablet 0.4 mg  0.4 mg SubLINGual Q5MIN PRN Tanner Young MD      
 tamsulosin (FLOMAX) capsule 0.4 mg  0.4 mg Oral DAILY Tanner Young MD   0.4 mg at 04/29/19 4275  therapeutic multivitamin (THERAGRAN) tablet 1 Tab  1 Tab Oral DAILY Tanner Young MD   1 Tab at 04/29/19 3288  acetaminophen (TYLENOL) tablet 650 mg  650 mg Oral Q4H PRN Tanner Young MD   650 mg at 04/26/19 2224  ondansetron (ZOFRAN) injection 4 mg  4 mg IntraVENous Q4H PRN Tanner Young MD   4 mg at 04/12/19 7721 Objective Vitals:  
 04/29/19 5785 04/29/19 0421 04/29/19 5946 04/29/19 0154 BP: 118/74 104/61 106/52 Pulse: 74 70 69 Resp: 18 18 18 Temp: 98.2 °F (36.8 °C) 97.5 °F (36.4 °C) 97.9 °F (36.6 °C) SpO2: 97% 95% 96% 98% Weight:      
Height:      
 
 
 
Intake/Output Summary (Last 24 hours) at 4/29/2019 1127 Last data filed at 4/29/2019 6724 Gross per 24 hour Intake 720 ml Output 1500 ml Net -780 ml Admission weight: Weight: 59.9 kg (132 lb) (04/10/19 6559) Last Weight Metrics: 
Weight Loss Metrics 4/28/2019 4/9/2019 4/2/2019 3/12/2019 3/7/2019 3/1/2019 2/23/2019 Today's Wt 146 lb 3.2 oz 141 lb 6.4 oz 147 lb 4.3 oz 149 lb 0.5 oz - 161 lb 163 lb 2.3 oz  
BMI 23.6 kg/m2 22.15 kg/m2 23.07 kg/m2 - 23.34 kg/m2 25.22 kg/m2 25.55 kg/m2 Physical Assessment:  
 
General: NAD, alert and oriented. Frail, cachectic. Neck: No jvd. LUNGS: diminished air entry at the bases, bl exp wheezes. CVS EXM: S1, S2, irregular. Abdomen: soft, non tender. Lower Extremities:  trace+ bl thigh edema. Lab CBC w/Diff No results for input(s): WBC, RBC, HGB, HCT, PLT, GRANS, LYMPH, EOS, HGBEXT, HCTEXT, PLTEXT, HGBEXT, HCTEXT, PLTEXT in the last 72 hours. Chemistry Recent Labs  
  04/28/19 
0423 04/27/19 
0550 GLU 70* 132*  142  
K 4.0 4.2  106 CO2 31 29 BUN 76* 87* CREA 1.46* 1.57* CA 8.3* 8.7 AGAP 6 7 BUCR 52* 55* ALB 2.6* 2.7* PHOS 4.0 4.4 No results found for: IRON, FE, TIBC, IBCT, PSAT, FERR Lab Results Component Value Date/Time Calcium 8.3 (L) 04/28/2019 04:23 AM  
 Phosphorus 4.0 04/28/2019 04:23 AM  
  
 
Jay Sanchez M.D. [facility] Phone [phone] Pager [phone]

## 2019-04-29 NOTE — PROGRESS NOTES
[facility]  -  Progress Note Patient: Dennise Porter MRN: [id number]  SSN: [id number] YOB: 1926  Age: 80 y.o. Sex: male Admit Date: 4/10/2019 Patient seen and examined independently. Slow improvement. Blood pressure marginal for cardiomyopathy treatment. Findings of recent catheterization as detailed below. Agree with assessment and plan. Nasreen Queen MD 
Assessment: - Acute hypoxic respiratory failure - HCAP: Multifocal PNA on CXR 04/10/19, multifocal consolidation within the left upper and bilateral lower lobes, likely recurrently multifocal PNA 
- Atrial fibrillation with RVR: CHADS2-Vasc score 5 (CHF, HTN, age, Vascular disease) and sinus tachycardia - Bilateral moderate to large pleural effusions CT chest 04/10-- s/p multiple thoracenteses, PleurX catheter (left) 04/23 
-CAD s/p LAD PCI with DEMAR 3/11/2019 with previous PCI to LCx 2011. Cath 3/11/2019 (Occluded mid RCA which appears to be chronic. There is faint collateral from the left coronary system, Left main artery with ostial 30-40%, LAD mid focal severely calcified tortuous 99% stenosis with CARLENE II flow, Circumflex coronary artery with diffuse mild 20-30% stenosis throughout). Discharged on ASA, plavix, statin, BB. 
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   
-Echo 03/14/19 with EF 26-30%  
-NEL, Creatinine 1.62 04/12 
-Peripheral vascular disease. -Hx Hypertension 
-Dyslipidemia 
-Asthma, former smoker. -Advanced age Plan:  
 
-Continue to encourage PO intake. -Increase mobility. 
-Continue current cardiac medication regimen - ASA, Lipitor, Plavix, Metoprolol. 
-No ACEi/ARB/ARNI due to borderline BP. Subjective: No new complaints. Objective:  
  
Patient Vitals for the past 8 hrs: 
 Temp Pulse Resp BP SpO2  
04/29/19 1144 97.6 °F (36.4 °C) 72 18 110/74 96 % 04/29/19 0835     98 % 04/29/19 0737 97.9 °F (36.6 °C) 69 18 106/52 96 % Patient Vitals for the past 96 hrs: 
 Weight 04/28/19 0011 146 lb 3.2 oz (66.3 kg) 04/26/19 0400 141 lb 11.2 oz (64.3 kg) Intake/Output Summary (Last 24 hours) at 4/29/2019 1500 Last data filed at 4/29/2019 1241 Gross per 24 hour Intake 720 ml Output 700 ml Net 20 ml Physical Exam: 
General:  alert, cooperative, no distress, appears stated age Neck:  No JVD Lungs:  Coarse breath sounds Heart:  Regular rate and rhythm Abdomen:  abdomen is soft without significant tenderness, masses, organomegaly or guarding Extremities:  Ecchymoses to upper extremities, no edema Data Review:  
 
Labs: Results:  
   
Chemistry Recent Labs  
  04/28/19 
0423 04/27/19 
8603 GLU 70* 132*  142  
K 4.0 4.2  106 CO2 31 29 BUN 76* 87* CREA 1.46* 1.57* CA 8.3* 8.7 PHOS 4.0 4.4 AGAP 6 7 BUCR 52* 55* ALB 2.6* 2.7* Lipid Panel Lab Results Component Value Date/Time Cholesterol, total 87 03/08/2019 05:28 AM  
 HDL Cholesterol 44 03/08/2019 05:28 AM  
 LDL, calculated 30 03/08/2019 05:28 AM  
 VLDL, calculated 13 03/08/2019 05:28 AM  
 Triglyceride 65 03/08/2019 05:28 AM  
 CHOL/HDL Ratio 2.0 03/08/2019 05:28 AM  
  
Liver Enzymes Recent Labs  
  04/28/19 0423 ALB 2.6* Thyroid Studies Lab Results Component Value Date/Time  TSH 1.22 03/07/2019 05:29 AM

## 2019-04-29 NOTE — PROGRESS NOTES
Chart reviewed. Plan remains SNF when medically stable, bed available & [facility] [place]. Left VM for Jorge Luis Morton in admissions @ [facility], [facility].. Will cont to follow. Farhana Calabrese RN,ext [phone].

## 2019-04-29 NOTE — PROGRESS NOTES
Problem: General Medical Care Plan Goal: *Vital signs within specified parameters Outcome: Progressing Towards Goal 
Goal: *Labs within defined limits Outcome: Progressing Towards Goal 
Goal: *Absence of infection signs and symptoms Outcome: Progressing Towards Goal 
Goal: *Optimal pain control at patient's stated goal 
Outcome: Progressing Towards Goal 
Goal: *Skin integrity maintained Outcome: Progressing Towards Goal 
Goal: *Fluid volume balance Outcome: Progressing Towards Goal 
Goal: *Optimize nutritional status Outcome: Progressing Towards Goal 
Goal: *Anxiety reduced or absent Outcome: Progressing Towards Goal 
Goal: *Progressive mobility and function (eg: ADL's) Outcome: Progressing Towards Goal 
  
Problem: Impaired Skin Integrity/Pressure Injury Treatment Goal: *Improvement of Existing Pressure Injury Outcome: Progressing Towards Goal 
Goal: *Prevention of pressure injury Description Document William Scale and appropriate interventions in the flowsheet. Outcome: Progressing Towards Goal 
  
Problem: Gas Exchange - Impaired Goal: *Absence of hypoxia Outcome: Progressing Towards Goal 
  
Problem: Patient Education: Go to Patient Education Activity Goal: Patient/Family Education Outcome: Progressing Towards Goal 
  
Problem: Nutrition Deficit Goal: *Optimize nutritional status Outcome: Progressing Towards Goal 
  
Problem: Falls - Risk of 
Goal: *Absence of Falls Description Document Denita Latin Fall Risk and appropriate interventions in the flowsheet. Outcome: Progressing Towards Goal 
  
Problem: Patient Education: Go to Patient Education Activity Goal: Patient/Family Education Outcome: Progressing Towards Goal 
  
Problem: Pressure Injury - Risk of 
Goal: *Prevention of pressure injury Description Document William Scale and appropriate interventions in the flowsheet. Outcome: Progressing Towards Goal 
  
Problem: Patient Education: Go to Patient Education Activity Goal: Patient/Family Education Outcome: Progressing Towards Goal 
  
Problem: Patient Education: Go to Patient Education Activity Goal: Patient/Family Education Outcome: Progressing Towards Goal 
  
Problem: Breathing Pattern - Ineffective Goal: *Absence of hypoxia Outcome: Progressing Towards Goal 
Goal: *Use of effective breathing techniques Outcome: Progressing Towards Goal 
  
Problem: Patient Education: Go to Patient Education Activity Goal: Patient/Family Education Outcome: Progressing Towards Goal

## 2019-04-30 NOTE — PROGRESS NOTES
RENAL PROGRESS NOTE Gerardine Never Assessment/Plan: · NEL in a setting of HAP/a.fib with rvr/systolic chf. Resolving, continue to monitor. · Hypernatremia. Na is up slightly, oral intake is inadequate, continue D5W. Continue to encourage oral intake of thickened water. · Systolic chf. No decompensation. · A.fib with rvr. Better controlled, card on the case. Try to sopt midodrin. · HAP. Finished abx, s/p lt  Thoracocentesis/plerual catheter. · Debilitation/malnutrition. Subjective:Patient complaints off: Sleepy, opens eyes to loud voice. Oral intake is unclear. Patient Active Problem List  
Diagnosis Code  Asthma J45.909  Prostate cancer ([facility] [address] C61  Hypercholesterolemia E78.00  Angina, class I ([facility] [address] I20.9  Left bundle branch block I44.7  Coronary artery disease I25.10  Hypertension I11.9  Dyslipidemia E78.5  Carotid artery disease ([facility]) I77.9  Aortic valve stenosis I35.0  Anemia D64.9  
 NSTEMI (non-ST elevated myocardial infarction) ([facility]) I21.4  Congestive heart disease ([facility]) I50.9  CHF (congestive heart failure) ([facility]) I50.9  HCAP (healthcare-associated pneumonia) J18.9  Chronic bilateral pleural effusions J90  
 Bilateral pleural effusion J90  
 Atrial fibrillation with rapid ventricular response ([facility]) I48.91 Current Facility-Administered Medications Medication Dose Route Frequency Provider Last Rate Last Dose  predniSONE (DELTASONE) tablet 20 mg  20 mg Oral DAILY WITH BREAKFAST Alisia Cheng MD   20 mg at 04/29/19 0848  
 guaiFENesin (ROBITUSSIN) 100 mg/5 mL oral liquid 400 mg  400 mg Oral TID Alisia Cheng MD   Stopped at 04/29/19 2200  
 dextrose 5% infusion  50 mL/hr IntraVENous CONTINUOUS Jonetta Blizzard, MD 50 mL/hr at 04/30/19 0935 50 mL/hr at 04/30/19 0935  metoprolol tartrate (LOPRESSOR) tablet 12.5 mg  12.5 mg Oral BID Ani LONGDenver PA-C   12.5 mg at 04/29/19 1731  
 insulin glargine (LANTUS) injection 5 Units  5 Units SubCUTAneous DAILY Antelmo Carter MD   5 Units at 04/29/19 0848  
 glucose chewable tablet 16 g  4 Tab Oral PRN Martinez Mendieta MD      
 glucagon (GLUCAGEN) injection 1 mg  1 mg IntraMUSCular PRN Martinez Mendieta MD      
 dextrose (D50) infusion 12.5-25 g  25-50 mL IntraVENous PRN Martinez Mendieta MD      
 insulin lispro (HUMALOG) injection   SubCUTAneous AC&HS Martinez Mendieta MD   Stopped at 04/29/19 1630  ipratropium (ATROVENT) 0.02 % nebulizer solution 0.5 mg  0.5 mg Nebulization Q6H PRN Dasia Mason NP      
 ALPRAZolam Gabby Puff) tablet 0.5 mg  0.5 mg Oral BID PRN Sonia Stock MD      
 albuterol-ipratropium (DUO-NEB) 2.5 MG-0.5 MG/3 ML  3 mL Nebulization Q2H PRN Sonia Stock MD   3 mL at 04/23/19 1951  potassium chloride SR (KLOR-CON 10) tablet 10 mEq  10 mEq Oral BID Sita Black MD   10 mEq at 04/29/19 1730  digoxin (LANOXIN) tablet 0.125 mg  0.125 mg Oral Q MON, WED & Claudia Morris MD   0.125 mg at 04/29/19 2306  arformoterol (BROVANA) neb solution 15 mcg  15 mcg Nebulization BID RT Chelsy Keys MD   15 mcg at 04/30/19 1926  budesonide (PULMICORT) 500 mcg/2 ml nebulizer suspension  500 mcg Nebulization BID RT Chelsy Keys MD   500 mcg at 04/30/19 6531  Lactobacillus Acidoph & Bulgar [facility]) tablet 2 Tab  2 Tab Oral BID Chelsy Keys MD   2 Tab at 04/29/19 1730  
 magnesium hydroxide (MILK OF MAGNESIA) 400 mg/5 mL oral suspension 30 mL  30 mL Oral DAILY PRN Chelsy Keys MD      
 pneumococcal 23-valent (PNEUMOVAX 23) injection 0.5 mL  0.5 mL IntraMUSCular PRIOR TO DISCHARGE Getachew Watson MD      
 heparin (porcine) injection 5,000 Units  5,000 Units SubCUTAneous Q8H Lian Mai MD   Stopped at 04/28/19 1400  
 metoprolol (LOPRESSOR) injection 2.5 mg  2.5 mg IntraVENous Q4H PRN Fabi Kahn PA   2.5 mg at 04/19/19 0216  bisacodyl (DULCOLAX) suppository 10 mg  10 mg Rectal DAILY PRN Sharif Cao MD   10 mg at 04/12/19 5199  sodium chloride (NS) flush 5-10 mL  5-10 mL IntraVENous PRN Tanner Young MD   10 mL at 04/28/19 1407  aspirin chewable tablet 81 mg  81 mg Oral DAILY Tanner Young MD   81 mg at 04/29/19 0848  atorvastatin (LIPITOR) tablet 40 mg  40 mg Oral QHS Tanner Young MD   40 mg at 04/29/19 2300  clopidogrel (PLAVIX) tablet 75 mg  75 mg Oral DAILY Tanner Young MD   75 mg at 04/29/19 0848  famotidine (PEPCID) tablet 20 mg  20 mg Oral DAILY Tanner Young MD   20 mg at 04/29/19 0848  
 nitroglycerin (NITROSTAT) tablet 0.4 mg  0.4 mg SubLINGual Q5MIN PRN Tanner Young MD      
 tamsulosin (FLOMAX) capsule 0.4 mg  0.4 mg Oral DAILY Tanner Young MD   0.4 mg at 04/29/19 3148  therapeutic multivitamin (THERAGRAN) tablet 1 Tab  1 Tab Oral DAILY Tanner Young MD   1 Tab at 04/29/19 8123  acetaminophen (TYLENOL) tablet 650 mg  650 mg Oral Q4H PRN Tanner Young MD   650 mg at 04/26/19 2224  ondansetron (ZOFRAN) injection 4 mg  4 mg IntraVENous Q4H PRN Tanner Young MD   4 mg at 04/12/19 2448 Objective Vitals:  
 04/30/19 2249 04/30/19 0403 04/30/19 2972 04/30/19 4400 BP: 97/57 102/65  108/62 Pulse: 76 66  76 Resp: 18 16  18 Temp: 97.5 °F (36.4 °C) 97.8 °F (36.6 °C)  97.6 °F (36.4 °C) SpO2: 96% 97% 96% 95% Weight:      
Height:      
 
 
 
Intake/Output Summary (Last 24 hours) at 4/30/2019 1026 Last data filed at 4/30/2019 5998 Gross per 24 hour Intake 170 ml Output 700 ml Net -530 ml Admission weight: Weight: 59.9 kg (132 lb) (04/10/19 1609) Last Weight Metrics: 
Weight Loss Metrics 4/29/2019 4/9/2019 4/2/2019 3/12/2019 3/7/2019 3/1/2019 2/23/2019 Today's Wt 145 lb 141 lb 6.4 oz 147 lb 4.3 oz 149 lb 0.5 oz - 161 lb 163 lb 2.3 oz  
BMI 23.4 kg/m2 22.15 kg/m2 23.07 kg/m2 - 23.34 kg/m2 25.22 kg/m2 25.55 kg/m2 Physical Assessment:  
 
General: NAD, alert and oriented. Frail, cachectic. Neck: No jvd. LUNGS: diminished air entry at the bases, bl exp wheezes. CVS EXM: S1, S2, irregular. Abdomen: soft, non tender. Lower Extremities:  trace+ bl thigh edema. Lab CBC w/Diff No results for input(s): WBC, RBC, HGB, HCT, PLT, GRANS, LYMPH, EOS, HGBEXT, HCTEXT, PLTEXT, HGBEXT, HCTEXT, PLTEXT in the last 72 hours. Chemistry Recent Labs 04/30/19 
9309 04/29/19 
0430 04/28/19 
0423 GLU 67* 81 70*  144 144  
K 4.1 4.4 4.0  
* 108 107 CO2 30 29 31 BUN 55* 69* 76* CREA 1.04 1.27 1.46* CA 8.4* 8.5 8.3* AGAP 6 7 6 BUCR 53* 54* 52* ALB 2.6* 2.6* 2.6* PHOS 3.2 3.3 4.0 No results found for: IRON, FE, TIBC, IBCT, PSAT, FERR Lab Results Component Value Date/Time Calcium 8.4 (L) 04/30/2019 05:25 AM  
 Phosphorus 3.2 04/30/2019 05:25 AM  
  
 
Crista Hassan M.D. [facility] Phone [phone] Pager [phone]

## 2019-04-30 NOTE — PROGRESS NOTES
Problem: General Medical Care Plan Goal: *Vital signs within specified parameters Outcome: Progressing Towards Goal 
Goal: *Labs within defined limits Outcome: Progressing Towards Goal 
Goal: *Absence of infection signs and symptoms Outcome: Progressing Towards Goal 
Goal: *Optimal pain control at patient's stated goal 
Outcome: Progressing Towards Goal 
Goal: *Skin integrity maintained Outcome: Progressing Towards Goal 
Goal: *Fluid volume balance Outcome: Progressing Towards Goal 
Goal: *Optimize nutritional status Outcome: Progressing Towards Goal 
Goal: *Anxiety reduced or absent Outcome: Progressing Towards Goal 
Goal: *Progressive mobility and function (eg: ADL's) Outcome: Progressing Towards Goal 
  
Problem: Impaired Skin Integrity/Pressure Injury Treatment Goal: *Improvement of Existing Pressure Injury Outcome: Progressing Towards Goal 
Goal: *Prevention of pressure injury Description Document William Scale and appropriate interventions in the flowsheet. Outcome: Progressing Towards Goal 
  
Problem: Gas Exchange - Impaired Goal: *Absence of hypoxia Outcome: Progressing Towards Goal 
  
Problem: Patient Education: Go to Patient Education Activity Goal: Patient/Family Education Outcome: Progressing Towards Goal 
  
Problem: Pain Goal: *Control of Pain Outcome: Progressing Towards Goal 
Goal: *PALLIATIVE CARE:  Alleviation of Pain Outcome: Progressing Towards Goal 
  
Problem: Nutrition Deficit Goal: *Optimize nutritional status Outcome: Progressing Towards Goal 
  
Problem: Falls - Risk of 
Goal: *Absence of Falls Description Document [place] Tommy Fall Risk and appropriate interventions in the flowsheet. Outcome: Progressing Towards Goal 
  
Problem: Patient Education: Go to Patient Education Activity Goal: Patient/Family Education Outcome: Progressing Towards Goal 
  
Problem: Pressure Injury - Risk of 
Goal: *Prevention of pressure injury Description Document William Scale and appropriate interventions in the flowsheet. Outcome: Progressing Towards Goal 
  
Problem: Patient Education: Go to Patient Education Activity Goal: Patient/Family Education Outcome: Progressing Towards Goal 
  
Problem: Patient Education: Go to Patient Education Activity Goal: Patient/Family Education Outcome: Progressing Towards Goal 
  
Problem: Patient Education: Go to Patient Education Activity Goal: Patient/Family Education Outcome: Progressing Towards Goal 
  
Problem: Patient Education: Go to Patient Education Activity Goal: Patient/Family Education Outcome: Progressing Towards Goal 
  
Problem: Breathing Pattern - Ineffective Goal: *Absence of hypoxia Outcome: Progressing Towards Goal 
Goal: *Use of effective breathing techniques Outcome: Progressing Towards Goal 
Goal: *PALLIATIVE CARE:  Alleviation of Dyspnea Outcome: Progressing Towards Goal 
  
Problem: Patient Education: Go to Patient Education Activity Goal: Patient/Family Education Outcome: Progressing Towards Goal

## 2019-04-30 NOTE — PROGRESS NOTES
[facility]  -  Progress Note Patient: Erasto Willard MRN: [id number]  SSN: [id number] YOB: 1926  Age: 80 y.o. Sex: male Admit Date: 4/10/2019 Assessment: - Acute hypoxic respiratory failure - HCAP: Multifocal PNA on CXR 04/10/19, multifocal consolidation within the left upper and bilateral lower lobes, likely recurrently multifocal PNA 
- Atrial fibrillation with RVR: CHADS2-Vasc score 5 (CHF, HTN, age, Vascular disease) and sinus tachycardia - Bilateral moderate to large pleural effusions CT chest 04/10-- s/p multiple thoracenteses, PleurX catheter (left) 04/23 
-CAD s/p LAD PCI with DEMAR 3/11/2019 with previous PCI to LCx 2011. Cath 3/11/2019 (Occluded mid RCA which appears to be chronic. There is faint collateral from the left coronary system, Left main artery with ostial 30-40%, LAD mid focal severely calcified tortuous 99% stenosis with CARLENE II flow, Circumflex coronary artery with diffuse mild 20-30% stenosis throughout). Discharged on ASA, plavix, statin, BB. 
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   
-Echo 03/14/19 with EF 26-30%  
-NEL, Creatinine 1.62 04/12 
-Peripheral vascular disease. -Hx Hypertension 
-Dyslipidemia 
-Asthma, former smoker. -Advanced age Plan:  
 
-Recommend rehab. 
-Continue current cardiac medication regimen - ASA, Lipitor, Plavix, Metoprolol, digoxin and statin 
-No ACEi/ARB/ARNI due to borderline BP. 
- No further cardiac work up planned at this time unless clinical status changes. Call us back if needed. Will be available as needed. Will need to follow up in cardiology clinic in 2-3 weeks after discharge. Thank you. Subjective: No new complaints. Resting comfortably,. Objective:  
  
Patient Vitals for the past 8 hrs: 
 Temp Pulse Resp BP SpO2  
04/30/19 1153     96 % 04/30/19 1103  75 18 118/63 98 % 04/30/19 0801 97.6 °F (36.4 °C) 76 18 108/62 95 % 04/30/19 0758     96 % Patient Vitals for the past 96 hrs: 
 Weight 04/29/19 0737 145 lb (65.8 kg) 04/28/19 0011 146 lb 3.2 oz (66.3 kg) Intake/Output Summary (Last 24 hours) at 4/30/2019 1319 Last data filed at 4/30/2019 3312 Gross per 24 hour Intake 170 ml Output 700 ml Net -530 ml Physical Exam: 
General:  alert, cooperative, no distress, appears stated age Neck:  No JVD Lungs:  Coarse breath sounds Heart:  Irregular rhythym, Abdomen:  abdomen is soft without significant tenderness, masses, organomegaly or guarding Extremities:  Ecchymoses to upper extremities, no edema Data Review:  
 
Labs: Results:  
   
Chemistry Recent Labs 04/30/19 
8129 04/29/19 
0430 04/28/19 
0423 GLU 67* 81 70*  144 144  
K 4.1 4.4 4.0  
* 108 107 CO2 30 29 31 BUN 55* 69* 76* CREA 1.04 1.27 1.46* CA 8.4* 8.5 8.3* PHOS 3.2 3.3 4.0 AGAP 6 7 6 BUCR 53* 54* 52* ALB 2.6* 2.6* 2.6* Lipid Panel Lab Results Component Value Date/Time Cholesterol, total 87 03/08/2019 05:28 AM  
 HDL Cholesterol 44 03/08/2019 05:28 AM  
 LDL, calculated 30 03/08/2019 05:28 AM  
 VLDL, calculated 13 03/08/2019 05:28 AM  
 Triglyceride 65 03/08/2019 05:28 AM  
 CHOL/HDL Ratio 2.0 03/08/2019 05:28 AM  
  
Liver Enzymes Recent Labs 04/30/19 
8857 ALB 2.6* Thyroid Studies Lab Results Component Value Date/Time  TSH 1.22 03/07/2019 05:29 AM

## 2019-04-30 NOTE — PROGRESS NOTES
Assumed patient care. Received patient awake,alert, oriented X2. Patient denies pain. Bed is locked and in lowest position and call bell is within reach. Not in acute distress.

## 2019-04-30 NOTE — PROGRESS NOTES
Patient is unable to communicate at this time. As he is resting peacefully at this time due to his current condition. No family seen at present.  offered prayer and left Spiritual Care brochure. Chaplains will continue to follow and will provide pastoral care on an as needed/requested basis. Ashley 3 Board Certified [employer] [facility]  
[phone]

## 2019-04-30 NOTE — PROGRESS NOTES
Pulmonary progress note History 81 y/o male with a history of recurrent transudative pleural effusions. Feeling better overall. Shortness of breath about the same over the last few days No respiratory distress at rest.  Normal affect Blood pressure 114/63, pulse 75, temperature 97.4 °F (36.3 °C), resp. rate 18, height 5' 6\" (1.676 m), weight 65.8 kg (145 lb), SpO2 96 %. No wheezing. No LE edema. PAL CXR shows increased left pleural effusion Assessment Recurrent pleural effusions s/p left Pleur-x catheter Plan Recommend Pleur-x drainage tomorrow.

## 2019-04-30 NOTE — ROUTINE PROCESS
Bedside and Verbal shift change report given to [facility] RN (oncoming nurse) by Elva Carr RN (offgoing nurse). Report included the following information SBAR, Intake/Output, MAR and Recent Results. Pt rested quietly throughout evening. Guaifenesin held due to dysphagia. Heparin held due to outdated lab values. Bedside and Verbal shift change report given to RN (oncoming nurse) by [facility] RN (offgoing nurse). Report included the following information SBAR, Intake/Output, MAR and Recent Results.

## 2019-04-30 NOTE — PROGRESS NOTES
Have left message for daughter Leny Lora to return call. Spoke with [facility] and they have made attempts to contact daughter and  They also have left messages to return their calls.

## 2019-04-30 NOTE — PROGRESS NOTES
Progress Note Patient: Sharona Alvarez               Sex: male          DOA: 4/10/2019 YOB: 1926      Age:  80 y.o.        LOS:  LOS: 20 days CHIEF COMPLAINT:  Other (pt on TCC and not improving per Daugher/wants evaluated.) Subjective:  
 
  doing well, he said his breathing is stable. Offer no acute complain. Eating well Objective:  
 
Visit Vitals /63 (BP 1 Location: Right arm, BP Patient Position: At rest) Pulse 75 Temp 97.6 °F (36.4 °C) Resp 18 Ht 5' 6\" (1.676 m) Wt 65.8 kg (145 lb) SpO2 96% BMI 23.40 kg/m² Physical Exam: 
Gen: cachectic Ears: hearing is intact Eyes: Icterus is not present Lungs:  diminished breath sounds bilaterally Heart: regular rate and rhythm, S1, S2 normal, no murmur, click, rub or gallop Gastrointestinal: soft, non-tender. Bowel sounds normal. No masses,  no organomegaly Neurological:  New Focal Deficits are not present Psychiatric:  Mood is stable Lab/Data Reviewed: 
CMP:  
Lab Results Component Value Date/Time  04/30/2019 05:25 AM  
 K 4.1 04/30/2019 05:25 AM  
  (H) 04/30/2019 05:25 AM  
 CO2 30 04/30/2019 05:25 AM  
 AGAP 6 04/30/2019 05:25 AM  
 GLU 67 (L) 04/30/2019 05:25 AM  
 BUN 55 (H) 04/30/2019 05:25 AM  
 CREA 1.04 04/30/2019 05:25 AM  
 GFRAA >60 04/30/2019 05:25 AM  
 GFRNA >60 04/30/2019 05:25 AM  
 CA 8.4 (L) 04/30/2019 05:25 AM  
 PHOS 3.2 04/30/2019 05:25 AM  
 ALB 2.6 (L) 04/30/2019 05:25 AM  
 
CBC:  
No results found for: WBC, HGB, HGBEXT, HCT, HCTEXT, PLT, PLTEXT, HGBEXT, HCTEXT, PLTEXT Imaging Reviewed: 
Xr Chest Pa Lat Result Date: 4/29/2019 EXAM: CHEST, TWO VIEWS CLINICAL INDICATION/HISTORY: pleural effusion   > Additional: None. COMPARISON: 4/25/2019 TECHNIQUE: AP and lateral views of the chest were obtained. _______________ FINDINGS: HEART AND MEDIASTINUM: Cardiac silhouette is top normal in size.  Minimal calcified plaque is present at the arch. LUNGS AND PLEURAL SPACES: Bilateral patchy alveolar densities are present including the periphery of both upper lobes, right infrahilar and basilar right lower lobe, and basilar left lower lobe, with slight progression in the upper lobes, slight improvement in the right lower lobe. Left basilar pleural catheter is again noted. Small left pleural effusion is slightly larger. Minimal right pleural effusion is smaller. No pneumothorax. BONY THORAX AND SOFT TISSUES: No acute osseous abnormality. _______________ IMPRESSION: 1. Multifocal pneumonia with slight progression in the upper lobes, slight improvement in the right lower lobe, without significant changes left lower lobe. 2. Left pleural catheter remains in place, small left pleural effusion is slightly larger. 3. Minimal right pleural effusion, with interval improvement. Peggyann Gang Assessment / Plan PLACEMENT IN PROCESS # HCAP: treated with  broad spectrum IV ab. Sputum cultures and urine antigens ordered. CT 4/10/19 : Multifocal consolidation within the left upper and bilateral lower lobes, similar to prior CT 03/13/2019. Findings likely represent recurrent multifocal. Off Abx currently. 4/19 : patient in sever SOB ,  repeat CXR. D/W  PCCM he is rec pleurodesis but daughter declined. Stat nebs as in SOB/wide spread ronchi. Try small dose steroid . per daughter he was in full power and very active till recent hospitalization and scoping of his GI then started detrioration rapidly. 
  
 # B/l recurrent pleural effusions - 2/2 CHF. Thoracentesis x2. Appreciate [facility] assistance. Had  repeat tapping 4/17/19. Ultrasound guided thoracentesis 1750 cc. Transudate. had Left PleurX catheter placed on 4/23 with significant improvement in resp status. repeat CXR with little L pleural effusion 
-Right PleurX catheter planned for 4/24 but canceled due to plavix.  Discussed with Cardiology, cannot hold plavix due to recent stent last month. Discussed with Pulm, Dr. Jeffery Mcclain said right catheter can be placed 90 days after stent placement with plavix held temporarily, no need to place now. # Hypernatremia. Resolved. Urine studies done . Used IVF D5. Ordered do not use NS, or any Na in diluents please as hypernatremia . Nephrology on board. 
  
# Hypokalemia. Replete and trend.  
  
# AF RVR: appreciate Cardiology assistance, started on  digoxin. AC per cardiology. 
  
# SLP was consulted previously, ordered diet recommended by SLP (soft, nectar thick liquids) 
  
# GOC - d/w daughter about palliative care measures, she declines adamantly. # NEL - improving, likely multifactorial 2/2 atbx, diuretics. Appreciate Nephrology eval.  
 
# CHF/CAD - per cardiology. No ACEi/ARB/ARNI due to borderline BP # DVT protocol Dispo: PT/OT rec SNF. Likely can dc/ Monday/Tuesday if can find rehab  place. Would recommend draining left chest via PleurX catheter prior to discharge and PleurX usage instructions taught to daughter prior to discharge. I have personally reviewed all pertinent labs and films that have officially resulted over the last 24 hours. I have personally checked for all pending labs that are awaiting final results. Signed By: Tra Fraser MD   
 April 30, 2019

## 2019-04-30 NOTE — ROUTINE PROCESS
Bedside and Verbal shift change report given to KATYA Rivera (oshncoming nurse) by [facility] RN (offgoing nurse). Report included the folowing information SBAR, Kardex, Intake/Output, MAR and Recent Results.

## 2019-05-01 NOTE — PROGRESS NOTES
Left message for Jami Hernandez of [place] letting her know daughter Roby Perla can be reached today at [phone]. Sent message via [address], and voice message asking Ms. Dickson to let this CM know asap if they will be accepting pt. (0) independent

## 2019-05-01 NOTE — PROGRESS NOTES
Problem: General Medical Care Plan Goal: *Vital signs within specified parameters Outcome: Progressing Towards Goal 
Goal: *Labs within defined limits Outcome: Progressing Towards Goal 
Goal: *Absence of infection signs and symptoms Outcome: Progressing Towards Goal 
Goal: *Optimal pain control at patient's stated goal 
Outcome: Progressing Towards Goal 
Goal: *Skin integrity maintained Outcome: Progressing Towards Goal 
Goal: *Fluid volume balance Outcome: Progressing Towards Goal 
Goal: *Optimize nutritional status Outcome: Progressing Towards Goal 
Goal: *Anxiety reduced or absent Outcome: Progressing Towards Goal 
Goal: *Progressive mobility and function (eg: ADL's) Outcome: Progressing Towards Goal 
  
Problem: Impaired Skin Integrity/Pressure Injury Treatment Goal: *Improvement of Existing Pressure Injury Outcome: Progressing Towards Goal 
Goal: *Prevention of pressure injury Description Document William Scale and appropriate interventions in the flowsheet. Outcome: Progressing Towards Goal 
  
Problem: Gas Exchange - Impaired Goal: *Absence of hypoxia Outcome: Progressing Towards Goal 
  
Problem: Patient Education: Go to Patient Education Activity Goal: Patient/Family Education Outcome: Progressing Towards Goal 
  
Problem: Pain Goal: *Control of Pain Outcome: Progressing Towards Goal 
  
Problem: Nutrition Deficit Goal: *Optimize nutritional status Outcome: Progressing Towards Goal 
  
Problem: Falls - Risk of 
Goal: *Absence of Falls Description Document Ed Hussein Fall Risk and appropriate interventions in the flowsheet. Outcome: Progressing Towards Goal 
  
Problem: Patient Education: Go to Patient Education Activity Goal: Patient/Family Education Outcome: Progressing Towards Goal 
  
Problem: Pressure Injury - Risk of 
Goal: *Prevention of pressure injury Description Document William Scale and appropriate interventions in the flowsheet.  
Outcome: Progressing Towards Goal 
  
 Problem: Patient Education: Go to Patient Education Activity Goal: Patient/Family Education Outcome: Progressing Towards Goal 
  
Problem: Breathing Pattern - Ineffective Goal: *Absence of hypoxia Outcome: Progressing Towards Goal 
Goal: *Use of effective breathing techniques Outcome: Progressing Towards Goal 
Goal: *PALLIATIVE CARE:  Alleviation of Dyspnea Outcome: Progressing Towards Goal 
  
Problem: Patient Education: Go to Patient Education Activity Goal: Patient/Family Education Outcome: Progressing Towards Goal

## 2019-05-01 NOTE — PROGRESS NOTES
Progress Note Patient: Dennise Porter               Sex: male          DOA: 4/10/2019 YOB: 1926      Age:  80 y.o.        LOS:  LOS: 21 days CHIEF COMPLAINT:  Other (pt on TCC and not improving per Daugher/wants evaluated.) Subjective: When I am going to be discharged ?  
  doing well, he said his breathing is stable. Offer no acute complain. Eating well Objective:  
 
Visit Vitals /56 (BP 1 Location: Right arm, BP Patient Position: At rest) Pulse 73 Temp 97.6 °F (36.4 °C) Resp 16 Ht 5' 5\" (1.651 m) Wt 97.5 kg (215 lb) SpO2 94% BMI 35.78 kg/m² Physical Exam: 
Gen: cachectic Ears: hearing is intact Eyes: Icterus is not present Lungs: better AE  bilaterally Heart: regular rate and rhythm, S1, S2 normal, no murmur, click, rub or gallop Gastrointestinal: soft, non-tender. Bowel sounds normal. No masses,  no organomegaly Neurological:  New Focal Deficits are not present Psychiatric:  Mood is stable Lab/Data Reviewed: 
CMP:  
Lab Results Component Value Date/Time  05/01/2019 05:15 AM  
 K 4.5 05/01/2019 05:15 AM  
  05/01/2019 05:15 AM  
 CO2 29 05/01/2019 05:15 AM  
 AGAP 9 05/01/2019 05:15 AM  
 GLU 79 05/01/2019 05:15 AM  
 BUN 46 (H) 05/01/2019 05:15 AM  
 CREA 1.00 05/01/2019 05:15 AM  
 GFRAA >60 05/01/2019 05:15 AM  
 GFRNA >60 05/01/2019 05:15 AM  
 CA 8.4 (L) 05/01/2019 05:15 AM  
 PHOS 3.2 05/01/2019 05:15 AM  
 ALB 2.5 (L) 05/01/2019 05:15 AM  
 
CBC:  
Lab Results Component Value Date/Time WBC 12.0 05/01/2019 05:15 AM  
 HGB 8.6 (L) 05/01/2019 05:15 AM  
 HCT 29.7 (L) 05/01/2019 05:15 AM  
  05/01/2019 05:15 AM  
 
 
Imaging Reviewed: 
No results found. Assessment / Plan PLACEMENT IN PROCESS . D/W CM 
Going for more draining of Pleural effusion today by radiology. # HCAP: treated with  broad spectrum IV ab.  Sputum cultures and urine antigens ordered. CT 4/10/19 : Multifocal consolidation within the left upper and bilateral lower lobes, similar to prior CT 03/13/2019. Findings likely represent recurrent multifocal. Off Abx currently. 4/19 : patient in sever SOB ,  repeat CXR. D/W  [facility] he is rec pleurodesis but daughter declined. Stat nebs as in SOB/wide spread ronchi. Try small dose steroid . per daughter he was in full power and very active till recent hospitalization and scoping of his GI then started detrioration rapidly. 
  
 # B/l recurrent pleural effusions - 2/2 CHF. Thoracentesis x2. Appreciate [facility] assistance. Had  repeat tapping 4/17/19. Ultrasound guided thoracentesis 1750 cc. Transudate. had Left PleurX catheter placed on 4/23 with significant improvement in resp status. repeat CXR with little L pleural effusion 
-Right PleurX catheter planned for 4/24 but canceled due to plavix. Discussed with Cardiology, cannot hold plavix due to recent stent last month. Discussed with Pulm, Dr. Keyonna Fuentes said right catheter can be placed 90 days after stent placement with plavix held temporarily, no need to place now. # Hypernatremia. Resolved. Urine studies done . Used IVF D5. Ordered do not use NS, or any Na in diluents please as hypernatremia . Nephrology on board. 
  
# Hypokalemia. Replete and trend.  
  
# AF RVR: appreciate Cardiology assistance, started on  digoxin. AC per cardiology. 
  
# SLP was consulted previously, ordered diet recommended by SLP (soft, nectar thick liquids) 
  
# GOC - d/w daughter about palliative care measures, she declines adamantly. # NEL - S.cr back to his normal level. improving, likely multifactorial 2/2 atbx, diuretics. Appreciate Nephrology eval.  
 
# CHF/CAD - per cardiology. No ACEi/ARB/ARNI due to borderline BP # DVT protocol Dispo: PT/OT rec SNF. Likely can dc/ Monday/Tuesday if can find rehab  place.  Would recommend draining left chest via PleurX catheter prior to discharge and PleurX usage instructions taught to daughter prior to discharge. I have personally reviewed all pertinent labs and films that have officially resulted over the last 24 hours. I have personally checked for all pending labs that are awaiting final results. Signed By: Carlo Boles MD   
 May 1, 2019

## 2019-05-01 NOTE — PROGRESS NOTES
1005 attempted PT, pt initially agreeable however changed his mind as he was thirsty and we would only provide him with appropriately thickened liquids. Encouraged participation, educated pt on role of PT, to no avail. Will continue to follow. Yue Harding, [place] 
 
[id number] pt with OT then SLP. Will continue to follow.

## 2019-05-01 NOTE — PROGRESS NOTES
Bedside and Verbal shift change report given to Alvin and Ca Roberson RN (oncoming nurse) by Ernesto Villalta RN (offgoing nurse). Report included the following information SBAR, Kardex, Intake/Output, MAR and Recent Results.

## 2019-05-01 NOTE — PROGRESS NOTES
Assumed care from [address]. Pt resting in bed with no signs of pain or distress. Bed locked and in lowest position with call bell in reach. Pt continuously  asked about leaving. Was redirected and explained that he's waiting for placement. [phone] with pulmonologist and gave okay to do drainage at bedside. 1752 Pt was drained of 1200 ml from pleural catheter using sterile technique. Pt complained of no discomfort. Vitals were stable.

## 2019-05-01 NOTE — PROGRESS NOTES
Received patient from [place], [facility]. Pt asleep in bed, on two liters of O2 nasal canula. Appears comfortable and pain free. Bed locked in lowest position and call light within reach. 2157: Pt refusing all scheduled medications for this time. Several attempts made to educate pt on the importance of taking the medications especially digoxin and the risks involved with non-compliance. Pt verbalized understanding but insisted on not taking the medications. Pt family (daughter) and Dr. Christo You made aware. Will continue to monitor and reassess pt. Bedside shift change report given to KATYA Saldivar (oncoming nurse) by Sammy Melton RN (offgoing nurse). Report included the following information SBAR, Kardex, and shift events

## 2019-05-01 NOTE — PROGRESS NOTES
Problem: Dysphagia (Adult) Goal: *Acute Goals and Plan of Care (Insert Text) Description Recommendations: 
Diet: mechanical-soft/honey-thick liquids (NO STRAWS) Meds: crushed in applesauce/pudding Aspiration Precautions Oral Care TID Other: SILENT ASPIRATION Goals:  Patient will: 1. Tolerate PO trials with 0 s/s overt distress in 4/5 trials 2. Utilize compensatory swallow strategies/maneuvers (decrease bite/sip, size/rate, alt. liq/sol) with min cues in 4/5 trials 3. Perform oral-motor/laryngeal exercises to increase oropharyngeal swallow function with min cues 4. Complete an objective swallow study (i.e., MBSS) to assess swallow integrity, r/o aspiration, and determine of safest LRD, min A - goal met 4/17/19 Outcome: Progressing Towards Goal 
  
SPEECH LANGUAGE PATHOLOGY DYSPHAGIA TREATMENT Patient: Zi Lange (44 y.o. male) Date: 5/1/2019 Diagnosis: Chronic bilateral pleural effusions [J90] HCAP (healthcare-associated pneumonia) [J18.9] CHF (congestive heart failure) ([address] [I50.9] Bilateral pleural effusion [J90] HCAP (healthcare-associated pneumonia) [J18.9] CHF (congestive heart failure) ([facility]) [I50.9] Chronic bilateral pleural effusions Precautions: aspiration Fall, Skin ASSESSMENT: 
Follow up this afternoon with ice chip trials to facilitate laryngeal strengthening exercises via effortful swallows. SLP presented at slow rate with cues for x3 effortful swallows after each ice chip. Pt with delayed s/s aspiration, specifically wet breathing and weak cough. SLP encouraged effortful coughs/throat clears in attempt to clear aspirate/penetrate. (+) response to treatment. SLP will continue to follow as indicated. D/w RN, [place]. Progression toward goals: 
?         Improving appropriately and progressing toward goals ? Improving slowly and progressing toward goals ? Not making progress toward goals and plan of care will be adjusted PLAN: 
 Recommendations and Planned Interventions: 
Patient continues to benefit from skilled intervention to address the above impairments. Continue treatment per established plan of care. Discharge Recommendations:  Aric Kim SUBJECTIVE:  
Patient stated ? thank you for those? . 
 
OBJECTIVE:  
Cognitive and Communication Status: 
Neurologic State: Alert Orientation Level: Oriented to person, Disoriented to time Cognition: Follows commands Perception: Appears intact Perseveration: No perseveration noted Safety/Judgement: Fall prevention, Decreased insight into deficits Dysphagia Treatment: 
 
Exercises: 
Laryngeal Exercises: 
Sustained \"ah\": No 
  
  
Mendelsohn Maneuver: No 
  
  
Effortful Swallow: Yes Sets : 1 Reps : 5 
Supraglottic Swallow: No 
  
  
Super-Supraglottic Swallow: No 
  
  
Incentive Spirometer: No 
  
  
  
Maria Isabel: Yes Sets : 1 Reps : 5 Sing \"EEE\": Yes Sets : 1 Reps : 15 Shaker: No 
  
  
Look Up at Ceiling/Gargle: No 
  
  
Open Mouth Wide/Yawn: No 
  
  
Tongue Back & Hold: No 
  
  
Effortful Breath Hold: No 
  
  
Hard Glottal Attack: No 
  
  
PAIN: 
Pain level pre-treatment: 0/10 Pain level post-treatment: 0/10 After treatment:  
?              Patient left in no apparent distress sitting up in chair ? Patient left in no apparent distress in bed 
? Call bell left within reach ? Nursing notified ? Family present ? Caregiver present ? Bed alarm activated COMMUNICATION/EDUCATION:  
? Aspiration precautions; swallow safety; compensatory techniques ? Patient unable to participate in education; education ongoing with staff ? Posted safety precautions in patient's room. ? Oral-motor/laryngeal strengthening exercises Pavan Austin SLP Time Calculation: 12 mins

## 2019-05-01 NOTE — PROGRESS NOTES
Problem: Self Care Deficits Care Plan (Adult) Goal: *Acute Goals and Plan of Care (Insert Text) Description Occupational Therapy Goals Initiated 4/18/2019 within 7 day(s). Pt re-evaluated on 5/1/19 following another week on skilled OT caseload. Pt regressing at this time due to decline in health & decreased participation with therapy. Reduced therapy frequency to 1-2x a week. Updated goals listed below. 1.  Patient will perform grooming tasks at EOB with supervision/set-up. 2.  Patient will perform upper/lower body dressing with minimal assistance. 3.  Patient will perform functional task in standing for 8 minutes with supervision and < 3 rest breaks to increase activity tolerance for ADLs. 4.  Patient will perform toilet transfers with supervision/set-up. 5.  Patient will perform all aspects of toileting with supervision/set-up. 6.  Patient will participate in upper extremity therapeutic exercise/activities for 8 minutes to increase BUE strength for functional transfers and ADLs. 7.  Patient will utilize energy conservation techniques during functional activities with minimal verbal cues. UPDATED GOALSSisi Rose MS OTR/L (4/24/19) 1. Patient will perform grooming tasks at EOB with supervision/set-up. 2.  Patient will perform upper/lower body dressing with minimal assistance. 3.  Patient will perform functional task in standing for 8 minutes with supervision and < 3 rest breaks to increase activity tolerance for ADLs. 4.  Patient will perform toilet transfers with minimal assistance. 5.  Patient will perform all aspects of toileting with minimal assistance. 6.  Patient will participate in upper extremity therapeutic exercise/activities for 8 minutes to increase BUE strength for functional transfers and ADLs. 7.  Patient will utilize energy conservation techniques during functional activities with minimal verbal cues. UPDATED GOALSSisi Rose MS OTR/L (5/1/19) 1.  Patient will perform grooming tasks at EOB for 8 minutes with minimal assistance for balance (5/1/19). 2.  Patient will perform upper/lower body dressing with moderate assistance (5/1/19). 3.  Patient will perform functional task at EOB for 8 minutes with supervision and < 3 rest breaks to increase activity tolerance for ADLs (5/1/19). 4.  Patient will perform toilet transfers with moderate assistance (5/1/19). 5.  Patient will perform all aspects of toileting with moderate assistance (5/1/19). 6.  Patient will participate in upper extremity therapeutic exercise/activities for 8 minutes to increase BUE strength for functional transfers and ADLs. 7.  Patient will utilize energy conservation techniques during functional activities with minimal verbal cues. Outcome: Progressing Towards Goal 
 
OCCUPATIONAL THERAPY RE-EVALUATION Patient: Kateryna Tovar (87 y.o. male) Date: 5/1/2019 Primary Diagnosis: Chronic bilateral pleural effusions [J90] HCAP (healthcare-associated pneumonia) [J18.9] CHF (congestive heart failure) ([facility] [address] [I50.9] Bilateral pleural effusion [J90] HCAP (healthcare-associated pneumonia) [J18.9] CHF (congestive heart failure) ([facility] [address] [I50.9] Precautions:  Fall, Aspiration, Skin PLOF: Pt was independent with ADLs; lived alone. ASSESSMENT : 
Based on the objective data described below, the patient presents with impairments with regard to activity tolerance, strength and participation. Requires max encouragement for skilled therapy. Mod/max A x2 for supine-->sit; fair-/poor sitting balance at EOB in prep for ADLs. Pt aborted further EOB activity despite encouragement, Mod A to return supine. Pt noted to have episode of bowel incontinence; rolling x4 trials with mod A, min vc's for BUE positioning and frequent rest breaks 2/2 SOB in sidelying. Max A for brief/pad management with max A to position hips in supine in prep for LB dressing.  Pt with poor/poor+ activity tolerance this session; will reduce therapy frequency to 1-2 times a week. Pt left with HOB elevated, SLP at bedside, needs within reach. KATYA Guzman aware of session. Patient will benefit from skilled intervention to address the above impairments. Patient's rehabilitation potential is considered to be Guarded Factors which may influence rehabilitation potential include: ? None noted ? Mental ability/status ? Medical condition ? Home/family situation and support systems ? Safety awareness ? Pain tolerance/management ? Other: PLAN : 
Recommendations and Planned Interventions: 
?               Self Care Training                  ? Therapeutic Activities ? Functional Mobility Training   ? Cognitive Retraining 
? Therapeutic Exercises           ? Endurance Activities ? Balance Training                    ? Neuromuscular Re-Education ? Visual/Perceptual Training     ? Home Safety Training 
? Patient Education                   ? Family Training/Education ? Other (comment): Frequency/Duration: Patient will be followed by occupational therapy 1-2 times a week to address goals. Discharge Recommendations: Aric Kim Further Equipment Recommendations for Discharge: TBD at next level of care SUBJECTIVE:  
Patient stated \"I have a new house and new car; I just want to go home. ? OBJECTIVE DATA SUMMARY:  
Hospital course since last seen and reason for reevaluation: Pt re-evaluated following another week on skilled OT caseload. Pt regressing at this time due to increased medical complications & decreased ability to participate with therapy. Decreased therapy frequency to 1-2 times a week due to decreased activity tolerance. Past Medical History:  
Diagnosis Date Asthma Cardiac cath 04/28/2011 oLM 30%. pLAD 30%. oD1 50%. CX 90% (3 x 18 [place] DEMAR). dRCA 90%. RPLB subtotal.  RPDA 100%. Cardiac echocardiogram 01/21/2014 EF 55-60%. Mod LVH. Gr 1 DDfx. Mild AS (mean grad 13). Mild AI. Unchanged from study of 11/30/11. Cardiac nuclear imaging test, mod risk 01/22/2016 Intermediate risk. Medium-sized inferior, inferoseptal infarction. No ischemia. EF 54%. Nondiagnostic EKG on pharm stress test.  
 Carotid duplex 03/02/2016 Mod 50-69% bilateral ICA stenosis. Probable significant RECA stenosis. >50% stenosis of left subclavian. No significant change from study of 1/8/15. Coronary artery disease Status post PCI with drug-eluting stent, [place] 3 x 18 mm in the proximal circumflex. Heart failure ([address] Hx of carotid artery disease ([address] Hypercholesterolemia Hypertension Prostate cancer ([facility] [address] Past Surgical History:  
Procedure Laterality Date CARDIAC SURG PROCEDURE UNLIST    
 HX CORONARY STENT PLACEMENT  4/28/11 PCI with drug-eluting stent, [place] 3 x 18 mm in the proximal circumflex (post dialated with 3.25 mm noncompliant balloon at high pressure). IR INSERT CATH PLEURAL INDWELL  4/23/2019 Barriers to Learning/Limitations: None Compensate with: visual, verbal, tactile, kinesthetic cues/model Home Situation:  
Home Situation Home Environment: Long term care # Steps to Enter: 0 One/Two Story Residence: One story Living Alone: No 
Support Systems: Child(marisela) Patient Expects to be Discharged to[de-identified] Rehabilitation facility Current DME Used/Available at Home: [place] [employer], Grab bars ? Right hand dominant   ? Left hand dominant Cognitive/Behavioral Status: 
Neurologic State: Alert Orientation Level: Oriented to person;Oriented to place Cognition: Follows commands Safety/Judgement: Decreased insight into deficits Skin: Intact (BUEs) Edema: None noted (BUEs) Vision/Perceptual: Acuity: Impaired far vision Coordination: BUE Coordination: Generally decreased, functional 
Fine Motor Skills-Upper: Right Intact; Left Intact Gross Motor Skills-Upper: Right Intact; Left Intact Balance: 
Sitting: Impaired Sitting - Static: Fair (occasional); Poor (constant support) Sitting - Dynamic: Poor (constant support) Standing: (not assessed; pt refused) Strength: BUE Strength: Generally decreased, functional(3+/5) Tone & Sensation: BUE Tone: Normal 
Sensation: (not tested) Range of Motion: BUE 
AROM: Generally decreased, functional(approx 3/4 shoulder flex) Functional Mobility and Transfers for ADLs: 
Bed Mobility: 
Rolling: Moderate assistance;Assist x2 Supine to Sit: Moderate assistance;Maximum assistance;Assist x2 Sit to Supine: Moderate assistance;Assist x2 Scooting: Maximum assistance;Assist x2(towards HOB) Transfers: 
Sit to Stand: (n/t) ADL Assessment:  
Feeding: Minimum assistance Oral Facial Hygiene/Grooming: Minimum assistance Bathing: Maximum assistance Upper Body Dressing: Maximum assistance Lower Body Dressing: Maximum assistance Toileting: Maximum assistance Cognitive Retraining Safety/Judgement: Decreased insight into deficits Therapeutic Activity: 
Supine-->sit with mod/max A x2 in prep for grooming tasks. Pt tolerates ~30 seconds at EOB in prep for grooming task. Rolling x4 trials with mod A, min vc's for BUE placement, and multiple 1-2 min rest breaks secondary to SOB in sidelying. Mod/max A to bridge hips in prep for LB dressing supine position. Pain: 
Pain level pre-treatment: 0/10 Pain level post-treatment: 0/10 Activity Tolerance:  Poor+ Please refer to the flowsheet for vital signs taken during this treatment. After treatment:  
? Patient left in no apparent distress sitting up in chair ? Patient left in no apparent distress in bed 
? Call bell left within reach ? Nursing notified ? Caregiver present ? Bed alarm activated COMMUNICATION/EDUCATION:  
? Role of Occupational Therapy in the acute care setting 
? Home safety education was provided and the patient/caregiver indicated understanding. ? Patient/family have participated as able in goal setting and plan of care. ? Patient/family agree to work toward stated goals and plan of care. ? Patient understands intent and goals of therapy, but is neutral about his/her participation. ? Patient is unable to participate in goal setting and plan of care. Thank you for this referral. 
Christiano Alfaro MS OTR/L Time Calculation: 28 mins

## 2019-05-01 NOTE — PROGRESS NOTES
Bedside shift report received from [id number]: AOX2, on 2L 02 via NC, generally weak; with coarse breath sounds but not dyspneic at rest; NSR BBB ob tele; denies any pain; repositioned comfortably; shift assessment completed 2117: due meds given; maintained on aspiration precautions at all times; daughter at bedside 2130: units Humalog given sc for sliding scale coverage 2345: sleeping; IVF infusing well 0040: incontinent care done; repositioned comfortably 0140: being coached by RT on flutter valve use 
 
0400: sleeping; needs within reach 
 
0600: incontinent care done 0730: Bedside and Verbal shift change report given to Yesenia Adam RN (oncoming nurse) by Juvenal Handley RN (offgoing nurse). Report included the following information SBAR, Kardex, Intake/Output, Recent Results and Cardiac Rhythm NSR BBB.

## 2019-05-01 NOTE — PROGRESS NOTES
RENAL PROGRESS NOTE [facility] Assessment/Plan: · NEL in a setting of HAP/a.fib with rvr/systolic chf. Resolved. · Hypernatremia. Corrected, d/c D5W. Continue to encourage oral intake of thickened water. · Systolic chf. No decompensation. · A.fib with rvr. Better controlled, card on the case. BP is stable off midodrin. · HAP. Finished abx, s/p lt Thoracocentesis/plerual catheter. Plans for rt pleur-x noted. · Debilitation/malnutrition. · Will s/o. Please call if any questions. Subjective:Patient complaints off: much more alert today, states he feels \"wonderful\". No cp/sob/n/v. Thirsty, asks for ice water but is on thickened liquids. Oral intake is about 25% per nurses. Patient Active Problem List  
Diagnosis Code  Asthma J45.909  Prostate cancer ([facility] [address] C61  Hypercholesterolemia E78.00  Angina, class I ([facility] [address] I20.9  Left bundle branch block I44.7  Coronary artery disease I25.10  Hypertension I11.9  Dyslipidemia E78.5  Carotid artery disease ([facility]) I77.9  Aortic valve stenosis I35.0  Anemia D64.9  
 NSTEMI (non-ST elevated myocardial infarction) ([facility]) I21.4  Congestive heart disease ([facility]) I50.9  CHF (congestive heart failure) ([facility]) I50.9  HCAP (healthcare-associated pneumonia) J18.9  Chronic bilateral pleural effusions J90  
 Bilateral pleural effusion J90  
 Atrial fibrillation with rapid ventricular response ([facility]) I48.91 Current Facility-Administered Medications Medication Dose Route Frequency Provider Last Rate Last Dose  [START ON 5/2/2019] insulin glargine (LANTUS) injection 4 Units  4 Units SubCUTAneous DAILY Howard Quinones MD      
 guaiFENesin (ROBITUSSIN) 100 mg/5 mL oral liquid 400 mg  400 mg Oral TID Laura Gates MD   400 mg at 05/01/19 2902  dextrose 5% infusion  50 mL/hr IntraVENous CONTINUOUS Golden Vitale MD 50 mL/hr at 05/01/19 0354 50 mL/hr at 05/01/19 0354  metoprolol tartrate (LOPRESSOR) tablet 12.5 mg  12.5 mg Oral BID Singh BELL PA-C   Stopped at 05/01/19 9974  
 glucose chewable tablet 16 g  4 Tab Oral PRN Soheila Marcos MD      
 glucagon (GLUCAGEN) injection 1 mg  1 mg IntraMUSCular PRN Soheila Marcos MD      
 dextrose (D50) infusion 12.5-25 g  25-50 mL IntraVENous PRN Soheila Marcos MD      
 insulin lispro (HUMALOG) injection   SubCUTAneous AC&HS Soheila Marcos MD   Stopped at 05/01/19 8011  ipratropium (ATROVENT) 0.02 % nebulizer solution 0.5 mg  0.5 mg Nebulization Q6H PRN Dasia Holbrook, BARBI      
 ALPRAZolam Harsha Milan) tablet 0.5 mg  0.5 mg Oral BID PRN Seble Leonardo MD      
 albuterol-ipratropium (DUO-NEB) 2.5 MG-0.5 MG/3 ML  3 mL Nebulization Q2H PRN Seble Leonardo MD   3 mL at 04/23/19 1951  potassium chloride SR (KLOR-CON 10) tablet 10 mEq  10 mEq Oral BID Viri Matos MD   10 mEq at 05/01/19 0684  digoxin (LANOXIN) tablet 0.125 mg  0.125 mg Oral Q MON, WED & Hank Stone MD   0.125 mg at 04/29/19 2306  arformoterol (BROVANA) neb solution 15 mcg  15 mcg Nebulization BID RT Bhavna Landeros MD   15 mcg at 05/01/19 3128  budesonide (PULMICORT) 500 mcg/2 ml nebulizer suspension  500 mcg Nebulization BID RT Bhavna Landeros MD   500 mcg at 05/01/19 0664  Lactobacillus Acidoph & Bulgar [facility]) tablet 2 Tab  2 Tab Oral BID Bhavna Landeros MD   2 Tab at 05/01/19 0835  
 magnesium hydroxide (MILK OF MAGNESIA) 400 mg/5 mL oral suspension 30 mL  30 mL Oral DAILY PRN Bhavna Landeros MD      
 pneumococcal 23-valent (PNEUMOVAX 23) injection 0.5 mL  0.5 mL IntraMUSCular PRIOR TO DISCHARGE Bhavna Landeros MD      
 heparin (porcine) injection 5,000 Units  5,000 Units SubCUTAneous Q8H Brian Holland MD   5,000 Units at 05/01/19 0600  
 metoprolol (LOPRESSOR) injection 2.5 mg  2.5 mg IntraVENous Q4H PRN Fabi Kahn PA   2.5 mg at 04/19/19 0216  bisacodyl (DULCOLAX) suppository 10 mg  10 mg Rectal DAILY PRN Cari Redding MD   10 mg at 04/12/19 2309  sodium chloride (NS) flush 5-10 mL  5-10 mL IntraVENous PRN Tanner Young MD   10 mL at 04/28/19 1407  aspirin chewable tablet 81 mg  81 mg Oral DAILY Tanner Young MD   81 mg at 05/01/19 0835  
 atorvastatin (LIPITOR) tablet 40 mg  40 mg Oral QHS Tanner Young MD   40 mg at 04/30/19 2116  clopidogrel (PLAVIX) tablet 75 mg  75 mg Oral DAILY Tanner Young MD   75 mg at 05/01/19 1496  famotidine (PEPCID) tablet 20 mg  20 mg Oral DAILY Tanner Young MD   20 mg at 05/01/19 0836  
 nitroglycerin (NITROSTAT) tablet 0.4 mg  0.4 mg SubLINGual Q5MIN PRN Tanner Young MD      
 tamsulosin (FLOMAX) capsule 0.4 mg  0.4 mg Oral DAILY Tanner Young MD   0.4 mg at 05/01/19 5459  therapeutic multivitamin (THERAGRAN) tablet 1 Tab  1 Tab Oral DAILY Tanner Young MD   1 Tab at 05/01/19 4319  acetaminophen (TYLENOL) tablet 650 mg  650 mg Oral Q4H PRN Tanner Young MD   650 mg at 04/26/19 2224  ondansetron (ZOFRAN) injection 4 mg  4 mg IntraVENous Q4H PRN Tanner Young MD   4 mg at 04/12/19 6475 Objective Vitals:  
 05/01/19 4002 05/01/19 0423 05/01/19 2059 05/01/19 6700 BP:  96/47 101/56 Pulse:  75 73 Resp:  17 16 Temp:  97.4 °F (36.3 °C) 97.6 °F (36.4 °C) SpO2: 97% 99% 98% 94% Weight:      
Height:      
 
 
 
Intake/Output Summary (Last 24 hours) at 5/1/2019 1102 Last data filed at 5/1/2019 7086 Gross per 24 hour Intake 2175.83 ml Output  Net 2175.83 ml Admission weight: Weight: 59.9 kg (132 lb) (04/10/19 1609) Last Weight Metrics: 
Weight Loss Metrics 4/30/2019 4/9/2019 4/2/2019 3/12/2019 3/7/2019 3/1/2019 2/23/2019 Today's Wt 215 lb 141 lb 6.4 oz 147 lb 4.3 oz 149 lb 0.5 oz - 161 lb 163 lb 2.3 oz  
BMI 35.78 kg/m2 22.15 kg/m2 23.07 kg/m2 - 23.34 kg/m2 25.22 kg/m2 25.55 kg/m2 Physical Assessment:  
 
General: NAD, alert and oriented. Frail, cachectic. Neck: No jvd. LUNGS: diminished air entry at the bases, bl exp wheezes. CVS EXM: S1, S2, irregular. Abdomen: soft, non tender. Lower Extremities:  trace+ bl thigh edema. Lab CBC w/Diff Recent Labs 05/01/19 
0515 WBC 12.0  
RBC 3.41* HGB 8.6* HCT 29.7*  
 GRANS 76* LYMPH 18* EOS 1 Chemistry Recent Labs 05/01/19 
0515 04/30/19 
0525 04/29/19 
0430 GLU 79 67* 81  145 144  
K 4.5 4.1 4.4  109* 108 CO2 29 30 29 BUN 46* 55* 69* CREA 1.00 1.04 1.27  
CA 8.4* 8.4* 8.5 AGAP 9 6 7 BUCR 46* 53* 54* ALB 2.5* 2.6* 2.6* PHOS 3.2 3.2 3.3 No results found for: IRON, FE, TIBC, IBCT, PSAT, FERR Lab Results Component Value Date/Time Calcium 8.4 (L) 05/01/2019 05:15 AM  
 Phosphorus 3.2 05/01/2019 05:15 AM  
  
 
Zenia Lynn M.D. [facility] Phone [phone] Pager [phone]

## 2019-05-02 NOTE — PROGRESS NOTES
Problem: Dysphagia (Adult) Goal: *Acute Goals and Plan of Care (Insert Text) Description Recommendations: 
Diet: mechanical-soft/honey-thick liquids (NO STRAWS) Meds: crushed in applesauce/pudding Aspiration Precautions Oral Care TID Other: SILENT ASPIRATION Goals:  Patient will: 1. Tolerate PO trials with 0 s/s overt distress in 4/5 trials - met 2. Utilize compensatory swallow strategies/maneuvers (decrease bite/sip, size/rate, alt. liq/sol) with min cues in 4/5 trials - met 3. Perform oral-motor/laryngeal exercises to increase oropharyngeal swallow function with min cues - met 4. Complete an objective swallow study (i.e., MBSS) to assess swallow integrity, r/o aspiration, and determine of safest LRD, min A - goal met 4/17/19 Outcome: Resolved/Not Met SPEECH LANGUAGE PATHOLOGY DYSPHAGIA TREATMENT & DISCHARGE Patient: Kennedy Rose (89 y.o. male) Date: 5/2/2019 Diagnosis: Chronic bilateral pleural effusions [J90] HCAP (healthcare-associated pneumonia) [J18.9] CHF (congestive heart failure) ([facility] [address] [I50.9] Bilateral pleural effusion [J90] HCAP (healthcare-associated pneumonia) [J18.9] CHF (congestive heart failure) ([facility]) [I50.9] Chronic bilateral pleural effusions Precautions:  Fall, Aspiration, Skin ASSESSMENT: 
Follow up this afternoon with ice chips to facilitate completion of laryngeal strengthening exercises for effortful swallow. A&Ox4. It should be noted that pt report significant frustration at not being able to go home, as well as not being able to have water or ice chips outside of therapy session for QOL/pleasure. Refusing to complete exercises despite max encouragement. At this time, pt continues to present with s/s aspiration with thin liquids. Intermittent coughing with honey-thick liquids. Pt is at very high risk for aspiration despite diet modification to puree with honey-thick liquids.  Rec QOL/comfort diet vs PEG; however, given advanced age and multiple comorbidities, preferred recommendation is initiation of QOL/comfort diet. Pt no longer benefiting from ST. Maximum therapeutic gains met; safest, least restrictive diet achieved in current in-patient/acute setting. Accordingly, SLP to d/c intervention at this time. Progression toward goals: 
?         Improving appropriately - goals met/approximated ? Not making progress/Not appropriate - will d/c POC PLAN: 
Recommendations and Planned Interventions: 
Maximum therapeutic gains met; safest, least restrictive diet achieved. D/C ST intervention at this time. Discharge Recommendations:  Home Health SUBJECTIVE:  
Patient stated ? Who do I have to talk to, to be able to go home? . 
 
OBJECTIVE:  
Cognitive and Communication Status: 
Neurologic State: Alert Orientation Level: Oriented X4 Cognition: Follows commands Perception: Appears intact Perseveration: Perseverates during mobility(requesting water) Safety/Judgement: Decreased insight into deficits Dysphagia Treatment: 
See above PAIN: 
Pain level pre-treatment: 0/10 Pain level post-treatment: 0/10 After treatment:  
?              Patient left in no apparent distress sitting up in chair ? Patient left in no apparent distress in bed 
? Call bell left within reach ? Nursing notified ? Caregiver present ? Bed alarm activated COMMUNICATION/EDUCATION:  
? Aspiration precautions; swallow safety; compensatory techniques ? Patient unable to participate in education; education ongoing with staff ? Posted safety precautions in patient's room. ? Oral-motor/laryngeal strengthening exercises Thank you for this referral, Jef Baca SLP Time Calculation: 15 mins

## 2019-05-02 NOTE — PROGRESS NOTES
Placed call to Aric Lopez with [facility] ([phone] ) to confirm that she had spoken with pt daughter Santhosh Gabriel yesterday and to request update on acceptance status of pt. Left voice message asking for return call

## 2019-05-02 NOTE — PROGRESS NOTES
PT/OT/ST Note: After therapy interdisciplinary rounds, it should be noted that pt has become increasingly frustrated (per his reports), that he is unable to go home. Therapy staff has been working with pt since admission and has noted physical decline secondary to increasing refusals to participate because he \"want(s) to go home\". Pt reports his adult dtr lives with him in his house; during therapy session today, pt reported \"my daughter doesn't want to take me home\". At onset of therapy, pt could walk (with assistance) to recliner and back to bed. At this time, pt refusing to participate with physical therapy. D/w RN who also reports he is refusing meds. With regard to diet, SLP has noted that pt is at very high risk for aspiration despite diet modification to puree with honey-thick liquid. SLP recommendations were QOL/comfort diet vs PEG; however, given advanced age and multiple comorbidities, preferred recommendation is initiation of QOL/comfort diet. Pt has verbalized aspiration risks and continues to ask for thin liquids. This also increases his frustration. Therapy staff concerned re: pt's decline secondary to his frustrations. Also, it should be noted that pt is not benefiting from skilled services as noted with decline. SLP d/c'd pt as maximum therapeutic gains met; safest, least restrictive diet achieved in current in-patient/acute setting. PT/OT will be discontinuing skilled treatment and placing pt on functional maintenance program. 
 
Again, it should be noted that pt is A&Ox4 and has verbalized increasing frustration that he is unable to go home. Therapy concerns have been discussed with  Karen Chahal, as well as case mgmt director George Dangelo. Asya Wright M.S., CCC-SLP Clinical Supervisor - Med-surg Rehab Ph: [phone]

## 2019-05-02 NOTE — PROGRESS NOTES
Problem: Discharge Planning Goal: *Discharge to safe environment Outcome: Progressing Towards Goal 
 Plan: home with home health Spoke with pt and daughter Crow Melendez this afternoon in pt room. Ms Vasile Christy stated when I entered room that pt wants to go home rather than going to SNF. This CM asked pt if he would be willing to go to rehab before going home to possibly improve his strength and mobility. Pt replied \"no, I want to go home now\". Informed Ms Vasile Christy that PT/OT recommending home health, hospital bed and 24 hr care. Daughter stated that she is in a position to live with pt, and that she works from home, so she can be with him. She reports that she already has a hospital bed in the home and pt has a rolling walker. Will provide daughter with home health list and brochures for personal care aides. Pt nurse to speak with MD regarding possible discharge orders.

## 2019-05-02 NOTE — ANCILLARY DISCHARGE INSTRUCTIONS
Patient and/or next of kin has been given the [facility] Important Message From Medicare About Your Rights\" letter and all questions were answered. Paper copy signed by daughter.

## 2019-05-02 NOTE — PROGRESS NOTES
Pulmonary progress note History 80-year-old male with a history of recurrent transudative pleural effusions with frequent hospital admissions for acute hypoxic respiratory failure secondary to pulmonary edema. He underwent Pleur-X catheter placement on the left on 4/23/2019. The right side was put on hold because of anticoagulation for a cardiac stent placed 3/11/2019. Patient would like to go home. He is not seeing any appreciable change in how he feels. Exam 
Jamul. Alert. No respiratory distress at rest.  Voice projection better than while in ICU Blood pressure 113/64, pulse 91, temperature 98.4 °F (36.9 °C), resp. rate 18, height 5' 5\" (1.651 m), weight 63 kg (138 lb 14.4 oz), SpO2 100 %. No JVD No accessory muscle use at rest 
Sclera anicteric No cyanosis or clubbing Facial movements symmetric Last chest x-ray is from 4/29/2019. At that time the left pleural effusion had slightly increased in size relative to 4/25/2019. Assessment Recurrent pleural effusions status post left Pleurx catheter NSTEMI with LVEF 26-30% s/p stent 3/11/19 Plan CXR now Drain catheter Repeat chest x-ray tomorrow morning to assess amount removed Arrange for home health Pleur-x draining Resume diuretics for LV dysfunction Discussed at length with patient and daughter 40 minutes.

## 2019-05-02 NOTE — PROGRESS NOTES
Problem: Discharge Planning Goal: *Discharge to safe environment Outcome: Progressing Towards Goal 
 Plan: home with home health

## 2019-05-02 NOTE — PROGRESS NOTES
Internal Medicine Progress Note NAME: Ganesh Bhagat :  1926 MRM:  [id number] Date/Time: 2019 ASSESSMENT/PLAN: 
 
Principal Problem: 
  Chronic bilateral pleural effusions (4/10/2019) Active Problems: 
  CHF (congestive heart failure) ([facility] [address] (4/10/2019) HCAP (healthcare-associated pneumonia) (4/10/2019) Atrial fibrillation with rapid ventricular response ([facility] [address] (2019) Assessment / Plan   
  
PLACEMENT IN PROCESS . D/W CM 
D/W Radiologist monie Ny X Cath. At length , no cath coming out. Plan to send patient down for another CXR anf they will follow on the drain. Follow CX result. D/W [facility] as well.  
  
# HCAP: treated with  broad spectrum IV ab. Sputum cultures and urine antigens ordered. CT 4/10/19 : Multifocal consolidation within the left upper and bilateral lower lobes, similar to prior CT 2019. Findings likely represent recurrent multifocal. Off Abx currently.  
  : patient in sever SOB ,  repeat CXR. D/W [facility] he is rec pleurodesis but daughter declined. Stat nebs as in SOB/wide spread ronchi. Try small dose steroid . per daughter he was in full power and very active till recent hospitalization and scoping of his GI then started detrioration rapidly. 
  
 # B/l recurrent pleural effusions - 2/2 CHF. Thoracentesis x2. Appreciate [facility] assistance. Had  repeat tapping 19. Ultrasound guided thoracentesis 1750 cc. Transudate. had Left PleurX catheter placed on  with significant improvement in resp status. repeat CXR with little L pleural effusion 
-Right PleurX catheter planned for  but canceled due to plavix. Discussed with Cardiology, cannot hold plavix due to recent stent last month. Discussed with Pulm, Dr. Keyonna Fuentes said right catheter can be placed 90 days after stent placement with plavix held temporarily, no need to place now.  
  
# Hypernatremia. Resolved. Urine studies done . Used IVF D5.  Ordered do not use NS, or any Na in diluents please as hypernatremia . Nephrology on board. 
  
# Hypokalemia. Replete and trend.  
  
# AF RVR: appreciate Cardiology assistance, started on  digoxin. AC per cardiology. 
  
# SLP was consulted previously, ordered diet recommended by SLP (soft, nectar thick liquids) 
  
# GOC - d/w daughter about palliative care measures, she declines adamantly.  
  
# NEL - S.cr back to his normal level. improving, likely multifactorial 2/2 atbx, diuretics. Appreciate Nephrology eval.  
  
# CHF/CAD - per cardiology. No ACEi/ARB/ARNI due to borderline BP 
  
# DVT protocol 
  
Dispo: PT/OT rec SNF. Likely can dc/ Monday/Tuesday if can find rehab  place. Would recommend draining left chest via PleurX catheter prior to discharge and PleurX usage instructions taught to daughter prior to discharge. 
  
I have personally reviewed all pertinent labs and films that have officially resulted over the last 24 hours. I have personally checked for all pending labs that are awaiting final results. 
 
-Code status : 
 
Lab Review:  
 
Recent Labs 05/01/19 
2100 05/01/19 
0515 WBC 13.4* 12.0 HGB 8.7* 8.6* HCT 30.3* 29.7*  
 215 Recent Labs 05/01/19 
0515 04/30/19 
8802  145  
K 4.5 4.1  109* CO2 29 30  
GLU 79 67* BUN 46* 55* CREA 1.00 1.04  
CA 8.4* 8.4* PHOS 3.2 3.2 ALB 2.5* 2.6* Lab Results Component Value Date/Time Glucose (POC) 84 05/02/2019 08:39 AM  
 Glucose (POC) 153 (H) 05/01/2019 09:16 PM  
 Glucose (POC) 135 (H) 05/01/2019 04:06 PM  
 Glucose (POC) 148 (H) 05/01/2019 11:20 AM  
 Glucose (POC) 98 05/01/2019 07:04 AM  
 Glucose (POC) 126 (H) 02/21/2019 07:17 PM  
 
No results for input(s): PH, PCO2, PO2, HCO3, FIO2 in the last 72 hours. No results for input(s): INR in the last 72 hours. No lab exists for component: INREXT No results found for: SDES Lab Results Component Value Date/Time Culture result: NO GROWTH 5 DAYS 04/16/2019 02:25 PM  
 Culture result: NO GROWTH 5 DAYS 04/12/2019 03:10 PM  
 Culture result: NO GROWTH 6 DAYS 04/10/2019 05:30 PM  
 
 
All Cardiac Markers in the last 24 hours: No results found for: CPK, CK, CKMMB, CKMB, RCK3, CKMBT, CKNDX, CKND1, CHRISTINE, TROPT, TROIQ, NGHIA, TROPT, TNIPOC, BNP, BNPP 
ABG: No results found for: PH, PHI, PCO2, PCO2I, PO2, PO2I, HCO3, HCO3I, FIO2, FIO2I Subjective: Chief Complaint:     
When I can go home Deny any complain ROS: 
(bold if positive,otherwise negative) Fever/chills ,  Dysuria Cough , Sputum , SOB/BOTELLO , Chest Pain Diarrhea ,Nausea/Vomit , Abd Pain , Constipation Tolerating Diet Objective:  
 
Vitals: 
Last 24hrs VS reviewed since prior progress note. Most recent are: 
 
Visit Vitals /58 (BP 1 Location: Right arm, BP Patient Position: Head of bed elevated (Comment degrees)) Pulse 68 Temp 97.8 °F (36.6 °C) Resp 16 Ht 5' 5\" (1.651 m) Wt 63 kg (138 lb 14.4 oz) SpO2 98% BMI 23.11 kg/m² SpO2 Readings from Last 6 Encounters:  
05/02/19 98% 04/23/19 98% 04/10/19 96% 04/02/19 98% 03/12/19 97% 03/01/19 95% O2 Flow Rate (L/min): 2 l/min Intake/Output Summary (Last 24 hours) at 5/2/2019 1122 Last data filed at 5/2/2019 1000 Gross per 24 hour Intake 600 ml Output  Net 600 ml Physical Exam:  
Gen: cachectic Ears: hearing is intact Eyes: Icterus is not present Lungs: better AE  bilaterally , harsh breathing, occasional ronchi. Heart: regular rate and rhythm, S1, S2 normal, no murmur, click, rub or gallop Gastrointestinal: soft, non-tender. Bowel sounds normal. No masses,  no organomegaly Neurological:  New Focal Deficits are not present Psychiatric:  Mood is stable Medications Reviewed: (see below) Lab Data Reviewed: (see below) 
 
______________________________________________________________________ Medications: Current Facility-Administered Medications Medication Dose Route Frequency  insulin glargine (LANTUS) injection 4 Units  4 Units SubCUTAneous DAILY  guaiFENesin (ROBITUSSIN) 100 mg/5 mL oral liquid 400 mg  400 mg Oral TID  metoprolol tartrate (LOPRESSOR) tablet 12.5 mg  12.5 mg Oral BID  
 glucose chewable tablet 16 g  4 Tab Oral PRN  
 glucagon (GLUCAGEN) injection 1 mg  1 mg IntraMUSCular PRN  
 dextrose (D50) infusion 12.5-25 g  25-50 mL IntraVENous PRN  
 insulin lispro (HUMALOG) injection   SubCUTAneous AC&HS  ipratropium (ATROVENT) 0.02 % nebulizer solution 0.5 mg  0.5 mg Nebulization Q6H PRN  
 ALPRAZolam (XANAX) tablet 0.5 mg  0.5 mg Oral BID PRN  
 albuterol-ipratropium (DUO-NEB) 2.5 MG-0.5 MG/3 ML  3 mL Nebulization Q2H PRN  potassium chloride SR (KLOR-CON 10) tablet 10 mEq  10 mEq Oral BID  digoxin (LANOXIN) tablet 0.125 mg  0.125 mg Oral Q MON, WED & FRI  arformoterol (BROVANA) neb solution 15 mcg  15 mcg Nebulization BID RT  
 budesonide (PULMICORT) 500 mcg/2 ml nebulizer suspension  500 mcg Nebulization BID RT  
 Lactobacillus Acidoph & Bulgar (FLORANEX) tablet 2 Tab  2 Tab Oral BID  magnesium hydroxide (MILK OF MAGNESIA) 400 mg/5 mL oral suspension 30 mL  30 mL Oral DAILY PRN  pneumococcal 23-valent (PNEUMOVAX 23) injection 0.5 mL  0.5 mL IntraMUSCular PRIOR TO DISCHARGE  heparin (porcine) injection 5,000 Units  5,000 Units SubCUTAneous Q8H  
 metoprolol (LOPRESSOR) injection 2.5 mg  2.5 mg IntraVENous Q4H PRN  
 bisacodyl (DULCOLAX) suppository 10 mg  10 mg Rectal DAILY PRN  
 sodium chloride (NS) flush 5-10 mL  5-10 mL IntraVENous PRN  
 aspirin chewable tablet 81 mg  81 mg Oral DAILY  atorvastatin (LIPITOR) tablet 40 mg  40 mg Oral QHS  clopidogrel (PLAVIX) tablet 75 mg  75 mg Oral DAILY  famotidine (PEPCID) tablet 20 mg  20 mg Oral DAILY  nitroglycerin (NITROSTAT) tablet 0.4 mg  0.4 mg SubLINGual Q5MIN PRN  
  tamsulosin (FLOMAX) capsule 0.4 mg  0.4 mg Oral DAILY  therapeutic multivitamin (THERAGRAN) tablet 1 Tab  1 Tab Oral DAILY  acetaminophen (TYLENOL) tablet 650 mg  650 mg Oral Q4H PRN  
 ondansetron (ZOFRAN) injection 4 mg  4 mg IntraVENous Q4H PRN Total time spent with patient: 35 minutes Care Plan discussed with: Patient, Nursing Staff, >50% of time spent in counseling and coordination of care and Radiologist re Elgin Barth X Cath. Discussed:  Care Plan Prophylaxis:  Hep SQ Disposition:  Home w/Family Attending Physician: Rasta Cline MD

## 2019-05-02 NOTE — PROGRESS NOTES
Precepting care provided by Lukas Paulson, RN.  
 
2030: Pt provided incontinence care, turned to right side, small BM WDL, moderate amount of clear yellow urine, dressing to DTI changed, sacrum pink, periwound skin blanchable. Encouraged pulmonary toileting, preformed x5 w/ encouragement. Bed alarm engaged. 2217: Pt refusing all meds, provides no reasoning, alert and oriented, called daughter, Elana Delcid, w/ pt's permission in room, she spoke to pt, she was informed her father is refusing all treatment @ this time. Dr. Janet Villa informed via telephone. Pt's current cardiac rhythm NSR HR 70's. Turn refused. 0021: Turn to right side. 0134: Provided incontinence care, bowel movement, urine, Mepilex changed, repositioned supine, turn refused.

## 2019-05-02 NOTE — PROGRESS NOTES
Problem: Mobility Impaired (Adult and Pediatric) Goal: *Acute Goals and Plan of Care (Insert Text) Description Physical Therapy Goals Initiated 4/23/2019 and to be accomplished within 7 days.: GOALS UPDATED 4/29 1. Patient will complete all bed mobility with minimal assistance/contact guard assist in order to prepare for EOB/OOB activity. 2.  Patient will perform sit <> stand with moderate assistance in order to prepare for OOB/gait activity. 3.  Patient will perform bed to chair transfers with moderate assistance in order to promote mobility and encourage seated activity to progress towards their prior level of function. 4.  Patient will participate in 15 minutes of B LE exercise/AROM with modified independence. Outcome: Not Progressing Towards Goal 
 PHYSICAL THERAPY TREATMENT Patient: Ganesh Bhagat (35 y.o. male) Date: 5/2/2019 Diagnosis: Chronic bilateral pleural effusions [J90] HCAP (healthcare-associated pneumonia) [J18.9] CHF (congestive heart failure) ([facility] [address] [I50.9] Bilateral pleural effusion [J90] HCAP (healthcare-associated pneumonia) [J18.9] CHF (congestive heart failure) ([facility]) [I50.9] Chronic bilateral pleural effusions Precautions: Fall, Aspiration, Skin ASSESSMENT: 
Pt continues to refuse cooperation with therapy, educated pt on importance of participation. Pt perseverating on going home, educated on role of PT in helping him to achieve his goals, however not receptive. Pt found with BM and urine, attempted instruction in bed mobility to facilitate ease of clean up, however pt did not participate and was max assist for rolling, total assist X2 for positioning. Pt has not consistently participated in skilled therapy, and is not progressing towards goals. Will attempt treatment tomorrow, however if pt continues to refuse participation or demonstrate progress, will be discharged from therapy caseload. Progression toward goals: ?      Improving appropriately and progressing toward goals ? Improving slowly and progressing toward goals ? Not making progress toward goals and plan of care will be adjusted PLAN: 
Patient continues to benefit from skilled intervention to address the above impairments. Continue treatment per established plan of care. Discharge Recommendations:  Home Health with 24 hour assist and long term care Further Equipment Recommendations for Discharge:  hospital bed SUBJECTIVE:  
Patient stated ? When do I get to go home? How much do they pay you? Its too much, let me go home. ? OBJECTIVE DATA SUMMARY:  
Critical Behavior: 
Neurologic State: Alert Orientation Level: Oriented X4 Cognition: Follows commands Safety/Judgement: Decreased insight into deficits Functional Mobility Training: 
Bed Mobility: 
Rolling: Maximum assistance Pain: 
Pain level pre-treatment: 0/10 Pain level post-treatment: 0/10 Pain Intervention(s): n/a Activity Tolerance:  
Poor, pt refuses participation Please refer to the flowsheet for vital signs taken during this treatment. After treatment:  
? Patient left in no apparent distress sitting up in chair ? Patient left in no apparent distress in bed 
? Call bell left within reach ? Nursing notified ? Caregiver present ? Bed alarm activated ? SCDs applied COMMUNICATION/EDUCATION:  
?         Pt refuses education ? Fall prevention education was provided and the patient/caregiver indicated understanding. ? Patient/family have participated as able in working toward goals and plan of care. ?         Patient/family agree to work toward stated goals and plan of care. ?         Patient understands intent and goals of therapy, but is neutral about his/her participation. ? Patient is unable to participate in stated goals/plan of care: ongoing with therapy staff. ?         Role of Physical Therapy in the acute care setting. Shlomo Magallon, PTA Time Calculation: 32 mins

## 2019-05-02 NOTE — PROGRESS NOTES
0900 pt refused all am PO meds and insulin. Instructed and explained importance of medicines, pt still refusing and \"just wants me to leave\" . 5 Pt daughter at bedside, convinced pt to take am PO meds. Daughter also wants to talk to pulm dr lomax right lung tap that she thought was still in the plan, per dr Pascual Engle, pt is no longer a candidate b/c there is not enough fluid on the right side any longer. Call placed to ICU for guidance on pulm  seeing pt today. 1350 call place to dr monet's office, spoke w  and left message for dr to call [phone] no call yet from dr Joanne Recio, but he spoke w Demarco Necessary per Bon Gaytan, case management. [facility] also waiting to talk w him. Home health orders for drainage and site care needed, also hospital orders needed. 1806 still no call back from dr Ann-Marie Ny paged to see if perhaps she had spoken w him. 1850 dr Loraine Garvey in room talking w pt and pt's daughter Bon Gaytan. Hussein Gaspar would like Bon Gaytan to see catheter drained here in the hospital before pt is discharged, [facility], charge RN, informed. 1915 Bedside and Verbal shift change report given to Nena (oncoming nurse) by Sam Isaacs RN 
 (offgoing nurse). Report included the following information Kardex, Intake/Output and Recent Results.

## 2019-05-03 NOTE — PROGRESS NOTES
Problem: Discharge Planning Goal: *Discharge to safe environment Outcome: Progressing Towards Goal 
 Plan: home with home health Spoke with daughter Naif  this afternoon. Informed her that home health referral has been made. Plan remains for pt to be discharged to daughter's home when medically ready with home health. Pt has hospital bed already in the home. Information previously given to daughter regarding personal care aide agencies. [facility] & [address] Provider list has been given to the patient and/or patient representative. Patient and/or patient representative has signed the [place] of Choice selecting ___[facility] 
______________________as their preference agency and a copy given. Both Home Health Provider list and [employer] of Choice have been placed on the chart.

## 2019-05-03 NOTE — PROGRESS NOTES
Problem: General Medical Care Plan Goal: *Vital signs within specified parameters Outcome: Progressing Towards Goal 
Goal: *Labs within defined limits Outcome: Progressing Towards Goal 
Goal: *Absence of infection signs and symptoms Outcome: Progressing Towards Goal 
Goal: *Optimal pain control at patient's stated goal 
Outcome: Progressing Towards Goal 
Goal: *Skin integrity maintained Outcome: Progressing Towards Goal 
Goal: *Fluid volume balance Outcome: Progressing Towards Goal 
Goal: *Optimize nutritional status Outcome: Progressing Towards Goal 
Goal: *Anxiety reduced or absent Outcome: Progressing Towards Goal 
Goal: *Progressive mobility and function (eg: ADL's) Outcome: Progressing Towards Goal 
  
Problem: Pain Goal: *Control of Pain Outcome: Progressing Towards Goal 
Goal: *PALLIATIVE CARE:  Alleviation of Pain Outcome: Progressing Towards Goal 
  
Problem: Falls - Risk of 
Goal: *Absence of Falls Description Document Zion Ramus Fall Risk and appropriate interventions in the flowsheet. Outcome: Progressing Towards Goal 
  
Problem: Patient Education: Go to Patient Education Activity Goal: Patient/Family Education Outcome: Progressing Towards Goal 
  
Problem: Pressure Injury - Risk of 
Goal: *Prevention of pressure injury Description Document William Scale and appropriate interventions in the flowsheet. Outcome: Progressing Towards Goal 
  
Problem: Discharge Planning Goal: *Discharge to safe environment Outcome: Progressing Towards Goal

## 2019-05-03 NOTE — PROGRESS NOTES
3501- Bedside and Verbal shift change report given to Quincy Pena RN (oncoming nurse) by Anel Werner RN (offgoing nurse). Report included the following information SBAR, Kardex and MAR.  
 
 
4622- Pleural catheter education provided by Richelle Palacios from interventional radiology. 1000mg removed from pleural cath as per MD order. Home kit in room for pt. Pt tolerated well. 0- Informed Dr. Sabino Mendez about pt's low BP 85/54 and 95/53. Received verbal order to hold any BP meds scheduled at this time and to enter parameter to hold Metoprolol if SBP <110. Pt resting in bed asymptomatic. 200- Informed Dr. Jean-Claude Chung of pt refusing all meds unless he is allowed to have water. Pt on aspiration precaution and diet is mech soft with thickened liquid. MD suggested pt's daughter to be informed and educate about the risks if allowing pt to have water and going against medical advice. 5030- Bedside and Verbal shift change report given to Octavio Solano RN (oncoming nurse) by Quincy Pena RN (offgoing nurse). Report included the following information SBAR, Kardex and MAR. Informed of pt refusing meds unless with water and pleural cath.

## 2019-05-03 NOTE — PROGRESS NOTES
Internal Medicine Progress Note NAME: Keshawn Smith :  1926 MRM:  [id number] Date/Time: 5/3/2019 ASSESSMENT/PLAN: 
 
Principal Problem: 
  Chronic bilateral pleural effusions (4/10/2019) Active Problems: 
  CHF (congestive heart failure) ([facility] [address] [facility] (4/10/2019) HCAP (healthcare-associated pneumonia) (4/10/2019) Atrial fibrillation with rapid ventricular response ([facility] [address] (2019) Assessment / Plan   
  
PLACEMENT IN PROCESS . D/W [facility] plan noted. I called radiologist and LM , they will call me back re drain and cath care prior dc patient. # HCAP: treated with  broad spectrum IV ab. Sputum cultures and urine antigens ordered. CT 4/10/19 : Multifocal consolidation within the left upper and bilateral lower lobes, similar to prior CT 2019. Findings likely represent recurrent multifocal. Off Abx currently.  
  : patient in sever SOB ,  repeat CXR. D/W [facility] he is rec pleurodesis but daughter declined. Stat nebs as in SOB/wide spread ronchi. Try small dose steroid . per daughter he was in full power and very active till recent hospitalization and scoping of his GI then started detrioration rapidly. 
  
 # B/l recurrent pleural effusions - 2/2 CHF. Thoracentesis x2. Appreciate [facility] assistance. Had  repeat tapping 19. Ultrasound guided thoracentesis 1750 cc. Transudate. had Left PleurX catheter placed on  with significant improvement in resp status. repeat CXR with little L pleural effusion 
-Right PleurX catheter planned for  but canceled due to plavix. Discussed with Cardiology, cannot hold plavix due to recent stent last month. Discussed with Pulm, Dr. Ej Robertson said right catheter can be placed 90 days after stent placement with plavix held temporarily, no need to place now.  
  
# Hypernatremia. Resolved. Urine studies done . Used IVF D5. Ordered do not use NS, or any Na in diluents please as hypernatremia .   Nephrology on board. 
  
 # Hypokalemia. Replete and trend.  
  
# AF RVR: appreciate Cardiology assistance, started on  digoxin. AC per cardiology. 
  
# SLP was consulted previously, ordered diet recommended by SLP (soft, nectar thick liquids) 
  
# GOC - d/w daughter about palliative care measures, she declines adamantly.  
  
# NEL - S.cr back to his normal level. improving, likely multifactorial 2/2 atbx, diuretics. Appreciate Nephrology eval.  
  
# CHF/CAD - per cardiology. No ACEi/ARB/ARNI due to borderline BP 
  
# DVT protocol 
  
 
-Code status : FULL Lab Review:  
 
Recent Labs 05/01/19 
2100 05/01/19 
0515 WBC 13.4* 12.0 HGB 8.7* 8.6* HCT 30.3* 29.7*  
 215 Recent Labs 05/01/19 
0515   
K 4.5  
 CO2 29 GLU 79 BUN 46* CREA 1.00  
CA 8.4* PHOS 3.2 ALB 2.5* Lab Results Component Value Date/Time Glucose (POC) 139 (H) 05/03/2019 09:05 AM  
 Glucose (POC) 110 05/02/2019 10:20 PM  
 Glucose (POC) 177 (H) 05/02/2019 03:50 PM  
 Glucose (POC) 101 05/02/2019 12:06 PM  
 Glucose (POC) 84 05/02/2019 08:39 AM  
 Glucose (POC) 126 (H) 02/21/2019 07:17 PM  
 
No results for input(s): PH, PCO2, PO2, HCO3, FIO2 in the last 72 hours. No results for input(s): INR in the last 72 hours. No lab exists for component: INREXT, INREXT No results found for: SDES Lab Results Component Value Date/Time Culture result: NO GROWTH 5 DAYS 04/16/2019 02:25 PM  
 Culture result: NO GROWTH 5 DAYS 04/12/2019 03:10 PM  
 Culture result: NO GROWTH 6 DAYS 04/10/2019 05:30 PM  
 
 
All Cardiac Markers in the last 24 hours: No results found for: CPK, CK, CKMMB, CKMB, RCK3, CKMBT, CKNDX, CKND1, CHRISTINE, TROPT, TROIQ, NGHIA, TROPT, TNIPOC, BNP, BNPP 
ABG: No results found for: PH, PHI, PCO2, PCO2I, PO2, PO2I, HCO3, HCO3I, FIO2, FIO2I Subjective: Chief Complaint:     
When I can go home Deny any complain. ROS: 
(bold if positive,otherwise negative) Fever/chills ,  Dysuria Cough , Sputum , SOB/BOTELLO , Chest Pain Diarrhea ,Nausea/Vomit , Abd Pain , Constipation Tolerating Diet Objective:  
 
Vitals: 
Last 24hrs VS reviewed since prior progress note. Most recent are: 
 
Visit Vitals /57 (BP 1 Location: Right arm, BP Patient Position: At rest) Pulse 78 Temp 97.4 °F (36.3 °C) Resp 17 Ht 5' 5\" (1.651 m) Wt 61.7 kg (136 lb 1.6 oz) SpO2 100% BMI 22.65 kg/m² SpO2 Readings from Last 6 Encounters:  
05/03/19 100% 04/23/19 98% 04/10/19 96% 04/02/19 98% 03/12/19 97% 03/01/19 95% O2 Flow Rate (L/min): 2 l/min Intake/Output Summary (Last 24 hours) at 5/3/2019 1023 Last data filed at 5/3/2019 0401 Gross per 24 hour Intake  Output 200 ml Net -200 ml Physical Exam:  
Gen: cachectic Ears: hearing is intact Eyes: Icterus is not present Lungs: better AE  bilaterally , harsh breathing, occasional ronchi. Heart: regular rate and rhythm, S1, S2 normal, no murmur, click, rub or gallop Gastrointestinal: soft, non-tender. Bowel sounds normal. No masses,  no organomegaly Neurological:  New Focal Deficits are not present Psychiatric:  Mood is stable Medications Reviewed: (see below) Lab Data Reviewed: (see below) 
 
______________________________________________________________________ Medications:  
 
Current Facility-Administered Medications Medication Dose Route Frequency  furosemide (LASIX) tablet 20 mg  20 mg Oral DAILY  furosemide (LASIX) tablet 10 mg  10 mg Oral DAILY  insulin glargine (LANTUS) injection 4 Units  4 Units SubCUTAneous DAILY  guaiFENesin (ROBITUSSIN) 100 mg/5 mL oral liquid 400 mg  400 mg Oral TID  metoprolol tartrate (LOPRESSOR) tablet 12.5 mg  12.5 mg Oral BID  
 glucose chewable tablet 16 g  4 Tab Oral PRN  
 glucagon (GLUCAGEN) injection 1 mg  1 mg IntraMUSCular PRN  
 dextrose (D50) infusion 12.5-25 g  25-50 mL IntraVENous PRN  
  insulin lispro (HUMALOG) injection   SubCUTAneous AC&HS  ipratropium (ATROVENT) 0.02 % nebulizer solution 0.5 mg  0.5 mg Nebulization Q6H PRN  
 ALPRAZolam (XANAX) tablet 0.5 mg  0.5 mg Oral BID PRN  
 albuterol-ipratropium (DUO-NEB) 2.5 MG-0.5 MG/3 ML  3 mL Nebulization Q2H PRN  potassium chloride SR (KLOR-CON 10) tablet 10 mEq  10 mEq Oral BID  digoxin (LANOXIN) tablet 0.125 mg  0.125 mg Oral Q MON, WED & FRI  arformoterol (BROVANA) neb solution 15 mcg  15 mcg Nebulization BID RT  
 budesonide (PULMICORT) 500 mcg/2 ml nebulizer suspension  500 mcg Nebulization BID RT  
 Lactobacillus Acidoph & Bulgar (FLORANEX) tablet 2 Tab  2 Tab Oral BID  magnesium hydroxide (MILK OF MAGNESIA) 400 mg/5 mL oral suspension 30 mL  30 mL Oral DAILY PRN  pneumococcal 23-valent (PNEUMOVAX 23) injection 0.5 mL  0.5 mL IntraMUSCular PRIOR TO DISCHARGE  heparin (porcine) injection 5,000 Units  5,000 Units SubCUTAneous Q8H  
 metoprolol (LOPRESSOR) injection 2.5 mg  2.5 mg IntraVENous Q4H PRN  
 bisacodyl (DULCOLAX) suppository 10 mg  10 mg Rectal DAILY PRN  
 sodium chloride (NS) flush 5-10 mL  5-10 mL IntraVENous PRN  
 aspirin chewable tablet 81 mg  81 mg Oral DAILY  atorvastatin (LIPITOR) tablet 40 mg  40 mg Oral QHS  clopidogrel (PLAVIX) tablet 75 mg  75 mg Oral DAILY  famotidine (PEPCID) tablet 20 mg  20 mg Oral DAILY  nitroglycerin (NITROSTAT) tablet 0.4 mg  0.4 mg SubLINGual Q5MIN PRN  
 tamsulosin (FLOMAX) capsule 0.4 mg  0.4 mg Oral DAILY  therapeutic multivitamin (THERAGRAN) tablet 1 Tab  1 Tab Oral DAILY  acetaminophen (TYLENOL) tablet 650 mg  650 mg Oral Q4H PRN  
 ondansetron (ZOFRAN) injection 4 mg  4 mg IntraVENous Q4H PRN Total time spent with patient: 35 minutes Care Plan discussed with: Patient, Nursing Staff, >50% of time spent in counseling and coordination of care and Radiologist monie Aguirre X Cath. Discussed:  Care Plan Prophylaxis:  Hep SQ Disposition:  Home w/Family Attending Physician: Roe Duran MD

## 2019-05-03 NOTE — WOUND CARE
Wound care was requested to assess a stage 2 pressure injury on patient's sacrum. The patient was awake and alert, and able to turn himself onto his side. He has a stage 2 PI on his sacrum measuring 3.8 x 1.9 x 0.1 cm. The base is pink. There are no s/s of infection. A bordered foam was applied to the area. A head-to-toe skin assessment was completed. There are no other areas of concern. The dressing around his Pleurx catheter was removed. The skin around the catheter is intact, with no erythema or signs of breakdown. A new foam dressing was applied and secured with Hypafix tape.

## 2019-05-03 NOTE — PROGRESS NOTES
Nutrition follow-up/ 
Plan of care RECOMMENDATIONS:  
 
1. Cardiac; [facility] Solid Diet w/ HTL  
2. SF CIB TID with honey thick milk 3. Monitor labs, weight and PO intake 4. RD to follow GOALS:  
 
1. Ongoing: PO intake meets >75% of protein/calorie needs by 5/8 
2. Ongoing: Weight Maintenance/gradual gain (1-2 lb/week) by 5/8 ASSESSMENT: 
 
Wt status is classified as normal per BMI of 22.6. Variable weights recorded. Poor PO intake. Continue SF CIB TID to supplement energy needs. Labs noted. Pt with hypoalbuminemia, hypocalcemia, elevated BUN level but trending down; GFR (>60). Nutrition recommendations listed. RD to follow. Meets Criteria for Acute Malnutrition  
[x] Severe Malnutrition, as evidenced by: 
            [x] Moderate muscle wasting, loss of subcutaneous fat 
            [x] Nutritional intake of <50% of recommended intake for >5 days [x] Weight loss of >1-2% in 1 week, >5% in 1 month, >7.5% in 3 months, or >10% in 6 months 
            [] Moderate-severe edema  
  
Nutrition Risk:  [] High  [x] Moderate []  Low SUBJECTIVE/OBJECTIVE:  
 
(5/3): Patient continues with poor intake of meals. Observed 25% intake of applesauce and CIB supplement during visit. Spoke with CNA who reported patient hasn't been eating much solid food but does drink fluids well. Patient has been requesting water. Will inform diet office to bring honey-thickened water with meals. Will monitor. 
 
(4/29): Reviewed SLP recommendations from today (4/29) for [facility] soft diet and HTL. Visited patient during lunch meal. Patient eating fruit during visit with rest of tray untouched and 25% intake of CIB supplement. Patient reports not having appetite but family brings in food sometimes. Encouraged adequate intake of meals and supplements to meet nutrition needs. Awaiting placement. Will monitor. 
 
(4/23):  Reviewed SLP note from today (4/23) with recommendations to continue mech soft diet with HTL. Patient now transferred to ICU to receive BiPAP.  
 
(4/18 afternoon): Noted SLP saw patient earlier today but patient refused participation stating he was too tired. Observed minimal intake of lunch tray (<5%). Patient reports appetite is so-so but is drinking CIB supplements. However, appears to only have had a few sips. Noted Boost supplement at bedside ( not on formulary). Discussed with patient and RN that SLP evaluated patient and recommended HTL so patient can only have CIB supplements at this time to be in compliance with HTL. Discussed and implemented food preferences. Will continue to closely monitor. (4/18 AM): S/P MBS 4/17 with SLP recommendations for mech soft solids and HTL. Patient with SF CIB supplements ordered on 4/17 due to HTL diet order. Noted diet changed to regular diet and thin liquids at 4/17/19 on 21:59 and Ensure Enlive supplements reordered (not honey-thick). Communicated with SLP who reported patient should still be on mech soft diet with HTL. No documentation available on why diet was changed so spoke with RN Maikol Olguin) inquiring why diet was changed. RN unaware why diet was changed. Communicated this writer would change diet back to mech soft diet and HTL per SLP recommendations and discontinue Ensure Enlive supplements as not compliant with diet order. SLP will be in this afternoon to re-assess. Will monitor. (4/17) Plan for U/S guided thoracentesis today. S/P MBS today with SLP recommendations for mech soft solids and HTL. Changed supplements from Ensure Enlive to SF CIB TID due to meet new diet order parameters. (4/16): Pt transferred from [facility] and admitted to hospital on (4/10) for HCAP, chronic pleural effusions and CHF. Diet orders carried over from SLP recomendations from previous admission to [facility] so would recommend a repeat evaluation. PO intake has been poor per vitals.  Variability in recent weights recorded, but per Hx from patient/daughter definitely with weight loss starting ~9 months ago (Total of 22 lb or 13% from ~ lb x 9 months ago and per weight at 158 lb on (1/16/2019) 17 lb or 11% over the past 3 months PTA) Noted fluid fluctuation also a factor. Plan is for thoracentesis tomorrow. Pt seen in room this evening with daughter at bedside. Observed 100% of supplement and applesauce consumed. Obtained some preferences and also had d/w daughter to fill out the menu to implement his preferences and dietary will pick it up. (Also informed dietary). Explained process and that there is a refrigerator on unit for things she may want to bring in as well. Noted orders for SLP to reevaluate with MBS placed this afternoon as well. Continue to encourage intake and will follow. (4/10): Pt w/ slightly improved appetite but still not adequate. Weight has been stable since admission. Pt seen in room with daughter who had just arrived and was requesting to see someone. Informed Ariana Martinez, and TCC Director, Kimberlee De Luna, that she would like to speak with someone. They addressed the situation. Pt is being transferred to the ER per family request so will follow up at a more appropriate time.  
 
(4/3): Pt transferred form [facility] to [address] [place] [address],[address] on 4/2/2019. Pt seen in room with daughter at bedside to assist with Hx. Pt reports was previously consuming 2 meals and 1 Boost per day prior to hospitalization. Denies having any food allergies or problems chewing/swallowing and stable weight prior to recent hospitalization. Spoke with SLP , Regi Farley, and will do evaluation as was previously on modified diet PTA. Pt stated he is feeling hungry in the mornings, but that he now tends to have early satiety. Discussed trying to make breakfast his largest meal and then doing smaller meals/nutrient dense snacks throughout the rest of the day.  Will reorder Ensure Enlive TID that he was previously receiving and will possibly add in magic cups but want to promote solid food intake first. Encouraged trying to consume what he can tolerate of solid foods first and then sipping on supplements after or between meals to increase kcal/protein. Also provided menu and encouraged him to implement preferences with dietary. We had the discussion that SLP would be evaluating most likely so things may have to be altered depending on results. Weight loss confirmed but unable to quantify exactly d/t recent fluid fluctuations. (Estimate ~10-12 lb or 8% loss x 1-2 months PTA) Encourage intake and will monitor. Information Obtained from:  
 [x] Chart Review [x] Patient 
 [] Family/Caregiver [x] Nurse/CNA [] Interdisciplinary Meeting/Rounds Diet: Cardiac;[facility] Soft w/ HTL Medications: [x] Reviewed Allergies: [x] Reviewed Patient Active Problem List  
Diagnosis Code  Asthma J45.909  Prostate cancer ([facility] [address] C61  Hypercholesterolemia E78.00  Angina, class I ([facility] [address] I20.9  Left bundle branch block I44.7  Coronary artery disease I25.10  Hypertension I11.9  Dyslipidemia E78.5  Carotid artery disease (HCC) I77.9  Aortic valve stenosis I35.0  Anemia D64.9  
 NSTEMI (non-ST elevated myocardial infarction) ([facility]) I21.4  Congestive heart disease (HCC) I50.9  CHF (congestive heart failure) ([facility]) I50.9  HCAP (healthcare-associated pneumonia) J18.9  Chronic bilateral pleural effusions J90  
 Bilateral pleural effusion J90  
 Atrial fibrillation with rapid ventricular response ([facility]) I48.91 Past Medical History:  
Diagnosis Date  Asthma  Cardiac cath 04/28/2011 oLM 30%. pLAD 30%. oD1 50%. CX 90% (3 x 18 [place] DEMAR). dRCA 90%. RPLB subtotal.  RPDA 100%.  Cardiac echocardiogram 01/21/2014 EF 55-60%. Mod LVH. Gr 1 DDfx. Mild AS (mean grad 13). Mild AI. Unchanged from study of 11/30/11.  Cardiac nuclear imaging test, mod risk 01/22/2016 Intermediate risk. Medium-sized inferior, inferoseptal infarction. No ischemia. EF 54%. Nondiagnostic EKG on pharm stress test.  
 Carotid duplex 03/02/2016 Mod 50-69% bilateral ICA stenosis. Probable significant RECA stenosis. >50% stenosis of left subclavian. No significant change from study of 1/8/15.  Coronary artery disease Status post PCI with drug-eluting stent, Barry 3 x 18 mm in the proximal circumflex.  Heart failure ([facility] [address]  Hx of carotid artery disease ([facility] [address]  Hypercholesterolemia  Hypertension  Prostate cancer ([facility] [address] Labs:   
Lab Results Component Value Date/Time Sodium 143 05/01/2019 05:15 AM  
 Potassium 4.5 05/01/2019 05:15 AM  
 Chloride 105 05/01/2019 05:15 AM  
 CO2 29 05/01/2019 05:15 AM  
 Anion gap 9 05/01/2019 05:15 AM  
 Glucose 79 05/01/2019 05:15 AM  
 BUN 46 (H) 05/01/2019 05:15 AM  
 Creatinine 1.00 05/01/2019 05:15 AM  
 Calcium 8.4 (L) 05/01/2019 05:15 AM  
 Magnesium 3.5 (H) 04/25/2019 02:15 AM  
 Phosphorus 3.2 05/01/2019 05:15 AM  
 Albumin 2.5 (L) 05/01/2019 05:15 AM  
 
Anthropometrics: BMI (calculated): 22.6 Last 3 Recorded Weights in this Encounter 04/30/19 2039 05/01/19 2239 05/03/19 4380 Weight: 97.5 kg (215 lb) 63 kg (138 lb 14.4 oz) 61.7 kg (136 lb 1.6 oz) Ht Readings from Last 1 Encounters:  
04/30/19 5' 5\" (1.651 m) Weight Metrics 5/3/2019 4/9/2019 4/2/2019 3/12/2019 3/7/2019 3/1/2019 2/23/2019 Weight 136 lb 1.6 oz 141 lb 6.4 oz 147 lb 4.3 oz 149 lb 0.5 oz - 161 lb 163 lb 2.3 oz  
BMI 22.65 kg/m2 22.15 kg/m2 23.07 kg/m2 - 23.34 kg/m2 25.22 kg/m2 25.55 kg/m2 Patient Vitals for the past 100 hrs: 
 % Diet Eaten 05/02/19 1000 25 % 05/01/19 1728 10 % 05/01/19 1221 20 % 05/01/19 0904 25 % 04/30/19 1724 20 % 04/30/19 0828 10 % UBW: 160-165 lb x 9 months ago per patient and daughter 
 
[x] Variable weights 
[] Weight Gain 
[] Weight Stable Estimated Nutrition Needs: [x] MSJ   
 Calories: 5631-1708 kcal Based on:   [x] Actual BW   
Protein:   77-90 g Based on:   [x] Actual BW Fluid:       9002-9458 ml Based on:   [x] Actual BW  
 
 [x] No Cultural, Pentecostalism or ethnic dietary need identified. [] Cultural, Pentecostalism and ethnic food preferences identified and addressed Wt Status:  [x] Normal (18.6 - 24.9) [] Underweight (<18.5) [] Overweight (25 - 29.9) [] Mild Obesity (30 - 34.9)  [] Moderate Obesity (35 - 39.9) [] Morbid Obesity (40+) Nutrition Problems Identified:  
[x] Suboptimal PO intake  
[] Food Allergies [x] Difficulty chewing/swallowing/poor dentition  
[] Constipation/Diarrhea  
[] Nausea/Vomiting  
[] None 
[] Other:  
 
Plan:  
[x] Therapeutic Diet [x]  Obtained/adjusted food preferences/tolerances and/or snacks options [x]  Continue supplements added  
[x] SLP will be following for feeding techniques []  HS snack added  
[x]  Modify diet texture  
[]  Modify diet for food allergies []  Assist with menu selection  
[x]  Monitor PO intake on meal rounds  
[x]  Continue inpatient monitoring and intervention  
[]  Participated in discharge planning/Interdisciplinary rounds/Team meetings  
[]  Other:  
 
Education Needs: 
 [] Not appropriate for teaching at this time  
 [x] Identified and addressed Nutrition Monitoring and Evaluation: 
[x] Continue ongoing monitoring and intervention 
[] Other [address]

## 2019-05-03 NOTE — PROGRESS NOTES
Attempted PT, pt declined stated he was waiting to speak with someone regarding his discharge. Will reattempt as time allows.  Chi Taylor, PTA

## 2019-05-03 NOTE — PROGRESS NOTES
Pulmonary progress note History 77-year-old male with a history of recurrent transudative pleural effusions with frequent hospital admissions for acute hypoxic respiratory failure secondary to pulmonary edema. He underwent Pleur-X catheter placement on the left on 4/23/2019. The right side was put on hold because of anticoagulation for a cardiac stent placed 3/11/2019. His symptoms began precipitously after EGD in February, 2019. He may have had a myocardial infarction around the time of the procedure. 1 L of fluid was removed via Pleurx catheter today. He feels considerably better. Exam 
[place]. Alert. No respiratory distress at rest.  Marked improvement in voice projection. Affect normal 
Blood pressure 120/79, pulse 95, temperature 97.8 °F (36.6 °C), resp. rate 18, height 5' 5\" (1.651 m), weight 61.7 kg (136 lb 1.6 oz), SpO2 92 %. No JVD No accessory muscle use at rest 
No wheezing. Few rales right apex anteriorly Sclera anicteric. Abdomen soft No cyanosis or clubbing Facial movements symmetric Chest x-ray from 5/3/2019 continues to show pulmonary edema. Assessment Recurrent pleural effusions status post left Pleurx catheter NSTEMI with LVEF 26-30% s/p stent 3/11/19 Pulmonary edema. Lasix resumed yesterday evening Plan Drain Pleurx catheter every 3 days 300 to 500 mL's 
Continue diuretics for LV dysfunction Instruct family on appropriate care of Pleurx catheter and bottle use The absolute amount in the frequency of the Pleurx drainage will be determined empirically over the next couple of weeks. The above recommendation is a reasonable starting point. Goal will be to minimize his shortness of breath. Hopefully he will receive further relief with the reinitiation of diuretics. The patient wishes to be discharged directly home, but the patient's daughter does not have sufficient resources to care for him there.   He essentially is appropriate for discharge to a skilled nursing facility. I am uncertain if the patient understands the importance of matching his capabilities to the support available at home, but after 3 attempts to make this point, he still did not-or would not-understand.

## 2019-05-03 NOTE — PROGRESS NOTES
1918: assumed pt care. Received pt resting in bed, pt is alert and oriented. Denies any pain at this time. No signs of distress. On 2 Lpm via NC. Daughter Ina Vallejo at bedside. Call bell within reach. 2005: SIMPLE PULMONARY STRESS TEST On and Off Oxygen Patient is non-ambulatory. 2005: O2 sat is 100 % on 2L. O2 sat is 97% after 3 mins. On room air. O2 sat is 97% after 6 mins. On room air. O2 sat is 94% after 12 mins on room air. O2 sat is 100% back on 2L. 2035: pt had wet his under pad with urine and had a moderate amount of brown soft formed bowel movement, incontinence care provided. Condom catheter applied attached to urinary bag, new mepilex applied on the sacrum. Repositioned pt on his left side. 5-3-19 
 
3140: Bedside and Verbal shift change report given to Michel Medrano (oncoming nurse) by Alan Leroy 
 (offgoing nurse). Report included the following information SBAR, Intake/Output, MAR and Recent Results.

## 2019-05-04 NOTE — PROGRESS NOTES
1900  -- Bedside, Verbal and Written shift change report given to [address] (oncoming nurse) by Bev Mccormackcal nurse). Report included the following information SBAR, Kardex, Intake/Output, MAR and Recent Results.  
   
 -- PM medications administered, pt tolerated with ease, will continue to monitor. 
  
 -- Shift reassessment, pt condition unchanged, will continue to monitor. 
   
 --  Shift reassessment, pt condition unchanged, will continue to monitor.   
   
  -- Bedside, Verbal and Written shift change report given to   (oncoming nurse) by DEBBY (offgoing nurse). Report included the following information SBAR, Kardex, Intake/Output, MAR and Recent Results. Skin assessment completed.

## 2019-05-04 NOTE — PROGRESS NOTES
1900  -- Bedside, Verbal and Written shift change report given to [address] (oncoming nurse) by Wanda Hyde nurse). Report included the following information SBAR, Kardex, Intake/Output, MAR and Recent Results. Aspiration sign placed at bedside. Reviewed CD on Pleural cath with daughter Ina Vallejo. 
 
 
Madison Pain 
7525 -- PM medications administered, pt tolerated with ease, will continue to monitor. 
  
0000 -- Shift reassessment, pt condition unchanged, will continue to monitor. 
   
0400 --  Shift reassessment, pt condition unchanged, will continue to monitor.   
   
 0700 -- Bedside, Verbal and Written shift change report given to [address] nurse) by DEBBY (offgoing nurse). Report included the following information SBAR, Kardex, Intake/Output, MAR and Recent Results. Skin assessment completed.

## 2019-05-04 NOTE — PROGRESS NOTES
Internal Medicine Progress Note NAME: Kelli Record :  1926 MRM:  [id number] Date/Time: 2019 ASSESSMENT/PLAN: 
 
Principal Problem: 
  Chronic bilateral pleural effusions (4/10/2019) Active Problems: 
  CHF (congestive heart failure) ([facility] [address] (4/10/2019) HCAP (healthcare-associated pneumonia) (4/10/2019) Atrial fibrillation with rapid ventricular response ([facility] [address] (2019) Assessment / Plan   
  
PLACEMENT IN PROCESS . D/W [facility] plan noted. Drain Pleurx catheter every 3 days 300 to 500 mL's, Continue diuretics for LV dysfunction ejection fraction is 26 - 30%  ,Instruct family on appropriate care of Pleurx catheter and bottle use . Daughter indicate cant manage him at home. # HCAP: treated with  broad spectrum IV ab. Sputum cultures and urine antigens ordered. CT 4/10/19 : Multifocal consolidation within the left upper and bilateral lower lobes, similar to prior CT 2019. Findings likely represent recurrent multifocal. Off Abx currently.  
  : patient in sever SOB ,  repeat CXR. D/W [facility] he is rec pleurodesis but daughter declined. Stat nebs as in SOB/wide spread ronchi. Try small dose steroid . per daughter he was in full power and very active till recent hospitalization and scoping of his GI then started detrioration rapidly. 
  
 # B/l recurrent pleural effusions - 2/2 CHF. Thoracentesis x2. Appreciate [facility] assistance. Had  repeat tapping 19. Ultrasound guided thoracentesis 1750 cc. Transudate. had Left PleurX catheter placed on  with significant improvement in resp status. repeat CXR with little L pleural effusion 
-Right PleurX catheter planned for  but canceled due to plavix. Discussed with Cardiology, cannot hold plavix due to recent stent last month. Discussed with Pulm, Dr. Ed Walker said right catheter can be placed 90 days after stent placement with plavix held temporarily, no need to place now.  - one litre removed from L side yesterday. # Hypernatremia. Resolved. Urine studies done . Used IVF D5. Ordered do not use NS, or any Na in diluents please as hypernatremia . Nephrology on board. 
 - 5/4 : Na 149 , D5 trial. 
 
# Hypokalemia. Replete and trend.  
  
# AF RVR: appreciate Cardiology assistance, started on  digoxin. AC per cardiology. 
  
# SLP was consulted previously, ordered diet recommended by SLP (soft, nectar thick liquids) 
  
# GOC - d/w daughter about palliative care measures, she declines adamantly.  
  
# NEL - S.cr back to his normal level. improving, likely multifactorial 2/2 atbx, diuretics. Appreciate Nephrology eval.  
  
# CHF/CAD - per cardiology. No ACEi/ARB/ARNI due to borderline BP 
  
# DVT protocol 
  
 
-Code status : FULL Lab Review:  
 
Recent Labs 05/04/19 
5973 05/01/19 
2100 WBC 9.5 13.4* HGB 9.1* 8.7* HCT 31.2* 30.3*  206 Recent Labs 05/04/19 
8634 *  
K 4.4  
* CO2 31 * BUN 38* CREA 0.89 CA 8.5 Lab Results Component Value Date/Time Glucose (POC) 110 05/04/2019 07:53 AM  
 Glucose (POC) 136 (H) 05/03/2019 10:07 PM  
 Glucose (POC) 100 05/03/2019 04:09 PM  
 Glucose (POC) 107 05/03/2019 12:26 PM  
 Glucose (POC) 139 (H) 05/03/2019 09:05 AM  
 Glucose (POC) 126 (H) 02/21/2019 07:17 PM  
 
No results for input(s): PH, PCO2, PO2, HCO3, FIO2 in the last 72 hours. No results for input(s): INR in the last 72 hours. No lab exists for component: INREXT, INREXT No results found for: SDES Lab Results Component Value Date/Time  Culture result: NO GROWTH 5 DAYS 04/16/2019 02:25 PM  
 Culture result: NO GROWTH 5 DAYS 04/12/2019 03:10 PM  
 Culture result: NO GROWTH 6 DAYS 04/10/2019 05:30 PM  
 
 
All Cardiac Markers in the last 24 hours: No results found for: CPK, CK, CKMMB, CKMB, RCK3, CKMBT, CKNDX, CKND1, CHRISTINE, TROPT, TROIQ, NGHIA, TROPT, TNIPOC, BNP, BNPP 
 ABG: No results found for: PH, PHI, PCO2, PCO2I, PO2, PO2I, HCO3, HCO3I, FIO2, FIO2I Subjective: Chief Complaint:     
When I can go home My breathing always good. Deny any complain. ROS: 
(bold if positive,otherwise negative) Fever/chills ,  Dysuria Cough , Sputum , SOB/BOTELLO , Chest Pain Diarrhea ,Nausea/Vomit , Abd Pain , Constipation Tolerating Diet Objective:  
 
Vitals: 
Last 24hrs VS reviewed since prior progress note. Most recent are: 
 
Visit Vitals BP 99/59 (BP 1 Location: Right arm, BP Patient Position: At rest) Pulse 81 Temp 97.4 °F (36.3 °C) Resp 16 Ht 5' 5\" (1.651 m) Wt 61.7 kg (136 lb 1.6 oz) SpO2 96% BMI 22.65 kg/m² SpO2 Readings from Last 6 Encounters:  
05/04/19 96% 04/23/19 98% 04/10/19 96% 04/02/19 98% 03/12/19 97% 03/01/19 95% O2 Flow Rate (L/min): 2 l/min Intake/Output Summary (Last 24 hours) at 5/4/2019 6678 Last data filed at 5/4/2019 1794 Gross per 24 hour Intake 240 ml Output 2000 ml Net -1760 ml Physical Exam:  
Gen: cachectic Ears: hearing is intact Eyes: Icterus is not present Lungs: better AE  bilaterally , harsh breathing, occasional ronchi. Heart: regular rate and rhythm, S1, S2 normal, no murmur, click, rub or gallop Gastrointestinal: soft, non-tender. Bowel sounds normal. No masses,  no organomegaly Neurological:  New Focal Deficits are not present Psychiatric:  Mood is stable Medications Reviewed: (see below) Lab Data Reviewed: (see below) 
 
______________________________________________________________________ Medications:  
 
Current Facility-Administered Medications Medication Dose Route Frequency  dextrose 5% infusion  25 mL/hr IntraVENous CONTINUOUS  
 furosemide (LASIX) tablet 20 mg  20 mg Oral DAILY  furosemide (LASIX) tablet 10 mg  10 mg Oral DAILY  insulin glargine (LANTUS) injection 4 Units  4 Units SubCUTAneous DAILY  guaiFENesin (ROBITUSSIN) 100 mg/5 mL oral liquid 400 mg  400 mg Oral TID  metoprolol tartrate (LOPRESSOR) tablet 12.5 mg  12.5 mg Oral BID  
 glucose chewable tablet 16 g  4 Tab Oral PRN  
 glucagon (GLUCAGEN) injection 1 mg  1 mg IntraMUSCular PRN  
 dextrose (D50) infusion 12.5-25 g  25-50 mL IntraVENous PRN  
 insulin lispro (HUMALOG) injection   SubCUTAneous AC&HS  ipratropium (ATROVENT) 0.02 % nebulizer solution 0.5 mg  0.5 mg Nebulization Q6H PRN  
 ALPRAZolam (XANAX) tablet 0.5 mg  0.5 mg Oral BID PRN  
 albuterol-ipratropium (DUO-NEB) 2.5 MG-0.5 MG/3 ML  3 mL Nebulization Q2H PRN  potassium chloride SR (KLOR-CON 10) tablet 10 mEq  10 mEq Oral BID  digoxin (LANOXIN) tablet 0.125 mg  0.125 mg Oral Q MON, WED & FRI  arformoterol (BROVANA) neb solution 15 mcg  15 mcg Nebulization BID RT  
 budesonide (PULMICORT) 500 mcg/2 ml nebulizer suspension  500 mcg Nebulization BID RT  
 Lactobacillus Acidoph & Bulgar (FLORANEX) tablet 2 Tab  2 Tab Oral BID  magnesium hydroxide (MILK OF MAGNESIA) 400 mg/5 mL oral suspension 30 mL  30 mL Oral DAILY PRN  pneumococcal 23-valent (PNEUMOVAX 23) injection 0.5 mL  0.5 mL IntraMUSCular PRIOR TO DISCHARGE  heparin (porcine) injection 5,000 Units  5,000 Units SubCUTAneous Q8H  
 metoprolol (LOPRESSOR) injection 2.5 mg  2.5 mg IntraVENous Q4H PRN  
 bisacodyl (DULCOLAX) suppository 10 mg  10 mg Rectal DAILY PRN  
 sodium chloride (NS) flush 5-10 mL  5-10 mL IntraVENous PRN  
 aspirin chewable tablet 81 mg  81 mg Oral DAILY  atorvastatin (LIPITOR) tablet 40 mg  40 mg Oral QHS  clopidogrel (PLAVIX) tablet 75 mg  75 mg Oral DAILY  famotidine (PEPCID) tablet 20 mg  20 mg Oral DAILY  nitroglycerin (NITROSTAT) tablet 0.4 mg  0.4 mg SubLINGual Q5MIN PRN  
 tamsulosin (FLOMAX) capsule 0.4 mg  0.4 mg Oral DAILY  therapeutic multivitamin (THERAGRAN) tablet 1 Tab  1 Tab Oral DAILY  acetaminophen (TYLENOL) tablet 650 mg  650 mg Oral Q4H PRN  
 ondansetron (ZOFRAN) injection 4 mg  4 mg IntraVENous Q4H PRN Total time spent with patient: 35 minutes Care Plan discussed with: Patient, Nursing Staff, >50% of time spent in counseling and coordination of care and Radiologist re Nichole Martínez X Cath. Discussed:  Care Plan Prophylaxis:  Hep SQ Disposition:  Home w/Family Attending Physician: Norma Be MD

## 2019-05-04 NOTE — PROGRESS NOTES
conducted a Follow up consultation and Spiritual Assessment for Brandyn Aviles, who is a 80 y. o.,male, along with the family member/s. The  provided the following Interventions for Family: 
Continued the relationship of care and support. Listened empathically. Offered prayer and assurance of continued prayer on family's behalf. Chart reviewed. The following outcomes were achieved: 
Family expressed gratitude for 's visit. Assessment: 
There are no spiritual or Yarsani issues which require intervention at this time. Plan: 
Chaplains will continue to follow and will provide pastoral care on an as needed/requested basis.  recommends bedside caregivers page  on duty if family shows signs of acute spiritual or emotional distress. Sofy Scott M.Div. , [address] [address] [facility]: [phone]/AEU: [phone]

## 2019-05-04 NOTE — PROGRESS NOTES
[facility] Pt in bed resting alert and oriented x3 denies pain at the moment no distress noted. Assessement completed. Pt has pleural catheter on the left chest clamped  Dressings CDI., condom catheter in place draining well. Plan of care for the shift explained pt verbalized understanding. IVF infusing well, HOB elevated, bed low and in locked position. Call light and frequently used items within reach. Pt educated on the benefits of SCD and prevalon boots but refused put them on. . 
 
2200- Pt's daughter arrived at beside. Huy Mcgraw assisted to give pt his scheduled med while daughter present for encouragement. Pt tolerated all his  Med.  
 
5113- Called by the telemetry tech and informed that pt had V  Tach. Checked on pt found doing ok denied pain, SOB or CP. When I informed him on what was going on pt smiledand stated\" What do you think when surrounded by this ladies. \"  Tele tech notified will continue to monitor. 0515- Pt given abed bath bed linen changed pt made comfortable in bed. Robertson catheter output 200 yellow urine. 0600- Pt sleeping. No events during the night.

## 2019-05-04 NOTE — PROGRESS NOTES
Problem: Pain Goal: *Control of Pain Outcome: Progressing Towards Goal 
  
Problem: Falls - Risk of 
Goal: *Absence of Falls Description Document Tia Manus Fall Risk and appropriate interventions in the flowsheet.  
Outcome: Progressing Towards Goal

## 2019-05-04 NOTE — PROGRESS NOTES
Patient received in bed awake. Patient alert and oriented X4, denies pain and discomfort. Patient resting quietly. Frequent use items within reach. Bed locked in low position. Call bell within reach and patient verbalized understanding of use for assistance and needs. Dual skin assessment conducted with Hua Sloan. See skin man dated 5/3/19. 
 
0848:  Patient refused all his morning medication. Flomax and guaifenesin were opened and had to be wasted. 1750: Informed Dr. Ricardo Flores that the patient is refusing all his medication. Patient just wants to go home. MD said to notify the patient's daughter. 1758:  Spoke with the daughter and informed her that her dad refused to take all his medication today and that he just wants to come home. I let the daughter know that I will speak with case management tomorrow to see what the status is of getting him home health so he can be discharged.

## 2019-05-04 NOTE — PROGRESS NOTES
Problem: General Medical Care Plan Goal: *Vital signs within specified parameters Outcome: Progressing Towards Goal 
Goal: *Labs within defined limits Outcome: Progressing Towards Goal 
Goal: *Absence of infection signs and symptoms Outcome: Progressing Towards Goal 
Goal: *Optimal pain control at patient's stated goal 
Outcome: Progressing Towards Goal 
Goal: *Skin integrity maintained Outcome: Progressing Towards Goal 
Goal: *Fluid volume balance Outcome: Progressing Towards Goal 
Goal: *Optimize nutritional status Outcome: Progressing Towards Goal 
Goal: *Anxiety reduced or absent Outcome: Progressing Towards Goal 
Goal: *Progressive mobility and function (eg: ADL's) Outcome: Progressing Towards Goal 
  
Problem: Impaired Skin Integrity/Pressure Injury Treatment Goal: *Improvement of Existing Pressure Injury Outcome: Progressing Towards Goal 
Goal: *Prevention of pressure injury Description Document William Scale and appropriate interventions in the flowsheet. Outcome: Progressing Towards Goal 
  
Problem: Gas Exchange - Impaired Goal: *Absence of hypoxia Outcome: Progressing Towards Goal 
  
Problem: Patient Education: Go to Patient Education Activity Goal: Patient/Family Education Outcome: Progressing Towards Goal 
  
Problem: Pain Goal: *Control of Pain Outcome: Progressing Towards Goal 
Goal: *PALLIATIVE CARE:  Alleviation of Pain Outcome: Progressing Towards Goal 
  
Problem: Nutrition Deficit Goal: *Optimize nutritional status Outcome: Progressing Towards Goal 
  
Problem: Falls - Risk of 
Goal: *Absence of Falls Description Document Burak Reas Fall Risk and appropriate interventions in the flowsheet. Outcome: Progressing Towards Goal 
  
Problem: Patient Education: Go to Patient Education Activity Goal: Patient/Family Education Outcome: Progressing Towards Goal 
  
Problem: Pressure Injury - Risk of 
Goal: *Prevention of pressure injury Description Document William Scale and appropriate interventions in the flowsheet. Outcome: Progressing Towards Goal 
  
Problem: Patient Education: Go to Patient Education Activity Goal: Patient/Family Education Outcome: Progressing Towards Goal 
  
Problem: Patient Education: Go to Patient Education Activity Goal: Patient/Family Education Outcome: Progressing Towards Goal 
  
Problem: Patient Education: Go to Patient Education Activity Goal: Patient/Family Education Outcome: Progressing Towards Goal 
  
Problem: Patient Education: Go to Patient Education Activity Goal: Patient/Family Education Outcome: Progressing Towards Goal 
  
Problem: Breathing Pattern - Ineffective Goal: *Absence of hypoxia Outcome: Progressing Towards Goal 
Goal: *Use of effective breathing techniques Outcome: Progressing Towards Goal 
Goal: *PALLIATIVE CARE:  Alleviation of Dyspnea Outcome: Progressing Towards Goal 
  
Problem: Patient Education: Go to Patient Education Activity Goal: Patient/Family Education Outcome: Progressing Towards Goal 
  
Problem: Discharge Planning Goal: *Discharge to safe environment Outcome: Progressing Towards Goal

## 2019-05-04 NOTE — PROGRESS NOTES
Respiratory Therapy Assessment Care Plan Patient: 
Xavier Pisano 80 y.o. male 5/4/2019 9:43 AM 
 
Chronic bilateral pleural effusions [J90] HCAP (healthcare-associated pneumonia) [J18.9] CHF (congestive heart failure) ([facility] [address] [I50.9] Bilateral pleural effusion [J90] HCAP (healthcare-associated pneumonia) [J18.9] CHF (congestive heart failure) ([address] [I50.9] Chest X-RAY:  
Results from Hospital Encounter encounter on 04/10/19 XR CHEST PORT Impression IMPRESSION: 
Extensive bilateral multifocal edema or infiltrate. Perhaps some improvement in 
the right upper lobe. No other change. Can't exclude bilateral pleural 
effusions. XR CHEST PORT Impression IMPRESSION: 
Worsening examination with increasing bilateral areas of edema or infiltrate. Worsening of bilateral pneumonia is suspected. XR CHEST PA LAT Impression IMPRESSION: 
 
 
1. Multifocal pneumonia with slight progression in the upper lobes, slight 
improvement in the right lower lobe, without significant changes left lower 
lobe. 2. Left pleural catheter remains in place, small left pleural effusion is 
slightly larger. 3. Minimal right pleural effusion, with interval improvement. Bob Ledezma Vital Signs:   Visit Vitals BP 99/59 (BP 1 Location: Right arm, BP Patient Position: At rest) Pulse 81 Temp 97.4 °F (36.3 °C) Resp 16 Ht 5' 5\" (1.651 m) Wt 61.7 kg (136 lb 1.6 oz) SpO2 97% BMI 22.65 kg/m² Indications for treatment: Hypoxia, increased WOB and SOB Plan of care: O2 therapy, VibraPEP therapy, scheduled bronchodilators Goal: Wean O2 to baseline, maintain baseline respiratory status, mobilize secretions

## 2019-05-05 NOTE — PROGRESS NOTES
PHYSICAL THERAPY NOTE 
 
9:39AM  Chart reviewed. Attempted to see patient. Patient declined PT in spite much encouragement. Stated \"I'm not going to get up nor do any exercises. \"  Nurse Mohan Aldridge made aware. 12:40PM Attempted to see patient for the second time. Patient declined PT and stated \"I want to wait for my daughter. \"  Will re-attempt PT as schedule permits. Sylvia Milian.  Alta Sever

## 2019-05-05 NOTE — PROGRESS NOTES
0710 Bedside and verbal shift change report received from [facility]. Patient alert and oriented x4, denies pain and discomfort. Patient resting quietly. Frequent used items within reach . Bed locked and in low position . Call bell within reach and patient verbalized understanding of use for assistance  And needs. Dual skin assessment conducted with Sister Varghese. Patient has bruising on upper arms and a stage 2 on r. buttock in the cleft with Mepilex dressing on sacrum. Small pressure ulcer developing on lower buttock skin man up to date 5/5/2019 
  
0719 received report by [employer] tech, of 5 beats of Vtach, went to patients room to assess. He denied any pain and Shortness of breath, asked him if he would take his insulin he agreed. 0900 attempted to give patient medications he refused each one, I reinforced the education on what each medication is for, patient verbalized understanding but adamantly declined. 1200 spoke to case management about discharge pt needs oxygen challenge [id number] completed patient oxygen challenge see separate note for results. 1800 patient's daughter arrived and pt. Medication given. Educated family on the need to drink thickened liquids not just frozen thin liquids. Family member verbalized understanding. Also explained pt's plan of care.

## 2019-05-05 NOTE — MANAGEMENT PLAN
Discharge/Transition Planning Goals: Discharge to safe environment ; Home and [place] Updates: Reviewed electronic and paper chart. [facility] for [facility]. Plan is Home and [place] and will need medical transport which will cost $250 copay with [employer]. Pt back on 4 liters NC. Will need a trial off oxygen as original trial deemed none needed. Nirav Muniz RN BSN Outcomes Manager Pager # [phone]

## 2019-05-05 NOTE — ROUTINE PROCESS
0710--Bedside and Verbal shift change report given to  RN (oncoming nurse) by Erick Lim RN (offgoing nurse). Report included the following information SBAR, Kardex, Intake/Output, MAR and Recent Results.

## 2019-05-05 NOTE — ROUTINE PROCESS
Bedside and Verbal shift change report given to Micheline Rowland RN (oncoming nurse) by Lucila Pham RN 
 (offgoing nurse). Report included the following information SBAR, Kardex, MAR and Recent Results.

## 2019-05-05 NOTE — PROGRESS NOTES
Internal Medicine Progress Note NAME: Janey England :  1926 MRM:  [id number] Date/Time: 2019 ASSESSMENT/PLAN: 
 
Principal Problem: 
  Chronic bilateral pleural effusions (4/10/2019) Active Problems: 
  CHF (congestive heart failure) ([facility] [address] (4/10/2019) HCAP (healthcare-associated pneumonia) (4/10/2019) Atrial fibrillation with rapid ventricular response ([facility] [address] (2019) Assessment / Plan   
  
PLACEMENT IN PROCESS . D/W CM she will check with family again if they take him home. He needs to drink more fluids, every time we stop D5 his Na go up. Patient refused taking po medicine at time requesting drinking water , SLP placed on thickened liquids and he is not happy about it. [facility]x catheter every 3 days 300 to 500 mL's, Continue diuretics for LV dysfunction ejection fraction is 26 - 30%  ,Instruct family on appropriate care of Pleurx catheter and bottle use . Daughter indicate cant manage him at home. # HCAP: treated with  broad spectrum IV ab. Sputum cultures and urine antigens ordered. CT 4/10/19 : Multifocal consolidation within the left upper and bilateral lower lobes, similar to prior CT 2019. Findings likely represent recurrent multifocal. Off Abx currently.  
  : patient in sever SOB ,  repeat CXR. D/W [facility] he is rec pleurodesis but daughter declined. Stat nebs as in SOB/wide spread ronchi. Try small dose steroid . per daughter he was in full power and very active till recent hospitalization and scoping of his GI then started detrioration rapidly. 
  
 # B/l recurrent pleural effusions - 2/2 CHF. Thoracentesis x2. Appreciate [facility] assistance. Had  repeat tapping 19. Ultrasound guided thoracentesis 1750 cc. Transudate. had Left PleurX catheter placed on  with significant improvement in resp status. repeat CXR with little L pleural effusion 
-Right PleurX catheter planned for  but canceled due to plavix. Discussed with Cardiology, cannot hold plavix due to recent stent last month. Discussed with Pulm, Dr. Kayley Keyes said right catheter can be placed 90 days after stent placement with plavix held temporarily, no need to place now.  
 - one litre removed from L side yesterday. # Hypernatremia. Resolved. Urine studies done . Used IVF D5. Ordered do not use NS, or any Na in diluents please as hypernatremia . Nephrology on board. 
 - 5/4 : Na 149 , D5 trial. 
 
# Hypokalemia. Replete and trend.  
  
# AF RVR: appreciate Cardiology assistance, started on  digoxin. AC per cardiology. 
  
# SLP was consulted previously, ordered diet recommended by SLP (soft, nectar thick liquids) 
  
# GOC - d/w daughter about palliative care measures, she declines adamantly.  
  
# NEL - S.cr back to his normal level. improving, likely multifactorial 2/2 atbx, diuretics. Appreciate Nephrology eval.  
  
# CHF/CAD - per cardiology. No ACEi/ARB/ARNI due to borderline BP 
  
# DVT protocol 
  
 
-Code status : FULL Lab Review:  
 
Recent Labs 05/04/19 
9005 WBC 9.5 HGB 9.1*  
HCT 31.2*  
 Recent Labs 05/05/19 
0540 05/04/19 
9000  149*  
K 4.5 4.4  
* 112* CO2 28 31 * 102* BUN 34* 38* CREA 0.89 0.89 CA 8.7 8.5 Lab Results Component Value Date/Time Glucose (POC) 114 (H) 05/05/2019 07:52 AM  
 Glucose (POC) 104 05/04/2019 09:57 PM  
 Glucose (POC) 153 (H) 05/04/2019 04:18 PM  
 Glucose (POC) 119 (H) 05/04/2019 11:52 AM  
 Glucose (POC) 110 05/04/2019 07:53 AM  
 Glucose (POC) 126 (H) 02/21/2019 07:17 PM  
 
No results for input(s): PH, PCO2, PO2, HCO3, FIO2 in the last 72 hours. No results for input(s): INR in the last 72 hours. No lab exists for component: INREXT, INREXT No results found for: SDES Lab Results Component Value Date/Time  Culture result: NO GROWTH 5 DAYS 04/16/2019 02:25 PM  
 Culture result: NO GROWTH 5 DAYS 04/12/2019 03:10 PM  
 Culture result: NO GROWTH 6 DAYS 04/10/2019 05:30 PM  
 
 
All Cardiac Markers in the last 24 hours: No results found for: CPK, CK, CKMMB, CKMB, RCK3, CKMBT, CKNDX, CKND1, CHRISTINE, TROPT, TROIQ, NGHIA, TROPT, TNIPOC, BNP, BNPP 
ABG: No results found for: PH, PHI, PCO2, PCO2I, PO2, PO2I, HCO3, HCO3I, FIO2, FIO2I Subjective: Chief Complaint:     
When I can go home , My breathing good. Deny any complain. ROS: 
(bold if positive,otherwise negative) Fever/chills ,  Dysuria Cough , Sputum , SOB/BOTELLO , Chest Pain Diarrhea ,Nausea/Vomit , Abd Pain , Constipation Tolerating Diet Objective:  
 
Vitals: 
Last 24hrs VS reviewed since prior progress note. Most recent are: 
 
Visit Vitals /65 (BP 1 Location: Right arm, BP Patient Position: At rest) Pulse 91 Temp 97.5 °F (36.4 °C) Resp 16 Ht 5' 5\" (1.651 m) Wt 61.7 kg (136 lb 1.6 oz) SpO2 97% BMI 22.65 kg/m² SpO2 Readings from Last 6 Encounters:  
05/05/19 97% 04/23/19 98% 04/10/19 96% 04/02/19 98% 03/12/19 97% 03/01/19 95% O2 Flow Rate (L/min): 4 l/min(found on 4, turned back down to 2) Intake/Output Summary (Last 24 hours) at 5/5/2019 1114 Last data filed at 5/5/2019 6748 Gross per 24 hour Intake 270 ml Output 2000 ml Net -1730 ml Physical Exam:  
Gen: cachectic Ears: hearing is intact Eyes: Icterus is not present Lungs: better AE  bilaterally , harsh breathing, occasional ronchi. Heart: regular rate and rhythm, S1, S2 normal, no murmur, click, rub or gallop Gastrointestinal: soft, non-tender. Bowel sounds normal. No masses,  no organomegaly Neurological:  New Focal Deficits are not present Psychiatric:  Mood is stable Medications Reviewed: (see below) Lab Data Reviewed: (see below) 
 
______________________________________________________________________ Medications:  
 
Current Facility-Administered Medications Medication Dose Route Frequency  dextrose 5% infusion  25 mL/hr IntraVENous CONTINUOUS  
 furosemide (LASIX) tablet 20 mg  20 mg Oral DAILY  furosemide (LASIX) tablet 10 mg  10 mg Oral DAILY  insulin glargine (LANTUS) injection 4 Units  4 Units SubCUTAneous DAILY  guaiFENesin (ROBITUSSIN) 100 mg/5 mL oral liquid 400 mg  400 mg Oral TID  metoprolol tartrate (LOPRESSOR) tablet 12.5 mg  12.5 mg Oral BID  
 glucose chewable tablet 16 g  4 Tab Oral PRN  
 glucagon (GLUCAGEN) injection 1 mg  1 mg IntraMUSCular PRN  
 dextrose (D50) infusion 12.5-25 g  25-50 mL IntraVENous PRN  
 insulin lispro (HUMALOG) injection   SubCUTAneous AC&HS  ipratropium (ATROVENT) 0.02 % nebulizer solution 0.5 mg  0.5 mg Nebulization Q6H PRN  
 ALPRAZolam (XANAX) tablet 0.5 mg  0.5 mg Oral BID PRN  
 albuterol-ipratropium (DUO-NEB) 2.5 MG-0.5 MG/3 ML  3 mL Nebulization Q2H PRN  potassium chloride SR (KLOR-CON 10) tablet 10 mEq  10 mEq Oral BID  digoxin (LANOXIN) tablet 0.125 mg  0.125 mg Oral Q MON, WED & FRI  arformoterol (BROVANA) neb solution 15 mcg  15 mcg Nebulization BID RT  
 budesonide (PULMICORT) 500 mcg/2 ml nebulizer suspension  500 mcg Nebulization BID RT  
 Lactobacillus Acidoph & Bulgar (FLORANEX) tablet 2 Tab  2 Tab Oral BID  magnesium hydroxide (MILK OF MAGNESIA) 400 mg/5 mL oral suspension 30 mL  30 mL Oral DAILY PRN  pneumococcal 23-valent (PNEUMOVAX 23) injection 0.5 mL  0.5 mL IntraMUSCular PRIOR TO DISCHARGE  heparin (porcine) injection 5,000 Units  5,000 Units SubCUTAneous Q8H  
 metoprolol (LOPRESSOR) injection 2.5 mg  2.5 mg IntraVENous Q4H PRN  
 bisacodyl (DULCOLAX) suppository 10 mg  10 mg Rectal DAILY PRN  
 sodium chloride (NS) flush 5-10 mL  5-10 mL IntraVENous PRN  
 aspirin chewable tablet 81 mg  81 mg Oral DAILY  atorvastatin (LIPITOR) tablet 40 mg  40 mg Oral QHS  clopidogrel (PLAVIX) tablet 75 mg  75 mg Oral DAILY  famotidine (PEPCID) tablet 20 mg  20 mg Oral DAILY  nitroglycerin (NITROSTAT) tablet 0.4 mg  0.4 mg SubLINGual Q5MIN PRN  
 tamsulosin (FLOMAX) capsule 0.4 mg  0.4 mg Oral DAILY  therapeutic multivitamin (THERAGRAN) tablet 1 Tab  1 Tab Oral DAILY  acetaminophen (TYLENOL) tablet 650 mg  650 mg Oral Q4H PRN  
 ondansetron (ZOFRAN) injection 4 mg  4 mg IntraVENous Q4H PRN Total time spent with patient: 25 minutes Care Plan discussed with: Patient, Care Manager and Nursing Staff Discussed:  Care Plan Prophylaxis:  Hep SQ Disposition:  Home w/Family Attending Physician: Bro Hobbs MD

## 2019-05-05 NOTE — PROGRESS NOTES
Respiratory Therapy Assessment Care Plan Patient: 
Ann Messina 80 y.o. male 5/5/2019 9:08 AM 
 
Chronic bilateral pleural effusions [J90] HCAP (healthcare-associated pneumonia) [J18.9] CHF (congestive heart failure) ([facility] [address] [I50.9] Bilateral pleural effusion [J90] HCAP (healthcare-associated pneumonia) [J18.9] CHF (congestive heart failure) ([address] [I50.9] Chest X-RAY:  
Results from Hospital Encounter encounter on 04/10/19 XR CHEST PORT Impression IMPRESSION: 
Extensive bilateral multifocal edema or infiltrate. Perhaps some improvement in 
the right upper lobe. No other change. Can't exclude bilateral pleural 
effusions. XR CHEST PORT Impression IMPRESSION: 
Worsening examination with increasing bilateral areas of edema or infiltrate. Worsening of bilateral pneumonia is suspected. XR CHEST PA LAT Impression IMPRESSION: 
 
 
1. Multifocal pneumonia with slight progression in the upper lobes, slight 
improvement in the right lower lobe, without significant changes left lower 
lobe. 2. Left pleural catheter remains in place, small left pleural effusion is 
slightly larger. 3. Minimal right pleural effusion, with interval improvement. Robby Santana Vital Signs:   Visit Vitals /65 (BP 1 Location: Right arm, BP Patient Position: At rest) Pulse 91 Temp 97.5 °F (36.4 °C) Resp 16 Ht 5' 5\" (1.651 m) Wt 61.7 kg (136 lb 1.6 oz) SpO2 97% BMI 22.65 kg/m² Indications for treatment: Hypoxia, increased WOB and SOB Plan of care: O2 therapy, VibraPEP therapy, scheduled bronchodilators Goal: Wean O2 to baseline room air, maintain baseline respiratory status, mobilize secretions

## 2019-05-05 NOTE — PROGRESS NOTES
Problem: General Medical Care Plan Goal: *Vital signs within specified parameters Outcome: Progressing Towards Goal 
  
Problem: General Medical Care Plan Goal: *Absence of infection signs and symptoms Outcome: Progressing Towards Goal 
  
Problem: General Medical Care Plan Goal: *Optimal pain control at patient's stated goal 
Outcome: Progressing Towards Goal 
  
Problem: [address] Goal: *Skin integrity maintained Outcome: Progressing Towards Goal 
  
Problem: General Medical Care Plan Goal: *Fluid volume balance Outcome: Progressing Towards Goal 
  
Problem: General Medical Care Plan Goal: *Optimize nutritional status Outcome: Progressing Towards Goal 
  
Problem: General Medical Care Plan Goal: *Skin integrity maintained Outcome: Progressing Towards Goal 
  
Problem: General Medical Care Plan Goal: *Fluid volume balance Outcome: Progressing Towards Goal 
  
Problem: General Medical Care Plan Goal: *Anxiety reduced or absent Outcome: Progressing Towards Goal 
  
Problem: General Medical Care Plan Goal: *Progressive mobility and function (eg: ADL's) Outcome: Progressing Towards Goal 
  
Problem: Impaired Skin Integrity/Pressure Injury Treatment Goal: *Improvement of Existing Pressure Injury Outcome: Progressing Towards Goal 
  
Problem: Impaired Skin Integrity/Pressure Injury Treatment Goal: *Prevention of pressure injury Description Document William Scale and appropriate interventions in the flowsheet. Outcome: Progressing Towards Goal 
  
Problem: Gas Exchange - Impaired Goal: *Absence of hypoxia Outcome: Progressing Towards Goal 
  
Problem: Impaired Skin Integrity/Pressure Injury Treatment Goal: *Prevention of pressure injury Description Document William Scale and appropriate interventions in the flowsheet. Outcome: Progressing Towards Goal 
  
Problem: Gas Exchange - Impaired Goal: *Absence of hypoxia Outcome: Progressing Towards Goal 
  
 Problem: Patient Education: Go to Patient Education Activity Goal: Patient/Family Education Outcome: Progressing Towards Goal 
  
Problem: Patient Education: Go to Patient Education Activity Goal: Patient/Family Education Outcome: Progressing Towards Goal 
  
Problem: Nutrition Deficit Goal: *Optimize nutritional status Outcome: Progressing Towards Goal 
  
Problem: Patient Education: Go to Patient Education Activity Goal: Patient/Family Education Outcome: Progressing Towards Goal 
  
Problem: Nutrition Deficit Goal: *Optimize nutritional status Outcome: Progressing Towards Goal 
  
Problem: Falls - Risk of 
Goal: *Absence of Falls Description Document Judy Keith Fall Risk and appropriate interventions in the flowsheet. Outcome: Progressing Towards Goal 
  
Problem: Patient Education: Go to Patient Education Activity Goal: Patient/Family Education Outcome: Progressing Towards Goal 
  
Problem: Pressure Injury - Risk of 
Goal: *Prevention of pressure injury Description Document William Scale and appropriate interventions in the flowsheet. Outcome: Progressing Towards Goal 
  
Problem: Patient Education: Go to Patient Education Activity Goal: Patient/Family Education Outcome: Progressing Towards Goal 
  
Problem: Patient Education: Go to Patient Education Activity Goal: Patient/Family Education Outcome: Progressing Towards Goal 
  
Problem: Patient Education: Go to Patient Education Activity Goal: Patient/Family Education Outcome: Progressing Towards Goal 
  
Problem: Patient Education: Go to Patient Education Activity Goal: Patient/Family Education Outcome: Progressing Towards Goal 
  
Problem: Breathing Pattern - Ineffective Goal: *Absence of hypoxia Outcome: Progressing Towards Goal 
  
Problem: Breathing Pattern - Ineffective Goal: *Use of effective breathing techniques Outcome: Progressing Towards Goal 
  
Problem: Breathing Pattern - Ineffective Goal: *PALLIATIVE CARE:  Alleviation of Dyspnea Outcome: Progressing Towards Goal 
  
Problem: Patient Education: Go to Patient Education Activity Goal: Patient/Family Education Outcome: Progressing Towards Goal 
  
 
Problem: Patient Education: Go to Patient Education Activity Goal: Patient/Family Education Outcome: Progressing Towards Goal

## 2019-05-05 NOTE — PROGRESS NOTES
Gave bedside and verbal report to [facility]. Let her know that tomorrow is the patients drainage day for his Pleural drain on the left chest,  and patient was alert and oriented x4 and sitting in bed. Most frequently used items within reach bed in lowest position call light in place side rails up.

## 2019-05-05 NOTE — PROGRESS NOTES
Problem: General Medical Care Plan Goal: *Vital signs within specified parameters 5/4/2019 2041 by Raji Godwin RN Outcome: Progressing Towards Goal 
5/4/2019 2034 by Raji Godwin RN Outcome: Progressing Towards Goal 
Goal: *Labs within defined limits 5/4/2019 2041 by Raji Godwin RN Outcome: Progressing Towards Goal 
5/4/2019 2034 by Raji Godwin RN Outcome: Progressing Towards Goal 
Goal: *Absence of infection signs and symptoms 5/4/2019 2041 by Raji Godwin RN Outcome: Progressing Towards Goal 
5/4/2019 2034 by Raji Godwin RN Outcome: Progressing Towards Goal 
Goal: *Optimal pain control at patient's stated goal 
5/4/2019 2041 by Raji Godwin RN Outcome: Progressing Towards Goal 
5/4/2019 2034 by Raji Godwin RN Outcome: Progressing Towards Goal 
Goal: *Skin integrity maintained 5/4/2019 2041 by Raji Godwin RN Outcome: Progressing Towards Goal 
5/4/2019 2034 by Raji Godwin RN Outcome: Progressing Towards Goal 
Goal: *Fluid volume balance 5/4/2019 2041 by Raji Godwin RN Outcome: Progressing Towards Goal 
5/4/2019 2034 by Raji Godwin RN Outcome: Progressing Towards Goal 
Goal: *Optimize nutritional status 5/4/2019 2041 by Raji Godwin RN Outcome: Progressing Towards Goal 
5/4/2019 2034 by Raji Godwin RN Outcome: Progressing Towards Goal 
Goal: *Anxiety reduced or absent 5/4/2019 2041 by Raji Godwin RN Outcome: Progressing Towards Goal 
5/4/2019 2034 by Raji Godwin RN Outcome: Progressing Towards Goal 
Goal: *Progressive mobility and function (eg: ADL's) 
5/4/2019 2041 by Raji Godwin RN Outcome: Progressing Towards Goal 
5/4/2019 2034 by Raji Godwin RN Outcome: Progressing Towards Goal 
  
Problem: Impaired Skin Integrity/Pressure Injury Treatment Goal: *Improvement of Existing Pressure Injury 5/4/2019 2041 by Elvira Cheadle, RN Outcome: Progressing Towards Goal 
5/4/2019 2034 by Elvira Cheadle, RN Outcome: Progressing Towards Goal 
Goal: *Prevention of pressure injury Description Document William Scale and appropriate interventions in the flowsheet. 5/4/2019 2041 by Elvira Cheadle, RN Outcome: Progressing Towards Goal 
5/4/2019 2034 by Elvira Cheadle, RN Outcome: Progressing Towards Goal 
  
Problem: Gas Exchange - Impaired Goal: *Absence of hypoxia 5/4/2019 2041 by Elvira Cheadle, RN Outcome: Progressing Towards Goal 
5/4/2019 2034 by Elvira Cheadle, RN Outcome: Progressing Towards Goal 
  
Problem: Patient Education: Go to Patient Education Activity Goal: Patient/Family Education 5/4/2019 2041 by Elvira Cheadle, RN Outcome: Progressing Towards Goal 
5/4/2019 2034 by Elvira Cheadle, RN Outcome: Progressing Towards Goal 
  
Problem: Pain Goal: *Control of Pain 5/4/2019 2041 by Elvira Cheadle, RN Outcome: Progressing Towards Goal 
5/4/2019 2034 by Elvira Cheadle, RN Outcome: Progressing Towards Goal 
Goal: *PALLIATIVE CARE:  Alleviation of Pain 5/4/2019 2041 by Elvira Cheadle, RN Outcome: Progressing Towards Goal 
5/4/2019 2034 by Elvira Cheadle, RN Outcome: Progressing Towards Goal 
  
Problem: Nutrition Deficit Goal: *Optimize nutritional status 5/4/2019 2041 by Elvira Cheadle, RN Outcome: Progressing Towards Goal 
5/4/2019 2034 by Elvira Cheadle, RN Outcome: Progressing Towards Goal 
  
Problem: Falls - Risk of 
Goal: *Absence of Falls Description Document Judy Keith Fall Risk and appropriate interventions in the flowsheet. 5/4/2019 2041 by Elvira Cheadle, RN Outcome: Progressing Towards Goal 
5/4/2019 2034 by Elvira Cheadle, RN Outcome: Progressing Towards Goal 
  
Problem: Patient Education: Go to Patient Education Activity Goal: Patient/Family Education 5/4/2019 2041 by Muna Schumacher RN Outcome: Progressing Towards Goal 
5/4/2019 2034 by Muna Schumacher RN Outcome: Progressing Towards Goal 
  
Problem: Pressure Injury - Risk of 
Goal: *Prevention of pressure injury Description Document William Scale and appropriate interventions in the flowsheet. 5/4/2019 2041 by Muna Schumacher RN Outcome: Progressing Towards Goal 
5/4/2019 2034 by Muna Schumacher RN Outcome: Progressing Towards Goal 
  
Problem: Patient Education: Go to Patient Education Activity Goal: Patient/Family Education 5/4/2019 2041 by Muna Schumacher RN Outcome: Progressing Towards Goal 
5/4/2019 2034 by Muna Schumacher RN Outcome: Progressing Towards Goal 
  
Problem: Patient Education: Go to Patient Education Activity Goal: Patient/Family Education 5/4/2019 2041 by Muna Schumacher RN Outcome: Progressing Towards Goal 
5/4/2019 2034 by Muna Schumacher RN Outcome: Progressing Towards Goal 
  
Problem: Patient Education: Go to Patient Education Activity Goal: Patient/Family Education 5/4/2019 2041 by Muna Schumacher RN Outcome: Progressing Towards Goal 
5/4/2019 2034 by Muna Schumacher RN Outcome: Progressing Towards Goal 
  
Problem: Patient Education: Go to Patient Education Activity Goal: Patient/Family Education 5/4/2019 2041 by Muna Schumacher RN Outcome: Progressing Towards Goal 
5/4/2019 2034 by Muna Schumacher RN Outcome: Progressing Towards Goal 
  
Problem: Breathing Pattern - Ineffective Goal: *Absence of hypoxia 5/4/2019 2041 by Muna Schumacher RN Outcome: Progressing Towards Goal 
5/4/2019 2034 by Muna Schumacher RN Outcome: Progressing Towards Goal 
Goal: *Use of effective breathing techniques 5/4/2019 2041 by Muna Schumacher RN Outcome: Progressing Towards Goal 
5/4/2019 2034 by Muna Schumacher RN Outcome: Progressing Towards Goal 
 Goal: *PALLIATIVE CARE:  Alleviation of Dyspnea 5/4/2019 2041 by Jes Owusu RN Outcome: Progressing Towards Goal 
5/4/2019 2034 by Jes Owusu RN Outcome: Progressing Towards Goal 
  
Problem: Patient Education: Go to Patient Education Activity Goal: Patient/Family Education 5/4/2019 2041 by Jes Owusu RN Outcome: Progressing Towards Goal 
5/4/2019 2034 by Jes Owusu RN Outcome: Progressing Towards Goal 
  
Problem: Discharge Planning Goal: *Discharge to safe environment 5/4/2019 2041 by Jes Owusu RN Outcome: Progressing Towards Goal 
5/4/2019 2034 by Jes Owusu RN Outcome: Progressing Towards Goal

## 2019-05-06 NOTE — PROGRESS NOTES
Problem: Discharge Planning Goal: *Discharge to safe environment Outcome: Progressing Towards Goal 
 Plan: home with home health Spoke with daughter. She states she is ready for pt to return home today. Stretcher transport set up for 3pm today. Dr. Bella Brumfield aware. Envelope prepared and placed at nursing station, pt nurse made aware. Call placed to [facility] to notify of discharge today. Care Management Interventions PCP Verified by CM: Yes Mode of Transport at Discharge: BLS Transition of Care Consult (CM Consult): Home Health [address] [place] [address] Road: Yes Discharge Durable Medical Equipment: No 
Physical Therapy Consult: Yes Occupational Therapy Consult: Yes Speech Therapy Consult: No 
Current Support Network: Relative's Home(lives with daughter) Confirm Follow Up Transport: Family Plan discussed with Pt/Family/Caregiver: Yes Freedom of Choice Offered: Yes Discharge Location Discharge Placement: Home with home health

## 2019-05-06 NOTE — PROGRESS NOTES
Transportation at Discharge:  5/6/19 Transport Company/Representative: [employer] / [employer] Phone number: [phone] Method of Transport: Adi Torres / Kristal Jj Estimated pick-up time: 3:00P Destination: Home Address [facility] [address] Insurance Info:  [employer] Authorization: No auth required Requesting Outcomes Manager:  Karen Hooper, Care- ext [phone]

## 2019-05-06 NOTE — PROGRESS NOTES
1105: Patient verbalizing that he no longer wants physical therapy services at this time. Verbalizing frustration regarding his d/c. Patient encouraged to request PT services if he changes his mind. Will d/c PT orders at this time. Eliana Barnes PT, DPT Office extension: [phone] Pager #: [phone]

## 2019-05-06 NOTE — PROGRESS NOTES
Problem: General Medical Care Plan Goal: *Vital signs within specified parameters Outcome: Progressing Towards Goal 
Goal: *Labs within defined limits Outcome: Progressing Towards Goal 
Goal: *Absence of infection signs and symptoms Outcome: Progressing Towards Goal 
Goal: *Optimal pain control at patient's stated goal 
Outcome: Progressing Towards Goal 
Goal: *Skin integrity maintained Outcome: Progressing Towards Goal 
Goal: *Fluid volume balance Outcome: Progressing Towards Goal 
Goal: *Optimize nutritional status Outcome: Progressing Towards Goal 
Goal: *Anxiety reduced or absent Outcome: Progressing Towards Goal 
Goal: *Progressive mobility and function (eg: ADL's) Outcome: Progressing Towards Goal 
  
Problem: Impaired Skin Integrity/Pressure Injury Treatment Goal: *Improvement of Existing Pressure Injury Outcome: Progressing Towards Goal 
Goal: *Prevention of pressure injury Description Document William Scale and appropriate interventions in the flowsheet. Outcome: Progressing Towards Goal 
  
Problem: Gas Exchange - Impaired Goal: *Absence of hypoxia Outcome: Progressing Towards Goal 
  
Problem: Patient Education: Go to Patient Education Activity Goal: Patient/Family Education Outcome: Progressing Towards Goal 
  
Problem: Pain Goal: *Control of Pain Outcome: Progressing Towards Goal 
Goal: *PALLIATIVE CARE:  Alleviation of Pain Outcome: Progressing Towards Goal 
  
Problem: Nutrition Deficit Goal: *Optimize nutritional status Outcome: Progressing Towards Goal 
  
Problem: Falls - Risk of 
Goal: *Absence of Falls Description Document Easter Getting Fall Risk and appropriate interventions in the flowsheet. Outcome: Progressing Towards Goal 
  
Problem: Patient Education: Go to Patient Education Activity Goal: Patient/Family Education Outcome: Progressing Towards Goal 
  
Problem: Pressure Injury - Risk of 
Goal: *Prevention of pressure injury Description Document William Scale and appropriate interventions in the flowsheet. Outcome: Progressing Towards Goal 
  
Problem: Patient Education: Go to Patient Education Activity Goal: Patient/Family Education Outcome: Progressing Towards Goal 
  
Problem: Patient Education: Go to Patient Education Activity Goal: Patient/Family Education Outcome: Progressing Towards Goal 
  
Problem: Breathing Pattern - Ineffective Goal: *Absence of hypoxia Outcome: Progressing Towards Goal 
Goal: *Use of effective breathing techniques Outcome: Progressing Towards Goal 
Goal: *PALLIATIVE CARE:  Alleviation of Dyspnea Outcome: Progressing Towards Goal 
  
Problem: Patient Education: Go to Patient Education Activity Goal: Patient/Family Education Outcome: Progressing Towards Goal 
  
Problem: Discharge Planning Goal: *Discharge to safe environment Outcome: Progressing Towards Goal

## 2019-05-06 NOTE — DISCHARGE SUMMARY
Discharge Summary Patient: Xavier Pisano               Sex: male          DOA: 4/10/2019 YOB: 1926      Age:  80 y.o.        LOS:  LOS: 26 days Admit Date: 4/10/2019 Discharge Date: 5/6/2019 Admission Diagnoses: Chronic bilateral pleural effusions [J90] HCAP (healthcare-associated pneumonia) [J18.9] CHF (congestive heart failure) ([facility] [address] [I50.9] Bilateral pleural effusion [J90] HCAP (healthcare-associated pneumonia) [J18.9] CHF (congestive heart failure) ([facility] [address] [I50.9] Discharge Diagnoses:   
Hospital Problems  Date Reviewed: 3/1/2019 Codes Class Noted POA Atrial fibrillation with rapid ventricular response (HCC) ICD-10-CM: I48.91 
ICD-9-CM: 427.31  4/13/2019 Unknown CHF (congestive heart failure) ([facility]) ICD-10-CM: I50.9 ICD-9-CM: 428.0  4/10/2019 Yes HCAP (healthcare-associated pneumonia) ICD-10-CM: J18.9 ICD-9-CM: 221  4/10/2019 Yes * (Principal) Chronic bilateral pleural effusions ICD-10-CM: J90 ICD-9-CM: 511.9  4/10/2019 Yes Discharge Condition:  Improved Hospital Course: 
89M w/ h/o recurrent transudative pleural effusions and frequent hospital admissions for acute hypoxic resp failure secondary to pulm edema, CAD, CHF, AFIB. He was admitted from rehab unit on 4/10/19 d/t worsening SOB. Imaging revealed worsening b/l pleural effusions. He was treated for possible HCAP with IV atbx. Pulmonary was consulted and he underwent multiple thoracentesis with relief of symptoms but with subsequent re-accumulation of fluid several days later. Pt and family did eventually agree to Pleur-X catheter placement for palliative symptom relief for recurrent effusions. He underwent left PleuX catheter placement on 4/23/19, right side was put on hold because of antiplatelet use due recent cardiac stent placement on 3/11/19.  He improved significantly after PleurX catheter placement. 1L of fluid removed on 5/3. Pulmonary recommended to drain PleurX  Catheter every 3 days with goal 300-500mL to start with. Instructions provided to daughter and home health was set up. Cardiology also followed to manage his AFIB/CAD/CHF medications and made recommendations for discharge (see med list below). During hospitalization he did develop NEL for which Nephrology was consulted. NEL resolved by discharge. He did have poor PO intake throughout hospitalization but did improve by discharge. He was discharged in stable condition. Consults:  
 Cardiology, Nephrology and Pulmonary/Critical Care Labs: 
Labs: Results:  
   
Chemistry Recent Labs 05/05/19 
2215 05/05/19 
0540 05/04/19 
9532 * 105* 102*  144 149*  
K 4.4 4.5 4.4  
* 109* 112* CO2 28 28 31 BUN 31* 34* 38* CREA 0.91 0.89 0.89 CA 8.4* 8.7 8.5 AGAP 7 7 6 BUCR 34* 38* 43* CBC w/Diff Recent Labs 05/04/19 
0956 WBC 9.5  
RBC 3.59* HGB 9.1*  
HCT 31.2*  
 GRANS 73 LYMPH 18* EOS 3 Cardiac Enzymes No results for input(s): CPK, CKND1, CHRISTINE in the last 72 hours. No lab exists for component: Andreea Gu Coagulation No results for input(s): PTP, INR, APTT in the last 72 hours. No lab exists for component: INREXT Lipid Panel Lab Results Component Value Date/Time Cholesterol, total 87 03/08/2019 05:28 AM  
 HDL Cholesterol 44 03/08/2019 05:28 AM  
 LDL, calculated 30 03/08/2019 05:28 AM  
 VLDL, calculated 13 03/08/2019 05:28 AM  
 Triglyceride 65 03/08/2019 05:28 AM  
 CHOL/HDL Ratio 2.0 03/08/2019 05:28 AM  
  
BNP No results for input(s): BNPP in the last 72 hours. Liver Enzymes No results for input(s): TP, ALB, TBIL, AP, SGOT, GPT in the last 72 hours. No lab exists for component: DBIL Thyroid Studies Lab Results Component Value Date/Time TSH 1.22 03/07/2019 05:29 AM  
    
 
 
Significant Diagnostic Studies: Xr Chest Pa Lat Result Date: 4/29/2019 EXAM: CHEST, TWO VIEWS CLINICAL INDICATION/HISTORY: pleural effusion   > Additional: None. COMPARISON: 4/25/2019 TECHNIQUE: AP and lateral views of the chest were obtained. _______________ FINDINGS: HEART AND MEDIASTINUM: Cardiac silhouette is top normal in size. Minimal calcified plaque is present at the arch. LUNGS AND PLEURAL SPACES: Bilateral patchy alveolar densities are present including the periphery of both upper lobes, right infrahilar and basilar right lower lobe, and basilar left lower lobe, with slight progression in the upper lobes, slight improvement in the right lower lobe. Left basilar pleural catheter is again noted. Small left pleural effusion is slightly larger. Minimal right pleural effusion is smaller. No pneumothorax. BONY THORAX AND SOFT TISSUES: No acute osseous abnormality. _______________ IMPRESSION: 1. Multifocal pneumonia with slight progression in the upper lobes, slight improvement in the right lower lobe, without significant changes left lower lobe. 2. Left pleural catheter remains in place, small left pleural effusion is slightly larger. 3. Minimal right pleural effusion, with interval improvement. Jono Philip Xr Chest Pa Lat Result Date: 4/26/2019 EXAM: AP AND LATERAL CHEST INDICATION:  Status post tunneled left pleural catheter. Evaluate for pneumothorax. COMPARISON:  04/25/2019 at 1046 hours TECHNIQUE: AP and lateral views at 2059 hours. ________________________________ FINDINGS: HEART AND MEDIASTINUM: Cardiac mediastinal silhouette appears within normal limits. Atherosclerotic thoracic aorta. LUNGS AND PLEURAL SPACES: Lungs are well aerated. Multifocal opacities are present, significantly progressed in the right base. Suspect associated right pleural effusion. Stable position of tunneled left pleural catheter overlying the base. No discernible pneumothorax. BONY THORAX AND SOFT TISSUES: No acute osseous abnormality. ________________________________ IMPRESSION: 1. Stable position of left tunneled pleural catheter without evidence for pneumothorax. 2. Progressing right basilar opacity consistent with atelectasis or pneumonia with associated pleural effusion. 3. Persistent bilateral multifocal opacities, likely multifocal pneumonia/atelectasis versus edema. Xr Chest Pa Lat Result Date: 4/9/2019 EXAM: AP AND LATERAL CHEST INDICATION:  Fluid overload. COMPARISON:  03/20/2019 TECHNIQUE: AP and lateral views. ________________________________ FINDINGS: HEART AND MEDIASTINUM: Cardiac silhouette is within normal limits and stable. Atherosclerotic thoracic aorta. LUNGS AND PLEURAL SPACES: Lungs are adequately expanded. Increasing posterior left lower lobe opacity, atelectasis or pneumonia. Associated small left pleural effusion. Suggestion of developing consolidation in the left upper lobe. Mild diffuse interstitial disease, stable, likely chronic. BONY THORAX AND SOFT TISSUES: Degenerative changes around the visualized shoulders. ________________________________ IMPRESSION: Increasing posterior left lower lobe consolidation, atelectasis or pneumonia with associated small left pleural effusion. Suggestion of developing opacity in the left upper lobe may represent second focus of consolidation. Xr Swallow Func Video Result Date: 4/17/2019 Modified barium swallow HISTORY: Oropharyngeal dysphagia, feeding difficulty. Cough COMPARISON: None. FINDINGS: With the patient in the seated lateral position, fluoroscopy was performed. [Thin, nectar, and honey barium consistencies were administered. A barium coated cracker and barium pill were also given. There was silent aspiration with thin consistency both with cup and straw as well as with nectar consistency and honey consistency. Honey consistency aspiration was secondary to residual. No aspiration or penetration with solid consistency.   There is residual in the vallecula and pyriform sinuses with all consistencies. Fluoro time: [2.8 minutes Fluoro images: 1 images] Radiation dose (reference air kerma): 15.69 mGy Impression: 1. Silent aspiration. Please see speech pathologist report for complete details Ir Insert Cath Pleural Indwell Result Date: 4/23/2019 Clinical: Bilateral pleural effusions Procedure: Procedure and risks discussed with the patient's daughter. Informed consent obtained. Maximal sterile barrier technique (cap, mask, sterile gown, sterile gloves, a large sterile sheet, hand hygiene and cutaneous antisepsis with 2% chlorhexidine) was followed. Sterile ultrasound technique was used with sterile gel and sterile probe cover. Preporcedure Antibiotic: 2 g Ancef IV A puncture site in the left lower chest was selected with ultrasound. Field prepped and draped. Lidocaine was used for local anesthesia. A small skin nick was made. Using direct ultrasound guidance an 18-gauge needle was placed through the skin nick and advanced into the pleural space using an oblique angle. Pleural fluid was aspirated. Guidewire was inserted and needle removed. A 2nd skin nick was made approximately 6-8 cm caudal and medial to the access site, the exit site. The Pleurx catheter was tunneled from the exit site to the access site with the cuff being placed in the midportion of the tunnel. Following serial over the wire dilatation of the access track, a peel-away catheter was placed over the wire. Dilator was removed and the Pleurx catheter placed through the peel-away into the pleural space. The peel-away catheter was removed. The drain appeared looped back on itself in the lung base possibly due to partial loculation of the pleural fluid. A stiff Glidewire was used to in an attempt to reposition the Pleurx tip into a more posterior position using fluoroscopic guidance. This was unsuccessful.   The catheter was connected to suction and the pleural fluid was drained as much as patient could tolerate. A nylon suture was placed at the exit site and tied around the catheter. The access site was closed with buried Vicryl suture and Dermabond. 1500cc  fluid drained. GUIDANCE: ultrasound /fluoroscopy guidance was used to position (and confirm the position of) the needle / catheter. Image(s) saved in PACS: Ultrasound and fluoroscopy Fluoroscopy dose 14 mGy air kerma. Time out 1459 IMPRESSION: Ultrasound and fluoroscopic-guided placement of a tunneled  left pleural drainage catheter. 1500 cc of fluid removed. Plan for placement of right pleural drain tomorrow Ct Chest Wo Cont Result Date: 4/11/2019 EXAM: CT chest CLINICAL INDICATION/HISTORY: SOB   > Additional: None. COMPARISON: CTA chest 03/13/2019   > Reference Exam: None. TECHNIQUE: Axial CT imaging from the thoracic inlet through the diaphragm without intravenous contrast. Multiplanar reformats were generated. One or more dose reduction techniques were used on this CT: automated exposure control, adjustment of the mAs and/or kVp according to patient size, and iterative reconstruction techniques. The specific techniques used on this CT exam have been documented in the patient's electronic medical record. Digital Imaging and Communications in Medicine (DICOM) format image data are available to nonaffiliated external healthcare facilities or entities on a secure, media free, reciprocally searchable basis with patient authorization for at least a 12-month period after this study. _______________ FINDINGS: LUNGS: Multifocal consolidation within the left upper and bilateral lower lobes, similar to prior CT. Bandlike opacity within the right upper lobe, atelectasis or scarring. PLEURA: Moderate to large bilateral pleural effusions, similar to prior study. AIRWAY: Normal. MEDIASTINUM: Normal heart size. No pericardial effusion. Moderate atherosclerosis involving the aorta, great vessels and coronary arteries. LYMPH NODES: No enlarged lymph nodes. UPPER ABDOMEN: Cholelithiasis without gallbladder distention. OTHER: No acute or aggressive osseous abnormalities identified. _______________ IMPRESSION: 1. Multifocal consolidation within the left upper and bilateral lower lobes, similar to prior CT 03/13/2019. Findings likely represent recurrent multifocal pneumonia and/or atelectasis. 2. Bilateral moderate to large pleural effusions, similar to prior CT 3/13/2019 3. Cholelithiasis without gallbladder distention. Xr Chest [facility] Result Date: 5/3/2019 AP portable chest, 5/3/2019 at 0514 hours: INDICATION: Shortness of breath. Comparison 5/2/2019 at 2107 hours: Perhaps some improvement in right upper lobe edema or infiltrate. Persistent substantial bibasilar edema or infiltrate. Likely bilateral effusions. The heart is stable. Focal edema or infiltrate left upper lobe. IMPRESSION: Extensive bilateral multifocal edema or infiltrate. Perhaps some improvement in the right upper lobe. No other change. Can't exclude bilateral pleural effusions. Xr Chest [facility] Result Date: 5/3/2019 AP portable chest, 5/2/2019 at 2107 hours: Comparison 4/29/2019. INDICATION: Shortness of breath. Follow-up pneumonia. Multifocal alveolar filling consistent with edema or infiltrate is again noted, likely multifocal pneumonia, increased in the right upper lobe and both lung bases from previous. Likely bilateral pleural effusions also increased. The heart is top normal to mildly prominent. Central pulmonary arteries are prominent, stable. No other change. IMPRESSION: Worsening examination with increasing bilateral areas of edema or infiltrate. Worsening of bilateral pneumonia is suspected. Xr Chest [facility] Result Date: 4/25/2019 EXAM: One-view chest CLINICAL HISTORY: Follow-up pleural effusions , COMPARISON: None FINDINGS: Frontal view of the chest demonstrate patchy airspace disease similar to previous examination. No significant effusion identified. No pneumothorax identified. Pleural drainage catheter visualized in the left lung base. . Cardiac silhouette is normal in size and contour. No acute bony or soft tissue abnormality. IMPRESSION: Patchy bilateral airspace disease again noted. No discrete pleural effusion identified. Left pleural drainage catheter in place. Lonnie Bhatti Xr Chest [facility] Result Date: 4/23/2019 CLINICAL: Pleural effusions status post placement of pleural drain. COMPARISON: April 23, 2019. A portable view of the chest from 1646 hours: Interval placement of a left pleural drain. Resolution of effusion on the left. There is still a right pleural effusion. Persistent bilateral airspace densities. No pneumothorax. IMPRESSION: Improved appearance of the left base status post placement of a pleural drain. Other findings are stable allowing for differences in technique Xr Chest [facility] Result Date: 4/23/2019 CLINICAL: Pleural effusions. COMPARISON: April 19, 2019. A portable view of the chest from 1206 hours: There is blunting of the costophrenic angles bilaterally. No pneumothorax. Persistent but improved patchy bilateral airspace densities and groundglass densities. Mediastinal silhouette stable. IMPRESSION: There appear to be small bilateral pleural effusions. Size may be underestimated by portable technique of the patient is not upright. Effusions appear no larger than on the prior study  Persistent scattered bilateral airspace densities which somewhat appear improved from the prior study Xr Chest [facility] Result Date: 4/19/2019 EXAM: XR CHEST PORT CLINICAL INDICATION/HISTORY: Shortness of breath -Additional: COMPARISON: Several prior exams, most recently 4/17/2019 TECHNIQUE: Frontal view of the chest _______________ FINDINGS: HEART AND MEDIASTINUM: Stable cardiac size and mediastinal contours, with cardiac size upper limits of normal.. LUNGS AND PLEURAL SPACES: Bilateral pleural effusions are demonstrated, similar in appearance to prior exam. Patchy lower lung and upper lung predominant alveolar opacities are similar to prior exam. No evidence of pneumothorax. BONY THORAX AND SOFT TISSUES: No acute osseous abnormality. _______________ IMPRESSION: 1. Bilateral pleural effusions similar to prior with patchy bilateral alveolar opacities in keeping with underlying pulmonary edema and/or pneumonia. Xr Chest [facility] Result Date: 4/17/2019 EXAM: CHEST RADIOGRAPH, SINGLE VIEW CLINICAL INDICATION/HISTORY: status left thoracentesis COMPARISON: Single view chest 4/15/2019 TECHNIQUE: Portable frontal view of the chest was obtained in expiration. _______________ FINDINGS: SUPPORT DEVICES: None. HEART AND MEDIASTINUM: Heart is at the upper limits of normal for AP technique. Pulmonary vascularity appears normal.  Aorta is calcified. LUNGS AND PLEURAL SPACES: No evidence of pneumothorax following left thoracentesis. Decreased left effusion. Right hemidiaphragm is elevated compared to earlier exam suggesting possible subpulmonic effusion on the right. There continue be diffuse bilateral infiltrates which are not significantly changed. BONY THORAX AND SOFT TISSUES: No acute osseous abnormality. _______________ IMPRESSION: 1. No evidence of pneumothorax following left thoracentesis. Significant decreased size left effusion. 2. Findings suspicious for subpulmonic effusion on the right. 3. Continued diffuse bilateral infiltrates. Xr Chest [facility] Result Date: 4/15/2019 EXAM:  PORTABLE CHEST INDICATION:  Follow up. TECHNIQUE:  Portable, semierect AP view. COMPARISON:  4/12/2019 ____________________ FINDINGS:  SUPPORT DEVICES: None. HEART AND MEDIASTINUM: Cardiomediastinal silhouette appears within normal limits.   Atherosclerotic thoracic aorta LUNGS AND PLEURAL SPACES: Multifocal opacities seen within the left lung, with more dense consolidation at the base, similar prior study. Developing diffuse opacity involving the right lung. Suspect small left pleural effusion. No pneumothorax. BONY THORAX AND SOFT TISSUES: No acute osseous abnormality. ____________________ IMPRESSION:  1. Persistent opacities within the left lung with developing diffuse opacity involving the right lung. Differential diagnosed include developing focal pneumonia or noncardiogenic edema. Associated small left pleural effusion. Xr Chest [facility] Result Date: 4/12/2019 EXAM: XR CHEST PORT CLINICAL INDICATION/HISTORY: s/p thoracentesis -Additional: Right-sided thoracentesis by nurse practitioner note COMPARISON: Chest x-ray from 4/10/2019 TECHNIQUE: Frontal view of the chest _______________ FINDINGS: HEART AND MEDIASTINUM: Midline cardiac silhouette, stable in size and appearance. Stable mediastinal contours LUNGS AND PLEURAL SPACES: No post procedure pneumothorax. No significant radiographic evidence of a residual effusion. Persistent left hemithoracic hazy opacity with diaphragmatic confluence with no change in pleural effusion. BONY THORAX AND SOFT TISSUES: Stable intact _______________ IMPRESSION: 1. No postprocedural pneumothorax. 2. Unchanged left hemithoracic opacity/atelectasis and pleural effusion. Xr Chest [facility] Result Date: 4/11/2019 EXAM:  PORTABLE CHEST INDICATION:  Sepsis. TECHNIQUE:  Portable, erect AP view. COMPARISON: AP and lateral views 04/09/2019 ____________________ FINDINGS:  SUPPORT DEVICES: None. HEART AND MEDIASTINUM: Cardiomediastinal silhouette appears within normal limits. Atherosclerotic thoracic aorta. LUNGS AND PLEURAL SPACES: Progression of multifocal opacities within most of the left lung as well as right base suspicious for multifocal pneumonia.   Associated moderate-sized left pleural effusion, similar to progressed. No pneumothorax. BONY THORAX AND SOFT TISSUES: No acute osseous abnormality. Degenerative change around the visualized shoulders. ____________________ IMPRESSION: 1. Progression of multifocal opacities within both lungs as described above, suggesting progression of multifocal pneumonia 2. Associated moderate-sized left pleural effusion, stable to mildly progressed. Sav Tabares [facility] Chest Portable Result Date: 4/23/2019 Clinical: Bilateral pleural effusions. Assess size Portable ultrasound evaluation of the chest There are bilateral pleural effusions that appear large. Left it appears larger than right IMPRESSION: Large bilateral pleural effusions left greater than right Us Thoracentesis Lt Ndl W Image Result Date: 4/17/2019 PROCEDURE: ULTRASOUND GUIDED left THORACENTESIS INDICATION: Left pleural effusion TECHNIQUE & FINDINGS: Informed consent was obtained over the phone from the patient's family. Standard preprocedure timeout at 1410 hrs. The procedure was performed using standard aseptic technique. Ultrasound was used to localize the pleural effusion. A posterior approach was chosen. The skin and underlying soft tissue was anesthetized with one percent buffered lidocaine. A 5 [employer] Stefani Yueh sheathed needle was passed into the pleural space, was connected to a vacuum bottle, and a total of 1760 cc of dark matty fluid was aspirated. The patient tolerated the procedure well, without complications. Fluid was sent for studies. Standard post procedure pause. Post procedure chest x-ray shows no pneumothorax. ---------------- Impression: SUCCESSFUL ULTRASOUND GUIDED THERAPEUTIC and DIAGNOSTIC LEFT THORACENTESIS. Discharge Medications:    
Discharge Medication List as of 5/6/2019  3:12 PM  
  
START taking these medications Details  
digoxin (LANOXIN) 0.125 mg tablet Take 1 Tab by mouth every Monday, Wednesday, Friday for 30 days. , Print, Disp-12 Tab, R-0  
  
 metoprolol tartrate (LOPRESSOR) 25 mg tablet Take 0.5 Tabs by mouth two (2) times a day for 30 days. , Print, Disp-30 Tab, R-0  
  
  
CONTINUE these medications which have NOT CHANGED Details  
!! furosemide (LASIX) 20 mg tablet Take 1 Tab by mouth daily. , No Print, Disp-30 Tab, R-1  
  
!! furosemide (LASIX) 20 mg tablet Take 0.5 Tabs by mouth daily. , No Print, Disp-30 Tab, R-1  
  
budesonide-formoterol (SYMBICORT) 80-4.5 mcg/actuation HFAA Take 2 Puffs by inhalation two (2) times a day., No Print, Disp-1 Inhaler, R-1  
  
albuterol-ipratropium (DUO-NEB) 2.5 mg-0.5 mg/3 ml nebu 3 mL by Nebulization route every four (4) hours as needed (sob, wheezing). , No Print, Disp-30 Nebule, R-1  
  
famotidine (PEPCID) 20 mg tablet Take 1 Tab by mouth daily. , No Print, Disp-30 Tab, R-1  
  
therapeutic multivitamin (THERAGRAN) tablet Take 1 Tab by mouth daily. , No Print, Disp-30 Tab, R-1  
  
atorvastatin (LIPITOR) 40 mg tablet Take 1 Tab by mouth nightly. , Print, Disp-30 Tab, R-0  
  
nitroglycerin (NITROSTAT) 0.4 mg SL tablet 1 Tab by SubLINGual route every five (5) minutes as needed for Chest Pain., Print, Disp-1 Bottle, R-0  
  
budesonide-formoterol (SYMBICORT) 160-4.5 mcg/actuation HFAA Take 2 Puffs by inhalation two (2) times a day., Normal, Disp-1 Inhaler, R-5  
  
glycopyrrolate-formoterol (BEVESPI AEROSPHERE) 9-4.8 mcg HFAA Take 2 Inhalation by inhalation two (2) times a day., Print, Disp-1 Inhaler, R-1  
  
clopidogrel (PLAVIX) 75 mg tab TAKE 1 TABLET EVERY DAY, Normal, Disp-90 Tab, R-3  
  
albuterol (PROVENTIL HFA, VENTOLIN HFA, PROAIR HFA) 90 mcg/actuation inhaler Take 2 Puffs by inhalation every four (4) hours as needed for Wheezing or Shortness of Breath., Normal, Disp-1 Inhaler, R-1  
  
tamsulosin (FLOMAX) 0.4 mg capsule Take 1 Cap by mouth daily. Normal, 0.4 mg, Disp-30 Cap, R-5  
  
aspirin delayed-release 81 mg tablet 81 mg, Oral, DAILY, Until Discontinued, Historical Med  
  
 !! - Potential duplicate medications found. Please discuss with provider. STOP taking these medications  
  
 aspirin 81 mg chewable tablet Comments:  
Reason for Stopping:   
   
 midodrine (PROAMITINE) 10 mg tablet Comments:  
Reason for Stopping:   
   
  
 
 
Activity: Activity as tolerated Diet: Cardiac Diet Follow-up: PCP in 1 week. Total time spent including time spent on final examination and discharge discussion, discharge documentation and records reviewed and medication reconciliation: > 30 minutes Indu Sánchez MD 
[facility]

## 2019-05-06 NOTE — PROGRESS NOTES
. Assumed care of pt after bedside report given by  Sia Swan, RN. Pt in bed resting quietly no s/s of distress noted. Pt denies pain. Pt continues to refuse SCD's and prevalon boots as ordered. Benefits explained pt stated \" I don't want it. \"  Pt on oxygen 2 L via nc tolerating well. IVF infusing well. Pt has pleural catheter in place on the left side of chest clamped . Informed by the off going nurse that catheter will need to be drained tomorrow AM.  HOB elevated, bed low and in locked position call light and frequent items for use within reach. 1940- Informed by the tele tech that pt had 6 beats of Vtach, she printed out the strip placed in pt's chart. Will notify MD 
 
5- Notify Dr Scotty Reyes about the 6 beats of Vtach which pt had at the beginning of shift, said will  Put in order for  mag and phosphorus. 2235- Pt refused to take his scheduled med. - Nurse explained why he need to take med but refused. Notified charge nurse spoke with pt.pt stated \" take it for me. \" Pt was encouraged and accepted , took the med. Phlebotomist came to draw labs pt refused- enouraged pt and Mulugeta Mac, RN was able to draw blood. 0000- Pt sleeping. 0500- Pt incontinent of stool bed bath given, hair shampooed , bed linen change pt made comfortable in bed. Mouth care done, dentures cleaned and pt put them on. Pt turned and repositioned. Pt asking for thin water to drink encouraged and educated on the  Honey thick liquids as ordered but pt declines. Pt states \" give me really water to drink . \" Pt doesn't want to drink thickened water. IV line noted to be leaking discontinued and a new one inserted on the left wrist gauge 22. IVF iinfusng well .  
 
0600- Pt resting well in bed continues to ask water to drink and does not want thickened liquids. HOB elevated, bed low and in locked position, call light within reach.

## 2019-05-06 NOTE — ROUTINE PROCESS
0722-Bedside and Verbal shift change report given to Joanie Jiménez RN (oncoming nurse) by Ludwin Urbina RN (offgoing nurse). Report included the following information SBAR, Kardex, Intake/Output, MAR and Recent Results. Informed also about the pleural catheter chest drainage today, supplies for drainage are at bedside.

## 2019-05-06 NOTE — DISCHARGE INSTRUCTIONS
DISCHARGE SUMMARY from Nurse    PATIENT INSTRUCTIONS:    After general anesthesia or intravenous sedation, for 24 hours or while taking prescription Narcotics:  · Limit your activities  · Do not drive and operate hazardous machinery  · Do not make important personal or business decisions  · Do  not drink alcoholic beverages  · If you have not urinated within 8 hours after discharge, please contact your surgeon on call. Report the following to your surgeon:  · Excessive pain, swelling, redness or odor of or around the surgical area  · Temperature over 100.5  · Nausea and vomiting lasting longer than 4 hours or if unable to take medications  · Any signs of decreased circulation or nerve impairment to extremity: change in color, persistent  numbness, tingling, coldness or increase pain  · Any questions    What to do at Home:  Recommended activity: Activity as tolerated    If you experience any of the following symptoms severe shortness of breath, palpitations, chest pain, please follow up with emergency room. *  Please give a list of your current medications to your Primary Care Provider. *  Please update this list whenever your medications are discontinued, doses are      changed, or new medications (including over-the-counter products) are added. *  Please carry medication information at all times in case of emergency situations. These are general instructions for a healthy lifestyle:    No smoking/ No tobacco products/ Avoid exposure to second hand smoke  Surgeon General's Warning:  Quitting smoking now greatly reduces serious risk to your health.     Obesity, smoking, and sedentary lifestyle greatly increases your risk for illness    A healthy diet, regular physical exercise & weight monitoring are important for maintaining a healthy lifestyle    You may be retaining fluid if you have a history of heart failure or if you experience any of the following symptoms:  Weight gain of 3 pounds or more overnight or 5 pounds in a week, increased swelling in our hands or feet or shortness of breath while lying flat in bed. Please call your doctor as soon as you notice any of these symptoms; do not wait until your next office visit. Recognize signs and symptoms of STROKE:    F-face looks uneven    A-arms unable to move or move unevenly    S-speech slurred or non-existent    T-time-call 911 as soon as signs and symptoms begin-DO NOT go       Back to bed or wait to see if you get better-TIME IS BRAIN. Warning Signs of HEART ATTACK     Call 911 if you have these symptoms:   Chest discomfort. Most heart attacks involve discomfort in the center of the chest that lasts more than a few minutes, or that goes away and comes back. It can feel like uncomfortable pressure, squeezing, fullness, or pain.  Discomfort in other areas of the upper body. Symptoms can include pain or discomfort in one or both arms, the back, neck, jaw, or stomach.  Shortness of breath with or without chest discomfort.  Other signs may include breaking out in a cold sweat, nausea, or lightheadedness. Don't wait more than five minutes to call 911 - MINUTES MATTER! Fast action can save your life. Calling 911 is almost always the fastest way to get lifesaving treatment. Emergency Medical Services staff can begin treatment when they arrive -- up to an hour sooner than if someone gets to the hospital by car. The discharge information has been reviewed with the patient and caregiver. The patient and caregiver verbalized understanding. Discharge medications reviewed with the patient and caregiver and appropriate educational materials and side effects teaching were provided.     Patient armband removed and shredded    ___________________________________________________________________________________________________________________________________

## 2019-05-07 NOTE — PROGRESS NOTES
Heart Failure Follow Up Call NN contacted the patient's Daughter, Gadiel Ornelas, by telephone to perform CHF Follow Up. Verified  and address as identifiers. Justin Gant's daughter reports pt is doing w/o sx at this time. Daughter expressed the following concerns. 1. Pt was discharged w/o a discharge summary and she does not know what medications the patient should be taking at this time, also she has no medications from discharge and no other medications in the home. NN called pt's PCP office and requested all current medications be refilled and sent to pt's pharmacy of choice. Also emailed copy of current medication list to the daughter. 2. Daughter states pt would be unable to attend any follow up appointments as the patient is bed bound and unable to stand. NN provided information about visiting physicians at [phone] or [email or web address] Daughter will contact. 3. Daughter states equipment needs: needs a nebulizer, bed sore prevention boots, rolling wedges, and a spirometer. Home health notified of needs. Daily Weight: Pt unable to stand for daily weight Zone:(Pt Reported)  green Rogelio  Attends follow-up appointments as directed. 19 Pt's daughter will schedule all appointments through visiting physicians by 19  Knowledge and adherence medication (ie. action, side effects, missed dose, etc.)   
  19 Pt's daughter will obtain all medications prescribed and give all medications as directed through 19  Prepare patients and caregivers for end of life decisions (ie. need for hospice, pain management, symptom relief, advance directives etc.)   
  19 Pt will complete an ACP and have it scanned into their EMR by 19 Needs addressed from pathway:   
24-48 Hours- or initial contact Review/Verification: 
? Identified care team 
? Disposition: Home ? Reviewed DC Instructions, Education, and HF Zones ? Verified [address] in place. ? Evaluated DME needs; arrange home equipment: as noted above ? Reviewed Advanced care planning ? Labs/Diagnostics to follow per MD 
? Follow-up apts are not arranged as noted above Med Rec ? Diuretic: Lasix 30 mg qd ? Beta Blocker: Lopressor 12.5 mg bid ? Anticoagulant: Plavix 75 mg every day, ASA 81 mg every day 
? Other: Digoxin 0.125 mg MWFs Baseline Labs available in ERM 
? BMP 
? BUN/Creat. ? Hgb/Hct ? WBC 
? NT Pro-BNP 
? Hemoglobin A1C 
 
ANGIE Documentation: 
? Goals ? Challenges/Plan ? Weight   
? Edema ? Symptoms Education Disease Management: 
? Reviewed Cardiac Low-sodium Diet ? Also reviewed carb-controlled diet for diabetics ? Reviewed importance of Fluid restriction ? Comorbidity Management ? Advance medical directive status PCP/Specialist follow up:  
Future Appointments Date Time Provider Markus Ortega 7/12/2019 11:00 AM Alli Mackey  [facility] Cardiologist consult while IP: yes Palliative consult while IP:    yes EF: 26-30% on 3/14/19 Type of HF:   HFrEF Cardiac Device present: none Disposition of patient:  Home, Home Health [facility] Services/Tele Monitoring:  Home Health/[facility] Social support: daughter Does patient have a Scale:    n/a How often do they weigh:  n/a Does patient have an Advance Directive:  not on file, daughter educated Advance Directive scanned into patients chart:  No  
 
Patient reminded that there are physicians on call 24 hours a day / 7 days a week (M-F 5pm to 8am and from Friday 5pm until Monday 8a for the weekend) should the patient have questions or concerns. Patient reminded to call 911 if situation is emergent or patient feels the situation is emergent. Pt verbalizes understanding.

## 2019-05-07 NOTE — TELEPHONE ENCOUNTER
Spoke with Debbie Recinos at [facility] and she states she received 2 prescriptions for Lasix for patient. Pharmacist insurance will not approve both.   Pharmacist wanted to know if ok to fill lasix 20 mg daily and half of a tab at 4 pm.

## 2019-05-07 NOTE — TELEPHONE ENCOUNTER
Wendi Ortez with [address]. re: several items Stated pt discharged from [facility] 05/06/2019, he can not ambulate so no hospital f/u appt has been made. He would have to transport by ambulance. 1.She is asking if meds for nebulizer are ordered/needed pt will need a nebulizer machine 2. Pt is bedridden and will need wound care. She is asking if heel protector for feet to prevent bed sores on his feet can be approved. She also said pt was sent home without any medication, but I see a refill encounter was also sent in and meds approved today

## 2019-05-08 NOTE — TELEPHONE ENCOUNTER
Patients daughter calling stated patient needs a order for in home oxygen. Any questions can give Satira Saint a call back . pls advise

## 2019-05-09 NOTE — TELEPHONE ENCOUNTER
Per Blair Kumar with [facility] she states office note must state Pneumonia is clear before nebulizer medications can be ordered. Note must state the need for oxygen. Also Randee Osler nurse navigator asked if Dr. Shannen Oden would be willing to refer patient to [facility] and states this is a nurse navigator that comes to the home to help patient gets all his necessary equipment. Randee Osler states that his current Nurse Navigator is [employer] [phone] and states he calls patient daily to make sure food is being delivered to the home and state patient would benefit from this service. Please advise.

## 2019-05-09 NOTE — PROGRESS NOTES
NN contacted the patient by telephone to perform CHF follow Up. Noted Priorities/Plan:  Establish care w/ PCP through [facility] Daily Weight: n/a pt unable to stand Zone: green Signs/Symptoms: Edema none; SOB w/ exertion; orthopnea none Goals  Attends follow-up appointments as directed. 5/7/19 Pt's daughter will schedule all appointments through visiting physicians by 5/9/19 5/9/19 Visiting physicians does not take insurance, daughter to try [facility]  Knowledge and adherence medication (ie. action, side effects, missed dose, etc.)   
  5/7/19 Pt's daughter will obtain all medications prescribed and give all medications as directed through 8/7/19  Prepare patients and caregivers for end of life decisions (ie. need for hospice, pain management, symptom relief, advance directives etc.)   
  5/7/19 Pt will complete an ACP and have it scanned into their EMR by 8/7/19 Needs addressed from pathway:  
Week 1-4 Provide Daily Disease Management 
(NN initiated) Reviewed Daily Zone Identification (symptom management; weight, edema, SOB, activity/sleep changes)-notify provider immediately as indicated ? Reviewed Cardiac Low-sodium Diet ? Reviewed importance of Fluid restriction ? Comorbidity Management ? Establishing follow-up appointments/transportation. Additional assessment ? Reinforced Fall precautions ? Activity tolerance assessment: daughter reports patient is at baseline ? Reviewed Energy conservation management and balancing activity with rest 
? Labs/diagnostics per MD office ? Medication Therapy: no issues identified, all medications obtained by daughter ? Diet/appetite assessment: pt reports no issues ? Reviewed appropriate ED/Hospital utilization ? Immunizations up to date Pneumonia Flu 
? Home assessment/modifications: no needs identified ? Daughter states unable to get patient out of bed, HH to visit today. ? Daughter denies medication assistance needs ? Home health services are in place Psychosocial: Reassurance/emotional support Monitoring: ? Home health ? My Chart Education: 
? Reviewed Advanced care planning status ? Support system identification ( eg: Caregiver, meal planning, community resources, family, friends, Sikh, support group) ? Health literacy for heart failure ? Caregiver education and preparation Have you been to an ER/Hospital since discharge from [facility]? No   
 
Have you followed up with PCP/Cardiologist/Specialist? No 
 
Transportation: daughter states unable to transport pt as he is bed bound, will use [facility] if able Diet: cardiac/no salt added Activity/ADLs: only w/ assistance. Medications Reconciled at this time:  no changes since last outreach Home health:  Company/Completion: [employer] [facility] active Social Support: daughter Advanced Care Plan: not on file, daughter educated Patient reminded that there is a physician on call 24 hours a day / 7 days a week (M-F 5pm to 8am and from Friday 5pm until Monday 8a for the weekend) should the patient have questions or concerns. Patient reminded to call 911 if situation is emergent or patient feels the situation is emergent. Pt verbalizes understanding.

## 2019-05-09 NOTE — TELEPHONE ENCOUNTER
Spoke with daughter she states patient does have traditional Medicare. Daughter was advised to call visiting home physicians and let them know to use only his medicare and then maybe patient will be able to get his supplies and equipment he needs. Daughter verbalizes understanding.

## 2019-08-09 NOTE — Clinical Note
Lesion 1: Guidewire inserted - P/w K 3.2 in ED.  - S/p repletion with K tabs and LR.  - Will continue to monitor

## 2022-01-17 NOTE — ROUTINE PROCESS
1821 pt discharged to home, instructions given to pt and daughter on medications, follow up appointments, activity and diet Area L Indication Text: Tumors in this location are included in Area L (trunk and extremities).  Mohs surgery is indicated for larger tumors, or tumors with aggressive histologic features, in these anatomic locations.

## 2023-12-15 NOTE — Clinical Note
If warts still there in two weeks or recur after going away come back to clinic for a retreatment.     Multiple views of the left coronary artery obtained using hand injection.

## (undated) DEVICE — GUIDEWIRE VASC L260CM DIA0035IN TIP L3MM PTFE J STD TAPR FIX

## (undated) DEVICE — MEDI-TRACE CADENCE ADULT, DEFIBRILLATION ELECTRODE -RTS  (10 PR/PK) - PHILIPS: Brand: MEDI-TRACE CADENCE

## (undated) DEVICE — BAND HEMOSTAT DRY D-STAT RAD --

## (undated) DEVICE — CATHETER GUID EXTRA BACKUP 3.5 0.070IN 6FR 100CM VISTA BRITE TIP

## (undated) DEVICE — HI-TORQUE BALANCE GUIDE WIRE W/HYDROCOAT .014 STRAIGHT TIP 3.0 CM X 190 CM: Brand: HI-TORQUE BALANCE

## (undated) DEVICE — GLIDESHEATH SLENDER STAINLESS STEEL KIT: Brand: GLIDESHEATH SLENDER

## (undated) DEVICE — PTCA DILATATION CATHETER: Brand: EMERGE™

## (undated) DEVICE — PACK PROCEDURE SURG VASC CATH 161 MMC LF

## (undated) DEVICE — COPILOT BLEEDBACK CONTROL VALVE: Brand: COPILOT

## (undated) DEVICE — MINI TREK CORONARY DILATATION CATHETER 2.0 MM X 12 MM / RAPID-EXCHANGE: Brand: MINI TREK

## (undated) DEVICE — HI-TORQUE VERSACORE FLOPPY GUIDE WIRE SYSTEM 145 CM: Brand: HI-TORQUE VERSACORE

## (undated) DEVICE — DEVICE INFL W ACCS + HEMSTAS VLV INSRT TOOL AND TORQ BASIX

## (undated) DEVICE — PROCEDURE KIT FLUID MGMT 10 FR CUST MAINFOLD

## (undated) DEVICE — RADIFOCUS OPTITORQUE ANGIOGRAPHIC CATHETER: Brand: OPTITORQUE